# Patient Record
Sex: FEMALE | Race: WHITE | Employment: OTHER | URBAN - METROPOLITAN AREA
[De-identification: names, ages, dates, MRNs, and addresses within clinical notes are randomized per-mention and may not be internally consistent; named-entity substitution may affect disease eponyms.]

---

## 2017-09-19 ENCOUNTER — TELEPHONE (OUTPATIENT)
Dept: CARDIOLOGY CLINIC | Age: 72
End: 2017-09-19

## 2017-09-19 NOTE — TELEPHONE ENCOUNTER
Called Dr. Kashmir Lovell office to let staff know we have the request for clearance for pt for surgery. Pt needs to be seen, has an appt on 11/22, surgery is scheduled for 11/30. Let staff know we cannot clear until pt is seen. She verbalized understanding.

## 2017-09-19 NOTE — TELEPHONE ENCOUNTER
Kelly mazariegos/Dr. Adelfo Bell office wants to speak with you. She said she already left a message for you regarding this patient. Please call.  Thanks

## 2018-02-11 ENCOUNTER — APPOINTMENT (OUTPATIENT)
Dept: CT IMAGING | Age: 73
DRG: 853 | End: 2018-02-11
Attending: EMERGENCY MEDICINE
Payer: MEDICARE

## 2018-02-11 ENCOUNTER — ANESTHESIA (OUTPATIENT)
Dept: SURGERY | Age: 73
DRG: 853 | End: 2018-02-11
Payer: MEDICARE

## 2018-02-11 ENCOUNTER — HOSPITAL ENCOUNTER (INPATIENT)
Age: 73
LOS: 32 days | Discharge: NURSING, REHAB OR LONG-TERM CARE | DRG: 853 | End: 2018-03-16
Attending: EMERGENCY MEDICINE | Admitting: SURGERY
Payer: MEDICARE

## 2018-02-11 ENCOUNTER — ANESTHESIA EVENT (OUTPATIENT)
Dept: SURGERY | Age: 73
DRG: 853 | End: 2018-02-11
Payer: MEDICARE

## 2018-02-11 DIAGNOSIS — K25.1 ACUTE GASTRIC ULCER WITH PERFORATION (HCC): Primary | ICD-10-CM

## 2018-02-11 DIAGNOSIS — S00.01XA ABRASION OF SCALP, INITIAL ENCOUNTER: ICD-10-CM

## 2018-02-11 DIAGNOSIS — E87.20 LACTIC ACIDOSIS: ICD-10-CM

## 2018-02-11 DIAGNOSIS — Z98.890 S/P EXPLORATORY LAPAROTOMY: ICD-10-CM

## 2018-02-11 DIAGNOSIS — S22.081A T12 BURST FRACTURE (HCC): ICD-10-CM

## 2018-02-11 DIAGNOSIS — S32.029A CLOSED FRACTURE OF SECOND LUMBAR VERTEBRA, UNSPECIFIED FRACTURE MORPHOLOGY, INITIAL ENCOUNTER (HCC): ICD-10-CM

## 2018-02-11 DIAGNOSIS — R19.8 PERFORATED VISCUS: ICD-10-CM

## 2018-02-11 DIAGNOSIS — S00.83XA FACIAL CONTUSION, INITIAL ENCOUNTER: ICD-10-CM
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
## 2018-02-11 DIAGNOSIS — K25.5 PERFORATED CHRONIC GASTRIC ULCER (HCC): ICD-10-CM

## 2018-02-11 DIAGNOSIS — K66.8 FREE INTRAPERITONEAL AIR: ICD-10-CM

## 2018-02-11 DIAGNOSIS — R52 PAIN: ICD-10-CM

## 2018-02-11 DIAGNOSIS — K70.30 ALCOHOLIC CIRRHOSIS OF LIVER WITHOUT ASCITES (HCC): ICD-10-CM

## 2018-02-11 DIAGNOSIS — F10.10 ETOH ABUSE: ICD-10-CM

## 2018-02-11 LAB
AMMONIA PLAS-SCNC: <10 UMOL/L
AMPHET UR QL SCN: NEGATIVE
ANION GAP BLD CALC-SCNC: 22 MMOL/L (ref 5–15)
APAP SERPL-MCNC: <2 UG/ML (ref 10–30)
APPEARANCE UR: ABNORMAL
APTT PPP: 25.3 SEC (ref 22.1–32.5)
BACTERIA URNS QL MICRO: ABNORMAL /HPF
BARBITURATES UR QL SCN: NEGATIVE
BASOPHILS # BLD: 0 K/UL (ref 0–0.1)
BASOPHILS NFR BLD: 0 % (ref 0–1)
BENZODIAZ UR QL: NEGATIVE
BILIRUB UR QL: NEGATIVE
BUN BLD-MCNC: 14 MG/DL (ref 9–20)
CA-I BLD-MCNC: 1.08 MMOL/L (ref 1.12–1.32)
CANNABINOIDS UR QL SCN: NEGATIVE
CHLORIDE BLD-SCNC: 101 MMOL/L (ref 98–107)
CK SERPL-CCNC: 162 U/L (ref 26–192)
CO2 BLD-SCNC: 17 MMOL/L (ref 21–32)
COCAINE UR QL SCN: NEGATIVE
COLOR UR: YELLOW
CREAT BLD-MCNC: 1 MG/DL (ref 0.6–1.3)
DIFFERENTIAL METHOD BLD: ABNORMAL
DRUG SCRN COMMENT,DRGCM: NORMAL
EOSINOPHIL # BLD: 0 K/UL (ref 0–0.4)
EOSINOPHIL NFR BLD: 0 % (ref 0–7)
EPITH CASTS URNS QL MICRO: ABNORMAL /LPF
ERYTHROCYTE [DISTWIDTH] IN BLOOD BY AUTOMATED COUNT: 13.2 % (ref 11.5–14.5)
ETHANOL SERPL-MCNC: 14 MG/DL
GLUCOSE BLD-MCNC: 108 MG/DL (ref 65–100)
GLUCOSE UR STRIP.AUTO-MCNC: NEGATIVE MG/DL
HCT VFR BLD AUTO: 49.6 % (ref 35–47)
HCT VFR BLD CALC: 53 % (ref 35–47)
HGB BLD-MCNC: 16.3 G/DL (ref 11.5–16)
HGB BLD-MCNC: 18 GM/DL (ref 11.5–16)
HGB UR QL STRIP: ABNORMAL
HYALINE CASTS URNS QL MICRO: ABNORMAL /LPF (ref 0–5)
IMM GRANULOCYTES # BLD: 0 K/UL
IMM GRANULOCYTES NFR BLD AUTO: 0 %
INR PPP: 1.2 (ref 0.9–1.1)
KETONES UR QL STRIP.AUTO: NEGATIVE MG/DL
LACTATE SERPL-SCNC: 8.4 MMOL/L (ref 0.4–2)
LEUKOCYTE ESTERASE UR QL STRIP.AUTO: ABNORMAL
LIPASE SERPL-CCNC: 184 U/L (ref 73–393)
LYMPHOCYTES # BLD: 0.3 K/UL (ref 0.8–3.5)
LYMPHOCYTES NFR BLD: 12 % (ref 12–49)
MCH RBC QN AUTO: 34.8 PG (ref 26–34)
MCHC RBC AUTO-ENTMCNC: 32.9 G/DL (ref 30–36.5)
MCV RBC AUTO: 105.8 FL (ref 80–99)
METAMYELOCYTES NFR BLD MANUAL: 9 %
METHADONE UR QL: NEGATIVE
MONOCYTES # BLD: 0.3 K/UL (ref 0–1)
MONOCYTES NFR BLD: 11 % (ref 5–13)
MYELOCYTES NFR BLD MANUAL: 1 %
NEUTS BAND NFR BLD MANUAL: 22 % (ref 0–6)
NEUTS SEG # BLD: 1.7 K/UL (ref 1.8–8)
NEUTS SEG NFR BLD: 45 % (ref 32–75)
NITRITE UR QL STRIP.AUTO: NEGATIVE
NRBC # BLD: 0 K/UL (ref 0–0.01)
NRBC BLD-RTO: 0 PER 100 WBC
OPIATES UR QL: NEGATIVE
PCP UR QL: NEGATIVE
PH UR STRIP: 5.5 [PH] (ref 5–8)
PLATELET # BLD AUTO: 173 K/UL (ref 150–400)
PLATELET COMMENTS,PCOM: ABNORMAL
PMV BLD AUTO: 12.9 FL (ref 8.9–12.9)
POTASSIUM BLD-SCNC: 4 MMOL/L (ref 3.5–5.1)
PROT UR STRIP-MCNC: 30 MG/DL
PROTHROMBIN TIME: 11.7 SEC (ref 9–11.1)
RBC # BLD AUTO: 4.69 M/UL (ref 3.8–5.2)
RBC #/AREA URNS HPF: ABNORMAL /HPF (ref 0–5)
RBC MORPH BLD: ABNORMAL
SALICYLATES SERPL-MCNC: 1.9 MG/DL (ref 2.8–20)
SERVICE CMNT-IMP: ABNORMAL
SODIUM BLD-SCNC: 135 MMOL/L (ref 136–145)
SP GR UR REFRACTOMETRY: 1 (ref 1–1.03)
THERAPEUTIC RANGE,PTTT: NORMAL SECS (ref 58–77)
TROPONIN I SERPL-MCNC: <0.04 NG/ML
UR CULT HOLD, URHOLD: NORMAL
UROBILINOGEN UR QL STRIP.AUTO: 0.2 EU/DL (ref 0.2–1)
WBC # BLD AUTO: 2.6 K/UL (ref 3.6–11)
WBC URNS QL MICRO: ABNORMAL /HPF (ref 0–4)

## 2018-02-11 PROCEDURE — 85610 PROTHROMBIN TIME: CPT | Performed by: EMERGENCY MEDICINE

## 2018-02-11 PROCEDURE — 84484 ASSAY OF TROPONIN QUANT: CPT | Performed by: EMERGENCY MEDICINE

## 2018-02-11 PROCEDURE — 36415 COLL VENOUS BLD VENIPUNCTURE: CPT | Performed by: EMERGENCY MEDICINE

## 2018-02-11 PROCEDURE — 74011000250 HC RX REV CODE- 250: Performed by: EMERGENCY MEDICINE

## 2018-02-11 PROCEDURE — 70486 CT MAXILLOFACIAL W/O DYE: CPT

## 2018-02-11 PROCEDURE — 74011000258 HC RX REV CODE- 258: Performed by: EMERGENCY MEDICINE

## 2018-02-11 PROCEDURE — 80047 BASIC METABLC PNL IONIZED CA: CPT

## 2018-02-11 PROCEDURE — 77030034850

## 2018-02-11 PROCEDURE — 80307 DRUG TEST PRSMV CHEM ANLYZR: CPT | Performed by: EMERGENCY MEDICINE

## 2018-02-11 PROCEDURE — 87040 BLOOD CULTURE FOR BACTERIA: CPT | Performed by: EMERGENCY MEDICINE

## 2018-02-11 PROCEDURE — 81001 URINALYSIS AUTO W/SCOPE: CPT | Performed by: EMERGENCY MEDICINE

## 2018-02-11 PROCEDURE — 99284 EMERGENCY DEPT VISIT MOD MDM: CPT

## 2018-02-11 PROCEDURE — 96375 TX/PRO/DX INJ NEW DRUG ADDON: CPT

## 2018-02-11 PROCEDURE — 83605 ASSAY OF LACTIC ACID: CPT | Performed by: EMERGENCY MEDICINE

## 2018-02-11 PROCEDURE — 96361 HYDRATE IV INFUSION ADD-ON: CPT

## 2018-02-11 PROCEDURE — 82550 ASSAY OF CK (CPK): CPT | Performed by: EMERGENCY MEDICINE

## 2018-02-11 PROCEDURE — 96365 THER/PROPH/DIAG IV INF INIT: CPT

## 2018-02-11 PROCEDURE — 74011636320 HC RX REV CODE- 636/320: Performed by: RADIOLOGY

## 2018-02-11 PROCEDURE — 74177 CT ABD & PELVIS W/CONTRAST: CPT

## 2018-02-11 PROCEDURE — 82140 ASSAY OF AMMONIA: CPT | Performed by: EMERGENCY MEDICINE

## 2018-02-11 PROCEDURE — 83690 ASSAY OF LIPASE: CPT | Performed by: EMERGENCY MEDICINE

## 2018-02-11 PROCEDURE — 51702 INSERT TEMP BLADDER CATH: CPT

## 2018-02-11 PROCEDURE — 74011250636 HC RX REV CODE- 250/636

## 2018-02-11 PROCEDURE — 85730 THROMBOPLASTIN TIME PARTIAL: CPT | Performed by: EMERGENCY MEDICINE

## 2018-02-11 PROCEDURE — 74011250636 HC RX REV CODE- 250/636: Performed by: EMERGENCY MEDICINE

## 2018-02-11 PROCEDURE — 93005 ELECTROCARDIOGRAM TRACING: CPT

## 2018-02-11 PROCEDURE — 85025 COMPLETE CBC W/AUTO DIFF WBC: CPT | Performed by: EMERGENCY MEDICINE

## 2018-02-11 PROCEDURE — 70450 CT HEAD/BRAIN W/O DYE: CPT

## 2018-02-11 RX ORDER — ONDANSETRON 2 MG/ML
4 INJECTION INTRAMUSCULAR; INTRAVENOUS
Status: COMPLETED | OUTPATIENT
Start: 2018-02-11 | End: 2018-02-11

## 2018-02-11 RX ORDER — SODIUM CHLORIDE 0.9 % (FLUSH) 0.9 %
5-10 SYRINGE (ML) INJECTION AS NEEDED
Status: DISCONTINUED | OUTPATIENT
Start: 2018-02-11 | End: 2018-03-16 | Stop reason: HOSPADM

## 2018-02-11 RX ORDER — NAPROXEN SODIUM 220 MG
220 TABLET ORAL
COMMUNITY
End: 2018-03-16

## 2018-02-11 RX ORDER — FAMOTIDINE 10 MG/ML
20 INJECTION INTRAVENOUS
Status: COMPLETED | OUTPATIENT
Start: 2018-02-11 | End: 2018-02-11

## 2018-02-11 RX ORDER — SODIUM CHLORIDE 0.9 % (FLUSH) 0.9 %
5-10 SYRINGE (ML) INJECTION EVERY 8 HOURS
Status: DISCONTINUED | OUTPATIENT
Start: 2018-02-11 | End: 2018-02-13 | Stop reason: SDUPTHER

## 2018-02-11 RX ORDER — LANOLIN ALCOHOL/MO/W.PET/CERES
500 CREAM (GRAM) TOPICAL DAILY
COMMUNITY
End: 2018-04-25

## 2018-02-11 RX ORDER — BISMUTH SUBSALICYLATE 262 MG
1 TABLET,CHEWABLE ORAL DAILY
COMMUNITY

## 2018-02-11 RX ORDER — FENTANYL CITRATE 50 UG/ML
50 INJECTION, SOLUTION INTRAMUSCULAR; INTRAVENOUS
Status: COMPLETED | OUTPATIENT
Start: 2018-02-11 | End: 2018-02-11

## 2018-02-11 RX ADMIN — PIPERACILLIN SODIUM AND TAZOBACTAM SODIUM 3.38 G: 3; .375 INJECTION, POWDER, LYOPHILIZED, FOR SOLUTION INTRAVENOUS at 22:43

## 2018-02-11 RX ADMIN — IOPAMIDOL 100 ML: 755 INJECTION, SOLUTION INTRAVENOUS at 21:49

## 2018-02-11 RX ADMIN — FAMOTIDINE 20 MG: 10 INJECTION, SOLUTION INTRAVENOUS at 21:52

## 2018-02-11 RX ADMIN — SODIUM CHLORIDE 1000 ML: 9 INJECTION, SOLUTION INTRAVENOUS at 21:51

## 2018-02-11 RX ADMIN — ONDANSETRON 4 MG: 2 INJECTION INTRAMUSCULAR; INTRAVENOUS at 21:52

## 2018-02-11 RX ADMIN — Medication 10 ML: at 21:52

## 2018-02-11 RX ADMIN — FENTANYL CITRATE 50 MCG: 50 INJECTION, SOLUTION INTRAMUSCULAR; INTRAVENOUS at 21:52

## 2018-02-11 RX ADMIN — SODIUM CHLORIDE 1000 ML: 900 INJECTION, SOLUTION INTRAVENOUS at 22:35

## 2018-02-11 RX ADMIN — SODIUM CHLORIDE, SODIUM LACTATE, POTASSIUM CHLORIDE, CALCIUM CHLORIDE: 600; 310; 30; 20 INJECTION, SOLUTION INTRAVENOUS at 23:20

## 2018-02-11 NOTE — IP AVS SNAPSHOT
[facility] 
 
 
 [address] 1007 [facility] 
[phone] Patient: Raisa Fink MRN: [id number] GAZ:[id number] About your hospitalization You were admitted on:  February 12, 2018 You last received care in the:  [facility] 4M POST SURG ORT 2 You were discharged on:  March 16, 2018 Why you were hospitalized Your primary diagnosis was:  Perforated Chronic Gastric Ulcer (Hcc) Your diagnoses also included:  Etoh Abuse, Free Intraperitoneal Air, S/P Exploratory Laparotomy, Alcoholic Cirrhosis Of Liver Without Ascites (Hcc), Fatty Liver Determined By Biopsy, Hyperammonemia (Hcc), Hypokalemia, Leukocytosis, Severe Protein-Calorie Malnutrition (Hcc), Acute Metabolic Encephalopathy, Gastrocutaneous Fistula, Pleural Effusion, Bilateral, Intra-Abdominal Fluid Collection, T12 Burst Fracture (Hcc) Follow-up Information Follow up With Details Comments Contact Info Ash Magallon MD In 2 weeks  [address] [place] [address] 
[phone] [address] [place] [address] 
[phone] [facility] [address]   210 [facility] [address] 23 [facility]   210 [facility] 74 
[phone] Discharge Orders None A check halina indicates which time of day the medication should be taken. My Medications START taking these medications Instructions Each Dose to Equal  
 Morning Noon Evening Bedtime  
 fentaNYL 25 mcg/hr PATCH Commonly known as:  Dorothy Benne Start taking on:  3/18/2018 Your next dose is:  1130 am on Sunday March 18 1 Patch by TransDERmal route every seventy-two (72) hours. Max Daily Amount: 1 Patch. 1 Patch  
    
  
   
   
   
  
 pantoprazole 40 mg tablet Commonly known as:  PROTONIX Your next dose is:  Tomorrow before breakfast  
   
 Take 1 Tab by mouth Before breakfast and dinner. 40 mg  
    
  
   
   
   
  
 polyethylene glycol 17 gram packet Commonly known as:  Judye Gaster Your next dose is:  Tomorrow before breakfast  
   
 Take 1 Packet by mouth daily. 17 g  
    
  
   
   
   
  
 spironolactone 25 mg tablet Commonly known as:  ALDACTONE Your next dose is:  Tomorrow in am  
   
 Take 1 Tab by mouth daily. 25 mg CONTINUE taking these medications Instructions Each Dose to Equal  
 Morning Noon Evening Bedtime  
 cyanocobalamin 500 mcg tablet Commonly known as:  VITAMIN B12 Your next dose is:  Tomorrow in am  
   
 Take 500 mcg by mouth daily. 500 mcg  
    
  
   
   
   
  
 multivitamin tablet Commonly known as:  ONE A DAY Your next dose is:  Tomorrow am  
   
 Take 1 Tab by mouth daily. 1 Tab  
    
  
   
   
   
  
 omega-3 fatty acids-vitamin e 1,000 mg Cap Your next dose is:  Tomorrow am  
   
 Take 2 Caps by mouth daily. 2 Cap STOP taking these medications ALEVE 220 mg tablet Generic drug:  naproxen sodium Where to Get Your Medications Information on where to get these meds will be given to you by the nurse or doctor. ! Ask your nurse or doctor about these medications  
  fentaNYL 25 mcg/hr PATCH  
 pantoprazole 40 mg tablet  
 polyethylene glycol 17 gram packet  
 spironolactone 25 mg tablet Discharge Instructions Peptic Ulcer Disease: Care Instructions Your Care Instructions Peptic ulcers are sores on the inside of the stomach or the small intestine. They are usually caused by an infection with bacteria or from use of nonsteroidal anti-inflammatory drugs (NSAIDs). NSAIDs include aspirin, ibuprofen (Advil), and naproxen (Aleve). Your doctor may have prescribed medicine to reduce stomach acid.   You also may need to take antibiotics if your peptic ulcers are caused by an infection. You can help your stomach heal and keep ulcers from coming back by making some changes in your lifestyle. Quit smoking, limit caffeine and alcohol, and reduce stress. Follow-up care is a key part of your treatment and safety. Be sure to make and go to all appointments, and call your doctor if you are having problems. It's also a good idea to know your test results and keep a list of the medicines you take. How can you care for yourself at home? · Take your medicines exactly as prescribed. Call your doctor if you think you are having a problem with your medicine. · Do not take aspirin or other NSAIDs such as ibuprofen (Advil or Motrin) or naproxen (Aleve). Ask your doctor what you can take for pain. · Do not smoke. Smoking can make ulcers worse. If you need help quitting, talk to your doctor about stop-smoking programs and medicines. These can increase your chances of quitting for good. · Drink in moderation or avoid drinking alcohol. · Do not drink beverages that have caffeine if they bother your stomach. These include coffee, tea, and soda. · Eat a balanced diet of small, frequent meals. Make an appointment with a dietitian if you need help planning your meals. · Reduce stress. Avoid people and places that make you feel anxious, if you can. Learn ways to reduce stress, such as biofeedback, guided imagery, and meditation. When should you call for help? Call 911 anytime you think you may need emergency care. For example, call if: 
? · You vomit blood or what looks like coffee grounds. ? · You pass maroon or very bloody stools. ?Call your doctor now or seek immediate medical care if: 
? · You have new or worse belly pain. ? · Your stools are black and look like tar, or they have streaks of blood. ? · You are vomiting. ? Watch closely for changes in your health, and be sure to contact your doctor if: 
? · You do not feel better as expected. Where can you learn more? Go to http://tala-jacob.info/. Enter C931 in the search box to learn more about \"Peptic Ulcer Disease: Care Instructions. \" Current as of: May 12, 2017 Content Version: 11.4 © 3136-0190 [employer]. Care instructions adapted under license by [employer] (which disclaims liability or warranty for this information). If you have questions about a medical condition or this instruction, always ask your healthcare professional. [place] [address] any warranty or liability for your use of this information. Garth Siemens. Veronica Ace MD, FACS General Surgery at [address] [place] [address], [address] 
[phone] Fax [phone] The[employer] Announcement We are excited to announce that we are making your provider's discharge notes available to you in TheFriendMail. You will see these notes when they are completed and signed by the physician that discharged you from your recent hospital stay. If you have any questions or concerns about any information you see in TheFriendMail, please call the Health Information Department where you were seen or reach out to your Primary Care Provider for more information about your plan of care. Introducing [facility] [place] [facility]! Dear Kiki Mccauley: Thank you for requesting a TheFriendMail account. Our records indicate that you already have an active TheFriendMail account. You can access your account anytime at [email or web address] Did you know that you can access your hospital and ER discharge instructions at any time in TheFriendMail? You can also review all of your test results from your hospital stay or ER visit. Additional Information If you have questions, please visit the Frequently Asked Questions section of the TheFriendMail website at [email or web address] Remember, MyChart is NOT to be used for urgent needs. For medical emergencies, dial 911. Now available from your iPhone and Android! Providers Seen During Your Hospitalization Provider Specialty Primary office phone [place], [address] Emergency Medicine [phone] Katia Blake MD Surgery [phone] Your Primary Care Physician (PCP) Primary Care Physician Office Phone Office Fax [phone] [facility],[address], [address]  [phone] You are allergic to the following No active allergies Recent Documentation Height Weight Breastfeeding? BMI OB Status Smoking Status 1.613 m 81 kg No 31.14 kg/m2 Postmenopausal Former Smoker Emergency Contacts Name Discharge Info Relation Home Work Mobile [address] CAREGIVER [3] Daughter [21] [phone] Patient Belongings The following personal items are in your possession at time of discharge: 
  Dental Appliances: None  Visual Aid: Glasses, With patient      Home Medications: None   Jewelry: Ring  Clothing: At bedside    Other Valuables: At bedside Please provide this summary of care documentation to your next provider. Signatures-by signing, you are acknowledging that this After Visit Summary has been reviewed with you and you have received a copy. Patient Signature:  ____________________________________________________________ Date:  ___________________________________________________________[facility] Provider Signature:  ____________________________________________________________ Date:  ____________________________________________________________

## 2018-02-11 NOTE — IP AVS SNAPSHOT
[facility] 
 
 
 [address] 
[phone] Patient: Glen Alberto MRN: [id number] YXQ:[birth year] A check halina indicates which time of day the medication should be taken. My Medications START taking these medications Instructions Each Dose to Equal  
 Morning Noon Evening Bedtime  
 fentaNYL 25 mcg/hr PATCH Commonly known as:  Daryl Lozada Start taking on:  3/18/2018 Your next dose is:  1130 am on Sunday March 18 1 Patch by TransDERmal route every seventy-two (72) hours. Max Daily Amount: 1 Patch. 1 Patch  
    
  
   
   
   
  
 pantoprazole 40 mg tablet Commonly known as:  PROTONIX Your next dose is:  Tomorrow before breakfast  
   
 Take 1 Tab by mouth Before breakfast and dinner. 40 mg  
    
  
   
   
   
  
 polyethylene glycol 17 gram packet Commonly known as:  Gillermina Larchwood Your next dose is:  Tomorrow before breakfast  
   
 Take 1 Packet by mouth daily. 17 g  
    
  
   
   
   
  
 spironolactone 25 mg tablet Commonly known as:  ALDACTONE Your next dose is:  Tomorrow in am  
   
 Take 1 Tab by mouth daily. 25 mg CONTINUE taking these medications Instructions Each Dose to Equal  
 Morning Noon Evening Bedtime  
 cyanocobalamin 500 mcg tablet Commonly known as:  VITAMIN B12 Your next dose is:  Tomorrow in am  
   
 Take 500 mcg by mouth daily. 500 mcg  
    
  
   
   
   
  
 multivitamin tablet Commonly known as:  ONE A DAY Your next dose is:  Tomorrow am  
   
 Take 1 Tab by mouth daily. 1 Tab  
    
  
   
   
   
  
 omega-3 fatty acids-vitamin e 1,000 mg Cap Your next dose is:  Tomorrow am  
   
 Take 2 Caps by mouth daily. 2 Cap STOP taking these medications ALEVE 220 mg tablet Generic drug:  naproxen sodium Where to Get Your Medications Information on where to get these meds will be given to you by the nurse or doctor. ! Ask your nurse or doctor about these medications  
  fentaNYL 25 mcg/hr PATCH  
 pantoprazole 40 mg tablet  
 polyethylene glycol 17 gram packet  
 spironolactone 25 mg tablet

## 2018-02-12 ENCOUNTER — APPOINTMENT (OUTPATIENT)
Dept: GENERAL RADIOLOGY | Age: 73
DRG: 853 | End: 2018-02-12
Attending: ANESTHESIOLOGY
Payer: MEDICARE

## 2018-02-12 PROBLEM — K70.30 ALCOHOLIC CIRRHOSIS OF LIVER WITHOUT ASCITES (HCC): Status: ACTIVE | Noted: 2018-02-12

## 2018-02-12 PROBLEM — K66.8 FREE INTRAPERITONEAL AIR: Status: ACTIVE | Noted: 2018-02-12

## 2018-02-12 PROBLEM — Z98.890 S/P EXPLORATORY LAPAROTOMY: Status: ACTIVE | Noted: 2018-02-12

## 2018-02-12 PROBLEM — K25.5 PERFORATED CHRONIC GASTRIC ULCER (HCC): Status: ACTIVE | Noted: 2018-02-11

## 2018-02-12 LAB
ABO + RH BLD: NORMAL
ANION GAP SERPL CALC-SCNC: 10 MMOL/L (ref 5–15)
ARTERIAL PATENCY WRIST A: ABNORMAL
ATRIAL RATE: 106 BPM
BASE DEFICIT BLDA-SCNC: 5.3 MMOL/L
BASOPHILS # BLD: 0 K/UL (ref 0–0.1)
BASOPHILS NFR BLD: 0 % (ref 0–1)
BDY SITE: ABNORMAL
BLOOD GROUP ANTIBODIES SERPL: NORMAL
BUN SERPL-MCNC: 11 MG/DL (ref 6–20)
BUN/CREAT SERPL: 24 (ref 12–20)
CALCIUM SERPL-MCNC: 5.5 MG/DL (ref 8.5–10.1)
CALCULATED P AXIS, ECG09: 40 DEGREES
CALCULATED R AXIS, ECG10: 29 DEGREES
CALCULATED T AXIS, ECG11: 66 DEGREES
CHLORIDE SERPL-SCNC: 114 MMOL/L (ref 97–108)
CO2 SERPL-SCNC: 17 MMOL/L (ref 21–32)
CREAT SERPL-MCNC: 0.46 MG/DL (ref 0.55–1.02)
DIAGNOSIS, 93000: NORMAL
DIFFERENTIAL METHOD BLD: ABNORMAL
EOSINOPHIL # BLD: 0 K/UL (ref 0–0.4)
EOSINOPHIL NFR BLD: 1 % (ref 0–7)
EPAP/CPAP/PEEP, PAPEEP: 5
ERYTHROCYTE [DISTWIDTH] IN BLOOD BY AUTOMATED COUNT: 13 % (ref 11.5–14.5)
FIO2 ON VENT: 65 %
GAS FLOW.O2 O2 DELIVERY SYS: 50 L/MIN
GAS FLOW.O2 SETTING OXYMISER: 14 L/MIN
GLUCOSE SERPL-MCNC: 80 MG/DL (ref 65–100)
HCO3 BLDA-SCNC: 19 MMOL/L (ref 22–26)
HCT VFR BLD AUTO: 41.7 % (ref 35–47)
HGB BLD-MCNC: 13.8 G/DL (ref 11.5–16)
IMM GRANULOCYTES # BLD: 0 K/UL
IMM GRANULOCYTES NFR BLD AUTO: 0 %
LYMPHOCYTES # BLD: 0.2 K/UL (ref 0.8–3.5)
LYMPHOCYTES NFR BLD: 10 % (ref 12–49)
MAGNESIUM SERPL-MCNC: 0.7 MG/DL (ref 1.6–2.4)
MCH RBC QN AUTO: 34.8 PG (ref 26–34)
MCHC RBC AUTO-ENTMCNC: 33.1 G/DL (ref 30–36.5)
MCV RBC AUTO: 105 FL (ref 80–99)
METAMYELOCYTES NFR BLD MANUAL: 7 %
MONOCYTES # BLD: 0.2 K/UL (ref 0–1)
MONOCYTES NFR BLD: 10 % (ref 5–13)
NEUTS BAND NFR BLD MANUAL: 29 % (ref 0–6)
NEUTS SEG # BLD: 1.3 K/UL (ref 1.8–8)
NEUTS SEG NFR BLD: 43 % (ref 32–75)
NRBC # BLD: 0 K/UL (ref 0–0.01)
NRBC BLD-RTO: 0 PER 100 WBC
P-R INTERVAL, ECG05: 144 MS
PCO2 BLDA: 36 MMHG (ref 35–45)
PH BLDA: 7.36 [PH] (ref 7.35–7.45)
PLATELET # BLD AUTO: 115 K/UL (ref 150–400)
PMV BLD AUTO: 12.6 FL (ref 8.9–12.9)
PO2 BLDA: 81 MMHG (ref 80–100)
POTASSIUM SERPL-SCNC: 3.2 MMOL/L (ref 3.5–5.1)
Q-T INTERVAL, ECG07: 372 MS
QRS DURATION, ECG06: 88 MS
QTC CALCULATION (BEZET), ECG08: 494 MS
RBC # BLD AUTO: 3.97 M/UL (ref 3.8–5.2)
RBC MORPH BLD: ABNORMAL
SAO2 % BLD: 96 % (ref 92–97)
SAO2% DEVICE SAO2% SENSOR NAME: ABNORMAL
SODIUM SERPL-SCNC: 141 MMOL/L (ref 136–145)
SPECIMEN EXP DATE BLD: NORMAL
SPECIMEN SITE: ABNORMAL
VENTILATION MODE VENT: ABNORMAL
VENTRICULAR RATE, ECG03: 106 BPM
VT SETTING VENT: 450 ML
WBC # BLD AUTO: 1.8 K/UL (ref 3.6–11)
WBC MORPH BLD: ABNORMAL

## 2018-02-12 PROCEDURE — 77030018836 HC SOL IRR NACL ICUM -A: Performed by: SURGERY

## 2018-02-12 PROCEDURE — 74011250637 HC RX REV CODE- 250/637: Performed by: INTERNAL MEDICINE

## 2018-02-12 PROCEDURE — 82803 BLOOD GASES ANY COMBINATION: CPT | Performed by: SURGERY

## 2018-02-12 PROCEDURE — 77030011278 HC ELECTRD LIG IMPT COVD -F: Performed by: SURGERY

## 2018-02-12 PROCEDURE — 83735 ASSAY OF MAGNESIUM: CPT | Performed by: SURGERY

## 2018-02-12 PROCEDURE — 77030011640 HC PAD GRND REM COVD -A: Performed by: SURGERY

## 2018-02-12 PROCEDURE — 0DU607Z SUPPLEMENT STOMACH WITH AUTOLOGOUS TISSUE SUBSTITUTE, OPEN APPROACH: ICD-10-PCS | Performed by: SURGERY

## 2018-02-12 PROCEDURE — 65610000006 HC RM INTENSIVE CARE

## 2018-02-12 PROCEDURE — 0DB60ZX EXCISION OF STOMACH, OPEN APPROACH, DIAGNOSTIC: ICD-10-PCS | Performed by: SURGERY

## 2018-02-12 PROCEDURE — 77030026438 HC STYL ET INTUB CARD -A: Performed by: ANESTHESIOLOGY

## 2018-02-12 PROCEDURE — 87077 CULTURE AEROBIC IDENTIFY: CPT | Performed by: EMERGENCY MEDICINE

## 2018-02-12 PROCEDURE — C1765 ADHESION BARRIER: HCPCS | Performed by: SURGERY

## 2018-02-12 PROCEDURE — 76060000035 HC ANESTHESIA 2 TO 2.5 HR: Performed by: SURGERY

## 2018-02-12 PROCEDURE — 77030005401 HC CATH RAD ARRO -A: Performed by: ANESTHESIOLOGY

## 2018-02-12 PROCEDURE — 77030019908 HC STETH ESOPH SIMS -A: Performed by: ANESTHESIOLOGY

## 2018-02-12 PROCEDURE — C9113 INJ PANTOPRAZOLE SODIUM, VIA: HCPCS | Performed by: SURGERY

## 2018-02-12 PROCEDURE — 74011250636 HC RX REV CODE- 250/636: Performed by: INTERNAL MEDICINE

## 2018-02-12 PROCEDURE — 74011250636 HC RX REV CODE- 250/636

## 2018-02-12 PROCEDURE — 71045 X-RAY EXAM CHEST 1 VIEW: CPT

## 2018-02-12 PROCEDURE — 85025 COMPLETE CBC W/AUTO DIFF WBC: CPT | Performed by: SURGERY

## 2018-02-12 PROCEDURE — 74011250636 HC RX REV CODE- 250/636: Performed by: SURGERY

## 2018-02-12 PROCEDURE — 77030019563 HC DEV ATTCH FEED HOLL -A

## 2018-02-12 PROCEDURE — 88307 TISSUE EXAM BY PATHOLOGIST: CPT | Performed by: SURGERY

## 2018-02-12 PROCEDURE — 80048 BASIC METABOLIC PNL TOTAL CA: CPT | Performed by: SURGERY

## 2018-02-12 PROCEDURE — 77030003481 HC NDL BIOP GUN BARD -B: Performed by: SURGERY

## 2018-02-12 PROCEDURE — 94002 VENT MGMT INPAT INIT DAY: CPT

## 2018-02-12 PROCEDURE — 87075 CULTR BACTERIA EXCEPT BLOOD: CPT | Performed by: EMERGENCY MEDICINE

## 2018-02-12 PROCEDURE — 76210000016 HC OR PH I REC 1 TO 1.5 HR: Performed by: SURGERY

## 2018-02-12 PROCEDURE — 87102 FUNGUS ISOLATION CULTURE: CPT | Performed by: EMERGENCY MEDICINE

## 2018-02-12 PROCEDURE — 77030013079 HC BLNKT BAIR HGGR 3M -A: Performed by: ANESTHESIOLOGY

## 2018-02-12 PROCEDURE — C1751 CATH, INF, PER/CENT/MIDLINE: HCPCS | Performed by: ANESTHESIOLOGY

## 2018-02-12 PROCEDURE — 77010033678 HC OXYGEN DAILY

## 2018-02-12 PROCEDURE — 77030020061 HC IV BLD WRMR ADMIN SET 3M -B: Performed by: ANESTHESIOLOGY

## 2018-02-12 PROCEDURE — 77030002966 HC SUT PDS J&J -A: Performed by: SURGERY

## 2018-02-12 PROCEDURE — 74011250637 HC RX REV CODE- 250/637: Performed by: SURGERY

## 2018-02-12 PROCEDURE — 74011000250 HC RX REV CODE- 250

## 2018-02-12 PROCEDURE — 77030008771 HC TU NG SALEM SUMP -A: Performed by: ANESTHESIOLOGY

## 2018-02-12 PROCEDURE — 77030008684 HC TU ET CUF COVD -B: Performed by: ANESTHESIOLOGY

## 2018-02-12 PROCEDURE — 77030020186 HC BOOT HL PROTCT SAGE -B

## 2018-02-12 PROCEDURE — 74011250636 HC RX REV CODE- 250/636: Performed by: ANESTHESIOLOGY

## 2018-02-12 PROCEDURE — 88313 SPECIAL STAINS GROUP 2: CPT | Performed by: SURGERY

## 2018-02-12 PROCEDURE — 86900 BLOOD TYPING SEROLOGIC ABO: CPT | Performed by: EMERGENCY MEDICINE

## 2018-02-12 PROCEDURE — 76010000131 HC OR TIME 2 TO 2.5 HR: Performed by: SURGERY

## 2018-02-12 PROCEDURE — 94003 VENT MGMT INPAT SUBQ DAY: CPT

## 2018-02-12 PROCEDURE — 77030002933 HC SUT MCRYL J&J -A: Performed by: SURGERY

## 2018-02-12 PROCEDURE — 88342 IMHCHEM/IMCYTCHM 1ST ANTB: CPT | Performed by: SURGERY

## 2018-02-12 PROCEDURE — 0D9670Z DRAINAGE OF STOMACH WITH DRAINAGE DEVICE, VIA NATURAL OR ARTIFICIAL OPENING: ICD-10-PCS | Performed by: SURGERY

## 2018-02-12 PROCEDURE — 74011000250 HC RX REV CODE- 250: Performed by: SURGERY

## 2018-02-12 PROCEDURE — 88305 TISSUE EXAM BY PATHOLOGIST: CPT | Performed by: SURGERY

## 2018-02-12 PROCEDURE — 77030032490 HC SLV COMPR SCD KNE COVD -B: Performed by: SURGERY

## 2018-02-12 PROCEDURE — 0FB20ZX EXCISION OF LEFT LOBE LIVER, OPEN APPROACH, DIAGNOSTIC: ICD-10-PCS | Performed by: SURGERY

## 2018-02-12 PROCEDURE — 77030011264 HC ELECTRD BLD EXT COVD -A: Performed by: SURGERY

## 2018-02-12 PROCEDURE — 77030013797 HC KT TRNSDUC PRSSR EDWD -A: Performed by: ANESTHESIOLOGY

## 2018-02-12 PROCEDURE — 36415 COLL VENOUS BLD VENIPUNCTURE: CPT | Performed by: EMERGENCY MEDICINE

## 2018-02-12 PROCEDURE — 87205 SMEAR GRAM STAIN: CPT | Performed by: EMERGENCY MEDICINE

## 2018-02-12 RX ORDER — LORAZEPAM 2 MG/ML
4 INJECTION INTRAMUSCULAR
Status: DISCONTINUED | OUTPATIENT
Start: 2018-02-12 | End: 2018-02-16

## 2018-02-12 RX ORDER — LIDOCAINE HYDROCHLORIDE 20 MG/ML
INJECTION, SOLUTION EPIDURAL; INFILTRATION; INTRACAUDAL; PERINEURAL AS NEEDED
Status: DISCONTINUED | OUTPATIENT
Start: 2018-02-12 | End: 2018-02-12 | Stop reason: HOSPADM

## 2018-02-12 RX ORDER — MAGNESIUM SULFATE HEPTAHYDRATE 40 MG/ML
2 INJECTION, SOLUTION INTRAVENOUS
Status: COMPLETED | OUTPATIENT
Start: 2018-02-12 | End: 2018-02-17

## 2018-02-12 RX ORDER — SODIUM CHLORIDE, SODIUM LACTATE, POTASSIUM CHLORIDE, CALCIUM CHLORIDE 600; 310; 30; 20 MG/100ML; MG/100ML; MG/100ML; MG/100ML
100 INJECTION, SOLUTION INTRAVENOUS CONTINUOUS
Status: DISCONTINUED | OUTPATIENT
Start: 2018-02-12 | End: 2018-02-12 | Stop reason: HOSPADM

## 2018-02-12 RX ORDER — MIDAZOLAM HYDROCHLORIDE 1 MG/ML
INJECTION, SOLUTION INTRAMUSCULAR; INTRAVENOUS AS NEEDED
Status: DISCONTINUED | OUTPATIENT
Start: 2018-02-12 | End: 2018-02-12 | Stop reason: HOSPADM

## 2018-02-12 RX ORDER — SODIUM CHLORIDE, SODIUM LACTATE, POTASSIUM CHLORIDE, CALCIUM CHLORIDE 600; 310; 30; 20 MG/100ML; MG/100ML; MG/100ML; MG/100ML
INJECTION, SOLUTION INTRAVENOUS
Status: DISCONTINUED | OUTPATIENT
Start: 2018-02-11 | End: 2018-02-12 | Stop reason: HOSPADM

## 2018-02-12 RX ORDER — PROPOFOL 10 MG/ML
5-50 VIAL (ML) INTRAVENOUS
Status: DISCONTINUED | OUTPATIENT
Start: 2018-02-12 | End: 2018-02-12 | Stop reason: HOSPADM

## 2018-02-12 RX ORDER — LORAZEPAM 2 MG/ML
2 INJECTION INTRAMUSCULAR
Status: DISCONTINUED | OUTPATIENT
Start: 2018-02-12 | End: 2018-02-16

## 2018-02-12 RX ORDER — ONDANSETRON 2 MG/ML
4 INJECTION INTRAMUSCULAR; INTRAVENOUS
Status: DISCONTINUED | OUTPATIENT
Start: 2018-02-12 | End: 2018-03-16 | Stop reason: HOSPADM

## 2018-02-12 RX ORDER — NALOXONE HYDROCHLORIDE 0.4 MG/ML
0.4 INJECTION, SOLUTION INTRAMUSCULAR; INTRAVENOUS; SUBCUTANEOUS AS NEEDED
Status: DISCONTINUED | OUTPATIENT
Start: 2018-02-12 | End: 2018-03-16 | Stop reason: HOSPADM

## 2018-02-12 RX ORDER — NYSTATIN 100000 [USP'U]/G
POWDER TOPICAL 2 TIMES DAILY
Status: DISCONTINUED | OUTPATIENT
Start: 2018-02-12 | End: 2018-03-16 | Stop reason: HOSPADM

## 2018-02-12 RX ORDER — SODIUM CHLORIDE, SODIUM LACTATE, POTASSIUM CHLORIDE, CALCIUM CHLORIDE 600; 310; 30; 20 MG/100ML; MG/100ML; MG/100ML; MG/100ML
INJECTION, SOLUTION INTRAVENOUS
Status: DISCONTINUED | OUTPATIENT
Start: 2018-02-12 | End: 2018-02-12 | Stop reason: HOSPADM

## 2018-02-12 RX ORDER — PROPOFOL 10 MG/ML
INJECTION, EMULSION INTRAVENOUS AS NEEDED
Status: DISCONTINUED | OUTPATIENT
Start: 2018-02-12 | End: 2018-02-12 | Stop reason: HOSPADM

## 2018-02-12 RX ORDER — SUCCINYLCHOLINE CHLORIDE 20 MG/ML
INJECTION INTRAMUSCULAR; INTRAVENOUS AS NEEDED
Status: DISCONTINUED | OUTPATIENT
Start: 2018-02-12 | End: 2018-02-12 | Stop reason: HOSPADM

## 2018-02-12 RX ORDER — FENTANYL CITRATE 50 UG/ML
INJECTION, SOLUTION INTRAMUSCULAR; INTRAVENOUS AS NEEDED
Status: DISCONTINUED | OUTPATIENT
Start: 2018-02-12 | End: 2018-02-12 | Stop reason: HOSPADM

## 2018-02-12 RX ORDER — HYDROMORPHONE HYDROCHLORIDE 2 MG/ML
1 INJECTION, SOLUTION INTRAMUSCULAR; INTRAVENOUS; SUBCUTANEOUS
Status: DISCONTINUED | OUTPATIENT
Start: 2018-02-12 | End: 2018-02-12

## 2018-02-12 RX ORDER — SODIUM CHLORIDE 0.9 % (FLUSH) 0.9 %
5-10 SYRINGE (ML) INJECTION EVERY 8 HOURS
Status: DISCONTINUED | OUTPATIENT
Start: 2018-02-12 | End: 2018-02-17

## 2018-02-12 RX ORDER — ROCURONIUM BROMIDE 10 MG/ML
INJECTION, SOLUTION INTRAVENOUS AS NEEDED
Status: DISCONTINUED | OUTPATIENT
Start: 2018-02-12 | End: 2018-02-12 | Stop reason: HOSPADM

## 2018-02-12 RX ORDER — SODIUM CHLORIDE 0.9 % (FLUSH) 0.9 %
5-10 SYRINGE (ML) INJECTION AS NEEDED
Status: DISCONTINUED | OUTPATIENT
Start: 2018-02-12 | End: 2018-02-17

## 2018-02-12 RX ORDER — HYDROMORPHONE HYDROCHLORIDE 2 MG/ML
.25-1 INJECTION, SOLUTION INTRAMUSCULAR; INTRAVENOUS; SUBCUTANEOUS
Status: DISCONTINUED | OUTPATIENT
Start: 2018-02-12 | End: 2018-02-12 | Stop reason: HOSPADM

## 2018-02-12 RX ORDER — ENOXAPARIN SODIUM 100 MG/ML
40 INJECTION SUBCUTANEOUS EVERY 24 HOURS
Status: DISCONTINUED | OUTPATIENT
Start: 2018-02-12 | End: 2018-03-16 | Stop reason: HOSPADM

## 2018-02-12 RX ORDER — SODIUM CHLORIDE 0.9 % (FLUSH) 0.9 %
5-10 SYRINGE (ML) INJECTION AS NEEDED
Status: DISCONTINUED | OUTPATIENT
Start: 2018-02-12 | End: 2018-02-12 | Stop reason: HOSPADM

## 2018-02-12 RX ORDER — PANTOPRAZOLE SODIUM 40 MG/10ML
40 INJECTION, POWDER, LYOPHILIZED, FOR SOLUTION INTRAVENOUS EVERY 12 HOURS
Status: DISCONTINUED | OUTPATIENT
Start: 2018-02-12 | End: 2018-03-15

## 2018-02-12 RX ORDER — LORAZEPAM 2 MG/ML
1 INJECTION INTRAMUSCULAR
Status: DISCONTINUED | OUTPATIENT
Start: 2018-02-12 | End: 2018-02-12

## 2018-02-12 RX ORDER — ONDANSETRON 2 MG/ML
INJECTION INTRAMUSCULAR; INTRAVENOUS AS NEEDED
Status: DISCONTINUED | OUTPATIENT
Start: 2018-02-12 | End: 2018-02-12 | Stop reason: HOSPADM

## 2018-02-12 RX ORDER — HYDROMORPHONE HYDROCHLORIDE 2 MG/ML
2 INJECTION, SOLUTION INTRAMUSCULAR; INTRAVENOUS; SUBCUTANEOUS
Status: DISCONTINUED | OUTPATIENT
Start: 2018-02-12 | End: 2018-02-16

## 2018-02-12 RX ORDER — POTASSIUM CHLORIDE 29.8 MG/ML
20 INJECTION INTRAVENOUS
Status: COMPLETED | OUTPATIENT
Start: 2018-02-12 | End: 2018-02-17

## 2018-02-12 RX ORDER — PROPOFOL 10 MG/ML
0-50 VIAL (ML) INTRAVENOUS
Status: DISCONTINUED | OUTPATIENT
Start: 2018-02-12 | End: 2018-02-12

## 2018-02-12 RX ORDER — SODIUM CHLORIDE 0.9 % (FLUSH) 0.9 %
5-10 SYRINGE (ML) INJECTION EVERY 8 HOURS
Status: DISCONTINUED | OUTPATIENT
Start: 2018-02-12 | End: 2018-02-12 | Stop reason: HOSPADM

## 2018-02-12 RX ORDER — DEXAMETHASONE SODIUM PHOSPHATE 4 MG/ML
INJECTION, SOLUTION INTRA-ARTICULAR; INTRALESIONAL; INTRAMUSCULAR; INTRAVENOUS; SOFT TISSUE AS NEEDED
Status: DISCONTINUED | OUTPATIENT
Start: 2018-02-12 | End: 2018-02-12 | Stop reason: HOSPADM

## 2018-02-12 RX ADMIN — FENTANYL CITRATE 100 MCG: 50 INJECTION, SOLUTION INTRAMUSCULAR; INTRAVENOUS at 00:17

## 2018-02-12 RX ADMIN — SODIUM CHLORIDE, SODIUM LACTATE, POTASSIUM CHLORIDE, CALCIUM CHLORIDE: 600; 310; 30; 20 INJECTION, SOLUTION INTRAVENOUS at 01:25

## 2018-02-12 RX ADMIN — FENTANYL CITRATE 50 MCG: 50 INJECTION, SOLUTION INTRAMUSCULAR; INTRAVENOUS at 00:52

## 2018-02-12 RX ADMIN — SODIUM CHLORIDE, SODIUM LACTATE, POTASSIUM CHLORIDE, CALCIUM CHLORIDE: 600; 310; 30; 20 INJECTION, SOLUTION INTRAVENOUS at 00:30

## 2018-02-12 RX ADMIN — MAGNESIUM SULFATE HEPTAHYDRATE 2 G: 40 INJECTION, SOLUTION INTRAVENOUS at 12:50

## 2018-02-12 RX ADMIN — HYDROMORPHONE HYDROCHLORIDE 1 MG: 2 INJECTION INTRAMUSCULAR; INTRAVENOUS; SUBCUTANEOUS at 03:32

## 2018-02-12 RX ADMIN — Medication 10 ML: at 14:00

## 2018-02-12 RX ADMIN — LORAZEPAM 2 MG: 2 INJECTION INTRAMUSCULAR; INTRAVENOUS at 20:18

## 2018-02-12 RX ADMIN — Medication 10 ML: at 05:02

## 2018-02-12 RX ADMIN — FOLIC ACID: 5 INJECTION, SOLUTION INTRAMUSCULAR; INTRAVENOUS; SUBCUTANEOUS at 03:00

## 2018-02-12 RX ADMIN — ROCURONIUM BROMIDE 30 MG: 10 INJECTION, SOLUTION INTRAVENOUS at 01:50

## 2018-02-12 RX ADMIN — FENTANYL CITRATE 50 MCG: 50 INJECTION, SOLUTION INTRAMUSCULAR; INTRAVENOUS at 00:56

## 2018-02-12 RX ADMIN — Medication 1 SPRAY: at 12:15

## 2018-02-12 RX ADMIN — FENTANYL CITRATE 50 MCG: 50 INJECTION, SOLUTION INTRAMUSCULAR; INTRAVENOUS at 00:08

## 2018-02-12 RX ADMIN — PROPOFOL 10 MCG/KG/MIN: 10 INJECTION, EMULSION INTRAVENOUS at 04:54

## 2018-02-12 RX ADMIN — SODIUM CHLORIDE, SODIUM LACTATE, POTASSIUM CHLORIDE, CALCIUM CHLORIDE: 600; 310; 30; 20 INJECTION, SOLUTION INTRAVENOUS at 00:25

## 2018-02-12 RX ADMIN — FOLIC ACID: 5 INJECTION, SOLUTION INTRAMUSCULAR; INTRAVENOUS; SUBCUTANEOUS at 17:37

## 2018-02-12 RX ADMIN — MIDAZOLAM HYDROCHLORIDE 2 MG: 1 INJECTION, SOLUTION INTRAMUSCULAR; INTRAVENOUS at 00:08

## 2018-02-12 RX ADMIN — Medication 10 ML: at 20:19

## 2018-02-12 RX ADMIN — PROPOFOL 100 MG: 10 INJECTION, EMULSION INTRAVENOUS at 00:17

## 2018-02-12 RX ADMIN — SUCCINYLCHOLINE CHLORIDE 100 MG: 20 INJECTION INTRAMUSCULAR; INTRAVENOUS at 00:17

## 2018-02-12 RX ADMIN — HYDROMORPHONE HYDROCHLORIDE 2 MG: 2 INJECTION, SOLUTION INTRAMUSCULAR; INTRAVENOUS; SUBCUTANEOUS at 13:03

## 2018-02-12 RX ADMIN — PHENYLEPHRINE HYDROCHLORIDE 30 MCG/MIN: 10 INJECTION INTRAVENOUS at 04:59

## 2018-02-12 RX ADMIN — POTASSIUM CHLORIDE 20 MEQ: 400 INJECTION, SOLUTION INTRAVENOUS at 14:25

## 2018-02-12 RX ADMIN — Medication 50 MCG/HR: at 05:10

## 2018-02-12 RX ADMIN — ONDANSETRON 4 MG: 2 INJECTION INTRAMUSCULAR; INTRAVENOUS at 00:56

## 2018-02-12 RX ADMIN — NYSTATIN: 100000 POWDER TOPICAL at 17:37

## 2018-02-12 RX ADMIN — PHENYLEPHRINE HYDROCHLORIDE 40 MCG/MIN: 10 INJECTION INTRAVENOUS at 13:06

## 2018-02-12 RX ADMIN — MAGNESIUM SULFATE HEPTAHYDRATE 2 G: 40 INJECTION, SOLUTION INTRAVENOUS at 09:21

## 2018-02-12 RX ADMIN — HYDROMORPHONE HYDROCHLORIDE 1 MG: 2 INJECTION INTRAMUSCULAR; INTRAVENOUS; SUBCUTANEOUS at 03:50

## 2018-02-12 RX ADMIN — LIDOCAINE HYDROCHLORIDE 40 MG: 20 INJECTION, SOLUTION EPIDURAL; INFILTRATION; INTRACAUDAL; PERINEURAL at 00:17

## 2018-02-12 RX ADMIN — POTASSIUM CHLORIDE 20 MEQ: 400 INJECTION, SOLUTION INTRAVENOUS at 10:30

## 2018-02-12 RX ADMIN — ENOXAPARIN SODIUM 40 MG: 40 INJECTION SUBCUTANEOUS at 14:25

## 2018-02-12 RX ADMIN — HYDROMORPHONE HYDROCHLORIDE 2 MG: 2 INJECTION, SOLUTION INTRAMUSCULAR; INTRAVENOUS; SUBCUTANEOUS at 16:23

## 2018-02-12 RX ADMIN — DEXAMETHASONE SODIUM PHOSPHATE 4 MG: 4 INJECTION, SOLUTION INTRA-ARTICULAR; INTRALESIONAL; INTRAMUSCULAR; INTRAVENOUS; SOFT TISSUE at 00:56

## 2018-02-12 RX ADMIN — HYDROMORPHONE HYDROCHLORIDE 1 MG: 2 INJECTION, SOLUTION INTRAMUSCULAR; INTRAVENOUS; SUBCUTANEOUS at 11:07

## 2018-02-12 RX ADMIN — ROCURONIUM BROMIDE 50 MG: 10 INJECTION, SOLUTION INTRAVENOUS at 00:30

## 2018-02-12 RX ADMIN — MIDAZOLAM HYDROCHLORIDE 2 MG: 1 INJECTION, SOLUTION INTRAMUSCULAR; INTRAVENOUS at 02:22

## 2018-02-12 RX ADMIN — PANTOPRAZOLE SODIUM 40 MG: 40 INJECTION, POWDER, FOR SOLUTION INTRAVENOUS at 20:18

## 2018-02-12 RX ADMIN — PANTOPRAZOLE SODIUM 40 MG: 40 INJECTION, POWDER, FOR SOLUTION INTRAVENOUS at 09:21

## 2018-02-12 RX ADMIN — ROCURONIUM BROMIDE 20 MG: 10 INJECTION, SOLUTION INTRAVENOUS at 01:20

## 2018-02-12 NOTE — ED TRIAGE NOTES
PT arrived via EMS, [facility] this morning pt is currently complaining of back and abd pain. PT has hx of ETOH abuse pt states she has not had any ETOH today.

## 2018-02-12 NOTE — BRIEF OP NOTE
BRIEF OPERATIVE NOTE    Date of Procedure: 2/11/2018   Preoperative Diagnosis: Intra-abdominal free air of unknown etiology [K66.8]  Postoperative Diagnosis: Intra-abdominal free air of unknown etiology [K66.8]    Procedure(s):  EXPLORATORY LAPAROTOMY, REPAIR OF PERFORATED POSTERIOR GASTRIC ULCER, AND CORE BIOPSY LIVER  Surgeon(s) and Role:     Tanna Baldwin MD - Primary         Assistant Staff: None      Surgical Staff:  Circ-1: Paolo Saenz RN  Scrub Tech-1: Merril Brace  Surg Asst-1: Jennyfer Linda  Event Time In   Incision Start 0040   Incision Close 0217     Anesthesia: General   Estimated Blood Loss: Approximately 50 ml.  UO: 200 ml. IVF: 3500 ml crystalloid. Specimens:   ID Type Source Tests Collected by Time Destination   1 : posterior gastric ulcer Preservative Ulcer  Julia Hogue MD 2/12/2018 0128 Pathology   2 : core biopsy left lobe liver # 1 Preservative Liver specimen  Julia Hogue MD 2/12/2018 0149 Pathology   3 : core biopsy left lobe liver # 2 Preservative Liver specimen  Julia Hogue MD 2/12/2018 0149 Pathology   4 : core biopsy left lobe liver # 3 Preservative Liver specimen  Julia Hogue MD 2/12/2018 0150 Pathology   1 : periotoneal fluid Body Fluid Peritoneal Fluid CULTURE, ANAEROBIC, CULTURE, BODY FLUID, CULTURE, FUNGUS Julia Hogue MD 2/12/2018 7612 Microbiology      Drains: 23 FR Osvaldo drain adjacent to the posterior gastric repair. Findings: 1. Approximately 1750 ml turbid peritoneal fluid., 2. A perforated posterior gastric ulcer in the antrum stuck to the pancreas. , 3. A cirrhotic liver. Complications: None. Implants: * No implants in log *   Disposition: Critical to the RR intubated and on pressors.

## 2018-02-12 NOTE — PROGRESS NOTES
Physical Therapy Note:    Orders acknowledged, chart reviewed, discussed with orthopedic PA. Pt currently intubated and unable to participate in mobility evaluation at this time. Will continue to follow and proceed with PT evaluation when pt stable and appropriate. Will provide and fit for TLSO at that time.

## 2018-02-12 NOTE — CDMP QUERY
Please clarify if this patient is (was) being treated/managed for:     => Severe Sepsis with Septic Shock POA related to perforated gastric ulcer  => Other explanation of clinical findings  => Unable to determine (no explanation for clinical findings)    The medical record reflects the following clinical findings, treatment, and risk factors. Risk Factors:  PUD with NSAID use    Clinical Indicators:  Perforated gastric ulcer with free air POA: SIRS POA: WBC 2.4 bands 22, HR 90s; Post op: delbert to maintain MAP> 65; 2/12 Intensivist PN: \"shock\" ; 2/12 WBC 1.8 bands 29; SBP 81/50- 102/55    Treatment: Repair or gastric ulcer; post op on Vent;  IVABs, Delbert, IVFs as ordered, monitor labs, VS    Please clarify and document your clinical opinion in the progress notes and discharge summary including the definitive and/or presumptive diagnosis, (suspected or probable), related to the above clinical findings. Please include clinical findings supporting your diagnosis.     Thank you,  Delvin Kc MSN, [facility] [place] [address]

## 2018-02-12 NOTE — WOUND CARE
Wound care consult:  Initial visit seen in the ICU with staff nurse  PO abdominal surgery 2-11 and recent fall at home with Thoracic fracture. On ICU bed. Assessment  All skin folds and bony prominences assessed, turned with staff assistance. Sacral area, heels and elbows intact. Mid abdominal dressings and drains intact. Not disturbed. Left breast skin fold moist and red with yeast like rash   Light purple bruise on right knee from fall    Treatment  Repositioned in bed   Heels floated- prevalon boots      Recommendations/Plan  Turn, reposition every 2 hours as tolerated, float heels, place in prevalon boots. Incontinent care with comfort shields. Apply moisture barrier as needed. Nystatin to left breast skin fold. Will follow, reconsult as needed.       Kylee Davis

## 2018-02-12 NOTE — CDMP QUERY
2. Thank you for your documentation of : Respiratory failure for this patient, could this be further specified as:     => Acute Hypoxic Respiratory failure r/t gastric ulcer perf with repair  => Other explanation of clinical findings  => Unable to determine (no explanation for clinical findings)    The medical record reflects the following clinical findings, treatment, and risk factors. Risk Factors:  Gastric ulcer with perf/free air per CT    Clinical Indicators:  Vent post operative; extubated to O2 6 L ; RR 20-23    Treatment: Vent/extubation/ O2 per Pulmonary; monitor, VS, sats    Please clarify and document your clinical opinion in the progress notes and discharge summary including the definitive and/or presumptive diagnosis, (suspected or probable), related to the above clinical findings. Please include clinical findings supporting your diagnosis.     Thank you,  Darylene Leriche, MSN, [facility] [place] [address]

## 2018-02-12 NOTE — CONSULTS
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 female with past medical history of gastric ulcer and ETOH abuse that presented to the ED last evening via EMS with chief complaint of diffuse severe abdominal pain after a GLF. Per ER notes, the patient woke up on 2/11/18 and was asymptomatic. Later in the morning she had a fall, but does not remember the time or mechanism. She denied hitting her head at that time or losing consciousness. She reported having nausea after the fall with one episode of vomiting. She reports that she has been taking Naproxen at home, and she notes that she took 5 tablets of the Naproxen today. She has a history of a gatric ulcer and was not on a PPI prior to this admission. She was found to have free air on CT of her abdomen and was taken to the OR last night by general surgery for a perforated gastric ulcer and liver biopsy. She was found to have a burst fracture of T12 on her CT scan and orthopedics was consulted for evaluation of this. She denies history of back pain, numbness, parasthesias, or bowel/bladder incontinence.        Patient Active Problem List    Diagnosis Date Noted    Free intraperitoneal air 02/12/2018    Perforated viscus 02/11/2018    ETOH abuse 02/11/2018    GI bleed 04/26/2016    Hypotension 04/26/2016    Tachycardia 04/26/2016    Acute blood loss anemia 04/26/2016     Family History   Problem Relation Age of Onset    Other Other      patient did not know      Social History   Substance Use Topics    Smoking status: Former Smoker    Smokeless tobacco: Never Used    Alcohol use 6.0 oz/week     0 Standard drinks or equivalent, 10 Glasses of wine per week      Comment: Hx of heavy etoh use, but quit several months ago     Past Medical History:   Diagnosis Date    Hyperlipidemia       Past Surgical History:   Procedure Laterality Date    HX CATARACT REMOVAL        Prior to Admission medications    Medication Sig Start Date End Date Taking? Authorizing Provider   naproxen sodium (ALEVE) 220 mg tablet Take 220 mg by mouth daily as needed for Pain. Yes Historical Provider   cyanocobalamin (VITAMIN B12) 500 mcg tablet Take 500 mcg by mouth daily. Yes Historical Provider   multivitamin (ONE A DAY) tablet Take 1 Tab by mouth daily. Yes Historical Provider   omega-3 fatty acids-vitamin e 1,000 mg cap Take 2 Caps by mouth daily. Yes Historical Provider     Current Facility-Administered Medications   Medication Dose Route Frequency    sodium chloride (NS) flush 5-10 mL  5-10 mL IntraVENous Q8H    sodium chloride (NS) flush 5-10 mL  5-10 mL IntraVENous PRN    acetaminophen (TYLENOL) solution 650 mg  650 mg Oral Q6H PRN    HYDROmorphone (PF) (DILAUDID) injection 1 mg  1 mg IntraVENous Q3H PRN    naloxone (NARCAN) injection 0.4 mg  0.4 mg IntraVENous PRN    ondansetron (ZOFRAN) injection 4 mg  4 mg IntraVENous Q4H PRN    enoxaparin (LOVENOX) injection 40 mg  40 mg SubCUTAneous Q24H    0.9% sodium chloride 1,000 mL with mvi, adult no. 4 with vit K 10 mL, thiamine 924 mg, folic acid 1 mg infusion   IntraVENous Q24H    PHENYLephrine (SMITA-SYNEPHRINE) 30 mg in 0.9% sodium chloride 250 mL infusion   mcg/min IntraVENous TITRATE    pantoprazole (PROTONIX) injection 40 mg  40 mg IntraVENous Q12H    propofol (DIPRIVAN) infusion  0-50 mcg/kg/min IntraVENous TITRATE    fentaNYL (PF) 900 mcg/30 ml infusion soln  0-200 mcg/hr IntraVENous TITRATE    potassium chloride 20 mEq in 50 ml IVPB  20 mEq IntraVENous Q2H    magnesium sulfate 2 g/50 ml IVPB (premix or compounded)  2 g IntraVENous Q2H    LORazepam (ATIVAN) injection 2 mg  2 mg IntraVENous Q1H PRN    LORazepam (ATIVAN) injection 4 mg  4 mg IntraVENous Q1H PRN    sodium chloride (NS) flush 5-10 mL  5-10 mL IntraVENous Q8H    sodium chloride (NS) flush 5-10 mL  5-10 mL IntraVENous PRN     No Known Allergies     Review of Systems:  Pertinent items are noted in HPI.     Objective:     Patient Vitals for the past 8 hrs:   BP Temp Pulse Resp SpO2 Weight   02/12/18 0945 104/79 - 89 24 98 % -   02/12/18 0930 90/55 - - - - -   02/12/18 0915 109/66 - 73 22 96 % -   02/12/18 0900 110/54 - 75 24 94 % -   02/12/18 0845 94/55 - 71 19 97 % -   02/12/18 0830 105/55 - 70 17 96 % -   02/12/18 0800 112/60 - 70 19 96 % -   02/12/18 0749 - - 80 16 96 % -   02/12/18 0745 110/62 - 70 19 98 % -   02/12/18 0730 - 98.6 °F (37 °C) - - - -   02/12/18 0700 (!) 83/56 - 68 14 98 % -   02/12/18 0630 (!) 72/51 - 71 14 - -   02/12/18 0600 (!) 72/51 - 72 14 - -   02/12/18 0545 (!) 73/46 - 74 14 - -   02/12/18 0530 (!) 109/93 - 92 25 - -   02/12/18 0515 100/59 - 83 21 - -   02/12/18 0500 (!) 73/51 - 94 19 - -   02/12/18 0445 (!) 73/48 - 88 14 - -   02/12/18 0430 117/55 97.5 °F (36.4 °C) (!) 102 16 - 77.3 kg (170 lb 6.7 oz)   02/12/18 0350 - - (!) 120 19 95 % -   02/12/18 0348 - - (!) 120 16 93 % -   02/12/18 0345 145/85 - (!) 123 21 93 % -   02/12/18 0344 - - (!) 106 18 92 % -   02/12/18 0340 173/79 - (!) 116 16 91 % -   02/12/18 0337 - - (!) 111 (!) 4 92 % -   02/12/18 0335 162/66 - (!) 125 8 93 % -   02/12/18 0332 - - (!) 133 (!) 6 92 % -   02/12/18 0331 173/83 - (!) 134 (!) 5 93 % -   02/12/18 0330 (!) 164/101 - (!) 134 (!) 6 92 % -   02/12/18 0328 - - (!) 132 13 94 % -   02/12/18 0324 - - (!) 133 26 93 % -   02/12/18 0320 (!) 171/95 97.9 °F (36.6 °C) (!) 132 19 93 % -   02/12/18 0315 174/82 - (!) 130 19 93 % -   02/12/18 0313 - - (!) 125 16 92 % -   02/12/18 0312 - - (!) 121 13 - -   02/12/18 0310 - - (!) 118 18 - -   02/12/18 0303 - - (!) 118 10 92 % -   02/12/18 0256 - - (!) 114 10 - -   02/12/18 0254 - - (!) 111 (!) 2 - -   02/12/18 0253 - - (!) 122 14 94 % -   02/12/18 0245 105/66 - (!) 122 18 - -   02/12/18 0244 129/65 97.5 °F (36.4 °C) (!) 120 17 95 % -   02/12/18 0242 - - (!) 120 17 - -   02/12/18 0241 - - (!) 117 16 94 % - 18 0240 103/59 - (!) 119 16 (!) 69 % -     Temp (24hrs), Av.9 °F (36.6 °C), Min:97.5 °F (36.4 °C), Max:98.6 °F (37 °C)        EXAM: GEN: Patient in bed. MUSC: Examination of bilateral lower extremities reveals no significant deformities. There are no rash or lesions. Patient has no significant loss of sensation in bilateral lower extremities. DF and PF is 5/5. Can not test bilateral knee or ankle reflexes due to current medical state. Sensation is intact in bilateral LE. Palpable DP bilaterally. IMPRESSION  IMPRESSION:     1. Pneumoperitoneum, with focal defect in the posterior wall of the gastric  antrum which is thickened, highly suspicious for perforated ulcer or less likely  neoplasm. Moderate free air. 2. Nodular morphology of the liver may indicate underlying cirrhosis. 3. Mildly dilated small bowel loops right lower quadrant may represent focal  ileus. 4. Acute to subacute comminuted/burst type fracture of the T12 vertebral body  without retropulsion. Mild superimposed endplate fracture of L2 may be acute. Data Review   Recent Results (from the past 24 hour(s))   CBC WITH AUTOMATED DIFF    Collection Time: 18  9:47 PM   Result Value Ref Range    WBC 2.6 (L) 3.6 - 11.0 K/uL    RBC 4.69 3.80 - 5.20 M/uL    HGB 16.3 (H) 11.5 - 16.0 g/dL    HCT 49.6 (H) 35.0 - 47.0 %    .8 (H) 80.0 - 99.0 FL    MCH 34.8 (H) 26.0 - 34.0 PG    MCHC 32.9 30.0 - 36.5 g/dL    RDW 13.2 11.5 - 14.5 %    PLATELET 248 068 - 036 K/uL    MPV 12.9 8.9 - 12.9 FL    NRBC 0.0 0  WBC    ABSOLUTE NRBC 0.00 0.00 - 0.01 K/uL    NEUTROPHILS 45 32 - 75 %    BAND NEUTROPHILS 22 (H) 0 - 6 %    LYMPHOCYTES 12 12 - 49 %    MONOCYTES 11 5 - 13 %    EOSINOPHILS 0 0 - 7 %    BASOPHILS 0 0 - 1 %    METAMYELOCYTES 9 (H) 0 %    MYELOCYTES 1 (H) 0 %    IMMATURE GRANULOCYTES 0 %    ABS. NEUTROPHILS 1.7 (L) 1.8 - 8.0 K/UL    ABS. LYMPHOCYTES 0.3 (L) 0.8 - 3.5 K/UL    ABS. MONOCYTES 0.3 0.0 - 1.0 K/UL    ABS. EOSINOPHILS 0.0 0.0 - 0.4 K/UL    ABS. BASOPHILS 0.0 0.0 - 0.1 K/UL    ABS. IMM.  GRANS. 0.0 K/UL    DF MANUAL      PLATELET COMMENTS Large Platelets      RBC COMMENTS NORMOCYTIC, NORMOCHROMIC     LACTIC ACID    Collection Time: 02/11/18  9:47 PM   Result Value Ref Range    Lactic acid 8.4 (HH) 0.4 - 2.0 MMOL/L   TROPONIN I    Collection Time: 02/11/18  9:47 PM   Result Value Ref Range    Troponin-I, Qt. <0.04 <0.05 ng/mL   CK W/ REFLX CKMB    Collection Time: 02/11/18  9:47 PM   Result Value Ref Range     26 - 192 U/L   LIPASE    Collection Time: 02/11/18  9:47 PM   Result Value Ref Range    Lipase 184 73 - 393 U/L   CULTURE, BLOOD    Collection Time: 02/11/18  9:47 PM   Result Value Ref Range    Special Requests: NO SPECIAL REQUESTS      Culture result: NO GROWTH AFTER 9 HOURS     ETHYL ALCOHOL    Collection Time: 02/11/18  9:47 PM   Result Value Ref Range    ALCOHOL(ETHYL),SERUM 14 (H) <10 MG/DL   PROTHROMBIN TIME + INR    Collection Time: 02/11/18  9:47 PM   Result Value Ref Range    INR 1.2 (H) 0.9 - 1.1      Prothrombin time 11.7 (H) 9.0 - 11.1 sec   PTT    Collection Time: 02/11/18  9:47 PM   Result Value Ref Range    aPTT 25.3 22.1 - 32.5 sec    aPTT, therapeutic range     58.0 - 77.0 SECS   ACETAMINOPHEN    Collection Time: 02/11/18  9:47 PM   Result Value Ref Range    Acetaminophen level <2 (L) 10 - 30 ug/mL   SALICYLATE    Collection Time: 02/11/18  9:47 PM   Result Value Ref Range    Salicylate level 1.9 (L) 2.8 - 20.0 MG/DL   AMMONIA    Collection Time: 02/11/18  9:50 PM   Result Value Ref Range    Ammonia <10 <32 UMOL/L   POC CHEM8    Collection Time: 02/11/18  9:53 PM   Result Value Ref Range    Calcium, ionized (POC) 1.08 (L) 1.12 - 1.32 MMOL/L    Sodium (POC) 135 (L) 136 - 145 MMOL/L    Potassium (POC) 4.0 3.5 - 5.1 MMOL/L    Chloride (POC) 101 98 - 107 MMOL/L    CO2 (POC) 17 (L) 21 - 32 MMOL/L    Anion gap (POC) 22 (H) 5 - 15 mmol/L    Glucose (POC) 108 (H) 65 - 100 MG/DL    BUN (POC) 14 9 - 20 MG/DL    Creatinine (POC) 1.0 0.6 - 1.3 MG/DL    GFRAA, POC >60 >60 ml/min/1.73m2    GFRNA, POC 54 (L) >60 ml/min/1.73m2    Hemoglobin (POC) 18.0 (H) 11.5 - 16.0 GM/DL    Hematocrit (POC) 53 (H) 35.0 - 47.0 %    Comment Comment Not Indicated. EKG, 12 LEAD, INITIAL    Collection Time: 02/11/18 10:00 PM   Result Value Ref Range    Ventricular Rate 106 BPM    Atrial Rate 106 BPM    P-R Interval 144 ms    QRS Duration 88 ms    Q-T Interval 372 ms    QTC Calculation (Bezet) 494 ms    Calculated P Axis 40 degrees    Calculated R Axis 29 degrees    Calculated T Axis 66 degrees    Diagnosis       Sinus tachycardia with frequent premature ventricular complexes  Otherwise normal ECG  No previous ECGs available     URINALYSIS W/MICROSCOPIC    Collection Time: 02/11/18 10:38 PM   Result Value Ref Range    Color YELLOW      Appearance TURBID (A) CLEAR      Specific gravity 1.005 1.003 - 1.030      pH (UA) 5.5 5.0 - 8.0      Protein 30 (A) NEG mg/dL    Glucose NEGATIVE  NEG mg/dL    Ketone NEGATIVE  NEG mg/dL    Bilirubin NEGATIVE  NEG      Blood TRACE (A) NEG      Urobilinogen 0.2 0.2 - 1.0 EU/dL    Nitrites NEGATIVE  NEG      Leukocyte Esterase SMALL (A) NEG      WBC  0 - 4 /hpf    RBC 0-5 0 - 5 /hpf    Epithelial cells FEW FEW /lpf    Bacteria 3+ (A) NEG /hpf    Hyaline cast 5-10 0 - 5 /lpf   URINE CULTURE HOLD SAMPLE    Collection Time: 02/11/18 10:38 PM   Result Value Ref Range    Urine culture hold        URINE ON HOLD IN MICROBIOLOGY DEPT FOR 3 DAYS. IF UNPRESERVED URINE IS SUBMITTED, IT CANNOT BE USED FOR ADDITIONAL TESTING AFTER 24 HRS, RECOLLECTION WILL BE REQUIRED.    DRUG SCREEN, URINE    Collection Time: 02/11/18 10:38 PM   Result Value Ref Range    AMPHETAMINES NEGATIVE  NEG      BARBITURATES NEGATIVE  NEG      BENZODIAZEPINES NEGATIVE  NEG      COCAINE NEGATIVE  NEG      METHADONE NEGATIVE  NEG      OPIATES NEGATIVE  NEG      PCP(PHENCYCLIDINE) NEGATIVE  NEG      THC (TH-CANNABINOL) NEGATIVE  NEG Drug screen comment (NOTE)    CBC WITH AUTOMATED DIFF    Collection Time: 02/12/18  3:03 AM   Result Value Ref Range    WBC 1.8 (L) 3.6 - 11.0 K/uL    RBC 3.97 3.80 - 5.20 M/uL    HGB 13.8 11.5 - 16.0 g/dL    HCT 41.7 35.0 - 47.0 %    .0 (H) 80.0 - 99.0 FL    MCH 34.8 (H) 26.0 - 34.0 PG    MCHC 33.1 30.0 - 36.5 g/dL    RDW 13.0 11.5 - 14.5 %    PLATELET 912 (L) 207 - 400 K/uL    MPV 12.6 8.9 - 12.9 FL    NRBC 0.0 0  WBC    ABSOLUTE NRBC 0.00 0.00 - 0.01 K/uL    NEUTROPHILS 43 32 - 75 %    BAND NEUTROPHILS 29 (H) 0 - 6 %    LYMPHOCYTES 10 (L) 12 - 49 %    MONOCYTES 10 5 - 13 %    EOSINOPHILS 1 0 - 7 %    BASOPHILS 0 0 - 1 %    METAMYELOCYTES 7 (H) 0 %    IMMATURE GRANULOCYTES 0 %    ABS. NEUTROPHILS 1.3 (L) 1.8 - 8.0 K/UL    ABS. LYMPHOCYTES 0.2 (L) 0.8 - 3.5 K/UL    ABS. MONOCYTES 0.2 0.0 - 1.0 K/UL    ABS. EOSINOPHILS 0.0 0.0 - 0.4 K/UL    ABS. BASOPHILS 0.0 0.0 - 0.1 K/UL    ABS. IMM.  GRANS. 0.0 K/UL    DF MANUAL      RBC COMMENTS NORMOCYTIC, NORMOCHROMIC      WBC COMMENTS VACUOLATED POLYS     METABOLIC PANEL, BASIC    Collection Time: 02/12/18  3:03 AM   Result Value Ref Range    Sodium 141 136 - 145 mmol/L    Potassium 3.2 (L) 3.5 - 5.1 mmol/L    Chloride 114 (H) 97 - 108 mmol/L    CO2 17 (L) 21 - 32 mmol/L    Anion gap 10 5 - 15 mmol/L    Glucose 80 65 - 100 mg/dL    BUN 11 6 - 20 MG/DL    Creatinine 0.46 (L) 0.55 - 1.02 MG/DL    BUN/Creatinine ratio 24 (H) 12 - 20      GFR est AA >60 >60 ml/min/1.73m2    GFR est non-AA >60 >60 ml/min/1.73m2    Calcium 5.5 (LL) 8.5 - 10.1 MG/DL   MAGNESIUM    Collection Time: 02/12/18  3:03 AM   Result Value Ref Range    Magnesium 0.7 (LL) 1.6 - 2.4 mg/dL   BLOOD GAS, ARTERIAL    Collection Time: 02/12/18  4:40 AM   Result Value Ref Range    pH 7.36 7.35 - 7.45      PCO2 36 35.0 - 45.0 mmHg    PO2 81 80 - 100 mmHg    O2 SAT 96 92 - 97 %    BICARBONATE 19 (L) 22 - 26 mmol/L    BASE DEFICIT 5.3 mmol/L    O2 METHOD VENTILATOR      O2 FLOW RATE 50.00 L/min    FIO2 65 %    MODE A/C      Tidal volume 450      SET RATE 14      EPAP/CPAP/PEEP 5.0      Sample source ARTERIAL      SITE DRAWN FROM ARTERIAL LINE      GRISEL'S TEST N/A     TYPE & SCREEN    Collection Time: 02/12/18  6:20 AM   Result Value Ref Range    Crossmatch Expiration 02/15/2018     ABO/Rh(D) Madalynn Diaz POSITIVE     Antibody screen NEG          Assessment/Plan:     A: T12 burst fracture without retropulsion      L2 compression fracture     P: 1. The fracture of T12 is an anterior fracture without cord compromise. For this reason we will order a TLSO for the patient. This is to be worn at all times if getting out of bed. May be without TLSO in bed and may sit up in bed without TLSO. Once medically stable may be OOB to chair with TLSO on. Log roll patient in bed for hygiene. 2. Pain control per primary team.  3. If patient is not progressing would recommend MRI of Thoracic/Lumbar spine to evaluate for possible kyphoplasty. 4. Orthopedics will follow    Discussed case with Dr. John Jeffers who agrees with plan.         Cary Yi, [address] [facility]   Orthopaedic Surgery [address]

## 2018-02-12 NOTE — ED NOTES
Pt reports she usually drinks white wine, approx 10 glasses per week. States her last drink was 3-4 days ago.

## 2018-02-12 NOTE — PERIOP NOTES
TRANSFER - OUT REPORT:    Verbal report given to nadeem tate(name) on Tomy Corona  being transferred to icu 13(unit) for routine post - op       Report consisted of patients Situation, Background, Assessment and   Recommendations(SBAR). Information from the following report(s) SBAR, Procedure Summary, Intake/Output and MAR was reviewed with the receiving nurse.     Lines:   Triple Lumen 02/12/18 Right Internal jugular (Active)   Central Line Being Utilized No 2/12/2018  2:50 AM   Criteria for Appropriate Use Hemodynamically unstable, requiring monitoring lines, vasopressors, or volume resuscitation 2/12/2018  2:50 AM   Site Assessment Clean, dry, & intact 2/12/2018  2:50 AM   Infiltration Assessment 0 2/12/2018  2:50 AM   Date of Last Dressing Change 02/12/18 2/12/2018  2:50 AM   Dressing Status Clean, dry, & intact 2/12/2018  2:50 AM   Proximal Hub Color/Line Status Blue 2/12/2018  2:50 AM   Positive Blood Return (Medial Site) Yes 2/12/2018  2:50 AM   Medial Hub Color/Line Status White 2/12/2018  2:50 AM   Positive Blood Return (Lateral Site) Yes 2/12/2018  2:50 AM   Distal Hub Color/Line Status Brown 2/12/2018  2:50 AM   Positive Blood Return (Site #3) Yes 2/12/2018  2:50 AM       Peripheral IV 02/11/18 Left Antecubital (Active)   Site Assessment Clean, dry, & intact 2/12/2018  2:50 AM   Phlebitis Assessment 0 2/12/2018  2:50 AM   Infiltration Assessment 0 2/12/2018  2:50 AM   Dressing Status Clean, dry, & intact 2/12/2018  2:50 AM   Dressing Type Tape;Transparent 2/12/2018  2:50 AM   Hub Color/Line Status Green 2/12/2018  2:50 AM       Peripheral IV 02/11/18 Right Antecubital (Active)   Site Assessment Clean, dry, & intact 2/12/2018  2:50 AM   Phlebitis Assessment 0 2/12/2018  2:50 AM   Infiltration Assessment 0 2/12/2018  2:50 AM   Dressing Status Clean, dry, & intact 2/12/2018  2:50 AM   Dressing Type Tape;Transparent 2/12/2018  2:50 AM   Hub Color/Line Status Green 2/12/2018  2:50 AM       Arterial Line 02/12/18 Left Radial artery (Active)   Site Assessment Clean, dry, & intact 2/12/2018  2:50 AM   Dressing Status Clean, dry, & intact 2/12/2018  2:50 AM   Dressing Type Transparent 2/12/2018  2:50 AM   Line Status Intact and in place 2/12/2018  2:50 AM   Affected Extremity/Extremities Color distal to insertion site pink (or appropriate for race) 2/12/2018  2:50 AM        Opportunity for questions and clarification was provided.       Patient transported with:   Monitor  O2 @ 10 liters  Registered Nurse    Being bagged on 100% still intubated

## 2018-02-12 NOTE — PROGRESS NOTES
@0700 Bedside and Verbal shift change report given to Roger Martinez RN (oncoming nurse) by Carlos Baig RN (offgoing nurse). Report included the following information SBAR, Kardex, ED Summary, Procedure Summary, Intake/Output, MAR, Accordion, Recent Results, Med Rec Status, Cardiac Rhythm Sinus and Alarm Parameters . Primary Nurse Viola Pugh RN and [employer], RN performed a dual skin assessment on this patient No impairment noted  Gianfranco score is 16    @0800 RASS of 0 to -1 on bilateral wrist restraints. Propofol 10 mcg/kg/min and Fentanyl 75 mcg/H. Able to follow commands. Dr. Lacey Ye bedside examing the patient. Orders for log rolls only d/t T12 fracture. @6067 Dr. Julio Chase arrives and exams the patient. Hopes to extubate today if surgery is okay with it. Would like to start weaning pressors to keep MAPs >65 mmHg. @0830 /55 (67), Delbert wean to 100 mcg/min. @0900 BP  110/54 (69), Delbert wean to 85 mcg/min.    @0915 /66 (75), Delbert wean to 70 mcg/min.    @0945 /79 (84), Delbert wean to 55 mcg/min.    @1000 /80, Delbert wean to 40 mcg/min. @1100 Extubated to  Liters nasal canula. Incentive spirometer education given. 5/10 back and abdominal pain and sore throat. Dilaudid 1 mg IVP given for effect. CIWA is a 6 at this time. @9719 Dr. Susana Christine arrives and exams the patient. Plans to keep her NPO for now. @0480 Patient c/o abdominal pain 6/10 still despite earlier dose. Paged Dr. Julio Chase and received orders to give Dilaudid 2 mg IVP now for effect and increase dose to this. @1500 [place] and farmer care completed. @1600 /99 (112), Delbert wean to 25 mcg/min. @1700 ALYSSA drain dressing change d/t oozing.

## 2018-02-12 NOTE — PROGRESS NOTES
Nutrition Assessment:    RECOMMENDATIONS/INTERVENTION(S):   1.  PO initiation & diet advancement per Surgery MD    2. If pt remains intubated, monitor the need for initiating nutrition support    3. Continue to monitor & replete lytes - Check Phos    4. Continue MVI, folic acid, thiamine    ASSESSMENT:   2/12:  Chart reviewed 2/2 intubation. 67 yof admitted for intra-abd free air of unknown etiology. Pmhx includes gastric ulcer (EGD x 2 yrs ago), Etoh. Surgery following. POD #1 ex lap repair of perforated gastric ulcer, liver bx. Remains intubated 2/2 shock, respiratory failure. Labs/meds reviewed. K, Mg requiring repletion. No Phos. On Delbert. Propofol currently stopped. On PPI. Ortho following for T12 fx. Surgical incisions to abd, ALYSSA drain, no edema noted. Overwt per advanced age. No recent wt to compare per hx. Energy needs calculated using current wt, vent settings, tmax. Noted n/v after fall yesterday, no reports of inadequate PO prior to fall. Etoh hx. SUBJECTIVE/OBJECTIVE:     Diet Order: NPO  % Eaten:  No data found. Pertinent Medications: [x] Reviewed - goody bag, Delbert, propofol, mag sulf, KCl, protonix    Past Medical History:   Diagnosis Date    Hyperlipidemia        Chemistries:  Lab Results   Component Value Date/Time    Sodium 141 02/12/2018 03:03 AM    Potassium 3.2 (L) 02/12/2018 03:03 AM    Chloride 114 (H) 02/12/2018 03:03 AM    CO2 17 (L) 02/12/2018 03:03 AM    Anion gap 10 02/12/2018 03:03 AM    Glucose 80 02/12/2018 03:03 AM    BUN 11 02/12/2018 03:03 AM    Creatinine 0.46 (L) 02/12/2018 03:03 AM    BUN/Creatinine ratio 24 (H) 02/12/2018 03:03 AM    GFR est AA >60 02/12/2018 03:03 AM    GFR est non-AA >60 02/12/2018 03:03 AM    Calcium 5.5 (LL) 02/12/2018 03:03 AM    AST (SGOT) 24 04/29/2016 05:33 AM    Alk.  phosphatase 74 04/29/2016 05:33 AM    Protein, total 5.1 (L) 04/29/2016 05:33 AM    Albumin 2.5 (L) 04/29/2016 05:33 AM    Globulin 2.6 04/29/2016 05:33 AM    A-G Ratio 1.0 (L) 04/29/2016 05:33 AM    ALT (SGPT) 18 04/29/2016 05:33 AM      Anthropometrics: Height: 5' 3.5\" (161.3 cm) Weight: 77.3 kg (170 lb 6.7 oz)    IBW (%IBW): 58.7 kg (129 lb 6.6 oz) (131.69 %) UBW (%UBW):  (UTO) (  %)    BMI: Body mass index is 29.71 kg/(m^2). This BMI is indicative of:   [] Underweight    [] Normal    [x] Overweight    []  Obesity    []  Extreme Obesity (BMI>40)  Estimated Nutrition Needs (Based on): 1388 Kcals/day (PSU) , 70 g (-88 (1.2-1.5 g/kg x IBW)) Protein  Carbohydrate:  At Least 130 g/day  Fluids: 1400 mL/day    Last BM: 2/10, abd semi-soft   []Active     []Hyperactive  [x]Hypoactive       [] Absent   BS  Skin:    [] Intact   [x] Incision - abd lap sites  [] Breakdown   [] DTI   [] Tears/Excoriation/Abrasion  []Edema [x] Other:ALYSSA drain     Wt Readings from Last 30 Encounters:   02/12/18 77.3 kg (170 lb 6.7 oz)   04/29/16 76.3 kg (168 lb 3.2 oz)      NUTRITION DIAGNOSES:   Problem:  Inadequate oral intake     Etiology: related to conditions associated with a dx - perforated viscus, respiratory failure     Signs/Symptoms: as evidenced by s/p lap w/ perf viscus repair, intubation, NPO      NUTRITION INTERVENTIONS:  Meals/Snacks: General/healthful diet - once PO initiated                  GOAL:   PO diet w/ no s/s of intolerance in the next 1-2 days    Cultural, Synagogue, or Ethnic Dietary Needs: Other (comment) (UTO)     EDUCATION & DISCHARGE NEEDS:    [x] None Identified   [] Identified and Education Provided/Documented   [] Identified and Pt declined/was not appropriate      [x] Interdisciplinary Care Plan Reviewed/Documented    [x] Discharge Needs:    TBD   [] No Nutrition Related Discharge Needs    NUTRITION RISK:   Pt Is At Nutrition Risk  [x]     No Nutrition Risk Identified  []       PT SEEN FOR:    []  MD Consult: []Calorie Count      []Diabetic Diet Education        []Diet Education     []Electrolyte Management     []General Nutrition Management and Supplements     []Management of Tube Feeding     []TPN Recommendations    []  RN Referral:  []MST score >=2     []Enteral/Parenteral Nutrition PTA     []Pregnant: Gestational DM or Multigestation                 [] Pressure Ulcer    []  Low BMI      []  Length of Stay       [] Dysphagia Diet         [x] Ventilator  []  Follow-up     Previous Recommendations:   [] Implemented          [] Not Implemented          [x] Not Applicable    Previous Goal:   [] Met              [] Progressing Towards Goal              [] Not Progressing Towards Goal   [x] Not Applicable            Magdi Gamino, [address]  Pager [phone]

## 2018-02-12 NOTE — ED PROVIDER NOTES
HPI Comments: 67 y.o. female with past medical history significant for HLD, gastric ulcer/bleeding ulcer, who presents via EMS with chief complaint of diffuse severe abdominal pain, onset today after a GLF. Pt reports that she woke up this morning at 0700 and was asymptomatic. Later in the morning she had a fall, but does not remember the time. She is unsure if she hit her head or lost consciousness, and she does not remember if she was experiencing any pain during the time of the fall. Reports having nausea since the fall with one episode of vomiting. The nausea has continued with dry-heaves, and then later in the day she developed severe abdominal pain. Pt reports that her abdominal pain is \"aching\" in quality and spread diffusely throughout the abdomen. She called EMS to bring her to the ED for evaluation of all of the above. EMS reports that when they arrived on scene, pt was pale and diaphoretic with SpO2 of 86% on RA. They placed patient on O2 via NC which brought her SpO2 into the 90% range. EMS also states that patient's pupils were dilated and sluggish on their examination. Pt still c/o nausea and abdominal pain in the ED which is now a 10/10 in severity. She reports that she has been taking Naproxen at home, and she notes that she took 5 tablets of the Naproxen today. Denies taking any Naproxen yesterday. Pt also admits that she drinks \"more wine than I should\", but she states that she only drinks at night and also reports that she has \"cut back a lot\". Denies EtOH ingestion today. She specifically denies any fevers, HA, CP, or current anticoagulation, but history is otherwise limited due to patient fall and acute abdominal pain. Review of EMR indicates that patient was admitted to the hospital in 2016 for hematemesis and melena. EGD showed PUD. Social hx: Negative for Tobacco use (former smoker); Positive for EtOH use (wine nightly, h/o heavy use);  Negative for Illicit Drug Abuse   PCP: Jasmine Gray DO Vanessa     Note written by Winston Santos, as dictated by Darrick Huff DO 9:37 PM     The history is provided by the patient, the EMS personnel and medical records. The history is limited by the condition of the patient. No  was used. Past Medical History:   Diagnosis Date    Hyperlipidemia        Past Surgical History:   Procedure Laterality Date    HX CATARACT REMOVAL           Family History:   Problem Relation Age of Onset    Other       patient did not know       Social History     Social History    Marital status: SINGLE     Spouse name: N/A    Number of children: N/A    Years of education: N/A     Occupational History    Not on file. Social History Main Topics    Smoking status: Former Smoker    Smokeless tobacco: Not on file    Alcohol use 0.0 oz/week     0 Standard drinks or equivalent per week      Comment: Hx of heavy etoh use, but quit several months ago    Drug use: No    Sexual activity: Not on file     Other Topics Concern    Not on file     Social History Narrative    No narrative on file     ALLERGIES: Review of patient's allergies indicates no known allergies. Review of Systems   Constitutional: Negative for appetite change, chills, fever and unexpected weight change. HENT: Negative for ear pain, hearing loss, rhinorrhea and trouble swallowing. Eyes: Negative for pain and visual disturbance. Respiratory: Negative for cough, chest tightness and shortness of breath. Cardiovascular: Negative for chest pain and palpitations. Gastrointestinal: Positive for abdominal pain, nausea and vomiting. Genitourinary: Negative for dysuria, hematuria and urgency. Musculoskeletal: Positive for back pain. Negative for myalgias. Skin: Negative for rash. Neurological: Negative for dizziness, weakness and numbness. Psychiatric/Behavioral: Negative for confusion and suicidal ideas.    All other systems reviewed and are negative. Vitals:    02/11/18 2141 02/11/18 2205 02/11/18 2226   BP: 116/68 115/81 126/49   Pulse: 89  90   Resp: 18  21   SpO2: 98%  96%   Weight: 81.6 kg (180 lb)     Height: 5' 3.5\" (1.613 m)         Physical Exam   Constitutional: She is oriented to person, place, and time. She appears well-developed and well-nourished. She appears distressed (mild). HENT:   Head: Normocephalic. Head is with abrasion (right occipital region) and with contusion (right maxillary area). Right Ear: External ear normal.   Left Ear: External ear normal.   Nose: Nose normal.   Mouth/Throat: Oropharynx is clear and moist. Mucous membranes are dry. No oropharyngeal exudate. Eyes: Conjunctivae and EOM are normal. Pupils are equal, round, and reactive to light. Right eye exhibits no discharge. Left eye exhibits no discharge. No scleral icterus. Neck: Normal range of motion. Neck supple. No JVD present. No tracheal deviation present. Cardiovascular: Regular rhythm, normal heart sounds and intact distal pulses. Tachycardia present. Exam reveals no gallop and no friction rub. No murmur heard. Pulmonary/Chest: Effort normal and breath sounds normal. No stridor. No respiratory distress. She has no decreased breath sounds. She has no wheezes. She has no rhonchi. She has no rales. She exhibits no tenderness. Abdominal: Soft. Bowel sounds are normal. She exhibits distension. There is tenderness (diffuse, greatest in epigastrium). There is guarding. There is no rebound. Abdomen is tympanitic   Musculoskeletal: Normal range of motion. She exhibits tenderness. She exhibits no edema. Lumbar back: She exhibits tenderness. Back:    Neurological: She is alert and oriented to person, place, and time. She has normal strength and normal reflexes. No cranial nerve deficit or sensory deficit. She exhibits normal muscle tone. Coordination normal. GCS eye subscore is 4. GCS verbal subscore is 5.  GCS motor subscore is 6.   Skin: Skin is warm and dry. No rash noted. She is not diaphoretic. No erythema. No pallor. Psychiatric: She has a normal mood and affect. Her behavior is normal. Judgment and thought content normal.   Nursing note and vitals reviewed. Note written by Rosaura Boo. Delmar Puente, as dictated by Moisés Pringle, DO 9:37 PM.     MDM  Number of Diagnoses or Management Options  Abrasion of scalp, initial encounter:   Acute gastric ulcer with perforation [facility]):   Closed fracture of second lumbar vertebra, unspecified fracture morphology, initial encounter [facility]):   Facial contusion, initial encounter:   Fall, initial encounter:   Lactic acidosis:   T12 burst fracture ([facility] [address]):      Amount and/or Complexity of Data Reviewed  Clinical lab tests: ordered and reviewed  Tests in the radiology section of CPT®: ordered and reviewed  Tests in the medicine section of CPT®: ordered and reviewed  Obtain history from someone other than the patient: yes (EMS, ex-)  Discuss the patient with other providers: yes (Gen surgery; ortho)  Independent visualization of images, tracings, or specimens: yes (ekg)    Risk of Complications, Morbidity, and/or Mortality  Presenting problems: high  Diagnostic procedures: moderate  Management options: high    Critical Care  Total time providing critical care: 30-74 minutes (Total critical care time spent exclusive of procedures:  60 minutes)    Patient Progress  Patient progress: stable (Guarded condition to the OR)        ED Course       Procedures    PROGRESS NOTE:  9:51 PM   Ex- currently at bedside. He states that patient called him today and mentioned that she had been having back pain for the past three days. She was taking the Naproxen for the back pain. PROGRESS NOTE:  10:34 PM   Spoke with radiologist, who states that patient has free-air on her CT abdomen/pelvis. Will consult with surgery emergently.      PROGRESS NOTE:  10:37 PM   Spoke with radiologist, who states that patient also has two acute lumbar vertebral fractures. CONSULT NOTE:  10:45 PM Niranjan Rosen DO spoke with Dr. Claudio Kearney, Consult for Surgery. Discussed available diagnostic tests and clinical findings. He is in agreement with care plans as outlined. Dr. Tatiana Walsh will evaluate the patient and plan to admit to hospital/take to OR tonight. PROGRESS NOTE:  10:53 PM    Dr. Tatiana Walsh recommends consulting with orthopedics about pt's lumbar vertebral fractures. CONSULT NOTE:  10:57 PM Niranjan Rosen DO spoke with Amanda Gutierres PA-C, Consult for Orthopedics. Discussed available diagnostic tests and clinical findings. He is in agreement with care plans as outlined. He is aware of the patient's case and states there is nothing to do emergently for patient's vertebral fractures. Will plan to fit for back brace tomorrow/as soon as possible. PROGRESS NOTE:  11:13 PM   Dr. Tatiana Walsh currently at bedside. Chief Complaint   Patient presents with   Ky Gear Fall       The patient's presenting problems have been discussed, and they are in agreement with the care plan formulated and outlined with them. I have encouraged them to ask questions as they arise throughout their visit.     MEDICATIONS GIVEN:  Medications   sodium chloride (NS) flush 5-10 mL (10 mL IntraVENous Given 2/11/18 2152)   sodium chloride (NS) flush 5-10 mL (not administered)   sodium chloride 0.9 % bolus infusion 1,000 mL (1,000 mL IntraVENous New Bag 2/11/18 2235)   sodium chloride 0.9 % bolus infusion 1,000 mL (0 mL IntraVENous IV Completed 2/11/18 2245)   fentaNYL citrate (PF) injection 50 mcg (50 mcg IntraVENous Given 2/11/18 2152)   ondansetron (ZOFRAN) injection 4 mg (4 mg IntraVENous Given 2/11/18 2152)   famotidine (PF) (PEPCID) injection 20 mg (20 mg IntraVENous Given 2/11/18 2152)   iopamidol (ISOVUE-370) 76 % injection 100 mL (100 mL IntraVENous Given 2/11/18 2149)   piperacillin-tazobactam (ZOSYN) 3.375 g in 0.9% sodium chloride (MBP/ADV) 100 mL (3.375 g IntraVENous New Bag 2/11/18 2050)       LABS REVIEWED:  Recent Results (from the past 24 hour(s))   CBC WITH AUTOMATED DIFF    Collection Time: 02/11/18  9:47 PM   Result Value Ref Range    WBC 2.6 (L) 3.6 - 11.0 K/uL    RBC 4.69 3.80 - 5.20 M/uL    HGB 16.3 (H) 11.5 - 16.0 g/dL    HCT 49.6 (H) 35.0 - 47.0 %    .8 (H) 80.0 - 99.0 FL    MCH 34.8 (H) 26.0 - 34.0 PG    MCHC 32.9 30.0 - 36.5 g/dL    RDW 13.2 11.5 - 14.5 %    PLATELET 120 372 - 673 K/uL    MPV 12.9 8.9 - 12.9 FL    NRBC 0.0 0  WBC    ABSOLUTE NRBC 0.00 0.00 - 0.01 K/uL    NEUTROPHILS 45 32 - 75 %    BAND NEUTROPHILS 22 (H) 0 - 6 %    LYMPHOCYTES 12 12 - 49 %    MONOCYTES 11 5 - 13 %    EOSINOPHILS 0 0 - 7 %    BASOPHILS 0 0 - 1 %    METAMYELOCYTES 9 (H) 0 %    MYELOCYTES 1 (H) 0 %    IMMATURE GRANULOCYTES 0 %    ABS. NEUTROPHILS 1.7 (L) 1.8 - 8.0 K/UL    ABS. LYMPHOCYTES 0.3 (L) 0.8 - 3.5 K/UL    ABS. MONOCYTES 0.3 0.0 - 1.0 K/UL    ABS. EOSINOPHILS 0.0 0.0 - 0.4 K/UL    ABS. BASOPHILS 0.0 0.0 - 0.1 K/UL    ABS. IMM.  GRANS. 0.0 K/UL    DF MANUAL      PLATELET COMMENTS Large Platelets      RBC COMMENTS NORMOCYTIC, NORMOCHROMIC     LACTIC ACID    Collection Time: 02/11/18  9:47 PM   Result Value Ref Range    Lactic acid 8.4 (HH) 0.4 - 2.0 MMOL/L   TROPONIN I    Collection Time: 02/11/18  9:47 PM   Result Value Ref Range    Troponin-I, Qt. <0.04 <0.05 ng/mL   CK W/ REFLX CKMB    Collection Time: 02/11/18  9:47 PM   Result Value Ref Range     26 - 192 U/L   LIPASE    Collection Time: 02/11/18  9:47 PM   Result Value Ref Range    Lipase 184 73 - 393 U/L   ETHYL ALCOHOL    Collection Time: 02/11/18  9:47 PM   Result Value Ref Range    ALCOHOL(ETHYL),SERUM 14 (H) <10 MG/DL   PROTHROMBIN TIME + INR    Collection Time: 02/11/18  9:47 PM   Result Value Ref Range    INR 1.2 (H) 0.9 - 1.1      Prothrombin time 11.7 (H) 9.0 - 11.1 sec   PTT    Collection Time: 02/11/18  9:47 PM   Result Value Ref Range    aPTT 25.3 22.1 - 32.5 sec    aPTT, therapeutic range     58.0 - 77.0 SECS   ACETAMINOPHEN    Collection Time: 02/11/18  9:47 PM   Result Value Ref Range    Acetaminophen level <2 (L) 10 - 30 ug/mL   SALICYLATE    Collection Time: 02/11/18  9:47 PM   Result Value Ref Range    Salicylate level 1.9 (L) 2.8 - 20.0 MG/DL   AMMONIA    Collection Time: 02/11/18  9:50 PM   Result Value Ref Range    Ammonia <10 <32 UMOL/L   POC CHEM8    Collection Time: 02/11/18  9:53 PM   Result Value Ref Range    Calcium, ionized (POC) 1.08 (L) 1.12 - 1.32 MMOL/L    Sodium (POC) 135 (L) 136 - 145 MMOL/L    Potassium (POC) 4.0 3.5 - 5.1 MMOL/L    Chloride (POC) 101 98 - 107 MMOL/L    CO2 (POC) 17 (L) 21 - 32 MMOL/L    Anion gap (POC) 22 (H) 5 - 15 mmol/L    Glucose (POC) 108 (H) 65 - 100 MG/DL    BUN (POC) 14 9 - 20 MG/DL    Creatinine (POC) 1.0 0.6 - 1.3 MG/DL    GFRAA, POC >60 >60 ml/min/1.73m2    GFRNA, POC 54 (L) >60 ml/min/1.73m2    Hemoglobin (POC) 18.0 (H) 11.5 - 16.0 GM/DL    Hematocrit (POC) 53 (H) 35.0 - 47.0 %    Comment Comment Not Indicated.      EKG, 12 LEAD, INITIAL    Collection Time: 02/11/18 10:00 PM   Result Value Ref Range    Ventricular Rate 106 BPM    Atrial Rate 106 BPM    P-R Interval 144 ms    QRS Duration 88 ms    Q-T Interval 372 ms    QTC Calculation (Bezet) 494 ms    Calculated P Axis 40 degrees    Calculated R Axis 29 degrees    Calculated T Axis 66 degrees    Diagnosis       Sinus tachycardia with frequent premature ventricular complexes  Otherwise normal ECG  No previous ECGs available     URINALYSIS W/MICROSCOPIC    Collection Time: 02/11/18 10:38 PM   Result Value Ref Range    Color YELLOW      Appearance TURBID (A) CLEAR      Specific gravity 1.005 1.003 - 1.030      pH (UA) 5.5 5.0 - 8.0      Protein 30 (A) NEG mg/dL    Glucose NEGATIVE  NEG mg/dL    Ketone NEGATIVE  NEG mg/dL    Bilirubin NEGATIVE  NEG      Blood TRACE (A) NEG      Urobilinogen 0.2 0.2 - 1.0 EU/dL    Nitrites NEGATIVE  NEG Leukocyte Esterase SMALL (A) NEG      WBC  0 - 4 /hpf    RBC 0-5 0 - 5 /hpf    Epithelial cells FEW FEW /lpf    Bacteria 3+ (A) NEG /hpf    Hyaline cast 5-10 0 - 5 /lpf   URINE CULTURE HOLD SAMPLE    Collection Time: 02/11/18 10:38 PM   Result Value Ref Range    Urine culture hold        URINE ON HOLD IN MICROBIOLOGY DEPT FOR 3 DAYS. IF UNPRESERVED URINE IS SUBMITTED, IT CANNOT BE USED FOR ADDITIONAL TESTING AFTER 24 HRS, RECOLLECTION WILL BE REQUIRED. VITAL SIGNS:  Patient Vitals for the past 12 hrs:   Pulse Resp BP SpO2   02/11/18 2226 90 21 126/49 96 %   02/11/18 2205 - - 115/81 -   02/11/18 2141 89 18 116/68 98 %       RADIOLOGY RESULTS:  The following have been ordered and reviewed:  Ct Head Wo Cont    Result Date: 2/11/2018  INDICATION:   fall EXAMINATION:  CT HEAD WO CONTRAST COMPARISON:  None TECHNIQUE:  Routine noncontrast axial head CT was performed. Sagittal and coronal reconstructions were generated. CT dose reduction was achieved through use of a standardized protocol tailored for this examination and automatic exposure control for dose modulation. Lissa Gimenez FINDINGS: Ventricles:  Midline, no hydrocephalus. Intracranial Hemorrhage:  None. Brain Parenchyma/Brainstem:  Normal for age. Basal Cisterns:  Normal. Paranasal Sinuses:  Visualized sinuses are clear. Soft Tissues:  No significant soft tissue swelling. Osseous Structures:  No acute fracture. Additional Comments:  N/A. IMPRESSION: No acute traumatic injury. Ct Maxillofacial Wo Cont    Result Date: 2/11/2018  INDICATION:  fall EXAMINATION:  MAXILLOFACIAL CT COMPARISON:  None TECHNIQUE:  Axial CT was performed through the maxillofacial bones. Coronal and sagittal reconstructions were obtained. CT dose reduction was achieved through use of a standardized protocol tailored for this examination and automatic exposure control for dose modulation. FINDINGS: The facial bones are intact. The paranasal sinuses are clear.  There is no significant soft tissue swelling. The globes and lenses are intact. IMPRESSION: No acute process     Ct Abd Pelv W Cont    Result Date: 2/11/2018  INDICATION: abd pain, h/o gastric ulcers, ? perforation COMPARISON: None TECHNIQUE: Following the uneventful intravenous administration of 100 cc Isovue-370, thin axial images were obtained through the abdomen and pelvis. Coronal and sagittal reconstructions were generated. Oral contrast was not administered. CT dose reduction was achieved through use of a standardized protocol tailored for this examination and automatic exposure control for dose modulation. FINDINGS: LUNG BASES: Bibasilar atelectasis. LIVER: Slightly nodular morphology. No focal lesion. Portal vein is patent. GALLBLADDER: Unremarkable. SPLEEN: No enlargement or lesion. PANCREAS: No mass or ductal dilatation. ADRENALS: No mass. KIDNEYS: No mass, calculus, or hydronephrosis. GI TRACT:  No evidence of bowel obstruction. Few prominent loops of small bowel right lower quadrant may represent focal ileus. Abnormal defect in the posterior wall of the gastric antrum with associated gastric wall thickening and adjacent inflammation PERITONEUM: Pneumoperitoneum and moderate amount of ascites. APPENDIX: Unremarkable. RETROPERITONEUM: No lymphadenopathy or aortic aneurysm. ADDITIONAL COMMENTS: N/A. URINARY BLADDER: Incompletely distended. REPRODUCTIVE ORGANS: Unremarkable. LYMPH NODES:  None enlarged. FREE FLUID:  Moderate to large. BONES: Age indeterminate fracture of T12, which appears to be burst type fracture with only mild loss of height and no retropulsion. Mild superior endplate fracture of L2 is age indeterminate. ADDITIONAL COMMENTS: N/A. IMPRESSION: 1. Pneumoperitoneum, with focal defect in the posterior wall of the gastric antrum which is thickened, highly suspicious for perforated ulcer or less likely neoplasm. Moderate free air.  2. Nodular morphology of the liver may indicate underlying cirrhosis. 3. Mildly dilated small bowel loops right lower quadrant may represent focal ileus. 4. Acute to subacute comminuted/burst type fracture of the T12 vertebral body without retropulsion. Mild superimposed endplate fracture of L2 may be acute. The findings were called to  [address] [place] [address] on 2/11/2018 at 2238 hours by myself. 789. ED EKG interpretation:  Rhythm: sinus tachycardia and PVC's; and regular . Rate (approx.): 106; Axis: normal; P wave: normal; QRS interval: normal ; ST/T wave: normal; Other findings: normal. This EKG was interpreted by Vane Arthur DO, ED Provider. CONSULTATIONS:   General Surgery, Orthopedic Surgery     PROGRESS NOTES:  Discussed results and plan with patient. Patient will be admitted/observed for further evaluation and treatment. DIAGNOSIS:    1. Acute gastric ulcer with perforation ([address]    2. Fall, initial encounter    3. Facial contusion, initial encounter    4. Abrasion of scalp, initial encounter    5. Lactic acidosis    6. T12 burst fracture ([address]    7. Closed fracture of second lumbar vertebra, unspecified fracture morphology, initial encounter [facility])        PLAN:  Admit to Surgery. STAT OR. ED COURSE: The patient's hospital course has been uncomplicated.

## 2018-02-12 NOTE — PROGRESS NOTES
[facility]       INTENSIVIST DAILY PROGRESS NOTE  Name: Gonzalo Lazcano   :    MRN: [id number]   Date: 2018 8:07 AM   I have reviewed the flowsheet and previous days notes. Overnight events reviewed:    · The pt underwent the surgery, on ventilator after that  · Maintained god BP with MAP > 65 on Delbert                Vital Signs:    Visit Vitals    /55    Pulse 70    Temp 98.6 °F (37 °C)    Resp 17    Ht 5' 3.5\" (1.613 m)    Wt 170 lb 6.7 oz (77.3 kg)    SpO2 96%    BMI 29.71 kg/m2       O2 Device: Ventilator   O2 Flow Rate (L/min): 2 l/min   Temp (24hrs), Av.9 °F (36.6 °C), Min:97.5 °F (36.4 °C), Max:98.6 °F (37 °C)       Intake/Output:   Last shift:      701 - 1900  In: -   Out: 125 [Urine:125]  Last 3 shifts: 02/10 1901 -  07  In: 3687.5 [I.V.:3687.5]  Out: 755 [Urine:425; Drains:130]    Intake/Output Summary (Last 24 hours) at 18 0848  Last data filed at 18 0839   Gross per 24 hour   Intake           3687.5 ml   Output              880 ml   Net           2807.5 ml     Hemodynamics:   PAP:     Wedge:     CVP:      CO:     CI:     SVR:     PVR:        Ventilator Settings:  Ventilator Mode: Assist control  Respiratory Rate  Back-Up Rate: 14  Insp Time (sec): 1 sec  Insp Flow (l/min): 50 l/min  Ventilator Volumes  Vt Set (ml): 450 ml  Vt Exhaled (Machine Breath) (ml): 540 ml  Ve Observed (l/min): 8.9 l/min  Ventilator Pressures  PIP Observed (cm H2O): 20 cm H2O  Plateau Pressure (cm H2O): 16 cm H2O  MAP (cm H2O): 9.7  PEEP/VENT (cm H2O): 5 cm H20    Physical Exam:  General:  Alert, cooperative, no distress, appears stated age. Intubated. Head:  Normocephalic, without obvious abnormality, atraumatic. Eyes:  Conjunctivae/corneas clear. Nose: Nares normal. Septum midline. Mucosa normal. No drainage or sinus tenderness. Lungs:   Clear to auscultation bilaterally. Chest wall:  No tenderness or deformity. Heart:  Regular rate and rhythm, S1, S2 normal, no murmur, click, rub or gallop. Abdomen:   Clean chilango  Covers the abdomen. Soft, tender to palpation. Hypoactive bowel sounds. Extremities: Extremities normal, atraumatic, no cyanosis or edema. SCDs in place. Pulses: 2+ and symmetric all extremities. Skin: Skin color, texture, turgor normal. No rashes or lesions   Neurologic: Intubated, but follows the commands. Grossly no focal deficits. DATA:   Current Facility-Administered Medications   Medication Dose Route Frequency    sodium chloride (NS) flush 5-10 mL  5-10 mL IntraVENous Q8H    sodium chloride (NS) flush 5-10 mL  5-10 mL IntraVENous PRN    acetaminophen (TYLENOL) solution 650 mg  650 mg Oral Q6H PRN    HYDROmorphone (PF) (DILAUDID) injection 1 mg  1 mg IntraVENous Q3H PRN    naloxone (NARCAN) injection 0.4 mg  0.4 mg IntraVENous PRN    ondansetron (ZOFRAN) injection 4 mg  4 mg IntraVENous Q4H PRN    enoxaparin (LOVENOX) injection 40 mg  40 mg SubCUTAneous Q24H    0.9% sodium chloride 1,000 mL with mvi, adult no. 4 with vit K 10 mL, thiamine 031 mg, folic acid 1 mg infusion   IntraVENous Q24H    PHENYLephrine (SMITA-SYNEPHRINE) 30 mg in 0.9% sodium chloride 250 mL infusion   mcg/min IntraVENous TITRATE    pantoprazole (PROTONIX) injection 40 mg  40 mg IntraVENous Q12H    propofol (DIPRIVAN) infusion  0-50 mcg/kg/min IntraVENous TITRATE    fentaNYL (PF) 900 mcg/30 ml infusion soln  0-200 mcg/hr IntraVENous TITRATE    potassium chloride 20 mEq in 50 ml IVPB  20 mEq IntraVENous Q2H    magnesium sulfate 2 g/50 ml IVPB (premix or compounded)  2 g IntraVENous Q2H    LORazepam (ATIVAN) injection 2 mg  2 mg IntraVENous Q1H PRN    LORazepam (ATIVAN) injection 4 mg  4 mg IntraVENous Q1H PRN    sodium chloride (NS) flush 5-10 mL  5-10 mL IntraVENous Q8H    sodium chloride (NS) flush 5-10 mL  5-10 mL IntraVENous PRN       Telemetry:          Labs:  Recent Labs      02/12/18   3024 02/11/18   2147   WBC  1.8*  2.6*   HGB  13.8  16.3*   HCT  41.7  49.6*   PLT  115*  173     Recent Labs      02/12/18   0303  02/11/18   2147   NA  141   --    K  3.2*   --    CL  114*   --    CO2  17*   --    GLU  80   --    BUN  11   --    CREA  0.46*   --    CA  5.5*   --    MG  0.7*   --    INR   --   1.2*     Recent Labs      02/12/18   0440   PH  7.36   PCO2  36   PO2  81   HCO3  19*   FIO2  65       Imaging:  I have personally reviewed the patients radiographs and reports.        IMPRESSION:   · Perforated gastric ulcer , s/p exploratory laparotomy with repair of perforated ulcer and core liver biopsy  · Respiratory failure, on ventilator  · Shock  · Alcohol abuse  · Electrolyte abnormalities with hypokalemia, hypomagnesemia    PLAN:   · Wean off the ventilator as tolerated to maintain sat O2 >90%, SBT later today if the pt can be weaned off the pressors  · Continue Propofol and Fentanyl, wean as tolerated  · Continue pressors with Delbert to maintain MAP>65, wean as tolerated   · CIWA protocol   · Goody bag  · Replete electrolytes as needed  · Post-op management per surgery   · NPO, advance per surgery    GLOBAL ISSUES:   · Head of bed elevated  · GI Prophylaxis: Protonix Q12  · DVT Prophylaxis: Lovenox SQ, SCDs     The patient was discussed with Dr. Geovanna Chacon ( the attending physician)    Radha Croft MD  Family Medicine Resident, PGY-2

## 2018-02-12 NOTE — OP NOTES
Madhu Joshi [facility] 78  OPERATIVE REPORT    Joo Ac  MR#: [id number]  : 1945  ACCOUNT #: [de-identified]   DATE OF SERVICE: 2018    SURGEON:    Elicia Klinefelter. Katelyn Lopez MD.    Atul Raphael. ANESTHESIA:    General endotracheal.    PREOPERATIVE DIAGNOSIS:    Perforated viscus with free intraperitoneal air. POSTOPERATIVE DIAGNOSIS:    Perforated posterior gastric ulcer. PROCEDURES:  1. Exploratory laparotomy. 2.  Excisional biopsy of stomach. 3.  Repair of perforated posterior gastric ulcer. 4.  Core needle biopsy of left lobe of liver. INDICATIONS:    Mrs. Jaci Self is a 71-year-old white female with history of alcohol abuse and a gastric ulcer documented on endoscopy approximately two years ago, after presenting with an episode of bleeding. She fell this morning and then presented at least 12 hours later after developing acute back and abdominal pain. She was tachycardic and hypotensive on presentation. Her CAT scan revealed free intraperitoneal air, concerning for a posterior gastric ulcer perforation. Clinically, she has an acute abdomen. She now presents at this time for urgent exploration. PROCEDURE IN DETAIL:    The patient was seen preoperatively in the ER. The risks, benefits, and expected outcomes were discussed with the patient, and all questions were answered to extract satisfactorily. The patient concurred with this plan, giving informed consent. The patient was taken to the OR. The patient was identified as [place], and the procedure verified as Exploratory laparotomy. The patient was placed on the OR table in the supine position. Prior to induction, antibiotic prophylaxis had been administered. General anesthesia was administered. The patient was hypotensive, which required initiation of pressor support. A central line and A-line were placed.   The patient's abdomen was prepped with ChloraPrep and draped in the usual sterile fashion. A timeout was performed, and the above information was confirmed. Using a 10 blade, the skin was incised in the upper midline. Dissection was taken down through all layers of the abdominal wall with cautery. The fascia was incised along the linea alba. Immediately upon entering the peritoneal cavity, a pneumoperitoneum was released and approximately 1750 mL of turbid peritoneal fluid was suctioned out. Routine Gram stain and cultures were obtained. Attention was turned towards the stomach. The greater curvature was grasped and pulled down. No ulcer was visualized or palpated along the anterior surface. I then attempted to enter the lesser sac by taking down the gastrocolic ligament adjacent to the stomach. The planes here were fixed, with the posterior wall of the stomach was adherent to the pancreas. This dissection only resulted in bleeding. The lesser omentum was opened. I was able to palpate a perforation along the posterior wall of the stomach at the distal antrum. I was not able to free up the stomach in order to expose the posterior surface directly; therefore, an anterior gastrotomy was made immediately anterior to the ulcer. Silk stay sutures were placed in all directions to assist with exposure. I was able to direct my finger posteriorly to introduce it into the hole to confirm that I was visualizing the perforation. The stomach was then closed with interrupted 3-0 silk without any tension. Because of the location of anterior gastrotomy, a decision was made not to perform a pyloroplasty since the closure may have been too long and difficult to close. The anterior gastrotomy was closed in two layers. The first layer was closed with a running 3-0 Vicryl, grasping full thickness bites, and the second layer was performed with interrupted 3-0 Silk Lembert sutures. The nasogastric tube was confirmed in good position just proximal to the repair. The upper abdomen was filled with saline and the stomach was compressed distal to the repair. Anesthesia insufflated the stomach with air, and no bubbling was noted to indicate a leak. I was not able to feel any further full thickness defect. The peritoneal cavity was copiously irrigated with approximately 4 liters of saline which was suctioned out as best possible. The liver did appear cirrhotic; therefore, three separate core needle biopsies were obtained in the left lobe using a Yousuf-Cut biopsy needle. The biopsy site was cauterized, and hemostasis was confirmed. A tongue of omentum was isolated and placed adjacent to the repair posteriorly. A 19 round Osvaldo drain was placed above the lesser curvature and extending just behind the stomach as well. The drain was brought out through the left lower quadrant and secured with 2-0 Nylon. The omentum was pulled down over the bowel. Seprafilm was placed over the omentum. The fascia was closed with a running #1 PDS from each end. Hemostasis was obtained within the wound as needed with cautery. The wound was irrigated with saline. The skin was approximated with staples. The wound was clean and dried, and a sterile dressing was applied. The drain was placed to bulb suction. The patient was left intubated. ESTIMATED BLOOD LOSS:    Approximately 50 mL. URINE OUTPUT:    200 mL. SPECIMENS:  1. Peritoneal fluid, which was sent for routine Gram stain and cultures to Microbiology. 2.  Biopsy of the posterior gastric ulcer, which was sent off as a permanent specimen to Pathology. 3.  Three separate core biopsies of the left lobe of the liver, which were sent off as permanent specimens to Pathology. INTRAVENOUS FLUIDS:    3500 mL crystalloid. DRAINS:    A 19 French round Osvaldo drain, which was placed adjacent to the posterior gastric repair. FINDINGS:  1. Approximately 1750 mL of turbid peritoneal fluid.   2.  A perforated posterior gastric ulcer in the antrum, which was fixed to the pancreas. 3.  A nodular-appearing liver consistent with cirrhosis. COMPLICATIONS:    None. IMPLANTS:    None. DISPOSITION:    The patient was transferred to the recovery room in critical condition, intubated and on pressors.       MD TEOFILO Tamayo / [place]  D: 02/12/2018 14:10     T: 02/12/2018 17:06  JOB #: [id number]  CC: TATIANA PIEDRA DO  CC: Mylene June MD

## 2018-02-12 NOTE — H&P
Surgery History and Physical    Subjective:      Jeffrey Rodriguez is a 67 y.o. white female who presents for evaluation of free air. Mrs. Francisco Nazario was doing fine until this morning when she fell. She is not sure if she lost consciousness. She then developed back and abdominal pain which has progressively worsened. She admits to having nausea, but denies any vomiting or fever. She has a h/o a gastric ulcer diagnosed on EGD 2 years ago, but has not been on PPI's and has taken an excessive amount of Naproxen recently. She says that she was not told that she could not take it. There is no documentation of biopsy or treatment pain for H. Pylori. She also has a long-standing h/o ETOH abuse, but denies drinking for 3 to 4 days. Her CT reveals free air with a suspected posterior gastric ulcer. Past Medical History:   Diagnosis Date    Hyperlipidemia      Past Surgical History:   Procedure Laterality Date    HX CATARACT REMOVAL        Family History   Problem Relation Age of Onset    Other Other      patient did not know     Social History   Substance Use Topics    Smoking status: Former Smoker    Smokeless tobacco: Never Used    Alcohol use 6.0 oz/week     0 Standard drinks or equivalent, 10 Glasses of wine per week      Comment: Hx of heavy etoh use, but quit several months ago      Prior to Admission medications    Medication Sig Start Date End Date Taking? Authorizing Provider   naproxen sodium (ALEVE) 220 mg tablet Take 220 mg by mouth daily as needed for Pain. Yes Historical Provider   cyanocobalamin (VITAMIN B12) 500 mcg tablet Take 500 mcg by mouth daily. Yes Historical Provider   multivitamin (ONE A DAY) tablet Take 1 Tab by mouth daily. Yes Historical Provider   omega-3 fatty acids-vitamin e 1,000 mg cap Take 2 Caps by mouth daily.    Yes Historical Provider      No Known Allergies    Review of Systems:  A comprehensive review of systems was negative except for that written in the History of Present Illness. Objective:      Patient Vitals for the past 8 hrs:   BP Pulse Resp SpO2 Height Weight   02/11/18 2226 126/49 90 21 96 % - -   02/11/18 2205 115/81 - - - - -   02/11/18 2141 116/68 89 18 98 % 5' 3.5\" (1.613 m) 180 lb (81.6 kg)       No data recorded. Physical Exam:  GENERAL: alert, cooperative, mild distress, slowed mentation, appears stated age, EYE: negative findings: anicteric sclera, LYMPHATIC: Cervical, supraclavicular nodes normal. , THROAT & NECK: normal, LUNG: clear to auscultation bilaterally, HEART: Tachycardic and regular rhythm, ABDOMEN: Soft, hypoactive BS, moderately distended and tympanitic, moderately tender in the epigastric region with voluntary guarding., EXTREMITIES:  no edema, SKIN: clammy/, NEUROLOGIC: Possibly slightly intoxicated., PSYCHIATRIC: non focal    Assessment:     1. Perforated viscus, probably gastric ulcer. 2. Spinal fracture. 3. ETOH abuse. Plan:     1. Mrs. Cherry Ribeiro probably has a perforation of her gastric ulcer. It appears that she has not been treated and has been taking ulcerogenic medication and ETOH. She will need to go to the OR for exploration. I discussed the risks of the procedure including bleeding, persistent infection, wound healing problems, blood clots, injury to the bowel, liver, spleen or diaphragm, reaction to the prep or local and general anesthetic, prolonged ventilation, and death. She understands the risks; any and all questions were answered to her satisfaction. Already treated with Zosyn. 2. Ortho consult for management of the spinal injury. No issues to address tonight. 3. DT prophylaxis.   Signed By: Ramy Villeda MD     February 11, 2018

## 2018-02-12 NOTE — ANESTHESIA PROCEDURE NOTES
Central Line Placement    Start time: 2/12/2018 12:31 AM  End time: 2/12/2018 12:37 AM  Performed by: Stephanie Zazueta  Authorized by: Stephanie Zazueta     Indications: vascular access, sepsis protocol, need for vasopressors and central pressure monitoring  Preanesthetic Checklist: patient identified, risks and benefits discussed, anesthesia consent, site marked, patient being monitored and timeout performed      Pre-procedure: All elements of maximal sterile barrier technique followed?  Yes    Maximal barrier precautions followed, 2% Chlorhexidine for cutaneous antisepsis, Hand hygiene performed prior to catheter insertion and Ultrasound guidance    Sterile Ultrasound Technique followed?: Yes          Procedure:   Prep:  Chlorhexidine  Location:  Internal jugular  Orientation:  Right  Patient position:  Flat  Catheter type:  Triple lumen  Catheter size:  7 Fr  Catheter length:  20 cm  Number of attempts:  1  Successful placement: Yes      Assessment:   Post-procedure:  Catheter secured, sterile dressing applied and sterile dressing with CHG applied  Assessment:  Blood return through all ports, guidewire removal verified and free fluid flow  Insertion:  Uncomplicated  Patient tolerance:  Patient tolerated the procedure well with no immediate complications

## 2018-02-12 NOTE — ANESTHESIA POSTPROCEDURE EVALUATION
Post-Anesthesia Evaluation and Assessment    Patient: Mey Fox MRN: [id number]  SSN: [id number]    YOB: 1945  Age: 67 y.o. Sex: female       Cardiovascular Function/Vital Signs  Visit Vitals    /66    Pulse (!) 122    Temp 36.6 °C (97.9 °F)    Resp 14    Ht 5' 3.5\" (1.613 m)    Wt 81.6 kg (180 lb)    SpO2 94%    BMI 31.39 kg/m2       Patient is status post general anesthesia for Procedure(s):  LAPAROTOMY EXPLORATORY;REPAIR OF PERFORATED POSTERIOR GASTRIC ULCER AND CORE BIOPSY LIVER. Nausea/Vomiting: None    Postoperative hydration reviewed and adequate. Pain:  Pain Scale 1: Adult Nonverbal Pain Scale (02/12/18 0350)  Pain Intensity 1: 0 (02/12/18 0320)   Managed    Neurological Status:   Neuro (WDL): Exceptions to WDL (02/12/18 0320)  Neuro  Neurologic State: Anesthetized (02/12/18 0320)  Orientation Level: Oriented X4 (02/11/18 2256)  Cognition: Follows commands; Appropriate for age attention/concentration (02/11/18 2256)  Speech: Clear (02/11/18 2256)  Assessment L Pupil: Brisk;Round (02/11/18 2256)  Size L Pupil (mm): 3 (02/11/18 2256)  Assessment R Pupil: Brisk;Round (02/11/18 2256)  Size R Pupil (mm): 3 (02/11/18 2256)  LUE Motor Response: No movement to any stimulus (02/12/18 0320)  LLE Motor Response: No movement to any stimulus (02/12/18 0320)  RUE Motor Response: No movement to any stimulus (02/12/18 0320)  RLE Motor Response: No movement to any stimulus (02/12/18 0320)   At baseline    Mental Status and Level of Consciousness: Arousable    Pulmonary Status:   O2 Device: Ventilator (02/12/18 0320)   Adequate oxygenation and airway patent    Complications related to anesthesia: None    Post-anesthesia assessment completed.  No concerns    Signed By: Miko Dale MD     February 12, 2018

## 2018-02-12 NOTE — PROGRESS NOTES
Problem: Falls - Risk of  Goal: *Absence of Falls  Document Demarcus Fall Risk and appropriate interventions in the flowsheet.    Outcome: Not Progressing Towards Goal  Fall Risk Interventions:  Mobility Interventions: Bed/chair exit alarm, Strengthening exercises (ROM-active/passive)         Medication Interventions: Bed/chair exit alarm, Evaluate medications/consider consulting pharmacy    Elimination Interventions: Bed/chair exit alarm, Call light in reach, Toileting schedule/hourly rounds    History of Falls Interventions: Bed/chair exit alarm, Door open when patient unattended, Consult care management for discharge planning, Evaluate medications/consider consulting pharmacy, Investigate reason for fall, Room close to nurse's station

## 2018-02-12 NOTE — ED NOTES
Bedside and Verbal shift change report given to Chelo Rodriguez RN (oncoming nurse) by Annmarie Rosa RN (offgoing nurse). Report included the following information SBAR, ED Summary, MAR, Recent Results and Cardiac Rhythm SR/ST with frequent PVCs.

## 2018-02-12 NOTE — PROGRESS NOTES
9427 received patient from PACU, assessment to follow. Primary Nurse Rheba Krabbe, SNEHAL and Alyssa Castro, RN performed a dual skin assessment on this patient Impairment noted- see wound doc flow sheet. Abdominal incision site, dressing c/d/i, Quintin drain to left abdomen. Redness noted to sacrum, yeast to periarea and under left breast, abrasion to posterior head, bruising to right knee. Wound care order placed. Gianfranco score is 16  Patient bathed, gown and linen changed. 0430 patient assessed. See flow sheet. 8306 pulmonary paged (Dr. Marry Castellano) in regards to sedation orders. 5808 pulmonary paged (Dr. Marry Castellano) in regards to patients morning labs and blood pressure. 0700 Bedside and Verbal shift change report given to Alexus Hutchinson (oncoming nurse) by [employer] RN (offgoing nurse). Report included the following information SBAR, Kardex, Intake/Output, MAR, Recent Results and Cardiac Rhythm NSR. Oncoming nurse aware of morning labs and awaiting MD to call back to inform.

## 2018-02-12 NOTE — CONSULTS
[facility]     Name: Phu Adam MRN: [id number]   : 1945 Hospital: [facility]   Date: 2018        Impression Plan   1. Peforated gastric ulcer, s/p repair  2. Shock  3. Acute hypoxic respiratory failure   4. Hypokalemia, hypomagnesemia  5. EtOH abuse     · Wean vent as tolerated, goal sats 90%, wean FiO2  · If able to wean pressors, can attempt SBT later today  · Pressors for MAP >65, wean as tolerated  · Fentanyl and propofol as needed  · Post-op management as per surgery  · Replete magnesium and potassium  · CIWA  · Goody bag  · SCDs  · Protonix  · NPO, diet to be advanced as per surgery       Pt is critically ill and at risk of decompensation in the setting of perforated gastric ulcer, shock requiring pressors, and respiratory failure. Critical care time spent with pt exclusive of procedures was 30 minutes    Radiology  ( personally reviewed) CXR with hypoventilation, L basilar atelectasis; ETT ok    CT abd/pelvis with free air, pneumoperitoneum with focal defect in the posterior wall of the gastric antrum suspicious for perforate ulcer; nodular morphology of the liver, mildly dilated loops of small bowel, acute to subacute comminuted/burst freactue of T12 and mild superimposed endplate fracture of L2   ABG No results for input(s): PHI, PO2I, PCO2I in the last 72 hours. Subjective     This patient has been seen and evaluated at the request of Dr. Barbara Shipman for shock and respiratory failure. Patient is a 67 y.o. female with a history of gastric ulcer and EtOH abuse. Presented after a fall. Complained of nausea, back, and abdominal pain. No fevers/chills. Was not taking a PPI for her known ulcer and had been taking large amounts of Naproxen. Was found to have CT abd/pelvis with free air and suspected posterior gastric ulcer. Underwent ex-lap in the OR with repair of the perforated gastric ulcer and a liver biopsy. Was admitted to the ICU post-op. Remains intubated and currently on ACVC Vt 450 RR 14 FiO2 recently decreased to 55% and PEEP 5. On smita at 115. Sedated with propofol 10 and fentanyl 75. Review of Systems:  Review of systems not obtained due to patient factors. Past Medical History:   Diagnosis Date    Hyperlipidemia       Past Surgical History:   Procedure Laterality Date    HX CATARACT REMOVAL        Prior to Admission medications    Medication Sig Start Date End Date Taking? Authorizing Provider   naproxen sodium (ALEVE) 220 mg tablet Take 220 mg by mouth daily as needed for Pain. Yes Historical Provider   cyanocobalamin (VITAMIN B12) 500 mcg tablet Take 500 mcg by mouth daily. Yes Historical Provider   multivitamin (ONE A DAY) tablet Take 1 Tab by mouth daily. Yes Historical Provider   omega-3 fatty acids-vitamin e 1,000 mg cap Take 2 Caps by mouth daily. Yes Historical Provider     Current Facility-Administered Medications   Medication Dose Route Frequency    sodium chloride (NS) flush 5-10 mL  5-10 mL IntraVENous Q8H    enoxaparin (LOVENOX) injection 40 mg  40 mg SubCUTAneous Q24H    0.9% sodium chloride 1,000 mL with mvi, adult no. 4 with vit K 10 mL, thiamine 714 mg, folic acid 1 mg infusion   IntraVENous Q24H    PHENYLephrine (SMITA-SYNEPHRINE) 30 mg in 0.9% sodium chloride 250 mL infusion   mcg/min IntraVENous TITRATE    pantoprazole (PROTONIX) injection 40 mg  40 mg IntraVENous Q12H    propofol (DIPRIVAN) infusion  0-50 mcg/kg/min IntraVENous TITRATE    fentaNYL (PF) 900 mcg/30 ml infusion soln  0-200 mcg/hr IntraVENous TITRATE    potassium chloride 20 mEq in 50 ml IVPB  20 mEq IntraVENous Q2H    magnesium sulfate 2 g/50 ml IVPB (premix or compounded)  2 g IntraVENous Q2H    sodium chloride (NS) flush 5-10 mL  5-10 mL IntraVENous Q8H     No Known Allergies   Social History   Substance Use Topics    Smoking status: Former Smoker    Smokeless tobacco: Never Used    Alcohol use 6.0 oz/week     0 Standard drinks or equivalent, 10 Glasses of wine per week      Comment: Hx of heavy etoh use, but quit several months ago      Family History   Problem Relation Age of Onset    Other Other      patient did not know          Laboratory: I have personally reviewed the critical care flowsheet and labs.      Recent Labs      02/12/18 0303 02/11/18 2147   WBC  1.8*  2.6*   HGB  13.8  16.3*   HCT  41.7  49.6*   PLT  115*  173     Recent Labs      02/12/18 0303 02/11/18 2147   NA  141   --    K  3.2*   --    CL  114*   --    CO2  17*   --    GLU  80   --    BUN  11   --    CREA  0.46*   --    CA  5.5*   --    MG  0.7*   --    INR   --   1.2*       Objective:     Mode Rate Tidal Volume Pressure FiO2 PEEP   Assist control   450 ml    55 % 5 cm H20     Vital Signs:    Ventilator Pressures  PIP Observed (cm H2O): 20 cm H2O  Plateau Pressure (cm H2O): 16 cm H2O  MAP (cm H2O): 9.7  PEEP/VENT (cm H2O): 5 cm [id number](24)Ventilator Pressures  PIP Observed (cm H2O): 20 cm H2O  Plateau Pressure (cm H2O): 16 cm H2O  MAP (cm H2O): 9.7  PEEP/VENT (cm H2O): 5 cm H20  Safety & Alarms  Circuit Temperature: 98.6 °F (37 °C)  Backup Mode Checked/Apnea: Yes  Pressure Max: 54 cm H2O  Ve Min: 2  Ve Max: 20  Vt Min: 200 ml  Vt Max: 1000 ml  RR Min: 14  RR Max: 50  Intake/Output:   Last shift:      Ventilator Volumes  Vt Set (ml): 450 ml  Vt Exhaled (Machine Breath) (ml): 540 ml  Ve Observed (l/min): 8.9 l/min  Last 3 shifts:  [id number]  Intake/Output Summary (Last 24 hours) at 02/12/18 0810  Last data filed at 02/12/18 0654   Gross per 24 hour   Intake           3687.5 ml   Output              755 ml   Net           2932.5 ml     EXAM:   GENERAL: well developed and in no distress, intubated, HEENT:  PERRL, EOMI, no alar flaring or epistaxis, oral mucosa moist without cyanosis, NECK:  no jugular vein distention, no retractions, no thyromegaly or masses, LUNGS: CTAB, respirations non-labored , HEART:  Regular rate and rhythm with no MGR; no edema is present, ABDOMEN:  soft with tenderness to palpation, hypoactive bowel sounds, EXTREMITIES:  warm with no cyanosis, SKIN:  no jaundice or ecchymosis and NEUROLOGIC:  RASS -1, follows commands, grossly non-focal    Nova Triana MD  [facility]

## 2018-02-12 NOTE — ED NOTES
Contact Mathew Nguyen at [phone] (cell) or [phone] (home) with any questions/concerns. This is the pt's ex-.

## 2018-02-12 NOTE — ANESTHESIA PREPROCEDURE EVALUATION
Anesthetic History   No history of anesthetic complications            Review of Systems / Medical History  Patient summary reviewed and pertinent labs reviewed    Pulmonary  Within defined limits                 Neuro/Psych   Within defined limits           Cardiovascular                  Exercise tolerance: >4 METS     GI/Hepatic/Renal  Within defined limits              Endo/Other  Within defined limits           Other Findings   Comments: H/o ETOH abuse  Acute vs subacute vertebral fractures           Physical Exam    Airway  Mallampati: III  TM Distance: 4 - 6 cm  Neck ROM: normal range of motion   Mouth opening: Normal     Cardiovascular  Regular rate and rhythm,  S1 and S2 normal,  no murmur, click, rub, or gallop  Rhythm: regular  Rate: normal         Dental    Dentition: Poor dentition     Pulmonary  Breath sounds clear to auscultation               Abdominal  GI exam deferred       Other Findings            Anesthetic Plan    ASA: 3, emergent  Anesthesia type: general    Monitoring Plan: CVP and Arterial line    Post procedure ventilation   Induction: Intravenous  Anesthetic plan and risks discussed with: Patient      Discussed r/b/o with patient. She may need to remain intubated and mechanically ventilated after the surgery. She also has two vertebral fractures that may need to be addressed in the future, but her perforation is of more importance at this time. She is neurologically intact.

## 2018-02-12 NOTE — PROGRESS NOTES
CM met with pt's ex-, Antonette Vitale ([phone]: [phone]/S: [phone]), at pt's bedside to obtain initial assessment information. Per Mr. Paolo Proctor, pt resides alone in a two story house. She sleeps downstairs on a couch and there is a full bath on the first floor. Although they have been  36 years, Mr. Paolo Proctor stated, \"she's my best friend. \"  He lives about 20 minutes away and sees pt about 1x/week. Pt's daughter, Keisha Kumar, lives in [place] and is on her way to the hospital.  Pt's other daughter, [facility], lives in [place]. Reportedly pt has a hx of ETOH. She has never had HH. She drives on a limited basis, usually to [address] [facility] in [place], which is also where she gets her prescriptions filled. No DME reported. PCP is Dr. Milla Rodriguez. CM will follow and assist with d/c needs. Mathew Grove LCSW    Care Management Interventions  PCP Verified by CM:  Yes (Dr. Jarrod Doty)  Discharge Durable Medical Equipment: No  Physical Therapy Consult: Yes  Occupational Therapy Consult: No  Speech Therapy Consult: No  Current Support Network: Lives Alone  Plan discussed with Pt/Family/Caregiver: Yes  Discharge Location  Discharge Placement: Other: (TBD)

## 2018-02-12 NOTE — PROGRESS NOTES
[facility]: MED RECONCILIATION    Medications added:     · MVI  · Fish oil  · B12  · Aleve prn    Medications removed:    · Niferex  · Protonix  · potassium    Information obtained from: patient    Allergies: Review of patient's allergies indicates no known allergies. Prior to Admission Medications:     Prior to Admission Medications   Prescriptions Last Dose Informant Patient Reported? Taking? cyanocobalamin (VITAMIN B12) 500 mcg tablet 2/10/2018 at Unknown time Self Yes Yes   Sig: Take 500 mcg by mouth daily. multivitamin (ONE A DAY) tablet 2/10/2018 at Unknown time Self Yes Yes   Sig: Take 1 Tab by mouth daily. naproxen sodium (ALEVE) 220 mg tablet 2/10/2018 at Unknown time Self Yes Yes   Sig: Take 220 mg by mouth daily as needed for Pain. omega-3 fatty acids-vitamin e 1,000 mg cap 2/10/2018 at Unknown time Self Yes Yes   Sig: Take 2 Caps by mouth daily.       Facility-Administered Medications: None       YOVANY Hunt   Contact: [id number]

## 2018-02-13 ENCOUNTER — APPOINTMENT (OUTPATIENT)
Dept: GENERAL RADIOLOGY | Age: 73
DRG: 853 | End: 2018-02-13
Attending: INTERNAL MEDICINE
Payer: MEDICARE

## 2018-02-13 LAB
ANION GAP SERPL CALC-SCNC: 4 MMOL/L (ref 5–15)
BASOPHILS # BLD: 0 K/UL (ref 0–0.1)
BASOPHILS NFR BLD: 0 % (ref 0–1)
BUN SERPL-MCNC: 21 MG/DL (ref 6–20)
BUN/CREAT SERPL: 32 (ref 12–20)
CALCIUM SERPL-MCNC: 7.7 MG/DL (ref 8.5–10.1)
CHLORIDE SERPL-SCNC: 110 MMOL/L (ref 97–108)
CO2 SERPL-SCNC: 25 MMOL/L (ref 21–32)
CREAT SERPL-MCNC: 0.65 MG/DL (ref 0.55–1.02)
DIFFERENTIAL METHOD BLD: ABNORMAL
EOSINOPHIL # BLD: 0 K/UL (ref 0–0.4)
EOSINOPHIL NFR BLD: 0 % (ref 0–7)
ERYTHROCYTE [DISTWIDTH] IN BLOOD BY AUTOMATED COUNT: 13.6 % (ref 11.5–14.5)
GLUCOSE SERPL-MCNC: 84 MG/DL (ref 65–100)
HCT VFR BLD AUTO: 38.6 % (ref 35–47)
HGB BLD-MCNC: 12.2 G/DL (ref 11.5–16)
IMM GRANULOCYTES # BLD: 0 K/UL
IMM GRANULOCYTES NFR BLD AUTO: 0 %
LYMPHOCYTES # BLD: 0.5 K/UL (ref 0.8–3.5)
LYMPHOCYTES NFR BLD: 3 % (ref 12–49)
MAGNESIUM SERPL-MCNC: 2.8 MG/DL (ref 1.6–2.4)
MCH RBC QN AUTO: 35 PG (ref 26–34)
MCHC RBC AUTO-ENTMCNC: 31.6 G/DL (ref 30–36.5)
MCV RBC AUTO: 110.6 FL (ref 80–99)
METAMYELOCYTES NFR BLD MANUAL: 14 %
MONOCYTES # BLD: 0 K/UL (ref 0–1)
MONOCYTES NFR BLD: 0 % (ref 5–13)
MYELOCYTES NFR BLD MANUAL: 3 %
NEUTS BAND NFR BLD MANUAL: 37 % (ref 0–6)
NEUTS SEG # BLD: 13.1 K/UL (ref 1.8–8)
NEUTS SEG NFR BLD: 43 % (ref 32–75)
NRBC # BLD: 0 K/UL (ref 0–0.01)
NRBC BLD-RTO: 0 PER 100 WBC
PLATELET # BLD AUTO: 155 K/UL (ref 150–400)
PMV BLD AUTO: 13.1 FL (ref 8.9–12.9)
POTASSIUM SERPL-SCNC: 5.1 MMOL/L (ref 3.5–5.1)
RBC # BLD AUTO: 3.49 M/UL (ref 3.8–5.2)
SODIUM SERPL-SCNC: 139 MMOL/L (ref 136–145)
WBC # BLD AUTO: 16.4 K/UL (ref 3.6–11)
WBC MORPH BLD: ABNORMAL

## 2018-02-13 PROCEDURE — C9113 INJ PANTOPRAZOLE SODIUM, VIA: HCPCS | Performed by: SURGERY

## 2018-02-13 PROCEDURE — 77010033678 HC OXYGEN DAILY

## 2018-02-13 PROCEDURE — 65610000006 HC RM INTENSIVE CARE

## 2018-02-13 PROCEDURE — 74011000250 HC RX REV CODE- 250: Performed by: SURGERY

## 2018-02-13 PROCEDURE — 74011250636 HC RX REV CODE- 250/636: Performed by: SURGERY

## 2018-02-13 PROCEDURE — 74011000258 HC RX REV CODE- 258: Performed by: INTERNAL MEDICINE

## 2018-02-13 PROCEDURE — 74011250636 HC RX REV CODE- 250/636: Performed by: INTERNAL MEDICINE

## 2018-02-13 PROCEDURE — 85025 COMPLETE CBC W/AUTO DIFF WBC: CPT | Performed by: SURGERY

## 2018-02-13 PROCEDURE — 36415 COLL VENOUS BLD VENIPUNCTURE: CPT | Performed by: SURGERY

## 2018-02-13 PROCEDURE — 83735 ASSAY OF MAGNESIUM: CPT | Performed by: SURGERY

## 2018-02-13 PROCEDURE — 80048 BASIC METABOLIC PNL TOTAL CA: CPT | Performed by: SURGERY

## 2018-02-13 PROCEDURE — 71045 X-RAY EXAM CHEST 1 VIEW: CPT

## 2018-02-13 RX ORDER — SODIUM CHLORIDE 450 MG/100ML
1000 INJECTION, SOLUTION INTRAVENOUS ONCE
Status: COMPLETED | OUTPATIENT
Start: 2018-02-13 | End: 2018-02-13

## 2018-02-13 RX ORDER — ACETYLCYSTEINE 200 MG/ML
SOLUTION ORAL; RESPIRATORY (INHALATION)
Status: DISPENSED
Start: 2018-02-13 | End: 2018-02-14

## 2018-02-13 RX ORDER — SODIUM CHLORIDE 450 MG/100ML
125 INJECTION, SOLUTION INTRAVENOUS CONTINUOUS
Status: DISCONTINUED | OUTPATIENT
Start: 2018-02-13 | End: 2018-02-16

## 2018-02-13 RX ORDER — HYDROMORPHONE HYDROCHLORIDE 2 MG/ML
1 INJECTION, SOLUTION INTRAMUSCULAR; INTRAVENOUS; SUBCUTANEOUS
Status: DISCONTINUED | OUTPATIENT
Start: 2018-02-13 | End: 2018-02-18

## 2018-02-13 RX ADMIN — Medication 10 ML: at 15:20

## 2018-02-13 RX ADMIN — PIPERACILLIN SODIUM AND TAZOBACTAM SODIUM 10.12 G: 3; .375 INJECTION, POWDER, LYOPHILIZED, FOR SOLUTION INTRAVENOUS at 11:34

## 2018-02-13 RX ADMIN — SODIUM CHLORIDE 75 ML/HR: 450 INJECTION, SOLUTION INTRAVENOUS at 21:12

## 2018-02-13 RX ADMIN — FOLIC ACID: 5 INJECTION, SOLUTION INTRAMUSCULAR; INTRAVENOUS; SUBCUTANEOUS at 18:02

## 2018-02-13 RX ADMIN — HYDROMORPHONE HYDROCHLORIDE 1 MG: 2 INJECTION, SOLUTION INTRAMUSCULAR; INTRAVENOUS; SUBCUTANEOUS at 11:34

## 2018-02-13 RX ADMIN — PIPERACILLIN SODIUM AND TAZOBACTAM SODIUM 3.38 G: 36; 4.5 INJECTION, POWDER, FOR SOLUTION INTRAVENOUS at 10:33

## 2018-02-13 RX ADMIN — HYDROMORPHONE HYDROCHLORIDE 1 MG: 2 INJECTION, SOLUTION INTRAMUSCULAR; INTRAVENOUS; SUBCUTANEOUS at 18:25

## 2018-02-13 RX ADMIN — PANTOPRAZOLE SODIUM 40 MG: 40 INJECTION, POWDER, FOR SOLUTION INTRAVENOUS at 22:43

## 2018-02-13 RX ADMIN — ENOXAPARIN SODIUM 40 MG: 40 INJECTION SUBCUTANEOUS at 15:20

## 2018-02-13 RX ADMIN — Medication 10 ML: at 03:58

## 2018-02-13 RX ADMIN — NYSTATIN: 100000 POWDER TOPICAL at 08:34

## 2018-02-13 RX ADMIN — HYDROMORPHONE HYDROCHLORIDE 2 MG: 2 INJECTION, SOLUTION INTRAMUSCULAR; INTRAVENOUS; SUBCUTANEOUS at 03:57

## 2018-02-13 RX ADMIN — Medication 1 SPRAY: at 18:03

## 2018-02-13 RX ADMIN — PANTOPRAZOLE SODIUM 40 MG: 40 INJECTION, POWDER, FOR SOLUTION INTRAVENOUS at 08:33

## 2018-02-13 RX ADMIN — SODIUM CHLORIDE 75 ML/HR: 450 INJECTION, SOLUTION INTRAVENOUS at 10:35

## 2018-02-13 RX ADMIN — NYSTATIN: 100000 POWDER TOPICAL at 18:03

## 2018-02-13 RX ADMIN — SODIUM CHLORIDE 1000 ML: 450 INJECTION, SOLUTION INTRAVENOUS at 22:00

## 2018-02-13 NOTE — PROGRESS NOTES
Bedside and Verbal shift change report given to Allegra Wall RN (oncoming nurse) by Yael Hamilton (offgoing nurse). Report included the following information SBAR, Kardex, Recent Results, Med Rec Status, Cardiac Rhythm SR and Alarm Parameters . Overnight, Mrs. Dona Kenney had a good night. She did have lower urine output and could benefit from having continuous IV fluids. Delbert could not be weaned because of low blood pressures. Arterial line patent. Pt given ativan one time last night, however it has a sedative effect and makes the patient extremely drowsy. Pt complained of pain this am and received hydromorphone however that also made her sleepy. Reduction in dosage would be appropriate. Bedside and Verbal shift change report given to [facility] manuel (oncoming nurse) by Allegra Wall RN (offgoing nurse). Report included the following information SBAR, Kardex, Recent Results, Med Rec Status, Cardiac Rhythm SR and Alarm Parameters .      Primary Nurse Dany Cruz RN and Vinh Will RN performed a dual skin assessment on this patient Impairment noted- see wound doc flow sheet  Gianfranco score is 13

## 2018-02-13 NOTE — PROGRESS NOTES
Problem: Falls - Risk of  Goal: *Absence of Falls  Document Demarcus Fall Risk and appropriate interventions in the flowsheet.    Outcome: Progressing Towards Goal  Fall Risk Interventions:  Mobility Interventions: Bed/chair exit alarm    Mentation Interventions: Bed/chair exit alarm    Medication Interventions: Bed/chair exit alarm    Elimination Interventions: Bed/chair exit alarm, Call light in reach, Elevated toilet seat    History of Falls Interventions: Bed/chair exit alarm

## 2018-02-13 NOTE — PROGRESS NOTES
02/13/18     MSW met with the patient and then her two daughters and son-in-laws for an extended length of time. Both daughers live out of town. Ayush Márquez, [phone] lives in [facility] and is MPOA. Copy on chart. Shanice Rivera, [phone] lives in [place]. They both are visiting for a short time. They are concerned about discharged plans for the patient as she lives alone. They have concerns about her not being able to care for themselves and concerned she will go back to drinking. They also are concerned that she will refuse recommendations. Ivette Jasmine tearful and verbalizing request that hospital staff will talk honestly with the patient about ETOH and recommendations of needing care after discharge. MSW explained procedures. Provided information on short term rehab, long term care, assisted livings, adult homes, hiring help in the home. Provided lists in writing and recommended they visit places and make tentative plans. Family aware that patient can make her own decisions and may refuse all recommendations and care. CM will need to follow.     ASHWIN Booth

## 2018-02-13 NOTE — PROGRESS NOTES
Physical Therapy - orders received and at bedside after speaking with RN. PT missed opportunity to work with patient today. Her BP was low this am on pressors and deferred PT until BP improved. Unable to return until 4 pm today and patient has been up in chair with RN staff's assistance - assist x 2- and back to bed recently. Patient reports family left recently and she needs to nap. Looks very fatigued, much too tired to attempt fit with TLSO in bed. Telemetry noted and BP in 103/64 which improved from 87/47 this am on pressors. Patient should be ready to begin PT in am if hemodynamically stable. Will follow.  Will also follow through with fitting with TLSO in am. Thank you for this consult- TRUDI Gould

## 2018-02-13 NOTE — PROGRESS NOTES
[address]       INTENSIVIST DAILY PROGRESS NOTE  Name: Ashley Stewart   : 1555   MRN: [id number]   Date: 2018 8:07 AM   I have reviewed the flowsheet and previous days notes. Overnight events reviewed:    · Successful extubation yesterday  · VSS over the night, afebrile  · WBc elevated this morning to 16.4  Maintained god BP with MAP > 65 on Delbert, Delbert titrated down to 10               Vital Signs:    Visit Vitals    BP (!) 87/47    Pulse 91    Temp 97.7 °F (36.5 °C)    Resp (!) 6    Ht 5' 3.5\" (1.613 m)    Wt 174 lb 9.7 oz (79.2 kg)    SpO2 97%    BMI 30.44 kg/m2       O2 Device: Nasal cannula   O2 Flow Rate (L/min): 2 l/min   Temp (24hrs), Av.9 °F (36.6 °C), Min:97.1 °F (36.2 °C), Max:98.8 °F (37.1 °C)       Intake/Output:   Last shift:         Last 3 shifts:  1901 -  0700  In: 6051.5 [I.V.:6051.5]  Out: 7622 [Urine:1980; Drains:825]    Intake/Output Summary (Last 24 hours) at 18 0915  Last data filed at 18 0630   Gross per 24 hour   Intake          2363.97 ml   Output             2125 ml   Net           238.97 ml     Hemodynamics:   PAP:     Wedge:     CVP:      CO:     CI:     SVR:     PVR:        Ventilator Settings:  Ventilator Mode: Spontaneous, Pressure support  Respiratory Rate  Back-Up Rate: 14  Insp Time (sec): 1 sec  Insp Flow (l/min): 50 l/min  Ventilator Volumes  Vt Set (ml): 450 ml  Vt Exhaled (Machine Breath) (ml): 540 ml  Vt Spont (ml): 300 ml  Ve Observed (l/min): 7 l/min  Ventilator Pressures  Pressure Support (cm H2O): 8 cm H2O  PIP Observed (cm H2O): 20 cm H2O  Plateau Pressure (cm H2O): 16 cm H2O  MAP (cm H2O): 9.7  PEEP/VENT (cm H2O): 5 cm H20    Physical Exam:  General:  Alert, cooperative, no distress, appears stated age. Intubated. Head:  Normocephalic, without obvious abnormality, atraumatic. Eyes:  Conjunctivae/corneas clear. Nose: Nares normal. Septum midline.  Mucosa normal. No drainage or sinus tenderness. Lungs:   Clear to auscultation bilaterally. Chest wall:  No tenderness or deformity. Heart:  Regular rate and rhythm, S1, S2 normal, no murmur, click, rub or gallop. Abdomen:   Clean chilango  Covers the abdomen. Soft, tender to palpation. Hypoactive bowel sounds. Extremities: Extremities normal, atraumatic, no cyanosis or edema. SCDs in place. Pulses: 2+ and symmetric all extremities. Skin: Skin color, texture, turgor normal. No rashes or lesions   Neurologic: Intubated, but follows the commands. Grossly no focal deficits. DATA:   Current Facility-Administered Medications   Medication Dose Route Frequency    sodium chloride (NS) flush 5-10 mL  5-10 mL IntraVENous Q8H    sodium chloride (NS) flush 5-10 mL  5-10 mL IntraVENous PRN    acetaminophen (TYLENOL) solution 650 mg  650 mg Oral Q6H PRN    naloxone (NARCAN) injection 0.4 mg  0.4 mg IntraVENous PRN    ondansetron (ZOFRAN) injection 4 mg  4 mg IntraVENous Q4H PRN    enoxaparin (LOVENOX) injection 40 mg  40 mg SubCUTAneous Q24H    0.9% sodium chloride 1,000 mL with mvi, adult no. 4 with vit K 10 mL, thiamine 700 mg, folic acid 1 mg infusion   IntraVENous Q24H    PHENYLephrine (SMITA-SYNEPHRINE) 30 mg in 0.9% sodium chloride 250 mL infusion   mcg/min IntraVENous TITRATE    pantoprazole (PROTONIX) injection 40 mg  40 mg IntraVENous Q12H    LORazepam (ATIVAN) injection 2 mg  2 mg IntraVENous Q1H PRN    LORazepam (ATIVAN) injection 4 mg  4 mg IntraVENous Q1H PRN    phenol throat spray (CHLORASEPTIC) 1 Spray  1 Spray Oral PRN    HYDROmorphone (PF) (DILAUDID) injection 2 mg  2 mg IntraVENous Q3H PRN    nystatin (MYCOSTATIN) 100,000 unit/gram powder   Topical BID    sodium chloride (NS) flush 5-10 mL  5-10 mL IntraVENous PRN       Telemetry:          Labs:  Recent Labs      02/13/18   0354  02/12/18   0303  02/11/18   2147   WBC  16.4*  1.8*  2.6*   HGB  12.2  13.8  16.3*   HCT  38.6  41.7  49.6*   PLT  155  115*  173 Recent Labs      02/13/18   0354  02/12/18   0303  02/11/18   2147   NA  139  141   --    K  5.1  3.2*   --    CL  110*  114*   --    CO2  25  17*   --    GLU  84  80   --    BUN  21*  11   --    CREA  0.65  0.46*   --    CA  7.7*  5.5*   --    MG  2.8*  0.7*   --    INR   --    --   1.2*     Recent Labs      02/12/18   0440   PH  7.36   PCO2  36   PO2  81   HCO3  19*   FIO2  65       Imaging:  I have personally reviewed the patients radiographs and reports. IMPRESSION:   · Perforated gastric ulcer , s/p exploratory laparotomy with repair of perforated ulcer and core liver biopsy  · Septic shock.  Currently on pressors  · Spine fracture with T12 w/o cord compression and L2 compression fracture  · Alcohol abuse  · Electrolyte abnormalities with hypokalemia, hypomagnesemia    PLAN:     · Continue pressors with Delbert to maintain MAP>65, wean off as tolerating  · Adding Zosyn to the regimen for postop management to cover possible intraabdominal pathogens  · Pain management post op as long as for the back pain  · Per surgery, will need upper endoscopy   · Per ortho, if pain not improving would recommend MRI for possible kyphoplasty   · CIWA protocol   · Goody bag  · Replete electrolytes as needed  · Post-op management per surgery   · Encouraged incentive spirometry   · Orthopedics is following  · NPO, advance per surgery    GLOBAL ISSUES:   · Head of bed elevated  · GI Prophylaxis: Protonix Q12  · DVT Prophylaxis: Lovenox SQ, SCDs     The patient was discussed with Dr. Sobeida Cavazos ( the attending physician)    Bre Hodges MD  Family Medicine Resident, PGY-2

## 2018-02-13 NOTE — PROGRESS NOTES
Progress Note    Patient: Anu Burch MRN: [id number]  SSN: [id number]    YOB: 1945  Age: 67 y.o. Sex: female      Admit Date: 2018    1 Day Post-Op    Procedure:  Procedure(s):  EXPLORATORY LAPAROTOMY; REPAIR OF PERFORATED POSTERIOR GASTRIC ULCER AND CORE BIOPSY LIVER    Subjective:     Mrs. Marvin Bowen c/o abdominal and back pain. She is not passing flatus or BM. Objective:     Visit Vitals    BP (!) 87/47    Pulse 91    Temp 98.4 °F (36.9 °C)    Resp (!) 6    Ht 5' 3.5\" (1.613 m)    Wt 174 lb 9.7 oz (79.2 kg)    SpO2 97%    BMI 30.44 kg/m2       Temp (24hrs), Av °F (36.7 °C), Min:97.1 °F (36.2 °C), Max:98.8 °F (37.1 °C)      Physical Exam:    GENERAL: alert, cooperative, no distress, ABDOMEN: Soft, hypoactive BS, ND, mild tenderness. The dressing is intact and dry.   The drain fluid is dark brown., EXTREMITIES:  no edema    Lab Review:   BMP:   Lab Results   Component Value Date/Time     2018 03:54 AM    K 5.1 2018 03:54 AM     (H) 2018 03:54 AM    CO2 25 2018 03:54 AM    AGAP 4 (L) 2018 03:54 AM    GLU 84 2018 03:54 AM    BUN 21 (H) 2018 03:54 AM    CREA 0.65 2018 03:54 AM    GFRAA >60 2018 03:54 AM    GFRNA >60 2018 03:54 AM     CMP:   Lab Results   Component Value Date/Time     2018 03:54 AM    K 5.1 2018 03:54 AM     (H) 2018 03:54 AM    CO2 25 2018 03:54 AM    AGAP 4 (L) 2018 03:54 AM    GLU 84 2018 03:54 AM    BUN 21 (H) 2018 03:54 AM    CREA 0.65 2018 03:54 AM    GFRAA >60 2018 03:54 AM    GFRNA >60 2018 03:54 AM    CA 7.7 (L) 2018 03:54 AM    MG 2.8 (H) 2018 03:54 AM     CBC:   Lab Results   Component Value Date/Time    WBC 16.4 (H) 2018 03:54 AM    HGB 12.2 2018 03:54 AM    HCT 38.6 2018 03:54 AM     2018 03:54 AM       Assessment:     Hospital Problems  Date Reviewed: 2016 Codes Class Noted POA    Free intraperitoneal air ICD-10-CM: K66.8  ICD-9-CM: 568.89  2/12/2018 Yes        S/P exploratory laparotomy ICD-10-CM: Z98.890  ICD-9-CM: V45.89  2/12/2018 No    Overview Signed 2/12/2018 11:57 AM by Wing Erum MD     Suture repair of perforated posterior gastric ulcer with Rivka Safe patch, core needle biopsies of left lobe of the liver. Alcoholic cirrhosis of liver without ascites (HCC) ICD-10-CM: K70.30  ICD-9-CM: 571.2  2/12/2018 Unknown        * (Principal)[facility] ICD-10-CM: K25.5  ICD-9-CM: 531.50  2/11/2018 Yes        ETOH abuse ICD-10-CM: F10.10  ICD-9-CM: 305.00  2/11/2018 Yes              Plan/Recommendations/Medical Decision Making:     WBC up, but afebrile. Continue Zosyn. Cultures pending. Wean pressors as tolerated. Continue bowel rest and IVF's. UGI prior to feeding when off pressors. Continue ALYSSA. UO fine, creatinine stable. Leave farmer for now. PT/OT when off pressors. Case management for SNF upon discharge. D/W daughters.         Signed By: Wing Erum MD     February 13, 2018

## 2018-02-13 NOTE — PROGRESS NOTES
[facility] Dosing Services: Antimicrobial Stewardship Daily Doc    Consult for antibiotic dosing of Zosyn by Dr. Diaz  Indication:  Intra-abdominal  Day of Therapy 1      Non-Kinetic Antimicrobial Dosing Regimen:   Current Regimen:  zosyn 3.375gm x1 then 10.125gm over 24 hrs    Other Antimicrobial   (not dosed by pharmacist) N/A   Cultures 2/12: Wound- pending  Anaerobe- pending  Fungal- pending  2/11: Blood- NGSF- pending   Serum Creatinine Lab Results   Component Value Date/Time    Creatinine 0.65 02/13/2018 03:54 AM    Creatinine (POC) 1.0 02/11/2018 09:53 PM         Creatinine Clearance Estimated Creatinine Clearance: 77.6 mL/min (based on Cr of 0.65). Temp Temp: 97.7 °F (36.5 °C)       WBC Lab Results   Component Value Date/Time    WBC 16.4 (H) 02/13/2018 03:54 AM        H/H Lab Results   Component Value Date/Time    HGB 12.2 02/13/2018 03:54 AM        Platelets    Lab Results   Component Value Date/Time    PLATELET 457 60/85/4344 03:54 AM            Thank you,  Katie Lopez, Pharm. D.

## 2018-02-13 NOTE — PROGRESS NOTES
[facility]     Name: Raisa Fink MRN: [id number]   : 1945 Hospital: [address]   Date: 2018        Impression Plan   1. Peforated gastric ulcer, s/p repair  2. Shock, likely septic  3. Acute hypoxic respiratory failure   4. Hypokalemia, hypomagnesemia  5. EtOH abuse     · Goal sats >90%, wean O2 as tolerated  · Pressors for MAP >65, wean as tolerated  · Will add continuous IVF while NPO   · Zosyn  · Post-op management as per surgery  · Pain control  · Monitor lytes, replete as needed  · CIWA, prn ativa  · Goody bag  · SCDs  · Protonix  · NPO, diet to be advanced as per surgery       Pt is critically ill and at risk of decompensation in the setting of perforated gastric ulcer and septic shock requiring pressors. Critical care time spent with pt exclusive of procedures was 30 minutes    Radiology  ( personally reviewed) CXR with hypoventilation, bibasilar atelectasis, likely ride sided effusion   ABG No results for input(s): PHI, PO2I, PCO2I in the last 72 hours. Subjective     Overnight events:  No acute events. Remains on low dose pressors, verona is currently at 10. Has abdominal pain with movement, but no complaints at rest. Denies shortness of breath. Review of Systems:  Review of systems not obtained due to patient factors. Past Medical History:   Diagnosis Date    Hyperlipidemia       Past Surgical History:   Procedure Laterality Date    HX CATARACT REMOVAL        Prior to Admission medications    Medication Sig Start Date End Date Taking? Authorizing Provider   naproxen sodium (ALEVE) 220 mg tablet Take 220 mg by mouth daily as needed for Pain. Yes Historical Provider   cyanocobalamin (VITAMIN B12) 500 mcg tablet Take 500 mcg by mouth daily. Yes Historical Provider   multivitamin (ONE A DAY) tablet Take 1 Tab by mouth daily. Yes Historical Provider   omega-3 fatty acids-vitamin e 1,000 mg cap Take 2 Caps by mouth daily.    Yes Historical Provider     Current Facility-Administered Medications   Medication Dose Route Frequency    piperacillin-tazobactam (ZOSYN) 3.375 g in 0.9% sodium chloride 100 mL IVPB  3.375 g IntraVENous ONCE    Followed by    piperacillin-tazobactam (ZOSYN) 10.125 g in 0.9% sodium chloride 500 mL continuous 24 hr infusion  10.125 g IntraVENous Q24H    sodium chloride (NS) flush 5-10 mL  5-10 mL IntraVENous Q8H    enoxaparin (LOVENOX) injection 40 mg  40 mg SubCUTAneous Q24H    0.9% sodium chloride 1,000 mL with mvi, adult no. 4 with vit K 10 mL, thiamine 671 mg, folic acid 1 mg infusion   IntraVENous Q24H    PHENYLephrine (SMITA-SYNEPHRINE) 30 mg in 0.9% sodium chloride 250 mL infusion   mcg/min IntraVENous TITRATE    pantoprazole (PROTONIX) injection 40 mg  40 mg IntraVENous Q12H    nystatin (MYCOSTATIN) 100,000 unit/gram powder   Topical BID     No Known Allergies   Social History   Substance Use Topics    Smoking status: Former Smoker    Smokeless tobacco: Never Used    Alcohol use 6.0 oz/week     0 Standard drinks or equivalent, 10 Glasses of wine per week      Comment: Hx of heavy etoh use, but quit several months ago      Family History   Problem Relation Age of Onset    Other Other      patient did not know          Laboratory: I have personally reviewed the critical care flowsheet and labs.      Recent Labs      02/13/18   0354  02/12/18   0303  02/11/18   2147   WBC  16.4*  1.8*  2.6*   HGB  12.2  13.8  16.3*   HCT  38.6  41.7  49.6*   PLT  155  115*  173     Recent Labs      02/13/18   0354  02/12/18   0303  02/11/18   2147   NA  139  141   --    K  5.1  3.2*   --    CL  110*  114*   --    CO2  25  17*   --    GLU  84  80   --    BUN  21*  11   --    CREA  0.65  0.46*   --    CA  7.7*  5.5*   --    MG  2.8*  0.7*   --    INR   --    --   1.2*       Objective:     Mode Rate Tidal Volume Pressure FiO2 PEEP   Spontaneous, Pressure support   450 ml  8 cm H2O 50 % 5 cm H20     Vital Signs:    Ventilator Pressures  Pressure Support (cm H2O): 8 cm H2O  PIP Observed (cm H2O): 20 cm H2O  Plateau Pressure (cm H2O): 16 cm H2O  MAP (cm H2O): 9.7  PEEP/VENT (cm H2O): 5 cm [id number](24)Ventilator Pressures  Pressure Support (cm H2O): 8 cm H2O  PIP Observed (cm H2O): 20 cm H2O  Plateau Pressure (cm H2O): 16 cm H2O  MAP (cm H2O): 9.7  PEEP/VENT (cm H2O): 5 cm H20  Safety & Alarms  Circuit Temperature: 98.6 °F (37 °C)  Backup Mode Checked/Apnea: Yes  Pressure Max: 54 cm H2O  Ve Min: 2  Ve Max: 20  Vt Min: 200 ml  Vt Max: 1000 ml  RR Min: 14  RR Max: 50  Intake/Output:   Last shift:      Ventilator Volumes  Vt Set (ml): 450 ml  Vt Exhaled (Machine Breath) (ml): 540 ml  Vt Spont (ml): 300 ml  Ve Observed (l/min): 7 l/min  Last 3 shifts:  [id number]    Intake/Output Summary (Last 24 hours) at 02/13/18 1024  Last data filed at 02/13/18 0630   Gross per 24 hour   Intake          2363.97 ml   Output             1925 ml   Net           438.97 ml     EXAM:   GENERAL: well developed and in no distress, HEENT:  PERRL, EOMI, no alar flaring or epistaxis, oral mucosa moist without cyanosis, NECK:  no jugular vein distention, no retractions, no thyromegaly or masses, LUNGS: CTAB but diminished at the bases, respirations non-labored , HEART:  Regular rate and rhythm with no MGR; no edema is present, ABDOMEN:  soft with tenderness to palpation, hypoactive bowel sounds, EXTREMITIES:  warm with no cyanosis, SKIN:  no jaundice or ecchymosis and NEUROLOGIC:  Alert, oriented, grossly non-focal    Mimi Sanon MD  [facility]

## 2018-02-13 NOTE — PROGRESS NOTES
0800 Pt assessed. No complaints. Weaning vasopressors for MAP>65. Abd soft, round. Tender upon palpation. BS absent x4. No N&V, or flatus. Midline abd incision with sylvain, Lt ALYSSA drain, draining brown drainage. Dsg changed. Rt NGT connected to LIS, draining brown drainage. Bathed, back, mouth care given. OOB to chair with 1 assist. Generalized weakness. Few steps to chair. Need for PT. See assessment. 0900 Family at bedside. Updated regarding pt's condition. 1000 Dr. Alfredo Pinzon at bedside. Assessed pt. Updated pt and family regarding pt's condition. 1010 Dr. Jacki Simons at bedside. Assessed pt. Updated pt and family regarding pt's condition. 1200 Pt reassessed. Assessment unchanged. Back to bed with 1 assist.   1305 Weaned off Phenylephrine IV. MAP >65.  1600 Pt reassessed. Resting with no complaints. Lt radial a line kinked, unable to get an waveform after troubleshooting. Discontinued, held pressure for 5min. No bleeding, hematoma. Gauzed applied. 1825 Pt oob to chair for comfort. C/o lower back and abd pain. Rates 8, medicated with Dilaudid 1mg IV. Ortho at bedside, discussed pt's back fx and treatment plan  With  and pt.   1900 Bedside and Verbal shift change report given to Mignon Palacio RN (oncoming nurse) by Bernardo Diallo RN (offgoing nurse). Report included the following information SBAR, Kardex, ED Summary, OR Summary, Intake/Output, MAR and Cardiac Rhythm sr. Back, abd pain improving. Now 5.

## 2018-02-14 LAB
ANION GAP SERPL CALC-SCNC: 6 MMOL/L (ref 5–15)
BACTERIA SPEC CULT: ABNORMAL
BACTERIA SPEC CULT: ABNORMAL
BACTERIA SPEC CULT: NORMAL
BASOPHILS # BLD: 0.2 K/UL (ref 0–0.1)
BASOPHILS NFR BLD: 1 % (ref 0–1)
BUN SERPL-MCNC: 23 MG/DL (ref 6–20)
BUN/CREAT SERPL: 39 (ref 12–20)
CALCIUM SERPL-MCNC: 7.2 MG/DL (ref 8.5–10.1)
CHLORIDE SERPL-SCNC: 110 MMOL/L (ref 97–108)
CO2 SERPL-SCNC: 23 MMOL/L (ref 21–32)
CREAT SERPL-MCNC: 0.59 MG/DL (ref 0.55–1.02)
DIFFERENTIAL METHOD BLD: ABNORMAL
EOSINOPHIL # BLD: 0 K/UL (ref 0–0.4)
EOSINOPHIL NFR BLD: 0 % (ref 0–7)
ERYTHROCYTE [DISTWIDTH] IN BLOOD BY AUTOMATED COUNT: 13.5 % (ref 11.5–14.5)
GLUCOSE SERPL-MCNC: 74 MG/DL (ref 65–100)
GRAM STN SPEC: ABNORMAL
GRAM STN SPEC: ABNORMAL
HCT VFR BLD AUTO: 34.1 % (ref 35–47)
HGB BLD-MCNC: 10.7 G/DL (ref 11.5–16)
IMM GRANULOCYTES # BLD: 0 K/UL
IMM GRANULOCYTES NFR BLD AUTO: 0 %
LYMPHOCYTES # BLD: 1.1 K/UL (ref 0.8–3.5)
LYMPHOCYTES NFR BLD: 6 % (ref 12–49)
MAGNESIUM SERPL-MCNC: 2.4 MG/DL (ref 1.6–2.4)
MCH RBC QN AUTO: 35.4 PG (ref 26–34)
MCHC RBC AUTO-ENTMCNC: 31.4 G/DL (ref 30–36.5)
MCV RBC AUTO: 112.9 FL (ref 80–99)
MONOCYTES # BLD: 0.4 K/UL (ref 0–1)
MONOCYTES NFR BLD: 2 % (ref 5–13)
NEUTS BAND NFR BLD MANUAL: 8 % (ref 0–6)
NEUTS SEG # BLD: 16.2 K/UL (ref 1.8–8)
NEUTS SEG NFR BLD: 83 % (ref 32–75)
NRBC # BLD: 0 K/UL (ref 0–0.01)
NRBC BLD-RTO: 0 PER 100 WBC
PLATELET # BLD AUTO: 145 K/UL (ref 150–400)
PMV BLD AUTO: 12.5 FL (ref 8.9–12.9)
POTASSIUM SERPL-SCNC: 4.2 MMOL/L (ref 3.5–5.1)
RBC # BLD AUTO: 3.02 M/UL (ref 3.8–5.2)
RBC MORPH BLD: ABNORMAL
SERVICE CMNT-IMP: ABNORMAL
SERVICE CMNT-IMP: NORMAL
SODIUM SERPL-SCNC: 139 MMOL/L (ref 136–145)
WBC # BLD AUTO: 17.9 K/UL (ref 3.6–11)
WBC MORPH BLD: ABNORMAL

## 2018-02-14 PROCEDURE — 74011000250 HC RX REV CODE- 250: Performed by: SURGERY

## 2018-02-14 PROCEDURE — 74011250636 HC RX REV CODE- 250/636: Performed by: INTERNAL MEDICINE

## 2018-02-14 PROCEDURE — 65610000006 HC RM INTENSIVE CARE

## 2018-02-14 PROCEDURE — 74011250636 HC RX REV CODE- 250/636: Performed by: SURGERY

## 2018-02-14 PROCEDURE — C9113 INJ PANTOPRAZOLE SODIUM, VIA: HCPCS | Performed by: SURGERY

## 2018-02-14 PROCEDURE — 80048 BASIC METABOLIC PNL TOTAL CA: CPT | Performed by: SURGERY

## 2018-02-14 PROCEDURE — 77030011943

## 2018-02-14 PROCEDURE — 36415 COLL VENOUS BLD VENIPUNCTURE: CPT | Performed by: SURGERY

## 2018-02-14 PROCEDURE — 74011000258 HC RX REV CODE- 258: Performed by: INTERNAL MEDICINE

## 2018-02-14 PROCEDURE — 83735 ASSAY OF MAGNESIUM: CPT | Performed by: SURGERY

## 2018-02-14 PROCEDURE — 85025 COMPLETE CBC W/AUTO DIFF WBC: CPT | Performed by: SURGERY

## 2018-02-14 RX ADMIN — FOLIC ACID: 5 INJECTION, SOLUTION INTRAMUSCULAR; INTRAVENOUS; SUBCUTANEOUS at 17:26

## 2018-02-14 RX ADMIN — SODIUM CHLORIDE 1000 ML: 900 INJECTION, SOLUTION INTRAVENOUS at 12:58

## 2018-02-14 RX ADMIN — LORAZEPAM 4 MG: 2 INJECTION INTRAMUSCULAR; INTRAVENOUS at 05:58

## 2018-02-14 RX ADMIN — PANTOPRAZOLE SODIUM 40 MG: 40 INJECTION, POWDER, FOR SOLUTION INTRAVENOUS at 08:30

## 2018-02-14 RX ADMIN — HYDROMORPHONE HYDROCHLORIDE 1 MG: 2 INJECTION, SOLUTION INTRAMUSCULAR; INTRAVENOUS; SUBCUTANEOUS at 00:47

## 2018-02-14 RX ADMIN — Medication 10 ML: at 21:54

## 2018-02-14 RX ADMIN — LORAZEPAM 2 MG: 2 INJECTION INTRAMUSCULAR; INTRAVENOUS at 01:34

## 2018-02-14 RX ADMIN — PANTOPRAZOLE SODIUM 40 MG: 40 INJECTION, POWDER, FOR SOLUTION INTRAVENOUS at 21:53

## 2018-02-14 RX ADMIN — LORAZEPAM 4 MG: 2 INJECTION INTRAMUSCULAR; INTRAVENOUS at 06:11

## 2018-02-14 RX ADMIN — PIPERACILLIN SODIUM AND TAZOBACTAM SODIUM 10.12 G: 3; .375 INJECTION, POWDER, LYOPHILIZED, FOR SOLUTION INTRAVENOUS at 09:49

## 2018-02-14 RX ADMIN — PIPERACILLIN SODIUM AND TAZOBACTAM SODIUM 3.38 G: 36; 4.5 INJECTION, POWDER, FOR SOLUTION INTRAVENOUS at 09:09

## 2018-02-14 RX ADMIN — ENOXAPARIN SODIUM 40 MG: 40 INJECTION SUBCUTANEOUS at 15:31

## 2018-02-14 RX ADMIN — NYSTATIN: 100000 POWDER TOPICAL at 08:53

## 2018-02-14 RX ADMIN — NYSTATIN: 100000 POWDER TOPICAL at 17:27

## 2018-02-14 RX ADMIN — Medication 10 ML: at 12:59

## 2018-02-14 NOTE — PROGRESS NOTES
Spiritual Care Assessment/Progress Notes    Domenica Mccord [id number]  [id number]    1945  67 y.o.  female    Patient Telephone Number: [phone] (home)   Evangelical Affiliation: Lobito Carrera   Language: English   Extended Emergency Contact Information  Primary Emergency Contact: 034Isabela Benoit Phone: [phone]  Relation: Daughter   Patient Active Problem List    Diagnosis Date Noted    Free intraperitoneal air 02/12/2018    S/P exploratory laparotomy 18/98/2199    Alcoholic cirrhosis of liver without ascites ([address] 02/12/2018    Perforated chronic gastric ulcer ([facility] [address] 02/11/2018    ETOH abuse 02/11/2018    GI bleed 04/26/2016    Hypotension 04/26/2016    Tachycardia 04/26/2016    Acute blood loss anemia 04/26/2016        Date: 2/14/2018       Level of Evangelical/Spiritual Activity:  []         Involved in jennifer tradition/spiritual practice    []         Not involved in jennifer tradition/spiritual practice  []         Spiritually oriented    []         Claims no spiritual orientation    []         seeking spiritual identity  []         Feels alienated from Restorationism practice/tradition  []         Feels angry about Restorationism practice/tradition  []         Spirituality/Restorationism tradition is a resource for coping at this time.   [x]         Not able to assess due to medical condition    Services Provided Today:  []         crisis intervention    []         reading Scriptures  []         spiritual assessment    []         prayer  []         empathic listening/emotional support  []         rites and rituals (cite in comments)  []         life review     []         Restorationism support  []         theological development   []         advocacy  []         ethical dialog     []         blessing  []         bereavement support    []         support to family  []         anticipatory grief support   []         help with AMD  []         spiritual guidance    [] meditation      Spiritual Care Needs  []         Emotional Support  []         Spiritual/Jewish Care  []         Loss/Adjustment  []         Advocacy/Referral                /Ethics  []         No needs expressed at               this time  []         Other: (note in               comments)  [address]  []         Follow up visits with               pt/family  []         Provide materials  []         Schedule sacraments  []         Contact Community               Clergy  [x]         Follow up as needed  []         Other: (note in               comments)     Comments:   Patient not responsive at this time. Pastoral care card left for patient informing her of pastoral care support and presence. No family present. Pastoral care is available to follow as needed/desired. [id number]. Visit by: Kelsey Britt. Kamar Burch.  Guero Chinchilla MA, [facility]    Lead  Profession Development & Advancement

## 2018-02-14 NOTE — PROGRESS NOTES
Physical Therapy orders acknowledged, chart reviewed and discussed with nurse patient not yet appropriate for therapy today unable to follow command and lethargic. We will continue to follow up with the patient for therapy. Thank you.

## 2018-02-14 NOTE — PROGRESS NOTES
Nutrition Assessment:    RECOMMENDATIONS/INTERVENTION(S):   1.  PO initiation & diet advancement per Surgery MD    2. Continue to monitor & replete lytes - Check Phos    3. Continue MVI, folic acid, thiamine    ASSESSMENT:   2/14:  Extubated 2/12. Unable to start PO yesterday per Surgery - plans for UGI p/t PO once off pressors. Discussed case in ICU rounds. Pt agitated, confused overnight - pulled NGT. On sedation per CIWA protocol. NPO x 3. No BM, flatus, absent BS. Labs/meds reviewed. K, Mg WDL. No Phos. 1/2 NS IVF. 2/12:  Chart reviewed 2/2 intubation. 67 yof admitted for intra-abd free air of unknown etiology. Pmhx includes gastric ulcer (EGD x 2 yrs ago), Etoh. Surgery following. POD #1 ex lap repair of perforated gastric ulcer, liver bx. Remains intubated 2/2 shock, respiratory failure. Labs/meds reviewed. K, Mg requiring repletion. No Phos. On Delbert. Propofol currently stopped. On PPI. Ortho following for T12 fx. Surgical incisions to abd, ALYSSA drain, no edema noted. Overwt per advanced age. No recent wt to compare per hx. Energy needs calculated using current wt, vent settings, tmax. Noted n/v after fall yesterday, no reports of inadequate PO prior to fall. Etoh hx. SUBJECTIVE/OBJECTIVE:     Diet Order: NPO  % Eaten:  No data found.     Pertinent Medications: [x] Reviewed - goody bag, Delbert, propofol, mag sulf, KCl, protonix    Past Medical History:   Diagnosis Date    Hyperlipidemia        Chemistries:  Lab Results   Component Value Date/Time    Sodium 139 02/14/2018 04:47 AM    Potassium 4.2 02/14/2018 04:47 AM    Chloride 110 (H) 02/14/2018 04:47 AM    CO2 23 02/14/2018 04:47 AM    Anion gap 6 02/14/2018 04:47 AM    Glucose 74 02/14/2018 04:47 AM    BUN 23 (H) 02/14/2018 04:47 AM    Creatinine 0.59 02/14/2018 04:47 AM    BUN/Creatinine ratio 39 (H) 02/14/2018 04:47 AM    GFR est AA >60 02/14/2018 04:47 AM    GFR est non-AA >60 02/14/2018 04:47 AM    Calcium 7.2 (L) 02/14/2018 04:47 AM    AST (SGOT) 24 04/29/2016 05:33 AM    Alk. phosphatase 74 04/29/2016 05:33 AM    Protein, total 5.1 (L) 04/29/2016 05:33 AM    Albumin 2.5 (L) 04/29/2016 05:33 AM    Globulin 2.6 04/29/2016 05:33 AM    A-G Ratio 1.0 (L) 04/29/2016 05:33 AM    ALT (SGPT) 18 04/29/2016 05:33 AM      Anthropometrics: Height: 5' 3.5\" (161.3 cm) Weight: 79.6 kg (175 lb 7.8 oz)    IBW (%IBW): 58.7 kg (129 lb 6.6 oz) (131.69 %) UBW (%UBW):  (UTO) (  %)    BMI: Body mass index is 30.6 kg/(m^2). This BMI is indicative of:   [] Underweight    [] Normal    [x] Overweight - per advanced age    []  Obesity    []  Extreme Obesity (BMI>40)  Estimated Nutrition Needs (Based on): 1388 Kcals/day (PSU) , 70 g (-88 (1.2-1.5 g/kg x IBW)) Protein  Carbohydrate:  At Least 130 g/day  Fluids: 1400 mL/day    Last BM: 2/10, abd semi-soft   []Active     []Hyperactive  []Hypoactive       [x] Absent   BS  Skin:    [] Intact   [x] Incision - abd lap sites  [] Breakdown   [] DTI   [] Tears/Excoriation/Abrasion  []Edema [x] Other:ALYSSA drain     Wt Readings from Last 30 Encounters:   02/14/18 79.6 kg (175 lb 7.8 oz)   04/29/16 76.3 kg (168 lb 3.2 oz)      NUTRITION DIAGNOSES:   Problem:  Inadequate oral intake     Etiology: related to conditions associated with a dx - perforated viscus, respiratory failure     Signs/Symptoms: as evidenced by s/p lap w/ perf viscus repair, intubation, NPO& requiring sedation for CIWA protocol    NUTRITION INTERVENTIONS:  Meals/Snacks: General/healthful diet - once PO initiated                  GOAL:   PO diet w/ stable lytes in the next 1-3 days    Cultural, Rastafari, or Ethnic Dietary Needs: Other (comment) (UTO)     EDUCATION & DISCHARGE NEEDS:    [x] None Identified   [] Identified and Education Provided/Documented   [] Identified and Pt declined/was not appropriate      [x] Interdisciplinary Care Plan Reviewed/Documented    [x] Discharge Needs:    TBD   [] No Nutrition Related Discharge Needs    NUTRITION RISK:   Pt Is At Nutrition Risk  [x]     No Nutrition Risk Identified  []       PT SEEN FOR:    []  MD Consult: []Calorie Count      []Diabetic Diet Education        []Diet Education     []Electrolyte Management     []General Nutrition Management and Supplements     []Management of Tube Feeding     []TPN Recommendations    []  RN Referral:  []MST score >=2     []Enteral/Parenteral Nutrition PTA     []Pregnant: Gestational DM or Multigestation                 [] Pressure Ulcer    []  Low BMI      []  Length of Stay       [] Dysphagia Diet         [] Ventilator  [x]  Follow-up     Previous Recommendations:   [] Implemented     [x] Progressing Towards Goal          [] Not Implemented          [] Not Applicable    Previous Goal:   [] Met              [x] Progressing Towards Goal              [] Not Progressing Towards Goal   [] Not Applicable            Santosh Mckeon, [address] [place] [address]  Pager [phone]

## 2018-02-14 NOTE — PROGRESS NOTES
[address]       INTENSIVIST DAILY PROGRESS NOTE  Name: Nestor Lua   : [birth year]   MRN: [id number]   Date: 2018 8:07 AM   I have reviewed the flowsheet and previous days notes. Overnight events reviewed:    · Delbert was titrated down and stopped yesterday. Inially BP remained stable but later BP dropped with MAP <65. She received bolus of 1L of NS and BP improved. This morning BP stays on the low side with episodes of MAP <65  · UOP with 0.4 ml/kg/h for the last 24 hours  · Early morning the pt became very agitated, was pulling her IVs out.  CIWA went up to 9 over the night, she received total of 6 mg of Ativan ( Verified with the nurse as there an error in documentation in [facility])              Vital Signs:    Visit Vitals    /54    Pulse (!) 101    Temp 98 °F (36.7 °C)    Resp 15    Ht 5' 3.5\" (1.613 m)    Wt 178 lb 5.6 oz (80.9 kg)    SpO2 98%    BMI 31.1 kg/m2       O2 Device: Room air   O2 Flow Rate (L/min): 4 l/min   Temp (24hrs), Av.3 °F (36.8 °C), Min:97.8 °F (36.6 °C), Max:98.7 °F (37.1 °C)       Intake/Output:   Last shift:         Last 3 shifts:  1901 -  0700  In: 6565.3 [I.V.:6565.3]  Out: 2095 [Urine:1405; Drains:640]    Intake/Output Summary (Last 24 hours) at 18 0835  Last data filed at 18 7325   Gross per 24 hour   Intake          6565.33 ml   Output             1095 ml   Net          5470.33 ml     Hemodynamics:   PAP:     Wedge:     CVP:      CO:     CI:     SVR:     PVR:        Ventilator Settings:  Ventilator Mode: Spontaneous, Pressure support  Respiratory Rate  Back-Up Rate: 14  Insp Time (sec): 1 sec  Insp Flow (l/min): 50 l/min  Ventilator Volumes  Vt Set (ml): 450 ml  Vt Exhaled (Machine Breath) (ml): 540 ml  Vt Spont (ml): 300 ml  Ve Observed (l/min): 7 l/min  Ventilator Pressures  Pressure Support (cm H2O): 8 cm H2O  PIP Observed (cm H2O): 20 cm H2O  Plateau Pressure (cm H2O): 16 cm H2O  MAP (cm H2O): 9. 7  PEEP/VENT (cm H2O): 5 cm H20    Physical Exam:  General:  Sleeping in the bed, but arousable, responds to commands, opens the eyes but closes them right after. Head:  Normocephalic, without obvious abnormality, atraumatic. Eyes:  Conjunctivae/corneas clear. Nose: Nares normal. Septum midline. Mucosa normal. No drainage or sinus tenderness. Lungs:   Clear to auscultation bilaterally. Chest wall:  No tenderness or deformity. Heart:  Regular rate and rhythm, S1, S2 normal, no murmur, click, rub or gallop. Abdomen:   Clean chilango  Covers the abdomen. Soft, tender to palpation. Absent of bowel sounds. Extremities: Extremities normal, atraumatic, no cyanosis or edema. SCDs in place. Pulses: 2+ and symmetric all extremities. Skin: Skin color, texture, turgor normal. No rashes or lesions   Neurologic: Sleepy and lethargic. Easy arousable, opens the eyes, follow the commands         DATA:   Current Facility-Administered Medications   Medication Dose Route Frequency    piperacillin-tazobactam (ZOSYN) 10.125 g in 0.9% sodium chloride 500 mL continuous 24 hr infusion  10.125 g IntraVENous Q24H    0.45% sodium chloride infusion  75 mL/hr IntraVENous CONTINUOUS    Hold 0.45% NaCl infusion while goody bag runs over 8 hrs (from 1975-5258)  1 Each Other Q24H    HYDROmorphone (PF) (DILAUDID) injection 1 mg  1 mg IntraVENous Q3H PRN    sodium chloride (NS) flush 5-10 mL  5-10 mL IntraVENous Q8H    sodium chloride (NS) flush 5-10 mL  5-10 mL IntraVENous PRN    acetaminophen (TYLENOL) solution 650 mg  650 mg Oral Q6H PRN    naloxone (NARCAN) injection 0.4 mg  0.4 mg IntraVENous PRN    ondansetron (ZOFRAN) injection 4 mg  4 mg IntraVENous Q4H PRN    enoxaparin (LOVENOX) injection 40 mg  40 mg SubCUTAneous Q24H    0.9% sodium chloride 1,000 mL with mvi, adult no. 4 with vit K 10 mL, thiamine 623 mg, folic acid 1 mg infusion   IntraVENous Q24H    PHENYLephrine (SMITA-SYNEPHRINE) 30 mg in 0.9% sodium chloride 250 mL infusion   mcg/min IntraVENous TITRATE    pantoprazole (PROTONIX) injection 40 mg  40 mg IntraVENous Q12H    LORazepam (ATIVAN) injection 2 mg  2 mg IntraVENous Q1H PRN    LORazepam (ATIVAN) injection 4 mg  4 mg IntraVENous Q1H PRN    phenol throat spray (CHLORASEPTIC) 1 Spray  1 Spray Oral PRN    HYDROmorphone (PF) (DILAUDID) injection 2 mg  2 mg IntraVENous Q3H PRN    nystatin (MYCOSTATIN) 100,000 unit/gram powder   Topical BID    sodium chloride (NS) flush 5-10 mL  5-10 mL IntraVENous PRN               Labs:  Recent Labs      02/14/18   0447  02/13/18   0354  02/12/18   0303   WBC  17.9*  16.4*  1.8*   HGB  10.7*  12.2  13.8   HCT  34.1*  38.6  41.7   PLT  145*  155  115*     Recent Labs      02/14/18   0447  02/13/18   0354  02/12/18   0303  02/11/18   2147   NA  139  139  141   --    K  4.2  5.1  3.2*   --    CL  110*  110*  114*   --    CO2  23  25  17*   --    GLU  74  84  80   --    BUN  23*  21*  11   --    CREA  0.59  0.65  0.46*   --    CA  7.2*  7.7*  5.5*   --    MG  2.4  2.8*  0.7*   --    INR   --    --    --   1.2*     Recent Labs      02/12/18   0440   PH  7.36   PCO2  36   PO2  81   HCO3  19*   FIO2  65       Imaging:  I have personally reviewed the patients radiographs and reports. IMPRESSION:   · Perforated gastric ulcer , s/p exploratory laparotomy with repair of perforated ulcer and core liver biopsy  · Septic shock. Off the pressors currently. · Spine fracture with T12 w/o cord compression and L2 compression fracture  · Alcohol abuse  · Electrolyte abnormalities with hypokalemia, hypomagnesemia    PLAN:     · Blood pressure runs on the lower side. If MAP <65 will give a bolus of NS and considering increasing the rate of IVF. If MAP remains ,65 may need to add back the pressors.   · Continue Zosyn   · Pain management post op as long as for the back pain  · TLSO trial this morning if patient is stable for PT  · CIWA protocol   · Goody bag  · Replete electrolytes as needed  · Post-op management per surgery   · Encouraged incentive spirometry   · Orthopedics is following  · NPO, advance per surgery    GLOBAL ISSUES:   · Head of bed elevated  · GI Prophylaxis: Protonix Q12  · DVT Prophylaxis: Lovenox SQ, SCDs     The patient was discussed with Dr. Yajaira Man ( the attending physician)    Nadya Price MD  Family Medicine Resident, PGY-2

## 2018-02-14 NOTE — PROGRESS NOTES
Problem: Patient Education: Go to Patient Education Activity  Goal: Patient/Family Education  Outcome: Progressing Towards Goal  Explained symptoms of etoh withdraw.

## 2018-02-14 NOTE — PROGRESS NOTES
Progress Note    Patient: Francisca Franco MRN: [id number]  SSN: [id number]    YOB: 1945  Age: 67 y.o. Sex: female      Admit Date: 2018    2 Days Post-Op    Procedure:  Procedure(s):  LAPAROTOMY EXPLORATORY;REPAIR OF PERFORATED POSTERIOR GASTRIC ULCER AND CORE BIOPSY LIVER    Subjective:     Mrs. Donn Hardy c/o back pain, but denies any abdominal pain. Events of last night noted. Objective:     Visit Vitals    /48    Pulse 96    Temp 97.9 °F (36.6 °C)    Resp 21    Ht 5' 3.5\" (1.613 m)    Wt 175 lb 7.8 oz (79.6 kg)    SpO2 99%    BMI 30.6 kg/m2       Temp (24hrs), Av.3 °F (36.8 °C), Min:97.8 °F (36.6 °C), Max:98.7 °F (37.1 °C)      Physical Exam:    GENERAL: cooperative, no distress, agitated, ABDOMEN: Soft, hypoactive BS, mild distention, appropriately tender. The ALYSSA fluid is brown. Data Review: reviewed  X Rays of chest    Lab Review:   BMP:   Lab Results   Component Value Date/Time     2018 04:47 AM    K 4.2 2018 04:47 AM     (H) 2018 04:47 AM    CO2 23 2018 04:47 AM    AGAP 6 2018 04:47 AM    GLU 74 2018 04:47 AM    BUN 23 (H) 2018 04:47 AM    CREA 0.59 2018 04:47 AM    GFRAA >60 2018 04:47 AM    GFRNA >60 2018 04:47 AM     CBC:   Lab Results   Component Value Date/Time    WBC 17.9 (H) 2018 04:47 AM    HGB 10.7 (L) 2018 04:47 AM    HCT 34.1 (L) 2018 04:47 AM     (L) 2018 04:47 AM       Assessment:     Hospital Problems  Date Reviewed: 2016          Codes Class Noted POA    Free intraperitoneal air ICD-10-CM: K66.8  ICD-9-CM: 568.89  2018 Yes        S/P exploratory laparotomy ICD-10-CM: G09.326  ICD-9-CM: V45.89  2018 No    Overview Signed 2018 11:57 AM by Kristen Malik MD     Suture repair of perforated posterior gastric ulcer with Larnell Breaker patch, core needle biopsies of left lobe of the liver.              Alcoholic cirrhosis of liver without ascites ([facility] ICD-10-CM: K70.30  ICD-9-CM: 571.2  2/12/2018 Unknown        * (Principal)Perforated chronic gastric ulcer ([facility] ICD-10-CM: K25.5  ICD-9-CM: 531.50  2/11/2018 Yes        ETOH abuse ICD-10-CM: F10.10  ICD-9-CM: 305.00  2/11/2018 Yes              Plan/Recommendations/Medical Decision Making:     Continue CIWA protocol. Afebrile, but WBC up. Continue Zosyn, drain. Keep NPO. Check UGI prior to feeding. Continue PPI's. HD stable off pressors again. Keep farmer for now, required bolus last night. Appreciate ICU and Ortho input.     Signed By: Ines Brito MD     February 14, 2018

## 2018-02-14 NOTE — PROGRESS NOTES
[facility]     Name: Gonzalo Lazcano MRN: [id number]   : 1945 Hospital: [facility]   Date: 2018        Impression Plan   1. Peforated gastric ulcer, s/p repair  2. Shock, likely septic  3. Acute hypoxic respiratory failure   4. Hypokalemia, hypomagnesemia  5. EtOH abuse     · Goal sats >90%, wean O2 as tolerated  · Increase IVF to 125, can bolus if needed  · Zosyn, follow-up cultures  · Post-op management as per surgery  · Pain control  · Monitor lytes, replete as needed  · CIWA, prn ativa  · Goody bag  · SCDs  · Protonix  · NPO, diet to be advanced as per surgery         Radiology  ( personally reviewed) CXR with hypoventilation, bibasilar atelectasis, likely ride sided effusion   ABG No results for input(s): PHI, PO2I, PCO2I in the last 72 hours. Subjective     Overnight events:  Agitated overnight, removed central line and NG tube. Received 4mg ativan and is still somewhat sedated from this. Hypotensive yesterday evening, pressors restarted but responded to a bolus of fluid and currently off pressors with a MAP of 66. Review of Systems:  Review of systems not obtained due to patient factors. Past Medical History:   Diagnosis Date    Hyperlipidemia       Past Surgical History:   Procedure Laterality Date    HX CATARACT REMOVAL        Prior to Admission medications    Medication Sig Start Date End Date Taking? Authorizing Provider   naproxen sodium (ALEVE) 220 mg tablet Take 220 mg by mouth daily as needed for Pain. Yes Historical Provider   cyanocobalamin (VITAMIN B12) 500 mcg tablet Take 500 mcg by mouth daily. Yes Historical Provider   multivitamin (ONE A DAY) tablet Take 1 Tab by mouth daily. Yes Historical Provider   omega-3 fatty acids-vitamin e 1,000 mg cap Take 2 Caps by mouth daily.    Yes Historical Provider     Current Facility-Administered Medications   Medication Dose Route Frequency    piperacillin-tazobactam (ZOSYN) 10.125 g in 0.9% sodium chloride 500 mL continuous 24 hr infusion  10.125 g IntraVENous Q24H    0.45% sodium chloride infusion  75 mL/hr IntraVENous CONTINUOUS    Hold 0.45% NaCl infusion while goody bag runs over 8 hrs (from 5792-7962)  1 Each Other Q24H    sodium chloride (NS) flush 5-10 mL  5-10 mL IntraVENous Q8H    enoxaparin (LOVENOX) injection 40 mg  40 mg SubCUTAneous Q24H    0.9% sodium chloride 1,000 mL with mvi, adult no. 4 with vit K 10 mL, thiamine 234 mg, folic acid 1 mg infusion   IntraVENous Q24H    PHENYLephrine (SMITA-SYNEPHRINE) 30 mg in 0.9% sodium chloride 250 mL infusion   mcg/min IntraVENous TITRATE    pantoprazole (PROTONIX) injection 40 mg  40 mg IntraVENous Q12H    nystatin (MYCOSTATIN) 100,000 unit/gram powder   Topical BID     No Known Allergies   Social History   Substance Use Topics    Smoking status: Former Smoker    Smokeless tobacco: Never Used    Alcohol use 6.0 oz/week     0 Standard drinks or equivalent, 10 Glasses of wine per week      Comment: Hx of heavy etoh use, but quit several months ago      Family History   Problem Relation Age of Onset    Other Other      patient did not know          Laboratory: I have personally reviewed the critical care flowsheet and labs.      Recent Labs      02/14/18   0447  02/13/18   0354  02/12/18   0303   WBC  17.9*  16.4*  1.8*   HGB  10.7*  12.2  13.8   HCT  34.1*  38.6  41.7   PLT  145*  155  115*     Recent Labs      02/14/18   0447  02/13/18   0354  02/12/18   0303  02/11/18   2147   NA  139  139  141   --    K  4.2  5.1  3.2*   --    CL  110*  110*  114*   --    CO2  23  25  17*   --    GLU  74  84  80   --    BUN  23*  21*  11   --    CREA  0.59  0.65  0.46*   --    CA  7.2*  7.7*  5.5*   --    MG  2.4  2.8*  0.7*   --    INR   --    --    --   1.2*       Objective:     Mode Rate Tidal Volume Pressure FiO2 PEEP   Spontaneous, Pressure support   450 ml  8 cm H2O 50 % 5 cm H20     Vital Signs:    Ventilator Pressures  Pressure Support (cm H2O): 8 cm H2O  PIP Observed (cm H2O): 20 cm H2O  Plateau Pressure (cm H2O): 16 cm H2O  MAP (cm H2O): 9.7  PEEP/VENT (cm H2O): 5 cm X24TEDA(24)Ventilator Pressures  Pressure Support (cm H2O): 8 cm H2O  PIP Observed (cm H2O): 20 cm H2O  Plateau Pressure (cm H2O): 16 cm H2O  MAP (cm H2O): 9.7  PEEP/VENT (cm H2O): 5 cm H20  Safety & Alarms  Circuit Temperature: 98.6 °F (37 °C)  Backup Mode Checked/Apnea: Yes  Pressure Max: 54 cm H2O  Ve Min: 2  Ve Max: 20  Vt Min: 200 ml  Vt Max: 1000 ml  RR Min: 14  RR Max: 50  Intake/Output:   Last shift:      Ventilator Volumes  Vt Set (ml): 450 ml  Vt Exhaled (Machine Breath) (ml): 540 ml  Vt Spont (ml): 300 ml  Ve Observed (l/min): 7 l/min  Last 3 shifts:  [id number]    Intake/Output Summary (Last 24 hours) at 02/14/18 0951  Last data filed at 02/14/18 2401   Gross per 24 hour   Intake          6565.33 ml   Output             1095 ml   Net          5470.33 ml     EXAM:   GENERAL: well developed and in no distress but confused, HEENT:  PERRL, EOMI, no alar flaring or epistaxis, oral mucosa moist without cyanosis, NECK:  no jugular vein distention, no retractions, no thyromegaly or masses, LUNGS: CTAB but diminished at the bases, respirations non-labored , HEART:  Regular rate and rhythm with no MGR; no edema is present, ABDOMEN:  soft with tenderness to palpation, hypoactive bowel sounds, EXTREMITIES:  warm with no cyanosis, SKIN:  no jaundice or ecchymosis and NEUROLOGIC: asleep, arouable, confused, grossly non-focal    Nayana Rob MD  [facility]

## 2018-02-14 NOTE — PROGRESS NOTES
0800 Pt assessed. Sedated, arouses and follows commands to voice briefly then closes eyes. Pt calm. Removed bilateral wrist restraints. No complaints at this time. SR, BP low 100's. Abd soft, round, tender upon palpation. BS absent x4. No N&V. Changed abd dsg. Midline staples intact, ALYSSA leaking around site. Site without redness. Farmer minimal urine output 30ml/hr, cloudy rudy. Pt bathed, back, mouth care given. Turned, repositioned. See assessment. 0900 Started RAC #20, good blood return. Central line not needed at this time. 7541 Family at bedside. Aware of pt's events overnight which they were aware of. Updates regarding pt's condition, plan of care, possible symptoms of etoh withdrawal, ICU delirium, infection. 8602 Dr. Suzen Duverney at bedside, assessed pt. Aware of pt's events overnight, urine output 30ml/hr. SBP low 100's, MAP 50's-60's. Increased 1/2NS @125ml/hr. 56 Dr. Landry Rojas on floor, updated on pt's events overnight. BP, urine output, am assessment. Assessed pt. Updated family regarding pt's condition and plan of care. 1050 Removed farmer cath without difficulty. 250ml emptied. Placed a pure wick. Turned, repositioned. 1300 Pt turned, repositioned. Remains sedated, confused. IV NS bolus started for low BP and low urine output. 1445 Pt turned, repositioned. 1600 Pt reassessed, assessment unchanged. Turned, repositioned. 1800 Pt has not voided, bladder scanner unavailable. Straight cath pt, 300ml cloudy rudy. Tolerated well. Turned, repositioned. Applied pure wick. 1900 Bedside and Verbal shift change report given to [facility] [employer], RN (oncoming nurse) by [facility], RN (offgoing nurse). Report included the following information SBAR, Kardex, ED Summary, OR Summary, Intake/Output, MAR, Recent Results and Cardiac Rhythm sr.

## 2018-02-14 NOTE — PROGRESS NOTES
[address]  [address]  [address] [address] [address]  [phone]        To Whom it May Concern:    Ms. Miriam Bill is currently under my care in the Intensive Care Unit at [facility]. She is currently unable to make her own decisions regarding her financial management give her current level of medical illness. Please do not hesitate to contact me if there are questions or concerns.                  Angie Hardy MD  2/14/2018

## 2018-02-15 LAB
ANION GAP SERPL CALC-SCNC: 12 MMOL/L (ref 5–15)
BASOPHILS # BLD: 0 K/UL (ref 0–0.1)
BASOPHILS NFR BLD: 0 % (ref 0–1)
BUN SERPL-MCNC: 13 MG/DL (ref 6–20)
BUN/CREAT SERPL: 28 (ref 12–20)
CALCIUM SERPL-MCNC: 7.4 MG/DL (ref 8.5–10.1)
CHLORIDE SERPL-SCNC: 106 MMOL/L (ref 97–108)
CO2 SERPL-SCNC: 20 MMOL/L (ref 21–32)
CREAT SERPL-MCNC: 0.46 MG/DL (ref 0.55–1.02)
DIFFERENTIAL METHOD BLD: ABNORMAL
EOSINOPHIL # BLD: 0.1 K/UL (ref 0–0.4)
EOSINOPHIL NFR BLD: 1 % (ref 0–7)
ERYTHROCYTE [DISTWIDTH] IN BLOOD BY AUTOMATED COUNT: 13.4 % (ref 11.5–14.5)
GLUCOSE SERPL-MCNC: 56 MG/DL (ref 65–100)
HCT VFR BLD AUTO: 39.1 % (ref 35–47)
HGB BLD-MCNC: 12.2 G/DL (ref 11.5–16)
IMM GRANULOCYTES # BLD: 0.1 K/UL (ref 0–0.04)
IMM GRANULOCYTES NFR BLD AUTO: 1 % (ref 0–0.5)
LYMPHOCYTES # BLD: 1.3 K/UL (ref 0.8–3.5)
LYMPHOCYTES NFR BLD: 9 % (ref 12–49)
MAGNESIUM SERPL-MCNC: 1.9 MG/DL (ref 1.6–2.4)
MCH RBC QN AUTO: 34.9 PG (ref 26–34)
MCHC RBC AUTO-ENTMCNC: 31.2 G/DL (ref 30–36.5)
MCV RBC AUTO: 111.7 FL (ref 80–99)
MONOCYTES # BLD: 0.7 K/UL (ref 0–1)
MONOCYTES NFR BLD: 5 % (ref 5–13)
NEUTS SEG # BLD: 12.1 K/UL (ref 1.8–8)
NEUTS SEG NFR BLD: 84 % (ref 32–75)
NRBC # BLD: 0 K/UL (ref 0–0.01)
NRBC BLD-RTO: 0 PER 100 WBC
PLATELET # BLD AUTO: 174 K/UL (ref 150–400)
PMV BLD AUTO: 11.9 FL (ref 8.9–12.9)
POTASSIUM SERPL-SCNC: 3.7 MMOL/L (ref 3.5–5.1)
RBC # BLD AUTO: 3.5 M/UL (ref 3.8–5.2)
RBC MORPH BLD: ABNORMAL
SODIUM SERPL-SCNC: 138 MMOL/L (ref 136–145)
WBC # BLD AUTO: 14.3 K/UL (ref 3.6–11)

## 2018-02-15 PROCEDURE — 74011000250 HC RX REV CODE- 250: Performed by: SURGERY

## 2018-02-15 PROCEDURE — C9113 INJ PANTOPRAZOLE SODIUM, VIA: HCPCS | Performed by: SURGERY

## 2018-02-15 PROCEDURE — 77030038269 HC DRN EXT URIN PURWCK BARD -A

## 2018-02-15 PROCEDURE — 65270000029 HC RM PRIVATE

## 2018-02-15 PROCEDURE — 77010033678 HC OXYGEN DAILY

## 2018-02-15 PROCEDURE — 80048 BASIC METABOLIC PNL TOTAL CA: CPT | Performed by: SURGERY

## 2018-02-15 PROCEDURE — 36415 COLL VENOUS BLD VENIPUNCTURE: CPT | Performed by: SURGERY

## 2018-02-15 PROCEDURE — 74011250637 HC RX REV CODE- 250/637: Performed by: SURGERY

## 2018-02-15 PROCEDURE — 85025 COMPLETE CBC W/AUTO DIFF WBC: CPT | Performed by: SURGERY

## 2018-02-15 PROCEDURE — 74011250636 HC RX REV CODE- 250/636: Performed by: INTERNAL MEDICINE

## 2018-02-15 PROCEDURE — 74011250636 HC RX REV CODE- 250/636: Performed by: SURGERY

## 2018-02-15 PROCEDURE — 83735 ASSAY OF MAGNESIUM: CPT | Performed by: SURGERY

## 2018-02-15 PROCEDURE — 74011000258 HC RX REV CODE- 258: Performed by: INTERNAL MEDICINE

## 2018-02-15 RX ORDER — QUETIAPINE FUMARATE 25 MG/1
25 TABLET, FILM COATED ORAL
Status: DISCONTINUED | OUTPATIENT
Start: 2018-02-15 | End: 2018-02-16

## 2018-02-15 RX ORDER — DEXTROSE MONOHYDRATE 50 MG/ML
50 INJECTION, SOLUTION INTRAVENOUS CONTINUOUS
Status: DISCONTINUED | OUTPATIENT
Start: 2018-02-15 | End: 2018-02-16

## 2018-02-15 RX ADMIN — LORAZEPAM 2 MG: 2 INJECTION INTRAMUSCULAR; INTRAVENOUS at 03:00

## 2018-02-15 RX ADMIN — PIPERACILLIN SODIUM AND TAZOBACTAM SODIUM 10.12 G: 3; .375 INJECTION, POWDER, LYOPHILIZED, FOR SOLUTION INTRAVENOUS at 11:10

## 2018-02-15 RX ADMIN — HYDROMORPHONE HYDROCHLORIDE 1 MG: 2 INJECTION, SOLUTION INTRAMUSCULAR; INTRAVENOUS; SUBCUTANEOUS at 23:27

## 2018-02-15 RX ADMIN — NYSTATIN: 100000 POWDER TOPICAL at 10:07

## 2018-02-15 RX ADMIN — DEXTROSE MONOHYDRATE 50 ML/HR: 5 INJECTION, SOLUTION INTRAVENOUS at 12:44

## 2018-02-15 RX ADMIN — Medication 10 ML: at 22:00

## 2018-02-15 RX ADMIN — SODIUM CHLORIDE 125 ML/HR: 450 INJECTION, SOLUTION INTRAVENOUS at 12:25

## 2018-02-15 RX ADMIN — ENOXAPARIN SODIUM 40 MG: 40 INJECTION SUBCUTANEOUS at 15:20

## 2018-02-15 RX ADMIN — Medication 10 ML: at 07:13

## 2018-02-15 RX ADMIN — PANTOPRAZOLE SODIUM 40 MG: 40 INJECTION, POWDER, FOR SOLUTION INTRAVENOUS at 10:07

## 2018-02-15 RX ADMIN — FOLIC ACID: 5 INJECTION, SOLUTION INTRAMUSCULAR; INTRAVENOUS; SUBCUTANEOUS at 19:35

## 2018-02-15 RX ADMIN — NYSTATIN: 100000 POWDER TOPICAL at 18:11

## 2018-02-15 RX ADMIN — LORAZEPAM 2 MG: 2 INJECTION INTRAMUSCULAR; INTRAVENOUS at 19:16

## 2018-02-15 RX ADMIN — LORAZEPAM 2 MG: 2 INJECTION INTRAMUSCULAR; INTRAVENOUS at 21:34

## 2018-02-15 RX ADMIN — PANTOPRAZOLE SODIUM 40 MG: 40 INJECTION, POWDER, FOR SOLUTION INTRAVENOUS at 21:51

## 2018-02-15 RX ADMIN — SODIUM CHLORIDE 125 ML/HR: 450 INJECTION, SOLUTION INTRAVENOUS at 02:11

## 2018-02-15 RX ADMIN — Medication 10 ML: at 15:22

## 2018-02-15 NOTE — PROGRESS NOTES
Attempted to work with the patient for Physical Therapy, chart reviewed and discussed with nurse patient still lethargic but easily open her eyes when called her name. We will continue to follow up with the patient for therapy once more awake and can follow command. Thank you.

## 2018-02-15 NOTE — PROGRESS NOTES
[facility]     Name: Anthony Otoole MRN: [id number]   : 1945 Hospital: [facility]   Date: 2/15/2018        Impression Plan   1. Peforated gastric ulcer, s/p repair  2. Shock, likely septic  3. Acute hypoxic respiratory failure   4. Hypokalemia, hypomagnesemia  5. EtOH abuse     · Goal sats >90%, wean O2 as tolerated  · Increase IVF to 125, can bolus if needed  · Zosyn, follow-up cultures  · Post-op management as per surgery  · Pain control  · Monitor lytes, replete as needed  · CIWA, prn ativa  · Goody bag  · SCDs  · Protonix  · NPO, diet to be advanced as per surgery    Stable for transfer to surgical         Radiology  ( personally reviewed) CXR with hypoventilation, bibasilar atelectasis, likely ride sided effusion   ABG No results for input(s): PHI, PO2I, PCO2I in the last 72 hours. Subjective     Overnight events:  Somewhat agitated this morning and received ativan 2mg. Received an additional fluid bolus yesterday and blood pressure has been stable since that time. This morning she tells me her abdomen hurts only with palpation. Denies shortness of breath, chest pain, f/c.    Review of Systems:  A comprehensive review of systems was negative except for that written in the HPI. Past Medical History:   Diagnosis Date    Hyperlipidemia       Past Surgical History:   Procedure Laterality Date    HX CATARACT REMOVAL        Prior to Admission medications    Medication Sig Start Date End Date Taking? Authorizing Provider   naproxen sodium (ALEVE) 220 mg tablet Take 220 mg by mouth daily as needed for Pain. Yes Historical Provider   cyanocobalamin (VITAMIN B12) 500 mcg tablet Take 500 mcg by mouth daily. Yes Historical Provider   multivitamin (ONE A DAY) tablet Take 1 Tab by mouth daily. Yes Historical Provider   omega-3 fatty acids-vitamin e 1,000 mg cap Take 2 Caps by mouth daily.    Yes Historical Provider     Current Facility-Administered Medications Medication Dose Route Frequency    piperacillin-tazobactam (ZOSYN) 10.125 g in 0.9% sodium chloride 500 mL continuous 24 hr infusion  10.125 g IntraVENous Q24H    0.45% sodium chloride infusion  125 mL/hr IntraVENous CONTINUOUS    Hold 0.45% NaCl infusion while goody bag runs over 8 hrs (from 4670-3708)  1 Each Other Q24H    sodium chloride (NS) flush 5-10 mL  5-10 mL IntraVENous Q8H    enoxaparin (LOVENOX) injection 40 mg  40 mg SubCUTAneous Q24H    0.9% sodium chloride 1,000 mL with mvi, adult no. 4 with vit K 10 mL, thiamine 440 mg, folic acid 1 mg infusion   IntraVENous Q24H    PHENYLephrine (SMITA-SYNEPHRINE) 30 mg in 0.9% sodium chloride 250 mL infusion   mcg/min IntraVENous TITRATE    pantoprazole (PROTONIX) injection 40 mg  40 mg IntraVENous Q12H    nystatin (MYCOSTATIN) 100,000 unit/gram powder   Topical BID     No Known Allergies   Social History   Substance Use Topics    Smoking status: Former Smoker    Smokeless tobacco: Never Used    Alcohol use 6.0 oz/week     0 Standard drinks or equivalent, 10 Glasses of wine per week      Comment: Hx of heavy etoh use, but quit several months ago      Family History   Problem Relation Age of Onset    Other Other      patient did not know          Laboratory: I have personally reviewed the critical care flowsheet and labs.      Recent Labs      02/15/18   0319  02/14/18   0447  02/13/18   0354   WBC  14.3*  17.9*  16.4*   HGB  12.2  10.7*  12.2   HCT  39.1  34.1*  38.6   PLT  174  145*  155     Recent Labs      02/15/18   0319  02/14/18   0447  02/13/18   0354   NA  138  139  139   K  3.7  4.2  5.1   CL  106  110*  110*   CO2  20*  23  25   GLU  56*  74  84   BUN  13  23*  21*   CREA  0.46*  0.59  0.65   CA  7.4*  7.2*  7.7*   MG  1.9  2.4  2.8*       Objective:     Mode Rate Tidal Volume Pressure FiO2 PEEP   Spontaneous, Pressure support   450 ml  8 cm H2O 50 % 5 cm H20     Vital Signs:    Ventilator Pressures  Pressure Support (cm H2O): 8 cm H2O  PIP Observed (cm H2O): 20 cm H2O  Plateau Pressure (cm H2O): 16 cm H2O  MAP (cm H2O): 9.7  PEEP/VENT (cm H2O): 5 cm [id number](24)Ventilator Pressures  Pressure Support (cm H2O): 8 cm H2O  PIP Observed (cm H2O): 20 cm H2O  Plateau Pressure (cm H2O): 16 cm H2O  MAP (cm H2O): 9.7  PEEP/VENT (cm H2O): 5 cm H20  Safety & Alarms  Circuit Temperature: 98.6 °F (37 °C)  Backup Mode Checked/Apnea: Yes  Pressure Max: 54 cm H2O  Ve Min: 2  Ve Max: 20  Vt Min: 200 ml  Vt Max: 1000 ml  RR Min: 14  RR Max: 50  Intake/Output:   Last shift:      Ventilator Volumes  Vt Set (ml): 450 ml  Vt Exhaled (Machine Breath) (ml): 540 ml  Vt Spont (ml): 300 ml  Ve Observed (l/min): 7 l/min  Last 3 shifts: 02/15 0701 - 02/15 1900  In: 320.8 [I.V.:320.8]  Out: - RRIOLAST3    Intake/Output Summary (Last 24 hours) at 02/15/18 0946  Last data filed at 02/15/18 0804   Gross per 24 hour   Intake           4892.5 ml   Output             1205 ml   Net           3687.5 ml     EXAM:   GENERAL: well developed and in no distress but confused, HEENT:  PERRL, EOMI, no alar flaring or epistaxis, oral mucosa moist without cyanosis, NECK:  no jugular vein distention, no retractions, no thyromegaly or masses, LUNGS: CTAB but diminished at the bases, respirations non-labored , HEART:  Regular rate and rhythm with no MGR; no edema is present, ABDOMEN:  soft with tenderness to deep palpation, hypoactive bowel sounds, EXTREMITIES:  warm with no cyanosis, SKIN:  no jaundice or ecchymosis and NEUROLOGIC: asleep, arouable, confused, grossly non-focal    Nayana Rob MD  [facility]

## 2018-02-15 NOTE — PROGRESS NOTES
Problem: Falls - Risk of  Goal: *Absence of Falls  Document Demarcus Fall Risk and appropriate interventions in the flowsheet.    Outcome: Progressing Towards Goal  Fall Risk Interventions:  Mobility Interventions: Bed/chair exit alarm    Mentation Interventions: Adequate sleep, hydration, pain control, Bed/chair exit alarm, Room close to nurse's station, Reorient patient    Medication Interventions: Bed/chair exit alarm, Patient to call before getting OOB, Evaluate medications/consider consulting pharmacy    Elimination Interventions: Call light in reach, Bed/chair exit alarm    History of Falls Interventions: Bed/chair exit alarm

## 2018-02-15 NOTE — PROGRESS NOTES
0700: Bedside and Verbal shift change report given to [employer] (oncoming nurse) by Opal Sandoval RN  (offgoing nurse). Report included the following information SBAR, Kardex, ED Summary, Procedure Summary, Intake/Output, MAR and Cardiac Rhythm Sinus rhythm. 0730: Pt resting in bed, responds to voice, alert to person and place but confused about time and situation. Pt on 2L NC. No complaints of pain at this time. Spoke with family on phone, will be in to visit later today. 1200: Pt resting quietly, orders received to transfer pt to surgical today. 1500: Per Dr. Rian Concepcion, will wait until pt is more alert to do upper GI series. 1550: TRANSFER - OUT REPORT:    Verbal report given to RN (name) on Tretha Scarce  being transferred to [facility] (unit) for routine progression of care       Report consisted of patients Situation, Background, Assessment and   Recommendations(SBAR). Information from the following report(s) SBAR, Kardex, ED Summary, OR Summary, Procedure Summary, Intake/Output, MAR, Recent Results and Cardiac Rhythm sinus rhythm was reviewed with the receiving nurse.     Lines:   Peripheral IV 02/14/18 Right Antecubital (Active)   Site Assessment Clean, dry, & intact 2/15/2018 12:00 PM   Phlebitis Assessment 0 2/15/2018 12:00 PM   Infiltration Assessment 0 2/15/2018 12:00 PM   Dressing Status Clean, dry, & intact 2/15/2018 12:00 PM   Dressing Type Transparent 2/15/2018 12:00 PM   Hub Color/Line Status Pink 2/15/2018 12:00 PM   Action Taken Open ports on tubing capped 2/15/2018  5:30 AM   Alcohol Cap Used Yes 2/15/2018 12:00 PM       Peripheral IV 02/15/18 Left Antecubital (Active)   Site Assessment Clean, dry, & intact 2/15/2018 12:00 PM   Phlebitis Assessment 0 2/15/2018 12:00 PM   Infiltration Assessment 0 2/15/2018 12:00 PM   Dressing Status Clean, dry, & intact 2/15/2018 12:00 PM   Dressing Type Transparent 2/15/2018 12:00 PM   Hub Color/Line Status Pink 2/15/2018 12:00 PM   Alcohol Cap Used Yes 2/15/2018 12:00 PM        Opportunity for questions and clarification was provided.       Patient transported with:   Monitor  O2 @ 3 liters   RN

## 2018-02-15 NOTE — DIABETES MGMT
Progress Note     Chart reviewed for low blood glucose ( < 80 mg/dL x 1 in the past 24 hours) . Recommendations/ Comments: If appropriate, please consider adding dextrose to IVF and POCT Glucose every 6 hours. Fasting glucose today: 56 mg/dL (per am POCT Glucose). Required 0 units of correction in the last 24 hours. POC Glucose last 24hrs:   Lab Results   Component Value Date/Time    GLU 56 (L) 02/15/2018 03:19 AM        Estimated Creatinine Clearance: 110 mL/min (based on Cr of 0.46). Diet order:   Active Orders   Diet    DIET NPO Except Meds      PO intake: No data found. History of Diabetes:   Nestor Lua is a 67 y.o. female with no past medical history significant for DM per Dr. Ines Brito MD's Surgery H&P dated 2/12/2018. A1C:   No results found for: HBA1C, HGBE8, IFJ9JOGH    Reference range*:  Increased risk for diabetes: 5.7 - 6.4%  Diabetes: >6.4%  Glycemic control for adults with diabetes: <7.0 %    *KAREEM MARTIN (2014). Diagnosis and classification of diabetes mellitus. Diabetes care, 37, S81. Thank you. Vinay Coreas. Anita MPH, RN, BSN, [address]   [phone]    -For most hospitalized persons with hyperglycemia in the intensive care unit (ICU), a glucose range of 140 to 180 mg/dL is recommended, provided this target can be safely achieved. *  - For general medicine and surgery patients in non-ICU settings, a premeal glucose target <140 mg/dL and a random blood glucose <180 mg/dL are recommended. *    JOE Yu, Tiffany Kaminski., Rebeka Hernandez., ... & Lucy Liu ([id number]). AMERICAN ASSOCIATION OF CLINICAL ENDOCRINOLOGISTS AND AMERICAN COLLEGE OF ENDOCRINOLOGY-CLINICAL PRACTICE GUIDELINES FOR DEVELOPING A DIABETES MELLITUS COMPREHENSIVE CARE PLAN-2015-EXECUTIVE SUMMARY: Complete guidelines are available at https://www. aace. com/publications/guidelines.  Endocrine Practice, 21(4), [id number].

## 2018-02-15 NOTE — PROGRESS NOTES
ORTHO:    Once pt. Is able to participate with PT, we will eval her pain. If improving and tolerable, treatment for the burst fx will be conservative with TLSO brace modified with abd incision. If severely debilitated due to back pain, plan for MRI and Kyphoplasty as treatment option with Interventional Radiology. Thank you for allowing us to take part in this patients care.       Gallo Gongora PA-C  Orthopaedic Surgery PA

## 2018-02-15 NOTE — PROGRESS NOTES
Primary Nurse Dahlia Tracy, RN and Lora Anderson RN performed a dual skin assessment on this patient Impairment noted- see wound doc flow sheet  Gianfranco score is 18    Bedside and Verbal shift change report given to Sushila Gandara RN (oncoming nurse) by Yuliana Perales (offgoing nurse). Report included the following information SBAR, Kardex, Recent Results, Med Rec Status, Cardiac Rhythm afib and Alarm Parameters . 2000- Pt sleepy, but able to wake up and follow commands. Family visited. 0000- Pt pulled at abdominal dressing and wound oozed. ALYSSA remains intact. 0200- Pt pulled out IV and had to have it replaced. PT's mentation a little off. Pt stating that she just wants to go home. Oxygen decreased. Ativan given. 0600- Bath given.

## 2018-02-15 NOTE — PROGRESS NOTES
Progress Note    Patient: Rashard Sy MRN: [id number]  SSN: [id number]    YOB: 1945  Age: 67 y.o. Sex: female      Admit Date: 2018    3 Days Post-Op    Procedure:  Procedure(s):  LAPAROTOMY EXPLORATORY; REPAIR OF PERFORATED POSTERIOR GASTRIC ULCER AND CORE BIOPSY LIVER    Subjective:     Mrs. Olga Lidia Pineda c/o back pain. She has abdominal pain if pressed. Objective:     Visit Vitals    /63    Pulse 75    Temp 97.7 °F (36.5 °C)    Resp 14    Ht 5' 3.5\" (1.613 m)    Wt 175 lb 7.8 oz (79.6 kg)    SpO2 92%    Breastfeeding No    BMI 30.6 kg/m2       Temp (24hrs), Av.9 °F (36.6 °C), Min:97.1 °F (36.2 °C), Max:98.8 °F (37.1 °C)      Physical Exam:    GENERAL: cooperative, no distress, confused, sedated, ABDOMEN: Soft, hypoactive BS, NT, ND. The ALYSSA fluid is dark. Lab Review:   BMP:   Lab Results   Component Value Date/Time     02/15/2018 03:19 AM    K 3.7 02/15/2018 03:19 AM     02/15/2018 03:19 AM    CO2 20 (L) 02/15/2018 03:19 AM    AGAP 12 02/15/2018 03:19 AM    GLU 56 (L) 02/15/2018 03:19 AM    BUN 13 02/15/2018 03:19 AM    CREA 0.46 (L) 02/15/2018 03:19 AM    GFRAA >60 02/15/2018 03:19 AM    GFRNA >60 02/15/2018 03:19 AM     CBC:   Lab Results   Component Value Date/Time    WBC 14.3 (H) 02/15/2018 03:19 AM    HGB 12.2 02/15/2018 03:19 AM    HCT 39.1 02/15/2018 03:19 AM     02/15/2018 03:19 AM       Assessment:     Hospital Problems  Date Reviewed: 2016          Codes Class Noted POA    Free intraperitoneal air ICD-10-CM: K66.8  ICD-9-CM: 568.89  2018 Yes        S/P exploratory laparotomy ICD-10-CM: R61.841  ICD-9-CM: V45.89  2018 No    Overview Signed 2018 11:57 AM by Jefry Valentin MD     Suture repair of perforated posterior gastric ulcer with [place] patch, core needle biopsies of left lobe of the liver.              Alcoholic [facility] liver without ascites [facility] ICD-10-CM: K70.30  ICD-9-CM: 571.2  2018 Unknown * (Principal)Perforated chronic gastric ulcer ([facility] ICD-10-CM: K25.5  ICD-9-CM: 531.50  2/11/2018 Yes        ETOH abuse ICD-10-CM: F10.10  ICD-9-CM: 305.00  2/11/2018 Yes              Plan/Recommendations/Medical Decision Making:     HD stable. WBC coming down. Continue Zosyn, drain. UGI prior to feeding if not too sedated. Continue bowel rest.  Continue CIWA. OK to transfer to floor.         Signed By: Gaurav Molina MD     February 15, 2018

## 2018-02-15 NOTE — PROGRESS NOTES
2/15/2018  12:58 PM  EMR reviewed, Pt S/P Ex lap 2/12, is continuing to require medical management. CM will follow for PT/OT evals and dispo recommendations, there is family concern for safety at d/c and Pt resuming ETOH use. Per CM note 2/13 family was provided resources for Rehab, LTC, AL and in home help. CM will follow for d/c plan and needs.   Javier Aponte

## 2018-02-15 NOTE — PROGRESS NOTES
[address]       INTENSIVIST DAILY PROGRESS NOTE  Name: Niecy Trivedi   :    MRN: [id number]   Date: 2/15/2018 8:07 AM   I have reviewed the flowsheet and previous days notes. Overnight events reviewed:    · Mildly agitated early this morning and required the dose of Ativan 2 mg ( CIWA up to 9)  · Blood pressure on the lower side yesterday at noon which required bolus of 1L NS. After that BP has been stable with MAp >65 with MIVF   · Afebtile              Vital Signs:    Visit Vitals    /56    Pulse 74    Temp 97.1 °F (36.2 °C)    Resp 12    Ht 5' 3.5\" (1.613 m)    Wt 175 lb 7.8 oz (79.6 kg)    SpO2 91%    Breastfeeding No    BMI 30.6 kg/m2       O2 Device: Nasal cannula   O2 Flow Rate (L/min): 3 l/min   Temp (24hrs), Av.9 °F (36.6 °C), Min:97.1 °F (36.2 °C), Max:98.8 °F (37.1 °C)       Intake/Output:   Last shift:         Last 3 shifts:  1901 - 02/15 0700  In: 78508.3 [I.V.:30262.3]  Out: 1274 [Urine:1280; Drains:505]    Intake/Output Summary (Last 24 hours) at 02/15/18 0757  Last data filed at 02/15/18 0530   Gross per 24 hour   Intake          4671.67 ml   Output             1205 ml   Net          3466.67 ml     Hemodynamics:   PAP:     Wedge:     CVP:      CO:     CI:     SVR:     PVR:        Ventilator Settings:  Ventilator Mode: Spontaneous, Pressure support  Respiratory Rate  Back-Up Rate: 14  Insp Time (sec): 1 sec  Insp Flow (l/min): 50 l/min  Ventilator Volumes  Vt Set (ml): 450 ml  Vt Exhaled (Machine Breath) (ml): 540 ml  Vt Spont (ml): 300 ml  Ve Observed (l/min): 7 l/min  Ventilator Pressures  Pressure Support (cm H2O): 8 cm H2O  PIP Observed (cm H2O): 20 cm H2O  Plateau Pressure (cm H2O): 16 cm H2O  MAP (cm H2O): 9.7  PEEP/VENT (cm H2O): 5 cm H20    Physical Exam:  General:  Alert, cooperative, not in distress   Head:  Normocephalic, without obvious abnormality, atraumatic. Eyes:  Conjunctivae/corneas clear.     Nose: Nares normal. Septum midline. Mucosa normal. No drainage or sinus tenderness. Lungs: No wheezing, no rhonchi. Symmetrically diminished breath sounds on the bases bilaterally. Chest wall:  No tenderness or deformity. Heart:  Regular rate and rhythm, S1, S2 normal, no murmur, click, rub or gallop. Abdomen:   Clean chilango  Covers the abdomen. Soft, tender to palpation. Absent of bowel sounds. Extremities: Extremities normal, atraumatic, no cyanosis or edema. SCDs in place. Pulses: 2+ and symmetric all extremities. Skin: Skin color, texture, turgor normal. No rashes or lesions   Neurologic: Sleeping in the bed but easily arousable and appropriately answering the questions and follow the commands. DATA:   Current Facility-Administered Medications   Medication Dose Route Frequency    piperacillin-tazobactam (ZOSYN) 10.125 g in 0.9% sodium chloride 500 mL continuous 24 hr infusion  10.125 g IntraVENous Q24H    0.45% sodium chloride infusion  125 mL/hr IntraVENous CONTINUOUS    Hold 0.45% NaCl infusion while goody bag runs over 8 hrs (from 8975-4430)  1 Each Other Q24H    HYDROmorphone (PF) (DILAUDID) injection 1 mg  1 mg IntraVENous Q3H PRN    sodium chloride (NS) flush 5-10 mL  5-10 mL IntraVENous Q8H    sodium chloride (NS) flush 5-10 mL  5-10 mL IntraVENous PRN    acetaminophen (TYLENOL) solution 650 mg  650 mg Oral Q6H PRN    naloxone (NARCAN) injection 0.4 mg  0.4 mg IntraVENous PRN    ondansetron (ZOFRAN) injection 4 mg  4 mg IntraVENous Q4H PRN    enoxaparin (LOVENOX) injection 40 mg  40 mg SubCUTAneous Q24H    0.9% sodium chloride 1,000 mL with mvi, adult no. 4 with vit K 10 mL, thiamine 280 mg, folic acid 1 mg infusion   IntraVENous Q24H    PHENYLephrine (SMITA-SYNEPHRINE) 30 mg in 0.9% sodium chloride 250 mL infusion   mcg/min IntraVENous TITRATE    pantoprazole (PROTONIX) injection 40 mg  40 mg IntraVENous Q12H    LORazepam (ATIVAN) injection 2 mg  2 mg IntraVENous Q1H PRN    LORazepam (ATIVAN) injection 4 mg  4 mg IntraVENous Q1H PRN    phenol throat spray (CHLORASEPTIC) 1 Spray  1 Spray Oral PRN    HYDROmorphone (PF) (DILAUDID) injection 2 mg  2 mg IntraVENous Q3H PRN    nystatin (MYCOSTATIN) 100,000 unit/gram powder   Topical BID    sodium chloride (NS) flush 5-10 mL  5-10 mL IntraVENous PRN               Labs:  Recent Labs      02/15/18   0319  02/14/18   0447  02/13/18   0354   WBC  14.3*  17.9*  16.4*   HGB  12.2  10.7*  12.2   HCT  39.1  34.1*  38.6   PLT  174  145*  155     Recent Labs      02/15/18   0319  02/14/18   0447  02/13/18   0354   NA  138  139  139   K  3.7  4.2  5.1   CL  106  110*  110*   CO2  20*  23  25   GLU  56*  74  84   BUN  13  23*  21*   CREA  0.46*  0.59  0.65   CA  7.4*  7.2*  7.7*   MG  1.9  2.4  2.8*     No results for input(s): PH, PCO2, PO2, HCO3, FIO2 in the last 72 hours. Imaging:  I have personally reviewed the patients radiographs and reports. IMPRESSION:   · Perforated gastric ulcer , s/p exploratory laparotomy with repair of perforated ulcer and core liver biopsy  · Alcohol abuse  · Septic shock, resolved. Off the pressors currently. · Spine fracture with T12 w/o cord compression and L2 compression fracture  · Electrolyte abnormalities with hypokalemia, hypomagnesemia    PLAN:   · Continue IVF for BP support. · Continue Zosyn   · CIWA protocol with Ativan prn  · Goody bag  · Replete electrolytes as needed  · Post-op management per surgery   · Encouraged incentive spirometry   · Orthopedics is following  · NPO, advance per surgery. The pt needs upper endoscopy prior to initiation of diet.   · Medically stable to be transferred from ICU   GLOBAL ISSUES:   · Head of bed elevated  · GI Prophylaxis: Protonix Q12  · DVT Prophylaxis: Lovenox SQ, SCDs     The patient was discussed with Dr. Iliana Reddy ( the attending physician)    Irma De Paz MD  Family Medicine Resident, PGY-2

## 2018-02-16 ENCOUNTER — APPOINTMENT (OUTPATIENT)
Dept: GENERAL RADIOLOGY | Age: 73
DRG: 853 | End: 2018-02-16
Attending: SURGERY
Payer: MEDICARE

## 2018-02-16 ENCOUNTER — APPOINTMENT (OUTPATIENT)
Dept: CT IMAGING | Age: 73
DRG: 853 | End: 2018-02-16
Attending: NURSE PRACTITIONER
Payer: MEDICARE

## 2018-02-16 PROBLEM — K70.30 ALCOHOLIC CIRRHOSIS OF LIVER WITHOUT ASCITES (HCC): Chronic | Status: ACTIVE | Noted: 2018-02-12

## 2018-02-16 PROBLEM — F10.10 ETOH ABUSE: Chronic | Status: ACTIVE | Noted: 2018-02-11

## 2018-02-16 PROBLEM — K25.5 PERFORATED CHRONIC GASTRIC ULCER (HCC): Chronic | Status: ACTIVE | Noted: 2018-02-11

## 2018-02-16 LAB
AMMONIA PLAS-SCNC: 69 UMOL/L
ANION GAP SERPL CALC-SCNC: 10 MMOL/L (ref 5–15)
BASOPHILS # BLD: 0 K/UL (ref 0–0.1)
BASOPHILS NFR BLD: 0 % (ref 0–1)
BUN SERPL-MCNC: 8 MG/DL (ref 6–20)
BUN/CREAT SERPL: 19 (ref 12–20)
CALCIUM SERPL-MCNC: 7.3 MG/DL (ref 8.5–10.1)
CHLORIDE SERPL-SCNC: 108 MMOL/L (ref 97–108)
CO2 SERPL-SCNC: 21 MMOL/L (ref 21–32)
CREAT SERPL-MCNC: 0.42 MG/DL (ref 0.55–1.02)
DIFFERENTIAL METHOD BLD: ABNORMAL
EOSINOPHIL # BLD: 0 K/UL (ref 0–0.4)
EOSINOPHIL NFR BLD: 1 % (ref 0–7)
ERYTHROCYTE [DISTWIDTH] IN BLOOD BY AUTOMATED COUNT: 13.1 % (ref 11.5–14.5)
GLUCOSE SERPL-MCNC: 94 MG/DL (ref 65–100)
HCT VFR BLD AUTO: 35.5 % (ref 35–47)
HGB BLD-MCNC: 11.4 G/DL (ref 11.5–16)
IMM GRANULOCYTES # BLD: 0.1 K/UL (ref 0–0.04)
IMM GRANULOCYTES NFR BLD AUTO: 1 % (ref 0–0.5)
LYMPHOCYTES # BLD: 0.9 K/UL (ref 0.8–3.5)
LYMPHOCYTES NFR BLD: 12 % (ref 12–49)
MAGNESIUM SERPL-MCNC: 1.7 MG/DL (ref 1.6–2.4)
MCH RBC QN AUTO: 34.4 PG (ref 26–34)
MCHC RBC AUTO-ENTMCNC: 32.1 G/DL (ref 30–36.5)
MCV RBC AUTO: 107.3 FL (ref 80–99)
MONOCYTES # BLD: 0.9 K/UL (ref 0–1)
MONOCYTES NFR BLD: 12 % (ref 5–13)
NEUTS SEG # BLD: 5.5 K/UL (ref 1.8–8)
NEUTS SEG NFR BLD: 74 % (ref 32–75)
NRBC # BLD: 0 K/UL (ref 0–0.01)
NRBC BLD-RTO: 0 PER 100 WBC
PLATELET # BLD AUTO: 159 K/UL (ref 150–400)
PMV BLD AUTO: 11.7 FL (ref 8.9–12.9)
POTASSIUM SERPL-SCNC: 3.1 MMOL/L (ref 3.5–5.1)
RBC # BLD AUTO: 3.31 M/UL (ref 3.8–5.2)
SODIUM SERPL-SCNC: 139 MMOL/L (ref 136–145)
TSH SERPL DL<=0.05 MIU/L-ACNC: 3.75 UIU/ML (ref 0.36–3.74)
VIT B12 SERPL-MCNC: 1765 PG/ML (ref 211–911)
WBC # BLD AUTO: 7.4 K/UL (ref 3.6–11)

## 2018-02-16 PROCEDURE — 84439 ASSAY OF FREE THYROXINE: CPT | Performed by: PSYCHIATRY & NEUROLOGY

## 2018-02-16 PROCEDURE — 74011250636 HC RX REV CODE- 250/636: Performed by: SURGERY

## 2018-02-16 PROCEDURE — 82140 ASSAY OF AMMONIA: CPT | Performed by: NURSE PRACTITIONER

## 2018-02-16 PROCEDURE — 77010033678 HC OXYGEN DAILY

## 2018-02-16 PROCEDURE — 65270000029 HC RM PRIVATE

## 2018-02-16 PROCEDURE — 74011250636 HC RX REV CODE- 250/636: Performed by: INTERNAL MEDICINE

## 2018-02-16 PROCEDURE — 74011000258 HC RX REV CODE- 258: Performed by: SURGERY

## 2018-02-16 PROCEDURE — 85025 COMPLETE CBC W/AUTO DIFF WBC: CPT | Performed by: SURGERY

## 2018-02-16 PROCEDURE — 3E0336Z INTRODUCTION OF NUTRITIONAL SUBSTANCE INTO PERIPHERAL VEIN, PERCUTANEOUS APPROACH: ICD-10-PCS | Performed by: SURGERY

## 2018-02-16 PROCEDURE — 77030038269 HC DRN EXT URIN PURWCK BARD -A

## 2018-02-16 PROCEDURE — 77030018786 HC NDL GD F/USND BARD -B

## 2018-02-16 PROCEDURE — 77030018719 HC DRSG PTCH ANTIMIC J&J -A

## 2018-02-16 PROCEDURE — 84481 FREE ASSAY (FT-3): CPT | Performed by: PSYCHIATRY & NEUROLOGY

## 2018-02-16 PROCEDURE — 82607 VITAMIN B-12: CPT | Performed by: NURSE PRACTITIONER

## 2018-02-16 PROCEDURE — 70450 CT HEAD/BRAIN W/O DYE: CPT

## 2018-02-16 PROCEDURE — C1751 CATH, INF, PER/CENT/MIDLINE: HCPCS

## 2018-02-16 PROCEDURE — 83735 ASSAY OF MAGNESIUM: CPT | Performed by: SURGERY

## 2018-02-16 PROCEDURE — 74011250637 HC RX REV CODE- 250/637: Performed by: SURGERY

## 2018-02-16 PROCEDURE — 77030008771 HC TU NG SALEM SUMP -A

## 2018-02-16 PROCEDURE — C9113 INJ PANTOPRAZOLE SODIUM, VIA: HCPCS | Performed by: SURGERY

## 2018-02-16 PROCEDURE — 02HV33Z INSERTION OF INFUSION DEVICE INTO SUPERIOR VENA CAVA, PERCUTANEOUS APPROACH: ICD-10-PCS | Performed by: SURGERY

## 2018-02-16 PROCEDURE — 74011258636 HC RX REV CODE- 258/636: Performed by: SURGERY

## 2018-02-16 PROCEDURE — 80048 BASIC METABOLIC PNL TOTAL CA: CPT | Performed by: SURGERY

## 2018-02-16 PROCEDURE — 84443 ASSAY THYROID STIM HORMONE: CPT | Performed by: NURSE PRACTITIONER

## 2018-02-16 PROCEDURE — 76937 US GUIDE VASCULAR ACCESS: CPT

## 2018-02-16 PROCEDURE — 74011000250 HC RX REV CODE- 250: Performed by: SURGERY

## 2018-02-16 PROCEDURE — 77030019563 HC DEV ATTCH FEED HOLL -A

## 2018-02-16 PROCEDURE — 36569 INSJ PICC 5 YR+ W/O IMAGING: CPT | Performed by: SURGERY

## 2018-02-16 PROCEDURE — 36415 COLL VENOUS BLD VENIPUNCTURE: CPT | Performed by: SURGERY

## 2018-02-16 RX ORDER — LORAZEPAM 2 MG/ML
1 INJECTION INTRAMUSCULAR
Status: DISCONTINUED | OUTPATIENT
Start: 2018-02-16 | End: 2018-02-20

## 2018-02-16 RX ORDER — POTASSIUM CHLORIDE 14.9 MG/ML
20 INJECTION INTRAVENOUS ONCE
Status: DISCONTINUED | OUTPATIENT
Start: 2018-02-16 | End: 2018-02-16

## 2018-02-16 RX ORDER — SODIUM CHLORIDE 0.9 % (FLUSH) 0.9 %
10 SYRINGE (ML) INJECTION EVERY 24 HOURS
Status: DISCONTINUED | OUTPATIENT
Start: 2018-02-16 | End: 2018-02-17

## 2018-02-16 RX ORDER — SODIUM CHLORIDE 0.9 % (FLUSH) 0.9 %
10 SYRINGE (ML) INJECTION AS NEEDED
Status: DISCONTINUED | OUTPATIENT
Start: 2018-02-16 | End: 2018-02-17

## 2018-02-16 RX ORDER — SODIUM CHLORIDE 0.9 % (FLUSH) 0.9 %
10-30 SYRINGE (ML) INJECTION AS NEEDED
Status: DISCONTINUED | OUTPATIENT
Start: 2018-02-16 | End: 2018-03-16 | Stop reason: HOSPADM

## 2018-02-16 RX ORDER — SODIUM CHLORIDE 0.9 % (FLUSH) 0.9 %
20 SYRINGE (ML) INJECTION EVERY 24 HOURS
Status: DISCONTINUED | OUTPATIENT
Start: 2018-02-16 | End: 2018-02-17

## 2018-02-16 RX ORDER — SODIUM CHLORIDE, SODIUM LACTATE, POTASSIUM CHLORIDE, CALCIUM CHLORIDE 600; 310; 30; 20 MG/100ML; MG/100ML; MG/100ML; MG/100ML
100 INJECTION, SOLUTION INTRAVENOUS CONTINUOUS
Status: DISCONTINUED | OUTPATIENT
Start: 2018-02-16 | End: 2018-02-16

## 2018-02-16 RX ORDER — LORAZEPAM 2 MG/ML
2 INJECTION INTRAMUSCULAR
Status: DISCONTINUED | OUTPATIENT
Start: 2018-02-16 | End: 2018-02-20

## 2018-02-16 RX ORDER — DEXTROSE, SODIUM CHLORIDE, SODIUM LACTATE, POTASSIUM CHLORIDE, AND CALCIUM CHLORIDE 5; .6; .31; .03; .02 G/100ML; G/100ML; G/100ML; G/100ML; G/100ML
100 INJECTION, SOLUTION INTRAVENOUS CONTINUOUS
Status: DISCONTINUED | OUTPATIENT
Start: 2018-02-16 | End: 2018-02-17

## 2018-02-16 RX ORDER — SODIUM CHLORIDE 0.9 % (FLUSH) 0.9 %
10-40 SYRINGE (ML) INJECTION EVERY 8 HOURS
Status: DISCONTINUED | OUTPATIENT
Start: 2018-02-16 | End: 2018-03-16 | Stop reason: HOSPADM

## 2018-02-16 RX ADMIN — DEXTROSE MONOHYDRATE 50 ML/HR: 5 INJECTION, SOLUTION INTRAVENOUS at 02:39

## 2018-02-16 RX ADMIN — HYDROMORPHONE HYDROCHLORIDE 1 MG: 2 INJECTION, SOLUTION INTRAMUSCULAR; INTRAVENOUS; SUBCUTANEOUS at 21:32

## 2018-02-16 RX ADMIN — LORAZEPAM 2 MG: 2 INJECTION INTRAMUSCULAR; INTRAVENOUS at 05:08

## 2018-02-16 RX ADMIN — PANTOPRAZOLE SODIUM 40 MG: 40 INJECTION, POWDER, FOR SOLUTION INTRAVENOUS at 10:06

## 2018-02-16 RX ADMIN — HYDROMORPHONE HYDROCHLORIDE 1 MG: 2 INJECTION, SOLUTION INTRAMUSCULAR; INTRAVENOUS; SUBCUTANEOUS at 11:18

## 2018-02-16 RX ADMIN — Medication 10 ML: at 00:00

## 2018-02-16 RX ADMIN — Medication 10 ML: at 21:32

## 2018-02-16 RX ADMIN — LORAZEPAM 2 MG: 2 INJECTION INTRAMUSCULAR; INTRAVENOUS at 00:01

## 2018-02-16 RX ADMIN — FOLIC ACID: 5 INJECTION, SOLUTION INTRAMUSCULAR; INTRAVENOUS; SUBCUTANEOUS at 18:23

## 2018-02-16 RX ADMIN — ENOXAPARIN SODIUM 40 MG: 40 INJECTION SUBCUTANEOUS at 16:28

## 2018-02-16 RX ADMIN — NYSTATIN: 100000 POWDER TOPICAL at 18:23

## 2018-02-16 RX ADMIN — HYDROMORPHONE HYDROCHLORIDE 2 MG: 2 INJECTION, SOLUTION INTRAMUSCULAR; INTRAVENOUS; SUBCUTANEOUS at 06:06

## 2018-02-16 RX ADMIN — PIPERACILLIN SODIUM AND TAZOBACTAM SODIUM 10.12 G: 3; .375 INJECTION, POWDER, LYOPHILIZED, FOR SOLUTION INTRAVENOUS at 10:49

## 2018-02-16 RX ADMIN — PANTOPRAZOLE SODIUM 40 MG: 40 INJECTION, POWDER, FOR SOLUTION INTRAVENOUS at 20:52

## 2018-02-16 RX ADMIN — NYSTATIN: 100000 POWDER TOPICAL at 10:06

## 2018-02-16 RX ADMIN — SODIUM CHLORIDE, SODIUM LACTATE, POTASSIUM CHLORIDE, CALCIUM CHLORIDE, AND DEXTROSE MONOHYDRATE 100 ML/HR: 600; 310; 30; 20; 5 INJECTION, SOLUTION INTRAVENOUS at 10:15

## 2018-02-16 NOTE — PROGRESS NOTES
Physical Therapy - Attempted PT twice, patient initially have PICC line placement and now off floor per RN.  Will make 3rd attempt as time permits, otherwise follow up tomorrow am.  Harry Bernstein

## 2018-02-16 NOTE — PROGRESS NOTES
2/16/2018 10:34 AM EMR reviewed, likely pt will need rehab placement at discharge. PT consult noted, pt will need OT consult. Spoke with Dr. Tatiana Walsh and requested OT eval. OT eval will not be placed until pt's participation in therapy is more appropriate. No plan for discharge today or over the weekend. CM will follow up on Monday, 2/19.  ZACH Alfred

## 2018-02-16 NOTE — PROGRESS NOTES
Bedside and Verbal shift change report given to Jo-Ann Stubbs RN (oncoming nurse) by Rodrigo Grover RN (offgoing nurse). Report included the following information SBAR and Kardex.

## 2018-02-16 NOTE — PROGRESS NOTES
VAT    Order acknowledged and chart reviewed for PICC Line evaluation. -- Areas reviewed, BUT NOT LIMITED TO, Patient's current and past H&P, Labs, all Notes, all Media records, radiology records, and medications. Spoke to RN who will get consent for PICC line with daughter, she is present in room. Plan for PICC line after lunch time.

## 2018-02-16 NOTE — PROGRESS NOTES
Primary Nurse Yg Jaimes RN and ISIS Kunz RN performed a dual skin assessment on this patient Impairment noted- see wound doc flow sheet  Gianfranco score is 18

## 2018-02-16 NOTE — PROGRESS NOTES
Problem: Falls - Risk of  Goal: *Absence of Falls  Document Demarcus Fall Risk and appropriate interventions in the flowsheet. Outcome: Progressing Towards Goal  Fall Risk Interventions:  Mobility Interventions: Bed/chair exit alarm, Patient to call before getting OOB    Mentation Interventions: Adequate sleep, hydration, pain control, Door open when patient unattended, Bed/chair exit alarm, Increase mobility, More frequent rounding, Update white board, Toileting rounds, Room close to nurse's station, Reorient patient    Medication Interventions: Utilize gait belt for transfers/ambulation, Teach patient to arise slowly, Patient to call before getting OOB, Evaluate medications/consider consulting pharmacy, Bed/chair exit alarm, Assess postural VS orthostatic hypotension    Elimination Interventions:  Toileting schedule/hourly rounds, Toilet paper/wipes in reach, Patient to call for help with toileting needs, Elevated toilet seat, Call light in reach, Bed/chair exit alarm    History of Falls Interventions: Consult care management for discharge planning, Bed/chair exit alarm, Door open when patient unattended, Evaluate medications/consider consulting pharmacy, Investigate reason for fall, Room close to nurse's station, Utilize gait belt for transfer/ambulation

## 2018-02-16 NOTE — PROGRESS NOTES
Bedside and Verbal shift change report given to Beatrice Patricia RN (oncoming nurse) by Arnaldo Soriano RN (offgoing nurse). Report included the following information SBAR and Kardex.

## 2018-02-16 NOTE — PROGRESS NOTES
PICC Placement Note    PRE-PROCEDURE VERIFICATION  Correct Procedure: yes  Correct Site:  yes  Temperature: Temp: 98.6 °F (37 °C), Temperature Source: Temp Source: Oral  Recent Labs      02/16/18   0401   BUN  8   CREA  0.42*   PLT  159   WBC  7.4     Allergies: Review of patient's allergies indicates no known allergies. Education materials for PICC Care given: yes. See Patient Education activity for further details. PICC Booklet placed at bedside: yes    Closed Ended PICC Catheters:  Flush Lumens as Follows:  Intermittent Medication:   Flush before and after each medication with 10 ml NS. Unused Ports:  Flush every 8 hours with 10 ml NS.  TPN Ports:  Flush every 24 hours with 20 ml NS prior to hanging new bag. Blood Draws: Stop infusion, draw off and waste 10 ml of blood. Draw sample with 10cc syringe or greater. DO NOT USE VACUTAINER . Transfer with appropriate device to lab  tubes. Flush with 20 ml NS. Dressing Change:  Every 7 days, and PRN using sterile technique if integrity of dressing is compromised. Initial dressing change for central line 24-48 hours post insertion if gauze is used. Apply new dressing per policy. PROCEDURE DETAIL  Consent was obtained and all questions were answered related to risks and benefits. A triple lumen PICC line was inserted, as a sterile procedure using ultrasound and modified Seldinger technique for TPN. The following documentation is in addition to the PICC properties in the lines/airways flowsheet :  Lot #: [id number]  Lidocaine 1% administered intradermally :yes  Internal Catheter Total Length: 41 cm with 2 cm out  Vein Selection for PICC:right basilic  Central Line Bundle followed yes  Complication Related to Insertion:no    The placement was verified by EKG, MAX P WAVE @ 41 (cm) with 2 cm out PER EKG PICC TIP @ C/A junction.      Line is okay to use: yes    Jagdish Quintanilla RN

## 2018-02-16 NOTE — PROGRESS NOTES
Nutrition Assessment:    RECOMMENDATIONS/INTERVENTION(S):   1. To avoid further nutritional decline while unable to initiate PO, recommend starting TPN    TPN recommendations:  Day 1:  D20/5%AA @ 42 ml/hr  Day 2:  D20/5%AA @ the goal rate of 63 ml/hr  Add Lipids 20% (500 ml) @ 42 ml/hr x 12 hr 3x/wk  (TPN @ goal + Lipids provides 1762 kcals, 76 g protein)    2. Monitor BG, lytes, TG (weekly while on TPN)    3. Adjust IVF accordingly  ASSESSMENT:   2/16:  Pt remains confused, agitated & sedated (CIWA protocol). NPO Day 6. Labs/meds reviewed. K repleted. No Phos. D5 LR IVF. TPN started per Surgery MD today, plans for UGI when able. Likely pt meets criteria for Severe Acute Malnutrition, no new wt, mild edema. TPN recs above. 2/14:  Extubated 2/12. Unable to start PO yesterday per Surgery - plans for UGI p/t PO once off pressors. Discussed case in ICU rounds. Pt agitated, confused overnight - pulled NGT. On sedation per CIWA protocol. NPO. No BM, flatus, absent BS. Labs/meds reviewed. K, Mg WDL. No Phos. 1/2 NS IVF. 2/12:  Chart reviewed 2/2 intubation. 67 yof admitted for intra-abd free air of unknown etiology. Pmhx includes gastric ulcer (EGD x 2 yrs ago), Etoh. Surgery following. POD #1 ex lap repair of perforated gastric ulcer, liver bx. Remains intubated 2/2 shock, respiratory failure. Labs/meds reviewed. K, Mg requiring repletion. No Phos. On Delbert. Propofol currently stopped. On PPI. Ortho following for T12 fx. Surgical incisions to abd, ALYSSA drain, no edema noted. Overwt per advanced age. No recent wt to compare per hx. Energy needs calculated using current wt, vent settings, tmax. Noted n/v after fall yesterday, no reports of inadequate PO prior to fall. Etoh hx. SUBJECTIVE/OBJECTIVE:     Diet Order: NPO; TPN - D20/5% AA @ 42 ml/hr  % Eaten:  No data found.     Pertinent Medications: [x] Reviewed - goody bag, Delbert, propofol, mag sulf, KCl, protonix    Past Medical History:   Diagnosis Date    Hyperlipidemia        Chemistries:  Lab Results   Component Value Date/Time    Sodium 139 02/16/2018 04:01 AM    Potassium 3.1 (L) 02/16/2018 04:01 AM    Chloride 108 02/16/2018 04:01 AM    CO2 21 02/16/2018 04:01 AM    Anion gap 10 02/16/2018 04:01 AM    Glucose 94 02/16/2018 04:01 AM    BUN 8 02/16/2018 04:01 AM    Creatinine 0.42 (L) 02/16/2018 04:01 AM    BUN/Creatinine ratio 19 02/16/2018 04:01 AM    GFR est AA >60 02/16/2018 04:01 AM    GFR est non-AA >60 02/16/2018 04:01 AM    Calcium 7.3 (L) 02/16/2018 04:01 AM    AST (SGOT) 24 04/29/2016 05:33 AM    Alk. phosphatase 74 04/29/2016 05:33 AM    Protein, total 5.1 (L) 04/29/2016 05:33 AM    Albumin 2.5 (L) 04/29/2016 05:33 AM    Globulin 2.6 04/29/2016 05:33 AM    A-G Ratio 1.0 (L) 04/29/2016 05:33 AM    ALT (SGPT) 18 04/29/2016 05:33 AM      Anthropometrics: Height: 5' 3.5\" (161.3 cm) Weight: 79.6 kg (175 lb 7.8 oz)    IBW (%IBW): 58.7 kg (129 lb 6.6 oz) (131.69 %) UBW (%UBW):  (UTO) (  %)    BMI: Body mass index is 30.6 kg/(m^2). This BMI is indicative of:   [] Underweight    [] Normal    [x] Overweight - per advanced age    []  Obesity    []  Extreme Obesity (BMI>40)  Estimated Nutrition Needs (Based on): 5946 Kcals/day (BMR (1398) x 1.3 AF) , 70 g (-88 (1.2-1.5 g/kg x IBW)) Protein  Carbohydrate:  At Least 130 g/day  Fluids: 1700 mL/day    Last BM: 2/10, abd semi-soft   []Active     []Hyperactive  [x]Hypoactive       [] Absent   BS  Skin:    [] Intact   [x] Incision - abd lap sites  [] Breakdown   [] DTI   [] Tears/Excoriation/Abrasion  [x]Edema - 1+ BLE [x] Other:ALYSSA drain     Wt Readings from Last 30 Encounters:   02/16/18 79.6 kg (175 lb 7.8 oz)   04/29/16 76.3 kg (168 lb 3.2 oz)      NUTRITION DIAGNOSES:   Problem:  Inadequate oral intake     Etiology: related to conditions associated with a dx - perforated viscus, respiratory failure     Signs/Symptoms: as evidenced by s/p lap w/ perf viscus repair, intubation, NPO x 6, requiring sedation for CIWA protocol, need to initiate TPN    NUTRITION INTERVENTIONS:  Meals/Snacks: General/healthful diet - once PO initiated Enteral/Parenteral Nutrition: Initiate parenteral nutrition                GOAL:   Initiate TPN w/ stable BG & lytes in the next 1-3 days    Cultural, Orthodox, or Ethnic Dietary Needs: Other (comment) (UTO)     EDUCATION & DISCHARGE NEEDS:    [x] None Identified   [] Identified and Education Provided/Documented   [] Identified and Pt declined/was not appropriate      [x] Interdisciplinary Care Plan Reviewed/Documented    [x] Discharge Needs:    TBD   [] No Nutrition Related Discharge Needs    NUTRITION RISK:   Pt Is At Nutrition Risk  [x]     No Nutrition Risk Identified  []       PT SEEN FOR:    []  MD Consult: []Calorie Count      []Diabetic Diet Education        []Diet Education     []Electrolyte Management     []General Nutrition Management and Supplements     []Management of Tube Feeding     [x]TPN Recommendations (TPN started per MD today)   []  RN Referral:  []MST score >=2     []Enteral/Parenteral Nutrition PTA     []Pregnant: Gestational DM or Multigestation                 [] Pressure Ulcer    []  Low BMI      []  Length of Stay       [] Dysphagia Diet         [] Ventilator  [x]  Follow-up     Previous Recommendations:   [] Implemented     [x] Progressing Towards Goal          [] Not Implemented          [] Not Applicable    Previous Goal:   [] Met              [x] Progressing Towards Goal              [] Not Progressing Towards Goal   [] Not Applicable            Mandy Olivia, [address] [place] [address]  Pager [phone]

## 2018-02-16 NOTE — PROGRESS NOTES
Progress Note    Patient: Mirian Kawasaki MRN: [id number]  SSN: [id number]    YOB: 1945  Age: 67 y.o. Sex: female      Admit Date: 2018    4 Days Post-Op    Procedure:  Procedure(s):  LAPAROTOMY EXPLORATORY;REPAIR OF PERFORATED POSTERIOR GASTRIC ULCER AND CORE BIOPSY LIVER    Subjective:     Mrs. Paolo Proctor is confused and sedated. She denies any pain. Objective:     Visit Vitals    /69 (BP 1 Location: Right arm, BP Patient Position: At rest)    Pulse 83    Temp 97.1 °F (36.2 °C)    Resp 16    Ht 5' 3.5\" (1.613 m)    Wt 175 lb 7.8 oz (79.6 kg)    SpO2 96%    Breastfeeding No    BMI 30.6 kg/m2       Temp (24hrs), Av.7 °F (36.5 °C), Min:97.1 °F (36.2 °C), Max:97.9 °F (36.6 °C)      Physical Exam:    GENERAL: no distress, confused, disoriented, ABDOMEN: Soft, NT, ND. The ALYSSA fluid is bilous, but clearer. Lab Review:   BMP:   Lab Results   Component Value Date/Time     2018 04:01 AM    K 3.1 (L) 2018 04:01 AM     2018 04:01 AM    CO2 21 2018 04:01 AM    AGAP 10 2018 04:01 AM    GLU 94 2018 04:01 AM    BUN 8 2018 04:01 AM    CREA 0.42 (L) 2018 04:01 AM    GFRAA >60 2018 04:01 AM    GFRNA >60 2018 04:01 AM     CBC:   Lab Results   Component Value Date/Time    WBC 7.4 2018 04:01 AM    HGB 11.4 (L) 2018 04:01 AM    HCT 35.5 2018 04:01 AM     2018 04:01 AM       Assessment:     Hospital Problems  Date Reviewed: 2016          Codes Class Noted POA    Free intraperitoneal air ICD-10-CM: K66.8  ICD-9-CM: 568.89  2018 Yes        S/P exploratory laparotomy ICD-10-CM: L82.182  ICD-9-CM: V45.89  2018 No    Overview Signed 2018 11:57 AM by Corinne Elaine MD     Suture repair of perforated posterior gastric ulcer with Charlett Sheller patch, core needle biopsies of left lobe of the liver.              Alcoholic cirrhosis of liver without ascites ([facility] ICD-10-CM: K70.30  ICD-9-CM: 571.2  2/12/2018 Unknown        * (Principal)Perforated chronic gastric ulcer ([facility] ICD-10-CM: K25.5  ICD-9-CM: 531.50  2/11/2018 Yes        ETOH abuse ICD-10-CM: F10.10  ICD-9-CM: 305.00  2/11/2018 Yes              Plan/Recommendations/Medical Decision Making:     Still confused and agitated, requiring sedation. Minimize sedation if possible. Start TPN. Place PICC. Continue bowel rest.  UGI when more alert. Continue PPI's. WBC normal.  Continue Zosyn, drain. PT/OT when more alert. DVT prophylaxis.     Signed By: Nedra Wiley MD     February 16, 2018

## 2018-02-16 NOTE — CONSULTS
[facility]  [place] [address]  [phone]     Inpatient Neurology Consult  [facility]    Name:   Jerzy Snyder record #: [id number]  Admission Date: 2/11/2018  Consult Date:  02/16/18    Referring Provider: Dr. Barbara Shipman  Chief Complaint:  AMS  Source of Hx:  Chart review, pt and RN Otis Halsted  _____________________________________________________________________    NEUROLOGY NOTE ADDENDUM:    2/16/2018      I have reviewed the documentation provided by the nurse practitioner, discussed her findings, clinical impression, and the proposed management plans with regards to this patient's encounter. I have personally evaluated the patient, verify the history and confirm physical findings. Below are my additional findings:    Admitted for perforated gastric ulcer s/p fall    Durng admission noted to be confused    (+) alcohol abuse    Ammonia 69  TSH 3.75    O> Drowsy but opens eyes to name calling  Knows she is in the hospital, recognize ex   Motor moves all ext    A> Encephalopathy possible multi factorial (elevated ammonia, abnormal TSH, alcohol withdrawal)    P> 1) Address abnormal ammonia  2) Will check T3 and T4  3) Withdrawal precaution    Thank you for the consultation. Ilya Rosales MD  Diplomate, American Board of Psychiatry and Neurology  Diplomate, Neuromuscular Medicine  Diplomate, American Board of Electrodiagnostic Medicine        HISTORY OF PRESENT ILLNESS:   Kellie Adam is a 67 y.o. female with PMH of former tobacco use, ETOH, HLD, perforated gastric ulcer and T12/L2 fracture. The Neurology Service is asked to evaluate for AMS, after admission for a perforated gastric ulcer post fall. Patient had Ex Lap of perforated Gastric ulcer and liver biopsy on 2/12/18 by Dr. Barbara Shipman. Patient states she knows she is at [facility] and fell and messed up her back and stomach. She knows she had surgery.   I told her neurology was asked to see her because of confusion noted after her surgery 4 days ago. I asked her what type of alcohol she drank and she stated wine, I stated no liquor or beer she said no. I asked about quantity, she said 2-3 small glasses, normal quantity. Patient was in and out of a restless sleep during discussion---she appeared sedated; but only received Ativan 2mg today at 0001 and 0500 and Hydromorphone at 0600 and 11am today. CT head done on admission 2/11 post fall without acute findings. Patient denies knowing she is confused and can follow some commands but not all commands. She knows the year, location and month. Past Medical History:   Diagnosis Date    Alcoholic cirrhosis of liver without ascites ([facility] [address] 2/12/2018    ETOH abuse 2/11/2018    History of vascular access device 02/16/2018    [facility] VAT - 5 FR triple, R basilic, TPN    Hyperlipidemia     Perforated chronic gastric ulcer ([facility] [address] 2/11/2018     Past Surgical History:   Procedure Laterality Date    HX CATARACT REMOVAL       Family History   Problem Relation Age of Onset    Other Other      patient did not know     Social History     Social History    Marital status: SINGLE     Spouse name: N/A    Number of children: N/A    Years of education: N/A     Occupational History    Not on file. Social History Main Topics    Smoking status: Former Smoker    Smokeless tobacco: Never Used    Alcohol use 6.0 oz/week     0 Standard drinks or equivalent, 10 Glasses of wine per week      Comment: Hx of heavy etoh use, but quit several months ago    Drug use: No    Sexual activity: Not on file     Other Topics Concern    Not on file     Social History Narrative       Objective  Allergies:   No Known Allergies  Outpatient Meds  No current facility-administered medications on file prior to encounter.       Current Outpatient Prescriptions on File Prior to Encounter   Medication Sig Dispense Refill    omega-3 fatty acids-vitamin e 1,000 mg cap Take 2 Caps by mouth daily.          Inpatient Meds    Current Facility-Administered Medications:     dextrose 5% lactated ringers infusion, 100 mL/hr, IntraVENous, CONTINUOUS, Corinne Elaine MD, Last Rate: 100 mL/hr at 02/16/18 1015, 100 mL/hr at 02/16/18 1015    TPN ADULT - CENTRAL AA 5% D20% W/ ELECTROLYTES AND CA, , IntraVENous, CONTINUOUS, Corinne Cabral., MD    LORazepam (ATIVAN) injection 1 mg, 1 mg, IntraVENous, Q2H PRN, Corinne Grate., MD    LORazepam (ATIVAN) injection 2 mg, 2 mg, IntraVENous, Q2H PRN, Corinne Grate., MD    alteplase (CATHFLO) 1 mg in sterile water (preservative free) 1 mL injection, 1 mg, InterCATHeter, PRN, Corinne Grate., MD    sodium chloride (NS) flush 10-30 mL, 10-30 mL, InterCATHeter, PRN, Corinne Grate., MD    sodium chloride (NS) flush 10 mL, 10 mL, InterCATHeter, Q24H, Corinne Grate., MD    sodium chloride (NS) flush 10 mL, 10 mL, InterCATHeter, PRN, Corinne Grate., MD    sodium chloride (NS) flush 10-40 mL, 10-40 mL, InterCATHeter, Q8H, Corinne Grate., MD    sodium chloride (NS) flush 20 mL, 20 mL, InterCATHeter, Q24H, Corinne Grate., MD    [COMPLETED] piperacillin-tazobactam (ZOSYN) 3.375 g in 0.9% sodium chloride 100 mL IVPB, 3.375 g, IntraVENous, ONCE, Last Rate: 200 mL/hr at 02/13/18 1033, 3.375 g at 02/13/18 1033 **FOLLOWED BY** piperacillin-tazobactam (ZOSYN) 10.125 g in 0.9% sodium chloride 500 mL continuous 24 hr infusion, 10.125 g, IntraVENous, Q24H, Mimi Sanon MD, 10.125 g at 02/16/18 1049    HYDROmorphone (PF) (DILAUDID) injection 1 mg, 1 mg, IntraVENous, Q3H PRN, Mimi Sanon MD, 1 mg at 02/16/18 1118    sodium chloride (NS) flush 5-10 mL, 5-10 mL, IntraVENous, Q8H, Corinne Elaine MD, 10 mL at 02/15/18 2200    sodium chloride (NS) flush 5-10 mL, 5-10 mL, IntraVENous, PRN, Corinne Elaine MD, 10 mL at 02/16/18 0000    acetaminophen (TYLENOL) solution 650 mg, 650 mg, Oral, Q6H PRN, Teresa Marshall MD    [facility]) injection 0.4 mg, 0.4 mg, IntraVENous, PRN, Teresa Marshall MD    ondansetron [facility]) injection 4 mg, 4 mg, IntraVENous, Q4H PRN, Teresa Marshall MD    enoxaparin (LOVENOX) injection 40 mg, 40 mg, SubCUTAneous, Q24H, Teresa Marshall MD, 40 mg at 02/15/18 1520    pantoprazole (PROTONIX) injection 40 mg, 40 mg, IntraVENous, Q12H, Teresa Marshall MD, 40 mg at 02/16/18 1006    phenol throat spray (CHLORASEPTIC) 1 Spray, 1 Spray, Oral, PRN, Livan Snyder MD, 1 [place] at 02/13/18 1803    nystatin (MYCOSTATIN) 100,000 unit/gram powder, , Topical, BID, Teresa Marshall MD    sodium chloride (NS) flush 5-10 mL, 5-10 mL, IntraVENous, PRN, Jairo Armas, DO    PHYSICAL EXAM  Patient Vitals for the past 12 hrs:   Temp Pulse Resp BP SpO2   02/16/18 1034 - 87 - 127/62 94 %   02/16/18 0826 98.6 °F (37 °C) 88 18 109/55 93 %   02/16/18 0311 97.1 °F (36.2 °C) 83 16 116/69 96 %        General:  WF in NAD, providing reduced history    Psych: sedated but will arouse if stimulated enough   Neck: supple, nontender,  No bruit   Heart: regular rhythm and rate     Lungs: clear BBS   Extremities: mild LE edema Skin: no rashes      Neurological Examination:    Mental Status:  confused, oriented x1, poor insight         Commands:   Following with direction 1 step commands    Language:  Comprehension: reduced    Speech: slower speech and responses, but with continued repetition of a question pt would answer     Cranial Nerves:            I: smell   Not tested    II: visual acuity    deferred    II: visual fields    *RON    II: pupils   Equal, pinpoint, reactive to light    II: optic disc   Not examined    III,VII:   no ptosis of either eyelid    III,IV,VI: extraocular muscles     no nystagmus, no intranuclear opthalmoplegia    V: mastication   *RON    V: facial sensation:    Equal V1, V2 and V3 bilaterally with LT    VII: facial muscle function     Symmetric, no facial droop    VIII: hearing   Equal bilaterally    IX: soft palate elevation    *RON    XI: trapezius strength    *RON    XI: sternocleidomastoid strength   *RON    XI: neck flexion strength    *RON    XII: tongue    *RON      Strength/Motor   Left:   Proximal:   4/5   Distal: 4 /5       Right:  Proximal:  4/5   Distal:   4/5   (JHA passively)   Drift:       None             Bulk:  appears symmetric         Tone:  normal      Reflexes:    BR Brachial Patellar Achilles Babinski Startle Glabellar   L 1+/4 1+/4 1+/4 NT  downgoing NT NT   R 1+/4 1+/4 1+/4 NT downgoing NT NT      Sensory: intact on proximal & distal extremity w/ LT, pressure, temp bilaterally   Coordination: pt cannot follow this command currently    Tremors:  no resting tremors, no intention tremors   Gait: deferred   *RON :  Unable to assess due to reduced comprehension and reduced LOC.     Labs Reviewed  Recent Results (from the past 12 hour(s))   METABOLIC PANEL, BASIC    Collection Time: 02/16/18  4:01 AM   Result Value Ref Range    Sodium 139 136 - 145 mmol/L    Potassium 3.1 (L) 3.5 - 5.1 mmol/L    Chloride 108 97 - 108 mmol/L    CO2 21 21 - 32 mmol/L    Anion gap 10 5 - 15 mmol/L    Glucose 94 65 - 100 mg/dL    BUN 8 6 - 20 MG/DL    Creatinine 0.42 (L) 0.55 - 1.02 MG/DL    BUN/Creatinine ratio 19 12 - 20      GFR est AA >60 >60 ml/min/1.73m2    GFR est non-AA >60 >60 ml/min/1.73m2    Calcium 7.3 (L) 8.5 - 10.1 MG/DL   CBC WITH AUTOMATED DIFF    Collection Time: 02/16/18  4:01 AM   Result Value Ref Range    WBC 7.4 3.6 - 11.0 K/uL    RBC 3.31 (L) 3.80 - 5.20 M/uL    HGB 11.4 (L) 11.5 - 16.0 g/dL    HCT 35.5 35.0 - 47.0 %    .3 (H) 80.0 - 99.0 FL    MCH 34.4 (H) 26.0 - 34.0 PG    MCHC 32.1 30.0 - 36.5 g/dL    RDW 13.1 11.5 - 14.5 %    PLATELET 363 042 - 973 K/uL    MPV 11.7 8.9 - 12.9 FL    NRBC 0.0 0  WBC    ABSOLUTE NRBC 0.00 0.00 - 0.01 K/uL    NEUTROPHILS 74 32 - 75 %    LYMPHOCYTES 12 12 - 49 %    MONOCYTES 12 5 - 13 % EOSINOPHILS 1 0 - 7 %    BASOPHILS 0 0 - 1 %    IMMATURE GRANULOCYTES 1 (H) 0.0 - 0.5 %    ABS. NEUTROPHILS 5.5 1.8 - 8.0 K/UL    ABS. LYMPHOCYTES 0.9 0.8 - 3.5 K/UL    ABS. MONOCYTES 0.9 0.0 - 1.0 K/UL    ABS. EOSINOPHILS 0.0 0.0 - 0.4 K/UL    ABS. BASOPHILS 0.0 0.0 - 0.1 K/UL    ABS. IMM. GRANS. 0.1 (H) 0.00 - 0.04 K/UL    DF AUTOMATED     MAGNESIUM    Collection Time: 02/16/18  4:01 AM   Result Value Ref Range    Magnesium 1.7 1.6 - 2.4 mg/dL     Imaging  Reviewed:   CT Results (recent):    Results from [place] encounter on 02/11/18   CT ABD PELV W CONT   Narrative INDICATION: abd pain, h/o gastric ulcers, ? perforation    COMPARISON: None    TECHNIQUE:   Following the uneventful intravenous administration of 100 cc Isovue-370, thin  axial images were obtained through the abdomen and pelvis. Coronal and sagittal  reconstructions were generated. Oral contrast was not administered. CT dose  reduction was achieved through use of a standardized protocol tailored for this  examination and automatic exposure control for dose modulation. FINDINGS:   LUNG BASES: Bibasilar atelectasis. LIVER: Slightly nodular morphology. No focal lesion. Portal vein is patent. GALLBLADDER: Unremarkable. SPLEEN: No enlargement or lesion. PANCREAS: No mass or ductal dilatation. ADRENALS: No mass. KIDNEYS: No mass, calculus, or hydronephrosis. GI TRACT:  No evidence of bowel obstruction. Few prominent loops of small bowel  right lower quadrant may represent focal ileus. Abnormal defect in the posterior  wall of the gastric antrum with associated gastric wall thickening and adjacent  inflammation  PERITONEUM: Pneumoperitoneum and moderate amount of ascites. APPENDIX: Unremarkable. RETROPERITONEUM: No lymphadenopathy or aortic aneurysm. ADDITIONAL COMMENTS: N/A.    URINARY BLADDER: Incompletely distended. REPRODUCTIVE ORGANS: Unremarkable. LYMPH NODES:  None enlarged. FREE FLUID:  Moderate to large.   BONES: Age indeterminate fracture of T12, which appears to be burst type  fracture with only mild loss of height and no retropulsion. Mild superior  endplate fracture of L2 is age indeterminate. ADDITIONAL COMMENTS: N/A. Impression IMPRESSION:    1. Pneumoperitoneum, with focal defect in the posterior wall of the gastric  antrum which is thickened, highly suspicious for perforated ulcer or less likely  neoplasm. Moderate free air. 2. Nodular morphology of the liver may indicate underlying cirrhosis. 3. Mildly dilated small bowel loops right lower quadrant may represent focal  ileus. 4. Acute to subacute comminuted/burst type fracture of the T12 vertebral body  without retropulsion. Mild superimposed endplate fracture of L2 may be acute. The findings were called to Dr. Gabriella Ferreira on 2/11/2018 at 2238 hours by myself.    789. MRI Results (recent):  No results found for this or any previous visit.    _____________________________________________________________________    Review of Systems: pt cannot provide ROS with AMS  _____________________________________________________________________  Impression  67 y.o. female with onset of AMS today. Exam findings of  . Imaging reviewed:  CT head without acute findings. CT abdomen noted acute/subacute T12 fracture and L2 endplate acute fracture. Notable Labs: Wound cx from 2/14 noting Alpha and Gamma Strept. On admission Ethyl level of 14. Liver biopsy only noted portal HTN and cirrhosis. With patient having hx of ETOH and reporting she drinks 2-3 small glasses of wine daily but used to drink more, she still is at risk of ETOH withdrawal.  She needs CIWA but oddly was last given Ativan 2mg  today at 0500 and 0001. She also received Hydromorphone at 0600 and 11am today. I question if she may have hepatic encephalopathy as well. Do not feel her AMS is neurologic in nature, but will check CT head to ensure this. Refer to plan below. Assessment:  1. Encephalopathy   2. ETOH     Plan  · Medications:  None to add  · Testing: CT head to ensure no acute events have occurred since fall 2/11, check Ammonia. TSh, B12  · Recommendations:  Would be wise to start CIWA either by Attending MD or hospitalist group. Fall precautions  ----Will evaluate later---  ·   Continue great care by collaborating care team and nursing staff. ·  Testing discussed with patient and RN --- any questions answered. My collaborating care team physician may have further recommendations. On DVT Prophylaxis yes no   Continue lovenox while inpatient x      Care Plan discussed with:  Patient x   Family    RN x   Care Manager    Consultant/Specialist:     Patient will be discussed with Dr. Pritesh Lindsey  ______________________________________________________________  Hospital Problems  Date Reviewed: 4/26/2016          Codes Class Noted POA    Free intraperitoneal air ICD-10-CM: K66.8  ICD-9-CM: 568.89  2/12/2018 Yes        S/P exploratory laparotomy ICD-10-CM: D94.206  ICD-9-CM: V45.89  2/12/2018 No    Overview Signed 2/12/2018 11:57 AM by Vandana Esquivel MD     Suture repair of perforated posterior gastric ulcer with Tylene Silver patch, core needle biopsies of left lobe of the liver. Alcoholic cirrhosis of liver without ascites (HCC) (Chronic) ICD-10-CM: K70.30  ICD-9-CM: 571.2  2/12/2018 Unknown        * (Principal)Perforated chronic gastric ulcer ([facility] [address] (Chronic) ICD-10-CM: K25.5  ICD-9-CM: 531.50  2/11/2018 Yes        ETOH abuse (Chronic) ICD-10-CM: F10.10  ICD-9-CM: 305.00  2/11/2018 Yes              *Thank you for allowing [facility], to participate in the care of your patient.     ---CLIFF August  ================================================    ADDENDUM--> Collaborating Care Team Physician:

## 2018-02-17 PROBLEM — E72.20 HYPERAMMONEMIA (HCC): Status: ACTIVE | Noted: 2018-02-16

## 2018-02-17 PROBLEM — K76.0 FATTY LIVER DETERMINED BY BIOPSY: Status: ACTIVE | Noted: 2018-02-12

## 2018-02-17 LAB
ALBUMIN SERPL-MCNC: 1.7 G/DL (ref 3.5–5)
ALBUMIN/GLOB SERPL: 0.5 {RATIO} (ref 1.1–2.2)
ALP SERPL-CCNC: 88 U/L (ref 45–117)
ALT SERPL-CCNC: 15 U/L (ref 12–78)
AMMONIA PLAS-SCNC: 60 UMOL/L
ANION GAP SERPL CALC-SCNC: 8 MMOL/L (ref 5–15)
AST SERPL-CCNC: 24 U/L (ref 15–37)
BACTERIA SPEC CULT: NORMAL
BASOPHILS # BLD: 0.1 K/UL (ref 0–0.1)
BASOPHILS NFR BLD: 1 % (ref 0–1)
BILIRUB SERPL-MCNC: 0.7 MG/DL (ref 0.2–1)
BUN SERPL-MCNC: 10 MG/DL (ref 6–20)
BUN/CREAT SERPL: 20 (ref 12–20)
CALCIUM SERPL-MCNC: 7.7 MG/DL (ref 8.5–10.1)
CHLORIDE SERPL-SCNC: 108 MMOL/L (ref 97–108)
CO2 SERPL-SCNC: 25 MMOL/L (ref 21–32)
CREAT SERPL-MCNC: 0.5 MG/DL (ref 0.55–1.02)
DIFFERENTIAL METHOD BLD: ABNORMAL
EOSINOPHIL # BLD: 0.1 K/UL (ref 0–0.4)
EOSINOPHIL NFR BLD: 1 % (ref 0–7)
ERYTHROCYTE [DISTWIDTH] IN BLOOD BY AUTOMATED COUNT: 13.3 % (ref 11.5–14.5)
GLOBULIN SER CALC-MCNC: 3.5 G/DL (ref 2–4)
GLUCOSE BLD STRIP.AUTO-MCNC: 130 MG/DL (ref 65–100)
GLUCOSE BLD STRIP.AUTO-MCNC: 140 MG/DL (ref 65–100)
GLUCOSE BLD STRIP.AUTO-MCNC: 153 MG/DL (ref 65–100)
GLUCOSE SERPL-MCNC: 150 MG/DL (ref 65–100)
HCT VFR BLD AUTO: 39.2 % (ref 35–47)
HGB BLD-MCNC: 12.6 G/DL (ref 11.5–16)
IMM GRANULOCYTES # BLD: 0.2 K/UL (ref 0–0.04)
IMM GRANULOCYTES NFR BLD AUTO: 2 % (ref 0–0.5)
LYMPHOCYTES # BLD: 1.4 K/UL (ref 0.8–3.5)
LYMPHOCYTES NFR BLD: 15 % (ref 12–49)
MAGNESIUM SERPL-MCNC: 1.8 MG/DL (ref 1.6–2.4)
MCH RBC QN AUTO: 34.1 PG (ref 26–34)
MCHC RBC AUTO-ENTMCNC: 32.1 G/DL (ref 30–36.5)
MCV RBC AUTO: 105.9 FL (ref 80–99)
MONOCYTES # BLD: 1.7 K/UL (ref 0–1)
MONOCYTES NFR BLD: 18 % (ref 5–13)
NEUTS SEG # BLD: 6 K/UL (ref 1.8–8)
NEUTS SEG NFR BLD: 64 % (ref 32–75)
NRBC # BLD: 0 K/UL (ref 0–0.01)
NRBC BLD-RTO: 0 PER 100 WBC
PLATELET # BLD AUTO: 214 K/UL (ref 150–400)
PMV BLD AUTO: 11.6 FL (ref 8.9–12.9)
POTASSIUM SERPL-SCNC: 3.5 MMOL/L (ref 3.5–5.1)
PREALB SERPL-MCNC: 5.5 MG/DL (ref 20–40)
PROT SERPL-MCNC: 5.2 G/DL (ref 6.4–8.2)
RBC # BLD AUTO: 3.7 M/UL (ref 3.8–5.2)
SERVICE CMNT-IMP: ABNORMAL
SERVICE CMNT-IMP: NORMAL
SODIUM SERPL-SCNC: 141 MMOL/L (ref 136–145)
T3FREE SERPL-MCNC: 1.8 PG/ML (ref 2.2–4)
T4 FREE SERPL-MCNC: 1.3 NG/DL (ref 0.8–1.5)
WBC # BLD AUTO: 9.4 K/UL (ref 3.6–11)

## 2018-02-17 PROCEDURE — 65270000029 HC RM PRIVATE

## 2018-02-17 PROCEDURE — 74011250636 HC RX REV CODE- 250/636: Performed by: SURGERY

## 2018-02-17 PROCEDURE — 80053 COMPREHEN METABOLIC PANEL: CPT | Performed by: SURGERY

## 2018-02-17 PROCEDURE — 77030008771 HC TU NG SALEM SUMP -A

## 2018-02-17 PROCEDURE — C9113 INJ PANTOPRAZOLE SODIUM, VIA: HCPCS | Performed by: SURGERY

## 2018-02-17 PROCEDURE — 77030019563 HC DEV ATTCH FEED HOLL -A

## 2018-02-17 PROCEDURE — 77030015696

## 2018-02-17 PROCEDURE — 77010033678 HC OXYGEN DAILY

## 2018-02-17 PROCEDURE — 83735 ASSAY OF MAGNESIUM: CPT | Performed by: SURGERY

## 2018-02-17 PROCEDURE — 74011250636 HC RX REV CODE- 250/636: Performed by: INTERNAL MEDICINE

## 2018-02-17 PROCEDURE — 77030038269 HC DRN EXT URIN PURWCK BARD -A

## 2018-02-17 PROCEDURE — 82140 ASSAY OF AMMONIA: CPT | Performed by: SURGERY

## 2018-02-17 PROCEDURE — 77030027138 HC INCENT SPIROMETER -A

## 2018-02-17 PROCEDURE — 36415 COLL VENOUS BLD VENIPUNCTURE: CPT | Performed by: SURGERY

## 2018-02-17 PROCEDURE — 74011258636 HC RX REV CODE- 258/636: Performed by: SURGERY

## 2018-02-17 PROCEDURE — 74011000250 HC RX REV CODE- 250: Performed by: SURGERY

## 2018-02-17 PROCEDURE — 85025 COMPLETE CBC W/AUTO DIFF WBC: CPT | Performed by: SURGERY

## 2018-02-17 PROCEDURE — 82962 GLUCOSE BLOOD TEST: CPT

## 2018-02-17 PROCEDURE — 74011000258 HC RX REV CODE- 258: Performed by: SURGERY

## 2018-02-17 PROCEDURE — 84134 ASSAY OF PREALBUMIN: CPT | Performed by: SURGERY

## 2018-02-17 RX ORDER — LIDOCAINE HYDROCHLORIDE 20 MG/ML
JELLY TOPICAL ONCE
Status: COMPLETED | OUTPATIENT
Start: 2018-02-17 | End: 2018-02-17

## 2018-02-17 RX ORDER — DEXTROSE 50 % IN WATER (D50W) INTRAVENOUS SYRINGE
12.5-25 AS NEEDED
Status: DISCONTINUED | OUTPATIENT
Start: 2018-02-17 | End: 2018-02-17

## 2018-02-17 RX ORDER — SODIUM CHLORIDE 9 MG/ML
75 INJECTION, SOLUTION INTRAVENOUS CONTINUOUS
Status: DISCONTINUED | OUTPATIENT
Start: 2018-02-17 | End: 2018-02-20

## 2018-02-17 RX ORDER — MAGNESIUM SULFATE 100 %
4 CRYSTALS MISCELLANEOUS AS NEEDED
Status: DISCONTINUED | OUTPATIENT
Start: 2018-02-17 | End: 2018-02-17 | Stop reason: SDUPTHER

## 2018-02-17 RX ORDER — MAGNESIUM SULFATE 100 %
4 CRYSTALS MISCELLANEOUS AS NEEDED
Status: DISCONTINUED | OUTPATIENT
Start: 2018-02-17 | End: 2018-03-16 | Stop reason: HOSPADM

## 2018-02-17 RX ORDER — DEXTROSE 50 % IN WATER (D50W) INTRAVENOUS SYRINGE
12.5-25 AS NEEDED
Status: DISCONTINUED | OUTPATIENT
Start: 2018-02-17 | End: 2018-03-16 | Stop reason: HOSPADM

## 2018-02-17 RX ADMIN — PANTOPRAZOLE SODIUM 40 MG: 40 INJECTION, POWDER, FOR SOLUTION INTRAVENOUS at 20:31

## 2018-02-17 RX ADMIN — PIPERACILLIN SODIUM AND TAZOBACTAM SODIUM 10.12 G: 3; .375 INJECTION, POWDER, LYOPHILIZED, FOR SOLUTION INTRAVENOUS at 11:06

## 2018-02-17 RX ADMIN — HYDROMORPHONE HYDROCHLORIDE 1 MG: 2 INJECTION, SOLUTION INTRAMUSCULAR; INTRAVENOUS; SUBCUTANEOUS at 22:49

## 2018-02-17 RX ADMIN — THIAMINE HYDROCHLORIDE: 100 INJECTION, SOLUTION INTRAMUSCULAR; INTRAVENOUS at 17:46

## 2018-02-17 RX ADMIN — SODIUM CHLORIDE, SODIUM LACTATE, POTASSIUM CHLORIDE, CALCIUM CHLORIDE, AND DEXTROSE MONOHYDRATE 100 ML/HR: 600; 310; 30; 20; 5 INJECTION, SOLUTION INTRAVENOUS at 05:47

## 2018-02-17 RX ADMIN — I.V. FAT EMULSION 500 ML: 20 EMULSION INTRAVENOUS at 17:43

## 2018-02-17 RX ADMIN — NYSTATIN: 100000 POWDER TOPICAL at 17:52

## 2018-02-17 RX ADMIN — Medication 10 ML: at 16:58

## 2018-02-17 RX ADMIN — ENOXAPARIN SODIUM 40 MG: 40 INJECTION SUBCUTANEOUS at 16:57

## 2018-02-17 RX ADMIN — HYDROMORPHONE HYDROCHLORIDE 1 MG: 2 INJECTION, SOLUTION INTRAMUSCULAR; INTRAVENOUS; SUBCUTANEOUS at 05:50

## 2018-02-17 RX ADMIN — HYDROMORPHONE HYDROCHLORIDE 1 MG: 2 INJECTION, SOLUTION INTRAMUSCULAR; INTRAVENOUS; SUBCUTANEOUS at 00:27

## 2018-02-17 RX ADMIN — SODIUM CHLORIDE 75 ML/HR: 900 INJECTION, SOLUTION INTRAVENOUS at 11:08

## 2018-02-17 RX ADMIN — HYDROMORPHONE HYDROCHLORIDE 1 MG: 2 INJECTION, SOLUTION INTRAMUSCULAR; INTRAVENOUS; SUBCUTANEOUS at 12:15

## 2018-02-17 RX ADMIN — NYSTATIN: 100000 POWDER TOPICAL at 08:47

## 2018-02-17 RX ADMIN — PANTOPRAZOLE SODIUM 40 MG: 40 INJECTION, POWDER, FOR SOLUTION INTRAVENOUS at 08:45

## 2018-02-17 RX ADMIN — LIDOCAINE HYDROCHLORIDE: 20 JELLY TOPICAL at 10:00

## 2018-02-17 RX ADMIN — HYDROMORPHONE HYDROCHLORIDE 1 MG: 2 INJECTION, SOLUTION INTRAMUSCULAR; INTRAVENOUS; SUBCUTANEOUS at 17:56

## 2018-02-17 NOTE — PROGRESS NOTES
General Surgery at [address] [place] [address]  Multiple attempts at NG placement unsuccessful last night. Patient Vitals for the past 12 hrs:   Temp Pulse Resp BP SpO2   18 0837 97.5 °F (36.4 °C) 92 16 146/74 98 %   18 0443 - 94 16 - 96 %   18 0406 97.9 °F (36.6 °C) (!) 106 16 158/72 95 %   18 2336 97.6 °F (36.4 °C) (!) 104 16 137/77 95 %     Temp (24hrs), Av °F (36.7 °C), Min:97.5 °F (36.4 °C), Max:98.9 °F (37.2 °C)         Date 18 07 - 18 0659 18 07 - 18 0659   Shift 9730-2433 4397-2484 24 Hour Total 6648-9272 9225-2822 24 Hour Total   I  N  T  A  K  E   P. O.  0 0         P. O.  0 0       I.V.  (mL/kg/hr)  1704  (1.8) 1704  (0.9)         Volume (dextrose 5% lactated ringers infusion)  1200 1200         Volume (TPN ADULT - CENTRAL AA 5% D20% W/ ELECTROLYTES AND CA)  504 504       Shift Total  (mL/kg)  1704  (21.4) 1704  (21.4)      O  U  T  P  U  T   Urine  (mL/kg/hr)  600  (0.6) 600  (0.3)         Urine Output (mL) (External Female Catheter 18)  600 600       Drains 240 120 360         Output (ml) (Samuel-Gutierrez Drain 18 Right; Anterior Abdomen) 240 120 360       Shift Total  (mL/kg) 240  (3) 720  (9) 960  (12.1)      NET -240 984 744      Weight (kg) 79.6 79.6 79.6 79.6 79.6 79.6           Gen NAD, Alert and oriented times 3   Lungs Exp wheeze bilaterally  Abd Distended, soft, tender at left drain site with bilious leakage around drain, dressings soaked, skin intact without erythema or excoriation, tube with same bilious fluid, incision clean without erythema or drainage, staples intact, Bowel sounds hypoactive   Black dot on Osvaldo drain visible with suture cinched below dot.     LAB   BMP:   Lab Results   Component Value Date/Time     2018 04:01 AM    K 3.5 2018 04:01 AM     2018 04:01 AM    CO2 25 2018 04:01 AM    AGAP 8 2018 04:01 AM     (H) 2018 04:01 AM    BUN 10 2018 04:01 AM    CREA 0.50 (L) 02/17/2018 04:01 AM    GFRAA >60 02/17/2018 04:01 AM    GFRNA >60 02/17/2018 04:01 AM     CBC:   Lab Results   Component Value Date/Time    WBC 9.4 02/17/2018 04:01 AM    HGB 12.6 02/17/2018 04:01 AM    HCT 39.2 02/17/2018 04:01 AM     02/17/2018 04:01 AM     Ammonia 60 UMOL/L    Assessment:   Active Hospital Problems    Diagnosis Date Noted    Hyperammonemia ([address] 02/16/2018        Ref. Range 2/16/2018 17:20   Ammonia Latest Ref Range: <32 UMOL/L 69 (H)         Free intraperitoneal air 02/12/2018    S/P exploratory laparotomy 02/12/2018     Suture repair of perforated posterior gastric ulcer with Julious March patch, core needle biopsies of left lobe of the liver.  Alcoholic cirrhosis of liver without ascites ([address] 02/12/2018     Liver bx 2/12/18:   Cirrhosis with mild chronic portal inflammation and macrovesicular steatosis.  Fatty liver determined by biopsy 02/12/2018        Cirrhosis with mild chronic portal inflammation and macrovesicular steatosis.  Perforated chronic gastric ulcer ([address] 02/11/2018    ETOH abuse 02/11/2018      Mental status improved and non-agitated today. CIWA 1, I don't believe she needs treatment for hyperammonia at this time. Currently has ileus as expected. No nausea or vomiting but sig risk for aspiration if vomits. Leak at drain site because dot is outside of skin. Not much can be done. Rec/Plan:  TPN as rec by RD: D20 AA 5% @ 63 mL/h, also ordered lipids. Replace NG for decompression. POC gluc and insulin SS. Signed By: Deejay Staples MD     February 17, 2018       10:25 AM Addendum  NG placement attempted. 1% lidocaine jelly applied to left nare, also used to lubricate 14 Fr Taney sump tube. Pt unable to swallow to assist with placement. Advancing tube caused coughing. Concerned for placement in trachea. Procedure aborted. Will ask if anesthesiologist can possibly attempt with direct or indirect, or will need to consider IR placement.  I will also try to contact Dr. Landry Rojas.

## 2018-02-17 NOTE — PROGRESS NOTES
Attempted to insert ordered NG tube with 2 nurse assist. Patient cooperated but was unable to assist with swallowing and tube would not advance. Patients R nare began bleeding and this RN stopped insertion attempt. NG tube placement was attempted 4x during day shift unsuccessfully. On-call MD paged to notify of above. 18- Dr. Donna Kat returned page and notified of above. Also notified of elevated Ammonia result. Patient will need to go to IR in the morning to have NG tube placed. No further orders at this time, will continue to monitor.

## 2018-02-17 NOTE — PROGRESS NOTES
Physical Therapy  2/17/2018    1500: Chart reviewed and spoke with RN who reports pt is not appropriate for evaluation at this time. Will follow up tomorrow to assess readiness to participate.      Thank Char Avalos, PT, DPT

## 2018-02-17 NOTE — PROGRESS NOTES
Problem: Falls - Risk of  Goal: *Absence of Falls  Document Demarcus Fall Risk and appropriate interventions in the flowsheet. Outcome: Progressing Towards Goal  Fall Risk Interventions:  Mobility Interventions: Bed/chair exit alarm, Patient to call before getting OOB    Mentation Interventions: Door open when patient unattended, Adequate sleep, hydration, pain control, Bed/chair exit alarm, Room close to nurse's station, Reorient patient, More frequent rounding    Medication Interventions: Utilize gait belt for transfers/ambulation, Teach patient to arise slowly, Patient to call before getting OOB, Evaluate medications/consider consulting pharmacy, Bed/chair exit alarm, Assess postural VS orthostatic hypotension    Elimination Interventions:  Toileting schedule/hourly rounds, Toilet paper/wipes in reach, Patient to call for help with toileting needs, Elevated toilet seat, Call light in reach, Bed/chair exit alarm    History of Falls Interventions: Utilize gait belt for transfer/ambulation, Room close to nurse's station, Investigate reason for fall, Evaluate medications/consider consulting pharmacy, Door open when patient unattended, Consult care management for discharge planning, Bed/chair exit alarm

## 2018-02-17 NOTE — PROGRESS NOTES
Bedside and Verbal shift change report given to Addy Lou RN (oncoming nurse) by Jeffery Gar RN (offgoing nurse). Report included the following information SBAR and Kardex.

## 2018-02-18 ENCOUNTER — APPOINTMENT (OUTPATIENT)
Dept: GENERAL RADIOLOGY | Age: 73
DRG: 853 | End: 2018-02-18
Attending: SURGERY
Payer: MEDICARE

## 2018-02-18 ENCOUNTER — ANESTHESIA (OUTPATIENT)
Dept: SURGERY | Age: 73
DRG: 853 | End: 2018-02-18
Payer: MEDICARE

## 2018-02-18 ENCOUNTER — ANESTHESIA EVENT (OUTPATIENT)
Dept: SURGERY | Age: 73
DRG: 853 | End: 2018-02-18
Payer: MEDICARE

## 2018-02-18 PROBLEM — E43 SEVERE PROTEIN-CALORIE MALNUTRITION (HCC): Status: ACTIVE | Noted: 2018-02-18

## 2018-02-18 PROBLEM — D72.829 LEUKOCYTOSIS: Status: ACTIVE | Noted: 2018-02-18

## 2018-02-18 PROBLEM — G93.41 ACUTE METABOLIC ENCEPHALOPATHY: Status: ACTIVE | Noted: 2018-02-18

## 2018-02-18 PROBLEM — E87.6 HYPOKALEMIA: Status: ACTIVE | Noted: 2018-02-18

## 2018-02-18 LAB
ALBUMIN SERPL-MCNC: 1.5 G/DL (ref 3.5–5)
ALBUMIN/GLOB SERPL: 0.5 {RATIO} (ref 1.1–2.2)
ALP SERPL-CCNC: 83 U/L (ref 45–117)
ALT SERPL-CCNC: 12 U/L (ref 12–78)
AMMONIA PLAS-SCNC: 52 UMOL/L
ANION GAP SERPL CALC-SCNC: 7 MMOL/L (ref 5–15)
ARTERIAL PATENCY WRIST A: YES
AST SERPL-CCNC: 19 U/L (ref 15–37)
BASE DEFICIT BLDA-SCNC: 0.3 MMOL/L
BASOPHILS # BLD: 0.1 K/UL (ref 0–0.1)
BASOPHILS NFR BLD: 1 % (ref 0–1)
BDY SITE: ABNORMAL
BILIRUB SERPL-MCNC: 0.6 MG/DL (ref 0.2–1)
BUN SERPL-MCNC: 9 MG/DL (ref 6–20)
BUN/CREAT SERPL: 21 (ref 12–20)
CALCIUM SERPL-MCNC: 7.4 MG/DL (ref 8.5–10.1)
CHLORIDE SERPL-SCNC: 108 MMOL/L (ref 97–108)
CO2 SERPL-SCNC: 27 MMOL/L (ref 21–32)
CREAT SERPL-MCNC: 0.43 MG/DL (ref 0.55–1.02)
DIFFERENTIAL METHOD BLD: ABNORMAL
EOSINOPHIL # BLD: 0.1 K/UL (ref 0–0.4)
EOSINOPHIL NFR BLD: 1 % (ref 0–7)
ERYTHROCYTE [DISTWIDTH] IN BLOOD BY AUTOMATED COUNT: 13.5 % (ref 11.5–14.5)
GAS FLOW.O2 O2 DELIVERY SYS: 4 L/MIN
GLOBULIN SER CALC-MCNC: 3.3 G/DL (ref 2–4)
GLUCOSE BLD STRIP.AUTO-MCNC: 139 MG/DL (ref 65–100)
GLUCOSE BLD STRIP.AUTO-MCNC: 142 MG/DL (ref 65–100)
GLUCOSE BLD STRIP.AUTO-MCNC: 146 MG/DL (ref 65–100)
GLUCOSE BLD STRIP.AUTO-MCNC: 151 MG/DL (ref 65–100)
GLUCOSE SERPL-MCNC: 139 MG/DL (ref 65–100)
HCO3 BLDA-SCNC: 23 MMOL/L (ref 22–26)
HCT VFR BLD AUTO: 38.6 % (ref 35–47)
HGB BLD-MCNC: 12.6 G/DL (ref 11.5–16)
IMM GRANULOCYTES # BLD: 0.2 K/UL (ref 0–0.04)
IMM GRANULOCYTES NFR BLD AUTO: 2 % (ref 0–0.5)
LYMPHOCYTES # BLD: 1.1 K/UL (ref 0.8–3.5)
LYMPHOCYTES NFR BLD: 9 % (ref 12–49)
MAGNESIUM SERPL-MCNC: 1.6 MG/DL (ref 1.6–2.4)
MCH RBC QN AUTO: 34.1 PG (ref 26–34)
MCHC RBC AUTO-ENTMCNC: 32.6 G/DL (ref 30–36.5)
MCV RBC AUTO: 104.6 FL (ref 80–99)
MONOCYTES # BLD: 1.2 K/UL (ref 0–1)
MONOCYTES NFR BLD: 10 % (ref 5–13)
NEUTS SEG # BLD: 9.7 K/UL (ref 1.8–8)
NEUTS SEG NFR BLD: 78 % (ref 32–75)
NRBC # BLD: 0 K/UL (ref 0–0.01)
NRBC BLD-RTO: 0 PER 100 WBC
PCO2 BLDA: 34 MMHG (ref 35–45)
PH BLDA: 7.45 [PH] (ref 7.35–7.45)
PLATELET # BLD AUTO: 209 K/UL (ref 150–400)
PMV BLD AUTO: 11.6 FL (ref 8.9–12.9)
PO2 BLDA: 72 MMHG (ref 80–100)
POTASSIUM SERPL-SCNC: 2.8 MMOL/L (ref 3.5–5.1)
PROT SERPL-MCNC: 4.8 G/DL (ref 6.4–8.2)
RBC # BLD AUTO: 3.69 M/UL (ref 3.8–5.2)
SAO2 % BLD: 95 % (ref 92–97)
SAO2% DEVICE SAO2% SENSOR NAME: ABNORMAL
SERVICE CMNT-IMP: ABNORMAL
SODIUM SERPL-SCNC: 142 MMOL/L (ref 136–145)
SPECIMEN SITE: ABNORMAL
WBC # BLD AUTO: 12.4 K/UL (ref 3.6–11)

## 2018-02-18 PROCEDURE — 76060000032 HC ANESTHESIA 0.5 TO 1 HR: Performed by: SURGERY

## 2018-02-18 PROCEDURE — 74011000250 HC RX REV CODE- 250: Performed by: SURGERY

## 2018-02-18 PROCEDURE — 83735 ASSAY OF MAGNESIUM: CPT | Performed by: SURGERY

## 2018-02-18 PROCEDURE — C9113 INJ PANTOPRAZOLE SODIUM, VIA: HCPCS | Performed by: SURGERY

## 2018-02-18 PROCEDURE — 94640 AIRWAY INHALATION TREATMENT: CPT

## 2018-02-18 PROCEDURE — 77010033678 HC OXYGEN DAILY

## 2018-02-18 PROCEDURE — 77030013140 HC MSK NEB VYRM -A

## 2018-02-18 PROCEDURE — 77030037186 HC VLV ENDOSC STRL DEFENDO DISP MVAT -A: Performed by: SURGERY

## 2018-02-18 PROCEDURE — 82140 ASSAY OF AMMONIA: CPT | Performed by: SURGERY

## 2018-02-18 PROCEDURE — 36415 COLL VENOUS BLD VENIPUNCTURE: CPT | Performed by: SURGERY

## 2018-02-18 PROCEDURE — 82803 BLOOD GASES ANY COMBINATION: CPT | Performed by: SURGERY

## 2018-02-18 PROCEDURE — 71045 X-RAY EXAM CHEST 1 VIEW: CPT

## 2018-02-18 PROCEDURE — 74011000250 HC RX REV CODE- 250

## 2018-02-18 PROCEDURE — 0D9670Z DRAINAGE OF STOMACH WITH DRAINAGE DEVICE, VIA NATURAL OR ARTIFICIAL OPENING: ICD-10-PCS | Performed by: SURGERY

## 2018-02-18 PROCEDURE — 85025 COMPLETE CBC W/AUTO DIFF WBC: CPT | Performed by: SURGERY

## 2018-02-18 PROCEDURE — 74011250636 HC RX REV CODE- 250/636

## 2018-02-18 PROCEDURE — 74011000258 HC RX REV CODE- 258: Performed by: SURGERY

## 2018-02-18 PROCEDURE — 77030008771 HC TU NG SALEM SUMP -A: Performed by: SURGERY

## 2018-02-18 PROCEDURE — 77030008771 HC TU NG SALEM SUMP -A: Performed by: ANESTHESIOLOGY

## 2018-02-18 PROCEDURE — 77030038269 HC DRN EXT URIN PURWCK BARD -A

## 2018-02-18 PROCEDURE — 74011250636 HC RX REV CODE- 250/636: Performed by: SURGERY

## 2018-02-18 PROCEDURE — 74011250636 HC RX REV CODE- 250/636: Performed by: INTERNAL MEDICINE

## 2018-02-18 PROCEDURE — 36600 WITHDRAWAL OF ARTERIAL BLOOD: CPT | Performed by: SURGERY

## 2018-02-18 PROCEDURE — 93005 ELECTROCARDIOGRAM TRACING: CPT

## 2018-02-18 PROCEDURE — 77030008684 HC TU ET CUF COVD -B: Performed by: ANESTHESIOLOGY

## 2018-02-18 PROCEDURE — 77030026438 HC STYL ET INTUB CARD -A: Performed by: ANESTHESIOLOGY

## 2018-02-18 PROCEDURE — 74018 RADEX ABDOMEN 1 VIEW: CPT

## 2018-02-18 PROCEDURE — 80053 COMPREHEN METABOLIC PANEL: CPT | Performed by: SURGERY

## 2018-02-18 PROCEDURE — 65660000000 HC RM CCU STEPDOWN

## 2018-02-18 PROCEDURE — 76010000138 HC OR TIME 0.5 TO 1 HR: Performed by: SURGERY

## 2018-02-18 PROCEDURE — 82962 GLUCOSE BLOOD TEST: CPT

## 2018-02-18 PROCEDURE — 76210000006 HC OR PH I REC 0.5 TO 1 HR: Performed by: SURGERY

## 2018-02-18 RX ORDER — LIDOCAINE HYDROCHLORIDE 20 MG/ML
INJECTION, SOLUTION EPIDURAL; INFILTRATION; INTRACAUDAL; PERINEURAL AS NEEDED
Status: DISCONTINUED | OUTPATIENT
Start: 2018-02-18 | End: 2018-02-18 | Stop reason: HOSPADM

## 2018-02-18 RX ORDER — ROCURONIUM BROMIDE 10 MG/ML
INJECTION, SOLUTION INTRAVENOUS AS NEEDED
Status: DISCONTINUED | OUTPATIENT
Start: 2018-02-18 | End: 2018-02-18 | Stop reason: HOSPADM

## 2018-02-18 RX ORDER — IPRATROPIUM BROMIDE AND ALBUTEROL SULFATE 2.5; .5 MG/3ML; MG/3ML
3 SOLUTION RESPIRATORY (INHALATION)
Status: DISCONTINUED | OUTPATIENT
Start: 2018-02-18 | End: 2018-02-24

## 2018-02-18 RX ORDER — SODIUM CHLORIDE, SODIUM LACTATE, POTASSIUM CHLORIDE, CALCIUM CHLORIDE 600; 310; 30; 20 MG/100ML; MG/100ML; MG/100ML; MG/100ML
INJECTION, SOLUTION INTRAVENOUS
Status: DISCONTINUED | OUTPATIENT
Start: 2018-02-18 | End: 2018-02-18 | Stop reason: HOSPADM

## 2018-02-18 RX ORDER — PROPOFOL 10 MG/ML
INJECTION, EMULSION INTRAVENOUS AS NEEDED
Status: DISCONTINUED | OUTPATIENT
Start: 2018-02-18 | End: 2018-02-18 | Stop reason: HOSPADM

## 2018-02-18 RX ORDER — HYDROMORPHONE HYDROCHLORIDE 1 MG/ML
0.5 INJECTION, SOLUTION INTRAMUSCULAR; INTRAVENOUS; SUBCUTANEOUS
Status: DISCONTINUED | OUTPATIENT
Start: 2018-02-18 | End: 2018-02-20

## 2018-02-18 RX ORDER — SUCCINYLCHOLINE CHLORIDE 20 MG/ML
INJECTION INTRAMUSCULAR; INTRAVENOUS AS NEEDED
Status: DISCONTINUED | OUTPATIENT
Start: 2018-02-18 | End: 2018-02-18 | Stop reason: HOSPADM

## 2018-02-18 RX ADMIN — Medication 10 ML: at 05:35

## 2018-02-18 RX ADMIN — LORAZEPAM 1 MG: 2 INJECTION, SOLUTION INTRAMUSCULAR; INTRAVENOUS at 00:15

## 2018-02-18 RX ADMIN — PANTOPRAZOLE SODIUM 40 MG: 40 INJECTION, POWDER, FOR SOLUTION INTRAVENOUS at 08:39

## 2018-02-18 RX ADMIN — NYSTATIN: 100000 POWDER TOPICAL at 17:58

## 2018-02-18 RX ADMIN — PROPOFOL 120 MG: 10 INJECTION, EMULSION INTRAVENOUS at 11:08

## 2018-02-18 RX ADMIN — ENOXAPARIN SODIUM 40 MG: 40 INJECTION SUBCUTANEOUS at 15:05

## 2018-02-18 RX ADMIN — SODIUM CHLORIDE 75 ML/HR: 900 INJECTION, SOLUTION INTRAVENOUS at 00:15

## 2018-02-18 RX ADMIN — NYSTATIN: 100000 POWDER TOPICAL at 08:39

## 2018-02-18 RX ADMIN — Medication 10 ML: at 22:20

## 2018-02-18 RX ADMIN — SODIUM CHLORIDE, SODIUM LACTATE, POTASSIUM CHLORIDE, CALCIUM CHLORIDE: 600; 310; 30; 20 INJECTION, SOLUTION INTRAVENOUS at 10:56

## 2018-02-18 RX ADMIN — CALCIUM GLUCONATE: 94 INJECTION, SOLUTION INTRAVENOUS at 17:52

## 2018-02-18 RX ADMIN — Medication 10 ML: at 15:07

## 2018-02-18 RX ADMIN — THIAMINE HYDROCHLORIDE: 100 INJECTION, SOLUTION INTRAMUSCULAR; INTRAVENOUS at 05:36

## 2018-02-18 RX ADMIN — SODIUM CHLORIDE 75 ML/HR: 900 INJECTION, SOLUTION INTRAVENOUS at 20:51

## 2018-02-18 RX ADMIN — HYDROMORPHONE HYDROCHLORIDE 0.5 MG: 1 INJECTION, SOLUTION INTRAMUSCULAR; INTRAVENOUS; SUBCUTANEOUS at 15:10

## 2018-02-18 RX ADMIN — HYDROMORPHONE HYDROCHLORIDE 0.5 MG: 1 INJECTION, SOLUTION INTRAMUSCULAR; INTRAVENOUS; SUBCUTANEOUS at 08:51

## 2018-02-18 RX ADMIN — ROCURONIUM BROMIDE 5 MG: 10 INJECTION, SOLUTION INTRAVENOUS at 11:07

## 2018-02-18 RX ADMIN — HYDROMORPHONE HYDROCHLORIDE 0.5 MG: 1 INJECTION, SOLUTION INTRAMUSCULAR; INTRAVENOUS; SUBCUTANEOUS at 05:35

## 2018-02-18 RX ADMIN — SUCCINYLCHOLINE CHLORIDE 100 MG: 20 INJECTION INTRAMUSCULAR; INTRAVENOUS at 11:11

## 2018-02-18 RX ADMIN — HYDROMORPHONE HYDROCHLORIDE 0.5 MG: 1 INJECTION, SOLUTION INTRAMUSCULAR; INTRAVENOUS; SUBCUTANEOUS at 19:00

## 2018-02-18 RX ADMIN — PANTOPRAZOLE SODIUM 40 MG: 40 INJECTION, POWDER, FOR SOLUTION INTRAVENOUS at 20:50

## 2018-02-18 RX ADMIN — PIPERACILLIN SODIUM AND TAZOBACTAM SODIUM 10.12 G: 3; .375 INJECTION, POWDER, LYOPHILIZED, FOR SOLUTION INTRAVENOUS at 13:01

## 2018-02-18 RX ADMIN — IPRATROPIUM BROMIDE AND ALBUTEROL SULFATE 3 ML: .5; 3 SOLUTION RESPIRATORY (INHALATION) at 02:02

## 2018-02-18 RX ADMIN — IPRATROPIUM BROMIDE AND ALBUTEROL SULFATE 3 ML: .5; 3 SOLUTION RESPIRATORY (INHALATION) at 08:58

## 2018-02-18 RX ADMIN — LIDOCAINE HYDROCHLORIDE 60 MG: 20 INJECTION, SOLUTION EPIDURAL; INFILTRATION; INTRACAUDAL; PERINEURAL at 11:08

## 2018-02-18 NOTE — PERIOP NOTES
Dr. Cabrera Leung informed of 2.8 K on 2/18/18 lab work. Dr. Umm Narayanan stated it was addressed in TPN orders and no further orders were given. PACU anaesthesia MD notified, no new orders given.

## 2018-02-18 NOTE — PROGRESS NOTES
5: Noticed pt having difficulty breathing with audible wheezing after receiving ativan 1mg IV 30 minutes prior for agitation after receiving a CIWA scale of 5. Pt was also having a heart rate sustaining in the 120s and oxygen saturations sustaining in the high 80s. Bp:188/86   B   RR: 22   HR:122   O2: 88% on 2L  0057: after conferring with senior nursing staff, a rapid response was call and pt was put on cardiac monitor. An ABG and ECG were done on recommendation of senior nursing staff. ABG results within normal limits and ECG showed Sinus Tach with occasional PVCs. Blood glucose was also given at this time and pt was at 139.   0110: vital signs at end of rapid response    O2: 90% on 4L   HR: 118bpm   Temp: 99.3 axillary   BP:168/76 blood pressure   RR: 20  0145: Dr. Brandi Phelan called and informed of rapid response, orders for albuterol nebs prn given, remote tele orders given, and orders to decrease dilaudid from 1mg IV to 0.5mg IV.

## 2018-02-18 NOTE — CONSULTS
[facility]  [address]  [phone]    Hospitalist Consult Note      NAME:  Cinthya Hitchcock   :      MRN:  [id number]     Requesting Physician Geena Renee MD   Reason for Consult:  Medical management, AMS     PCP:  Fernie Oscar DO     Date/Time:  2018 1:51 PM       Assessment and Plan:      Principal Problem:  Perforated chronic gastric ulcer /  Free intraperitoneal air / S/P exploratory laparotomy. S/p repair in OR with Dr. Rian Concepcion on 18. Now s/p NGT placement to LIWS. Defer to primary re: issues of further evaluation of repair integrity, diet/TPN, pain control, and dispo. Active Problems:    Acute metabolic encephalopathy. Agree with Neurology consult. This is multifactorial but suspect that leading causes would be infectious, medication related, underlying liver disease, and generalized acute delirium in a septuagenarian. Continue antibiotic coverage for possible aspiration PNA. Agree with decreasing dose of hydromorphone and use this judiciously. CIWA can continue for now but this is not c/w EtOH withdrawal. AASLD recognizes that there is no diagnostic or prognostic value in measuring ammonia levels, it's only utility is in prognosis of acute liver failure. Improved gut transit will certainly help and would continue NGT to LIWS. Discussed at length with family re: supportive care, maintenance of sleep/wake cycle, and re-orientation. Hypoxia / Respiratory distress. CXR with atelectasis and pleural effusion. Continue Abx, nebs, supplemental oxygen. Repeat CXR in AM.    Alcoholic cirrhosis of liver without ascites /  Fatty liver determined by biopsy /  Hyperammonemia. Will need to be counseled on weight loss and alcohol cessation when more awake.  Again, ammonia levels of little utility and do not correlate with patient's condition (i.e. Ammonia level 52 down from 60 yesterday and patient in much worse neurological condition). ETOH abuse. Can continue CIWA for now but doubt this is withdrawal. Needs to stop EtOH entirely. Hypokalemia /  Hypoalbuminemia. Being addressed with TPN    Leukocytosis. Could be related to evolving ? PNA v. Stress response. Monitor      Case discussed with Dr. Colby Joseph who will follow patient starting tomorrow         Subjective:     CHIEF COMPLAINT: AMS     HISTORY OF PRESENT ILLNESS:     Ms. Ailyn Goff is a 67 y.o.  female with EtOH abuse who is admitted to the General Surgery Service with perforated gastric ulcer. We are asked to evaluate for altered mental status. The patient is poorly interactive at this time and this limits primary hx. Discussed case with family available at bedside providing secondary hx and chart review. Per family, patient has had declining neurological condition over past 48 hours. Notably, an RRT was called for respiratory distress. Past Medical History:   Diagnosis Date    Alcoholic cirrhosis of liver without ascites ([facility] [address] 2/12/2018    ETOH abuse 2/11/2018    History of vascular access device 02/16/2018    [facility] VAT - 5 FR triple, R basilic, TPN    Hyperammonemia ([facility] [address] 2/16/2018     Results for Yanely Loyola (MRN [id number]) as of 2/17/2018 08:43  Ref. Range 2/16/2018 17:20 Ammonia Latest Ref Range: <32 UMOL/L 69 (H)     Hyperlipidemia     Perforated chronic gastric ulcer ([facility] [address] 2/11/2018        Past Surgical History:   Procedure Laterality Date    HX CATARACT REMOVAL         Social History   Substance Use Topics    Smoking status: Former Smoker    Smokeless tobacco: Never Used    Alcohol use 11.4 oz/week     0 Standard drinks or equivalent, 19 Glasses of wine per week      Comment: Hx of heavy etoh use, but quit several months ago        Family History   Problem Relation Age of Onset    Other Other      patient did not know        No Known Allergies     Prior to Admission medications    Medication Sig Start Date End Date Taking? Authorizing Provider   naproxen sodium (ALEVE) 220 mg tablet Take 220 mg by mouth daily as needed for Pain. Yes Historical Provider   cyanocobalamin (VITAMIN B12) 500 mcg tablet Take 500 mcg by mouth daily. Yes Historical Provider   multivitamin (ONE A DAY) tablet Take 1 Tab by mouth daily. Yes Historical Provider   omega-3 fatty acids-vitamin e 1,000 mg cap Take 2 Caps by mouth daily.    Yes Historical Provider         Current Facility-Administered Medications:     albuterol-ipratropium (DUO-NEB) 2.5 MG-0.5 MG/3 ML, 3 mL, Nebulization, Q4H PRN, Herminia Castillo MD, 3 mL at 02/18/18 0858    HYDROmorphone (PF) (DILAUDID) injection 0.5 mg, 0.5 mg, IntraVENous, Q3H PRN, Herminia Castillo MD, 0.5 mg at 02/18/18 0851    TPN ADULT - CENTRAL, , IntraVENous, CONTINUOUS, Herminia Castillo MD    TPN ADULT - CENTRAL AA 5% D20% W/ ELECTROLYTES AND CA, , IntraVENous, CONTINUOUS, Herminia Castillo MD    fat emulsion 20% (LIPOSYN, INTRAlipid) infusion 500 mL, 500 mL, IntraVENous, Q MON, WED & SAT, Herminia Castillo MD, 500 mL at 02/17/18 1743    glucose chewable tablet 16 g, 4 Tab, Oral, PRN, Herminia Castillo MD    glucagon (GLUCAGEN) injection 1 mg, 1 mg, IntraMUSCular, PRN, Herminia Castillo MD    0.9% sodium chloride infusion, 75 mL/hr, IntraVENous, CONTINUOUS, Hemrinia Castillo MD, Last Rate: 75 mL/hr at 02/18/18 0015, 75 mL/hr at 02/18/18 0015    insulin regular (NOVOLIN R, HUMULIN R) injection, , SubCUTAneous, AC&HS, Herminia Castillo MD, Stopped at 02/17/18 1630    dextrose (D50W) injection syrg 12.5-25 g, 12.5-25 g, IntraVENous, PRN, Herminia Castillo MD    LORazepam (ATIVAN) injection 1 mg, 1 mg, IntraVENous, Q2H PRN, Wing Erum MD, 1 mg at 02/18/18 0015    LORazepam (ATIVAN) injection 2 mg, 2 mg, IntraVENous, Q2H PRN, Wing Erum MD    alteplase (CATHFLO) 1 mg in sterile water (preservative free) 1 mL injection, 1 mg, InterCATHeter, PRN, Wing Erum MD    sodium chloride (NS) flush 10-30 mL, 10-30 mL, InterCATHeter, PRN, Sintia Canales MD    sodium chloride (NS) flush 10-40 mL, 10-40 mL, InterCATHeter, Q8H, Sintia Canales MD, 10 mL at 02/18/18 0535    [COMPLETED] piperacillin-tazobactam (ZOSYN) 3.375 g in 0.9% sodium chloride 100 mL IVPB, 3.375 g, IntraVENous, ONCE, Stopped at 02/17/18 0846 **FOLLOWED BY** piperacillin-tazobactam (ZOSYN) 10.125 g in 0.9% sodium chloride 500 mL continuous 24 hr infusion, 10.125 g, IntraVENous, Q24H, Nayana Rob MD, 10.125 g at 02/18/18 1301    acetaminophen (TYLENOL) solution 650 mg, 650 mg, Oral, Q6H PRN, Sintia Canales MD    [facility]) injection 0.4 mg, 0.4 mg, IntraVENous, PRN, Sintia Canales MD    ondansetron [facility]) injection 4 mg, 4 mg, IntraVENous, Q4H PRN, Sintia Canales MD    enoxaparin (LOVENOX) injection 40 mg, 40 mg, SubCUTAneous, Q24H, Sintia Canales MD, 40 mg at 02/17/18 1657    pantoprazole (PROTONIX) injection 40 mg, 40 mg, IntraVENous, Q12H, Sintia Canales MD, 40 mg at 02/18/18 7300    phenol throat spray (CHLORASEPTIC) 1 Spray, 1 Spray, Oral, PRN, Nayana Rob MD, 1 Spray at 02/13/18 1803    nystatin (MYCOSTATIN) 100,000 unit/gram powder, , Topical, BID, Sintia Canales MD    sodium chloride (NS) flush 5-10 mL, 5-10 mL, IntraVENous, PRN, Cathy Man,     Review of Systems:  Unable to obtain due to current clinical condition         Objective:      VITALS:    Vital signs reviewed; most recent are:    Visit Vitals    /60    Pulse (!) 107    Temp 98.6 °F (37 °C)    Resp 17    Ht 5' 3.5\" (1.613 m)    Wt 79.6 kg (175 lb 7.8 oz)    SpO2 95%    Breastfeeding No    BMI 30.6 kg/m2     SpO2 Readings from Last 6 Encounters:   02/18/18 95%   04/30/16 94%    O2 Flow Rate (L/min): 4 l/min     Intake/Output Summary (Last 24 hours) at 02/18/18 1351  Last data filed at 02/18/18 1027   Gross per 24 hour   Intake                0 ml Output             1740 ml   Net            -1740 ml      All Micro Results     Procedure Component Value Units Date/Time    CULTURE, BLOOD [[id number]] Collected:  02/11/18 2147    Order Status:  Completed Specimen:  Blood from Blood Updated:  02/17/18 0659     Special Requests: NO SPECIAL REQUESTS        Culture result: NO GROWTH 6 DAYS       CULTURE, Kaveh Fees STAIN [[id number]]  (Abnormal) Collected:  02/12/18 0045    Order Status:  Completed Specimen:  Peritoneal Fluid Updated:  02/14/18 1154     Special Requests:         Culture performed on Fluid swab specimen     GRAM STAIN 1+ WBCS SEEN         NO ORGANISMS SEEN        Culture result:         RARE ALPHA STREPTOCOCCUS (A)              GAMMA STREPTOCOCCUS ISOLATED FROM THIO BROTH ONLY (A)    CULTURE, ANAEROBIC [[id number]] Collected:  02/12/18 0045    Order Status:  Completed Specimen:  Peritoneal Fluid Updated:  02/14/18 1113     Special Requests: NONE        Culture result: NO ANAEROBES ISOLATED       CULTURE, FUNGUS [[id number]] Collected:  02/12/18 0045    Order Status:  Completed Updated:  02/12/18 1059    URINE CULTURE HOLD SAMPLE [[id number]] Collected:  02/11/18 2238    Order Status:  Completed Specimen:  Urine from Serum Updated:  02/11/18 2244     Urine culture hold         URINE ON HOLD IN MICROBIOLOGY DEPT FOR 3 DAYS. IF UNPRESERVED URINE IS SUBMITTED, IT CANNOT BE USED FOR ADDITIONAL TESTING AFTER 24 HRS, RECOLLECTION WILL BE REQUIRED. Exam:     Physical Exam:    Gen:  Ill-appearing, in no acute distress  HEENT:  Pink conjunctivae, PERRL, hearing intact to voice, NGT in place, dry MM  Neck:  Supple, without masses, thyroid non-tender  Resp:  No accessory muscle use, bibasilar crackles  Card:  No murmurs, normal S1, S2 without thrills, bruits. ++ peripheral edema  Abd:  Soft, mild ttp,  Mild distended, post-op dressing in place, c/d/i.   Lymph:  No cervical or inguinal adenopathy  Musc:  No cyanosis or clubbing  Skin:  No rashes or ulcers, skin turgor is good  Neuro:  Lethargic, responds to verbal stimuli, answering some questions and following some commands  Psych:  Little to no insight, lethargic       Labs:    Recent Labs      02/18/18   0129   WBC  12.4*   HGB  12.6   HCT  38.6   PLT  209     Recent Labs      02/18/18   0129   NA  142   K  2.8*   CL  108   CO2  27   GLU  139*   BUN  9   CREA  0.43*   CA  7.4*   MG  1.6   ALB  1.5*   TBILI  0.6   SGOT  19   ALT  12     Lab Results   Component Value Date/Time    Glucose (POC) 146 (H) 02/18/2018 07:50 AM    Glucose (POC) 139 (H) 02/18/2018 01:01 AM     Recent Labs      02/18/18   0100   PH  7.45   PCO2  34*   PO2  72*   HCO3  23     No results for input(s): INR in the last 72 hours.     No lab exists for component: INREXT    I have reviewed previous records,    Telemetry reviewed:   normal sinus rhythm    Risk of deterioration: high      Total time spent with patient: 70 min                  Care Plan discussed with: Patient, Family, Nursing Staff and >50% of time spent in counseling and coordination of care    Discussed:  Care Plan       ___________________________________________________    Attending Physician: Laya Chaudhari DO

## 2018-02-18 NOTE — PROGRESS NOTES
Occupational Therapy:  Chart reviewed. Attempted to see patient, currently off the floor for procedure. We will continue to follow and re-attempt tomorrow.   Thank you  Oz Reddy MS, OTR/L

## 2018-02-18 NOTE — ROUTINE PROCESS
Bedside and Verbal shift change report given to Masood Bae RN (oncoming nurse) by Hayes Nuñez RN (offgoing nurse). Report included the following information SBAR, Kardex, ED Summary, OR Summary, Procedure Summary, Intake/Output, MAR and Recent Results.

## 2018-02-18 NOTE — ANESTHESIA POSTPROCEDURE EVALUATION
Post-Anesthesia Evaluation and Assessment    Patient: Myra Soliman MRN: [id number]  SSN: [id number]    YOB: 1945  Age: 67 y.o. Sex: female       Cardiovascular Function/Vital Signs  Visit Vitals    /57    Pulse (!) 110    Temp 37 °C (98.6 °F)    Resp 21    Ht 5' 3.5\" (1.613 m)    Wt 79.6 kg (175 lb 7.8 oz)    SpO2 94%    Breastfeeding No    BMI 30.6 kg/m2       Patient is status post general anesthesia for Procedure(s):   [address]. Nausea/Vomiting: None    Postoperative hydration reviewed and adequate. Pain:  Pain Scale 1: Adult Nonverbal Pain Scale (02/18/18 1150)  Pain Intensity 1: 0 (02/18/18 1150)   Managed    Neurological Status:   Neuro (WDL): Exceptions to WDL (02/18/18 1150)  Neuro  Neurologic State: Anesthetized (post op) (02/18/18 1150)  Orientation Level: Unable to verbalize (02/18/18 1150)  Cognition: Decreased attention/concentration; No command following;Poor safety awareness (02/18/18 0156)  Speech: Incomprehensible words (02/18/18 8438)  Assessment L Pupil: Brisk;Round (02/16/18 1954)  Size L Pupil (mm): 2 (02/16/18 1954)  Assessment R Pupil: Brisk;Round (02/16/18 1954)  Size R Pupil (mm): 2 (02/16/18 1954)  LUE Motor Response: Weak (02/18/18 0832)  LLE Motor Response: Weak (02/18/18 0832)  RUE Motor Response: Weak (02/18/18 9036)  RLE Motor Response: Weak (02/18/18 0832)   At baseline    Mental Status and Level of Consciousness: Arousable    Pulmonary Status:   O2 Device: Oxygen mask (02/18/18 1150)   Adequate oxygenation and airway patent    Complications related to anesthesia: None    Post-anesthesia assessment completed. No concerns.  Back to baseline    Signed By: Elvie Isaac DO     February 18, 2018

## 2018-02-18 NOTE — ROUTINE PROCESS
TRANSFER - OUT REPORT:    Verbal report given to Evelyn Yasmine on Jason Res  being transferred to [phone] for routine post - op       Report consisted of patients Situation, Background, Assessment and   Recommendations(SBAR). Information from the following report(s) SBAR and OR Summary was reviewed with the receiving nurse. Lines:   PICC Triple Lumen 54/78/93 Right;Basilic (Active)   Central Line Being Utilized Yes 2/18/2018 11:50 AM   Criteria for Appropriate Use Total parenteral nutrition 2/18/2018 11:50 AM   Site Assessment Clean, dry, & intact 2/18/2018 11:50 AM   Phlebitis Assessment 0 2/18/2018 11:50 AM   Infiltration Assessment 0 2/18/2018 11:50 AM   Arm Circumference (cm) 27 cm 2/16/2018  1:30 PM   Date of Last Dressing Change 02/16/18 2/18/2018 11:50 AM   Dressing Status Clean, dry, & intact 2/18/2018 11:50 AM   External Catheter Length (cm) 2 centimeters 2/16/2018  1:30 PM   Dressing Type Disk with Chlorhexadine gluconate (CHG); Transparent 2/18/2018 11:50 AM   Action Taken Open ports on tubing capped 2/18/2018  2:53 AM   Hub Color/Line Status Kristen Field; Infusing 2/18/2018 11:50 AM   Positive Blood Return (Site #1) Yes 2/18/2018  2:53 AM   Hub Color/Line Status Red 2/18/2018 11:50 AM   Positive Blood Return (Site #2) Yes 2/18/2018  2:53 AM   Hub Color/Line Status White 2/18/2018 11:50 AM   Positive Blood Return (Site #3) Yes 2/18/2018  2:53 AM   Alcohol Cap Used Yes 2/18/2018  2:53 AM        Opportunity for questions and clarification was provided.       Patient transported with:   O2 @ 4 liters  Registered Nurse

## 2018-02-18 NOTE — OP NOTES
Operative Report    Date of Surgery: 2/18/2018     Preoperative Diagnosis: ulcer perforation    Postoperative Diagnosis: ulcer perforation    Surgeon(s) and Role:     * Nimisha Lala MD - Primary      Assistant:  none    Anesthesia: General    Procedure: Procedure(s):   NASOGASTRIC TUBE PLACEMENT under anesthesia    Findings: 18 Fr tube placed into stomach     Estimated Blood Loss:  none           Drains: none and Nasogastric Tube             Specimens: * No specimens in log *             Implants: * No implants in log *           Complications:  None; patient tolerated the procedure well. Indications:  Pt had recent repair of posterior gastric ulcer via anterior gastrotomy. Needs NG tube decompression. Multiple attempts (by RNs and MD) at bedside have been unsuccessful. Procedure in Detail: The patient was seen in the Holding Area. After obtaining informed consent the patient was taken to the operating room, identified as Rashardrosy Sy, and the procedure verified. The patient remained supine in her bed. After establishing general endotracheal anesthesia using the C-Mac the nasogastric tube was inserted through the left nare and under direct visualization it was passed into the esophagus. KUB was obtained in the OR which showed the tip of tube in the proximal stomach. The tube was advanced above 10 cm then secured with umbilical tape around the back of her neck. Portable CXR had been ordered on the floor so the x-ray tech took that as well. Radiologist read pending but on my own read, the tube was seen in the distal stomach    The patient was extubated and taken to recovery room in good condition having tolerated the procedure well.       Signed By: Nimisha Lala MD     February 18, 2018

## 2018-02-18 NOTE — PROGRESS NOTES
Physical Therapy:  Chart reviewed. Attempted to see patient, currently off the floor for procedure. We will continue to follow and re-attempt tomorrow.   Thank you  Samantha Tillman PT,DPT,NCS

## 2018-02-18 NOTE — PROGRESS NOTES
4918: Responded to Rapid Response called by primary RN. Patient was working hard to breath, and not responding. Patient had received Dilaudid 1 mg IV at 2249 and Ativan 1 mg at [facility] 3701 for a CIWA score of 5. 12 Lead EKG showed Sinus Tachycardia with occasional PVCs. Patient on 4 L Nasal Cannula with Oxygen Saturation of 95%. Patient is lethargic, but is oriented to self and place. ABGs done at this time which looked within normal limits. Patient back to previous baseline prior to rapid response. Recommend upgrading patient to Remote Telemetry. Nursing Supervisor and Primary RN in agreement.

## 2018-02-18 NOTE — PROGRESS NOTES
Bedside shift change report given to Shanna Castillo (oncoming nurse) by Farris Crigler (offgoing nurse). Report included the following information SBAR, Kardex, Procedure Summary, MAR and Recent Results.

## 2018-02-18 NOTE — PROGRESS NOTES
General Surgery at [facility]  Events last night noted. RN notes abd more distended. No vomiting yet. Patient Vitals for the past 12 hrs:   Temp Pulse Resp BP SpO2   18 0749 98.1 °F (36.7 °C) 94 18 152/70 98 %   18 0444 98.6 °F (37 °C) (!) 105 18 169/79 92 %   18 0254 - 100 - - -   18 0110 99.3 °F (37.4 °C) (!) 118 20 168/76 90 %   18 2357 98.6 °F (37 °C) (!) 109 16 181/84 93 %     Temp (24hrs), Av.7 °F (37.1 °C), Min:98.1 °F (36.7 °C), Max:99.3 °F (37.4 °C)       Date 18 0700 - 18 0659   Shift 6018-6075 1589-4898 6946-5227 24 Hour Total   I  N  T  A  K  E   Shift Total  (mL/kg)       O  U  T  P  U  T   Urine  (mL/kg/hr) 500   500    Drains 20   20    Shift Total  (mL/kg) 520  (6.5)   520  (6.5)   Weight (kg) 79.6 79.6 79.6 79.6       Gen Pt is obtunded, just received meds, responds to pain  Abd Soft, distended, tympanitic, tender at incision, no rebound, drainage bilious    LAB Ammonia - 52  CMP:   Lab Results   Component Value Date/Time     2018 01:29 AM    K 2.8 (L) 2018 01:29 AM     2018 01:29 AM    CO2 27 2018 01:29 AM    AGAP 7 2018 01:29 AM     (H) 2018 01:29 AM    BUN 9 2018 01:29 AM    CREA 0.43 (L) 2018 01:29 AM    GFRAA >60 2018 01:29 AM    GFRNA >60 2018 01:29 AM    CA 7.4 (L) 2018 01:29 AM    MG 1.6 2018 01:29 AM    ALB 1.5 (L) 2018 01:29 AM    TP 4.8 (L) 2018 01:29 AM    GLOB 3.3 2018 01:29 AM    AGRAT 0.5 (L) 2018 01:29 AM    SGOT 19 2018 01:29 AM    ALT 12 2018 01:29 AM       Assessment:   Active Hospital Problems    Diagnosis Date Noted    Hypokalemia 2018    Hyperammonemia ([address] 2018        Ref.  Range 2018 17:20   Ammonia Latest Ref Range: <32 UMOL/L 69 (H)         Free intraperitoneal air 2018    S/P exploratory laparotomy 2018     Suture repair of perforated posterior gastric ulcer with Michael patch, core needle biopsies of left lobe of the liver.  Alcoholic cirrhosis of liver without ascites ([address] 02/12/2018     Liver bx 2/12/18:   Cirrhosis with mild chronic portal inflammation and macrovesicular steatosis.  Fatty liver determined by biopsy 02/12/2018        Cirrhosis with mild chronic portal inflammation and macrovesicular steatosis.  Perforated chronic gastric ulcer ([address] 02/11/2018    ETOH abuse 02/11/2018      With increasing distension due to ileus, concerned for potential vomiting and aspiration. Needs NG tube. I think direct visualization will be needed, not just fluoro. Rec/Plan:  NG tube placement under anesthesia with endoscopic guidance. Discussed indication with daughter. She understands and wishes to proceed. Renew TPN, replace K  CXR to assess increased O2 need.     Signed By: Herminia Castillo MD covering for Dr. Landry Rojas    February 18, 2018

## 2018-02-18 NOTE — ANESTHESIA PREPROCEDURE EVALUATION
Anesthetic History   No history of anesthetic complications            Review of Systems / Medical History  Patient summary reviewed and pertinent labs reviewed    Pulmonary  Within defined limits                 Neuro/Psych         Dementia    Comments: Alcoholic encephalopathy Cardiovascular              Hyperlipidemia    Exercise tolerance: >4 METS     GI/Hepatic/Renal           PUD and liver disease    Comments: EtOH abuse  NSAID usage  S/P perforated ulcer Endo/Other        Obesity     Other Findings   Comments: Acute vs subacute vertebral fractures         Physical Exam    Airway  Mallampati: III  TM Distance: 4 - 6 cm  Neck ROM: normal range of motion   Mouth opening: Normal     Cardiovascular  Regular rate and rhythm,  S1 and S2 normal,  no murmur, click, rub, or gallop  Rhythm: regular  Rate: normal         Dental    Dentition: Poor dentition     Pulmonary  Breath sounds clear to auscultation               Abdominal  GI exam deferred       Other Findings            Anesthetic Plan    ASA: 3, emergent  Anesthesia type: general    Monitoring Plan: CVP and Arterial line      Induction: Intravenous  Anesthetic plan and risks discussed with: Patient and Family      Informed consent obtained.

## 2018-02-19 ENCOUNTER — APPOINTMENT (OUTPATIENT)
Dept: GENERAL RADIOLOGY | Age: 73
DRG: 853 | End: 2018-02-19
Attending: INTERNAL MEDICINE
Payer: MEDICARE

## 2018-02-19 ENCOUNTER — APPOINTMENT (OUTPATIENT)
Dept: CT IMAGING | Age: 73
DRG: 853 | End: 2018-02-19
Attending: SURGERY
Payer: MEDICARE

## 2018-02-19 LAB
ALBUMIN SERPL-MCNC: 1.5 G/DL (ref 3.5–5)
ALBUMIN/GLOB SERPL: 0.4 {RATIO} (ref 1.1–2.2)
ALP SERPL-CCNC: 94 U/L (ref 45–117)
ALT SERPL-CCNC: 13 U/L (ref 12–78)
ANION GAP SERPL CALC-SCNC: 8 MMOL/L (ref 5–15)
APPEARANCE FLD: ABNORMAL
AST SERPL-CCNC: 22 U/L (ref 15–37)
ATRIAL RATE: 111 BPM
BASOPHILS # BLD: 0.1 K/UL (ref 0–0.1)
BASOPHILS NFR BLD: 0 % (ref 0–1)
BILIRUB SERPL-MCNC: 1 MG/DL (ref 0.2–1)
BUN SERPL-MCNC: 12 MG/DL (ref 6–20)
BUN/CREAT SERPL: 26 (ref 12–20)
CALCIUM SERPL-MCNC: 7.5 MG/DL (ref 8.5–10.1)
CALCULATED P AXIS, ECG09: 32 DEGREES
CALCULATED R AXIS, ECG10: 18 DEGREES
CALCULATED T AXIS, ECG11: 68 DEGREES
CHLORIDE SERPL-SCNC: 108 MMOL/L (ref 97–108)
CO2 SERPL-SCNC: 26 MMOL/L (ref 21–32)
COLOR FLD: ABNORMAL
CREAT SERPL-MCNC: 0.46 MG/DL (ref 0.55–1.02)
DIAGNOSIS, 93000: NORMAL
DIFFERENTIAL METHOD BLD: ABNORMAL
EOSINOPHIL # BLD: 0.1 K/UL (ref 0–0.4)
EOSINOPHIL NFR BLD: 0 % (ref 0–7)
ERYTHROCYTE [DISTWIDTH] IN BLOOD BY AUTOMATED COUNT: 13.3 % (ref 11.5–14.5)
GLOBULIN SER CALC-MCNC: 3.5 G/DL (ref 2–4)
GLUCOSE BLD STRIP.AUTO-MCNC: 113 MG/DL (ref 65–100)
GLUCOSE BLD STRIP.AUTO-MCNC: 131 MG/DL (ref 65–100)
GLUCOSE BLD STRIP.AUTO-MCNC: 146 MG/DL (ref 65–100)
GLUCOSE SERPL-MCNC: 135 MG/DL (ref 65–100)
HCT VFR BLD AUTO: 37.2 % (ref 35–47)
HGB BLD-MCNC: 12.2 G/DL (ref 11.5–16)
IMM GRANULOCYTES # BLD: 0.2 K/UL (ref 0–0.04)
IMM GRANULOCYTES NFR BLD AUTO: 1 % (ref 0–0.5)
LYMPHOCYTES # BLD: 1 K/UL (ref 0.8–3.5)
LYMPHOCYTES NFR BLD: 6 % (ref 12–49)
LYMPHOCYTES NFR FLD: 18 %
MAGNESIUM SERPL-MCNC: 1.6 MG/DL (ref 1.6–2.4)
MCH RBC QN AUTO: 34.1 PG (ref 26–34)
MCHC RBC AUTO-ENTMCNC: 32.8 G/DL (ref 30–36.5)
MCV RBC AUTO: 103.9 FL (ref 80–99)
MONOCYTES # BLD: 1.1 K/UL (ref 0–1)
MONOCYTES NFR BLD: 7 % (ref 5–13)
MONOS+MACROS NFR FLD: 10 %
NEUTROPHILS NFR FLD: 72 %
NEUTS SEG # BLD: 13.3 K/UL (ref 1.8–8)
NEUTS SEG NFR BLD: 85 % (ref 32–75)
NRBC # BLD: 0 K/UL (ref 0–0.01)
NRBC BLD-RTO: 0 PER 100 WBC
NUC CELL # FLD: 4972 /CU MM
P-R INTERVAL, ECG05: 152 MS
PLATELET # BLD AUTO: 214 K/UL (ref 150–400)
PMV BLD AUTO: 11.8 FL (ref 8.9–12.9)
POTASSIUM SERPL-SCNC: 3.1 MMOL/L (ref 3.5–5.1)
PROT SERPL-MCNC: 5 G/DL (ref 6.4–8.2)
Q-T INTERVAL, ECG07: 340 MS
QRS DURATION, ECG06: 96 MS
QTC CALCULATION (BEZET), ECG08: 462 MS
RBC # BLD AUTO: 3.58 M/UL (ref 3.8–5.2)
RBC # FLD: >100 /CU MM
SERVICE CMNT-IMP: ABNORMAL
SODIUM SERPL-SCNC: 142 MMOL/L (ref 136–145)
SPECIMEN SOURCE FLD: ABNORMAL
VENTRICULAR RATE, ECG03: 111 BPM
WBC # BLD AUTO: 15.7 K/UL (ref 3.6–11)

## 2018-02-19 PROCEDURE — 74011250636 HC RX REV CODE- 250/636: Performed by: SURGERY

## 2018-02-19 PROCEDURE — 74011000250 HC RX REV CODE- 250: Performed by: SURGERY

## 2018-02-19 PROCEDURE — C9113 INJ PANTOPRAZOLE SODIUM, VIA: HCPCS | Performed by: SURGERY

## 2018-02-19 PROCEDURE — 77030003666 HC NDL SPINAL BD -A

## 2018-02-19 PROCEDURE — 77030003462 HC NDL BIOP BRST MDT -B

## 2018-02-19 PROCEDURE — 74177 CT ABD & PELVIS W/CONTRAST: CPT

## 2018-02-19 PROCEDURE — 89050 BODY FLUID CELL COUNT: CPT | Performed by: SURGERY

## 2018-02-19 PROCEDURE — 99153 MOD SED SAME PHYS/QHP EA: CPT

## 2018-02-19 PROCEDURE — 74011636320 HC RX REV CODE- 636/320: Performed by: RADIOLOGY

## 2018-02-19 PROCEDURE — 85025 COMPLETE CBC W/AUTO DIFF WBC: CPT | Performed by: SURGERY

## 2018-02-19 PROCEDURE — 36415 COLL VENOUS BLD VENIPUNCTURE: CPT | Performed by: SURGERY

## 2018-02-19 PROCEDURE — 87205 SMEAR GRAM STAIN: CPT | Performed by: SURGERY

## 2018-02-19 PROCEDURE — 49406 IMAGE CATH FLUID PERI/RETRO: CPT

## 2018-02-19 PROCEDURE — 80053 COMPREHEN METABOLIC PANEL: CPT | Performed by: SURGERY

## 2018-02-19 PROCEDURE — 77030002996 HC SUT SLK J&J -A

## 2018-02-19 PROCEDURE — 74011000250 HC RX REV CODE- 250: Performed by: RADIOLOGY

## 2018-02-19 PROCEDURE — 77010033678 HC OXYGEN DAILY

## 2018-02-19 PROCEDURE — 77030010546 HC BG URIN DRNG URES -B

## 2018-02-19 PROCEDURE — 0W9G30Z DRAINAGE OF PERITONEAL CAVITY WITH DRAINAGE DEVICE, PERCUTANEOUS APPROACH: ICD-10-PCS | Performed by: RADIOLOGY

## 2018-02-19 PROCEDURE — 74011000250 HC RX REV CODE- 250

## 2018-02-19 PROCEDURE — 71045 X-RAY EXAM CHEST 1 VIEW: CPT

## 2018-02-19 PROCEDURE — 77030038269 HC DRN EXT URIN PURWCK BARD -A

## 2018-02-19 PROCEDURE — C1769 GUIDE WIRE: HCPCS

## 2018-02-19 PROCEDURE — 74011250637 HC RX REV CODE- 250/637: Performed by: SURGERY

## 2018-02-19 PROCEDURE — 0W9G3ZZ DRAINAGE OF PERITONEAL CAVITY, PERCUTANEOUS APPROACH: ICD-10-PCS | Performed by: RADIOLOGY

## 2018-02-19 PROCEDURE — 65660000000 HC RM CCU STEPDOWN

## 2018-02-19 PROCEDURE — 99152 MOD SED SAME PHYS/QHP 5/>YRS: CPT

## 2018-02-19 PROCEDURE — C1729 CATH, DRAINAGE: HCPCS

## 2018-02-19 PROCEDURE — 77030011229 HC DIL VESL COON COOK -A

## 2018-02-19 PROCEDURE — 83735 ASSAY OF MAGNESIUM: CPT | Performed by: SURGERY

## 2018-02-19 PROCEDURE — 82962 GLUCOSE BLOOD TEST: CPT

## 2018-02-19 PROCEDURE — 74011250636 HC RX REV CODE- 250/636: Performed by: RADIOLOGY

## 2018-02-19 PROCEDURE — 87075 CULTR BACTERIA EXCEPT BLOOD: CPT | Performed by: SURGERY

## 2018-02-19 PROCEDURE — 94640 AIRWAY INHALATION TREATMENT: CPT

## 2018-02-19 PROCEDURE — 74011000258 HC RX REV CODE- 258: Performed by: SURGERY

## 2018-02-19 PROCEDURE — 74011250636 HC RX REV CODE- 250/636: Performed by: INTERNAL MEDICINE

## 2018-02-19 RX ORDER — LIDOCAINE HYDROCHLORIDE 10 MG/ML
INJECTION, SOLUTION EPIDURAL; INFILTRATION; INTRACAUDAL; PERINEURAL
Status: COMPLETED
Start: 2018-02-19 | End: 2018-02-19

## 2018-02-19 RX ORDER — LIDOCAINE HYDROCHLORIDE 10 MG/ML
1-5 INJECTION INFILTRATION; PERINEURAL
Status: DISCONTINUED | OUTPATIENT
Start: 2018-02-19 | End: 2018-02-19 | Stop reason: HOSPADM

## 2018-02-19 RX ORDER — FENTANYL CITRATE 50 UG/ML
100 INJECTION, SOLUTION INTRAMUSCULAR; INTRAVENOUS
Status: DISCONTINUED | OUTPATIENT
Start: 2018-02-19 | End: 2018-02-19 | Stop reason: ALTCHOICE

## 2018-02-19 RX ORDER — LIDOCAINE HYDROCHLORIDE 10 MG/ML
10 INJECTION, SOLUTION EPIDURAL; INFILTRATION; INTRACAUDAL; PERINEURAL
Status: COMPLETED | OUTPATIENT
Start: 2018-02-19 | End: 2018-02-19

## 2018-02-19 RX ORDER — POTASSIUM CHLORIDE 14.9 MG/ML
20 INJECTION INTRAVENOUS ONCE
Status: COMPLETED | OUTPATIENT
Start: 2018-02-19 | End: 2018-02-20

## 2018-02-19 RX ORDER — MIDAZOLAM HYDROCHLORIDE 1 MG/ML
5 INJECTION, SOLUTION INTRAMUSCULAR; INTRAVENOUS
Status: DISCONTINUED | OUTPATIENT
Start: 2018-02-19 | End: 2018-02-19 | Stop reason: ALTCHOICE

## 2018-02-19 RX ADMIN — Medication 10 ML: at 06:56

## 2018-02-19 RX ADMIN — MIDAZOLAM 1 MG: 1 INJECTION INTRAMUSCULAR; INTRAVENOUS at 17:02

## 2018-02-19 RX ADMIN — FENTANYL CITRATE 25 MCG: 50 INJECTION, SOLUTION INTRAMUSCULAR; INTRAVENOUS at 16:59

## 2018-02-19 RX ADMIN — NYSTATIN: 100000 POWDER TOPICAL at 19:00

## 2018-02-19 RX ADMIN — LIDOCAINE HYDROCHLORIDE 5 ML: 10 INJECTION, SOLUTION EPIDURAL; INFILTRATION; INTRACAUDAL; PERINEURAL at 17:10

## 2018-02-19 RX ADMIN — I.V. FAT EMULSION 500 ML: 20 EMULSION INTRAVENOUS at 19:14

## 2018-02-19 RX ADMIN — HYDROMORPHONE HYDROCHLORIDE 0.5 MG: 1 INJECTION, SOLUTION INTRAMUSCULAR; INTRAVENOUS; SUBCUTANEOUS at 08:16

## 2018-02-19 RX ADMIN — PANTOPRAZOLE SODIUM 40 MG: 40 INJECTION, POWDER, FOR SOLUTION INTRAVENOUS at 22:07

## 2018-02-19 RX ADMIN — ONDANSETRON 4 MG: 2 INJECTION INTRAMUSCULAR; INTRAVENOUS at 22:13

## 2018-02-19 RX ADMIN — HYDROMORPHONE HYDROCHLORIDE 0.5 MG: 1 INJECTION, SOLUTION INTRAMUSCULAR; INTRAVENOUS; SUBCUTANEOUS at 11:18

## 2018-02-19 RX ADMIN — Medication 10 ML: at 14:06

## 2018-02-19 RX ADMIN — PIPERACILLIN SODIUM AND TAZOBACTAM SODIUM 10.12 G: 3; .375 INJECTION, POWDER, LYOPHILIZED, FOR SOLUTION INTRAVENOUS at 11:08

## 2018-02-19 RX ADMIN — LIDOCAINE HYDROCHLORIDE 5 ML: 10 INJECTION, SOLUTION EPIDURAL; INFILTRATION; INTRACAUDAL; PERINEURAL at 17:15

## 2018-02-19 RX ADMIN — PANTOPRAZOLE SODIUM 40 MG: 40 INJECTION, POWDER, FOR SOLUTION INTRAVENOUS at 08:15

## 2018-02-19 RX ADMIN — LIDOCAINE HYDROCHLORIDE 5 ML: 10 INJECTION, SOLUTION INFILTRATION; PERINEURAL at 17:24

## 2018-02-19 RX ADMIN — LIDOCAINE HYDROCHLORIDE 10 ML: 10 INJECTION, SOLUTION EPIDURAL; INFILTRATION; INTRACAUDAL; PERINEURAL at 17:47

## 2018-02-19 RX ADMIN — IPRATROPIUM BROMIDE AND ALBUTEROL SULFATE 3 ML: .5; 3 SOLUTION RESPIRATORY (INHALATION) at 04:15

## 2018-02-19 RX ADMIN — CALCIUM GLUCONATE: 94 INJECTION, SOLUTION INTRAVENOUS at 18:52

## 2018-02-19 RX ADMIN — HYDROMORPHONE HYDROCHLORIDE 0.5 MG: 1 INJECTION, SOLUTION INTRAMUSCULAR; INTRAVENOUS; SUBCUTANEOUS at 18:45

## 2018-02-19 RX ADMIN — ENOXAPARIN SODIUM 40 MG: 40 INJECTION SUBCUTANEOUS at 14:06

## 2018-02-19 RX ADMIN — HYDROMORPHONE HYDROCHLORIDE 0.5 MG: 1 INJECTION, SOLUTION INTRAMUSCULAR; INTRAVENOUS; SUBCUTANEOUS at 14:06

## 2018-02-19 RX ADMIN — MIDAZOLAM 0.5 MG: 1 INJECTION INTRAMUSCULAR; INTRAVENOUS at 17:14

## 2018-02-19 RX ADMIN — NYSTATIN: 100000 POWDER TOPICAL at 08:21

## 2018-02-19 RX ADMIN — HYDROMORPHONE HYDROCHLORIDE 0.5 MG: 1 INJECTION, SOLUTION INTRAMUSCULAR; INTRAVENOUS; SUBCUTANEOUS at 04:41

## 2018-02-19 RX ADMIN — ONDANSETRON 4 MG: 2 INJECTION INTRAMUSCULAR; INTRAVENOUS at 11:16

## 2018-02-19 RX ADMIN — LORAZEPAM 1 MG: 2 INJECTION, SOLUTION INTRAMUSCULAR; INTRAVENOUS at 22:14

## 2018-02-19 RX ADMIN — IOPAMIDOL 95 ML: 755 INJECTION, SOLUTION INTRAVENOUS at 13:28

## 2018-02-19 RX ADMIN — DIATRIZOATE MEGLUMINE AND DIATRIZOATE SODIUM 30 ML: 660; 100 LIQUID ORAL; RECTAL at 10:29

## 2018-02-19 RX ADMIN — HYDROMORPHONE HYDROCHLORIDE 0.5 MG: 1 INJECTION, SOLUTION INTRAMUSCULAR; INTRAVENOUS; SUBCUTANEOUS at 00:37

## 2018-02-19 RX ADMIN — IPRATROPIUM BROMIDE AND ALBUTEROL SULFATE 3 ML: .5; 3 SOLUTION RESPIRATORY (INHALATION) at 22:40

## 2018-02-19 RX ADMIN — POTASSIUM CHLORIDE 20 MEQ: 200 INJECTION, SOLUTION INTRAVENOUS at 11:09

## 2018-02-19 RX ADMIN — FENTANYL CITRATE 12.5 MCG: 50 INJECTION, SOLUTION INTRAMUSCULAR; INTRAVENOUS at 17:11

## 2018-02-19 RX ADMIN — SODIUM CHLORIDE 75 ML/HR: 900 INJECTION, SOLUTION INTRAVENOUS at 11:07

## 2018-02-19 NOTE — PROGRESS NOTES
Orthopaedic Progress Note      2018 11:20 AM     Patient: Betsy Amaya MRN: [id number]  SSN: [id number]    YOB: 1945  Age: 67 y.o. Sex: female      Admit date:  2018  Date of Surgery:  2018   Admitting Physician:  Brooklynn Gómez MD   Surgeon:  Donta Perez) and Role:     * Charles Hager MD - Primary    Consulting Physician(s): Treatment Team: Attending Provider: Brooklynn Gómez MD; Consulting Provider: Brooklynn Gómez MD; Consulting Provider: Luis Vizcarra; Consulting Provider: Casandra Jordan DO; Care Manager: Jacqueline Godoy; Consulting Provider: Kristie Delgado MD; Physician: Ramona Schmidt MD; Physician: Kashmir Andrade MD    SUBJECTIVE:     Betsy Amaya is a 67 y.o. female is laying in bed with NG tube place. Patient is arousable, but is not able to conversate this morning. She complains of abdominal pain. OBJECTIVE:       Physical Exam:  General: Alert, cooperative, no distress. Respiratory: Respirations unlabored  Neurological:  Neurovascular exam within normal limits. Motor: + DF/PF. Musculoskeletal: Calves soft, supple, non-tender upon palpation.         Vital Signs:      Patient Vitals for the past 8 hrs:   BP Temp Pulse Resp SpO2   18 0719 156/76 100 °F (37.8 °C) 97 20 94 %   18 0333 173/78 98.8 °F (37.1 °C) (!) 113 19 92 %                                          Temp (24hrs), Av.6 °F (37 °C), Min:98 °F (36.7 °C), Max:100 °F (37.8 °C)      Labs:        Recent Labs      18   0052   HCT  37.2   HGB  12.2     Lab Results   Component Value Date/Time    Sodium 142 2018 12:52 AM    Potassium 3.1 (L) 2018 12:52 AM    Chloride 108 2018 12:52 AM    CO2 26 2018 12:52 AM    Glucose 135 (H) 2018 12:52 AM    BUN 12 2018 12:52 AM    Creatinine 0.46 (L) 2018 12:52 AM    Calcium 7.5 (L) 2018 12:52 AM       PT/OT:                Patient mobility                         ASSESSMENT / PLAN:   Principal Problem:    Perforated chronic gastric ulcer ([address] (2/11/2018)    Active Problems:    ETOH abuse (2/11/2018)      Free intraperitoneal air (2/12/2018)      S/P exploratory laparotomy (2/12/2018)      Overview: Suture repair of perforated posterior gastric ulcer with Larnell Breaker patch,       core needle biopsies of left lobe of the liver. Alcoholic cirrhosis of liver without ascites ([address] (2/12/2018)      Overview: Liver bx 2/12/18:       Cirrhosis with mild chronic portal inflammation and macrovesicular       steatosis. Fatty liver determined by biopsy (2/12/2018)      Overview:        Cirrhosis with mild chronic portal inflammation and macrovesicular       steatosis. Hyperammonemia ([address] (2/16/2018)      Overview:        Ref. Range 2/16/2018 17:20       Ammonia Latest Ref Range: <32 UMOL/L 69 (H)             Hypokalemia (2/18/2018)      Leukocytosis (2/18/2018)      Severe protein-calorie malnutrition ([address] (2/18/2018)      Acute metabolic encephalopathy (1/50/4034)       A: T12 burst fracture      S/p ex lap with repair of posterior gastric ulcer      Abdominal pain    P: 1. I had a long discussion with the family today regarding her T12 burst fracture. I explained to them that currently her medical state has really slowed down progress with regards to her back. She will progress eventually to a TLSO when ambulating or OOB. Currently, therapy has not been able to work with her due to her medical state. We will continue to follow along with therapy to see her progression. I explained to the family if she struggles with pain in regards to the TLSO due to the fracture and incisional/abdominal pain we may be forced to do a MRI and evaluate appropriateness of a kyphoplasty.       Signed By:  Jermain Munguia, [facility]

## 2018-02-19 NOTE — PROGRESS NOTES
2/19/2018 2:20 PM Met with pt's daughter, SUSANA([phone]) and discussed options for discharge. SNF list provided to pt's daughter, CM explained [facility] and [facility] are not in-network with [employer]. Pt's daughter was understanding and reported she will review list and follow up with CM for preferences of SNFs. CM's contact information provided to pt's daughter. CM will follow. 2/19/2018 :52 AM EMR reviewed, pt on TPN. PT and OT evals pending. CM will follow for final discharge recommendations.  ZACH Stokes

## 2018-02-19 NOTE — PROGRESS NOTES
Progress Note    Patient: Francisca Franco MRN: [id number]  SSN: [id number]    YOB: 1945  Age: 67 y.o. Sex: female      Admit Date: 2018    1 Day Post-Op    Procedure:  Procedure(s):   NASOGASTRIC TUBE PLACEMENT    Subjective:     Mrs. Donn Hardy c/o abdominal pain. Objective:     Visit Vitals    /76 (BP 1 Location: Left arm, BP Patient Position: At rest)    Pulse 97    Temp 100 °F (37.8 °C)    Resp 20    Ht 5' 3.5\" (1.613 m)    Wt 175 lb 7.8 oz (79.6 kg)    SpO2 94%    Breastfeeding No    BMI 30.6 kg/m2       Temp (24hrs), Av.6 °F (37 °C), Min:98 °F (36.7 °C), Max:100 °F (37.8 °C)      Physical Exam:    GENERAL: alert, cooperative, no distress, ABDOMEN: Soft, hypoactive BS, moderately distended and tender. ALYSSA fluid is bilous and leaking around tube.     Lab Review:   BMP:   Lab Results   Component Value Date/Time     2018 12:52 AM    K 3.1 (L) 2018 12:52 AM     2018 12:52 AM    CO2 26 2018 12:52 AM    AGAP 8 2018 12:52 AM     (H) 2018 12:52 AM    BUN 12 2018 12:52 AM    CREA 0.46 (L) 2018 12:52 AM    GFRAA >60 2018 12:52 AM    GFRNA >60 2018 12:52 AM     CMP:   Lab Results   Component Value Date/Time     2018 12:52 AM    K 3.1 (L) 2018 12:52 AM     2018 12:52 AM    CO2 26 2018 12:52 AM    AGAP 8 2018 12:52 AM     (H) 2018 12:52 AM    BUN 12 2018 12:52 AM    CREA 0.46 (L) 2018 12:52 AM    GFRAA >60 2018 12:52 AM    GFRNA >60 2018 12:52 AM    CA 7.5 (L) 2018 12:52 AM    MG 1.6 2018 12:52 AM    ALB 1.5 (L) 2018 12:52 AM    TP 5.0 (L) 2018 12:52 AM    GLOB 3.5 2018 12:52 AM    AGRAT 0.4 (L) 2018 12:52 AM    SGOT 22 2018 12:52 AM    ALT 13 2018 12:52 AM     CBC:   Lab Results   Component Value Date/Time    WBC 15.7 (H) 2018 12:52 AM    HGB 12.2 2018 12:52 AM    HCT 37.2 02/19/2018 12:52 AM     02/19/2018 12:52 AM       Assessment:     Hospital Problems  Date Reviewed: 4/26/2016          Codes Class Noted POA    Hypokalemia ICD-10-CM: E87.6  ICD-9-CM: 276.8  2/18/2018 No        Leukocytosis ICD-10-CM: D72.829  ICD-9-CM: 288.60  2/18/2018 Yes        Severe protein-calorie malnutrition ([address] ICD-10-CM: E43  ICD-9-CM: 998  2/18/2018 Yes        Acute metabolic encephalopathy TZD-97-WK: G93.41  ICD-9-CM: 348.31  2/18/2018 Yes        Hyperammonemia ([address] ICD-10-CM: E72.20  ICD-9-CM: 270.6  2/16/2018 No    Overview Addendum 2/17/2018  9:08 AM by Zaki Heredia MD        Ref. Range 2/16/2018 17:20   Ammonia Latest Ref Range: <32 UMOL/L 69 (H)                Free intraperitoneal air ICD-10-CM: K66.8  ICD-9-CM: 568.89  2/12/2018 Yes        S/P exploratory laparotomy ICD-10-CM: Z98.890  ICD-9-CM: V45.89  2/12/2018 No    Overview Signed 2/12/2018 11:57 AM by May Villa MD     Suture repair of perforated posterior gastric ulcer with Dawson Handy patch, core needle biopsies of left lobe of the liver. Alcoholic cirrhosis of liver without ascites (HCC) (Chronic) ICD-10-CM: K70.30  ICD-9-CM: 571.2  2/12/2018 Yes    Overview Signed 2/17/2018  8:45 AM by Zaki Heredia MD     Liver bx 2/12/18:   Cirrhosis with mild chronic portal inflammation and macrovesicular steatosis. Fatty liver determined by biopsy ICD-10-CM: K76.0  ICD-9-CM: 571.8  2/12/2018 Yes    Overview Signed 2/17/2018  8:53 AM by Zaki Heredia MD        Cirrhosis with mild chronic portal inflammation and macrovesicular steatosis. * (Principal)Perforated chronic gastric ulcer ([address] (Chronic) ICD-10-CM: K25.5  ICD-9-CM: 531.50  2/11/2018 Yes        ETOH abuse (Chronic) ICD-10-CM: F10.10  ICD-9-CM: 305.00  2/11/2018 Yes              Plan/Recommendations/Medical Decision Making:     Continue bowel rest, TPN. Replete K+. WBC trending up and tachycardia.   CT abdomen this AM. Continue Zosyn. Continue PPI\"sJayesh SCOTT. Appreciate Neurology assistance.     Signed By: Kiara Doshi MD     February 19, 2018

## 2018-02-19 NOTE — PROGRESS NOTES
Occupational Therapy Note:  Chart reviewed and spoke with RN. Pt recently received contrast and is now leaving room for medical test.  In addition pt now with NG tube and TPN. She has an acute T12 burst fx in which ortho states she can mobilize slowly with TLSO brace for OOB and if she cannot tolerate mobility with then perform MRI of spine. Per RN pt is unable to participate in therapy today due to medical issues and pain. Will follow up tomorrow and attempt OT eval as pt is able to participate.   Ko Herbert, OTR/L, CBIS

## 2018-02-19 NOTE — PROGRESS NOTES
Family Practice Consult and Daily Progress Note: 2/19/2018  Anu Law Guardian, DO    Assessment/Plan:   Perforated chronic gastric ulcer /  Free intraperitoneal air / S/P exploratory laparotomy. S/p repair in OR with Dr. Otoniel Abdi on 2/12/18 - per surg     Acute metabolic encephalopathy. Agree with Neurology consult - multifactorial - likely recent surg/anesthesia/medication/underlying liver disease, and generalized acute delirium in a septuagenarian. Continue antibiotic coverage for possible aspiration PNA. Agree with decreasing dose of hydromorphone and use this judiciously. CIWA can continue for now but this is not currently c/w EtOH withdrawal.  supportive care, maintenance of sleep/wake cycle, and re-orientation will help - for now monitor and follow Neuro recs.      Hypoxia / Respiratory distress. CXR with atelectasis and pleural effusion. Continue Abx, nebs, supplemental oxygen and incentive spirom as able. Repeat CXR as needed.     Alcoholic cirrhosis of liver without ascites /  Fatty liver determined by biopsy /  Hyperammonemia. Will need to be counseled on weight loss and alcohol cessation when more awake.    ETOH abuse. Can continue CIWA for now but doubt this is withdrawal. Needs to stop EtOH entirely.     Hypokalemia /  Hypoalbuminemia. Being addressed with TPN     Leukocytosis. Could be related to evolving ? PNA v. Stress response.  Monitor        Problem List:  Problem List as of 2/19/2018  Date Reviewed: 4/26/2016          Codes Class Noted - Resolved    Hypokalemia ICD-10-CM: E87.6  ICD-9-CM: 276.8  2/18/2018 - Present        Leukocytosis ICD-10-CM: D72.829  ICD-9-CM: 288.60  2/18/2018 - Present        Severe protein-calorie malnutrition ([facility] ICD-10-CM: E43  ICD-9-CM: 262  2/18/2018 - Present        Acute metabolic encephalopathy YFL-30-GC: G93.41  ICD-9-CM: 348.31  2/18/2018 - Present        Hyperammonemia ([facility] ICD-10-CM: E72.20  ICD-9-CM: 270.6  2/16/2018 - Present Overview Addendum 2/17/2018  9:08 AM by Deejay Staples MD        Ref. Range 2/16/2018 17:20   Ammonia Latest Ref Range: <32 UMOL/L 69 (H)                Free intraperitoneal air ICD-10-CM: K66.8  ICD-9-CM: 568.89  2/12/2018 - Present        S/P exploratory laparotomy ICD-10-CM: Z98.890  ICD-9-CM: V45.89  2/12/2018 - Present    Overview Signed 2/12/2018 11:57 AM by Brandin Barnett MD     Suture repair of perforated posterior gastric ulcer with Julious March patch, core needle biopsies of left lobe of the liver. Alcoholic cirrhosis of liver without ascites (HCC) (Chronic) ICD-10-CM: K70.30  ICD-9-CM: 571.2  2/12/2018 - Present    Overview Signed 2/17/2018  8:45 AM by Deejay Staples MD     Liver bx 2/12/18:   Cirrhosis with mild chronic portal inflammation and macrovesicular steatosis. Fatty liver determined by biopsy ICD-10-CM: K76.0  ICD-9-CM: 571.8  2/12/2018 - Present    Overview Signed 2/17/2018  8:53 AM by Deejay Staples MD        Cirrhosis with mild chronic portal inflammation and macrovesicular steatosis. * (Principal)Perforated chronic gastric ulcer ([address] (Chronic) ICD-10-CM: K25.5  ICD-9-CM: 531.50  2/11/2018 - Present        ETOH abuse (Chronic) ICD-10-CM: F10.10  ICD-9-CM: 305.00  2/11/2018 - Present        GI bleed ICD-10-CM: K92.2  ICD-9-CM: 578.9  4/26/2016 - Present        Hypotension ICD-10-CM: I95.9  ICD-9-CM: 458.9  4/26/2016 - Present        Tachycardia ICD-10-CM: R00.0  ICD-9-CM: 785.0  4/26/2016 - Present        Acute blood loss anemia ICD-10-CM: D62  ICD-9-CM: 285.1  4/26/2016 - Present              Subjective:    67 y.o.  female with EtOH abuse who is admitted to the General Surgery Service with perforated gastric ulcer. We are asked to evaluate for altered mental status. The patient is poorly interactive at this time and this limits primary hx. Discussed case with family available at bedside providing secondary hx and chart review.  Per family, patient has had declining neurological condition over past 48 hours. Notably, an RRT was called for respiratory distress. 2/19: This morning she is a bit more alert at this moment and taking in more complete sentences. She says she does not feel well. She has no specific site of pain other than the NG tube which is bothering her a great deal.  She should improve as time from surg increases. K+ a little low - replacing. Past Medical History:   Diagnosis Date    Alcoholic cirrhosis of liver without ascites ([facility] 2/12/2018    ETOH abuse 2/11/2018    History of vascular access device 02/16/2018    [facility] VAT - 5 FR triple, R basilic, TPN    Hyperammonemia ([facility] 2/16/2018     Results for Dahlia Check (MRN [id number]) as of 2/17/2018 08:43  Ref. Range 2/16/2018 17:20 Ammonia Latest Ref Range: <32 UMOL/L 69 (H)     Hyperlipidemia     Perforated chronic gastric ulcer ([facility] 2/11/2018     Past Surgical History:   Procedure Laterality Date    HX CATARACT REMOVAL       Social History     Social History    Marital status: SINGLE     Spouse name: N/A    Number of children: N/A    Years of education: N/A     Occupational History    Not on file. Social History Main Topics    Smoking status: Former Smoker    Smokeless tobacco: Never Used    Alcohol use 11.4 oz/week     0 Standard drinks or equivalent, 19 Glasses of wine per week      Comment: Hx of heavy etoh use, but quit several months ago    Drug use: No    Sexual activity: Not on file     Other Topics Concern    Not on file     Social History Narrative     Family History   Problem Relation Age of Onset    Other Other      patient did not know       Review of Systems:   Review of systems not obtained due to patient factors.     Objective:   Physical Exam:     Visit Vitals    /78 (BP 1 Location: Left arm, BP Patient Position: At rest)    Pulse (!) 113    Temp 98.8 °F (37.1 °C)    Resp 19    Ht 5' 3.5\" (1.613 m)    Wt 79.6 kg (175 lb 7.8 oz)    SpO2 92%    Breastfeeding No    BMI 30.6 kg/m2    O2 Flow Rate (L/min): 4 l/min O2 Device: Nasal cannula    Temp (24hrs), Av.3 °F (36.8 °C), Min:98 °F (36.7 °C), Max:98.8 °F (37.1 °C)         1901 -  0700  In: -   Out: 6026 [Urine:3050; Drains:200]    General:  Alert, appears stated age. Head:  Normocephalic, without obvious abnormality, atraumatic. Eyes:  Conjunctivae/corneas clear. PERRL, EOMs intact. Nose: NG in place. Throat: Lips, mucosa, and tongue moist..   Neck: Supple, symmetrical, trachea midline, no adenopathy, thyroid: no enlargement/tenderness/nodules, no carotid bruit and no JVD. Lungs:   Scattered rhonchi bilaterally. Chest wall:  No tenderness or deformity. Heart:  Regular rate and rhythm, S1, S2 normal, no murmur, click, rub or gallop. Abdomen:   Soft, distended, with mild generalized tenderness. Bowel sounds present. Dressings dry. Extremities: no cyanosis or edema. No calf tenderness or cords. Skin: Skin color, texture, turgor normal. No rashes or lesions   Neurologic:   Alert and oriented X 1. She thinks she is in [facility].  She is able to give short answers to questions. She will follow 1 step commands. No cogwheeling or rigidity. Gait not tested at this time. Sensation grossly normal to touch.   Gross motor of extremities normal.       Data Review:       Recent Days:  Recent Labs      18   0052  18   0129  18   0401   WBC  15.7*  12.4*  9.4   HGB  12.2  12.6  12.6   HCT  37.2  38.6  39.2   PLT  214  209  214     Recent Labs      18   0052  18   0129  18   0401   NA  142  142  141   K  3.1*  2.8*  3.5   CL  108  108  108   CO2  26  27  25   GLU  135*  139*  150*   BUN  12  9  10   CREA  0.46*  0.43*  0.50*   CA  7.5*  7.4*  7.7*   MG  1.6  1.6  1.8   ALB  1.5*  1.5*  1.7*   TBILI  1.0  0.6  0.7   SGOT  22  19  24   ALT  13  12  15     Recent Labs      18   0100   PH  7.45   PCO2 34*   PO2  72*   HCO3  23       24 Hour Results:  Recent Results (from the past 24 hour(s))   GLUCOSE, POC    Collection Time: 02/18/18  7:50 AM   Result Value Ref Range    Glucose (POC) 146 (H) 65 - 100 mg/dL    Performed by Jagjit Breen ([facility])    GLUCOSE, POC    Collection Time: 02/18/18  4:24 PM   Result Value Ref Range    Glucose (POC) 142 (H) 65 - 100 mg/dL    Performed by Jagjit Breen ([facility])    GLUCOSE, POC    Collection Time: 02/18/18  9:56 PM   Result Value Ref Range    Glucose (POC) 151 (H) 65 - 100 mg/dL    Performed by Margarette Sacks Alaa ([facility])    CBC WITH AUTOMATED DIFF    Collection Time: 02/19/18 12:52 AM   Result Value Ref Range    WBC 15.7 (H) 3.6 - 11.0 K/uL    RBC 3.58 (L) 3.80 - 5.20 M/uL    HGB 12.2 11.5 - 16.0 g/dL    HCT 37.2 35.0 - 47.0 %    .9 (H) 80.0 - 99.0 FL    MCH 34.1 (H) 26.0 - 34.0 PG    MCHC 32.8 30.0 - 36.5 g/dL    RDW 13.3 11.5 - 14.5 %    PLATELET 607 980 - 961 K/uL    MPV 11.8 8.9 - 12.9 FL    NRBC 0.0 0  WBC    ABSOLUTE NRBC 0.00 0.00 - 0.01 K/uL    NEUTROPHILS 85 (H) 32 - 75 %    LYMPHOCYTES 6 (L) 12 - 49 %    MONOCYTES 7 5 - 13 %    EOSINOPHILS 0 0 - 7 %    BASOPHILS 0 0 - 1 %    IMMATURE GRANULOCYTES 1 (H) 0.0 - 0.5 %    ABS. NEUTROPHILS 13.3 (H) 1.8 - 8.0 K/UL    ABS. LYMPHOCYTES 1.0 0.8 - 3.5 K/UL    ABS. MONOCYTES 1.1 (H) 0.0 - 1.0 K/UL    ABS. EOSINOPHILS 0.1 0.0 - 0.4 K/UL    ABS. BASOPHILS 0.1 0.0 - 0.1 K/UL    ABS. IMM.  GRANS. 0.2 (H) 0.00 - 0.04 K/UL    DF AUTOMATED     MAGNESIUM    Collection Time: 02/19/18 12:52 AM   Result Value Ref Range    Magnesium 1.6 1.6 - 2.4 mg/dL   METABOLIC PANEL, COMPREHENSIVE    Collection Time: 02/19/18 12:52 AM   Result Value Ref Range    Sodium 142 136 - 145 mmol/L    Potassium 3.1 (L) 3.5 - 5.1 mmol/L    Chloride 108 97 - 108 mmol/L    CO2 26 21 - 32 mmol/L    Anion gap 8 5 - 15 mmol/L    Glucose 135 (H) 65 - 100 mg/dL    BUN 12 6 - 20 MG/DL    Creatinine 0.46 (L) 0.55 - 1.02 MG/DL    BUN/Creatinine ratio 26 (H) 12 - 20 GFR est AA >60 >60 ml/min/1.73m2    GFR est non-AA >60 >60 ml/min/1.73m2    Calcium 7.5 (L) 8.5 - 10.1 MG/DL    Bilirubin, total 1.0 0.2 - 1.0 MG/DL    ALT (SGPT) 13 12 - 78 U/L    AST (SGOT) 22 15 - 37 U/L    Alk.  phosphatase 94 45 - 117 U/L    Protein, total 5.0 (L) 6.4 - 8.2 g/dL    Albumin 1.5 (L) 3.5 - 5.0 g/dL    Globulin 3.5 2.0 - 4.0 g/dL    A-G Ratio 0.4 (L) 1.1 - 2.2         Medications reviewed  Current Facility-Administered Medications   Medication Dose Route Frequency    albuterol-ipratropium (DUO-NEB) 2.5 MG-0.5 MG/3 ML  3 mL Nebulization Q4H PRN    HYDROmorphone (PF) (DILAUDID) injection 0.5 mg  0.5 mg IntraVENous Q3H PRN    TPN ADULT - CENTRAL   IntraVENous CONTINUOUS    fat emulsion 20% (LIPOSYN, INTRAlipid) infusion 500 mL  500 mL IntraVENous Q MON, WED & SAT    glucose chewable tablet 16 g  4 Tab Oral PRN    glucagon (GLUCAGEN) injection 1 mg  1 mg IntraMUSCular PRN    0.9% sodium chloride infusion  75 mL/hr IntraVENous CONTINUOUS    insulin regular (NOVOLIN R, HUMULIN R) injection   SubCUTAneous AC&HS    dextrose (D50W) injection syrg 12.5-25 g  12.5-25 g IntraVENous PRN    LORazepam (ATIVAN) injection 1 mg  1 mg IntraVENous Q2H PRN    LORazepam (ATIVAN) injection 2 mg  2 mg IntraVENous Q2H PRN    alteplase (CATHFLO) 1 mg in sterile water (preservative free) 1 mL injection  1 mg InterCATHeter PRN    sodium chloride (NS) flush 10-30 mL  10-30 mL InterCATHeter PRN    sodium chloride (NS) flush 10-40 mL  10-40 mL InterCATHeter Q8H    piperacillin-tazobactam (ZOSYN) 10.125 g in 0.9% sodium chloride 500 mL continuous 24 hr infusion  10.125 g IntraVENous Q24H    acetaminophen (TYLENOL) solution 650 mg  650 mg Oral Q6H PRN    naloxone (NARCAN) injection 0.4 mg  0.4 mg IntraVENous PRN    ondansetron (ZOFRAN) injection 4 mg  4 mg IntraVENous Q4H PRN    enoxaparin (LOVENOX) injection 40 mg  40 mg SubCUTAneous Q24H    pantoprazole (PROTONIX) injection 40 mg  40 mg IntraVENous Q12H  phenol throat spray (CHLORASEPTIC) 1 Spray  1 Spray Oral PRN    nystatin (MYCOSTATIN) 100,000 unit/gram powder   Topical BID    sodium chloride (NS) flush 5-10 mL  5-10 mL IntraVENous PRN       Care Plan discussed with: Patient/Family and Nurse    Total time spent with patient: 30 minutes.     Saundra Rivera MD

## 2018-02-19 NOTE — ROUTINE PROCESS
Bedside and Verbal shift change report given to Mary Ellen Bynum RN (oncoming nurse) by Nolberto Dougherty RN (offgoing nurse). Report included the following information SBAR, Kardex, ED Summary, OR Summary, Procedure Summary, Intake/Output, MAR and Recent Results.

## 2018-02-19 NOTE — PROGRESS NOTES
Bedside shift change report given to Justin Parra (oncoming nurse) by Kwame Metzger (offgoing nurse). Report included the following information SBAR, Kardex, Procedure Summary, MAR and Recent Results.

## 2018-02-19 NOTE — PROGRESS NOTES
Physical Therapy Note:  RN consulted requesting to defer PT tx at this time. Chart review revealing extended medical issues and now with NG tube/TPN/continued AMS. Pt being prepared with contrast for test and experiencing nausea. Will continue to follow for PT evaluation.

## 2018-02-19 NOTE — PROGRESS NOTES
[facility] RN [facility] of [facility] for a CT guided drain. Per RN she has obtained consent from Pt's daughter since Pt is not alert and oriented x4. Pt received Lovenox at 1406 hrs (see MAR). Pt put in for transport. 1630 - Pt taken to CT for CT guided drain of abscess. Pt drowsy but responds to commands. Procedure started at 1702 hrs and ended at 1729 hrs. Pt received a total of 1.5 mg of Versed and 37.5 mcg of Fentanyl. Pt tolerated the procedure well. VS monitored throughout procedure. 200 - Report called to Ady Alexis RN in [facility]. Pt returning with a 8.5 FR Accordian drain to the left abdomen draining clear yellow fluid. Procedure site c/d/i. Labs sent by LEONOR Bautista.  1816 - Pt resting/sleeping. 1827 - Pt transported back to the floor. Alert and VS stable (see doc flow).

## 2018-02-19 NOTE — PROGRESS NOTES
Problem: Falls - Risk of  Goal: *Absence of Falls  Document Demarcus Fall Risk and appropriate interventions in the flowsheet.    Outcome: Progressing Towards Goal  Fall Risk Interventions:  Mobility Interventions: Bed/chair exit alarm, Communicate number of staff needed for ambulation/transfer, Patient to call before getting OOB    Mentation Interventions: Bed/chair exit alarm, Adequate sleep, hydration, pain control, Door open when patient unattended, Reorient patient, Room close to nurse's station, More frequent rounding    Medication Interventions: Bed/chair exit alarm, Patient to call before getting OOB, Teach patient to arise slowly    Elimination Interventions: Bed/chair exit alarm    History of Falls Interventions: Bed/chair exit alarm

## 2018-02-19 NOTE — PROGRESS NOTES
Nutrition Assessment:    RECOMMENDATIONS/INTERVENTION(S):   1. While unable to initiate PO, recommend continuing TPN    TPN recommendations:  D20/5%AA @ the goal rate of 63 ml/hr  Add Lipids 20% (500 ml) @ 42 ml/hr x 12 hr 3x/wk  (TPN @ goal + Lipids provides 1762 kcals, 76 g protein)    2. Monitor BG, lytes, TG (weekly while on TPN)    3. Adjust IVF accordingly  ASSESSMENT:   2/19:  Labs/meds reviewed. TPN running @ recommended goal rate. BG controlled, [phone]. On insulin. K continuing to require repletion. PAB 5.5. No TG, Alk phos WDL. NS IVF. Last BM 2/10, hypoactive BS. NGT placed today 2/2 abd pain. Continue TPN per Surgery MD.  WBC's elevated, for CT abd today. Possible kyphoplasty per Ortho. Noted new wt.      2/16:  Pt remains confused, agitated & sedated (CIWA protocol). NPO Day 6. Labs/meds reviewed. K repleted. No Phos. D5 LR IVF. TPN started per Surgery MD today, plans for UGI when able. Likely pt meets criteria for Severe Acute Malnutrition, no new wt, mild edema. TPN recs above. 2/14:  Extubated 2/12. Unable to start PO yesterday per Surgery - plans for UGI p/t PO once off pressors. Discussed case in ICU rounds. Pt agitated, confused overnight - pulled NGT. On sedation per CIWA protocol. NPO. No BM, flatus, absent BS. Labs/meds reviewed. K, Mg WDL. No Phos. 1/2 NS IVF. 2/12:  Chart reviewed 2/2 intubation. 67 yof admitted for intra-abd free air of unknown etiology. Pmhx includes gastric ulcer (EGD x 2 yrs ago), Etoh. Surgery following. POD #1 ex lap repair of perforated gastric ulcer, liver bx. Remains intubated 2/2 shock, respiratory failure. Labs/meds reviewed. K, Mg requiring repletion. No Phos. On Delbert. Propofol currently stopped. On PPI. Ortho following for T12 fx. Surgical incisions to abd, ALYSSA drain, no edema noted. Overwt per advanced age. No recent wt to compare per hx.   Energy needs calculated using current wt, vent settings, tmax.  Noted n/v after fall yesterday, no reports of inadequate PO prior to fall. Etoh hx. SUBJECTIVE/OBJECTIVE:     Diet Order: NPO; TPN - D20/5% AA @ 63 ml/hr + Lipids 20% (500 ml) @ 42 ml/hr x 12 hr 3x/wk  % Eaten:  No data found. Pertinent Medications: [x] Reviewed - protonix, insulin, KCl, protonix    Past Medical History:   Diagnosis Date    Alcoholic cirrhosis of liver without ascites ([facility] 2/12/2018    ETOH abuse 2/11/2018    History of vascular access device 02/16/2018    [facility] VAT - 5 FR triple, R basilic, TPN    Hyperammonemia ([facility] 2/16/2018     Results for Claudia Alexander (MRN [id number]) as of 2/17/2018 08:43  Ref. Range 2/16/2018 17:20 Ammonia Latest Ref Range: <32 UMOL/L 69 (H)     Hyperlipidemia     Perforated chronic gastric ulcer ([facility] 2/11/2018       Chemistries:  Lab Results   Component Value Date/Time    Sodium 142 02/19/2018 12:52 AM    Potassium 3.1 (L) 02/19/2018 12:52 AM    Chloride 108 02/19/2018 12:52 AM    CO2 26 02/19/2018 12:52 AM    Anion gap 8 02/19/2018 12:52 AM    Glucose 135 (H) 02/19/2018 12:52 AM    BUN 12 02/19/2018 12:52 AM    Creatinine 0.46 (L) 02/19/2018 12:52 AM    BUN/Creatinine ratio 26 (H) 02/19/2018 12:52 AM    GFR est AA >60 02/19/2018 12:52 AM    GFR est non-AA >60 02/19/2018 12:52 AM    Calcium 7.5 (L) 02/19/2018 12:52 AM    AST (SGOT) 22 02/19/2018 12:52 AM    Alk. phosphatase 94 02/19/2018 12:52 AM    Protein, total 5.0 (L) 02/19/2018 12:52 AM    Albumin 1.5 (L) 02/19/2018 12:52 AM    Globulin 3.5 02/19/2018 12:52 AM    A-G Ratio 0.4 (L) 02/19/2018 12:52 AM    ALT (SGPT) 13 02/19/2018 12:52 AM      Anthropometrics: Height: 5' 3.5\" (161.3 cm) Weight: 79.6 kg (175 lb 7.8 oz)    IBW (%IBW): 58.7 kg (129 lb 6.6 oz) (131.69 %) UBW (%UBW):  (UTO) (  %)    BMI: Body mass index is 30.6 kg/(m^2).     This BMI is indicative of:   [] Underweight    [] Normal    [x] Overweight - per advanced age    []  Obesity    []  Extreme Obesity (BMI>40)  Estimated Nutrition Needs (Based on): 1661 Kcals/day (BMR (1398) x 1.3 AF) , 70 g (-88 (1.2-1.5 g/kg x IBW)) Protein  Carbohydrate:  At Least 130 g/day  Fluids: 1700 mL/day    Last BM: 2/10, abd soft, tender   []Active     []Hyperactive  [x]Hypoactive       [] Absent   BS  Skin:    [] Intact   [x] Incision - abd lap sites  [] Breakdown   [] DTI   [] Tears/Excoriation/Abrasion  [x]Edema - 1+ BUE [x] Other:ALYSSA drain     Wt Readings from Last 30 Encounters:   02/16/18 79.6 kg (175 lb 7.8 oz)   04/29/16 76.3 kg (168 lb 3.2 oz)      NUTRITION DIAGNOSES:   Problem:  Inadequate oral intake     Etiology: related to conditions associated with a dx - perforated viscus, respiratory failure     Signs/Symptoms: as evidenced by s/p lap w/ perf viscus repair, intubation, NPO x 8, requiring sedation for CIWA protocol, need to initiate TPN    NUTRITION INTERVENTIONS:  Meals/Snacks: General/healthful diet - once PO initiated Enteral/Parenteral Nutrition: Initiate parenteral nutrition                GOAL:   Continue TPN @ goal until able to initiate PO in the next 1-3 days    Cultural, Jehovah's witness, or Ethnic Dietary Needs: Other (comment) (UTO)     EDUCATION & DISCHARGE NEEDS:    [x] None Identified   [] Identified and Education Provided/Documented   [] Identified and Pt declined/was not appropriate      [x] Interdisciplinary Care Plan Reviewed/Documented    [x] Discharge Needs:    TBD   [] No Nutrition Related Discharge Needs    NUTRITION RISK:   Pt Is At Nutrition Risk  [x]     No Nutrition Risk Identified  []       PT SEEN FOR:    []  MD Consult: []Calorie Count      []Diabetic Diet Education        []Diet Education     []Electrolyte Management     []General Nutrition Management and Supplements     []Management of Tube Feeding     [x]TPN Recommendations (TPN started per MD today)   []  RN Referral:  []MST score >=2     []Enteral/Parenteral Nutrition PTA     []Pregnant: Gestational DM or Multigestation                 [] Pressure Ulcer    []  Low BMI []  Length of Stay       [] Dysphagia Diet         [] Ventilator  [x]  Follow-up - TPN     Previous Recommendations:   [x] Implemented     [] Progressing Towards Goal          [] Not Implemented          [] Not Applicable    Previous Goal:   [x] Met              [] Progressing Towards Goal              [] Not Progressing Towards Goal   [] Not Applicable            Denia Barbosa, [address] [place] [address]  Pager [phone]

## 2018-02-19 NOTE — PROGRESS NOTES
Primary Nurse Jason Mims RN and Luh Guerra RN performed a dual skin assessment on this patient Impairment noted- see wound doc flow sheet  Gianfranco score is 11

## 2018-02-20 ENCOUNTER — APPOINTMENT (OUTPATIENT)
Dept: GENERAL RADIOLOGY | Age: 73
DRG: 853 | End: 2018-02-20
Attending: FAMILY MEDICINE
Payer: MEDICARE

## 2018-02-20 LAB
ALBUMIN SERPL-MCNC: 1.4 G/DL (ref 3.5–5)
ALBUMIN/GLOB SERPL: 0.4 {RATIO} (ref 1.1–2.2)
ALP SERPL-CCNC: 104 U/L (ref 45–117)
ALT SERPL-CCNC: 16 U/L (ref 12–78)
ANION GAP SERPL CALC-SCNC: 5 MMOL/L (ref 5–15)
AST SERPL-CCNC: 26 U/L (ref 15–37)
BASOPHILS # BLD: 0.1 K/UL (ref 0–0.1)
BASOPHILS NFR BLD: 0 % (ref 0–1)
BILIRUB SERPL-MCNC: 1 MG/DL (ref 0.2–1)
BUN SERPL-MCNC: 13 MG/DL (ref 6–20)
BUN/CREAT SERPL: 28 (ref 12–20)
CALCIUM SERPL-MCNC: 7.5 MG/DL (ref 8.5–10.1)
CHLORIDE SERPL-SCNC: 110 MMOL/L (ref 97–108)
CO2 SERPL-SCNC: 27 MMOL/L (ref 21–32)
CREAT SERPL-MCNC: 0.46 MG/DL (ref 0.55–1.02)
DIFFERENTIAL METHOD BLD: ABNORMAL
EOSINOPHIL # BLD: 0.2 K/UL (ref 0–0.4)
EOSINOPHIL NFR BLD: 1 % (ref 0–7)
ERYTHROCYTE [DISTWIDTH] IN BLOOD BY AUTOMATED COUNT: 13.6 % (ref 11.5–14.5)
GLOBULIN SER CALC-MCNC: 3.4 G/DL (ref 2–4)
GLUCOSE BLD STRIP.AUTO-MCNC: 103 MG/DL (ref 65–100)
GLUCOSE BLD STRIP.AUTO-MCNC: 111 MG/DL (ref 65–100)
GLUCOSE BLD STRIP.AUTO-MCNC: 126 MG/DL (ref 65–100)
GLUCOSE BLD STRIP.AUTO-MCNC: 97 MG/DL (ref 65–100)
GLUCOSE SERPL-MCNC: 109 MG/DL (ref 65–100)
HCT VFR BLD AUTO: 36.4 % (ref 35–47)
HGB BLD-MCNC: 11.7 G/DL (ref 11.5–16)
IMM GRANULOCYTES # BLD: 0.2 K/UL (ref 0–0.04)
IMM GRANULOCYTES NFR BLD AUTO: 1 % (ref 0–0.5)
LYMPHOCYTES # BLD: 1.1 K/UL (ref 0.8–3.5)
LYMPHOCYTES NFR BLD: 6 % (ref 12–49)
MAGNESIUM SERPL-MCNC: 1.7 MG/DL (ref 1.6–2.4)
MCH RBC QN AUTO: 34 PG (ref 26–34)
MCHC RBC AUTO-ENTMCNC: 32.1 G/DL (ref 30–36.5)
MCV RBC AUTO: 105.8 FL (ref 80–99)
MONOCYTES # BLD: 1.2 K/UL (ref 0–1)
MONOCYTES NFR BLD: 6 % (ref 5–13)
NEUTS SEG # BLD: 17 K/UL (ref 1.8–8)
NEUTS SEG NFR BLD: 86 % (ref 32–75)
NRBC # BLD: 0 K/UL (ref 0–0.01)
NRBC BLD-RTO: 0 PER 100 WBC
PHOSPHATE SERPL-MCNC: 2.6 MG/DL (ref 2.6–4.7)
PHOSPHATE SERPL-MCNC: 2.8 MG/DL (ref 2.6–4.7)
PLATELET # BLD AUTO: 275 K/UL (ref 150–400)
PMV BLD AUTO: 12.2 FL (ref 8.9–12.9)
POTASSIUM SERPL-SCNC: 3.7 MMOL/L (ref 3.5–5.1)
PROT SERPL-MCNC: 4.8 G/DL (ref 6.4–8.2)
RBC # BLD AUTO: 3.44 M/UL (ref 3.8–5.2)
SERVICE CMNT-IMP: ABNORMAL
SERVICE CMNT-IMP: NORMAL
SODIUM SERPL-SCNC: 142 MMOL/L (ref 136–145)
WBC # BLD AUTO: 19.8 K/UL (ref 3.6–11)

## 2018-02-20 PROCEDURE — 82962 GLUCOSE BLOOD TEST: CPT

## 2018-02-20 PROCEDURE — 83735 ASSAY OF MAGNESIUM: CPT | Performed by: SURGERY

## 2018-02-20 PROCEDURE — 74011000250 HC RX REV CODE- 250: Performed by: SURGERY

## 2018-02-20 PROCEDURE — 77030011256 HC DRSG MEPILEX <16IN NO BORD MOLN -A

## 2018-02-20 PROCEDURE — 74011250637 HC RX REV CODE- 250/637: Performed by: SURGERY

## 2018-02-20 PROCEDURE — 87040 BLOOD CULTURE FOR BACTERIA: CPT | Performed by: FAMILY MEDICINE

## 2018-02-20 PROCEDURE — 94640 AIRWAY INHALATION TREATMENT: CPT

## 2018-02-20 PROCEDURE — 74011000258 HC RX REV CODE- 258: Performed by: SURGERY

## 2018-02-20 PROCEDURE — 74011250636 HC RX REV CODE- 250/636: Performed by: SURGERY

## 2018-02-20 PROCEDURE — 77010033678 HC OXYGEN DAILY

## 2018-02-20 PROCEDURE — 97163 PT EVAL HIGH COMPLEX 45 MIN: CPT

## 2018-02-20 PROCEDURE — 65660000000 HC RM CCU STEPDOWN

## 2018-02-20 PROCEDURE — 97165 OT EVAL LOW COMPLEX 30 MIN: CPT

## 2018-02-20 PROCEDURE — 97530 THERAPEUTIC ACTIVITIES: CPT

## 2018-02-20 PROCEDURE — 84100 ASSAY OF PHOSPHORUS: CPT | Performed by: SURGERY

## 2018-02-20 PROCEDURE — 97535 SELF CARE MNGMENT TRAINING: CPT

## 2018-02-20 PROCEDURE — 36415 COLL VENOUS BLD VENIPUNCTURE: CPT | Performed by: FAMILY MEDICINE

## 2018-02-20 PROCEDURE — 85025 COMPLETE CBC W/AUTO DIFF WBC: CPT | Performed by: SURGERY

## 2018-02-20 PROCEDURE — 74011250636 HC RX REV CODE- 250/636: Performed by: INTERNAL MEDICINE

## 2018-02-20 PROCEDURE — 80053 COMPREHEN METABOLIC PANEL: CPT | Performed by: SURGERY

## 2018-02-20 PROCEDURE — C9113 INJ PANTOPRAZOLE SODIUM, VIA: HCPCS | Performed by: SURGERY

## 2018-02-20 PROCEDURE — 84100 ASSAY OF PHOSPHORUS: CPT | Performed by: FAMILY MEDICINE

## 2018-02-20 PROCEDURE — 77030038269 HC DRN EXT URIN PURWCK BARD -A

## 2018-02-20 PROCEDURE — 71045 X-RAY EXAM CHEST 1 VIEW: CPT

## 2018-02-20 PROCEDURE — L0464 TLSO 4MOD SACRO-SCAP PRE: HCPCS

## 2018-02-20 RX ORDER — LORAZEPAM 2 MG/ML
1 INJECTION INTRAMUSCULAR
Status: DISCONTINUED | OUTPATIENT
Start: 2018-02-20 | End: 2018-02-27

## 2018-02-20 RX ORDER — HYDROMORPHONE HYDROCHLORIDE 1 MG/ML
0.5 INJECTION, SOLUTION INTRAMUSCULAR; INTRAVENOUS; SUBCUTANEOUS
Status: DISCONTINUED | OUTPATIENT
Start: 2018-02-20 | End: 2018-02-28

## 2018-02-20 RX ADMIN — HYDROMORPHONE HYDROCHLORIDE: 1 INJECTION, SOLUTION INTRAMUSCULAR; INTRAVENOUS; SUBCUTANEOUS at 12:43

## 2018-02-20 RX ADMIN — PANTOPRAZOLE SODIUM 40 MG: 40 INJECTION, POWDER, FOR SOLUTION INTRAVENOUS at 10:28

## 2018-02-20 RX ADMIN — PIPERACILLIN SODIUM AND TAZOBACTAM SODIUM 10.12 G: 3; .375 INJECTION, POWDER, LYOPHILIZED, FOR SOLUTION INTRAVENOUS at 11:34

## 2018-02-20 RX ADMIN — LORAZEPAM 1 MG: 2 INJECTION, SOLUTION INTRAMUSCULAR; INTRAVENOUS at 03:42

## 2018-02-20 RX ADMIN — IPRATROPIUM BROMIDE AND ALBUTEROL SULFATE 3 ML: .5; 3 SOLUTION RESPIRATORY (INHALATION) at 20:49

## 2018-02-20 RX ADMIN — ENOXAPARIN SODIUM 40 MG: 40 INJECTION SUBCUTANEOUS at 15:37

## 2018-02-20 RX ADMIN — NYSTATIN: 100000 POWDER TOPICAL at 10:35

## 2018-02-20 RX ADMIN — Medication 10 ML: at 20:48

## 2018-02-20 RX ADMIN — HYDROMORPHONE HYDROCHLORIDE 0.5 MG: 1 INJECTION, SOLUTION INTRAMUSCULAR; INTRAVENOUS; SUBCUTANEOUS at 01:00

## 2018-02-20 RX ADMIN — ACETAMINOPHEN 650 MG: 650 SOLUTION ORAL at 02:46

## 2018-02-20 RX ADMIN — CALCIUM GLUCONATE: 94 INJECTION, SOLUTION INTRAVENOUS at 18:56

## 2018-02-20 RX ADMIN — HYDROMORPHONE HYDROCHLORIDE 0.5 MG: 1 INJECTION, SOLUTION INTRAMUSCULAR; INTRAVENOUS; SUBCUTANEOUS at 16:50

## 2018-02-20 RX ADMIN — Medication 30 ML: at 14:00

## 2018-02-20 RX ADMIN — PANTOPRAZOLE SODIUM 40 MG: 40 INJECTION, POWDER, FOR SOLUTION INTRAVENOUS at 20:48

## 2018-02-20 RX ADMIN — SODIUM CHLORIDE 75 ML/HR: 900 INJECTION, SOLUTION INTRAVENOUS at 02:41

## 2018-02-20 RX ADMIN — NYSTATIN: 100000 POWDER TOPICAL at 18:58

## 2018-02-20 RX ADMIN — HYDROMORPHONE HYDROCHLORIDE 0.5 MG: 1 INJECTION, SOLUTION INTRAMUSCULAR; INTRAVENOUS; SUBCUTANEOUS at 06:54

## 2018-02-20 NOTE — PROGRESS NOTES
Progress Note    Patient: Nestor Lua MRN: [id number]  SSN: [id number]    YOB: 1945  Age: 67 y.o. Sex: female      Admit Date: 2018    8 Days Post-Op    Procedure:  Procedure(s):   EX LAP    Subjective:     Mrs. Patsy Johnson is sedated, but arouseable. Objective:     Visit Vitals    /71 (BP 1 Location: Left arm, BP Patient Position: At rest)    Pulse (!) 113    Temp 100.3 °F (37.9 °C)    Resp 16    Ht 5' 3.5\" (1.613 m)    Wt 195 lb (88.5 kg)    SpO2 97%    Breastfeeding No    BMI 34 kg/m2       Temp (24hrs), Av.9 °F (37.2 °C), Min:97.8 °F (36.6 °C), Max:100.3 °F (37.9 °C)      Physical Exam:    GENERAL: fatigued, cooperative, no distress, oversedated, ABDOMEN: Soft, hypoactive BS, distended, mild tenderness around incision. The incision is clean, dry, and intact with no erythema. The ALYSSA fluid is clear, green. The accordion fluid is serous. Data Review: reviewed  CT abdomen.     Lab Review:   BMP:   Lab Results   Component Value Date/Time     2018 03:46 AM    K 3.7 2018 03:46 AM     (H) 2018 03:46 AM    CO2 27 2018 03:46 AM    AGAP 5 2018 03:46 AM     (H) 2018 03:46 AM    BUN 13 2018 03:46 AM    CREA 0.46 (L) 2018 03:46 AM    GFRAA >60 2018 03:46 AM    GFRNA >60 2018 03:46 AM     CBC:   Lab Results   Component Value Date/Time    WBC 19.8 (H) 2018 03:46 AM    HGB 11.7 2018 03:46 AM    HCT 36.4 2018 03:46 AM     2018 03:46 AM       Assessment:     Hospital Problems  Date Reviewed: 2016          Codes Class Noted POA    Hypokalemia ICD-10-CM: E87.6  ICD-9-CM: 276.8  2018 No        Leukocytosis ICD-10-CM: D72.829  ICD-9-CM: 288.60  2018 Yes        Severe protein-calorie malnutrition ([facility] ICD-10-CM: E43  ICD-9-CM: 262  2018 Yes        Acute metabolic encephalopathy SKV-34-NY: G93.41  ICD-9-CM: 348.31  2018 Yes        Hyperammonemia ([facility] ICD-10-CM: E72.20  ICD-9-CM: 270.6  2/16/2018 No    Overview Addendum 2/17/2018  9:08 AM by Brenda Lyon MD        Ref. Range 2/16/2018 17:20   Ammonia Latest Ref Range: <32 UMOL/L 69 (H)                Free intraperitoneal air ICD-10-CM: K66.8  ICD-9-CM: 568.89  2/12/2018 Yes        S/P exploratory laparotomy ICD-10-CM: Z98.890  ICD-9-CM: V45.89  2/12/2018 No    Overview Signed 2/12/2018 11:57 AM by Molly Head MD     Suture repair of perforated posterior gastric ulcer with Henry Darell patch, core needle biopsies of left lobe of the liver. Alcoholic cirrhosis of liver without ascites (HCC) (Chronic) ICD-10-CM: K70.30  ICD-9-CM: 571.2  2/12/2018 Yes    Overview Signed 2/17/2018  8:45 AM by Brenda Lyon MD     Liver bx 2/12/18:   Cirrhosis with mild chronic portal inflammation and macrovesicular steatosis. Fatty liver determined by biopsy ICD-10-CM: K76.0  ICD-9-CM: 571.8  2/12/2018 Yes    Overview Signed 2/17/2018  8:53 AM by Brenda Lyon MD        Cirrhosis with mild chronic portal inflammation and macrovesicular steatosis. * (Principal)Perforated chronic gastric ulcer ([address] (Chronic) ICD-10-CM: K25.5  ICD-9-CM: 531.50  2/11/2018 Yes        ETOH abuse (Chronic) ICD-10-CM: F10.10  ICD-9-CM: 305.00  2/11/2018 Yes              Plan/Recommendations/Medical Decision Making:     Appears oversedated. Will try to hold Ativan and Dilaudid unless absolutely needed. Continue TPN. No gastric leak on CT.  D/C NGT. Start clears when awake and alert. Continue PPI's. Will need to be treated for H. Pylori at some point. Continue Zosyn. WBC up. Continue drains. Still tachycardic, but otherwise HD stable. IS.  OOB to chair, PT/OT. DVT prophylaxis.     Signed By: Molly Head MD     February 20, 2018

## 2018-02-20 NOTE — PROGRESS NOTES
Family Practice Consult and Daily Progress Note: 2/20/2018  Jhon Casey  Becky Gabrielraine Memos, DO    Assessment/Plan:   Perforated chronic gastric ulcer /  Free intraperitoneal air / S/P exploratory laparotomy. S/p repair in OR with Dr. Suman Duggan on 2/12/18 - per surg     Acute metabolic encephalopathy. Agree with Neurology consult - multifactorial - likely recent surg/anesthesia/medication/underlying liver disease, and generalized acute delirium in a septuagenarian. Continue antibiotic coverage for possible aspiration PNA. Agree with decreasing dose of hydromorphone/pain meds/anxiety meds and use judiciously. CIWA can continue for now but this is not currently c/w EtOH withdrawal.  supportive care, maintenance of sleep/wake cycle, and re-orientation will help - for now monitor and follow Neuro recs.      Hypoxia / Respiratory distress. CXR with atelectasis and pleural effusion. Continue Abx, nebs, supplemental oxygen and incentive spirom as able. Repeat CXR as needed.     Alcoholic cirrhosis of liver without ascites /  Fatty liver determined by biopsy /  Hyperammonemia. Will need to be counseled on weight loss and alcohol cessation when more awake.    ETOH abuse. Can continue CIWA for now but doubt this is withdrawal. Needs to stop EtOH entirely.     Hypokalemia /  Hypoalbuminemia. Being addressed with TPN     Leukocytosis. Could be related to evolving ? PNA v. Stress response.  Monitor        Problem List:  Problem List as of 2/20/2018  Date Reviewed: 4/26/2016          Codes Class Noted - Resolved    Hypokalemia ICD-10-CM: E87.6  ICD-9-CM: 276.8  2/18/2018 - Present        Leukocytosis ICD-10-CM: D72.829  ICD-9-CM: 288.60  2/18/2018 - Present        Severe protein-calorie malnutrition ([facility] ICD-10-CM: E43  ICD-9-CM: 262  2/18/2018 - Present        Acute metabolic encephalopathy HGQ-78-QH: G93.41  ICD-9-CM: 348.31  2/18/2018 - Present        Hyperammonemia ([facility] ICD-10-CM: E72.20  ICD-9-CM: 270.6  2/16/2018 - Present    Overview Addendum 2/17/2018  9:08 AM by Jesika Wall MD        Ref. Range 2/16/2018 17:20   Ammonia Latest Ref Range: <32 UMOL/L 69 (H)                Free intraperitoneal air ICD-10-CM: K66.8  ICD-9-CM: 568.89  2/12/2018 - Present        S/P exploratory laparotomy ICD-10-CM: Z98.890  ICD-9-CM: V45.89  2/12/2018 - Present    Overview Signed 2/12/2018 11:57 AM by Zena Hodgkins., MD     Suture repair of perforated posterior gastric ulcer with Juliaette Nims patch, core needle biopsies of left lobe of the liver. Alcoholic cirrhosis of liver without ascites (HCC) (Chronic) ICD-10-CM: K70.30  ICD-9-CM: 571.2  2/12/2018 - Present    Overview Signed 2/17/2018  8:45 AM by Jesika Wall MD     Liver bx 2/12/18:   Cirrhosis with mild chronic portal inflammation and macrovesicular steatosis. Fatty liver determined by biopsy ICD-10-CM: K76.0  ICD-9-CM: 571.8  2/12/2018 - Present    Overview Signed 2/17/2018  8:53 AM by Jesika Wall MD        Cirrhosis with mild chronic portal inflammation and macrovesicular steatosis. * (Principal)Perforated chronic gastric ulcer ([address] (Chronic) ICD-10-CM: K25.5  ICD-9-CM: 531.50  2/11/2018 - Present        ETOH abuse (Chronic) ICD-10-CM: F10.10  ICD-9-CM: 305.00  2/11/2018 - Present        GI bleed ICD-10-CM: K92.2  ICD-9-CM: 578.9  4/26/2016 - Present        Hypotension ICD-10-CM: I95.9  ICD-9-CM: 458.9  4/26/2016 - Present        Tachycardia ICD-10-CM: R00.0  ICD-9-CM: 785.0  4/26/2016 - Present        Acute blood loss anemia ICD-10-CM: D62  ICD-9-CM: 285.1  4/26/2016 - Present              Subjective:    67 y.o.  female with EtOH abuse who is admitted to the General Surgery Service with perforated gastric ulcer. We are asked to evaluate for altered mental status. The patient is poorly interactive at this time and this limits primary hx.  Discussed case with family available at bedside providing secondary hx and chart review. Per family, patient has had declining neurological condition over past 48 hours. Notably, an RRT was called for respiratory distress. 2/19: This morning she is a bit more alert at this moment and taking in more complete sentences. She says she does not feel well. She has no specific site of pain other than the NG tube which is bothering her a great deal.  She should improve as time from surg increases. K+ a little low - replacing.     2/20:  Still confused and somnolent at times. Now able to localize pain to ab and converse a bit more. Tmax 100.3  WBC is up again but hopefully reactive - recheck in AM - more incentive spirom and check CXR. Past Medical History:   Diagnosis Date    Alcoholic cirrhosis of liver without ascites ([facility] 2/12/2018    ETOH abuse 2/11/2018    History of vascular access device 02/16/2018    [facility] VAT - 5 FR triple, R basilic, TPN    Hyperammonemia ([facility] 2/16/2018     Results for Aminta Guallpa (MRN [id number]) as of 2/17/2018 08:43  Ref. Range 2/16/2018 17:20 Ammonia Latest Ref Range: <32 UMOL/L 69 (H)     Hyperlipidemia     Perforated chronic gastric ulcer ([facility] 2/11/2018     Past Surgical History:   Procedure Laterality Date    HX CATARACT REMOVAL       Social History     Social History    Marital status: SINGLE     Spouse name: N/A    Number of children: N/A    Years of education: N/A     Occupational History    Not on file.      Social History Main Topics    Smoking status: Former Smoker    Smokeless tobacco: Never Used    Alcohol use 11.4 oz/week     0 Standard drinks or equivalent, 19 Glasses of wine per week      Comment: Hx of heavy etoh use, but quit several months ago    Drug use: No    Sexual activity: Not on file     Other Topics Concern    Not on file     Social History Narrative     Family History   Problem Relation Age of Onset    Other Other      patient did not know       Review of Systems:   Review of systems not obtained due to patient factors. Objective:   Physical Exam:     Visit Vitals    /71 (BP 1 Location: Left arm, BP Patient Position: At rest)    Pulse (!) 113    Temp 100.3 °F (37.9 °C)    Resp 16    Ht 5' 3.5\" (1.613 m)    Wt 88.5 kg (195 lb)    SpO2 97%    Breastfeeding No    BMI 34 kg/m2    O2 Flow Rate (L/min): 3 l/min O2 Device: Nasal cannula    Temp (24hrs), Av.1 °F (37.3 °C), Min:97.8 °F (36.6 °C), Max:100.3 °F (37.9 °C)    1901 -  0700  In: -   Out: 45 [Drains:45]   701 - 1900  In: -   Out: 2680 [Urine:2400; Drains:280]    General:  Alert, appears stated age. Head:  Normocephalic, without obvious abnormality, atraumatic. Eyes:  Conjunctivae/corneas clear. EOMs intact. Nose: NG in place. Throat: Lips, mucosa, and tongue moist..   Neck: Supple, symmetrical, trachea midline, no adenopathy, thyroid: no enlargement/tenderness/nodules, no carotid bruit and no JVD. Lungs:   Scattered rhonchi. Chest wall:  No tenderness or deformity. Heart:  Regular rate and rhythm, S1, S2 normal, no murmur, click, rub or gallop. Abdomen:   Soft, distended, with mild generalized tenderness. Bowel sounds present. Dressings dry. Extremities: no cyanosis or edema. No calf tenderness or cords. Skin: Skin color, texture, turgor normal. No rashes or lesions   Neurologic:   Alert and oriented X 2. She knows she is in THE [facility]. She is able to give short answers to questions. She will follow 1 step commands. No cogwheeling or rigidity. Gait not tested at this time. Sensation grossly normal to touch.   Gross motor of extremities normal.       Data Review:       Recent Days:  Recent Labs      18   0346  18   0052  18   0129   WBC  19.8*  15.7*  12.4*   HGB  11.7  12.2  12.6   HCT  36.4  37.2  38.6   PLT  275  214  209     Recent Labs      18   0346  18   0052  18   0129   NA  142  142  142   K  3.7  3.1*  2.8*   CL  110*  108  108   CO2  27 26  27   GLU  109*  135*  139*   BUN  13  12  9   CREA  0.46*  0.46*  0.43*   CA  7.5*  7.5*  7.4*   MG  1.7  1.6  1.6   PHOS  2.6  2.8   --    --    ALB  1.4*  1.5*  1.5*   TBILI  1.0  1.0  0.6   SGOT  26  22  19   ALT  16  13  12     Recent Labs      02/18/18   0100   PH  7.45   PCO2  34*   PO2  72*   HCO3  23       24 Hour Results:  Recent Results (from the past 24 hour(s))   GLUCOSE, POC    Collection Time: 02/19/18  7:22 AM   Result Value Ref Range    Glucose (POC) 146 (H) 65 - 100 mg/dL    Performed by Ulysses Scott (PCT)    GLUCOSE, POC    Collection Time: 02/19/18 11:22 AM   Result Value Ref Range    Glucose (POC) 131 (H) 65 - 100 mg/dL    Performed by Ulysses Scott (PCT)    CULTURE, BODY FLUID Tang November STAIN    Collection Time: 02/19/18  5:15 PM   Result Value Ref Range    Special Requests: NO SPECIAL REQUESTS      GRAM STAIN RARE WBCS SEEN      GRAM STAIN NO ORGANISMS SEEN      Culture result: PENDING    CELL COUNT, BODY FLUID    Collection Time: 02/19/18  5:15 PM   Result Value Ref Range    BODY FLUID TYPE ABDOMEN      FLUID COLOR PINK      FLUID APPEARANCE CLOUDY      FLUID RBC CT. >100 (H) 0 /cu mm    FLUID NUCLEATED CELLS 4972 /cu mm    FLD NEUTROPHILS 72 (A) NRRE %    FLD LYMPHS 18 (A) NRRE %    FLD MONO/MACROPHAGES 10 (A) NRRE %   GLUCOSE, POC    Collection Time: 02/19/18  8:48 PM   Result Value Ref Range    Glucose (POC) 113 (H) 65 - 100 mg/dL    Performed by Cherelle Alonso (PCT)    PHOSPHORUS    Collection Time: 02/20/18  3:46 AM   Result Value Ref Range    Phosphorus 2.8 2.6 - 4.7 MG/DL   CBC WITH AUTOMATED DIFF    Collection Time: 02/20/18  3:46 AM   Result Value Ref Range    WBC 19.8 (H) 3.6 - 11.0 K/uL    RBC 3.44 (L) 3.80 - 5.20 M/uL    HGB 11.7 11.5 - 16.0 g/dL    HCT 36.4 35.0 - 47.0 %    .8 (H) 80.0 - 99.0 FL    MCH 34.0 26.0 - 34.0 PG    MCHC 32.1 30.0 - 36.5 g/dL    RDW 13.6 11.5 - 14.5 %    PLATELET 504 995 - 311 K/uL    MPV 12.2 8.9 - 12.9 FL    NRBC 0.0 0  WBC ABSOLUTE NRBC 0.00 0.00 - 0.01 K/uL    NEUTROPHILS 86 (H) 32 - 75 %    LYMPHOCYTES 6 (L) 12 - 49 %    MONOCYTES 6 5 - 13 %    EOSINOPHILS 1 0 - 7 %    BASOPHILS 0 0 - 1 %    IMMATURE GRANULOCYTES 1 (H) 0.0 - 0.5 %    ABS. NEUTROPHILS 17.0 (H) 1.8 - 8.0 K/UL    ABS. LYMPHOCYTES 1.1 0.8 - 3.5 K/UL    ABS. MONOCYTES 1.2 (H) 0.0 - 1.0 K/UL    ABS. EOSINOPHILS 0.2 0.0 - 0.4 K/UL    ABS. BASOPHILS 0.1 0.0 - 0.1 K/UL    ABS. IMM. GRANS. 0.2 (H) 0.00 - 0.04 K/UL    DF AUTOMATED     MAGNESIUM    Collection Time: 02/20/18  3:46 AM   Result Value Ref Range    Magnesium 1.7 1.6 - 2.4 mg/dL   METABOLIC PANEL, COMPREHENSIVE    Collection Time: 02/20/18  3:46 AM   Result Value Ref Range    Sodium 142 136 - 145 mmol/L    Potassium 3.7 3.5 - 5.1 mmol/L    Chloride 110 (H) 97 - 108 mmol/L    CO2 27 21 - 32 mmol/L    Anion gap 5 5 - 15 mmol/L    Glucose 109 (H) 65 - 100 mg/dL    BUN 13 6 - 20 MG/DL    Creatinine 0.46 (L) 0.55 - 1.02 MG/DL    BUN/Creatinine ratio 28 (H) 12 - 20      GFR est AA >60 >60 ml/min/1.73m2    GFR est non-AA >60 >60 ml/min/1.73m2    Calcium 7.5 (L) 8.5 - 10.1 MG/DL    Bilirubin, total 1.0 0.2 - 1.0 MG/DL    ALT (SGPT) 16 12 - 78 U/L    AST (SGOT) 26 15 - 37 U/L    Alk.  phosphatase 104 45 - 117 U/L    Protein, total 4.8 (L) 6.4 - 8.2 g/dL    Albumin 1.4 (L) 3.5 - 5.0 g/dL    Globulin 3.4 2.0 - 4.0 g/dL    A-G Ratio 0.4 (L) 1.1 - 2.2     PHOSPHORUS    Collection Time: 02/20/18  3:46 AM   Result Value Ref Range    Phosphorus 2.6 2.6 - 4.7 MG/DL   GLUCOSE, POC    Collection Time: 02/20/18  5:40 AM   Result Value Ref Range    Glucose (POC) 126 (H) 65 - 100 mg/dL    Performed by Zeny West (PCT)        Medications reviewed  Current Facility-Administered Medications   Medication Dose Route Frequency    insulin regular (NOVOLIN R, HUMULIN R) injection   SubCUTAneous Q6H    TPN ADULT - CENTRAL   IntraVENous CONTINUOUS    albuterol-ipratropium (DUO-NEB) 2.5 MG-0.5 MG/3 ML  3 mL Nebulization Q4H PRN    HYDROmorphone (PF) (DILAUDID) injection 0.5 mg  0.5 mg IntraVENous Q3H PRN    fat emulsion 20% (LIPOSYN, INTRAlipid) infusion 500 mL  500 mL IntraVENous Q MON, WED & SAT    glucose chewable tablet 16 g  4 Tab Oral PRN    glucagon (GLUCAGEN) injection 1 mg  1 mg IntraMUSCular PRN    0.9% sodium chloride infusion  75 mL/hr IntraVENous CONTINUOUS    dextrose (D50W) injection syrg 12.5-25 g  12.5-25 g IntraVENous PRN    LORazepam (ATIVAN) injection 1 mg  1 mg IntraVENous Q2H PRN    LORazepam (ATIVAN) injection 2 mg  2 mg IntraVENous Q2H PRN    alteplase (CATHFLO) 1 mg in sterile water (preservative free) 1 mL injection  1 mg InterCATHeter PRN    sodium chloride (NS) flush 10-30 mL  10-30 mL InterCATHeter PRN    sodium chloride (NS) flush 10-40 mL  10-40 mL InterCATHeter Q8H    piperacillin-tazobactam (ZOSYN) 10.125 g in 0.9% sodium chloride 500 mL continuous 24 hr infusion  10.125 g IntraVENous Q24H    acetaminophen (TYLENOL) solution 650 mg  650 mg Oral Q6H PRN    naloxone (NARCAN) injection 0.4 mg  0.4 mg IntraVENous PRN    ondansetron (ZOFRAN) injection 4 mg  4 mg IntraVENous Q4H PRN    enoxaparin (LOVENOX) injection 40 mg  40 mg SubCUTAneous Q24H    pantoprazole (PROTONIX) injection 40 mg  40 mg IntraVENous Q12H    phenol throat spray (CHLORASEPTIC) 1 Spray  1 Spray Oral PRN    nystatin (MYCOSTATIN) 100,000 unit/gram powder   Topical BID    sodium chloride (NS) flush 5-10 mL  5-10 mL IntraVENous PRN       Care Plan discussed with: Patient/Family and Nurse  Total time spent with patient: 30 minutes.   Rylan Ca MD

## 2018-02-20 NOTE — PROGRESS NOTES
Problem: Self Care Deficits Care Plan (Adult)  Goal: *Acute Goals and Plan of Care (Insert Text)  Occupational Therapy Goals  Initiated 2/20/2018  1. Patient will perform upper body dressing with moderate assistance in unsupported sitting within 7 day(s). 2.  Patient will perform upper body bathing with moderate assistance  within 7 day(s). 3.  Patient will perform grooming with minimal assistance/contact guard assist within 7 day(s). 4.  Patient will perform toilet transfers with maximal assistance  within 7 day(s). 5.  Patient will perform all aspects of toileting with maximal assistance within 7 day(s). 6.  Patient will participate in upper extremity therapeutic exercise/activities with minimal assistance/contact guard assist for 5 minutes within 7 day(s). Occupational Therapy EVALUATION  Patient: Mey Fox (80 y.o. female)  Date: 2/20/2018  Primary Diagnosis: Intra-abdominal free air of unknown etiology [K66.8]  Procedure(s) (LRB):   NASOGASTRIC TUBE PLACEMENT (N/A) 2 Days Post-Op   Precautions:   Aspiration, Back, Bed Alarm, Fall, Skin    ASSESSMENT :  Based on the objective data described below, the patient presents with hospital admission secondary to abdominal pain, low O2 sats on room air and ETOH abuse with GLF at home, and found to have intra abdominal free air, T12 burst fracture. Patient with complicated hospital course since admission to include perforated gastric ulcer, s/p repair, acute hypoxic respiratory failure, hypokalemia, cirrhosis of liver, intubation, and NG tube placement. Patient received in bed sleeping and agreeable to activity. Patient following therapist commands and does well with support/encouragement of ex  present in room. Patient requires max assist x 2 for rolling and sitting EOB. Patient demonstrates fair sitting balance at EOB and requirs constant support for safety.   Patient does ask for toileting but unable to hold for set up and therefore incontinent of urine sitting EOB. Patient able to stand with mod assist x2 at EOB to allow removal /replacment of bedsheets. Patient fatigues very quickly, noted with sweating and returned to EOB with moderate assistance. Noted HR at 107 with standing and O2 reading in 80s but unclear if accurate as pulse ox on patient ear and misaligned. Patient following treatment session well, though still with some confusion regarding time/place. She reports increased pain with activity and and nursing staff to medicate. Patient returned to bed with max assist x 2. Patient will require rehab placement to maximize function, though course of rehab will be long. Patient will best benefit from SNF placement as she is well below baseline status of independence as reported by family. Patient will benefit from skilled intervention to address the above impairments.   Patients rehabilitation potential is considered to be Fair  Factors which may influence rehabilitation potential include:   []             None noted  [x]             Mental ability/status  [x]             Medical condition  []             Home/family situation and support systems  [x]             Safety awareness  []             Pain tolerance/management  []             Other:      PLAN :  Recommendations and Planned Interventions:  [x]               Self Care Training                  [x]        Therapeutic Activities  [x]               Functional Mobility Training    []        Cognitive Retraining  [x]               Therapeutic Exercises           [x]        Endurance Activities  [x]               Balance Training                   []        Neuromuscular Re-Education  []               Visual/Perceptual Training     [x]   Home Safety Training  [x]               Patient Education                 [x]        Family Training/Education  []               Other (comment):    Frequency/Duration: Patient will be followed by occupational therapy 5 times a week to address goals. Discharge Recommendations: Skilled Nursing Facility  Further Equipment Recommendations for Discharge: TBD     SUBJECTIVE:   Patient stated I want to lay down.     OBJECTIVE DATA SUMMARY:   HISTORY:   Past Medical History:   Diagnosis Date    Alcoholic cirrhosis of liver without ascites ([facility] [address] 2/12/2018    ETOH abuse 2/11/2018    History of vascular access device 02/16/2018    [facility] VAT - 5 FR triple, R basilic, TPN    Hyperammonemia ([facility] [address] 2/16/2018     Results for Kala Diaz (MRN [id number]) as of 2/17/2018 08:43  Ref. Range 2/16/2018 17:20 Ammonia Latest Ref Range: <32 UMOL/L 69 (H)     Hyperlipidemia     Perforated chronic gastric ulcer ([facility] [address] 2/11/2018     Past Surgical History:   Procedure Laterality Date    HX CATARACT REMOVAL         Prior Level of Function/Environment/Context: independent with ADLs, per family  Occupations in which the patient is/was successful, what are the barriers preventing that success:   Performance Patterns (routines, roles, habits, and rituals):   Personal Interests and/or values:   Expanded or extensive additional review of patient history:     Home Situation  Home Environment: Private residence  # Steps to Enter: 7  Rails to Enter: Yes  Hand Rails : Bilateral  One/Two Story Residence: Two story, live on 1st floor  Living Alone: Yes  Support Systems: Child(palma)  Patient Expects to be Discharged to[de-identified] Private residence  Current DME Used/Available at Home: None  Tub or Shower Type:  (sponge bathes )  [x]  Right hand dominant   []  Left hand dominant    EXAMINATION OF PERFORMANCE DEFICITS:  Cognitive/Behavioral Status:  Neurologic State: Confused  Orientation Level: Oriented to person;Oriented to place  Cognition: Follows commands             Skin: intact as seen, multiple drains intact      Hearing:   Auditory  Auditory Impairment: None    Vision/Perceptual:      Range of Motion:  AROM: Generally decreased, functional  PROM: Generally decreased, functional Strength:    Strength: Grossly decreased, non-functional                Coordination:  Coordination: Grossly decreased, non-functional            Tone & Sensation:  Tone: Normal  Sensation: Intact                      Balance:  Sitting: High guard; With support  Standing: Impaired  Standing - Static: Constant support; Fair  Standing - Dynamic : Poor    Functional Mobility and Transfers for ADLs:  Bed Mobility:  Rolling: Maximum assistance; Additional time;Assist x2  Supine to Sit: Maximum assistance; Additional time;Assist x2  Sit to Supine: Maximum assistance; Additional time;Assist x2  Scooting: Moderate assistance; Additional time;Assist x2    Transfers:  Sit to Stand: Moderate assistance; Additional time;Assist x2  Stand to Sit: Moderate assistance; Additional time;Assist x2    ADL Assessment:  Feeding:  (NPO)    Oral Facial Hygiene/Grooming: Maximum assistance    Bathing: Total assistance    Upper Body Dressing: Total assistance    Lower Body Dressing: Total assistance    Toileting: Total assistance (incontinent of urine at EOB)                ADL Intervention and task modifications:                                          Therapeutic Exercise:     Functional Measure:  Barthel Index:    Bathin  Bladder: 0  Bowels: 5  Groomin  Dressin  Feedin  Mobility: 0  Stairs: 0  Toilet Use: 0  Transfer (Bed to Chair and Back): 5  Total: 10       Barthel and G-code impairment scale:  Percentage of impairment CH  0% CI  1-19% CJ  20-39% CK  40-59% CL  60-79% CM  80-99% CN  100%   Barthel Score 0-100 100 99-80 79-60 59-40 20-39 1-19   0   Barthel Score 0-20 20 17-19 13-16 9-12 5-8 1-4 0      The Barthel ADL Index: Guidelines  1. The index should be used as a record of what a patient does, not as a record of what a patient could do. 2. The main aim is to establish degree of independence from any help, physical or verbal, however minor and for whatever reason.   3. The need for supervision renders the patient not independent. 4. A patient's performance should be established using the best available evidence. Asking the patient, friends/relatives and nurses are the usual sources, but direct observation and common sense are also important. However direct testing is not needed. 5. Usually the patient's performance over the preceding 24-48 hours is important, but occasionally longer periods will be relevant. 6. Middle categories imply that the patient supplies over 50 per cent of the effort. 7. Use of aids to be independent is allowed. Jose North., Barthel, DJayeshW. (3150). Functional evaluation: the Barthel Index. [address] [facility] (14)2. Kong Skinner kenan KIM SteveF, Isha Hughes., Alfredo Ibarra., [place], [address] (1999). Measuring the change indisability after inpatient rehabilitation; comparison of the responsiveness of the Barthel Index and Functional [place] Measure. Journal of Neurology, Neurosurgery, and Psychiatry, 66(4), 703-405. Roberta Montgomery, N.J.A, BALBINA Felton, & Radhika Pelletier MJayeshA. (2004.) Assessment of post-stroke quality of life in cost-effectiveness studies: The usefulness of the Barthel Index and the EuroQoL-5D. Quality of Life Research, 13, 195-72         G codes: In compliance with CMSs Claims Based Outcome Reporting, the following G-code set was chosen for this patient based on their primary functional limitation being treated: The outcome measure chosen to determine the severity of the functional limitation was the Barthel Index  with a score of 10/100 which was correlated with the impairment scale. ?  Self Care:     - CURRENT STATUS: CM - 80%-99% impaired, limited or restricted    - GOAL STATUS: CL - 60%-79% impaired, limited or restricted    - D/C STATUS:  ---------------To be determined---------------     Occupational Therapy Evaluation Charge Determination   History Examination Decision-Making   MEDIUM Complexity : Expanded review of history including physical, cognitive and psychosocial  history  MEDIUM Complexity : 3-5 performance deficits relating to physical, cognitive , or psychosocial skils that result in activity limitations and / or participation restrictions HIGH Complexity : Patient presents with comorbidities that affect occupational performance. Signifigant modification of tasks or assistance (eg, physical or verbal) with assessment (s) is necessary to enable patient to complete evaluation       Based on the above components, the patient evaluation is determined to be of the following complexity level: HIGH   Pain:  Pain Scale 1: Numeric (0 - 10)  Pain Intensity 1: 0  Pain Location 1: Back  Pain Orientation 1: Posterior  Pain Description 1: Aching  Pain Intervention(s) 1: Repositioned  Activity Tolerance:   VSS  Please refer to the flowsheet for vital signs taken during this treatment. After treatment:   [] Patient left in no apparent distress sitting up in chair  [x] Patient left in no apparent distress in bed  [x] Call bell left within reach  [x] Nursing notified  [x] Caregiver present  [x] Bed alarm activated    COMMUNICATION/EDUCATION:   The patients plan of care was discussed with: Physical Therapist and Registered Nurse. [x] Home safety education was provided and the patient/caregiver indicated understanding. [x] Patient/family have participated as able in goal setting and plan of care. [x] Patient/family agree to work toward stated goals and plan of care. [] Patient understands intent and goals of therapy, but is neutral about his/her participation. [] Patient is unable to participate in goal setting and plan of care. This patients plan of care is appropriate for delegation to [facility].     Thank you for this referral.  JAN Infante/L  Time Calculation: 31 mins

## 2018-02-20 NOTE — PROGRESS NOTES
Problem: Mobility Impaired (Adult and Pediatric)  Goal: *Acute Goals and Plan of Care (Insert Text)  Physical Therapy Goals  Initiated 2/20/2018    1. Patient will move from supine to sit and sit to supine , scoot up and down and roll side to side in bed with moderate assistance x 2  within 7 days. 2. Patient will perform sit <> stand with minimal assist x 2  within 7 days. 3. Patient will ambulate with minimal assistance x 2 for 10 feet with RW and assist of 3rd pushing chair from behind within 7 days. 4. Patient will verbalize and demonstrate understanding of spinal precautions (No bending, lifting greater than 5 lbs, or twisting; log-roll technique; frequent repositioning as instructed) within 7 days. physical Therapy EVALUATION  Patient: Glen Alberto (67 y.o. female)  Date: 2/20/2018  Primary Diagnosis: Intra-abdominal free air of unknown etiology [K66.8]  Procedure(s) (LRB):   NASOGASTRIC TUBE PLACEMENT (N/A) 2 Days Post-Op   Precautions: Aspiration, Back, Bed Alarm, Fall, Skin    ASSESSMENT :  Based on the objective data described below, the patient presents with severe abdominal pain, poor tolerance for bed mobility and sitting edge of bed, decreased strength BLEs, poor initiation of movement, and poor sitting and standing balance. Ms. Jaci Self has been hospitalized for last 8 days and has had complex hospitalization. She has history of ETOH and found after GLF. She sustained burst fracture of T12 and was admitted to ICU. She had perforated gastric ulcer with free air and blood loss. She required urgent surgery and since has been sedated and off floor for tests and procedures over the last 7 days. She was extubated on 2/14/18. PT has been unable to work with patient due to her medical complexity and today has been first viable opportunity for PT/OT intervention.  Other pertinent events/ history this admission: includes cirrhosis of liver, acute encephalopathy, elevated ammonia levels, NG tube placement. Patient with extreme pain with rolling and bed mobility. Due to patient's drowsy and confused, RN reports they did not want to premedicate for therapies as patient would then be sedated. Patient needed max assist x 2 to supine<>sit. She has bulky abdominal bandages and stapled incision along abdomen as well as 2 drains. Patient has difficulty tolerating all movement. Patient sat edge of bed and with increased work of breathing and drowsiness. BP substantially elevated 180/84 SPO2 95% . At one point ear sensor for continuous pulse oximeter was stretched and recorded 79%. However when sensor replaced with firm contact to ear lobe, immediate rise and registered at 93-95%. Did not intend for patient to stand today and did not believe patient could tolerate donning of TLSO but then she began complaining of urge to void. Patient incontinent urine and stood briefly in order to change bed linens. Patient able to sustain WBAT BLEs for brief stance and sidestep 4 steps with poor foot clearance. Overall max x 2 to total x 2 for back to bed. Vitals returned toward baseline and patient visibly very fatigued with activity. Patient began coughing and twice productive sputum. Patient placed in upright sitting in bed with multi podus boots and SCDs in place. Patient appears still quite critically ill and fragile tolerance of activity with sweating, tachycardia, tachypnea, decreased alertness and c/o severe pain and fatigue. After last 8 days in bed patient's rehab course will be extensive and prolonged. She lives alone but her ex- ( he states best friend) at bedside and 2 daughters present earlier today. Patient has no DME at home and was completely independent and driving prior to onset recent injury. Discussed with patient, RN and ex- oral hygiene swabs and gave patient wash cloth. Encouraged continued AAROM with family members and head of bed elevation. Recommend SNF for rehab.    Patient will benefit from skilled intervention to address the above impairments. Patients rehabilitation potential is considered to be Guarded  Factors which may influence rehabilitation potential include:   []         None noted  [x]         Mental ability/status  [x]         Medical condition  [x]         Home/family situation and support systems  [x]         Safety awareness  [x]         Pain tolerance/management  []         Other:      PLAN :  Recommendations and Planned Interventions:  [x]           Bed Mobility Training             []    Neuromuscular Re-Education  [x]           Transfer Training                   []    Orthotic/Prosthetic Training  [x]           Gait Training                         []    Modalities  [x]           Therapeutic Exercises           []    Edema Management/Control  [x]           Therapeutic Activities            [x]    Patient and Family Training/Education  []           Other (comment):    Frequency/Duration: Patient will be followed by physical therapy  twice daily to address goals. Discharge Recommendations: [facility]  Further Equipment Recommendations for Discharge: TBD     SUBJECTIVE:   Patient stated I am so tired- I can't !.    OBJECTIVE DATA SUMMARY:   HISTORY:    Past Medical History:   Diagnosis Date    Alcoholic cirrhosis of liver without ascites ([facility] [address] 2/12/2018    ETOH abuse 2/11/2018    History of vascular access device 02/16/2018    [facility] VAT - 5 FR triple, R basilic, TPN    Hyperammonemia ([facility] [address] 2/16/2018     Results for Martin Hutchinson (MRN [id number]) as of 2/17/2018 08:43  Ref.  Range 2/16/2018 17:20 Ammonia Latest Ref Range: <32 UMOL/L 69 (H)     Hyperlipidemia     Perforated chronic gastric ulcer ([facility] [address] 2/11/2018     Past Surgical History:   Procedure Laterality Date    HX CATARACT REMOVAL       Prior Level of Function/Home Situation:prior to adm - independent prior to fall and fracture  Personal factors and/or comorbidities impacting plan of care: Lives alone, disorientation, pain control, debility from prolonged bedrest    Home Situation  Home Environment: Private residence  # Steps to Enter: 7  Rails to Enter: Yes  Hand Rails : Bilateral  One/Two Story Residence: Two story, live on 1st floor  Living Alone: Yes  Support Systems: Child(palma)  Patient Expects to be Discharged to[de-identified] Private residence  Current DME Used/Available at Home: None  Tub or Shower Type:  (sponge bathes )    EXAMINATION/PRESENTATION/DECISION MAKING:   Critical Behavior:  Neurologic State: Confused  Orientation Level: Oriented to person, Oriented to place  Cognition: Follows commands     Hearing: Auditory  Auditory Impairment: None  Skin: Multipodus boots, on turn team every 2 hrs  Range Of Motion:  AROM: Generally decreased, functional           PROM: Generally decreased, functional           Strength:    Strength: Grossly decreased, non-functional                    Tone & Sensation:   Tone: Normal              Sensation: Intact               Coordination:  Coordination: Grossly decreased, non-functional  Vision:      Functional Mobility:  Bed Mobility:  Rolling: Maximum assistance; Additional time;Assist x2  Supine to Sit: Maximum assistance; Additional time;Assist x2  Sit to Supine: Maximum assistance; Additional time;Assist x2  Scooting: Moderate assistance; Additional time;Assist x2  Transfers:  Sit to Stand: Moderate assistance; Additional time;Assist x2  Stand to Sit: Moderate assistance; Additional time;Assist x2  Stand Pivot Transfers: (NT)                    Balance:   Sitting: High guard; With support  Standing: Impaired  Standing - Static: Constant support; Fair  Standing - Dynamic : Poor  Ambulation/Gait Training:  Distance (ft): 1 Feet (ft) (4 sidesteps )     Ambulation - Level of Assistance: Moderate assistance; Additional time;Assist x2        Gait Abnormalities: Antalgic;Decreased step clearance         Functional Measure:  Barthel Index:    Bathin  Bladder: 0  Bowels: 5  Groomin  Dressin  Feedin  Mobility: 0  Stairs: 0  Toilet Use: 0  Transfer (Bed to Chair and Back): 5  Total: 10       Barthel and G-code impairment scale:  Percentage of impairment CH  0% CI  1-19% CJ  20-39% CK  40-59% CL  60-79% CM  80-99% CN  100%   Barthel Score 0-100 100 99-80 79-60 59-40 20-39 1-19   0   Barthel Score 0-20 20 17-19 13-16 9-12 5-8 1-4 0      The Barthel ADL Index: Guidelines  1. The index should be used as a record of what a patient does, not as a record of what a patient could do. 2. The main aim is to establish degree of independence from any help, physical or verbal, however minor and for whatever reason. 3. The need for supervision renders the patient not independent. 4. A patient's performance should be established using the best available evidence. Asking the patient, friends/relatives and nurses are the usual sources, but direct observation and common sense are also important. However direct testing is not needed. 5. Usually the patient's performance over the preceding 24-48 hours is important, but occasionally longer periods will be relevant. 6. Middle categories imply that the patient supplies over 50 per cent of the effort. 7. Use of aids to be independent is allowed. John Cannon., Barthel, D.W. (8006). Functional evaluation: the Barthel Index. [address] [facility] (14)2. PAULO Keller, Eloina Buckley, Tova Wilcoxs., [place], [address] [place] [address] (). Measuring the change indisability after inpatient rehabilitation; comparison of the responsiveness of the Barthel Index and Functional [place] Measure. Journal of Neurology, Neurosurgery, and Psychiatry, 66(4), 107-159. Roberta Cheung, N.J.A, BALBINA Felton, & Meir Dobbs M.A. (2004.) Assessment of post-stroke quality of life in cost-effectiveness studies: The usefulness of the Barthel Index and the EuroQoL-5D. Quality of Life Research, 13, 967-36       G codes:   In compliance with CMSs Claims Based Outcome Reporting, the following G-code set was chosen for this patient based on their primary functional limitation being treated: The outcome measure chosen to determine the severity of the functional limitation was the Barthel Index with a score of 10/100 which was correlated with the impairment scale. ? Mobility - Walking and Moving Around:     - CURRENT STATUS: CM - 80%-99% impaired, limited or restricted    - GOAL STATUS: CL - 60%-79% impaired, limited or restricted    - D/C STATUS:  ---------------To be determined---------------      Physical Therapy Evaluation Charge Determination   History Examination Presentation Decision-Making   HIGH Complexity :3+ comorbidities / personal factors will impact the outcome/ POC  HIGH Complexity : 4+ Standardized tests and measures addressing body structure, function, activity limitation and / or participation in recreation  HIGH Complexity : Unstable and unpredictable characteristics  Other outcome measures Barthel Index  HIGH       Based on the above components, the patient evaluation is determined to be of the following complexity level: HIGH     Pain:  Pain Scale 1: Numeric (0 - 10)  Pain Intensity 1: 0  Pain Location 1: Back  Pain Orientation 1: Posterior  Pain Description 1: Aching  Pain Intervention(s) 1: Repositioned  Activity Tolerance:   Poor - see flowsheet  Please refer to the flowsheet for vital signs taken during this treatment. After treatment:   []         Patient left in no apparent distress sitting up in chair  [x]         Patient left in no apparent distress in bed sitting upright   [x]         Call bell left within reach  [x]         Nursing notified  [x]         Caregiver present  [x]         Bed alarm activated    COMMUNICATION/EDUCATION:   The patients plan of care was discussed with: Occupational Therapist and Registered Nurse.  []         Fall prevention education was provided and the patient/caregiver indicated understanding.   [] Patient/family have participated as able in goal setting and plan of care. []         Patient/family agree to work toward stated goals and plan of care. []         Patient understands intent and goals of therapy, but is neutral about his/her participation. [x]         Patient is unable to participate in goal setting and plan of care.     Thank you for this referral.  Jayden Mcgill, PT   Time Calculation: 26 mins

## 2018-02-20 NOTE — PROGRESS NOTES
PT - patient with great pain today with minimal bed mobility and attempted stance. Unable to tolerate don of brace due to severe abdominal pain. Patient extremely debilitated and will need to build up to be able to tolerate PT BID. Concerned to advance too quickly to BID PT due to complex medical status. PT will follow up tomorrow and discuss with RN, PT timed with pre medication so that we may try to don TLSO. Lethargy and sedating pain medication has been significant barrier to patient being alert enough to follow some commands and participate in therapies. Without pre medicated for tx however patient cannot tolerate therapies and is unsafe to be up to chair at this time.  Thank you  Janece Habermann, MSPT

## 2018-02-21 ENCOUNTER — APPOINTMENT (OUTPATIENT)
Dept: GENERAL RADIOLOGY | Age: 73
DRG: 853 | End: 2018-02-21
Attending: SURGERY
Payer: MEDICARE

## 2018-02-21 LAB
ALBUMIN SERPL-MCNC: 1.4 G/DL (ref 3.5–5)
ALBUMIN/GLOB SERPL: 0.4 {RATIO} (ref 1.1–2.2)
ALP SERPL-CCNC: 109 U/L (ref 45–117)
ALT SERPL-CCNC: 13 U/L (ref 12–78)
ANION GAP SERPL CALC-SCNC: 7 MMOL/L (ref 5–15)
AST SERPL-CCNC: 26 U/L (ref 15–37)
BASOPHILS # BLD: 0.1 K/UL (ref 0–0.1)
BASOPHILS NFR BLD: 1 % (ref 0–1)
BILIRUB SERPL-MCNC: 1.4 MG/DL (ref 0.2–1)
BUN SERPL-MCNC: 16 MG/DL (ref 6–20)
BUN/CREAT SERPL: 36 (ref 12–20)
CALCIUM SERPL-MCNC: 7.7 MG/DL (ref 8.5–10.1)
CHLORIDE SERPL-SCNC: 111 MMOL/L (ref 97–108)
CO2 SERPL-SCNC: 25 MMOL/L (ref 21–32)
CREAT SERPL-MCNC: 0.45 MG/DL (ref 0.55–1.02)
DIFFERENTIAL METHOD BLD: ABNORMAL
EOSINOPHIL # BLD: 0.1 K/UL (ref 0–0.4)
EOSINOPHIL NFR BLD: 1 % (ref 0–7)
ERYTHROCYTE [DISTWIDTH] IN BLOOD BY AUTOMATED COUNT: 13.9 % (ref 11.5–14.5)
GLOBULIN SER CALC-MCNC: 3.5 G/DL (ref 2–4)
GLUCOSE BLD STRIP.AUTO-MCNC: 106 MG/DL (ref 65–100)
GLUCOSE BLD STRIP.AUTO-MCNC: 106 MG/DL (ref 65–100)
GLUCOSE BLD STRIP.AUTO-MCNC: 112 MG/DL (ref 65–100)
GLUCOSE BLD STRIP.AUTO-MCNC: 124 MG/DL (ref 65–100)
GLUCOSE SERPL-MCNC: 104 MG/DL (ref 65–100)
HCT VFR BLD AUTO: 33.1 % (ref 35–47)
HGB BLD-MCNC: 10.7 G/DL (ref 11.5–16)
IMM GRANULOCYTES # BLD: 0.1 K/UL (ref 0–0.04)
IMM GRANULOCYTES NFR BLD AUTO: 1 % (ref 0–0.5)
LYMPHOCYTES # BLD: 1.2 K/UL (ref 0.8–3.5)
LYMPHOCYTES NFR BLD: 6 % (ref 12–49)
MAGNESIUM SERPL-MCNC: 1.8 MG/DL (ref 1.6–2.4)
MCH RBC QN AUTO: 34 PG (ref 26–34)
MCHC RBC AUTO-ENTMCNC: 32.3 G/DL (ref 30–36.5)
MCV RBC AUTO: 105.1 FL (ref 80–99)
MONOCYTES # BLD: 1.2 K/UL (ref 0–1)
MONOCYTES NFR BLD: 6 % (ref 5–13)
NEUTS SEG # BLD: 16.4 K/UL (ref 1.8–8)
NEUTS SEG NFR BLD: 86 % (ref 32–75)
NRBC # BLD: 0 K/UL (ref 0–0.01)
NRBC BLD-RTO: 0 PER 100 WBC
PHOSPHATE SERPL-MCNC: 3.1 MG/DL (ref 2.6–4.7)
PLATELET # BLD AUTO: 311 K/UL (ref 150–400)
PMV BLD AUTO: 12.1 FL (ref 8.9–12.9)
POTASSIUM SERPL-SCNC: 4.2 MMOL/L (ref 3.5–5.1)
PROT SERPL-MCNC: 4.9 G/DL (ref 6.4–8.2)
RBC # BLD AUTO: 3.15 M/UL (ref 3.8–5.2)
SERVICE CMNT-IMP: ABNORMAL
SODIUM SERPL-SCNC: 143 MMOL/L (ref 136–145)
TRIGL SERPL-MCNC: 76 MG/DL (ref ?–150)
WBC # BLD AUTO: 19.1 K/UL (ref 3.6–11)

## 2018-02-21 PROCEDURE — 77010033678 HC OXYGEN DAILY

## 2018-02-21 PROCEDURE — 84478 ASSAY OF TRIGLYCERIDES: CPT | Performed by: SURGERY

## 2018-02-21 PROCEDURE — 74241 XR UPPER GI SERIES W KUB: CPT

## 2018-02-21 PROCEDURE — C9113 INJ PANTOPRAZOLE SODIUM, VIA: HCPCS | Performed by: SURGERY

## 2018-02-21 PROCEDURE — 74011250637 HC RX REV CODE- 250/637: Performed by: SURGERY

## 2018-02-21 PROCEDURE — 97116 GAIT TRAINING THERAPY: CPT

## 2018-02-21 PROCEDURE — 65660000000 HC RM CCU STEPDOWN

## 2018-02-21 PROCEDURE — 80053 COMPREHEN METABOLIC PANEL: CPT | Performed by: SURGERY

## 2018-02-21 PROCEDURE — 74011000258 HC RX REV CODE- 258: Performed by: SURGERY

## 2018-02-21 PROCEDURE — 97110 THERAPEUTIC EXERCISES: CPT

## 2018-02-21 PROCEDURE — 36415 COLL VENOUS BLD VENIPUNCTURE: CPT | Performed by: SURGERY

## 2018-02-21 PROCEDURE — 84100 ASSAY OF PHOSPHORUS: CPT | Performed by: SURGERY

## 2018-02-21 PROCEDURE — 74011000250 HC RX REV CODE- 250: Performed by: SURGERY

## 2018-02-21 PROCEDURE — 85025 COMPLETE CBC W/AUTO DIFF WBC: CPT | Performed by: SURGERY

## 2018-02-21 PROCEDURE — 74011636320 HC RX REV CODE- 636/320: Performed by: SURGERY

## 2018-02-21 PROCEDURE — 74011250636 HC RX REV CODE- 250/636: Performed by: SURGERY

## 2018-02-21 PROCEDURE — 83735 ASSAY OF MAGNESIUM: CPT | Performed by: SURGERY

## 2018-02-21 PROCEDURE — 82962 GLUCOSE BLOOD TEST: CPT

## 2018-02-21 PROCEDURE — 97530 THERAPEUTIC ACTIVITIES: CPT

## 2018-02-21 PROCEDURE — 77030038269 HC DRN EXT URIN PURWCK BARD -A

## 2018-02-21 PROCEDURE — 74011250636 HC RX REV CODE- 250/636: Performed by: INTERNAL MEDICINE

## 2018-02-21 RX ORDER — FACIAL-BODY WIPES
10 EACH TOPICAL DAILY PRN
Status: DISCONTINUED | OUTPATIENT
Start: 2018-02-21 | End: 2018-02-28

## 2018-02-21 RX ADMIN — PIPERACILLIN SODIUM AND TAZOBACTAM SODIUM 10.12 G: 3; .375 INJECTION, POWDER, LYOPHILIZED, FOR SOLUTION INTRAVENOUS at 12:11

## 2018-02-21 RX ADMIN — HYDROMORPHONE HYDROCHLORIDE 0.5 MG: 1 INJECTION, SOLUTION INTRAMUSCULAR; INTRAVENOUS; SUBCUTANEOUS at 12:00

## 2018-02-21 RX ADMIN — HYDROMORPHONE HYDROCHLORIDE 0.5 MG: 1 INJECTION, SOLUTION INTRAMUSCULAR; INTRAVENOUS; SUBCUTANEOUS at 05:02

## 2018-02-21 RX ADMIN — Medication 10 ML: at 13:26

## 2018-02-21 RX ADMIN — HYDROMORPHONE HYDROCHLORIDE 0.5 MG: 1 INJECTION, SOLUTION INTRAMUSCULAR; INTRAVENOUS; SUBCUTANEOUS at 16:31

## 2018-02-21 RX ADMIN — BISACODYL 10 MG: 10 SUPPOSITORY RECTAL at 16:33

## 2018-02-21 RX ADMIN — LORAZEPAM 1 MG: 2 INJECTION INTRAMUSCULAR; INTRAVENOUS at 02:35

## 2018-02-21 RX ADMIN — CALCIUM GLUCONATE: 94 INJECTION, SOLUTION INTRAVENOUS at 18:08

## 2018-02-21 RX ADMIN — I.V. FAT EMULSION 500 ML: 20 EMULSION INTRAVENOUS at 18:06

## 2018-02-21 RX ADMIN — IOHEXOL 70 ML: 350 INJECTION, SOLUTION INTRAVENOUS at 11:34

## 2018-02-21 RX ADMIN — NYSTATIN: 100000 POWDER TOPICAL at 18:07

## 2018-02-21 RX ADMIN — ENOXAPARIN SODIUM 40 MG: 40 INJECTION SUBCUTANEOUS at 14:51

## 2018-02-21 RX ADMIN — PANTOPRAZOLE SODIUM 40 MG: 40 INJECTION, POWDER, FOR SOLUTION INTRAVENOUS at 10:23

## 2018-02-21 RX ADMIN — NYSTATIN: 100000 POWDER TOPICAL at 10:13

## 2018-02-21 RX ADMIN — Medication 30 ML: at 06:00

## 2018-02-21 NOTE — PROGRESS NOTES
Bedside and Verbal shift change report given to Eden Davalos RN (oncoming nurse) by Mikhail Enriquez Rn (offgoing nurse). Report included the following information SBAR, Kardex, Procedure Summary, Intake/Output, MAR, Recent Results and Cardiac Rhythm Sinus Tach.

## 2018-02-21 NOTE — PROGRESS NOTES
Progress Note    Patient: Karla Spivey MRN: [id number]  SSN: [id number]    YOB: 1945  Age: 67 y.o. Sex: female      Admit Date: 2018    9 Days Post-Op    Procedure:  Procedure(s):   EX LAP    Subjective:     Mrs. Ailyn Goff is more alert and oriented today. She has more back than abdominal pain. She is not passing flatus or BM. Objective:     Visit Vitals    /63 (BP 1 Location: Left arm, BP Patient Position: At rest)    Pulse (!) 103    Temp 97.5 °F (36.4 °C)    Resp 16    Ht 5' 3.5\" (1.613 m)    Wt 193 lb 12.6 oz (87.9 kg)    SpO2 95%    Breastfeeding No    BMI 33.79 kg/m2       Temp (24hrs), Av.9 °F (36.6 °C), Min:97.4 °F (36.3 °C), Max:98.4 °F (36.9 °C)      Physical Exam:    GENERAL: alert, fatigued, cooperative, no distress, ABDOMEN: Soft, hypoactive BS, mild tenderness and distention. The ALYSSA fluid is bilious. The accordion fluid is serous. Data Review: reviewed  cultures.     Lab Review:   BMP:   Lab Results   Component Value Date/Time     2018 03:45 AM    K 4.2 2018 03:45 AM     (H) 2018 03:45 AM    CO2 25 2018 03:45 AM    AGAP 7 2018 03:45 AM     (H) 2018 03:45 AM    BUN 16 2018 03:45 AM    CREA 0.45 (L) 2018 03:45 AM    GFRAA >60 2018 03:45 AM    GFRNA >60 2018 03:45 AM     CMP:   Lab Results   Component Value Date/Time     2018 03:45 AM    K 4.2 2018 03:45 AM     (H) 2018 03:45 AM    CO2 25 2018 03:45 AM    AGAP 7 2018 03:45 AM     (H) 2018 03:45 AM    BUN 16 2018 03:45 AM    CREA 0.45 (L) 2018 03:45 AM    GFRAA >60 2018 03:45 AM    GFRNA >60 2018 03:45 AM    CA 7.7 (L) 2018 03:45 AM    MG 1.8 2018 03:45 AM    PHOS 3.1 2018 03:45 AM    ALB 1.4 (L) 2018 03:45 AM    TP 4.9 (L) 2018 03:45 AM    GLOB 3.5 2018 03:45 AM    AGRAT 0.4 (L) 2018 03:45 AM    SGOT 26 02/21/2018 03:45 AM    ALT 13 02/21/2018 03:45 AM     CBC:   Lab Results   Component Value Date/Time    WBC 19.1 (H) 02/21/2018 03:45 AM    HGB 10.7 (L) 02/21/2018 03:45 AM    HCT 33.1 (L) 02/21/2018 03:45 AM     02/21/2018 03:45 AM       Assessment:     Hospital Problems  Date Reviewed: 4/26/2016          Codes Class Noted POA    Hypokalemia ICD-10-CM: E87.6  ICD-9-CM: 276.8  2/18/2018 No        Leukocytosis ICD-10-CM: D72.829  ICD-9-CM: 288.60  2/18/2018 Yes        Severe protein-calorie malnutrition ([address] ICD-10-CM: E43  ICD-9-CM: 262  2/18/2018 Yes        Acute metabolic encephalopathy ZZI-53-LL: G93.41  ICD-9-CM: 348.31  2/18/2018 Yes        Hyperammonemia (HCC) ICD-10-CM: E72.20  ICD-9-CM: 270.6  2/16/2018 No    Overview Addendum 2/17/2018  9:08 AM by Darcie Foster MD        Ref. Range 2/16/2018 17:20   Ammonia Latest Ref Range: <32 UMOL/L 69 (H)                Free intraperitoneal air ICD-10-CM: K66.8  ICD-9-CM: 568.89  2/12/2018 Yes        S/P exploratory laparotomy ICD-10-CM: Z98.890  ICD-9-CM: V45.89  2/12/2018 No    Overview Signed 2/12/2018 11:57 AM by Julia Hogue MD     Suture repair of perforated posterior gastric ulcer with Cabrera Arellano patch, core needle biopsies of left lobe of the liver. Alcoholic cirrhosis of liver without ascites (HCC) (Chronic) ICD-10-CM: K70.30  ICD-9-CM: 571.2  2/12/2018 Yes    Overview Signed 2/17/2018  8:45 AM by Darcie Foster MD     Liver bx 2/12/18:   Cirrhosis with mild chronic portal inflammation and macrovesicular steatosis. Fatty liver determined by biopsy ICD-10-CM: K76.0  ICD-9-CM: 571.8  2/12/2018 Yes    Overview Signed 2/17/2018  8:53 AM by Darcie Foster MD        Cirrhosis with mild chronic portal inflammation and macrovesicular steatosis.               * (Principal)Perforated chronic gastric ulcer ([address] (Chronic) ICD-10-CM: K25.5  ICD-9-CM: 531.50  2/11/2018 Yes        ETOH abuse (Chronic) ICD-10-CM: F10.10  ICD-9-CM: 305.00  2/11/2018 Yes              Plan/Recommendations/Medical Decision Making:     Continue TPN and bowel rest.  Still with bilious drainage from the ALYSSA. No leak noted on CT. Will try to get UGI. WBC hanging at 19. Continue Zosyn and drains. OOB with PT. Suppository.       Signed By: Corinne Elaine MD     February 21, 2018

## 2018-02-21 NOTE — PROGRESS NOTES
Problem: Mobility Impaired (Adult and Pediatric)  Goal: *Acute Goals and Plan of Care (Insert Text)  Physical Therapy Goals  Initiated 2/20/2018    1. Patient will move from supine to sit and sit to supine , scoot up and down and roll side to side in bed with moderate assistance x 2  within 7 days. 2. Patient will perform sit <> stand with minimal assist x 2  within 7 days. 3. Patient will ambulate with minimal assistance x 2 for 10 feet with RW and assist of 3rd pushing chair from behind within 7 days. 4. Patient will verbalize and demonstrate understanding of spinal precautions (No bending, lifting greater than 5 lbs, or twisting; log-roll technique; frequent repositioning as instructed) within 7 days. physical Therapy TREATMENT  Patient: Jeanne Portillo (2 y.o. female)  Date: 2/21/2018  Diagnosis: Intra-abdominal free air of unknown etiology [K66.8] Perforated chronic gastric ulcer ([address]  Procedure(s) (LRB):   NASOGASTRIC TUBE PLACEMENT (N/A) 3 Days Post-Op  Precautions: Aspiration, Back, Bed Alarm, Fall, Skin  Chart, physical therapy assessment, plan of care and goals were reviewed. ASSESSMENT:  Patient in bed awake. [place]. Her friend (stan) present and encouraging patient during treatment. Performed semireclined bed exercises x 10 reps each for strength and range of motion of bilateral lower extremities. Performed transfer training for supine <> sidelying <> sit with max assist of two and cues for technique. Patient needs review of log rolling technique. Patient asking for the therapist to \"pull me up. \"  Performed sit <> stand with cues for hand placement and to use the armrest of the bedside commode. Gait training with moderate assist of two and assist needed with steering. Patient improving. Tolerating more therapy today. Limited by back pain with mobility. Attempted to jennifer TLSO brace, adjusted twice to attempting fitting - but difficult due to two abdominal drains present.   Brace was sized but not yet tightened/used. Will continue to assess use and fit. Patient asleep before therapist left the room. May benefit from sitting up in chair next treatment if no sleepy. Pulse ox also quickly desats and returns to 90s with mobility. (questionable reading due to speed of change)  Progression toward goals:  [x]    Improving appropriately and progressing toward goals  []    Improving slowly and progressing toward goals  []    Not making progress toward goals and plan of care will be adjusted     PLAN:  Patient continues to benefit from skilled intervention to address the above impairments. Continue treatment per established plan of care. Discharge Recommendations:  Rehab  Further Equipment Recommendations for Discharge:  tbd     SUBJECTIVE:   Patient stated it hurts.     OBJECTIVE DATA SUMMARY:   Critical Behavior:  Neurologic State: Alert  Orientation Level: Oriented to person, Oriented to place  Cognition: Follows commands     Functional Mobility Training:  Bed Mobility:     Supine to Sit: Maximum assistance;Assist x2; Additional time  Sit to Supine: Maximum assistance;Assist x2; Additional time           Transfers:  Sit to Stand: Moderate assistance;Assist x2; Additional time  Stand to Sit: Moderate assistance;Assist x2; Additional time                             Balance:     Ambulation/Gait Training:  Distance (ft): 3 Feet (ft) (twice)  Assistive Device: Walker, rolling;Gait belt  Ambulation - Level of Assistance: Moderate assistance;Assist x2; Additional time        Gait Abnormalities: Step to gait; Antalgic        Base of Support: Widened     Speed/Alejandra: Delayed  Step Length: Right shortened;Left shortened                    Stairs: Therapeutic Exercises:   Performed semireclined bed exercises x 10 reps each for strength and range of motion of bilateral lower extremities.    Performed resisted PF, resisted HS, SAQs, Ab/ad    Pain Scale 1: Numeric (0 - 10)  Pain Intensity 1: 8  Pain Location 1: Abdomen  Pain Orientation 1: Anterior  Pain Description 1: Aching;Constant  Pain Intervention(s) 1: Emotional support;Distraction  Activity Tolerance:   Limited by pain and weakness  Please refer to the flowsheet for vital signs taken during this treatment.   After treatment:   []    Patient left in no apparent distress sitting up in chair  [x]    Patient left in no apparent distress in bed  [x]    Call bell left within reach  [x]    Nursing notified  [x]    Caregiver present  []    Bed alarm activated    COMMUNICATION/COLLABORATION:   The patients plan of care was discussed with: Registered Nurse    Azul Pearson, PT   Time Calculation: 43 mins

## 2018-02-21 NOTE — PROGRESS NOTES
Bedside shift change report given to Inocencio Aguila (oncoming nurse) by Eben Guillermo (offgoing nurse). Report included the following information SBAR, Kardex, OR Summary, Procedure Summary, Intake/Output and MAR.

## 2018-02-21 NOTE — PROGRESS NOTES
Reviewed chart and spoke with nurse. Patient off floor at Upper GI procedure. Will continue to attempt Physical Therapy as schedule allows and patient is available.

## 2018-02-21 NOTE — PROGRESS NOTES
Family Practice Consult and Daily Progress Note: 2/21/2018  Foy Bamberger Tiffany Epifania Sear, DO    Assessment/Plan:   Perforated chronic gastric ulcer /  Free intraperitoneal air / S/P exploratory laparotomy. S/p repair in OR with Dr. Autumn Gonzalez on 2/12/18 - per surg     Acute metabolic encephalopathy. Agree with Neurology consult - multifactorial - likely recent surg/anesthesia/medication/underlying liver disease, and generalized acute delirium in a septuagenarian. Continue antibiotic coverage for possible aspiration PNA. Agree with decreasing dose of hydromorphone/pain meds/anxiety meds and use judiciously. CIWA can continue for now but this is not currently c/w EtOH withdrawal.  supportive care, maintenance of sleep/wake cycle, and re-orientation will help - for now monitor and follow Neuro recs.      Hypoxia / Respiratory distress. CXR with atelectasis and pleural effusion. Continue Abx, nebs, supplemental oxygen and incentive spirom as able. Repeat CXR as needed.     Alcoholic cirrhosis of liver without ascites /  Fatty liver determined by biopsy /  Hyperammonemia. Will need to be counseled on weight loss and alcohol cessation when more awake.    ETOH abuse. Can continue CIWA for now but doubt this is withdrawal. Needs to stop EtOH entirely.     Hypokalemia /  Hypoalbuminemia. Being addressed with TPN     Leukocytosis. Could be related to evolving ? PNA v. Stress response.  Monitor        Problem List:  Problem List as of 2/21/2018  Date Reviewed: 4/26/2016          Codes Class Noted - Resolved    Hypokalemia ICD-10-CM: E87.6  ICD-9-CM: 276.8  2/18/2018 - Present        Leukocytosis ICD-10-CM: D72.829  ICD-9-CM: 288.60  2/18/2018 - Present        Severe protein-calorie malnutrition ([facility] ICD-10-CM: E43  ICD-9-CM: 262  2/18/2018 - Present        Acute metabolic encephalopathy JEL-55-: G93.41  ICD-9-CM: 348.31  2/18/2018 - Present        Hyperammonemia ([facility] [address] [facility] ICD-10-CM: E72.20  ICD-9-CM: 270.6  2/16/2018 - Present    Overview Addendum 2/17/2018  9:08 AM by Joy Membreno MD        Ref. Range 2/16/2018 17:20   Ammonia Latest Ref Range: <32 UMOL/L 69 (H)                Free intraperitoneal air ICD-10-CM: K66.8  ICD-9-CM: 568.89  2/12/2018 - Present        S/P exploratory laparotomy ICD-10-CM: Z98.890  ICD-9-CM: V45.89  2/12/2018 - Present    Overview Signed 2/12/2018 11:57 AM by Katia Blake MD     Suture repair of perforated posterior gastric ulcer with Sammi Abi patch, core needle biopsies of left lobe of the liver. Alcoholic cirrhosis of liver without ascites (HCC) (Chronic) ICD-10-CM: K70.30  ICD-9-CM: 571.2  2/12/2018 - Present    Overview Signed 2/17/2018  8:45 AM by Joy Membreno MD     Liver bx 2/12/18:   Cirrhosis with mild chronic portal inflammation and macrovesicular steatosis. Fatty liver determined by biopsy ICD-10-CM: K76.0  ICD-9-CM: 571.8  2/12/2018 - Present    Overview Signed 2/17/2018  8:53 AM by Joy Membreno MD        Cirrhosis with mild chronic portal inflammation and macrovesicular steatosis. * (Principal)Perforated chronic gastric ulcer ([address] (Chronic) ICD-10-CM: K25.5  ICD-9-CM: 531.50  2/11/2018 - Present        ETOH abuse (Chronic) ICD-10-CM: F10.10  ICD-9-CM: 305.00  2/11/2018 - Present        GI bleed ICD-10-CM: K92.2  ICD-9-CM: 578.9  4/26/2016 - Present        Hypotension ICD-10-CM: I95.9  ICD-9-CM: 458.9  4/26/2016 - Present        Tachycardia ICD-10-CM: R00.0  ICD-9-CM: 785.0  4/26/2016 - Present        Acute blood loss anemia ICD-10-CM: D62  ICD-9-CM: 285.1  4/26/2016 - Present              Subjective:    67 y.o.  female with EtOH abuse who is admitted to the General Surgery Service with perforated gastric ulcer. We are asked to evaluate for altered mental status. The patient is poorly interactive at this time and this limits primary hx.  Discussed case with family available at bedside providing secondary hx and chart review. Per family, patient has had declining neurological condition over past 48 hours. Notably, an RRT was called for respiratory distress. 2/19: This morning she is a bit more alert at this moment and taking in more complete sentences. She says she does not feel well. She has no specific site of pain other than the NG tube which is bothering her a great deal.  She should improve as time from surg increases. K+ a little low - replacing.     2/20:  Still confused and somnolent at times. Now able to localize pain to ab and converse a bit more. Tmax 100.3  WBC is up again but hopefully reactive - recheck in AM - more incentive spirom and check CXR.     2/21: A little more alert. Knows she is in the hospital. Not able to participate with PT yesterday as she was in too much pain. WBC still at 19. Past Medical History:   Diagnosis Date    Alcoholic cirrhosis of liver without ascites ([facility] 2/12/2018    ETOH abuse 2/11/2018    History of vascular access device 02/16/2018    [facility] VAT - 5 FR triple, R basilic, TPN    Hyperammonemia ([facility] 2/16/2018     Results for [place] Harman (MRN [id number]) as of 2/17/2018 08:43  Ref. Range 2/16/2018 17:20 Ammonia Latest Ref Range: <32 UMOL/L 69 (H)     Hyperlipidemia     Perforated chronic gastric ulcer ([facility] 2/11/2018     Past Surgical History:   Procedure Laterality Date    HX CATARACT REMOVAL       Social History     Social History    Marital status: SINGLE     Spouse name: N/A    Number of children: N/A    Years of education: N/A     Occupational History    Not on file.      Social History Main Topics    Smoking status: Former Smoker    Smokeless tobacco: Never Used    Alcohol use 11.4 oz/week     0 Standard drinks or equivalent, 19 Glasses of wine per week      Comment: Hx of heavy etoh use, but quit several months ago    Drug use: No    Sexual activity: Not on file     Other Topics Concern    Not on file     Social History Narrative     Family History   Problem Relation Age of Onset    Other Other      patient did not know       Review of Systems:   Review of systems not obtained due to patient factors. Objective:   Physical Exam:     Visit Vitals    /63 (BP 1 Location: Left arm, BP Patient Position: At rest)    Pulse (!) 103    Temp 97.5 °F (36.4 °C)    Resp 16    Ht 5' 3.5\" (1.613 m)    Wt 193 lb 12.6 oz (87.9 kg)    SpO2 95%    Breastfeeding No    BMI 33.79 kg/m2    O2 Flow Rate (L/min): 2 l/min O2 Device: Nasal cannula    Temp (24hrs), Av.8 °F (36.6 °C), Min:97.4 °F (36.3 °C), Max:98.4 °F (36.9 °C)         1901 -  0700  In: 3.9 [I.V.:3.9]  Out: 0246 [Urine:850; Drains:275]    General:  Alert but sleepy, appears stated age. Head:  Normocephalic, without obvious abnormality, atraumatic. Eyes:  Conjunctivae/corneas clear. EOMs intact. Nose: NG in place. Throat: Lips, mucosa, and tongue moist..   Neck: Supple, symmetrical, trachea midline, no adenopathy, thyroid: no enlargement/tenderness/nodules, no carotid bruit and no JVD. Lungs:   Scattered rhonchi. Chest wall:  No tenderness or deformity. Heart:  Regular rate and rhythm, S1, S2 normal, no murmur, click, rub or gallop. Abdomen:   Soft, distended, with mild generalized tenderness. Bowel sounds present. Dressings dry. Incision dry without redness   Extremities: no cyanosis or edema. No calf tenderness or cords. Skin: Skin color, texture, turgor normal. No rashes or lesions   Neurologic:   Alert and oriented X 2. She knows she is in [facility]. She is able to give short answers to questions. She will follow 1 step commands. No cogwheeling or rigidity. Gait not tested at this time. Sensation grossly normal to touch.   Gross motor of extremities normal.       Data Review:       Recent Days:  Recent Labs      18   0345  18   0346  18   0052   WBC  19.1*  19.8*  15.7*   HGB  10.7*  11.7  12.2   HCT  33.1*  36.4  37.2 PLT  311  275  214     Recent Labs      02/21/18   0345  02/20/18   0346  02/19/18   0052   NA  143  142  142   K  4.2  3.7  3.1*   CL  111*  110*  108   CO2  25  27  26   GLU  104*  109*  135*   BUN  16  13  12   CREA  0.45*  0.46*  0.46*   CA  7.7*  7.5*  7.5*   MG  1.8  1.7  1.6   PHOS  3.1  2.6  2.8   --    ALB  1.4*  1.4*  1.5*   TBILI  1.4*  1.0  1.0   SGOT  26  26  22   ALT  13  16  13     No results for input(s): PH, PCO2, PO2, HCO3, FIO2 in the last 72 hours. 24 Hour Results:  Recent Results (from the past 24 hour(s))   GLUCOSE, POC    Collection Time: 02/20/18 12:56 PM   Result Value Ref Range    Glucose (POC) 97 65 - 100 mg/dL    Performed by [address] [place] [address] Rd, BLOOD    Collection Time: 02/20/18  5:13 PM   Result Value Ref Range    Special Requests: NO SPECIAL REQUESTS      Culture result: NO GROWTH AFTER 12 HOURS     GLUCOSE, POC    Collection Time: 02/20/18  6:05 PM   Result Value Ref Range    Glucose (POC) 111 (H) 65 - 100 mg/dL    Performed by [facility]    GLUCOSE, POC    Collection Time: 02/20/18 11:30 PM   Result Value Ref Range    Glucose (POC) 103 (H) 65 - 100 mg/dL    Performed by Sherrel Denver (PCT)    CBC WITH AUTOMATED DIFF    Collection Time: 02/21/18  3:45 AM   Result Value Ref Range    WBC 19.1 (H) 3.6 - 11.0 K/uL    RBC 3.15 (L) 3.80 - 5.20 M/uL    HGB 10.7 (L) 11.5 - 16.0 g/dL    HCT 33.1 (L) 35.0 - 47.0 %    .1 (H) 80.0 - 99.0 FL    MCH 34.0 26.0 - 34.0 PG    MCHC 32.3 30.0 - 36.5 g/dL    RDW 13.9 11.5 - 14.5 %    PLATELET 289 813 - 077 K/uL    MPV 12.1 8.9 - 12.9 FL    NRBC 0.0 0  WBC    ABSOLUTE NRBC 0.00 0.00 - 0.01 K/uL    NEUTROPHILS 86 (H) 32 - 75 %    LYMPHOCYTES 6 (L) 12 - 49 %    MONOCYTES 6 5 - 13 %    EOSINOPHILS 1 0 - 7 %    BASOPHILS 1 0 - 1 %    IMMATURE GRANULOCYTES 1 (H) 0.0 - 0.5 %    ABS. NEUTROPHILS 16.4 (H) 1.8 - 8.0 K/UL    ABS. LYMPHOCYTES 1.2 0.8 - 3.5 K/UL    ABS. MONOCYTES 1.2 (H) 0.0 - 1.0 K/UL    ABS.  EOSINOPHILS 0.1 0.0 - 0.4 K/UL    ABS. BASOPHILS 0.1 0.0 - 0.1 K/UL    ABS. IMM. GRANS. 0.1 (H) 0.00 - 0.04 K/UL    DF AUTOMATED     MAGNESIUM    Collection Time: 02/21/18  3:45 AM   Result Value Ref Range    Magnesium 1.8 1.6 - 2.4 mg/dL   METABOLIC PANEL, COMPREHENSIVE    Collection Time: 02/21/18  3:45 AM   Result Value Ref Range    Sodium 143 136 - 145 mmol/L    Potassium 4.2 3.5 - 5.1 mmol/L    Chloride 111 (H) 97 - 108 mmol/L    CO2 25 21 - 32 mmol/L    Anion gap 7 5 - 15 mmol/L    Glucose 104 (H) 65 - 100 mg/dL    BUN 16 6 - 20 MG/DL    Creatinine 0.45 (L) 0.55 - 1.02 MG/DL    BUN/Creatinine ratio 36 (H) 12 - 20      GFR est AA >60 >60 ml/min/1.73m2    GFR est non-AA >60 >60 ml/min/1.73m2    Calcium 7.7 (L) 8.5 - 10.1 MG/DL    Bilirubin, total 1.4 (H) 0.2 - 1.0 MG/DL    ALT (SGPT) 13 12 - 78 U/L    AST (SGOT) 26 15 - 37 U/L    Alk.  phosphatase 109 45 - 117 U/L    Protein, total 4.9 (L) 6.4 - 8.2 g/dL    Albumin 1.4 (L) 3.5 - 5.0 g/dL    Globulin 3.5 2.0 - 4.0 g/dL    A-G Ratio 0.4 (L) 1.1 - 2.2     PHOSPHORUS    Collection Time: 02/21/18  3:45 AM   Result Value Ref Range    Phosphorus 3.1 2.6 - 4.7 MG/DL   GLUCOSE, POC    Collection Time: 02/21/18  5:46 AM   Result Value Ref Range    Glucose (POC) 112 (H) 65 - 100 mg/dL    Performed by Malcolm Weiner (PCT)        Medications reviewed  Current Facility-Administered Medications   Medication Dose Route Frequency    HYDROmorphone (PF) (DILAUDID) injection 0.5 mg  0.5 mg IntraVENous Q4H PRN    LORazepam (ATIVAN) injection 1 mg  1 mg IntraVENous Q4H PRN    TPN ADULT - CENTRAL   IntraVENous CONTINUOUS    insulin regular (NOVOLIN R, HUMULIN R) injection   SubCUTAneous Q6H    albuterol-ipratropium (DUO-NEB) 2.5 MG-0.5 MG/3 ML  3 mL Nebulization Q4H PRN    fat emulsion 20% (LIPOSYN, INTRAlipid) infusion 500 mL  500 mL IntraVENous Q MON, WED & SAT    glucose chewable tablet 16 g  4 Tab Oral PRN    glucagon (GLUCAGEN) injection 1 mg  1 mg IntraMUSCular PRN    dextrose (D50W) injection syrg 12.5-25 g  12.5-25 g IntraVENous PRN    alteplase (CATHFLO) 1 mg in sterile water (preservative free) 1 mL injection  1 mg InterCATHeter PRN    sodium chloride (NS) flush 10-30 mL  10-30 mL InterCATHeter PRN    sodium chloride (NS) flush 10-40 mL  10-40 mL InterCATHeter Q8H    piperacillin-tazobactam (ZOSYN) 10.125 g in 0.9% sodium chloride 500 mL continuous 24 hr infusion  10.125 g IntraVENous Q24H    acetaminophen (TYLENOL) solution 650 mg  650 mg Oral Q6H PRN    naloxone (NARCAN) injection 0.4 mg  0.4 mg IntraVENous PRN    ondansetron (ZOFRAN) injection 4 mg  4 mg IntraVENous Q4H PRN    enoxaparin (LOVENOX) injection 40 mg  40 mg SubCUTAneous Q24H    pantoprazole (PROTONIX) injection 40 mg  40 mg IntraVENous Q12H    phenol throat spray (CHLORASEPTIC) 1 Spray  1 Spray Oral PRN    nystatin (MYCOSTATIN) 100,000 unit/gram powder   Topical BID    sodium chloride (NS) flush 5-10 mL  5-10 mL IntraVENous PRN       Care Plan discussed with: Patient/Family and Nurse  Total time spent with patient: 30 minutes.     Sherman Rod MD

## 2018-02-22 ENCOUNTER — APPOINTMENT (OUTPATIENT)
Dept: CT IMAGING | Age: 73
DRG: 853 | End: 2018-02-22
Attending: SURGERY
Payer: MEDICARE

## 2018-02-22 LAB
ALBUMIN SERPL-MCNC: 1.5 G/DL (ref 3.5–5)
ALBUMIN/GLOB SERPL: 0.4 {RATIO} (ref 1.1–2.2)
ALP SERPL-CCNC: 167 U/L (ref 45–117)
ALT SERPL-CCNC: 25 U/L (ref 12–78)
ANION GAP SERPL CALC-SCNC: 7 MMOL/L (ref 5–15)
AST SERPL-CCNC: 46 U/L (ref 15–37)
BASOPHILS # BLD: 0.1 K/UL (ref 0–0.1)
BASOPHILS NFR BLD: 1 % (ref 0–1)
BILIRUB SERPL-MCNC: 1 MG/DL (ref 0.2–1)
BNP SERPL-MCNC: 854 PG/ML (ref 0–125)
BUN SERPL-MCNC: 12 MG/DL (ref 6–20)
BUN/CREAT SERPL: 28 (ref 12–20)
CALCIUM SERPL-MCNC: 7.8 MG/DL (ref 8.5–10.1)
CHLORIDE SERPL-SCNC: 107 MMOL/L (ref 97–108)
CO2 SERPL-SCNC: 27 MMOL/L (ref 21–32)
CREAT SERPL-MCNC: 0.43 MG/DL (ref 0.55–1.02)
DIFFERENTIAL METHOD BLD: ABNORMAL
EOSINOPHIL # BLD: 0.1 K/UL (ref 0–0.4)
EOSINOPHIL NFR BLD: 1 % (ref 0–7)
ERYTHROCYTE [DISTWIDTH] IN BLOOD BY AUTOMATED COUNT: 13.9 % (ref 11.5–14.5)
GLOBULIN SER CALC-MCNC: 3.8 G/DL (ref 2–4)
GLUCOSE BLD STRIP.AUTO-MCNC: 103 MG/DL (ref 65–100)
GLUCOSE BLD STRIP.AUTO-MCNC: 103 MG/DL (ref 65–100)
GLUCOSE BLD STRIP.AUTO-MCNC: 120 MG/DL (ref 65–100)
GLUCOSE SERPL-MCNC: 96 MG/DL (ref 65–100)
HCT VFR BLD AUTO: 32.6 % (ref 35–47)
HGB BLD-MCNC: 10.6 G/DL (ref 11.5–16)
IMM GRANULOCYTES # BLD: 0.1 K/UL (ref 0–0.04)
IMM GRANULOCYTES NFR BLD AUTO: 1 % (ref 0–0.5)
LYMPHOCYTES # BLD: 1.6 K/UL (ref 0.8–3.5)
LYMPHOCYTES NFR BLD: 8 % (ref 12–49)
MAGNESIUM SERPL-MCNC: 1.9 MG/DL (ref 1.6–2.4)
MCH RBC QN AUTO: 34.2 PG (ref 26–34)
MCHC RBC AUTO-ENTMCNC: 32.5 G/DL (ref 30–36.5)
MCV RBC AUTO: 105.2 FL (ref 80–99)
MONOCYTES # BLD: 1.3 K/UL (ref 0–1)
MONOCYTES NFR BLD: 6 % (ref 5–13)
NEUTS SEG # BLD: 16.7 K/UL (ref 1.8–8)
NEUTS SEG NFR BLD: 84 % (ref 32–75)
NRBC # BLD: 0 K/UL (ref 0–0.01)
NRBC BLD-RTO: 0 PER 100 WBC
PHOSPHATE SERPL-MCNC: 3.6 MG/DL (ref 2.6–4.7)
PLATELET # BLD AUTO: 408 K/UL (ref 150–400)
PMV BLD AUTO: 11.9 FL (ref 8.9–12.9)
POTASSIUM SERPL-SCNC: 4 MMOL/L (ref 3.5–5.1)
PROT SERPL-MCNC: 5.3 G/DL (ref 6.4–8.2)
RBC # BLD AUTO: 3.1 M/UL (ref 3.8–5.2)
SERVICE CMNT-IMP: ABNORMAL
SODIUM SERPL-SCNC: 141 MMOL/L (ref 136–145)
WBC # BLD AUTO: 19.9 K/UL (ref 3.6–11)

## 2018-02-22 PROCEDURE — 74011000250 HC RX REV CODE- 250: Performed by: SURGERY

## 2018-02-22 PROCEDURE — 77010033678 HC OXYGEN DAILY

## 2018-02-22 PROCEDURE — 49406 IMAGE CATH FLUID PERI/RETRO: CPT

## 2018-02-22 PROCEDURE — 77030002996 HC SUT SLK J&J -A

## 2018-02-22 PROCEDURE — 74011636320 HC RX REV CODE- 636/320: Performed by: RADIOLOGY

## 2018-02-22 PROCEDURE — C1769 GUIDE WIRE: HCPCS

## 2018-02-22 PROCEDURE — 71275 CT ANGIOGRAPHY CHEST: CPT

## 2018-02-22 PROCEDURE — 74011000250 HC RX REV CODE- 250: Performed by: RADIOLOGY

## 2018-02-22 PROCEDURE — C9113 INJ PANTOPRAZOLE SODIUM, VIA: HCPCS | Performed by: SURGERY

## 2018-02-22 PROCEDURE — 99153 MOD SED SAME PHYS/QHP EA: CPT

## 2018-02-22 PROCEDURE — 77030010546 HC BG URIN DRNG URES -B

## 2018-02-22 PROCEDURE — 83880 ASSAY OF NATRIURETIC PEPTIDE: CPT | Performed by: NURSE PRACTITIONER

## 2018-02-22 PROCEDURE — 74011250636 HC RX REV CODE- 250/636: Performed by: SURGERY

## 2018-02-22 PROCEDURE — 80053 COMPREHEN METABOLIC PANEL: CPT | Performed by: SURGERY

## 2018-02-22 PROCEDURE — 74011250637 HC RX REV CODE- 250/637: Performed by: SURGERY

## 2018-02-22 PROCEDURE — 74011250636 HC RX REV CODE- 250/636: Performed by: NURSE PRACTITIONER

## 2018-02-22 PROCEDURE — 74177 CT ABD & PELVIS W/CONTRAST: CPT

## 2018-02-22 PROCEDURE — 84100 ASSAY OF PHOSPHORUS: CPT | Performed by: SURGERY

## 2018-02-22 PROCEDURE — 77030038269 HC DRN EXT URIN PURWCK BARD -A

## 2018-02-22 PROCEDURE — 85025 COMPLETE CBC W/AUTO DIFF WBC: CPT | Performed by: FAMILY MEDICINE

## 2018-02-22 PROCEDURE — 77030003462 HC NDL BIOP BRST MDT -B

## 2018-02-22 PROCEDURE — 74011250637 HC RX REV CODE- 250/637: Performed by: FAMILY MEDICINE

## 2018-02-22 PROCEDURE — 94640 AIRWAY INHALATION TREATMENT: CPT

## 2018-02-22 PROCEDURE — 36415 COLL VENOUS BLD VENIPUNCTURE: CPT | Performed by: SURGERY

## 2018-02-22 PROCEDURE — 83735 ASSAY OF MAGNESIUM: CPT | Performed by: SURGERY

## 2018-02-22 PROCEDURE — 74011250636 HC RX REV CODE- 250/636: Performed by: RADIOLOGY

## 2018-02-22 PROCEDURE — C1729 CATH, DRAINAGE: HCPCS

## 2018-02-22 PROCEDURE — 65660000000 HC RM CCU STEPDOWN

## 2018-02-22 PROCEDURE — 82962 GLUCOSE BLOOD TEST: CPT

## 2018-02-22 PROCEDURE — 74011250636 HC RX REV CODE- 250/636: Performed by: INTERNAL MEDICINE

## 2018-02-22 PROCEDURE — 77030005208 HC CATH HLDR GLWC -A

## 2018-02-22 PROCEDURE — 74011000258 HC RX REV CODE- 258: Performed by: SURGERY

## 2018-02-22 PROCEDURE — 99152 MOD SED SAME PHYS/QHP 5/>YRS: CPT

## 2018-02-22 RX ORDER — MIDAZOLAM HYDROCHLORIDE 1 MG/ML
5 INJECTION, SOLUTION INTRAMUSCULAR; INTRAVENOUS
Status: DISCONTINUED | OUTPATIENT
Start: 2018-02-22 | End: 2018-02-22

## 2018-02-22 RX ORDER — LEVOFLOXACIN 5 MG/ML
750 INJECTION, SOLUTION INTRAVENOUS EVERY 24 HOURS
Status: DISCONTINUED | OUTPATIENT
Start: 2018-02-22 | End: 2018-02-27

## 2018-02-22 RX ORDER — FENTANYL CITRATE 50 UG/ML
100 INJECTION, SOLUTION INTRAMUSCULAR; INTRAVENOUS
Status: DISCONTINUED | OUTPATIENT
Start: 2018-02-22 | End: 2018-02-22

## 2018-02-22 RX ORDER — METOPROLOL TARTRATE 25 MG/1
25 TABLET, FILM COATED ORAL ONCE
Status: COMPLETED | OUTPATIENT
Start: 2018-02-22 | End: 2018-02-22

## 2018-02-22 RX ORDER — LIDOCAINE HYDROCHLORIDE 10 MG/ML
10 INJECTION, SOLUTION EPIDURAL; INFILTRATION; INTRACAUDAL; PERINEURAL
Status: COMPLETED | OUTPATIENT
Start: 2018-02-22 | End: 2018-02-22

## 2018-02-22 RX ORDER — VANCOMYCIN/0.9 % SOD CHLORIDE 1 G/100 ML
1000 PLASTIC BAG, INJECTION (ML) INTRAVENOUS EVERY 12 HOURS
Status: DISCONTINUED | OUTPATIENT
Start: 2018-02-22 | End: 2018-02-22 | Stop reason: DRUGHIGH

## 2018-02-22 RX ORDER — FUROSEMIDE 10 MG/ML
20 INJECTION INTRAMUSCULAR; INTRAVENOUS ONCE
Status: COMPLETED | OUTPATIENT
Start: 2018-02-22 | End: 2018-02-22

## 2018-02-22 RX ADMIN — HYDROMORPHONE HYDROCHLORIDE 0.5 MG: 1 INJECTION, SOLUTION INTRAMUSCULAR; INTRAVENOUS; SUBCUTANEOUS at 01:42

## 2018-02-22 RX ADMIN — FUROSEMIDE 20 MG: 10 INJECTION, SOLUTION INTRAMUSCULAR; INTRAVENOUS at 17:30

## 2018-02-22 RX ADMIN — PANTOPRAZOLE SODIUM 40 MG: 40 INJECTION, POWDER, FOR SOLUTION INTRAVENOUS at 09:47

## 2018-02-22 RX ADMIN — HYDROMORPHONE HYDROCHLORIDE 0.5 MG: 1 INJECTION, SOLUTION INTRAMUSCULAR; INTRAVENOUS; SUBCUTANEOUS at 17:31

## 2018-02-22 RX ADMIN — MIDAZOLAM 0.5 MG: 1 INJECTION INTRAMUSCULAR; INTRAVENOUS at 15:23

## 2018-02-22 RX ADMIN — ENOXAPARIN SODIUM 40 MG: 40 INJECTION SUBCUTANEOUS at 17:30

## 2018-02-22 RX ADMIN — PANTOPRAZOLE SODIUM 40 MG: 40 INJECTION, POWDER, FOR SOLUTION INTRAVENOUS at 00:41

## 2018-02-22 RX ADMIN — Medication 10 ML: at 17:31

## 2018-02-22 RX ADMIN — NYSTATIN: 100000 POWDER TOPICAL at 17:30

## 2018-02-22 RX ADMIN — MIDAZOLAM 1 MG: 1 INJECTION INTRAMUSCULAR; INTRAVENOUS at 15:12

## 2018-02-22 RX ADMIN — ONDANSETRON 4 MG: 2 INJECTION INTRAMUSCULAR; INTRAVENOUS at 00:41

## 2018-02-22 RX ADMIN — HYDROMORPHONE HYDROCHLORIDE 0.5 MG: 1 INJECTION, SOLUTION INTRAMUSCULAR; INTRAVENOUS; SUBCUTANEOUS at 21:36

## 2018-02-22 RX ADMIN — IOPAMIDOL 95 ML: 755 INJECTION, SOLUTION INTRAVENOUS at 12:04

## 2018-02-22 RX ADMIN — PIPERACILLIN SODIUM AND TAZOBACTAM SODIUM 10.12 G: 3; .375 INJECTION, POWDER, LYOPHILIZED, FOR SOLUTION INTRAVENOUS at 12:51

## 2018-02-22 RX ADMIN — FENTANYL CITRATE 25 MCG: 50 INJECTION, SOLUTION INTRAMUSCULAR; INTRAVENOUS at 15:12

## 2018-02-22 RX ADMIN — LORAZEPAM 1 MG: 2 INJECTION INTRAMUSCULAR; INTRAVENOUS at 21:36

## 2018-02-22 RX ADMIN — LIDOCAINE HYDROCHLORIDE 10 ML: 10 INJECTION, SOLUTION EPIDURAL; INFILTRATION; INTRACAUDAL; PERINEURAL at 16:06

## 2018-02-22 RX ADMIN — NYSTATIN: 100000 POWDER TOPICAL at 09:46

## 2018-02-22 RX ADMIN — PANTOPRAZOLE SODIUM 40 MG: 40 INJECTION, POWDER, FOR SOLUTION INTRAVENOUS at 21:36

## 2018-02-22 RX ADMIN — CALCIUM GLUCONATE: 94 INJECTION, SOLUTION INTRAVENOUS at 18:24

## 2018-02-22 RX ADMIN — FENTANYL CITRATE 12.5 MCG: 50 INJECTION, SOLUTION INTRAMUSCULAR; INTRAVENOUS at 15:33

## 2018-02-22 RX ADMIN — MIDAZOLAM 0.5 MG: 1 INJECTION INTRAMUSCULAR; INTRAVENOUS at 15:27

## 2018-02-22 RX ADMIN — LORAZEPAM 1 MG: 2 INJECTION INTRAMUSCULAR; INTRAVENOUS at 00:41

## 2018-02-22 RX ADMIN — METOPROLOL TARTRATE 25 MG: 25 TABLET ORAL at 04:35

## 2018-02-22 RX ADMIN — HYDROMORPHONE HYDROCHLORIDE 0.5 MG: 1 INJECTION, SOLUTION INTRAMUSCULAR; INTRAVENOUS; SUBCUTANEOUS at 10:01

## 2018-02-22 RX ADMIN — LEVOFLOXACIN 750 MG: 5 INJECTION, SOLUTION INTRAVENOUS at 17:30

## 2018-02-22 RX ADMIN — VANCOMYCIN HYDROCHLORIDE 2250 MG: 10 INJECTION, POWDER, LYOPHILIZED, FOR SOLUTION INTRAVENOUS at 18:24

## 2018-02-22 RX ADMIN — IPRATROPIUM BROMIDE AND ALBUTEROL SULFATE 3 ML: .5; 3 SOLUTION RESPIRATORY (INHALATION) at 07:01

## 2018-02-22 RX ADMIN — Medication 10 ML: at 00:42

## 2018-02-22 RX ADMIN — MIDAZOLAM 0.5 MG: 1 INJECTION INTRAMUSCULAR; INTRAVENOUS at 15:33

## 2018-02-22 NOTE — PROGRESS NOTES
At [address] was notified that patients heart rate was in the high 120s, her respirations were 32. md notified. Orders for metoprolol tartrate 2mg po and to add magnesium to morning labs. Bedside and Verbal shift change report given to Charlie Mcgill (oncoming nurse) by Allie Jones (offgoing nurse). Report included the following information SBAR, Kardex, MAR and Recent Results.

## 2018-02-22 NOTE — PROGRESS NOTES
Family Practice Consult and Daily Progress Note: 2/22/2018  Anthony Zuniga DO    Assessment/Plan:   Perforated chronic gastric ulcer /  Free intraperitoneal air / S/P exploratory laparotomy. S/p repair in OR with Dr. Low Zepeda on 2/12/18 - per surg     Acute metabolic encephalopathy. Agree with Neurology consult - multifactorial - likely recent surg/anesthesia/medication/underlying liver disease, and generalized acute delirium in a septuagenarian. Continue antibiotic coverage for possible aspiration PNA. Agree with decreasing dose of hydromorphone/pain meds/anxiety meds and use judiciously. Supportive care, maintenance of sleep/wake cycle, and re-orientation will help - for now monitor and follow Neuro recs.      Hypoxia / Respiratory distress. CXR with atelectasis and pleural effusion. Continue Abx, nebs, supplemental oxygen and incentive spirom as able. Repeat CXR as needed.     Alcoholic cirrhosis of liver without ascites /  Fatty liver determined by biopsy /  Hyperammonemia. Will need to be counseled on weight loss and alcohol cessation.     ETOH abuse. Needs to stop EtOH entirely.     Hypokalemia /  Hypoalbuminemia. Being addressed with TPN     Leukocytosis. Could be related to evolving ? PNA v. Stress response. Monitor        Problem List:  Problem List as of 2/22/2018  Date Reviewed: 4/26/2016          Codes Class Noted - Resolved    Hypokalemia ICD-10-CM: E87.6  ICD-9-CM: 276.8  2/18/2018 - Present        Leukocytosis ICD-10-CM: D72.829  ICD-9-CM: 288.60  2/18/2018 - Present        Severe protein-calorie malnutrition ([facility] [address] ICD-10-CM: E43  ICD-9-CM: 262  2/18/2018 - Present        Acute metabolic encephalopathy JVI-55-AN: G93.41  ICD-9-CM: 348.31  2/18/2018 - Present        Hyperammonemia ([facility] [address] ICD-10-CM: E72.20  ICD-9-CM: 270.6  2/16/2018 - Present    Overview Addendum 2/17/2018  9:08 AM by Keanu Whitt MD        Ref.  Range 2/16/2018 17:20   Ammonia Latest Ref Range: <32 UMOL/L 69 (H)                Free intraperitoneal air ICD-10-CM: K66.8  ICD-9-CM: 568.89  2/12/2018 - Present        S/P exploratory laparotomy ICD-10-CM: Z98.890  ICD-9-CM: V45.89  2/12/2018 - Present    Overview Signed 2/12/2018 11:57 AM by Edward Baker MD     Suture repair of perforated posterior gastric ulcer with Mevelyn Prophet patch, core needle biopsies of left lobe of the liver. Alcoholic cirrhosis of liver without ascites (HCC) (Chronic) ICD-10-CM: K70.30  ICD-9-CM: 571.2  2/12/2018 - Present    Overview Signed 2/17/2018  8:45 AM by Nuno Kuo MD     Liver bx 2/12/18:   Cirrhosis with mild chronic portal inflammation and macrovesicular steatosis. Fatty liver determined by biopsy ICD-10-CM: K76.0  ICD-9-CM: 571.8  2/12/2018 - Present    Overview Signed 2/17/2018  8:53 AM by Nuno Kuo MD        Cirrhosis with mild chronic portal inflammation and macrovesicular steatosis. * (Principal)Perforated chronic gastric ulcer ([address] (Chronic) ICD-10-CM: K25.5  ICD-9-CM: 531.50  2/11/2018 - Present        ETOH abuse (Chronic) ICD-10-CM: F10.10  ICD-9-CM: 305.00  2/11/2018 - Present        GI bleed ICD-10-CM: K92.2  ICD-9-CM: 578.9  4/26/2016 - Present        Hypotension ICD-10-CM: I95.9  ICD-9-CM: 458.9  4/26/2016 - Present        Tachycardia ICD-10-CM: R00.0  ICD-9-CM: 785.0  4/26/2016 - Present        Acute blood loss anemia ICD-10-CM: D62  ICD-9-CM: 285.1  4/26/2016 - Present              Subjective:    67 y.o.  female with EtOH abuse who is admitted to the General Surgery Service with perforated gastric ulcer. We are asked to evaluate for altered mental status. The patient is poorly interactive at this time and this limits primary hx. Discussed case with family available at bedside providing secondary hx and chart review. Per family, patient has had declining neurological condition over past 48 hours.  Notably, an RRT was called for respiratory distress. 2/19: This morning she is a bit more alert at this moment and taking in more complete sentences. She says she does not feel well. She has no specific site of pain other than the NG tube which is bothering her a great deal.  She should improve as time from surg increases. K+ a little low - replacing.     2/20:  Still confused and somnolent at times. Now able to localize pain to ab and converse a bit more. Tmax 100.3  WBC is up again but hopefully reactive - recheck in AM - more incentive spirom and check CXR.     2/21: A little more alert. Knows she is in the hospital. Not able to participate with PT yesterday as she was in too much pain. WBC still at 19.     2/22:  WBC 19K. She is more alert. Daughter at bedside. Both of her daughters live out of town. Looking at SNF options for rehab. Coughing more. Starting to use IS. Past Medical History:   Diagnosis Date    Alcoholic cirrhosis of liver without ascites ([facility] [address] 2/12/2018    ETOH abuse 2/11/2018    History of vascular access device 02/16/2018    [facility] VAT - 5 FR triple, R basilic, TPN    Hyperammonemia ([facility] [address] 2/16/2018     Results for Aminta Guallpa (MRN [id number]) as of 2/17/2018 08:43  Ref. Range 2/16/2018 17:20 Ammonia Latest Ref Range: <32 UMOL/L 69 (H)     Hyperlipidemia     Perforated chronic gastric ulcer ([facility] [address] 2/11/2018     Past Surgical History:   Procedure Laterality Date    HX CATARACT REMOVAL       Social History     Social History    Marital status: SINGLE     Spouse name: N/A    Number of children: N/A    Years of education: N/A     Occupational History    Not on file.      Social History Main Topics    Smoking status: Former Smoker    Smokeless tobacco: Never Used    Alcohol use 11.4 oz/week     0 Standard drinks or equivalent, 19 Glasses of wine per week      Comment: Hx of heavy etoh use, but quit several months ago    Drug use: No    Sexual activity: Not on file     Other Topics Concern    Not on file Social History Narrative     Family History   Problem Relation Age of Onset    Other Other      patient did not know       Review of Systems:   Review of systems not obtained due to patient factors. Objective:   Physical Exam:     Visit Vitals    /66 (BP 1 Location: Left arm, BP Patient Position: At rest)    Pulse 95    Temp 98 °F (36.7 °C)    Resp 15    Ht 5' 3.5\" (1.613 m)    Wt 87.9 kg (193 lb 12.6 oz)    SpO2 96%    Breastfeeding No    BMI 33.79 kg/m2    O2 Flow Rate (L/min): 2 l/min O2 Device: Nasal cannula    Temp (24hrs), Av.2 °F (36.8 °C), Min:97.5 °F (36.4 °C), Max:99 °F (37.2 °C)        1901 -  0700  In: .6 [I.V.:.6]  Out:  [Urine:1775; Drains:275]    General:  Alert and oriented, appears stated age. Head:  Normocephalic, without obvious abnormality, atraumatic. Eyes:  Conjunctivae/corneas clear. EOMs intact. Nose: Patent, on NC   Throat: Lips, mucosa, and tongue moist..   Neck: Supple, symmetrical, trachea midline, no adenopathy, thyroid: no enlargement/tenderness/nodules, no carotid bruit and no JVD. Lungs:   Scattered rhonchi. Chest wall:  No tenderness or deformity. Heart:  Regular rate and rhythm, S1, S2 normal, no murmur, click, rub or gallop. Abdomen:   Soft, distended, with mild generalized tenderness. Bowel sounds present. Dressings dry. Incision dry without redness   Extremities: no cyanosis or edema. No calf tenderness or cords. Skin: Skin color, texture, turgor normal. No rashes or lesions   Neurologic:   Alert and oriented X 2. She is able to answer questions. She will follow  commands. No cogwheeling or rigidity. Gait not tested at this time. Sensation grossly normal to touch.   Gross motor of extremities normal.       Data Review:       Recent Days:  Recent Labs      18   0457  18   0345  18   0346   WBC  19.9*  19.1*  19.8*   HGB  10.6*  10.7*  11.7   HCT  32.6*  33.1*  36.4   PLT  408*  311  139 Recent Labs      02/22/18   0457  02/21/18   0345  02/20/18   0346   NA  141  143  142   K  4.0  4.2  3.7   CL  107  111*  110*   CO2  27  25  27   GLU  96  104*  109*   BUN  12  16  13   CREA  0.43*  0.45*  0.46*   CA  7.8*  7.7*  7.5*   MG  1.9  1.8  1.7   PHOS  3.6  3.1  2.6  2.8   ALB  1.5*  1.4*  1.4*   TBILI  1.0  1.4*  1.0   SGOT  46*  26  26   ALT  25  13  16     No results for input(s): PH, PCO2, PO2, HCO3, FIO2 in the last 72 hours. 24 Hour Results:  Recent Results (from the past 24 hour(s))   GLUCOSE, POC    Collection Time: 02/21/18 12:23 PM   Result Value Ref Range    Glucose (POC) 106 (H) 65 - 100 mg/dL    Performed by David Inman (CON)    GLUCOSE, POC    Collection Time: 02/21/18  5:55 PM   Result Value Ref Range    Glucose (POC) 124 (H) 65 - 100 mg/dL    Performed by [address] [place] [address], POC    Collection Time: 02/21/18 11:41 PM   Result Value Ref Range    Glucose (POC) 106 (H) 65 - 100 mg/dL    Performed by Kristine Crook (PCT)    MAGNESIUM    Collection Time: 02/22/18  4:57 AM   Result Value Ref Range    Magnesium 1.9 1.6 - 2.4 mg/dL   METABOLIC PANEL, COMPREHENSIVE    Collection Time: 02/22/18  4:57 AM   Result Value Ref Range    Sodium 141 136 - 145 mmol/L    Potassium 4.0 3.5 - 5.1 mmol/L    Chloride 107 97 - 108 mmol/L    CO2 27 21 - 32 mmol/L    Anion gap 7 5 - 15 mmol/L    Glucose 96 65 - 100 mg/dL    BUN 12 6 - 20 MG/DL    Creatinine 0.43 (L) 0.55 - 1.02 MG/DL    BUN/Creatinine ratio 28 (H) 12 - 20      GFR est AA >60 >60 ml/min/1.73m2    GFR est non-AA >60 >60 ml/min/1.73m2    Calcium 7.8 (L) 8.5 - 10.1 MG/DL    Bilirubin, total 1.0 0.2 - 1.0 MG/DL    ALT (SGPT) 25 12 - 78 U/L    AST (SGOT) 46 (H) 15 - 37 U/L    Alk.  phosphatase 167 (H) 45 - 117 U/L    Protein, total 5.3 (L) 6.4 - 8.2 g/dL    Albumin 1.5 (L) 3.5 - 5.0 g/dL    Globulin 3.8 2.0 - 4.0 g/dL    A-G Ratio 0.4 (L) 1.1 - 2.2     CBC WITH AUTOMATED DIFF    Collection Time: 02/22/18  4:57 AM   Result Value Ref Range WBC 19.9 (H) 3.6 - 11.0 K/uL    RBC 3.10 (L) 3.80 - 5.20 M/uL    HGB 10.6 (L) 11.5 - 16.0 g/dL    HCT 32.6 (L) 35.0 - 47.0 %    .2 (H) 80.0 - 99.0 FL    MCH 34.2 (H) 26.0 - 34.0 PG    MCHC 32.5 30.0 - 36.5 g/dL    RDW 13.9 11.5 - 14.5 %    PLATELET 501 (H) 634 - 400 K/uL    MPV 11.9 8.9 - 12.9 FL    NRBC 0.0 0  WBC    ABSOLUTE NRBC 0.00 0.00 - 0.01 K/uL    NEUTROPHILS 84 (H) 32 - 75 %    LYMPHOCYTES 8 (L) 12 - 49 %    MONOCYTES 6 5 - 13 %    EOSINOPHILS 1 0 - 7 %    BASOPHILS 1 0 - 1 %    IMMATURE GRANULOCYTES 1 (H) 0.0 - 0.5 %    ABS. NEUTROPHILS 16.7 (H) 1.8 - 8.0 K/UL    ABS. LYMPHOCYTES 1.6 0.8 - 3.5 K/UL    ABS. MONOCYTES 1.3 (H) 0.0 - 1.0 K/UL    ABS. EOSINOPHILS 0.1 0.0 - 0.4 K/UL    ABS. BASOPHILS 0.1 0.0 - 0.1 K/UL    ABS. IMM.  GRANS. 0.1 (H) 0.00 - 0.04 K/UL    DF AUTOMATED     PHOSPHORUS    Collection Time: 02/22/18  4:57 AM   Result Value Ref Range    Phosphorus 3.6 2.6 - 4.7 MG/DL   GLUCOSE, POC    Collection Time: 02/22/18  6:00 AM   Result Value Ref Range    Glucose (POC) 103 (H) 65 - 100 mg/dL    Performed by Boogie Davis ([facility])        Medications reviewed  Current Facility-Administered Medications   Medication Dose Route Frequency    bisacodyl (DULCOLAX) suppository 10 mg  10 mg Rectal DAILY PRN    TPN ADULT - CENTRAL   IntraVENous CONTINUOUS    HYDROmorphone (PF) (DILAUDID) injection 0.5 mg  0.5 mg IntraVENous Q4H PRN    LORazepam (ATIVAN) injection 1 mg  1 mg IntraVENous Q4H PRN    insulin regular (NOVOLIN R, HUMULIN R) injection   SubCUTAneous Q6H    albuterol-ipratropium (DUO-NEB) 2.5 MG-0.5 MG/3 ML  3 mL Nebulization Q4H PRN    fat emulsion 20% (LIPOSYN, INTRAlipid) infusion 500 mL  500 mL IntraVENous Q MON, WED & SAT    glucose chewable tablet 16 g  4 Tab Oral PRN    glucagon (GLUCAGEN) injection 1 mg  1 mg IntraMUSCular PRN    dextrose (D50W) injection syrg 12.5-25 g  12.5-25 g IntraVENous PRN    alteplase (CATHFLO) 1 mg in sterile water (preservative free) 1 mL injection  1 mg InterCATHeter PRN    sodium chloride (NS) flush 10-30 mL  10-30 mL InterCATHeter PRN    sodium chloride (NS) flush 10-40 mL  10-40 mL InterCATHeter Q8H    piperacillin-tazobactam (ZOSYN) 10.125 g in 0.9% sodium chloride 500 mL continuous 24 hr infusion  10.125 g IntraVENous Q24H    acetaminophen (TYLENOL) solution 650 mg  650 mg Oral Q6H PRN    naloxone (NARCAN) injection 0.4 mg  0.4 mg IntraVENous PRN    ondansetron (ZOFRAN) injection 4 mg  4 mg IntraVENous Q4H PRN    enoxaparin (LOVENOX) injection 40 mg  40 mg SubCUTAneous Q24H    pantoprazole (PROTONIX) injection 40 mg  40 mg IntraVENous Q12H    phenol throat spray (CHLORASEPTIC) 1 Spray  1 Spray Oral PRN    nystatin (MYCOSTATIN) 100,000 unit/gram powder   Topical BID    sodium chloride (NS) flush 5-10 mL  5-10 mL IntraVENous PRN       Care Plan discussed with: Patient/Family and Nurse     Total time spent with patient: 30 minutes.     Matteo Nunez MD

## 2018-02-22 NOTE — PROGRESS NOTES
[facility] Dosing Services: Antimicrobial Stewardship Progress Note    Consult for antibiotic dosing of vanocmycin by Franklin Bishop NP  Pharmacist reviewed antibiotic appropriateness for 67year old , female  for indication of HAP  Day of Therapy 1    Plan:  Vancomycin therapy:   Start Vancomycin therapy, with loading dose of 2250 mg IV x 1 @ 1600   Follow with maintenance dose of 1250 mg IV Q12H to begin at 0400 on 2/23   Dose calculated to approximate a therapeutic trough of 15-20 mcg/mL.  Last trough level / Plan for level: prior to 4th dose (not yet ordered)  [address] to follow daily and will make changes to dose and/or frequency based on clinical status. Other Antimicrobial  (not dosed by pharmacist)   Zosyn 10.125 gm IV continuous infusion Q24H   mg IV Q24H   Cultures     2/11 Blood - ng (FINAL)  2/11 Urine - on hold  2/12 Peritoneal fluid - rare alpha strep, gamma strep in thio broth (FINAL)  2/19 Abd fluid - ngtd (pending)  2/20 Blood - ngtd (pending)   Serum Creatinine     Lab Results   Component Value Date/Time    Creatinine 0.43 (L) 02/22/2018 04:57 AM    Creatinine (POC) 1.0 02/11/2018 09:53 PM       Creatinine Clearance Estimated Creatinine Clearance: 123.8 mL/min (based on Cr of 0.43). Temp   100 °F (37.8 °C)    WBC   Lab Results   Component Value Date/Time    WBC 19.9 (H) 02/22/2018 04:57 AM       H/H   Lab Results   Component Value Date/Time    HGB 10.6 (L) 02/22/2018 04:57 AM        Platelets   Lab Results   Component Value Date/Time    PLATELET 974 (H) 55/27/0646 04:57 AM        Thank you for the consult,  Domenico Page

## 2018-02-22 NOTE — PROGRESS NOTES
Progress Note    Patient: Mando Artist MRN: [id number]  SSN: [id number]    YOB: 1945  Age: 67 y.o. Sex: female      Admit Date: 2018    10 Days Post-Op    Procedure:  Procedure(s):  EX LAP    Subjective:     Mrs. eR Cerda c/o back and abdominal pain. She passed flatus and had multiple BM's. Objective:     Visit Vitals    /66 (BP 1 Location: Left arm, BP Patient Position: At rest)    Pulse 95    Temp 98 °F (36.7 °C)    Resp 15    Ht 5' 3.5\" (1.613 m)    Wt 193 lb 12.6 oz (87.9 kg)    SpO2 96%    Breastfeeding No    BMI 33.79 kg/m2       Temp (24hrs), Av.3 °F (36.8 °C), Min:97.9 °F (36.6 °C), Max:99 °F (37.2 °C)      Physical Exam:    GENERAL: alert, cooperative, no distress, ABDOMEN: Soft, mild distention, inconsistent tenderness. The ALYSSA fluid is light green. The accordion fluid is serous.     Lab Review:   BMP:   Lab Results   Component Value Date/Time     2018 04:57 AM    K 4.0 2018 04:57 AM     2018 04:57 AM    CO2 27 2018 04:57 AM    AGAP 7 2018 04:57 AM    GLU 96 2018 04:57 AM    BUN 12 2018 04:57 AM    CREA 0.43 (L) 2018 04:57 AM    GFRAA >60 2018 04:57 AM    GFRNA >60 2018 04:57 AM     CBC:   Lab Results   Component Value Date/Time    WBC 19.9 (H) 2018 04:57 AM    HGB 10.6 (L) 2018 04:57 AM    HCT 32.6 (L) 2018 04:57 AM     (H) 2018 04:57 AM       Assessment:     Hospital Problems  Date Reviewed: 2016          Codes Class Noted POA    Hypokalemia ICD-10-CM: E87.6  ICD-9-CM: 276.8  2018 No        Leukocytosis ICD-10-CM: D72.829  ICD-9-CM: 288.60  2018 Yes        Severe protein-calorie malnutrition ([address] ICD-10-CM: E43  ICD-9-CM: 880  2018 Yes        Acute metabolic encephalopathy XQG-17-ES: G93.41  ICD-9-CM: 348.31  2018 Yes        Hyperammonemia (HCC) ICD-10-CM: E72.20  ICD-9-CM: 270.6  2018 No    Overview Addendum 2018  9:08 AM by Madeleine Colorado MD        Ref. Range 2/16/2018 17:20   Ammonia Latest Ref Range: <32 UMOL/L 69 (H)                Free intraperitoneal air ICD-10-CM: K66.8  ICD-9-CM: 568.89  2/12/2018 Yes        S/P exploratory laparotomy ICD-10-CM: Z98.890  ICD-9-CM: V45.89  2/12/2018 No    Overview Signed 2/12/2018 11:57 AM by Kristen Malik MD     Suture repair of perforated posterior gastric ulcer with Larnell Breaker patch, core needle biopsies of left lobe of the liver. Alcoholic cirrhosis of liver without ascites (HCC) (Chronic) ICD-10-CM: K70.30  ICD-9-CM: 571.2  2/12/2018 Yes    Overview Signed 2/17/2018  8:45 AM by Madeleine Colorado MD     Liver bx 2/12/18:   Cirrhosis with mild chronic portal inflammation and macrovesicular steatosis. Fatty liver determined by biopsy ICD-10-CM: K76.0  ICD-9-CM: 571.8  2/12/2018 Yes    Overview Signed 2/17/2018  8:53 AM by Madeleine Colorado MD        Cirrhosis with mild chronic portal inflammation and macrovesicular steatosis. * (Principal)Perforated chronic gastric ulcer ([address] (Chronic) ICD-10-CM: K25.5  ICD-9-CM: 531.50  2/11/2018 Yes        ETOH abuse (Chronic) ICD-10-CM: F10.10  ICD-9-CM: 305.00  2/11/2018 Yes              Plan/Recommendations/Medical Decision Making:     No fever, but WBC still at 19. C/o abdominal pain, but exam is inconsistent. Drain output is going down. Repeat CT abd/pelvis. Continue Zosyn, drains. Still with tachycardia and requiring O2. Check CT chest.  Continue TPN. No leak on previous CT or limited UGI. Clears if CT negative. OOB with PT.  IS, wean O2.     Signed By: Kristen Malik MD     February 22, 2018

## 2018-02-22 NOTE — PROGRESS NOTES
PHYSICAL THERAPY:PT attempted this afternoon but was aborted as pt was taken for testing. PT unable to return. PT will follow in AM.

## 2018-02-22 NOTE — PROGRESS NOTES
Bedside and Verbal shift change report given to Natasha (oncoming nurse) by [facility] (offgoing nurse). Report included the following information SBAR, Kardex, Procedure Summary, Intake/Output, MAR and Recent Results.

## 2018-02-22 NOTE — PROGRESS NOTES
[facility]     Name: Jeanne Portillo MRN: [id number]   : 1945 Hospital: [address]   Date: 2018        Asked to see again due to worsening SOB. Not present on the unit, having drain placed in IR. Unable to personally interview patient. Impression Plan   1. Dyspnea  2. Abnormal Chest CT: with bilateral pleural effusions, atelectasis,and patchy airspace disease in MIRIAM  3. Leukocytosis  4. Peforated gastric ulcer, s/p repair  5. Shock, likely septic, resolved  6. Acute hypoxic respiratory failure   7. Hypokalemia, hypomagnesemia  8. EtOH abuse     · Goal sats >90%, wean O2 as tolerated  · Continue to diurese, will add Bumex 1mg BID  · Zosyn, add Vanc and Levaquin. Follow-up cultures  · Post-op management as per surgery  · Pain control  · Monitor lytes, replete as needed  · Encourage IS use  · OOB and mobilize as much as possible  · TPN  · SCDs  · Protonix               Radiology  ( personally reviewed) Chest CTA:  No evidence of PE. Small bilateral pleural effusions. Considerable bibasilar atelectasis. Patchy airspace disease at left apex in MIRIAM. No mass or nodule. Small bilateral pleural effusions. Abdominal CT:  Ascites. Cirrhosis. [id number] undrained left subphrenic fluid collection. ABG No results for input(s): PHI, PO2I, PCO2I in the last 72 hours. Subjective     Overnight events:  TMax 99.0  BP stable  HR in low 100s  O2 sats 95% on 2L NC  WBC 19.9  Hgb 10.6  Phos 3.6  Blood cultures negative thus far  Received Lasix this mornin out thus far today (975 mL urine)  Pro- (post Lasix)      Past Medical History:   Diagnosis Date    Alcoholic cirrhosis of liver without ascites ([facility] [facility] 2018    ETOH abuse 2018    History of vascular access device 2018    [facility] VAT - 5 FR triple, R basilic, TPN    Hyperammonemia ([facility] [address] [facility] 2018     Results for Claudia Alexander (MRN [id number]) as of 2018 08:43  Ref. Range 2/16/2018 17:20 Ammonia Latest Ref Range: <32 UMOL/L 69 (H)     Hyperlipidemia     Perforated chronic gastric ulcer ([address] 2/11/2018      Past Surgical History:   Procedure Laterality Date    HX CATARACT REMOVAL        Prior to Admission medications    Medication Sig Start Date End Date Taking? Authorizing Provider   naproxen sodium (ALEVE) 220 mg tablet Take 220 mg by mouth daily as needed for Pain. Yes Historical Provider   cyanocobalamin (VITAMIN B12) 500 mcg tablet Take 500 mcg by mouth daily. Yes Historical Provider   multivitamin (ONE A DAY) tablet Take 1 Tab by mouth daily. Yes Historical Provider   omega-3 fatty acids-vitamin e 1,000 mg cap Take 2 Caps by mouth daily.    Yes Historical Provider     Current Facility-Administered Medications   Medication Dose Route Frequency    TPN ADULT - CENTRAL   IntraVENous CONTINUOUS    diatrizoate meglumine-d.sodium (MD-GASTROVIEW,GASTROGRAFIN) 66-10 % contrast solution 30 mL  30 mL Oral ONCE    lidocaine (PF) (XYLOCAINE) 10 mg/mL (1 %) injection 10 mL  10 mL SubCUTAneous RAD ONCE    furosemide (LASIX) injection 20 mg  20 mg IntraVENous ONCE    vancomycin (VANCOCIN) 1000 mg in  ml infusion  1,000 mg IntraVENous Q12H    levoFLOXacin (LEVAQUIN) 750 mg in D5W IVPB  750 mg IntraVENous Q24H    TPN ADULT - CENTRAL   IntraVENous CONTINUOUS    insulin regular (NOVOLIN R, HUMULIN R) injection   SubCUTAneous Q6H    fat emulsion 20% (LIPOSYN, INTRAlipid) infusion 500 mL  500 mL IntraVENous Q MON, WED & SAT    sodium chloride (NS) flush 10-40 mL  10-40 mL InterCATHeter Q8H    piperacillin-tazobactam (ZOSYN) 10.125 g in 0.9% sodium chloride 500 mL continuous 24 hr infusion  10.125 g IntraVENous Q24H    enoxaparin (LOVENOX) injection 40 mg  40 mg SubCUTAneous Q24H    pantoprazole (PROTONIX) injection 40 mg  40 mg IntraVENous Q12H    nystatin (MYCOSTATIN) 100,000 unit/gram powder   Topical BID     No Known Allergies   Social History   Substance Use Topics    Smoking status: Former Smoker    Smokeless tobacco: Never Used    Alcohol use 11.4 oz/week     0 Standard drinks or equivalent, 19 Glasses of wine per week      Comment: Hx of heavy etoh use, but quit several months ago      Family History   Problem Relation Age of Onset    Other Other      patient did not know          Laboratory: I have personally reviewed the critical care flowsheet and labs. Recent Labs      02/22/18 0457 02/21/18 0345  02/20/18   0346   WBC  19.9*  19.1*  19.8*   HGB  10.6*  10.7*  11.7   HCT  32.6*  33.1*  36.4   PLT  408*  311  275     Recent Labs      02/22/18 0457 02/21/18 0345  02/20/18   0346   NA  141  143  142   K  4.0  4.2  3.7   CL  107  111*  110*   CO2  27  25  27   GLU  96  104*  109*   BUN  12  16  13   CREA  0.43*  0.45*  0.46*   CA  7.8*  7.7*  7.5*   MG  1.9  1.8  1.7   PHOS  3.6  3.1  2.6  2.8   ALB  1.5*  1.4*  1.4*   SGOT  46*  26  26   ALT  25  13  16       Objective:          Intake/Output Summary (Last 24 hours) at 02/22/18 1428  Last data filed at 02/22/18 0351   Gross per 24 hour   Intake           1961.6 ml   Output             1215 ml   Net            746.6 ml       Kimmy Castellano NP  [facility]

## 2018-02-22 NOTE — PROGRESS NOTES
2/22/2018 11:38 AM Called and spoke with pt's daughter, Lorne De Paz regarding preferred rehab placement. Lorne De Paz reported [facility] would be preference and family will be checking [facility] as well. Pt's daughter requested referrals be sent to [facility] and [facility]. Referral sent to [facility] via [employer]. Referral sent to [facility] [address] via [employer] [place]. CM will follow.  ZACH Nathan

## 2018-02-22 NOTE — PROGRESS NOTES
Nutrition Assessment:    RECOMMENDATIONS/INTERVENTION(S):   1. Continue TPN until PO initiated & tolerated    TPN recommendations:  D20/5%AA @ the goal rate of 63 ml/hr  Add Lipids 20% (500 ml) @ 42 ml/hr x 12 hr 3x/wk  (TPN @ goal + Lipids provides 1762 kcals, 76 g protein)    2. Continue to monitor BG, lytes, TG (weekly while on TPN)    3.  Obtain new wt - standing scale if able  ASSESSMENT:   2/22:  TPN + Lipids running @ goal rate. TG 76. Alk phos, AST elevated. BG controlled. Lytes WDL. No IVF. Pt reporting abd pain, for repeat CT abd today. Last BM 2/21, loose, +BS.         2/19:  Labs/meds reviewed. TPN running @ recommended goal rate. BG controlled, [phone]. On insulin. K continuing to require repletion. PAB 5.5. No TG, Alk phos WDL. NS IVF. Last BM 2/10, hypoactive BS. NGT placed today 2/2 abd pain. Continue TPN per Surgery MD.  WBC's elevated, for CT abd today. Possible kyphoplasty per Ortho. Noted new wt.      2/16:  Pt remains confused, agitated & sedated (CIWA protocol). NPO Day 6. Labs/meds reviewed. K repleted. No Phos. D5 LR IVF. TPN started per Surgery MD today, plans for UGI when able. Likely pt meets criteria for Severe Acute Malnutrition, no new wt, mild edema. TPN recs above. 2/14:  Extubated 2/12. Unable to start PO yesterday per Surgery - plans for UGI p/t PO once off pressors. Discussed case in ICU rounds. Pt agitated, confused overnight - pulled NGT. On sedation per CIWA protocol. NPO. No BM, flatus, absent BS. Labs/meds reviewed. K, Mg WDL. No Phos. 1/2 NS IVF. 2/12:  Chart reviewed 2/2 intubation. 67 yof admitted for intra-abd free air of unknown etiology. Pmhx includes gastric ulcer (EGD x 2 yrs ago), Etoh. Surgery following. POD #1 ex lap repair of perforated gastric ulcer, liver bx. Remains intubated 2/2 shock, respiratory failure. Labs/meds reviewed. K, Mg requiring repletion. No Phos. On Delbert. Propofol currently stopped. On PPI. Ortho following for T12 fx. Surgical incisions to abd, ALYSSA drain, no edema noted. Overwt per advanced age. No recent wt to compare per hx. Energy needs calculated using current wt, vent settings, tmax. Noted n/v after fall yesterday, no reports of inadequate PO prior to fall. Etoh hx. SUBJECTIVE/OBJECTIVE:     Diet Order: NPO; TPN - D20/5% AA @ 63 ml/hr + Lipids 20% (500 ml) @ 42 ml/hr x 12 hr 3x/wk  % Eaten:  No data found. Pertinent Medications: [x] Reviewed     Past Medical History:   Diagnosis Date    Alcoholic cirrhosis of liver without ascites ([facility] [address] 2/12/2018    ETOH abuse 2/11/2018    History of vascular access device 02/16/2018    [facility] VAT - 5 FR triple, R basilic, TPN    Hyperammonemia ([facility] [address] 2/16/2018     Results for Fernandez Blue (MRN [id number]) as of 2/17/2018 08:43  Ref. Range 2/16/2018 17:20 Ammonia Latest Ref Range: <32 UMOL/L 69 (H)     Hyperlipidemia     Perforated chronic gastric ulcer ([facility] [address] 2/11/2018       Chemistries:  Lab Results   Component Value Date/Time    Sodium 141 02/22/2018 04:57 AM    Potassium 4.0 02/22/2018 04:57 AM    Chloride 107 02/22/2018 04:57 AM    CO2 27 02/22/2018 04:57 AM    Anion gap 7 02/22/2018 04:57 AM    Glucose 96 02/22/2018 04:57 AM    BUN 12 02/22/2018 04:57 AM    Creatinine 0.43 (L) 02/22/2018 04:57 AM    BUN/Creatinine ratio 28 (H) 02/22/2018 04:57 AM    GFR est AA >60 02/22/2018 04:57 AM    GFR est non-AA >60 02/22/2018 04:57 AM    Calcium 7.8 (L) 02/22/2018 04:57 AM    AST (SGOT) 46 (H) 02/22/2018 04:57 AM    Alk.  phosphatase 167 (H) 02/22/2018 04:57 AM    Protein, total 5.3 (L) 02/22/2018 04:57 AM    Albumin 1.5 (L) 02/22/2018 04:57 AM    Globulin 3.8 02/22/2018 04:57 AM    A-G Ratio 0.4 (L) 02/22/2018 04:57 AM    ALT (SGPT) 25 02/22/2018 04:57 AM      Anthropometrics: Height: 5' 3.5\" (161.3 cm) Weight: 87.9 kg (193 lb 12.6 oz)    IBW (%IBW): 58.7 kg (129 lb 6.6 oz) (131.69 %) UBW (%UBW):  (UTO) (  %)    BMI: Body mass index is 33.79 kg/(m^2). This BMI is indicative of:   [] Underweight    [] Normal    [] Overweight     [x]  Obesity    []  Extreme Obesity (BMI>40)  Estimated Nutrition Needs (Based on): 1661 Kcals/day (BMR (1398) x 1.3 AF) , 70 g (-88 (1.2-1.5 g/kg x IBW)) Protein  Carbohydrate:  At Least 130 g/day  Fluids: 1700 mL/day    Last BM: 2/21 loose   [x]Active     []Hyperactive  []Hypoactive       [] Absent   BS  Skin:    [] Intact   [x] Incision - abd lap sites  [] Breakdown   [] DTI   [] Tears/Excoriation/Abrasion  [x]Edema - 1+ BUE [x] Other:ALYSSA drain     Wt Readings from Last 30 Encounters:   02/20/18 87.9 kg (193 lb 12.6 oz)   04/29/16 76.3 kg (168 lb 3.2 oz)      NUTRITION DIAGNOSES:   Problem:  Inadequate oral intake     Etiology: related to altered GI function      Signs/Symptoms: as evidenced by s/p lap w/ perf viscus repair, intubation, NPO x 11, abd pain/repeat CT abd, need to continue TPN    NUTRITION INTERVENTIONS:  Meals/Snacks: General/healthful diet - once PO initiated Enteral/Parenteral Nutrition: Initiate parenteral nutrition                GOAL:   Continue TPN @ goal until able to initiate PO in the next 3-5 days    Cultural, Gnosticism, or Ethnic Dietary Needs: None    EDUCATION & DISCHARGE NEEDS:    [x] None Identified   [] Identified and Education Provided/Documented   [] Identified and Pt declined/was not appropriate      [x] Interdisciplinary Care Plan Reviewed/Documented    [x] Discharge Needs:    TBD   [] No Nutrition Related Discharge Needs    NUTRITION RISK:   Pt Is At Nutrition Risk  [x]     No Nutrition Risk Identified  []       PT SEEN FOR:    []  MD Consult: []Calorie Count      []Diabetic Diet Education        []Diet Education     []Electrolyte Management     []General Nutrition Management and Supplements     []Management of Tube Feeding     []TPN Recommendations   []  RN Referral:  []MST score >=2     []Enteral/Parenteral Nutrition PTA     []Pregnant: Gestational DM or Multigestation [] Pressure Ulcer    []  Low BMI      []  Length of Stay       [] Dysphagia Diet         [] Ventilator  [x]  Follow-up - TPN     Previous Recommendations:   [x] Implemented     [] Progressing Towards Goal          [] Not Implemented          [] Not Applicable    Previous Goal:   [x] Met              [] Progressing Towards Goal              [] Not Progressing Towards Goal   [] Not Applicable            Jolanda Landau, [address] [place] [address]  Pager [phone]

## 2018-02-22 NOTE — PROGRESS NOTES
Pt received to [facility] on stretcher, drowsy but arousable. Two daughters with pt. Confirmed NPO and NKA. Pt on 2L 02, vitals stable. PICC line with positive flush and blood return on all 3 lumens, green caps placed on luer locks. Mallampati 2 with 2.5 fingers. LS diminished but clear, HS S1, S2. Consent signed by Dr Anders Mckeon. Pt with very small BM and urine x1 while in RH. Pt noted to have accordion drain from LUE abdomen as well as ALYSSA from mid abdomen. To CT, prepped on table, timeout at 1527. Start time at 35 67 15, pt tolerated fair, total of 2mg versed and 37.5 mcg fentanyl given. Stop time [id number] 12. Pt has 10 Fr Resolve drain in LUE with accordion attached, 125 mL clear yellow fluid drained to bag. Old pigtail removed during procedure. Pt returned to [facility], bedpan void. Pt is restless in bed, daughter at bedside. Report called to Christina Jaime RN.

## 2018-02-22 NOTE — PROGRESS NOTES
Occupational Therapy Note: Pt being transported off the floor for a test. Will attempt OT later today as able.

## 2018-02-22 NOTE — H&P
Radiology History and Physical    Patient: Richmond Dragan 67 y.o. female       Chief Complaint: Fall      History of Present Illness: for left subphrenic drain    History:    Past Medical History:   Diagnosis Date    Alcoholic cirrhosis of liver without ascites ([facility] [address] 2/12/2018    ETOH abuse 2/11/2018    History of vascular access device 02/16/2018    [facility] VAT - 5 FR triple, R basilic, TPN    Hyperammonemia ([facility] [address] 2/16/2018     Results for Narcisa Abdi (MRN [id number]) as of 2/17/2018 08:43  Ref. Range 2/16/2018 17:20 Ammonia Latest Ref Range: <32 UMOL/L 69 (H)     Hyperlipidemia     Perforated chronic gastric ulcer ([facility] [address] 2/11/2018     Family History   Problem Relation Age of Onset    Other Other      patient did not know     Social History     Social History    Marital status: SINGLE     Spouse name: N/A    Number of children: N/A    Years of education: N/A     Occupational History    Not on file.      Social History Main Topics    Smoking status: Former Smoker    Smokeless tobacco: Never Used    Alcohol use 11.4 oz/week     0 Standard drinks or equivalent, 19 Glasses of wine per week      Comment: Hx of heavy etoh use, but quit several months ago    Drug use: No    Sexual activity: Not on file     Other Topics Concern    Not on file     Social History Narrative       Allergies: No Known Allergies    Current Medications:  Current Facility-Administered Medications   Medication Dose Route Frequency    TPN ADULT - CENTRAL   IntraVENous CONTINUOUS    diatrizoate meglumine-d.sodium (MD-GASTROVIEW,GASTROGRAFIN) 66-10 % contrast solution 30 mL  30 mL Oral ONCE    fentaNYL citrate (PF) injection 100 mcg  100 mcg IntraVENous RAD PRN    midazolam (VERSED) injection 5 mg  5 mg IntraVENous RAD PRN    lidocaine (PF) (XYLOCAINE) 10 mg/mL (1 %) injection 10 mL  10 mL SubCUTAneous RAD ONCE    furosemide (LASIX) injection 20 mg  20 mg IntraVENous ONCE    vancomycin (VANCOCIN) 1000 mg in  ml infusion  1,000 mg IntraVENous Q12H    levoFLOXacin (LEVAQUIN) 750 mg in D5W IVPB  750 mg IntraVENous Q24H    bisacodyl (DULCOLAX) suppository 10 mg  10 mg Rectal DAILY PRN    TPN ADULT - CENTRAL   IntraVENous CONTINUOUS    HYDROmorphone (PF) (DILAUDID) injection 0.5 mg  0.5 mg IntraVENous Q4H PRN    LORazepam (ATIVAN) injection 1 mg  1 mg IntraVENous Q4H PRN    insulin regular (NOVOLIN R, HUMULIN R) injection   SubCUTAneous Q6H    albuterol-ipratropium (DUO-NEB) 2.5 MG-0.5 MG/3 ML  3 mL Nebulization Q4H PRN    fat emulsion 20% (LIPOSYN, INTRAlipid) infusion 500 mL  500 mL IntraVENous Q MON, WED & SAT    glucose chewable tablet 16 g  4 Tab Oral PRN    glucagon (GLUCAGEN) injection 1 mg  1 mg IntraMUSCular PRN    dextrose (D50W) injection syrg 12.5-25 g  12.5-25 g IntraVENous PRN    alteplase (CATHFLO) 1 mg in sterile water (preservative free) 1 mL injection  1 mg InterCATHeter PRN    sodium chloride (NS) flush 10-30 mL  10-30 mL InterCATHeter PRN    sodium chloride (NS) flush 10-40 mL  10-40 mL InterCATHeter Q8H    piperacillin-tazobactam (ZOSYN) 10.125 g in 0.9% sodium chloride 500 mL continuous 24 hr infusion  10.125 g IntraVENous Q24H    acetaminophen (TYLENOL) solution 650 mg  650 mg Oral Q6H PRN    naloxone (NARCAN) injection 0.4 mg  0.4 mg IntraVENous PRN    ondansetron (ZOFRAN) injection 4 mg  4 mg IntraVENous Q4H PRN    enoxaparin (LOVENOX) injection 40 mg  40 mg SubCUTAneous Q24H    pantoprazole (PROTONIX) injection 40 mg  40 mg IntraVENous Q12H    phenol throat spray (CHLORASEPTIC) 1 Spray  1 Spray Oral PRN    nystatin (MYCOSTATIN) 100,000 unit/gram powder   Topical BID    sodium chloride (NS) flush 5-10 mL  5-10 mL IntraVENous PRN        Physical Exam:  Blood pressure 131/70, pulse 98, temperature 100 °F (37.8 °C), resp. rate 18, height 5' 3.5\" (1.613 m), weight 87.9 kg (193 lb 12.6 oz), SpO2 93 %, not currently breastfeeding.   LUNG: clear to auscultation bilaterally, HEART: regular rate and rhythm      Alerts:    Hospital Problems  Date Reviewed: 4/26/2016          Codes Class Noted POA    Hypokalemia ICD-10-CM: E87.6  ICD-9-CM: 276.8  2/18/2018 No        Leukocytosis ICD-10-CM: D72.829  ICD-9-CM: 288.60  2/18/2018 Yes        Severe protein-calorie malnutrition ([address] ICD-10-CM: E43  ICD-9-CM: 727  2/18/2018 Yes        Acute metabolic encephalopathy SEF-74-SY: G93.41  ICD-9-CM: 348.31  2/18/2018 Yes        Hyperammonemia ([address] ICD-10-CM: E72.20  ICD-9-CM: 270.6  2/16/2018 No    Overview Addendum 2/17/2018  9:08 AM by Brenda Lyon MD        Ref. Range 2/16/2018 17:20   Ammonia Latest Ref Range: <32 UMOL/L 69 (H)                Free intraperitoneal air ICD-10-CM: K66.8  ICD-9-CM: 568.89  2/12/2018 Yes        S/P exploratory laparotomy ICD-10-CM: Z98.890  ICD-9-CM: V45.89  2/12/2018 No    Overview Signed 2/12/2018 11:57 AM by Molly Head MD     Suture repair of perforated posterior gastric ulcer with Henry Darell patch, core needle biopsies of left lobe of the liver. Alcoholic cirrhosis of liver without ascites (HCC) (Chronic) ICD-10-CM: K70.30  ICD-9-CM: 571.2  2/12/2018 Yes    Overview Signed 2/17/2018  8:45 AM by Brenda Lyon MD     Liver bx 2/12/18:   Cirrhosis with mild chronic portal inflammation and macrovesicular steatosis. Fatty liver determined by biopsy ICD-10-CM: K76.0  ICD-9-CM: 571.8  2/12/2018 Yes    Overview Signed 2/17/2018  8:53 AM by Brenda Lyon MD        Cirrhosis with mild chronic portal inflammation and macrovesicular steatosis.               * (Principal)Perforated chronic gastric ulcer ([address] (Chronic) ICD-10-CM: K25.5  ICD-9-CM: 531.50  2/11/2018 Yes        ETOH abuse (Chronic) ICD-10-CM: F10.10  ICD-9-CM: 305.00  2/11/2018 Yes              Laboratory:      Recent Labs      02/22/18   0457   HGB  10.6*   HCT  32.6*   WBC  19.9*   PLT  408*   BUN  12   CREA  0.43*   K  4.0         Plan of Care/Planned Procedure:  Risks, benefits, and alternatives reviewed with patient and she agrees to proceed with the procedure.        Severiano Penning, MD

## 2018-02-23 LAB
ALBUMIN SERPL-MCNC: 1.4 G/DL (ref 3.5–5)
ALBUMIN/GLOB SERPL: 0.4 {RATIO} (ref 1.1–2.2)
ALP SERPL-CCNC: 161 U/L (ref 45–117)
ALT SERPL-CCNC: 23 U/L (ref 12–78)
AMMONIA PLAS-SCNC: 42 UMOL/L
ANION GAP SERPL CALC-SCNC: 6 MMOL/L (ref 5–15)
AST SERPL-CCNC: 37 U/L (ref 15–37)
BACTERIA SPEC CULT: NORMAL
BACTERIA SPEC CULT: NORMAL
BASOPHILS # BLD: 0.1 K/UL (ref 0–0.1)
BASOPHILS NFR BLD: 0 % (ref 0–1)
BILIRUB SERPL-MCNC: 1.5 MG/DL (ref 0.2–1)
BUN SERPL-MCNC: 11 MG/DL (ref 6–20)
BUN/CREAT SERPL: 26 (ref 12–20)
CALCIUM SERPL-MCNC: 7.4 MG/DL (ref 8.5–10.1)
CHLORIDE SERPL-SCNC: 107 MMOL/L (ref 97–108)
CO2 SERPL-SCNC: 26 MMOL/L (ref 21–32)
CREAT SERPL-MCNC: 0.42 MG/DL (ref 0.55–1.02)
DIFFERENTIAL METHOD BLD: ABNORMAL
EOSINOPHIL # BLD: 0.1 K/UL (ref 0–0.4)
EOSINOPHIL NFR BLD: 1 % (ref 0–7)
ERYTHROCYTE [DISTWIDTH] IN BLOOD BY AUTOMATED COUNT: 14 % (ref 11.5–14.5)
GLOBULIN SER CALC-MCNC: 3.7 G/DL (ref 2–4)
GLUCOSE BLD STRIP.AUTO-MCNC: 101 MG/DL (ref 65–100)
GLUCOSE BLD STRIP.AUTO-MCNC: 107 MG/DL (ref 65–100)
GLUCOSE BLD STRIP.AUTO-MCNC: 108 MG/DL (ref 65–100)
GLUCOSE BLD STRIP.AUTO-MCNC: 119 MG/DL (ref 65–100)
GLUCOSE SERPL-MCNC: 101 MG/DL (ref 65–100)
GRAM STN SPEC: NORMAL
GRAM STN SPEC: NORMAL
HCT VFR BLD AUTO: 32 % (ref 35–47)
HGB BLD-MCNC: 10.2 G/DL (ref 11.5–16)
IMM GRANULOCYTES # BLD: 0.1 K/UL (ref 0–0.04)
IMM GRANULOCYTES NFR BLD AUTO: 1 % (ref 0–0.5)
LYMPHOCYTES # BLD: 1 K/UL (ref 0.8–3.5)
LYMPHOCYTES NFR BLD: 6 % (ref 12–49)
MCH RBC QN AUTO: 33.6 PG (ref 26–34)
MCHC RBC AUTO-ENTMCNC: 31.9 G/DL (ref 30–36.5)
MCV RBC AUTO: 105.3 FL (ref 80–99)
MONOCYTES # BLD: 0.6 K/UL (ref 0–1)
MONOCYTES NFR BLD: 4 % (ref 5–13)
NEUTS SEG # BLD: 15.2 K/UL (ref 1.8–8)
NEUTS SEG NFR BLD: 89 % (ref 32–75)
NRBC # BLD: 0 K/UL (ref 0–0.01)
NRBC BLD-RTO: 0 PER 100 WBC
PLATELET # BLD AUTO: 444 K/UL (ref 150–400)
PMV BLD AUTO: 12.5 FL (ref 8.9–12.9)
POTASSIUM SERPL-SCNC: 3.7 MMOL/L (ref 3.5–5.1)
PREALB SERPL-MCNC: 4.4 MG/DL (ref 20–40)
PROT SERPL-MCNC: 5.1 G/DL (ref 6.4–8.2)
RBC # BLD AUTO: 3.04 M/UL (ref 3.8–5.2)
SERVICE CMNT-IMP: ABNORMAL
SERVICE CMNT-IMP: NORMAL
SERVICE CMNT-IMP: NORMAL
SODIUM SERPL-SCNC: 139 MMOL/L (ref 136–145)
WBC # BLD AUTO: 17.1 K/UL (ref 3.6–11)

## 2018-02-23 PROCEDURE — 77030038269 HC DRN EXT URIN PURWCK BARD -A

## 2018-02-23 PROCEDURE — 65660000000 HC RM CCU STEPDOWN

## 2018-02-23 PROCEDURE — 74011000250 HC RX REV CODE- 250: Performed by: SURGERY

## 2018-02-23 PROCEDURE — 82962 GLUCOSE BLOOD TEST: CPT

## 2018-02-23 PROCEDURE — C9113 INJ PANTOPRAZOLE SODIUM, VIA: HCPCS | Performed by: SURGERY

## 2018-02-23 PROCEDURE — 74011250637 HC RX REV CODE- 250/637: Performed by: FAMILY MEDICINE

## 2018-02-23 PROCEDURE — 82140 ASSAY OF AMMONIA: CPT | Performed by: SURGERY

## 2018-02-23 PROCEDURE — 74011250636 HC RX REV CODE- 250/636: Performed by: SURGERY

## 2018-02-23 PROCEDURE — 84134 ASSAY OF PREALBUMIN: CPT | Performed by: SURGERY

## 2018-02-23 PROCEDURE — 74011250636 HC RX REV CODE- 250/636: Performed by: NURSE PRACTITIONER

## 2018-02-23 PROCEDURE — 36415 COLL VENOUS BLD VENIPUNCTURE: CPT | Performed by: SURGERY

## 2018-02-23 PROCEDURE — 80053 COMPREHEN METABOLIC PANEL: CPT | Performed by: SURGERY

## 2018-02-23 PROCEDURE — 74011250636 HC RX REV CODE- 250/636: Performed by: INTERNAL MEDICINE

## 2018-02-23 PROCEDURE — 97530 THERAPEUTIC ACTIVITIES: CPT

## 2018-02-23 PROCEDURE — 77010033678 HC OXYGEN DAILY

## 2018-02-23 PROCEDURE — 74011000258 HC RX REV CODE- 258: Performed by: SURGERY

## 2018-02-23 PROCEDURE — 94640 AIRWAY INHALATION TREATMENT: CPT

## 2018-02-23 PROCEDURE — 85025 COMPLETE CBC W/AUTO DIFF WBC: CPT | Performed by: FAMILY MEDICINE

## 2018-02-23 PROCEDURE — 97535 SELF CARE MNGMENT TRAINING: CPT

## 2018-02-23 PROCEDURE — 74011000250 HC RX REV CODE- 250: Performed by: NURSE PRACTITIONER

## 2018-02-23 PROCEDURE — 74011250637 HC RX REV CODE- 250/637: Performed by: SURGERY

## 2018-02-23 RX ORDER — BUMETANIDE 0.25 MG/ML
1 INJECTION INTRAMUSCULAR; INTRAVENOUS ONCE
Status: COMPLETED | OUTPATIENT
Start: 2018-02-23 | End: 2018-02-23

## 2018-02-23 RX ORDER — HALOPERIDOL 1 MG/1
1 TABLET ORAL
Status: DISCONTINUED | OUTPATIENT
Start: 2018-02-23 | End: 2018-02-27

## 2018-02-23 RX ORDER — METOPROLOL TARTRATE 25 MG/1
25 TABLET, FILM COATED ORAL ONCE
Status: ACTIVE | OUTPATIENT
Start: 2018-02-23 | End: 2018-02-23

## 2018-02-23 RX ORDER — BUMETANIDE 0.25 MG/ML
1 INJECTION INTRAMUSCULAR; INTRAVENOUS ONCE
Status: DISCONTINUED | OUTPATIENT
Start: 2018-02-23 | End: 2018-02-23

## 2018-02-23 RX ORDER — QUETIAPINE FUMARATE 25 MG/1
12.5 TABLET, FILM COATED ORAL
Status: COMPLETED | OUTPATIENT
Start: 2018-02-23 | End: 2018-02-25

## 2018-02-23 RX ADMIN — LEVOFLOXACIN 750 MG: 5 INJECTION, SOLUTION INTRAVENOUS at 15:32

## 2018-02-23 RX ADMIN — QUETIAPINE 12.5 MG: 25 TABLET ORAL at 02:33

## 2018-02-23 RX ADMIN — BUMETANIDE 1 MG: 0.25 INJECTION INTRAMUSCULAR; INTRAVENOUS at 17:11

## 2018-02-23 RX ADMIN — NYSTATIN: 100000 POWDER TOPICAL at 22:39

## 2018-02-23 RX ADMIN — HYDROMORPHONE HYDROCHLORIDE 0.5 MG: 1 INJECTION, SOLUTION INTRAMUSCULAR; INTRAVENOUS; SUBCUTANEOUS at 05:36

## 2018-02-23 RX ADMIN — IPRATROPIUM BROMIDE AND ALBUTEROL SULFATE 3 ML: .5; 3 SOLUTION RESPIRATORY (INHALATION) at 02:31

## 2018-02-23 RX ADMIN — LORAZEPAM 1 MG: 2 INJECTION INTRAMUSCULAR; INTRAVENOUS at 05:36

## 2018-02-23 RX ADMIN — CALCIUM GLUCONATE: 94 INJECTION, SOLUTION INTRAVENOUS at 19:31

## 2018-02-23 RX ADMIN — PANTOPRAZOLE SODIUM 40 MG: 40 INJECTION, POWDER, FOR SOLUTION INTRAVENOUS at 22:38

## 2018-02-23 RX ADMIN — NYSTATIN: 100000 POWDER TOPICAL at 10:05

## 2018-02-23 RX ADMIN — ENOXAPARIN SODIUM 40 MG: 40 INJECTION SUBCUTANEOUS at 15:32

## 2018-02-23 RX ADMIN — PANTOPRAZOLE SODIUM 40 MG: 40 INJECTION, POWDER, FOR SOLUTION INTRAVENOUS at 10:10

## 2018-02-23 RX ADMIN — Medication 10 ML: at 15:32

## 2018-02-23 RX ADMIN — HYDROMORPHONE HYDROCHLORIDE 0.5 MG: 1 INJECTION, SOLUTION INTRAMUSCULAR; INTRAVENOUS; SUBCUTANEOUS at 15:31

## 2018-02-23 RX ADMIN — VANCOMYCIN HYDROCHLORIDE 1250 MG: 10 INJECTION, POWDER, LYOPHILIZED, FOR SOLUTION INTRAVENOUS at 05:28

## 2018-02-23 RX ADMIN — LORAZEPAM 1 MG: 2 INJECTION INTRAMUSCULAR; INTRAVENOUS at 19:31

## 2018-02-23 RX ADMIN — PIPERACILLIN SODIUM AND TAZOBACTAM SODIUM 10.12 G: 3; .375 INJECTION, POWDER, LYOPHILIZED, FOR SOLUTION INTRAVENOUS at 13:02

## 2018-02-23 RX ADMIN — Medication 10 ML: at 22:38

## 2018-02-23 RX ADMIN — VANCOMYCIN HYDROCHLORIDE 1250 MG: 10 INJECTION, POWDER, LYOPHILIZED, FOR SOLUTION INTRAVENOUS at 17:11

## 2018-02-23 RX ADMIN — HYDROMORPHONE HYDROCHLORIDE 0.5 MG: 1 INJECTION, SOLUTION INTRAMUSCULAR; INTRAVENOUS; SUBCUTANEOUS at 22:37

## 2018-02-23 NOTE — CONSULTS
Consult completed 2/22 by Cait Heath NP and Dr. Kelvin Ribeiro. Please see notes section for details.

## 2018-02-23 NOTE — PROGRESS NOTES
0215 Patient had become extremely restless and confused. HR in the 120s. MD notified. Order was given to administer Seroquel 12.5 mg PO once and if patient isn't asleep in an hour, may may repeat dose. And if HR didn't come down with that as well, administer Metoprolol 25mg PO once. Will continue to monitor. Bedside and Verbal shift change report given to Juanis Bonilla (oncoming nurse) by Kaleb Mcmanus (offgoing nurse). Report included the following information SBAR, Kardex, MAR and Recent Results.

## 2018-02-23 NOTE — PROGRESS NOTES
Spiritual Care Partner Volunteer visited patient in med surg on 2/22/2018. This is a late chart note. Documented by:   Rev. Chao Ayala.  Guero Chinchilla MA, [facility]    Lead  Profession Development & Advancement

## 2018-02-23 NOTE — PROGRESS NOTES
Occupational Therapy Note; Spoke to RN who stated pt is very groggy right now and recommended trying to work with pt this afternoon instead. Will attempt to see pt later in the day.  Nik VALENTINE/ALAYNA

## 2018-02-23 NOTE — PROGRESS NOTES
2/23/2018  4:32 PM  EMR reviewed, Pt is continuing to require medical management; current plan is SNf for rehab at d/c. Referrals made to [facility], CM called admissions, left VM. CM called [facility], spoke w/ Ayaz Armando, they can accept Pt for rehab at d/c. CM will follow.   Myah Peace

## 2018-02-23 NOTE — PROGRESS NOTES
Problem: Mobility Impaired (Adult and Pediatric)  Goal: *Acute Goals and Plan of Care (Insert Text)  Physical Therapy Goals  Initiated 2/20/2018    1. Patient will move from supine to sit and sit to supine , scoot up and down and roll side to side in bed with moderate assistance x 2  within 7 days. 2. Patient will perform sit <> stand with minimal assist x 2  within 7 days. 3. Patient will ambulate with minimal assistance x 2 for 10 feet with RW and assist of 3rd pushing chair from behind within 7 days. 4. Patient will verbalize and demonstrate understanding of spinal precautions (No bending, lifting greater than 5 lbs, or twisting; log-roll technique; frequent repositioning as instructed) within 7 days. physical Therapy TREATMENT  Patient: Karla Spivey (65 y.o. female)  Date: 2/23/2018  Diagnosis: Intra-abdominal free air of unknown etiology [K66.8] Perforated chronic gastric ulcer ([address]  Procedure(s) (LRB):   NASOGASTRIC TUBE PLACEMENT (N/A) 5 Days Post-Op  Precautions: Aspiration, Back, Bed Alarm, Fall, Skin  Chart, physical therapy assessment, plan of care and goals were reviewed. ASSESSMENT:  Pt presents with some  pain to back and abdomen. Pt and family eager to mobilize as able. Pt performed rolling to put on diaper as pt is incontanent of bowl. Pt max to total assist to sit on EOB. Pt min to mod  assist sitting on EOB. Pt to stand with max assist of 2. Pt stood on elevated plinth with RW max assist of 2. Pt stood for 1 minute max assist of 2. Pt was able to keep knees extended but leaned heavily to posterior. Pt returned to supine max to total assist of 2. Pt struggling with pain and weakness. PT will continue to follow.   Progression toward goals:  []      Improving appropriately and progressing toward goals  [x]      Improving slowly and progressing toward goals  []      Not making progress toward goals and plan of care will be adjusted     PLAN:  Patient continues to benefit from skilled intervention to address the above impairments. Continue treatment per established plan of care. Discharge Recommendations: To Be Determined  Further Equipment Recommendations for Discharge:       SUBJECTIVE:        OBJECTIVE DATA SUMMARY:   Critical Behavior:  Neurologic State: Confused, Eyes open spontaneously, Restless  Orientation Level: Oriented to person, Oriented to place  Cognition: Follows commands, Poor safety awareness     Functional Mobility Training:  Bed Mobility:     Supine to Sit: Maximum assistance; Total assistance;Assist x2     Scooting: Maximum assistance;Assist x2;Total assistance         Transfers:  Sit to Stand: Maximum assistance;Assist x2                                Balance:  Sitting: Impaired; With support  Standing: Impaired; With support    Pain:  Pain Scale 1: Numeric (0 - 10)  Pain Intensity 1: 8  Pain Location 1: Abdomen;Back  Pain Orientation 1: Anterior;Posterior  Pain Description 1: Aching;Constant  Pain Intervention(s) 1: Medication (see MAR)  Activity Tolerance:   Pt tolerated treatment well. Please refer to the flowsheet for vital signs taken during this treatment.   After treatment:   [] Patient left in no apparent distress sitting up in chair  [x] Patient left in no apparent distress in bed  [] Call bell left within reach  [] Nursing notified  [] Caregiver present  [] Bed alarm activated    COMMUNICATION/COLLABORATION:   The patients plan of care was discussed with: Physical Therapist    Parker Modi PTA   Time Calculation: 23 mins

## 2018-02-23 NOTE — PROGRESS NOTES
[facility]     Name: Betsy Amaya MRN: [id number]   : 1945 Hospital: [address] [facility]   Date: 2018        Asked to see again due to worsening SOB. Impression Plan   1. Dyspnea  2. Abnormal Chest CT: with bilateral pleural effusions, atelectasis,and patchy airspace disease in MIRIAM  3. Leukocytosis  4. Peforated gastric ulcer, s/p repair  5. Shock, likely septic, resolved  6. Acute hypoxic respiratory failure   7. Hypokalemia, hypomagnesemia  8. EtOH abuse     · Goal sats >90%, wean O2 as tolerated  · Will diurese with 1mg Bumex now  · Zosyn, Vanc and Levaquin. Follow-up cultures, should be able to wean tomorrow  · Post-op management as per surgery  · Pain control: timing of narcotics and benzos spaced out this morning  · Monitor lytes, replete as needed  · Encourage IS use  · OOB and mobilize as much as possible: discussed with nursing today that she needs to get OOB  · TPN  · SCDs  · Protonix               Radiology  ( personally reviewed) Chest CTA:  No evidence of PE. Small bilateral pleural effusions. Considerable bibasilar atelectasis. Patchy airspace disease at left apex in MIRIAM. No mass or nodule. Small bilateral pleural effusions. Abdominal CT:  Ascites. Cirrhosis. [id number] undrained left subphrenic fluid collection. ABG No results for input(s): PHI, PO2I, PCO2I in the last 72 hours. Subjective     Overnight events:  TMax 100.7  BP stable  HR in low 100s  O2 sats 98% on 3L NC  WBC 17.1  K 3.7  Hgb 10.2  Phos 3.6  Blood cultures negative thus far  Fluid balance: -865  New subphrenic drain placed in IR yesterday    ROS:   Spoke mostly with daughter and ex . Patient lethargic but has periods of wakefulness and is quite uncomfortable. Reports extensive back pain. Denies fever or chills. She reports that her breathing is slightly improved. Denies history of sleep apnea but certainly has episodes of gasping while I'm in room.   Denies history of lung disease. Former smoker for 10+ years, quit 20 years ago. She reports that she did have a cough that has improved. Has LE swelling that her family reports is not at all her baseline-typically has very small legs. Family inquiring about getting her OOB. Past Medical History:   Diagnosis Date    Alcoholic cirrhosis of liver without ascites ([facility] 2/12/2018    ETOH abuse 2/11/2018    History of vascular access device 02/16/2018    [facility] VAT - 5 FR triple, R basilic, TPN    Hyperammonemia ([facility] 2/16/2018     Results for Obdulio Patricia (MRN [id number]) as of 2/17/2018 08:43  Ref. Range 2/16/2018 17:20 Ammonia Latest Ref Range: <32 UMOL/L 69 (H)     Hyperlipidemia     Perforated chronic gastric ulcer ([facility] 2/11/2018      Past Surgical History:   Procedure Laterality Date    HX CATARACT REMOVAL        Prior to Admission medications    Medication Sig Start Date End Date Taking? Authorizing Provider   naproxen sodium (ALEVE) 220 mg tablet Take 220 mg by mouth daily as needed for Pain. Yes Historical Provider   cyanocobalamin (VITAMIN B12) 500 mcg tablet Take 500 mcg by mouth daily. Yes Historical Provider   multivitamin (ONE A DAY) tablet Take 1 Tab by mouth daily. Yes Historical Provider   omega-3 fatty acids-vitamin e 1,000 mg cap Take 2 Caps by mouth daily.    Yes Historical Provider     Current Facility-Administered Medications   Medication Dose Route Frequency    metoprolol tartrate (LOPRESSOR) tablet 25 mg  25 mg Oral ONCE    TPN ADULT - CENTRAL   IntraVENous CONTINUOUS    [START ON 2/24/2018] Vancomycin Trough 03:30 2/24  1 Each Other ONCE    TPN ADULT - CENTRAL   IntraVENous CONTINUOUS    levoFLOXacin (LEVAQUIN) 750 mg in D5W IVPB  750 mg IntraVENous Q24H    vancomycin (VANCOCIN) 1,250 mg in 0.9% sodium chloride 250 mL IVPB  1,250 mg IntraVENous Q12H    insulin regular (NOVOLIN R, HUMULIN R) injection   SubCUTAneous Q6H    fat emulsion 20% (LIPOSYN, INTRAlipid) infusion 500 mL  500 mL IntraVENous Q MON, WED & SAT    sodium chloride (NS) flush 10-40 mL  10-40 mL InterCATHeter Q8H    piperacillin-tazobactam (ZOSYN) 10.125 g in 0.9% sodium chloride 500 mL continuous 24 hr infusion  10.125 g IntraVENous Q24H    enoxaparin (LOVENOX) injection 40 mg  40 mg SubCUTAneous Q24H    pantoprazole (PROTONIX) injection 40 mg  40 mg IntraVENous Q12H    nystatin (MYCOSTATIN) 100,000 unit/gram powder   Topical BID     No Known Allergies   Social History   Substance Use Topics    Smoking status: Former Smoker    Smokeless tobacco: Never Used    Alcohol use 11.4 oz/week     0 Standard drinks or equivalent, 19 Glasses of wine per week      Comment: Hx of heavy etoh use, but quit several months ago      Family History   Problem Relation Age of Onset    Other Other      patient did not know          Laboratory: I have personally reviewed the critical care flowsheet and labs. Recent Labs      02/23/18 0339 02/22/18 0457  02/21/18   0345   WBC  17.1*  19.9*  19.1*   HGB  10.2*  10.6*  10.7*   HCT  32.0*  32.6*  33.1*   PLT  444*  408*  311     Recent Labs      02/23/18   0339  02/22/18 0457  02/21/18   0345   NA  139  141  143   K  3.7  4.0  4.2   CL  107  107  111*   CO2  26  27  25   GLU  101*  96  104*   BUN  11  12  16   CREA  0.42*  0.43*  0.45*   CA  7.4*  7.8*  7.7*   MG   --   1.9  1.8   PHOS   --   3.6  3.1   ALB  1.4*  1.5*  1.4*   SGOT  37  46*  26   ALT  23  25  13       Objective:          Intake/Output Summary (Last 24 hours) at 02/23/18 1350  Last data filed at 02/23/18 0237   Gross per 24 hour   Intake               10 ml   Output              875 ml   Net             -865 ml     GENERAL: lethargic with periods of wakefulness due to pain, confused but able to answer questions appropriately HEENT:  PERRL, EOMI, no alar flaring or epistaxis, oral mucosa moist without cyanosis, NECK:  no jugular vein distention, no retractions, no thyromegaly or masses, LUNGS: CTAB but diminished at the bases, respirations non-labored, on 2L NC , HEART:  Regular rate and rhythm with no MGR; no edema is present, ABDOMEN:  soft, stable CDI, drain dressing CDI EXTREMITIES:  warm with no cyanosis, NP LE swelling SKIN:  no jaundice or ecchymosis and NEUROLOGIC: asleep, arouable, confused, grossly non-focal    [place]rent[place] Life, NP  [facility]

## 2018-02-23 NOTE — CONSULTS
Gastroenterology Consultation Note      Admit Date: 2/11/2018  Consult Date: 2/23/2018   I greatly appreciate your asking me to see Mey Fox, thank you very much for the opportunity to participate in her care. Narrative Assessment and Plan   · Perforated gastric ulcer  · Alcoholic cirrhosis  · Ascites (new postop)    Plan  - postop surgical management per primary  - will need for H Pylori    Regarding cirrhosis: With history and liver biopsy results this is likely alcohol induced. Had long discussion with patient daughter. They were very honest with me about patient alcohol history and then also that their mother will not likely comply with routine follow up appointments, screening etc and if she returns home will likely return to drinking. The main goal is to focus on patient alcohol cessation. Discussed that is she continues to drink this can lead to further complications and early demise. Avoiding alcohol can improve outcome and prevent further complications. Will need to have conversation with patient also when she is more alert. Check for hep A and B and if not immune patient will need vaccinations. Ascites management: When eating again recommend strict low Na diet. Can consider adding step 1 diuretic therapy with lasix 40mg and aldactone 100mg daily if kidney function and electrolytes allow. [address] screening: recent CT scan without evidence of hepatic mass, will need ultrasound in 6 months    Esophageal variceal screening: EGD in 6-8 weeks (if patient agreeable) to assess for healing of ulcer and screen for varices    Attending (Dr. Heydi Paul) evaluation to follow    ------  Yaritza Lam. Heydi Paul MD  [phone] office  [phone] voicemail   I have personally reviewed the history and independently examined the patient. I have reviewed the chart and agree with the documentation recorded by the Mid Level Provider, including the assessment, treatment plan, and disposition.     ASSESSMENT AND PLAN:  Cirrhosis  Ethanol abuse  Peptic ulcer disease  Ascites  Confusion / delerium    She's been inpatient since 2/12 so her confusion too far out to ascribe to DT. She has ascites and per Dr. Kade Castellon a small controlled leak    Continue supportive care/TPN  GI will see this weekend if needed, call if so [phone]    Kusum Gamino MD          Subjective:     Chief Complaint: confused    History of Present Illness: Valery Giang is a 67 y.o. female that was admitted with perforated gastric ulcer 2/11/18. Required emergent exploration and repair. Initial CT scan shows abnormal liver morphology. Liver biopsy performed and results with cirrhosis with mild chronic portal inflammation and macrovesicular steatosis (comments: likely alcohol related or steatohepatitis). Patient is confused and not able to provider history. Patient's daughters state patient has issues with alcohol abuse for the past 13-14 years. Pt lives at home alone. Daughters live [place] of [place]. Despite attempts to change her living situation and recommendations from previous providers to stop drinking she has continued. Patient has history of perforated gastric ulcer in 2016. Recommended to have repeat EGD/Colonoscopy 6-8 weeks after procedure but per office records/notes patient declined. Daughter's state that patient does not go to the doctor or cancel her appointments. PCP:  Lori Hicks DO    Past Medical History:   Diagnosis Date    Alcoholic cirrhosis of liver without ascites ([address] 2/12/2018    ETOH abuse 2/11/2018    History of vascular access device 02/16/2018    [facility] VAT - 5 FR triple, R basilic, TPN    Hyperammonemia ([address] 2/16/2018     Results for Kala Diaz (MRN [id number]) as of 2/17/2018 08:43  Ref.  Range 2/16/2018 17:20 Ammonia Latest Ref Range: <32 UMOL/L 69 (H)     Hyperlipidemia     Perforated chronic gastric ulcer ([address] 2/11/2018        Past Surgical History:   Procedure Laterality Date    HX CATARACT REMOVAL         Social History   Substance Use Topics    Smoking status: Former Smoker    Smokeless tobacco: Never Used    Alcohol use 11.4 oz/week     0 Standard drinks or equivalent, 19 Glasses of wine per week      Comment: Hx of heavy etoh use, but quit several months ago        Family History   Problem Relation Age of Onset    Other Other      patient did not know        No Known Allergies         Home Medications:  Prior to Admission Medications   Prescriptions Last Dose Informant Patient Reported? Taking? cyanocobalamin (VITAMIN B12) 500 mcg tablet 2/10/2018 at Unknown time Self Yes Yes   Sig: Take 500 mcg by mouth daily. multivitamin (ONE A DAY) tablet 2/10/2018 at Unknown time Self Yes Yes   Sig: Take 1 Tab by mouth daily. naproxen sodium (ALEVE) 220 mg tablet 2/10/2018 at Unknown time Self Yes Yes   Sig: Take 220 mg by mouth daily as needed for Pain. omega-3 fatty acids-vitamin e 1,000 mg cap 2/10/2018 at Unknown time Self Yes Yes   Sig: Take 2 Caps by mouth daily.       Facility-Administered Medications: None       Hospital Medications:  Current Facility-Administered Medications   Medication Dose Route Frequency    QUEtiapine (SEROquel) tablet 12.5 mg  12.5 mg Oral Q1H PRN    metoprolol tartrate (LOPRESSOR) tablet 25 mg  25 mg Oral ONCE    TPN ADULT - CENTRAL   IntraVENous CONTINUOUS    TPN ADULT - CENTRAL   IntraVENous CONTINUOUS    levoFLOXacin (LEVAQUIN) 750 mg in D5W IVPB  750 mg IntraVENous Q24H    vancomycin (VANCOCIN) 1,250 mg in 0.9% sodium chloride 250 mL IVPB  1,250 mg IntraVENous Q12H    bisacodyl (DULCOLAX) suppository 10 mg  10 mg Rectal DAILY PRN    HYDROmorphone (PF) (DILAUDID) injection 0.5 mg  0.5 mg IntraVENous Q4H PRN    LORazepam (ATIVAN) injection 1 mg  1 mg IntraVENous Q4H PRN    insulin regular (NOVOLIN R, HUMULIN R) injection   SubCUTAneous Q6H    albuterol-ipratropium (DUO-NEB) 2.5 MG-0.5 MG/3 ML  3 mL Nebulization Q4H PRN    fat emulsion 20% (LIPOSYN, INTRAlipid) infusion 500 mL  500 mL IntraVENous Q MON, WED & SAT    glucose chewable tablet 16 g  4 Tab Oral PRN    glucagon (GLUCAGEN) injection 1 mg  1 mg IntraMUSCular PRN    dextrose (D50W) injection syrg 12.5-25 g  12.5-25 g IntraVENous PRN    alteplase (CATHFLO) 1 mg in sterile water (preservative free) 1 mL injection  1 mg InterCATHeter PRN    sodium chloride (NS) flush 10-30 mL  10-30 mL InterCATHeter PRN    sodium chloride (NS) flush 10-40 mL  10-40 mL InterCATHeter Q8H    piperacillin-tazobactam (ZOSYN) 10.125 g in 0.9% sodium chloride 500 mL continuous 24 hr infusion  10.125 g IntraVENous Q24H    acetaminophen (TYLENOL) solution 650 mg  650 mg Oral Q6H PRN    naloxone (NARCAN) injection 0.4 mg  0.4 mg IntraVENous PRN    ondansetron (ZOFRAN) injection 4 mg  4 mg IntraVENous Q4H PRN    enoxaparin (LOVENOX) injection 40 mg  40 mg SubCUTAneous Q24H    pantoprazole (PROTONIX) injection 40 mg  40 mg IntraVENous Q12H    phenol throat spray (CHLORASEPTIC) 1 Spray  1 Spray Oral PRN    nystatin (MYCOSTATIN) 100,000 unit/gram powder   Topical BID    sodium chloride (NS) flush 5-10 mL  5-10 mL IntraVENous PRN       Review of Systems: Admission ROS by Julia Hogue MD from 2/11/2018 were reviewed with the patient and changes (other than per HPI) include: none      Objective:     Physical Exam:  Visit Vitals    /55 (BP 1 Location: Left arm, BP Patient Position: At rest)    Pulse 98    Temp 98.5 °F (36.9 °C)    Resp 15    Ht 5' 3.5\" (1.613 m)    Wt 87.9 kg (193 lb 12.6 oz)    SpO2 95%    Breastfeeding No    BMI 33.79 kg/m2     SpO2 Readings from Last 6 Encounters:   02/23/18 95%   04/30/16 94%    O2 Flow Rate (L/min): 3 l/min     Intake/Output Summary (Last 24 hours) at 02/23/18 1020  Last data filed at 02/23/18 0237   Gross per 24 hour   Intake               10 ml   Output              875 ml   Net             -865 ml      General: no distress, confused  Skin:  No jaundice  HEENT: Pupils equal, sclera anicteric, oropharynx with no gross lesions  Cardiovascular: regular rate and rhythm, well perfused, trace edema  Respiratory:  Diminished breath sounds, no respiratory distress  GI: bowel sounds hypoactive, soft, obese, mild distention, appears appropriately tender  Musculoskeletal:  No skeletal deformity nor acute arthritis noted. Neurological:  Motor and sensory function intact in upper extremeties  Psychiatric: drowsy and confused    Laboratory:    Recent Results (from the past 24 hour(s))   GLUCOSE, POC    Collection Time: 02/22/18  1:01 PM   Result Value Ref Range    Glucose (POC) 103 (H) 65 - 100 mg/dL    Performed by Miki Payne (PCT)    GLUCOSE, POC    Collection Time: 02/22/18  6:44 PM   Result Value Ref Range    Glucose (POC) 120 (H) 65 - 100 mg/dL    Performed by Nicko Hanks    GLUCOSE, POC    Collection Time: 02/23/18 12:00 AM   Result Value Ref Range    Glucose (POC) 101 (H) 65 - 100 mg/dL    Performed by Kaylah Raya    METABOLIC PANEL, COMPREHENSIVE    Collection Time: 02/23/18  3:39 AM   Result Value Ref Range    Sodium 139 136 - 145 mmol/L    Potassium 3.7 3.5 - 5.1 mmol/L    Chloride 107 97 - 108 mmol/L    CO2 26 21 - 32 mmol/L    Anion gap 6 5 - 15 mmol/L    Glucose 101 (H) 65 - 100 mg/dL    BUN 11 6 - 20 MG/DL    Creatinine 0.42 (L) 0.55 - 1.02 MG/DL    BUN/Creatinine ratio 26 (H) 12 - 20      GFR est AA >60 >60 ml/min/1.73m2    GFR est non-AA >60 >60 ml/min/1.73m2    Calcium 7.4 (L) 8.5 - 10.1 MG/DL    Bilirubin, total 1.5 (H) 0.2 - 1.0 MG/DL    ALT (SGPT) 23 12 - 78 U/L    AST (SGOT) 37 15 - 37 U/L    Alk.  phosphatase 161 (H) 45 - 117 U/L    Protein, total 5.1 (L) 6.4 - 8.2 g/dL    Albumin 1.4 (L) 3.5 - 5.0 g/dL    Globulin 3.7 2.0 - 4.0 g/dL    A-G Ratio 0.4 (L) 1.1 - 2.2     CBC WITH AUTOMATED DIFF    Collection Time: 02/23/18  3:39 AM   Result Value Ref Range    WBC 17.1 (H) 3.6 - 11.0 K/uL    RBC 3.04 (L) 3.80 - 5.20 M/uL    HGB 10.2 (L) 11.5 - 16.0 g/dL    HCT 32.0 (L) 35.0 - 47.0 %    .3 (H) 80.0 - 99.0 FL    MCH 33.6 26.0 - 34.0 PG    MCHC 31.9 30.0 - 36.5 g/dL    RDW 14.0 11.5 - 14.5 %    PLATELET 125 (H) 582 - 400 K/uL    MPV 12.5 8.9 - 12.9 FL    NRBC 0.0 0  WBC    ABSOLUTE NRBC 0.00 0.00 - 0.01 K/uL    NEUTROPHILS 89 (H) 32 - 75 %    LYMPHOCYTES 6 (L) 12 - 49 %    MONOCYTES 4 (L) 5 - 13 %    EOSINOPHILS 1 0 - 7 %    BASOPHILS 0 0 - 1 %    IMMATURE GRANULOCYTES 1 (H) 0.0 - 0.5 %    ABS. NEUTROPHILS 15.2 (H) 1.8 - 8.0 K/UL    ABS. LYMPHOCYTES 1.0 0.8 - 3.5 K/UL    ABS. MONOCYTES 0.6 0.0 - 1.0 K/UL    ABS. EOSINOPHILS 0.1 0.0 - 0.4 K/UL    ABS. BASOPHILS 0.1 0.0 - 0.1 K/UL    ABS. IMM. GRANS. 0.1 (H) 0.00 - 0.04 K/UL    DF AUTOMATED     PREALBUMIN    Collection Time: 02/23/18  3:39 AM   Result Value Ref Range    Prealbumin 4.4 (L) 20.0 - 40.0 mg/dL   GLUCOSE, POC    Collection Time: 02/23/18  6:20 AM   Result Value Ref Range    Glucose (POC) 119 (H) 65 - 100 mg/dL    Performed by Kaylah Raya          Assessment/Plan:     Principal Problem:    Perforated chronic gastric ulcer ([facility] (2/11/2018)    Active Problems:    ETOH abuse (2/11/2018)      Free intraperitoneal air (2/12/2018)      S/P exploratory laparotomy (2/12/2018)      Overview: Suture repair of perforated posterior gastric ulcer with Shikha Butts patch,       core needle biopsies of left lobe of the liver. Alcoholic cirrhosis of liver without ascites ([address] (2/12/2018)      Overview: Liver bx 2/12/18:       Cirrhosis with mild chronic portal inflammation and macrovesicular       steatosis. Fatty liver determined by biopsy (2/12/2018)      Overview:        Cirrhosis with mild chronic portal inflammation and macrovesicular       steatosis. Hyperammonemia ([address] (2/16/2018)      Overview:        Ref.  Range 2/16/2018 17:20       Ammonia Latest Ref Range: <32 UMOL/L 69 (H)             Hypokalemia (2/18/2018)      Leukocytosis (2/18/2018)      Severe protein-calorie malnutrition ([facility] (2/18/2018)      Acute metabolic encephalopathy (7/49/3144)         See above narrative for full detail.     Jann SAN PA-C  02/23/18  10:20 AM

## 2018-02-23 NOTE — PROGRESS NOTES
Problem: Self Care Deficits Care Plan (Adult)  Goal: *Acute Goals and Plan of Care (Insert Text)  Occupational Therapy Goals  Initiated 2/20/2018  1. Patient will perform upper body dressing with moderate assistance in unsupported sitting within 7 day(s). 2.  Patient will perform upper body bathing with moderate assistance  within 7 day(s). 3.  Patient will perform grooming with minimal assistance/contact guard assist within 7 day(s). 4.  Patient will perform toilet transfers with maximal assistance  within 7 day(s). 5.  Patient will perform all aspects of toileting with maximal assistance within 7 day(s). 6.  Patient will participate in upper extremity therapeutic exercise/activities with minimal assistance/contact guard assist for 5 minutes within 7 day(s). Occupational Therapy TREATMENT  Patient: Raisa Fink (81 y.o. female)  Date: 2/23/2018  Diagnosis: Intra-abdominal free air of unknown etiology [K66.8] Perforated chronic gastric ulcer ([address]  Procedure(s) (LRB):   NASOGASTRIC TUBE PLACEMENT (N/A) 5 Days Post-Op  Precautions: Aspiration, Back, Bed Alarm, Fall, Skin  Chart, occupational therapy assessment, plan of care, and goals were reviewed. ASSESSMENT:  Pt needed max assist x 2 to roll for brief placement. Family stating pt has been \"peeing constantly. \" Pt sat edge of bed with fair balance. She stood one time on plinth due to short stature and in prep for ADL's with moderate assist x 2 plus another to assist with linens. Pt verbalizing back and abdominal pain, RN aware. Pt returned to supine and max assist to doff/don gown. Pt will need rehab. Progression toward goals:  []          Improving appropriately and progressing toward goals  [x]          Improving slowly and progressing toward goals  []          Not making progress toward goals and plan of care will be adjusted     PLAN:  Patient continues to benefit from skilled intervention to address the above impairments.   Continue treatment per established plan of care. Discharge Recommendations:  Rehab  Further Equipment Recommendations for Discharge:  None     SUBJECTIVE:   Patient stated oooo.     OBJECTIVE DATA SUMMARY:   Cognitive/Behavioral Status:  Neurologic State: Drowsy; Eyes open spontaneously  Orientation Level: Oriented to person  Cognition: Decreased attention/concentration           Functional Mobility and Transfers for ADLs:   Bed Mobility:     Supine to Sit: Maximum assistance; Total assistance;Assist x2     Scooting: Maximum assistance;Assist x2;Total assistance     Transfers:  Sit to Stand: Maximum assistance;Assist x2       Balance:  Sitting: Impaired; With support  Standing: Impaired; With support  ADL Intervention:                      Upper Body Dressing Assistance  Dressing Assistance: Maximum assistance  Hospital Gown: Maximum assistance    Lower Body Dressing Assistance  Dressing Assistance: Total assistance(dependent)  Protective Undergarmet: Total assistance (dependent)  Leg Crossed Method Used: No  Position Performed: Supine  Cues: Physical assistance    Toileting  Toileting Assistance: Total assistance(dependent)         Pain:  Pain Scale 1: Numeric (0 - 10)  Pain Intensity 1: 8  Pain Location 1: Abdomen;Back  Pain Orientation 1: Anterior;Posterior  Pain Description 1: Aching;Constant  Pain Intervention(s) 1: Medication (see MAR)    Activity Tolerance:    Poor  Please refer to the flowsheet for vital signs taken during this treatment.   After treatment:   []  Patient left in no apparent distress sitting up in chair  [x]  Patient left in no apparent distress in bed  [x]  Call bell left within reach  [x]  Nursing notified  [x]  Caregiver present  []  Bed alarm activated    COMMUNICATION/COLLABORATION:   The patients plan of care was discussed with: Physical Therapy Assistant, Occupational Therapist and Registered Nurse    ISRAEL Powell  Time Calculation: 30 mins

## 2018-02-23 NOTE — PROGRESS NOTES
Family Practice Consult and Daily Progress Note: 2/23/2018  [facility]  Becky Solis DO    Assessment/Plan:   Perforated chronic gastric ulcer /  Free intraperitoneal air / S/P exploratory laparotomy. S/p repair in OR with Dr. Tonny Montgomery on 2/12/18 - per surg     Acute metabolic encephalopathy. Agree with decreasing dose of hydromorphone/pain meds/anxiety meds and use judiciously. Supportive care, maintenance of sleep/wake cycle, and re-orientation      Hypoxia / Respiratory distress. CXR with atelectasis and pleural effusion. Continue Abx, nebs, supplemental oxygen and incentive spirom as able. Repeat CT chest with moderate to large persistent left subphrenic collection. Pulmonary consulted and planning to drain in IR. Bumex.      Alcoholic cirrhosis of liver without ascites /  Fatty liver determined by biopsy /  Hyperammonemia. Will need to be counseled on weight loss and alcohol cessation.     ETOH abuse. Needs to stop EtOH entirely.     Hypokalemia /  Hypoalbuminemia. Being addressed with TPN     Leukocytosis. Down to 17 this am. Zosyn, Vanc, and levaquin        Problem List:  Problem List as of 2/23/2018  Date Reviewed: 4/26/2016          Codes Class Noted - Resolved    Hypokalemia ICD-10-CM: E87.6  ICD-9-CM: 276.8  2/18/2018 - Present        Leukocytosis ICD-10-CM: D72.829  ICD-9-CM: 288.60  2/18/2018 - Present        Severe protein-calorie malnutrition ([facility] ICD-10-CM: E43  ICD-9-CM: 262  2/18/2018 - Present        Acute metabolic encephalopathy FSI-69-EZ: G93.41  ICD-9-CM: 348.31  2/18/2018 - Present        Hyperammonemia ([address] ICD-10-CM: E72.20  ICD-9-CM: 270.6  2/16/2018 - Present    Overview Addendum 2/17/2018  9:08 AM by Chacorta Munguia MD        Ref.  Range 2/16/2018 17:20   Ammonia Latest Ref Range: <32 UMOL/L 69 (H)                Free intraperitoneal air ICD-10-CM: K66.8  ICD-9-CM: 568.89  2/12/2018 - Present        S/P exploratory laparotomy ICD-10-CM: Z98.890  ICD-9-CM: V45.89  2/12/2018 - Present    Overview Signed 2/12/2018 11:57 AM by Zena Hodgkins., MD     Suture repair of perforated posterior gastric ulcer with Juliaette Nims patch, core needle biopsies of left lobe of the liver. Alcoholic cirrhosis of liver without ascites (HCC) (Chronic) ICD-10-CM: K70.30  ICD-9-CM: 571.2  2/12/2018 - Present    Overview Signed 2/17/2018  8:45 AM by Jesika Wall MD     Liver bx 2/12/18:   Cirrhosis with mild chronic portal inflammation and macrovesicular steatosis. Fatty liver determined by biopsy ICD-10-CM: K76.0  ICD-9-CM: 571.8  2/12/2018 - Present    Overview Signed 2/17/2018  8:53 AM by Jesika Wall MD        Cirrhosis with mild chronic portal inflammation and macrovesicular steatosis. * (Principal)Perforated chronic gastric ulcer ([address] (Chronic) ICD-10-CM: K25.5  ICD-9-CM: 531.50  2/11/2018 - Present        ETOH abuse (Chronic) ICD-10-CM: F10.10  ICD-9-CM: 305.00  2/11/2018 - Present        GI bleed ICD-10-CM: K92.2  ICD-9-CM: 578.9  4/26/2016 - Present        Hypotension ICD-10-CM: I95.9  ICD-9-CM: 458.9  4/26/2016 - Present        Tachycardia ICD-10-CM: R00.0  ICD-9-CM: 785.0  4/26/2016 - Present        Acute blood loss anemia ICD-10-CM: D62  ICD-9-CM: 285.1  4/26/2016 - Present              Subjective:    67 y.o.  female with EtOH abuse who is admitted to the General Surgery Service with perforated gastric ulcer. We are asked to evaluate for altered mental status. The patient is poorly interactive at this time and this limits primary hx. Discussed case with family available at bedside providing secondary hx and chart review. Per family, patient has had declining neurological condition over past 48 hours. Notably, an RRT was called for respiratory distress. 2/19: This morning she is a bit more alert at this moment and taking in more complete sentences. She says she does not feel well.   She has no specific site of pain other than the NG tube which is bothering her a great deal.  She should improve as time from surg increases. K+ a little low - replacing.     2/20:  Still confused and somnolent at times. Now able to localize pain to ab and converse a bit more. Tmax 100.3  WBC is up again but hopefully reactive - recheck in AM - more incentive spirom and check CXR.     2/21: A little more alert. Knows she is in the hospital. Not able to participate with PT yesterday as she was in too much pain. WBC still at 19.     2/22:  WBC 19K. She is more alert. Daughter at bedside. Both of her daughters live out of town. Looking at SNF options for rehab. Coughing more. Starting to use IS.     2/23: WBC 17. Repeat CT chest with moderate to large persistent left subphrenic collection. IR has been consulted. Vanc and Levaquin added by pulm. Past Medical History:   Diagnosis Date    Alcoholic cirrhosis of liver without ascites ([facility] 2/12/2018    ETOH abuse 2/11/2018    History of vascular access device 02/16/2018    [facility] VAT - 5 FR triple, R basilic, TPN    Hyperammonemia ([facility] 2/16/2018     Results for Maddi Seen (MRN [id number]) as of 2/17/2018 08:43  Ref. Range 2/16/2018 17:20 Ammonia Latest Ref Range: <32 UMOL/L 69 (H)     Hyperlipidemia     Perforated chronic gastric ulcer ([facility] 2/11/2018     Past Surgical History:   Procedure Laterality Date    HX CATARACT REMOVAL       Social History     Social History    Marital status: SINGLE     Spouse name: N/A    Number of children: N/A    Years of education: N/A     Occupational History    Not on file.      Social History Main Topics    Smoking status: Former Smoker    Smokeless tobacco: Never Used    Alcohol use 11.4 oz/week     0 Standard drinks or equivalent, 19 Glasses of wine per week      Comment: Hx of heavy etoh use, but quit several months ago    Drug use: No    Sexual activity: Not on file     Other Topics Concern    Not on file     Social History Narrative Family History   Problem Relation Age of Onset    Other Other      patient did not know       Review of Systems:   Review of systems not obtained due to patient factors. Objective:   Physical Exam:     Visit Vitals    /83    Pulse (!) 109    Temp (!) 100.7 °F (38.2 °C)    Resp 26    Ht 5' 3.5\" (1.613 m)    Wt 193 lb 12.6 oz (87.9 kg)    SpO2 94%    Breastfeeding No    BMI 33.79 kg/m2    O2 Flow Rate (L/min): 3 l/min O2 Device: Nasal cannula    Temp (24hrs), Av.8 °F (37.1 °C), Min:97.4 °F (36.3 °C), Max:100.7 °F (38.2 °C)    1901 -  0700  In: 0   Out: 805 [Urine:700; Drains:105]   701 -  190  In: 1971.6 [I.V.:1961.6]  Out: 1310 [Urine:1125; Drains:185]    General:  Sleeping, appears stated age. Head:  Normocephalic, without obvious abnormality, atraumatic. Eyes:  Conjunctivae/corneas clear. EOMs intact. Nose: Patent, on NC   Throat: Lips, mucosa, and tongue moist..   Neck: Supple, symmetrical, trachea midline, no adenopathy, thyroid: no enlargement/tenderness/nodules, no carotid bruit and no JVD. Lungs:   Diminished breath sounds   Chest wall:  No tenderness or deformity. Heart:  Regular rate and rhythm, S1, S2 normal, no murmur, click, rub or gallop. Abdomen:   Soft, distended, with mild generalized tenderness. Bowel sounds present. Dressings dry. Incision dry without redness   Extremities: no cyanosis or edema. No calf tenderness or cords. Skin: Skin color, texture, turgor normal. No rashes or lesions   Neurologic:   Arousable. She will follow  commands. No cogwheeling or rigidity. Gait not tested at this time. Sensation grossly normal to touch.   Gross motor of extremities normal.       Data Review:       Recent Days:  Recent Labs      18   0339  18   0457  18   0345   WBC  17.1*  19.9*  19.1*   HGB  10.2*  10.6*  10.7*   HCT  32.0*  32.6*  33.1*   PLT  444*  408*  311     Recent Labs      18   0339  18   0453 02/21/18   0345   NA  139  141  143   K  3.7  4.0  4.2   CL  107  107  111*   CO2  26  27  25   GLU  101*  96  104*   BUN  11  12  16   CREA  0.42*  0.43*  0.45*   CA  7.4*  7.8*  7.7*   MG   --   1.9  1.8   PHOS   --   3.6  3.1   ALB  1.4*  1.5*  1.4*   TBILI  1.5*  1.0  1.4*   SGOT  37  46*  26   ALT  23  25  13     No results for input(s): PH, PCO2, PO2, HCO3, FIO2 in the last 72 hours. 24 Hour Results:  Recent Results (from the past 24 hour(s))   GLUCOSE, POC    Collection Time: 02/22/18  1:01 PM   Result Value Ref Range    Glucose (POC) 103 (H) 65 - 100 mg/dL    Performed by Charlie Garvey (KENDALL)    GLUCOSE, POC    Collection Time: 02/22/18  6:44 PM   Result Value Ref Range    Glucose (POC) 120 (H) 65 - 100 mg/dL    Performed by Jo-Ann Lozoya    GLUCOSE, POC    Collection Time: 02/23/18 12:00 AM   Result Value Ref Range    Glucose (POC) 101 (H) 65 - 100 mg/dL    Performed by Kaylah Raya    METABOLIC PANEL, COMPREHENSIVE    Collection Time: 02/23/18  3:39 AM   Result Value Ref Range    Sodium 139 136 - 145 mmol/L    Potassium 3.7 3.5 - 5.1 mmol/L    Chloride 107 97 - 108 mmol/L    CO2 26 21 - 32 mmol/L    Anion gap 6 5 - 15 mmol/L    Glucose 101 (H) 65 - 100 mg/dL    BUN 11 6 - 20 MG/DL    Creatinine 0.42 (L) 0.55 - 1.02 MG/DL    BUN/Creatinine ratio 26 (H) 12 - 20      GFR est AA >60 >60 ml/min/1.73m2    GFR est non-AA >60 >60 ml/min/1.73m2    Calcium 7.4 (L) 8.5 - 10.1 MG/DL    Bilirubin, total 1.5 (H) 0.2 - 1.0 MG/DL    ALT (SGPT) 23 12 - 78 U/L    AST (SGOT) 37 15 - 37 U/L    Alk.  phosphatase 161 (H) 45 - 117 U/L    Protein, total 5.1 (L) 6.4 - 8.2 g/dL    Albumin 1.4 (L) 3.5 - 5.0 g/dL    Globulin 3.7 2.0 - 4.0 g/dL    A-G Ratio 0.4 (L) 1.1 - 2.2     CBC WITH AUTOMATED DIFF    Collection Time: 02/23/18  3:39 AM   Result Value Ref Range    WBC 17.1 (H) 3.6 - 11.0 K/uL    RBC 3.04 (L) 3.80 - 5.20 M/uL    HGB 10.2 (L) 11.5 - 16.0 g/dL    HCT 32.0 (L) 35.0 - 47.0 %    .3 (H) 80.0 - 99.0 FL    MCH 33.6 26.0 - 34.0 PG    MCHC 31.9 30.0 - 36.5 g/dL    RDW 14.0 11.5 - 14.5 %    PLATELET 021 (H) 269 - 400 K/uL    MPV 12.5 8.9 - 12.9 FL    NRBC 0.0 0  WBC    ABSOLUTE NRBC 0.00 0.00 - 0.01 K/uL    NEUTROPHILS 89 (H) 32 - 75 %    LYMPHOCYTES 6 (L) 12 - 49 %    MONOCYTES 4 (L) 5 - 13 %    EOSINOPHILS 1 0 - 7 %    BASOPHILS 0 0 - 1 %    IMMATURE GRANULOCYTES 1 (H) 0.0 - 0.5 %    ABS. NEUTROPHILS 15.2 (H) 1.8 - 8.0 K/UL    ABS. LYMPHOCYTES 1.0 0.8 - 3.5 K/UL    ABS. MONOCYTES 0.6 0.0 - 1.0 K/UL    ABS. EOSINOPHILS 0.1 0.0 - 0.4 K/UL    ABS. BASOPHILS 0.1 0.0 - 0.1 K/UL    ABS. IMM.  GRANS. 0.1 (H) 0.00 - 0.04 K/UL    DF AUTOMATED     GLUCOSE, POC    Collection Time: 02/23/18  6:20 AM   Result Value Ref Range    Glucose (POC) 119 (H) 65 - 100 mg/dL    Performed by Kaylah Raya        Medications reviewed  Current Facility-Administered Medications   Medication Dose Route Frequency    QUEtiapine (SEROquel) tablet 12.5 mg  12.5 mg Oral Q1H PRN    metoprolol tartrate (LOPRESSOR) tablet 25 mg  25 mg Oral ONCE    TPN ADULT - CENTRAL   IntraVENous CONTINUOUS    levoFLOXacin (LEVAQUIN) 750 mg in D5W IVPB  750 mg IntraVENous Q24H    vancomycin (VANCOCIN) 1,250 mg in 0.9% sodium chloride 250 mL IVPB  1,250 mg IntraVENous Q12H    bisacodyl (DULCOLAX) suppository 10 mg  10 mg Rectal DAILY PRN    HYDROmorphone (PF) (DILAUDID) injection 0.5 mg  0.5 mg IntraVENous Q4H PRN    LORazepam (ATIVAN) injection 1 mg  1 mg IntraVENous Q4H PRN    insulin regular (NOVOLIN R, HUMULIN R) injection   SubCUTAneous Q6H    albuterol-ipratropium (DUO-NEB) 2.5 MG-0.5 MG/3 ML  3 mL Nebulization Q4H PRN    fat emulsion 20% (LIPOSYN, INTRAlipid) infusion 500 mL  500 mL IntraVENous Q MON, WED & SAT    glucose chewable tablet 16 g  4 Tab Oral PRN    glucagon (GLUCAGEN) injection 1 mg  1 mg IntraMUSCular PRN    dextrose (D50W) injection syrg 12.5-25 g  12.5-25 g IntraVENous PRN    alteplase (CATHFLO) 1 mg in sterile water (preservative free) 1 mL injection  1 mg InterCATHeter PRN    sodium chloride (NS) flush 10-30 mL  10-30 mL InterCATHeter PRN    sodium chloride (NS) flush 10-40 mL  10-40 mL InterCATHeter Q8H    piperacillin-tazobactam (ZOSYN) 10.125 g in 0.9% sodium chloride 500 mL continuous 24 hr infusion  10.125 g IntraVENous Q24H    acetaminophen (TYLENOL) solution 650 mg  650 mg Oral Q6H PRN    naloxone (NARCAN) injection 0.4 mg  0.4 mg IntraVENous PRN    ondansetron (ZOFRAN) injection 4 mg  4 mg IntraVENous Q4H PRN    enoxaparin (LOVENOX) injection 40 mg  40 mg SubCUTAneous Q24H    pantoprazole (PROTONIX) injection 40 mg  40 mg IntraVENous Q12H    phenol throat spray (CHLORASEPTIC) 1 Spray  1 Spray Oral PRN    nystatin (MYCOSTATIN) 100,000 unit/gram powder   Topical BID    sodium chloride (NS) flush 5-10 mL  5-10 mL IntraVENous PRN       Care Plan discussed with: Patient/Family and Nurse     Total time spent with patient: 20 minutes.     Phoenix Oro MD

## 2018-02-23 NOTE — PROGRESS NOTES
SURGERY PROGRESS NOTE      Admit Date: 2018        Subjective:     Patient complains of abdominal and back pain. Family complains of altered mental status and state she has declined over the last 24 hours       Objective:     Visit Vitals    /56 (BP 1 Location: Left arm, BP Patient Position: At rest)    Pulse 99    Temp 98.4 °F (36.9 °C)    Resp 16    Ht 5' 3.5\" (1.613 m)    Wt 193 lb 12.6 oz (87.9 kg)    SpO2 98%    Breastfeeding No    BMI 33.79 kg/m2        Temp (24hrs), Av.6 °F (37 °C), Min:97.4 °F (36.3 °C), Max:100.7 °F (38.2 °C)          190 -  0700  In: 10   Out: 0121 [Urine:1250; Drains:225]    Physical Exam:    General:  Arousal, oriented most of the time but confused periodically and falls asleep quickly   Abdomen: soft, bowel sounds active, appropriately tender    ALYSSA -  Small amount bilious drainage. Does not hold suction and needs tp be charged frequently.       Pigtail - serous    Incision:   healing well, no drainage, no erythema, no hernia, no seroma, no swelling, no dehiscence, incision well approximated           Lab Results  Component Value Date/Time   WBC 17.1 (H) 2018 03:39 AM   HGB 10.2 (L) 2018 03:39 AM   Hemoglobin (POC) 18.0 (H) 2018 09:53 PM   HCT 32.0 (L) 2018 03:39 AM   Hematocrit (POC) 53 (H) 2018 09:53 PM   PLATELET 879 (H)  03:39 AM   .3 (H) 2018 03:39 AM     Lab Results  Component Value Date/Time   GFR est non-AA >60 2018 03:39 AM   GFRNA, POC 54 (L) 2018 09:53 PM   GFR est AA >60 2018 03:39 AM   GFRAA, POC >60 2018 09:53 PM   Creatinine 0.42 (L) 2018 03:39 AM   Creatinine (POC) 1.0 2018 09:53 PM   BUN 11 2018 03:39 AM   BUN (POC) 14 2018 09:53 PM   Sodium 139 2018 03:39 AM   Sodium (POC) 135 (L) 2018 09:53 PM   Potassium 3.7 2018 03:39 AM   Potassium (POC) 4.0 2018 09:53 PM   Chloride 107 2018 03:39 AM   Chloride (POC) 101 02/11/2018 09:53 PM   CO2 26 02/23/2018 03:39 AM   Magnesium 1.9 02/22/2018 04:57 AM   Phosphorus 3.6 02/22/2018 04:57 AM       Assessment/Plan:     Principal Problem:    Perforated chronic gastric ulcer ([address] (2/11/2018)    Active Problems:    ETOH abuse (2/11/2018)      Free intraperitoneal air (2/12/2018)      S/P exploratory laparotomy (2/12/2018)      Overview: Suture repair of perforated posterior gastric ulcer with Annamaria Core patch,       core needle biopsies of left lobe of the liver. Alcoholic cirrhosis of liver without ascites ([address] (2/12/2018)      Overview: Liver bx 2/12/18:       Cirrhosis with mild chronic portal inflammation and macrovesicular       steatosis. Fatty liver determined by biopsy (2/12/2018)      Overview:        Cirrhosis with mild chronic portal inflammation and macrovesicular       steatosis. Hyperammonemia ([address] (2/16/2018)      Overview:        Ref. Range 2/16/2018 17:20       Ammonia Latest Ref Range: <32 UMOL/L 69 (H)             Hypokalemia (2/18/2018)      Leukocytosis (2/18/2018)      Severe protein-calorie malnutrition ([address] (2/18/2018)      Acute metabolic encephalopathy (1/94/1725)    Stable from surgical perspective. Has a small controlled leak based on the drain output and CT findings. This should heal with bowel rest and nutrition. encephalopathy is multifactorial and as such difficult to sort out. I will try low dose haldol to reduce ativan use to see if this helps.   I seen previous discussions on ammonia and the decision not to treat but with the family's observation of a decline after improvement I will check to see if it significantly elevated

## 2018-02-24 LAB
ALBUMIN SERPL-MCNC: 1.2 G/DL (ref 3.5–5)
ALBUMIN/GLOB SERPL: 0.4 {RATIO} (ref 1.1–2.2)
ALP SERPL-CCNC: 138 U/L (ref 45–117)
ALT SERPL-CCNC: 22 U/L (ref 12–78)
ANION GAP SERPL CALC-SCNC: 6 MMOL/L (ref 5–15)
AST SERPL-CCNC: 36 U/L (ref 15–37)
BASOPHILS # BLD: 0.1 K/UL (ref 0–0.1)
BASOPHILS NFR BLD: 1 % (ref 0–1)
BILIRUB SERPL-MCNC: 1 MG/DL (ref 0.2–1)
BUN SERPL-MCNC: 12 MG/DL (ref 6–20)
BUN/CREAT SERPL: 24 (ref 12–20)
CALCIUM SERPL-MCNC: 6.7 MG/DL (ref 8.5–10.1)
CHLORIDE SERPL-SCNC: 111 MMOL/L (ref 97–108)
CO2 SERPL-SCNC: 24 MMOL/L (ref 21–32)
CREAT SERPL-MCNC: 0.49 MG/DL (ref 0.55–1.02)
DATE LAST DOSE: NORMAL
DIFFERENTIAL METHOD BLD: ABNORMAL
EOSINOPHIL # BLD: 0.1 K/UL (ref 0–0.4)
EOSINOPHIL NFR BLD: 1 % (ref 0–7)
ERYTHROCYTE [DISTWIDTH] IN BLOOD BY AUTOMATED COUNT: 14.2 % (ref 11.5–14.5)
GLOBULIN SER CALC-MCNC: 3.4 G/DL (ref 2–4)
GLUCOSE BLD STRIP.AUTO-MCNC: 104 MG/DL (ref 65–100)
GLUCOSE BLD STRIP.AUTO-MCNC: 120 MG/DL (ref 65–100)
GLUCOSE BLD STRIP.AUTO-MCNC: 121 MG/DL (ref 65–100)
GLUCOSE BLD STRIP.AUTO-MCNC: 87 MG/DL (ref 65–100)
GLUCOSE SERPL-MCNC: 92 MG/DL (ref 65–100)
HCT VFR BLD AUTO: 26.1 % (ref 35–47)
HGB BLD-MCNC: 8.1 G/DL (ref 11.5–16)
IMM GRANULOCYTES # BLD: 0.1 K/UL (ref 0–0.04)
IMM GRANULOCYTES NFR BLD AUTO: 1 % (ref 0–0.5)
LYMPHOCYTES # BLD: 1 K/UL (ref 0.8–3.5)
LYMPHOCYTES NFR BLD: 8 % (ref 12–49)
MAGNESIUM SERPL-MCNC: 1.8 MG/DL (ref 1.6–2.4)
MCH RBC QN AUTO: 33.6 PG (ref 26–34)
MCHC RBC AUTO-ENTMCNC: 31 G/DL (ref 30–36.5)
MCV RBC AUTO: 108.3 FL (ref 80–99)
MONOCYTES # BLD: 1 K/UL (ref 0–1)
MONOCYTES NFR BLD: 8 % (ref 5–13)
NEUTS SEG # BLD: 10.5 K/UL (ref 1.8–8)
NEUTS SEG NFR BLD: 82 % (ref 32–75)
NRBC # BLD: 0 K/UL (ref 0–0.01)
NRBC BLD-RTO: 0 PER 100 WBC
PHOSPHATE SERPL-MCNC: 3.1 MG/DL (ref 2.6–4.7)
PLATELET # BLD AUTO: 383 K/UL (ref 150–400)
PMV BLD AUTO: 12.7 FL (ref 8.9–12.9)
POTASSIUM SERPL-SCNC: 3.6 MMOL/L (ref 3.5–5.1)
PROT SERPL-MCNC: 4.6 G/DL (ref 6.4–8.2)
RBC # BLD AUTO: 2.41 M/UL (ref 3.8–5.2)
REPORTED DOSE,DOSE: NORMAL UNITS
REPORTED DOSE/TIME,TMG: 1600
SERVICE CMNT-IMP: ABNORMAL
SERVICE CMNT-IMP: NORMAL
SODIUM SERPL-SCNC: 141 MMOL/L (ref 136–145)
VANCOMYCIN TROUGH SERPL-MCNC: 8.9 UG/ML (ref 5–10)
WBC # BLD AUTO: 12.8 K/UL (ref 3.6–11)

## 2018-02-24 PROCEDURE — 74011250636 HC RX REV CODE- 250/636: Performed by: SURGERY

## 2018-02-24 PROCEDURE — 74011000250 HC RX REV CODE- 250: Performed by: INTERNAL MEDICINE

## 2018-02-24 PROCEDURE — 84100 ASSAY OF PHOSPHORUS: CPT | Performed by: SURGERY

## 2018-02-24 PROCEDURE — 85025 COMPLETE CBC W/AUTO DIFF WBC: CPT | Performed by: FAMILY MEDICINE

## 2018-02-24 PROCEDURE — 97530 THERAPEUTIC ACTIVITIES: CPT

## 2018-02-24 PROCEDURE — 36415 COLL VENOUS BLD VENIPUNCTURE: CPT | Performed by: SURGERY

## 2018-02-24 PROCEDURE — 86708 HEPATITIS A ANTIBODY: CPT | Performed by: PHYSICIAN ASSISTANT

## 2018-02-24 PROCEDURE — C9113 INJ PANTOPRAZOLE SODIUM, VIA: HCPCS | Performed by: SURGERY

## 2018-02-24 PROCEDURE — 82962 GLUCOSE BLOOD TEST: CPT

## 2018-02-24 PROCEDURE — 80202 ASSAY OF VANCOMYCIN: CPT | Performed by: NURSE PRACTITIONER

## 2018-02-24 PROCEDURE — 74011000250 HC RX REV CODE- 250: Performed by: SURGERY

## 2018-02-24 PROCEDURE — 74011250636 HC RX REV CODE- 250/636: Performed by: INTERNAL MEDICINE

## 2018-02-24 PROCEDURE — 77030011256 HC DRSG MEPILEX <16IN NO BORD MOLN -A

## 2018-02-24 PROCEDURE — 65660000000 HC RM CCU STEPDOWN

## 2018-02-24 PROCEDURE — 94640 AIRWAY INHALATION TREATMENT: CPT

## 2018-02-24 PROCEDURE — 74011000258 HC RX REV CODE- 258: Performed by: SURGERY

## 2018-02-24 PROCEDURE — 74011250637 HC RX REV CODE- 250/637: Performed by: SURGERY

## 2018-02-24 PROCEDURE — 83735 ASSAY OF MAGNESIUM: CPT | Performed by: SURGERY

## 2018-02-24 PROCEDURE — 77030038269 HC DRN EXT URIN PURWCK BARD -A

## 2018-02-24 PROCEDURE — 86706 HEP B SURFACE ANTIBODY: CPT | Performed by: PHYSICIAN ASSISTANT

## 2018-02-24 PROCEDURE — 80053 COMPREHEN METABOLIC PANEL: CPT | Performed by: SURGERY

## 2018-02-24 PROCEDURE — 74011250636 HC RX REV CODE- 250/636: Performed by: NURSE PRACTITIONER

## 2018-02-24 PROCEDURE — 77010033678 HC OXYGEN DAILY

## 2018-02-24 RX ORDER — BUMETANIDE 0.25 MG/ML
1 INJECTION INTRAMUSCULAR; INTRAVENOUS ONCE
Status: COMPLETED | OUTPATIENT
Start: 2018-02-24 | End: 2018-02-24

## 2018-02-24 RX ORDER — ACETAMINOPHEN 325 MG/1
650 TABLET ORAL
Status: DISCONTINUED | OUTPATIENT
Start: 2018-02-24 | End: 2018-03-16 | Stop reason: HOSPADM

## 2018-02-24 RX ORDER — IPRATROPIUM BROMIDE AND ALBUTEROL SULFATE 2.5; .5 MG/3ML; MG/3ML
3 SOLUTION RESPIRATORY (INHALATION)
Status: DISCONTINUED | OUTPATIENT
Start: 2018-02-24 | End: 2018-03-01

## 2018-02-24 RX ORDER — FENTANYL 12.5 UG/1
1 PATCH TRANSDERMAL
Status: DISCONTINUED | OUTPATIENT
Start: 2018-02-24 | End: 2018-02-28

## 2018-02-24 RX ADMIN — PIPERACILLIN SODIUM AND TAZOBACTAM SODIUM 10.12 G: 3; .375 INJECTION, POWDER, LYOPHILIZED, FOR SOLUTION INTRAVENOUS at 11:59

## 2018-02-24 RX ADMIN — IPRATROPIUM BROMIDE AND ALBUTEROL SULFATE 3 ML: .5; 3 SOLUTION RESPIRATORY (INHALATION) at 01:39

## 2018-02-24 RX ADMIN — LORAZEPAM 1 MG: 2 INJECTION INTRAMUSCULAR; INTRAVENOUS at 19:16

## 2018-02-24 RX ADMIN — Medication 10 ML: at 20:57

## 2018-02-24 RX ADMIN — HYDROMORPHONE HYDROCHLORIDE 0.5 MG: 1 INJECTION, SOLUTION INTRAMUSCULAR; INTRAVENOUS; SUBCUTANEOUS at 12:40

## 2018-02-24 RX ADMIN — Medication 10 ML: at 21:05

## 2018-02-24 RX ADMIN — HYDROMORPHONE HYDROCHLORIDE 0.5 MG: 1 INJECTION, SOLUTION INTRAMUSCULAR; INTRAVENOUS; SUBCUTANEOUS at 19:16

## 2018-02-24 RX ADMIN — IPRATROPIUM BROMIDE AND ALBUTEROL SULFATE 3 ML: .5; 3 SOLUTION RESPIRATORY (INHALATION) at 13:18

## 2018-02-24 RX ADMIN — NYSTATIN: 100000 POWDER TOPICAL at 09:25

## 2018-02-24 RX ADMIN — VANCOMYCIN HYDROCHLORIDE 1250 MG: 10 INJECTION, POWDER, LYOPHILIZED, FOR SOLUTION INTRAVENOUS at 05:30

## 2018-02-24 RX ADMIN — IPRATROPIUM BROMIDE AND ALBUTEROL SULFATE 3 ML: .5; 3 SOLUTION RESPIRATORY (INHALATION) at 19:58

## 2018-02-24 RX ADMIN — BISACODYL 10 MG: 10 SUPPOSITORY RECTAL at 09:23

## 2018-02-24 RX ADMIN — HYDROMORPHONE HYDROCHLORIDE 0.5 MG: 1 INJECTION, SOLUTION INTRAMUSCULAR; INTRAVENOUS; SUBCUTANEOUS at 03:43

## 2018-02-24 RX ADMIN — LEVOFLOXACIN 750 MG: 5 INJECTION, SOLUTION INTRAVENOUS at 15:20

## 2018-02-24 RX ADMIN — CALCIUM GLUCONATE: 94 INJECTION, SOLUTION INTRAVENOUS at 18:52

## 2018-02-24 RX ADMIN — I.V. FAT EMULSION 500 ML: 20 EMULSION INTRAVENOUS at 18:53

## 2018-02-24 RX ADMIN — NYSTATIN: 100000 POWDER TOPICAL at 19:09

## 2018-02-24 RX ADMIN — PANTOPRAZOLE SODIUM 40 MG: 40 INJECTION, POWDER, FOR SOLUTION INTRAVENOUS at 09:00

## 2018-02-24 RX ADMIN — ONDANSETRON 4 MG: 2 INJECTION INTRAMUSCULAR; INTRAVENOUS at 11:59

## 2018-02-24 RX ADMIN — ENOXAPARIN SODIUM 40 MG: 40 INJECTION SUBCUTANEOUS at 15:19

## 2018-02-24 RX ADMIN — PANTOPRAZOLE SODIUM 40 MG: 40 INJECTION, POWDER, FOR SOLUTION INTRAVENOUS at 20:57

## 2018-02-24 RX ADMIN — Medication 10 ML: at 15:23

## 2018-02-24 RX ADMIN — BUMETANIDE 1 MG: 0.25 INJECTION INTRAMUSCULAR; INTRAVENOUS at 12:41

## 2018-02-24 NOTE — PROGRESS NOTES
Problem: Falls - Risk of  Goal: *Absence of Falls  Document Demarcus Fall Risk and appropriate interventions in the flowsheet.    Outcome: Progressing Towards Goal  Fall Risk Interventions:  Mobility Interventions: Bed/chair exit alarm, OT consult for ADLs, Patient to call before getting OOB, PT Consult for mobility concerns, PT Consult for assist device competence    Mentation Interventions: Adequate sleep, hydration, pain control, Bed/chair exit alarm, Door open when patient unattended, Family/sitter at bedside    Medication Interventions: Bed/chair exit alarm, Patient to call before getting OOB, Utilize gait belt for transfers/ambulation    Elimination Interventions: Bed/chair exit alarm, Call light in reach, Patient to call for help with toileting needs, Toileting schedule/hourly rounds    History of Falls Interventions: Bed/chair exit alarm, Door open when patient unattended, Room close to nurse's station

## 2018-02-24 NOTE — PROGRESS NOTES
[facility]     Name: Anu Burch MRN: [id number]   : 1945 Hospital: [facility]   Date: 2018        Asked to see again due to worsening SOB. Impression Plan   1. Dyspnea  2. Abnormal Chest CT: with bilateral pleural effusions, atelectasis,and patchy airspace disease in MIRIAM  3. Leukocytosis  4. Peforated gastric ulcer, s/p repair  5. Shock, likely septic, resolved  6. Acute hypoxic respiratory failure   7. Hypokalemia, hypomagnesemia  8. EtOH abuse     · Goal sats >90%, wean O2 as tolerated  · Will diurese with 1mg Bumex again today  · Zosyn, Levaquin. Follow-up cultures, should be able to wean tomorrow; d/c vancomycin  · Will schedule nebs as she is not remembering to ask for her PRNs  · Post-op management as per surgery  · Pain control  · Monitor lytes, replete as needed  · Encourage IS use  · OOB and mobilize as much as possible: discussed with nursing today that she needs to get OOB  · TPN  · SCDs  · Protonix               Radiology  ( personally reviewed) Chest CTA:  No evidence of PE. Small bilateral pleural effusions. Considerable bibasilar atelectasis. Patchy airspace disease at left apex in MIRIAM. No mass or nodule. Small bilateral pleural effusions. Abdominal CT:  Ascites. Cirrhosis. [id number] undrained left subphrenic fluid collection. ABG No results for input(s): PHI, PO2I, PCO2I in the last 72 hours. Subjective     Overnight events:  TMax 99.7  BP stable  HR in low 100s  O2 sats 95% on 2L NC  WBC 12.8  K 3.6  Hgb 10.2  Phos 3.6  Blood cultures negative thus far  Fluid balance: -995 (but ins not recorded)  New subphrenic drain placed in IR     ROS:   Less lethargic. Still short of breath, feels she cannot take in a good deep breath due to chest pain and discomfort in her abdomen. Denies cough, sputum production, chest pain. No f/c. Has a headache today.       Past Medical History:   Diagnosis Date    [facility] 2/12/2018    ETOH abuse 2/11/2018    History of vascular access device 02/16/2018    [facility] VAT - 5 FR triple, R basilic, TPN    Hyperammonemia ([facility] 2/16/2018     Results for Jyl Dials (MRN [id number]) as of 2/17/2018 08:43  Ref. Range 2/16/2018 17:20 Ammonia Latest Ref Range: <32 UMOL/L 69 (H)     Hyperlipidemia     Perforated chronic gastric ulcer ([facility] 2/11/2018      Past Surgical History:   Procedure Laterality Date    HX CATARACT REMOVAL        Prior to Admission medications    Medication Sig Start Date End Date Taking? Authorizing Provider   naproxen sodium (ALEVE) 220 mg tablet Take 220 mg by mouth daily as needed for Pain. Yes Historical Provider   cyanocobalamin (VITAMIN B12) 500 mcg tablet Take 500 mcg by mouth daily. Yes Historical Provider   multivitamin (ONE A DAY) tablet Take 1 Tab by mouth daily. Yes Historical Provider   omega-3 fatty acids-vitamin e 1,000 mg cap Take 2 Caps by mouth daily.    Yes Historical Provider     Current Facility-Administered Medications   Medication Dose Route Frequency    fentaNYL (DURAGESIC) 12 mcg/hr patch 1 Patch  1 Patch TransDERmal Q72H    TPN ADULT - CENTRAL   IntraVENous CONTINUOUS    vancomycin (VANCOCIN) 1,250 mg in 0.9% sodium chloride 250 mL IVPB  1,250 mg IntraVENous Q8H    TPN ADULT - CENTRAL   IntraVENous CONTINUOUS    levoFLOXacin (LEVAQUIN) 750 mg in D5W IVPB  750 mg IntraVENous Q24H    insulin regular (NOVOLIN R, HUMULIN R) injection   SubCUTAneous Q6H    fat emulsion 20% (LIPOSYN, INTRAlipid) infusion 500 mL  500 mL IntraVENous Q MON, WED & SAT    sodium chloride (NS) flush 10-40 mL  10-40 mL InterCATHeter Q8H    piperacillin-tazobactam (ZOSYN) 10.125 g in 0.9% sodium chloride 500 mL continuous 24 hr infusion  10.125 g IntraVENous Q24H    enoxaparin (LOVENOX) injection 40 mg  40 mg SubCUTAneous Q24H    pantoprazole (PROTONIX) injection 40 mg  40 mg IntraVENous Q12H    nystatin (MYCOSTATIN) 100,000 unit/gram powder   Topical BID     No Known Allergies   Social History   Substance Use Topics    Smoking status: Former Smoker    Smokeless tobacco: Never Used    Alcohol use 11.4 oz/week     0 Standard drinks or equivalent, 19 Glasses of wine per week      Comment: Hx of heavy etoh use, but quit several months ago      Family History   Problem Relation Age of Onset    Other Other      patient did not know          Laboratory: I have personally reviewed the critical care flowsheet and labs. Recent Labs      02/24/18 0412 02/23/18 0339 02/22/18 0457   WBC  12.8*  17.1*  19.9*   HGB  8.1*  10.2*  10.6*   HCT  26.1*  32.0*  32.6*   PLT  383  444*  408*     Recent Labs      02/24/18 0412 02/23/18 0339 02/22/18 0457   NA  141  139  141   K  3.6  3.7  4.0   CL  111*  107  107   CO2  24  26  27   GLU  92  101*  96   BUN  12  11  12   CREA  0.49*  0.42*  0.43*   CA  6.7*  7.4*  7.8*   MG  1.8   --   1.9   PHOS  3.1   --   3.6   ALB  1.2*  1.4*  1.5*   SGOT  36  37  46*   ALT  22  23  25       Objective:          Intake/Output Summary (Last 24 hours) at 02/24/18 1106  Last data filed at 02/24/18 0010   Gross per 24 hour   Intake                0 ml   Output              995 ml   Net             -995 ml     GENERAL: alert, able to answer questions appropriately, uncomfortable but NAD HEENT:  PERRL, EOMI, no alar flaring or epistaxis, oral mucosa moist without cyanosis, NECK:  no jugular vein distention, no retractions, no thyromegaly or masses, LUNGS: CTAB but diminished at the bases, respirations non-labored, on 2L NC , HEART:  Regular rate and rhythm with no MGR; no edema is present, ABDOMEN:  soft, stable CDI, drain dressing CDI EXTREMITIES:  warm with no cyanosis, NP LE swelling SKIN:  no jaundice or ecchymosis and NEUROLOGIC: alert, oriented, grossly non-focal    Lieutenant Jayesh MD  [facility]

## 2018-02-24 NOTE — PROGRESS NOTES
Problem: Falls - Risk of  Goal: *Absence of Falls  Document Demarcus Fall Risk and appropriate interventions in the flowsheet.    Outcome: Progressing Towards Goal  Fall Risk Interventions:  Mobility Interventions: Bed/chair exit alarm, OT consult for ADLs, PT Consult for mobility concerns, PT Consult for assist device competence    Mentation Interventions: Bed/chair exit alarm, Door open when patient unattended, Evaluate medications/consider consulting pharmacy, Family/sitter at bedside, Room close to nurse's station    Medication Interventions: Bed/chair exit alarm    Elimination Interventions: Bed/chair exit alarm, Call light in reach, Patient to call for help with toileting needs    History of Falls Interventions: Bed/chair exit alarm, Consult care management for discharge planning, Evaluate medications/consider consulting pharmacy, Door open when patient unattended, Room close to nurse's station

## 2018-02-24 NOTE — PROGRESS NOTES
Bedside and Verbal shift change report given to Raisa Flores (oncoming nurse) by Gavin Freeman (offgoing nurse). Report included the following information SBAR, Kardex, MAR and Recent Results.

## 2018-02-24 NOTE — PROGRESS NOTES
Problem: Mobility Impaired (Adult and Pediatric)  Goal: *Acute Goals and Plan of Care (Insert Text)  Physical Therapy Goals  Initiated 2/20/2018    1. Patient will move from supine to sit and sit to supine , scoot up and down and roll side to side in bed with moderate assistance x 2  within 7 days. 2. Patient will perform sit <> stand with minimal assist x 2  within 7 days. 3. Patient will ambulate with minimal assistance x 2 for 10 feet with RW and assist of 3rd pushing chair from behind within 7 days. 4. Patient will verbalize and demonstrate understanding of spinal precautions (No bending, lifting greater than 5 lbs, or twisting; log-roll technique; frequent repositioning as instructed) within 7 days. physical Therapy TREATMENT  Patient: Jeffrey Rodriguez (68 y.o. female)  Date: 2/24/2018  Diagnosis: Intra-abdominal free air of unknown etiology [K66.8] Perforated chronic gastric ulcer ([address]  Procedure(s) (LRB):   NASOGASTRIC TUBE PLACEMENT (N/A) 6 Days Post-Op  Precautions: Aspiration, Back, Bed Alarm, Fall, Skin  Chart, physical therapy assessment, plan of care and goals were reviewed. ASSESSMENT:  Pt pleasant but confused. Pt comes to sit with max assist of 2. Pt progressed to CGA sitting on EOB. Pt sit to stand with mod assist of 2. Pt took 3 steps to bedside chair with RW min to mod assist of 2. Pt sat in chair 10 minutes as bed was changed. Pt not safe to sit in chair alone and family was not present. Pt returned to bed with  bed with RW min to mod assist of 2. Pt max of 2 sit to supine. Raissa was assisted rolling pt to ad extra pads. Pt very confused but was more mobile today. PT will continue to follow.   Progression toward goals:  []      Improving appropriately and progressing toward goals  [x]      Improving slowly and progressing toward goals  []      Not making progress toward goals and plan of care will be adjusted     PLAN:  Patient continues to benefit from skilled intervention to address the above impairments. Continue treatment per established plan of care. Discharge Recommendations:  Rehab  Further Equipment Recommendations for Discharge:  rolling walker     SUBJECTIVE:       OBJECTIVE DATA SUMMARY:   Critical Behavior:  Neurologic State: Drowsy, Eyes open spontaneously  Orientation Level: Oriented to person  Cognition: Decreased attention/concentration     Functional Mobility Training:  Bed Mobility:       Pt max of 2 supine to sit to supine. Transfers:      Pt performed SPT bed to chair with RW min to mod assist of 2. Balance:     Ambulation/Gait Training:   Pt took 3 steps to chair with RW min to mod assist of 2. Activity Tolerance:   Pt tolerated treatment fair. Please refer to the flowsheet for vital signs taken during this treatment.   After treatment:   [] Patient left in no apparent distress sitting up in chair  [x] Patient left in no apparent distress in bed  [] Call bell left within reach  [] Nursing notified  [] Caregiver present  [] Bed alarm activated    COMMUNICATION/COLLABORATION:   The patients plan of care was discussed with: Physical Therapist    Staci Reid PTA   Time Calculation: 53 mins

## 2018-02-24 NOTE — PROGRESS NOTES
[address] Dosing Services:     Consult for antibiotic dosing of Vancomycin by Franklin Bishop NP  Indication:HAP  Day of Therapy 3    Vancomycin therapy:  Current maintenance dose: 1250 (mg) every 12 hours (frequency). Trough goal 15-20 mcg/ml    Last trough level 8.9 mcg/ml drawn 1.5 hrs early. Corrected trough 7.4 mcg/ml. Adjustment in Therapy:1250 mg every 8 hours  Dose calculated to approximate a therapeutic trough of 15 mcg/mL. Pharmacy to follow daily and will make changes to dose and/or frequency based on clinical status.   Pharmacist Anuradha Cruz                                [id number]

## 2018-02-24 NOTE — PROGRESS NOTES
Family Practice Consult and Daily Progress Note: 2/24/2018  Thor Bernheim Tiffany Terence Rajas,     Assessment/Plan:   Perforated chronic gastric ulcer /  Free intraperitoneal air / S/P exploratory laparotomy. S/p repair in OR with Dr. John Miarnda on 2/12/18 - per surg  -  Severe protein calorie malnutrition-POA  --calcium corrects  --TPN     Acute metabolic encephalopathy. Multifactorial-- medications, alcohol use, sundowning  ---avoid triggers  ---CIWA  ---surgery has ordered haldol PRN  ---maintenance of sleep/wake cycle, and re-orientation      Hypoxia / Respiratory distress. CXR with atelectasis and pleural effusion. Continue Abx, nebs, supplemental oxygen and incentive spirom as able. Repeat CT chest with moderate to large persistent left subphrenic collection. Pulmonary consulted and planning to drain in IR. Bumex.      Alcoholic cirrhosis of liver without ascites /  Fatty liver determined by biopsy /  Hyperammonemia. --GI has seen- rec alcohol cessation and outpt follow up     ETOH abuse. Needs to stop ETOH entirely.     Hypokalemia /  Hypoalbuminemia. Being addressed with TPN     Leukocytosis. Down to 12 this am. Zosyn, Vanc, and levaquin    Anemia: Hgb down to 8  --follow, transfuse <7    Needs to get up with PT today to bedside commode.          Problem List:  Problem List as of 2/24/2018  Date Reviewed: 4/26/2016          Codes Class Noted - Resolved    Hypokalemia ICD-10-CM: E87.6  ICD-9-CM: 276.8  2/18/2018 - Present        Leukocytosis ICD-10-CM: D72.829  ICD-9-CM: 288.60  2/18/2018 - Present        Severe protein-calorie malnutrition ([facility] [address] ICD-10-CM: E43  ICD-9-CM: 262  2/18/2018 - Present        Acute metabolic encephalopathy PVZ-49-PK: G93.41  ICD-9-CM: 348.31  2/18/2018 - Present        Hyperammonemia ([facility] [address] ICD-10-CM: E72.20  ICD-9-CM: 270.6  2/16/2018 - Present    Overview Addendum 2/17/2018  9:08 AM by Elliot Sun MD        Ref.  Range 2/16/2018 17:20   Ammonia Latest Ref Range: <32 UMOL/L 69 (H)                Free intraperitoneal air ICD-10-CM: K66.8  ICD-9-CM: 568.89  2/12/2018 - Present        S/P exploratory laparotomy ICD-10-CM: Z98.890  ICD-9-CM: V45.89  2/12/2018 - Present    Overview Signed 2/12/2018 11:57 AM by Sintia Canales MD     Suture repair of perforated posterior gastric ulcer with Julio Salvage patch, core needle biopsies of left lobe of the liver. Alcoholic cirrhosis of liver without ascites (HCC) (Chronic) ICD-10-CM: K70.30  ICD-9-CM: 571.2  2/12/2018 - Present    Overview Signed 2/17/2018  8:45 AM by Noel Martin MD     Liver bx 2/12/18:   Cirrhosis with mild chronic portal inflammation and macrovesicular steatosis. Fatty liver determined by biopsy ICD-10-CM: K76.0  ICD-9-CM: 571.8  2/12/2018 - Present    Overview Signed 2/17/2018  8:53 AM by Noel Martin MD        Cirrhosis with mild chronic portal inflammation and macrovesicular steatosis. * (Principal)Perforated chronic gastric ulcer ([address] (Chronic) ICD-10-CM: K25.5  ICD-9-CM: 531.50  2/11/2018 - Present        ETOH abuse (Chronic) ICD-10-CM: F10.10  ICD-9-CM: 305.00  2/11/2018 - Present        GI bleed ICD-10-CM: K92.2  ICD-9-CM: 578.9  4/26/2016 - Present        Hypotension ICD-10-CM: I95.9  ICD-9-CM: 458.9  4/26/2016 - Present        Tachycardia ICD-10-CM: R00.0  ICD-9-CM: 785.0  4/26/2016 - Present        Acute blood loss anemia ICD-10-CM: D62  ICD-9-CM: 285.1  4/26/2016 - Present              Subjective:    67 y.o.  female with EtOH abuse who is admitted to the General Surgery Service with perforated gastric ulcer. We are asked to evaluate for altered mental status. The patient is poorly interactive at this time and this limits primary hx. Discussed case with family available at bedside providing secondary hx and chart review. Per family, patient has had declining neurological condition over past 48 hours.  Notably, an RRT was called for respiratory distress. 2/19: This morning she is a bit more alert at this moment and taking in more complete sentences. She says she does not feel well. She has no specific site of pain other than the NG tube which is bothering her a great deal.  She should improve as time from surg increases. K+ a little low - replacing.     2/20:  Still confused and somnolent at times. Now able to localize pain to ab and converse a bit more. Tmax 100.3  WBC is up again but hopefully reactive - recheck in AM - more incentive spirom and check CXR.     2/21: A little more alert. Knows she is in the hospital. Not able to participate with PT yesterday as she was in too much pain. WBC still at 19.     2/22:  WBC 19K. She is more alert. Daughter at bedside. Both of her daughters live out of town. Looking at SNF options for rehab. Coughing more. Starting to use IS.     2/23: WBC 17. Repeat CT chest with moderate to large persistent left subphrenic collection. IR has been consulted. Rafael and Levaquin added by pulm. 2/24: Pt had a better night last night. Only brief episode of confusion. She is much clearer this AM.  Now in quite a bit of pain from gas and stomach cramping. Thinks she will feel better if she can have a BM. Has been on bedpan for a while without success. On TPN. Daughters very concerned that she won't be successful while lying down. Wanting to get up to bedside commode. Will need PT's help. Past Medical History:   Diagnosis Date    Alcoholic cirrhosis of liver without ascites ([facility] [address] [address] 2/12/2018    ETOH abuse 2/11/2018    History of vascular access device 02/16/2018    [facility] VAT - 5 FR triple, R basilic, TPN    Hyperammonemia ([facility] [address] 2/16/2018     Results for Aminta Guallpa (MRN [id number]) as of 2/17/2018 08:43  Ref.  Range 2/16/2018 17:20 Ammonia Latest Ref Range: <32 UMOL/L 69 (H)     Hyperlipidemia     Perforated chronic gastric ulcer ([facility] [address] 2/11/2018     Past Surgical History:   Procedure Laterality Date  HX CATARACT REMOVAL       Social History     Social History    Marital status: SINGLE     Spouse name: N/A    Number of children: N/A    Years of education: N/A     Occupational History    Not on file. Social History Main Topics    Smoking status: Former Smoker    Smokeless tobacco: Never Used    Alcohol use 11.4 oz/week     0 Standard drinks or equivalent, 19 Glasses of wine per week      Comment: Hx of heavy etoh use, but quit several months ago    Drug use: No    Sexual activity: Not on file     Other Topics Concern    Not on file     Social History Narrative     Family History   Problem Relation Age of Onset    Other Other      patient did not know       Review of Systems:   Review of systems not obtained due to patient factors. Objective:   Physical Exam:     Visit Vitals    /67 (BP 1 Location: Left arm, BP Patient Position: At rest)    Pulse (!) 101    Temp 99.7 °F (37.6 °C)    Resp 18    Ht 5' 3.5\" (1.613 m)    Wt 87.9 kg (193 lb 12.6 oz)    SpO2 95%    Breastfeeding No    BMI 33.79 kg/m2    O2 Flow Rate (L/min): 3 l/min O2 Device: Nasal cannula    Temp (24hrs), Av.4 °F (36.9 °C), Min:97.7 °F (36.5 °C), Max:99.7 °F (37.6 °C)         1901 -  0700  In: 0   Out: 1800 [Urine:1600; Drains:200]    General:  Awake, uncomfortable, appears stated age. Head:  Normocephalic, without obvious abnormality, atraumatic. Eyes:  Conjunctivae/corneas clear. EOMs intact. Nose: Patent, on NC   Throat: Lips, mucosa, and tongue dry. Neck: Supple, symmetrical, trachea midline, no adenopathy, thyroid: no enlargement/tenderness/nodules, no carotid bruit and no JVD. Lungs:   Diminished breath sounds   Chest wall:  No tenderness or deformity. Heart:  Regular rate and rhythm, S1, S2 normal, no murmur, click, rub or gallop. Abdomen:   Soft, distended, with mild generalized tenderness. Bowel sounds present. Dressings dry.  Incision dry without redness, staple line intact Extremities: no cyanosis or edema. No calf tenderness or cords. Skin: Skin color, texture, turgor normal. No rashes or lesions   Neurologic:   AOx2, CN intact, Gait not tested at this time. Sensation grossly normal to touch. Gross motor of extremities normal.       Data Review:       Recent Days:  Recent Labs      02/24/18 0412 02/23/18 0339 02/22/18 0457   WBC  12.8*  17.1*  19.9*   HGB  8.1*  10.2*  10.6*   HCT  26.1*  32.0*  32.6*   PLT  383  444*  408*     Recent Labs      02/24/18 0412 02/23/18 0339 02/22/18 0457   NA  141  139  141   K  3.6  3.7  4.0   CL  111*  107  107   CO2  24  26  27   GLU  92  101*  96   BUN  12  11  12   CREA  0.49*  0.42*  0.43*   CA  6.7*  7.4*  7.8*   MG  1.8   --   1.9   PHOS  3.1   --   3.6   ALB  1.2*  1.4*  1.5*   TBILI  1.0  1.5*  1.0   SGOT  36  37  46*   ALT  22  23  25     No results for input(s): PH, PCO2, PO2, HCO3, FIO2 in the last 72 hours.     24 Hour Results:  Recent Results (from the past 24 hour(s))   GLUCOSE, POC    Collection Time: 02/23/18 11:59 AM   Result Value Ref Range    Glucose (POC) 108 (H) 65 - 100 mg/dL    Performed by Energeno Alaa (PCT)    AMMONIA    Collection Time: 02/23/18  4:37 PM   Result Value Ref Range    Ammonia 42 (H) <32 UMOL/L   GLUCOSE, POC    Collection Time: 02/23/18  6:48 PM   Result Value Ref Range    Glucose (POC) 107 (H) 65 - 100 mg/dL    Performed by Energeno Alaa (PCT)    GLUCOSE, POC    Collection Time: 02/24/18 12:24 AM   Result Value Ref Range    Glucose (POC) 104 (H) 65 - 100 mg/dL    Performed by Pauline RAYMUNDO([id number])    METABOLIC PANEL, COMPREHENSIVE    Collection Time: 02/24/18  4:12 AM   Result Value Ref Range    Sodium 141 136 - 145 mmol/L    Potassium 3.6 3.5 - 5.1 mmol/L    Chloride 111 (H) 97 - 108 mmol/L    CO2 24 21 - 32 mmol/L    Anion gap 6 5 - 15 mmol/L    Glucose 92 65 - 100 mg/dL    BUN 12 6 - 20 MG/DL    Creatinine 0.49 (L) 0.55 - 1.02 MG/DL    BUN/Creatinine ratio 24 (H) 12 - 20      GFR est AA >60 >60 ml/min/1.73m2    GFR est non-AA >60 >60 ml/min/1.73m2    Calcium 6.7 (L) 8.5 - 10.1 MG/DL    Bilirubin, total 1.0 0.2 - 1.0 MG/DL    ALT (SGPT) 22 12 - 78 U/L    AST (SGOT) 36 15 - 37 U/L    Alk. phosphatase 138 (H) 45 - 117 U/L    Protein, total 4.6 (L) 6.4 - 8.2 g/dL    Albumin 1.2 (L) 3.5 - 5.0 g/dL    Globulin 3.4 2.0 - 4.0 g/dL    A-G Ratio 0.4 (L) 1.1 - 2.2     CBC WITH AUTOMATED DIFF    Collection Time: 02/24/18  4:12 AM   Result Value Ref Range    WBC 12.8 (H) 3.6 - 11.0 K/uL    RBC 2.41 (L) 3.80 - 5.20 M/uL    HGB 8.1 (L) 11.5 - 16.0 g/dL    HCT 26.1 (L) 35.0 - 47.0 %    .3 (H) 80.0 - 99.0 FL    MCH 33.6 26.0 - 34.0 PG    MCHC 31.0 30.0 - 36.5 g/dL    RDW 14.2 11.5 - 14.5 %    PLATELET 479 221 - 315 K/uL    MPV 12.7 8.9 - 12.9 FL    NRBC 0.0 0  WBC    ABSOLUTE NRBC 0.00 0.00 - 0.01 K/uL    NEUTROPHILS 82 (H) 32 - 75 %    LYMPHOCYTES 8 (L) 12 - 49 %    MONOCYTES 8 5 - 13 %    EOSINOPHILS 1 0 - 7 %    BASOPHILS 1 0 - 1 %    IMMATURE GRANULOCYTES 1 (H) 0.0 - 0.5 %    ABS. NEUTROPHILS 10.5 (H) 1.8 - 8.0 K/UL    ABS. LYMPHOCYTES 1.0 0.8 - 3.5 K/UL    ABS. MONOCYTES 1.0 0.0 - 1.0 K/UL    ABS. EOSINOPHILS 0.1 0.0 - 0.4 K/UL    ABS. BASOPHILS 0.1 0.0 - 0.1 K/UL    ABS. IMM.  GRANS. 0.1 (H) 0.00 - 0.04 K/UL    DF AUTOMATED     MAGNESIUM    Collection Time: 02/24/18  4:12 AM   Result Value Ref Range    Magnesium 1.8 1.6 - 2.4 mg/dL   PHOSPHORUS    Collection Time: 02/24/18  4:12 AM   Result Value Ref Range    Phosphorus 3.1 2.6 - 4.7 MG/DL   GLUCOSE, POC    Collection Time: 02/24/18  7:19 AM   Result Value Ref Range    Glucose (POC) 121 (H) 65 - 100 mg/dL    Performed by Chino Jacobs (KENDALL)        Medications reviewed  Current Facility-Administered Medications   Medication Dose Route Frequency    fentaNYL (DURAGESIC) 12 mcg/hr patch 1 Patch  1 Patch TransDERmal Q72H    TPN ADULT - CENTRAL   IntraVENous CONTINUOUS    QUEtiapine (SEROquel) tablet 12.5 mg  12.5 mg Oral Q1H PRN    TPN ADULT - CENTRAL   IntraVENous CONTINUOUS    haloperidol (HALDOL) tablet 1 mg  1 mg Oral Q8H PRN    levoFLOXacin (LEVAQUIN) 750 mg in D5W IVPB  750 mg IntraVENous Q24H    vancomycin (VANCOCIN) 1,250 mg in 0.9% sodium chloride 250 mL IVPB  1,250 mg IntraVENous Q12H    bisacodyl (DULCOLAX) suppository 10 mg  10 mg Rectal DAILY PRN    HYDROmorphone (PF) (DILAUDID) injection 0.5 mg  0.5 mg IntraVENous Q4H PRN    LORazepam (ATIVAN) injection 1 mg  1 mg IntraVENous Q4H PRN    insulin regular (NOVOLIN R, HUMULIN R) injection   SubCUTAneous Q6H    albuterol-ipratropium (DUO-NEB) 2.5 MG-0.5 MG/3 ML  3 mL Nebulization Q4H PRN    fat emulsion 20% (LIPOSYN, INTRAlipid) infusion 500 mL  500 mL IntraVENous Q MON, WED & SAT    glucose chewable tablet 16 g  4 Tab Oral PRN    glucagon (GLUCAGEN) injection 1 mg  1 mg IntraMUSCular PRN    dextrose (D50W) injection syrg 12.5-25 g  12.5-25 g IntraVENous PRN    alteplase (CATHFLO) 1 mg in sterile water (preservative free) 1 mL injection  1 mg InterCATHeter PRN    sodium chloride (NS) flush 10-30 mL  10-30 mL InterCATHeter PRN    sodium chloride (NS) flush 10-40 mL  10-40 mL InterCATHeter Q8H    piperacillin-tazobactam (ZOSYN) 10.125 g in 0.9% sodium chloride 500 mL continuous 24 hr infusion  10.125 g IntraVENous Q24H    acetaminophen (TYLENOL) solution 650 mg  650 mg Oral Q6H PRN    naloxone (NARCAN) injection 0.4 mg  0.4 mg IntraVENous PRN    ondansetron (ZOFRAN) injection 4 mg  4 mg IntraVENous Q4H PRN    enoxaparin (LOVENOX) injection 40 mg  40 mg SubCUTAneous Q24H    pantoprazole (PROTONIX) injection 40 mg  40 mg IntraVENous Q12H    phenol throat spray (CHLORASEPTIC) 1 Spray  1 Spray Oral PRN    nystatin (MYCOSTATIN) 100,000 unit/gram powder   Topical BID    sodium chloride (NS) flush 5-10 mL  5-10 mL IntraVENous PRN       Care Plan discussed with: Patient/Family and Nurse/Administration/PT    Total time spent with patient: 40 minutes.     Cherry Roman MD

## 2018-02-24 NOTE — PROGRESS NOTES
SURGERY PROGRESS NOTE      Admit Date: 2018      Subjective:     Patient complains of severe back pain. Denies nausea. She is confused and thinks I am her son-in-law. She calls me Nigel Moralez despite being reoriented multiple times. Objective:     Visit Vitals    /67 (BP 1 Location: Left arm, BP Patient Position: At rest;Head of bed elevated (Comment degrees))  Comment (BP Patient Position): 20    Pulse (!) 109    Temp 98.1 °F (36.7 °C)    Resp 20    Ht 5' 3.5\" (1.613 m)    Wt 193 lb 12.6 oz (87.9 kg)    SpO2 94%    Breastfeeding No    BMI 33.79 kg/m2        Temp (24hrs), Av.2 °F (36.8 °C), Min:97.7 °F (36.5 °C), Max:98.5 °F (36.9 °C)          1901 -  0700  In: 0   Out: 1800 [Urine:1600; Drains:200]    Physical Exam:    General:  alert, cooperative, no distress, appears stated age, confused   Abdomen: soft, bowel sounds active, non-tender    ALYSSA - less than 50cc/24 hr of bile.   LUQ drain - serous   Incision:   healing well, no drainage, no erythema, no hernia, no seroma, no swelling, no dehiscence, incision well approximated           Lab Results  Component Value Date/Time   WBC 12.8 (H) 2018 04:12 AM   HGB 8.1 (L) 2018 04:12 AM   Hemoglobin (POC) 18.0 (H) 2018 09:53 PM   HCT 26.1 (L) 2018 04:12 AM   Hematocrit (POC) 53 (H) 2018 09:53 PM   PLATELET 083  04:12 AM   .3 (H) 2018 04:12 AM     Lab Results  Component Value Date/Time   GFR est non-AA >60 2018 04:12 AM   GFRNA, POC 54 (L) 2018 09:53 PM   GFR est AA >60 2018 04:12 AM   GFRAA, POC >60 2018 09:53 PM   Creatinine 0.49 (L) 2018 04:12 AM   Creatinine (POC) 1.0 2018 09:53 PM   BUN 12 2018 04:12 AM   BUN (POC) 14 2018 09:53 PM   Sodium 141 2018 04:12 AM   Sodium (POC) 135 (L) 2018 09:53 PM   Potassium 3.6 2018 04:12 AM   Potassium (POC) 4.0 2018 09:53 PM   Chloride 111 (H) 2018 04:12 AM   Chloride (POC) 101 02/11/2018 09:53 PM   CO2 24 02/24/2018 04:12 AM   Magnesium 1.8 02/24/2018 04:12 AM   Phosphorus 3.1 02/24/2018 04:12 AM       Assessment:     Principal Problem:    Perforated chronic gastric ulcer ([address] (2/11/2018)    Active Problems:    ETOH abuse (2/11/2018)      Free intraperitoneal air (2/12/2018)      S/P exploratory laparotomy (2/12/2018)      Overview: Suture repair of perforated posterior gastric ulcer with Harle Damian patch,       core needle biopsies of left lobe of the liver. Alcoholic cirrhosis of liver without ascites ([address] (2/12/2018)      Overview: Liver bx 2/12/18:       Cirrhosis with mild chronic portal inflammation and macrovesicular       steatosis. Fatty liver determined by biopsy (2/12/2018)      Overview:        Cirrhosis with mild chronic portal inflammation and macrovesicular       steatosis. Hyperammonemia ([address] (2/16/2018)      Overview:        Ref. Range 2/16/2018 17:20       Ammonia Latest Ref Range: <32 UMOL/L 69 (H)             Hypokalemia (2/18/2018)      Leukocytosis (2/18/2018)      Severe protein-calorie malnutrition ([address] (2/18/2018)      Acute metabolic encephalopathy (1/97/8566)    perforated ulcer -  Healing. Continue TPN, bowel rest, PPI and drainage. Encephalopathy -  Minimize benzo. I think it is best to treat her pain. Will try low does fentanyl patch to try and minimize bolus IV doses of narcotics.

## 2018-02-25 ENCOUNTER — APPOINTMENT (OUTPATIENT)
Dept: GENERAL RADIOLOGY | Age: 73
DRG: 853 | End: 2018-02-25
Attending: FAMILY MEDICINE
Payer: MEDICARE

## 2018-02-25 LAB
ALBUMIN SERPL-MCNC: 1.5 G/DL (ref 3.5–5)
ALBUMIN/GLOB SERPL: 0.4 {RATIO} (ref 1.1–2.2)
ALP SERPL-CCNC: 179 U/L (ref 45–117)
ALT SERPL-CCNC: 26 U/L (ref 12–78)
ANION GAP SERPL CALC-SCNC: 6 MMOL/L (ref 5–15)
AST SERPL-CCNC: 42 U/L (ref 15–37)
BASOPHILS # BLD: 0.1 K/UL (ref 0–0.1)
BASOPHILS NFR BLD: 1 % (ref 0–1)
BILIRUB SERPL-MCNC: 0.9 MG/DL (ref 0.2–1)
BUN SERPL-MCNC: 12 MG/DL (ref 6–20)
BUN/CREAT SERPL: 24 (ref 12–20)
CALCIUM SERPL-MCNC: 7.7 MG/DL (ref 8.5–10.1)
CHLORIDE SERPL-SCNC: 104 MMOL/L (ref 97–108)
CO2 SERPL-SCNC: 26 MMOL/L (ref 21–32)
CREAT SERPL-MCNC: 0.5 MG/DL (ref 0.55–1.02)
DIFFERENTIAL METHOD BLD: ABNORMAL
EOSINOPHIL # BLD: 0.2 K/UL (ref 0–0.4)
EOSINOPHIL NFR BLD: 1 % (ref 0–7)
ERYTHROCYTE [DISTWIDTH] IN BLOOD BY AUTOMATED COUNT: 14.3 % (ref 11.5–14.5)
GLOBULIN SER CALC-MCNC: 4 G/DL (ref 2–4)
GLUCOSE BLD STRIP.AUTO-MCNC: 111 MG/DL (ref 65–100)
GLUCOSE BLD STRIP.AUTO-MCNC: 91 MG/DL (ref 65–100)
GLUCOSE BLD STRIP.AUTO-MCNC: 95 MG/DL (ref 65–100)
GLUCOSE BLD STRIP.AUTO-MCNC: 96 MG/DL (ref 65–100)
GLUCOSE SERPL-MCNC: 101 MG/DL (ref 65–100)
HAV AB SER QL IA: POSITIVE
HCT VFR BLD AUTO: 30 % (ref 35–47)
HGB BLD-MCNC: 9.5 G/DL (ref 11.5–16)
IMM GRANULOCYTES # BLD: 0.2 K/UL (ref 0–0.04)
IMM GRANULOCYTES NFR BLD AUTO: 1 % (ref 0–0.5)
LYMPHOCYTES # BLD: 1.2 K/UL (ref 0.8–3.5)
LYMPHOCYTES NFR BLD: 8 % (ref 12–49)
MAGNESIUM SERPL-MCNC: 1.8 MG/DL (ref 1.6–2.4)
MCH RBC QN AUTO: 33.5 PG (ref 26–34)
MCHC RBC AUTO-ENTMCNC: 31.7 G/DL (ref 30–36.5)
MCV RBC AUTO: 105.6 FL (ref 80–99)
MONOCYTES # BLD: 1.4 K/UL (ref 0–1)
MONOCYTES NFR BLD: 9 % (ref 5–13)
NEUTS SEG # BLD: 12.5 K/UL (ref 1.8–8)
NEUTS SEG NFR BLD: 81 % (ref 32–75)
NRBC # BLD: 0 K/UL (ref 0–0.01)
NRBC BLD-RTO: 0 PER 100 WBC
PHOSPHATE SERPL-MCNC: 3.3 MG/DL (ref 2.6–4.7)
PLATELET # BLD AUTO: 528 K/UL (ref 150–400)
PMV BLD AUTO: 12.1 FL (ref 8.9–12.9)
POTASSIUM SERPL-SCNC: 3.8 MMOL/L (ref 3.5–5.1)
PROT SERPL-MCNC: 5.5 G/DL (ref 6.4–8.2)
RBC # BLD AUTO: 2.84 M/UL (ref 3.8–5.2)
SERVICE CMNT-IMP: ABNORMAL
SERVICE CMNT-IMP: NORMAL
SODIUM SERPL-SCNC: 136 MMOL/L (ref 136–145)
WBC # BLD AUTO: 15.5 K/UL (ref 3.6–11)

## 2018-02-25 PROCEDURE — 36415 COLL VENOUS BLD VENIPUNCTURE: CPT | Performed by: FAMILY MEDICINE

## 2018-02-25 PROCEDURE — 65660000000 HC RM CCU STEPDOWN

## 2018-02-25 PROCEDURE — 74011000250 HC RX REV CODE- 250: Performed by: SURGERY

## 2018-02-25 PROCEDURE — 74011250637 HC RX REV CODE- 250/637: Performed by: FAMILY MEDICINE

## 2018-02-25 PROCEDURE — 84100 ASSAY OF PHOSPHORUS: CPT | Performed by: SURGERY

## 2018-02-25 PROCEDURE — 74011250636 HC RX REV CODE- 250/636: Performed by: SURGERY

## 2018-02-25 PROCEDURE — 82962 GLUCOSE BLOOD TEST: CPT

## 2018-02-25 PROCEDURE — 73100 X-RAY EXAM OF WRIST: CPT

## 2018-02-25 PROCEDURE — 74011250636 HC RX REV CODE- 250/636: Performed by: INTERNAL MEDICINE

## 2018-02-25 PROCEDURE — 83735 ASSAY OF MAGNESIUM: CPT | Performed by: SURGERY

## 2018-02-25 PROCEDURE — 85025 COMPLETE CBC W/AUTO DIFF WBC: CPT | Performed by: FAMILY MEDICINE

## 2018-02-25 PROCEDURE — 73110 X-RAY EXAM OF WRIST: CPT

## 2018-02-25 PROCEDURE — 74011250637 HC RX REV CODE- 250/637: Performed by: INTERNAL MEDICINE

## 2018-02-25 PROCEDURE — 80053 COMPREHEN METABOLIC PANEL: CPT | Performed by: SURGERY

## 2018-02-25 PROCEDURE — 74011250637 HC RX REV CODE- 250/637: Performed by: SURGERY

## 2018-02-25 PROCEDURE — 77030008027

## 2018-02-25 PROCEDURE — 74011000258 HC RX REV CODE- 258: Performed by: SURGERY

## 2018-02-25 PROCEDURE — 71045 X-RAY EXAM CHEST 1 VIEW: CPT

## 2018-02-25 PROCEDURE — 74011000250 HC RX REV CODE- 250: Performed by: INTERNAL MEDICINE

## 2018-02-25 PROCEDURE — C9113 INJ PANTOPRAZOLE SODIUM, VIA: HCPCS | Performed by: SURGERY

## 2018-02-25 PROCEDURE — 77030038269 HC DRN EXT URIN PURWCK BARD -A

## 2018-02-25 PROCEDURE — 94640 AIRWAY INHALATION TREATMENT: CPT

## 2018-02-25 PROCEDURE — 77010033678 HC OXYGEN DAILY

## 2018-02-25 PROCEDURE — 74011250636 HC RX REV CODE- 250/636: Performed by: NURSE PRACTITIONER

## 2018-02-25 RX ORDER — METHOCARBAMOL 500 MG/1
500 TABLET, FILM COATED ORAL 4 TIMES DAILY
Status: DISCONTINUED | OUTPATIENT
Start: 2018-02-25 | End: 2018-03-01

## 2018-02-25 RX ORDER — BUMETANIDE 0.25 MG/ML
1 INJECTION INTRAMUSCULAR; INTRAVENOUS ONCE
Status: COMPLETED | OUTPATIENT
Start: 2018-02-25 | End: 2018-02-25

## 2018-02-25 RX ORDER — GUAIFENESIN 600 MG/1
600 TABLET, EXTENDED RELEASE ORAL EVERY 12 HOURS
Status: DISCONTINUED | OUTPATIENT
Start: 2018-02-25 | End: 2018-02-28

## 2018-02-25 RX ADMIN — METHOCARBAMOL 500 MG: 500 TABLET ORAL at 18:08

## 2018-02-25 RX ADMIN — GUAIFENESIN 600 MG: 600 TABLET, EXTENDED RELEASE ORAL at 14:22

## 2018-02-25 RX ADMIN — IPRATROPIUM BROMIDE AND ALBUTEROL SULFATE 3 ML: .5; 3 SOLUTION RESPIRATORY (INHALATION) at 01:38

## 2018-02-25 RX ADMIN — IPRATROPIUM BROMIDE AND ALBUTEROL SULFATE 3 ML: .5; 3 SOLUTION RESPIRATORY (INHALATION) at 14:23

## 2018-02-25 RX ADMIN — METHOCARBAMOL 500 MG: 500 TABLET ORAL at 12:24

## 2018-02-25 RX ADMIN — LEVOFLOXACIN 750 MG: 5 INJECTION, SOLUTION INTRAVENOUS at 14:23

## 2018-02-25 RX ADMIN — QUETIAPINE 12.5 MG: 25 TABLET ORAL at 22:17

## 2018-02-25 RX ADMIN — HYDROMORPHONE HYDROCHLORIDE 0.5 MG: 1 INJECTION, SOLUTION INTRAMUSCULAR; INTRAVENOUS; SUBCUTANEOUS at 14:22

## 2018-02-25 RX ADMIN — BUMETANIDE 1 MG: 0.25 INJECTION INTRAMUSCULAR; INTRAVENOUS at 14:21

## 2018-02-25 RX ADMIN — HYDROMORPHONE HYDROCHLORIDE 0.5 MG: 1 INJECTION, SOLUTION INTRAMUSCULAR; INTRAVENOUS; SUBCUTANEOUS at 10:15

## 2018-02-25 RX ADMIN — ENOXAPARIN SODIUM 40 MG: 40 INJECTION SUBCUTANEOUS at 14:21

## 2018-02-25 RX ADMIN — IPRATROPIUM BROMIDE AND ALBUTEROL SULFATE 3 ML: .5; 3 SOLUTION RESPIRATORY (INHALATION) at 07:45

## 2018-02-25 RX ADMIN — NYSTATIN: 100000 POWDER TOPICAL at 08:22

## 2018-02-25 RX ADMIN — HYDROMORPHONE HYDROCHLORIDE 0.5 MG: 1 INJECTION, SOLUTION INTRAMUSCULAR; INTRAVENOUS; SUBCUTANEOUS at 18:09

## 2018-02-25 RX ADMIN — Medication 10 ML: at 14:22

## 2018-02-25 RX ADMIN — Medication 10 ML: at 05:40

## 2018-02-25 RX ADMIN — PIPERACILLIN SODIUM AND TAZOBACTAM SODIUM 10.12 G: 3; .375 INJECTION, POWDER, LYOPHILIZED, FOR SOLUTION INTRAVENOUS at 10:15

## 2018-02-25 RX ADMIN — LORAZEPAM 1 MG: 2 INJECTION INTRAMUSCULAR; INTRAVENOUS at 20:46

## 2018-02-25 RX ADMIN — IPRATROPIUM BROMIDE AND ALBUTEROL SULFATE 3 ML: .5; 3 SOLUTION RESPIRATORY (INHALATION) at 19:57

## 2018-02-25 RX ADMIN — GUAIFENESIN 600 MG: 600 TABLET, EXTENDED RELEASE ORAL at 20:37

## 2018-02-25 RX ADMIN — NYSTATIN: 100000 POWDER TOPICAL at 18:17

## 2018-02-25 RX ADMIN — PANTOPRAZOLE SODIUM 40 MG: 40 INJECTION, POWDER, FOR SOLUTION INTRAVENOUS at 08:18

## 2018-02-25 RX ADMIN — HYDROMORPHONE HYDROCHLORIDE 0.5 MG: 1 INJECTION, SOLUTION INTRAMUSCULAR; INTRAVENOUS; SUBCUTANEOUS at 05:40

## 2018-02-25 RX ADMIN — METHOCARBAMOL 500 MG: 500 TABLET ORAL at 22:14

## 2018-02-25 RX ADMIN — CALCIUM GLUCONATE: 94 INJECTION, SOLUTION INTRAVENOUS at 18:07

## 2018-02-25 RX ADMIN — PANTOPRAZOLE SODIUM 40 MG: 40 INJECTION, POWDER, FOR SOLUTION INTRAVENOUS at 20:36

## 2018-02-25 NOTE — PROGRESS NOTES
SURGERY PROGRESS NOTE      Admit Date: 2018    Subjective:     Patient complains of back pain and severe spasms that radiate around her left side. Denies nausea. Passing flatus and had a BM yesterday. She complains of  The staples causing pain as well. Daughters have may concerns that were discussed today.       Objective:     Visit Vitals    /60 (BP 1 Location: Left arm, BP Patient Position: At rest)    Pulse (!) 115  Comment: nurse is aware    Temp 99.2 °F (37.3 °C)    Resp 16    Ht 5' 3.5\" (1.613 m)    Wt 193 lb 12.6 oz (87.9 kg)    SpO2 96%    Breastfeeding No    BMI 33.79 kg/m2        Temp (24hrs), Av.9 °F (37.2 °C), Min:97.6 °F (36.4 °C), Max:100.5 °F (38.1 °C)          190 -  0700  In: -   Out: 1858 [Urine:1550; Drains:220]    Physical Exam:    General:  alert, cooperative, no distress, appears stated age   Abdomen: soft, bowel sounds active, non-tender    ALYSSA - only a faint hint of bile    LUQ drain - serous    Incision:   healing well, no drainage, no erythema, no hernia, no seroma, no swelling, no dehiscence, incision well approximated           Lab Results  Component Value Date/Time   WBC 15.5 (H) 2018 05:36 AM   HGB 9.5 (L) 2018 05:36 AM   Hemoglobin (POC) 18.0 (H) 2018 09:53 PM   HCT 30.0 (L) 2018 05:36 AM   Hematocrit (POC) 53 (H) 2018 09:53 PM   PLATELET 179 (H)  05:36 AM   .6 (H) 2018 05:36 AM     Lab Results  Component Value Date/Time   GFR est non-AA >60 2018 05:36 AM   GFRNA, POC 54 (L) 2018 09:53 PM   GFR est AA >60 2018 05:36 AM   GFRAA, POC >60 2018 09:53 PM   Creatinine 0.50 (L) 2018 05:36 AM   Creatinine (POC) 1.0 2018 09:53 PM   BUN 12 2018 05:36 AM   BUN (POC) 14 2018 09:53 PM   Sodium 136 2018 05:36 AM   Sodium (POC) 135 (L) 2018 09:53 PM   Potassium 3.8 2018 05:36 AM   Potassium (POC) 4.0 2018 09:53 PM   Chloride 104 2018 05:36 AM   Chloride (POC) 101 02/11/2018 09:53 PM   CO2 26 02/25/2018 05:36 AM   Magnesium 1.8 02/25/2018 05:36 AM   Phosphorus 3.3 02/25/2018 05:36 AM       Assessment:     Principal Problem:    Perforated chronic gastric ulcer ([address] (2/11/2018)    Active Problems:    ETOH abuse (2/11/2018)      Free intraperitoneal air (2/12/2018)      S/P exploratory laparotomy (2/12/2018)      Overview: Suture repair of perforated posterior gastric ulcer with Shikha Butts patch,       core needle biopsies of left lobe of the liver. Alcoholic cirrhosis of liver without ascites ([address] (2/12/2018)      Overview: Liver bx 2/12/18:       Cirrhosis with mild chronic portal inflammation and macrovesicular       steatosis. Fatty liver determined by biopsy (2/12/2018)      Overview:        Cirrhosis with mild chronic portal inflammation and macrovesicular       steatosis. Hyperammonemia ([address] (2/16/2018)      Overview:        Ref. Range 2/16/2018 17:20       Ammonia Latest Ref Range: <32 UMOL/L 69 (H)             Hypokalemia (2/18/2018)      Leukocytosis (2/18/2018)      Severe protein-calorie malnutrition ([address] (2/18/2018)      Acute metabolic encephalopathy (6/18/0433)    GI function improving. Will renew TPN and get nutrition labs for the morning. Pain - working on getting of bolus pain medication and transitioning to a patch. Will add robaxin for the muscle spasms.   Hopefull can get drains out and get her in her back brace to decrease her pain level    Confusion - better today

## 2018-02-25 NOTE — PROGRESS NOTES
[facility]     Name: Amanda Franco MRN: [id number]   : 1945 Hospital: [address]   Date: 2018        Asked to see again due to worsening SOB. Impression Plan   1. Dyspnea  2. Abnormal Chest CT: with bilateral pleural effusions, atelectasis,and patchy airspace disease in MIRIAM  3. Leukocytosis  4. Peforated gastric ulcer, s/p repair  5. Shock, likely septic, resolved  6. Acute hypoxic respiratory failure   7. Hypokalemia, hypomagnesemia  8. EtOH abuse     · Goal sats >90%, wean O2 as tolerated  · Will diurese with 1mg Bumex again today  · Zosyn, Levaquin. Follow-up cultures  · Reculture if she spikes another temp or white count continues to increase  · Scheduled nebs  · Will add guaifenesin  · Post-op management as per surgery  · Pain control  · Monitor lytes, replete as needed  · Encourage IS use  · OOB and mobilize as much as possible: discussed with nursing today that she needs to get OOB  · TPN  · SCDs  · Protonix               Radiology  ( personally reviewed) Chest CTA:  No evidence of PE. Small bilateral pleural effusions. Considerable bibasilar atelectasis. Patchy airspace disease at left apex in MIRIAM. No mass or nodule. Small bilateral pleural effusions. Abdominal CT:  Ascites. Cirrhosis. [id number] undrained left subphrenic fluid collection. ABG No results for input(s): PHI, PO2I, PCO2I in the last 72 hours. Subjective     Overnight events:  TMax 99.7  BP stable  HR in low 100s  O2 sats 95% on 2L NC  WBC 15.5  K 3.8  Hgb 10.2  Phos 3.6  Blood cultures negative thus far  Fluid balance: -775  New subphrenic drain placed in IR     ROS:   Still short of breat at times. Persistent dry cough. Denies chest pain. Still has abdominal pain. Febrile overnight.       Past Medical History:   Diagnosis Date    Alcoholic cirrhosis of liver without ascites ([facility] 2018    ETOH abuse 2018    History of vascular access device 2018 [facility] VAT - 5 FR triple, R basilic, TPN    Hyperammonemia ([facility] 2/16/2018     Results for Vane Irving (MRN [id number]) as of 2/17/2018 08:43  Ref. Range 2/16/2018 17:20 Ammonia Latest Ref Range: <32 UMOL/L 69 (H)     Hyperlipidemia     Perforated chronic gastric ulcer ([facility] 2/11/2018      Past Surgical History:   Procedure Laterality Date    HX CATARACT REMOVAL        Prior to Admission medications    Medication Sig Start Date End Date Taking? Authorizing Provider   naproxen sodium (ALEVE) 220 mg tablet Take 220 mg by mouth daily as needed for Pain. Yes Historical Provider   cyanocobalamin (VITAMIN B12) 500 mcg tablet Take 500 mcg by mouth daily. Yes Historical Provider   multivitamin (ONE A DAY) tablet Take 1 Tab by mouth daily. Yes Historical Provider   omega-3 fatty acids-vitamin e 1,000 mg cap Take 2 Caps by mouth daily.    Yes Historical Provider     Current Facility-Administered Medications   Medication Dose Route Frequency    TPN ADULT - CENTRAL   IntraVENous CONTINUOUS    methocarbamol (ROBAXIN) tablet 500 mg  500 mg Oral QID    bumetanide (BUMEX) injection 1 mg  1 mg IntraVENous ONCE    guaiFENesin ER (MUCINEX) tablet 600 mg  600 mg Oral Q12H    fentaNYL (DURAGESIC) 12 mcg/hr patch 1 Patch  1 Patch TransDERmal Q72H    TPN ADULT - CENTRAL   IntraVENous CONTINUOUS    albuterol-ipratropium (DUO-NEB) 2.5 MG-0.5 MG/3 ML  3 mL Nebulization Q6H RT    levoFLOXacin (LEVAQUIN) 750 mg in D5W IVPB  750 mg IntraVENous Q24H    insulin regular (NOVOLIN R, HUMULIN R) injection   SubCUTAneous Q6H    fat emulsion 20% (LIPOSYN, INTRAlipid) infusion 500 mL  500 mL IntraVENous Q MON, WED & SAT    sodium chloride (NS) flush 10-40 mL  10-40 mL InterCATHeter Q8H    piperacillin-tazobactam (ZOSYN) 10.125 g in 0.9% sodium chloride 500 mL continuous 24 hr infusion  10.125 g IntraVENous Q24H    enoxaparin (LOVENOX) injection 40 mg  40 mg SubCUTAneous Q24H    pantoprazole (PROTONIX) injection 40 mg  40 mg IntraVENous Q12H    nystatin (MYCOSTATIN) 100,000 unit/gram powder   Topical BID     No Known Allergies   Social History   Substance Use Topics    Smoking status: Former Smoker    Smokeless tobacco: Never Used    Alcohol use 11.4 oz/week     0 Standard drinks or equivalent, 19 Glasses of wine per week      Comment: Hx of heavy etoh use, but quit several months ago      Family History   Problem Relation Age of Onset    Other Other      patient did not know          Laboratory: I have personally reviewed the critical care flowsheet and labs. Recent Labs      02/25/18 0536  02/24/18 0412 02/23/18   0339   WBC  15.5*  12.8*  17.1*   HGB  9.5*  8.1*  10.2*   HCT  30.0*  26.1*  32.0*   PLT  528*  383  444*     Recent Labs      02/25/18 0536  02/24/18 0412  02/23/18   0339   NA  136  141  139   K  3.8  3.6  3.7   CL  104  111*  107   CO2  26  24  26   GLU  101*  92  101*   BUN  12  12  11   CREA  0.50*  0.49*  0.42*   CA  7.7*  6.7*  7.4*   MG  1.8  1.8   --    PHOS  3.3  3.1   --    ALB  1.5*  1.2*  1.4*   SGOT  42*  36  37   ALT  26  22  23       Objective:          Intake/Output Summary (Last 24 hours) at 02/25/18 1300  Last data filed at 02/25/18 0630   Gross per 24 hour   Intake                0 ml   Output              775 ml   Net             -775 ml     GENERAL: alert, able to answer questions appropriately, uncomfortable but NAD HEENT:  PERRL, EOMI, no alar flaring or epistaxis, oral mucosa moist without cyanosis, NECK:  no jugular vein distention, no retractions, no thyromegaly or masses, LUNGS: CTAB but diminished at the bases, respirations non-labored, on 2L NC , HEART:  Regular rate and rhythm with no MGR; no edema is present, ABDOMEN:  soft, stable CDI, drain dressing CDI EXTREMITIES:  warm with no cyanosis, NP LE swelling SKIN:  no jaundice or ecchymosis and NEUROLOGIC: alert, oriented, grossly non-focal    Skip MD Jovanny  [facility]

## 2018-02-25 NOTE — PROGRESS NOTES
Family Practice Daily Progress Note: 2/25/2018  Mirian Kawasaki Tiffany Jeani Grills,     Assessment/Plan:   Perforated chronic gastric ulcer /  Free intraperitoneal air / S/P exploratory laparotomy. S/p repair in OR with Dr. Anabelle Briceno on 2/12/18 - per surg    Severe protein calorie malnutrition-POA  --calcium corrects  --TPN     Acute metabolic encephalopathy. Multifactorial-- medications, alcohol use, sundowning  ---avoid triggers  ---CIWA  ---surgery has ordered haldol PRN  ---maintenance of sleep/wake cycle, and re-orientation      Hypoxia / Respiratory distress. CXR with atelectasis and pleural effusion. Continue Abx, nebs, supplemental oxygen and incentive spirom as able. Repeat CT chest with moderate to large persistent left subphrenic collection. ---Pulmonary consulted and planning to drain in IR. Bumex. ---repeat CXR     Alcoholic cirrhosis of liver without ascites /  Fatty liver determined by biopsy /  Hyperammonemia. --GI has seen- rec alcohol cessation and outpt follow up     ETOH abuse. Needs to stop ETOH entirely.     Hypokalemia /  Hypoalbuminemia. Being addressed with TPN     Leukocytosis. Up and febrile again  --if spikes again, will need to repeat blood cultures, and urine  --abx per pulm. - Zosyn, and levaquin    Anemia: Hgb up to 9.5  --follow, transfuse <7    Calcium corrects.     R wrist pain:  --XR today    Debility: due to prolonged hospital stay  --continue PT/OT  --will likely need rehab        Problem List:  Problem List as of 2/25/2018  Date Reviewed: 4/26/2016          Codes Class Noted - Resolved    Hypokalemia ICD-10-CM: E87.6  ICD-9-CM: 276.8  2/18/2018 - Present        Leukocytosis ICD-10-CM: D72.829  ICD-9-CM: 288.60  2/18/2018 - Present        Severe protein-calorie malnutrition ([facility] ICD-10-CM: E43  ICD-9-CM: 262  2/18/2018 - Present        Acute metabolic encephalopathy XHA-13-[facility]: G93.41  ICD-9-CM: 348.31  2/18/2018 - Present        Hyperammonemia ([facility] ICD-10-CM: E72.20  ICD-9-CM: 270.6  2/16/2018 - Present    Overview Addendum 2/17/2018  9:08 AM by Kehinde Elkins MD        Ref. Range 2/16/2018 17:20   Ammonia Latest Ref Range: <32 UMOL/L 69 (H)                Free intraperitoneal air ICD-10-CM: K66.8  ICD-9-CM: 568.89  2/12/2018 - Present        S/P exploratory laparotomy ICD-10-CM: Z98.890  ICD-9-CM: V45.89  2/12/2018 - Present    Overview Signed 2/12/2018 11:57 AM by Jennie Alarcon MD     Suture repair of perforated posterior gastric ulcer with Ramirez Bitter patch, core needle biopsies of left lobe of the liver. Alcoholic cirrhosis of liver without ascites (HCC) (Chronic) ICD-10-CM: K70.30  ICD-9-CM: 571.2  2/12/2018 - Present    Overview Signed 2/17/2018  8:45 AM by Kehinde Elkins MD     Liver bx 2/12/18:   Cirrhosis with mild chronic portal inflammation and macrovesicular steatosis. Fatty liver determined by biopsy ICD-10-CM: K76.0  ICD-9-CM: 571.8  2/12/2018 - Present    Overview Signed 2/17/2018  8:53 AM by Kehinde Elkins MD        Cirrhosis with mild chronic portal inflammation and macrovesicular steatosis. * (Principal)Perforated chronic gastric ulcer ([address] (Chronic) ICD-10-CM: K25.5  ICD-9-CM: 531.50  2/11/2018 - Present        ETOH abuse (Chronic) ICD-10-CM: F10.10  ICD-9-CM: 305.00  2/11/2018 - Present        GI bleed ICD-10-CM: K92.2  ICD-9-CM: 578.9  4/26/2016 - Present        Hypotension ICD-10-CM: I95.9  ICD-9-CM: 458.9  4/26/2016 - Present        Tachycardia ICD-10-CM: R00.0  ICD-9-CM: 785.0  4/26/2016 - Present        Acute blood loss anemia ICD-10-CM: D62  ICD-9-CM: 285.1  4/26/2016 - Present              Subjective:    67 y.o.  female with EtOH abuse who is admitted to the General Surgery Service with perforated gastric ulcer. We are asked to evaluate for altered mental status. The patient is poorly interactive at this time and this limits primary hx.  Discussed case with family available at bedside providing secondary hx and chart review. Per family, patient has had declining neurological condition over past 48 hours. Notably, an RRT was called for respiratory distress. 2/19: This morning she is a bit more alert at this moment and taking in more complete sentences. She says she does not feel well. She has no specific site of pain other than the NG tube which is bothering her a great deal.  She should improve as time from surg increases. K+ a little low - replacing.     2/20:  Still confused and somnolent at times. Now able to localize pain to ab and converse a bit more. Tmax 100.3  WBC is up again but hopefully reactive - recheck in AM - more incentive spirom and check CXR.     2/21: A little more alert. Knows she is in the hospital. Not able to participate with PT yesterday as she was in too much pain. WBC still at 19.     2/22:  WBC 19K. She is more alert. Daughter at bedside. Both of her daughters live out of town. Looking at SNF options for rehab. Coughing more. Starting to use IS.     2/23: WBC 17. Repeat CT chest with moderate to large persistent left subphrenic collection. IR has been consulted. Vanc and Levaquin added by pulm. 2/24: Pt had a better night last night. Only brief episode of confusion. She is much clearer this AM.  Now in quite a bit of pain from gas and stomach cramping. Thinks she will feel better if she can have a BM. Has been on bedpan for a while without success. On TPN. Daughters very concerned that she won't be successful while lying down. Wanting to get up to bedside commode. Will need PT's help. 2/25: febrile overnight and WBC up after vanc was stopped. Pt c/o more dyspnea today. Known fluid collection that will be drained in IR this week. Up to chair with PT yesterday briefly. +BM yesterday. Da notes pt has been c/o R wrist pain off and on for 2 wks since she fell. No swelling.     Past Medical History:   Diagnosis Date    Al[facility] liver without ascites ([facility] 2018    ETOH abuse 2018    History of vascular access device 2018    [facility] VAT - 5 FR triple, R basilic, TPN    Hyperammonemia ([facility] 2018     Results for Celso Lunsford (MRN [id number]) as of 2018 08:43  Ref. Range 2018 17:20 Ammonia Latest Ref Range: <32 UMOL/L 69 (H)     Hyperlipidemia     Perforated chronic gastric ulcer ([facility] 2018     Past Surgical History:   Procedure Laterality Date    HX CATARACT REMOVAL       Social History     Social History    Marital status: SINGLE     Spouse name: N/A    Number of children: N/A    Years of education: N/A     Occupational History    Not on file. Social History Main Topics    Smoking status: Former Smoker    Smokeless tobacco: Never Used    Alcohol use 11.4 oz/week     0 Standard drinks or equivalent, 19 Glasses of wine per week      Comment: Hx of heavy etoh use, but quit several months ago    Drug use: No    Sexual activity: Not on file     Other Topics Concern    Not on file     Social History Narrative     Family History   Problem Relation Age of Onset    Other Other      patient did not know       Review of Systems:   Review of systems not obtained due to patient factors. Objective:   Physical Exam:     Visit Vitals    /67 (BP 1 Location: Left arm, BP Patient Position: At rest)    Pulse 99    Temp 98.5 °F (36.9 °C)    Resp 18    Ht 5' 3.5\" (1.613 m)    Wt 87.9 kg (193 lb 12.6 oz)    SpO2 90%    Breastfeeding No    BMI 33.79 kg/m2    O2 Flow Rate (L/min): 2 l/min O2 Device: Nasal cannula    Temp (24hrs), Av °F (37.2 °C), Min:97.6 °F (36.4 °C), Max:100.5 °F (38.1 °C)         1901 -  0700  In: -   Out: 4873 [Urine:1550; Drains:220]    General:  Awake, uncomfortable, appears stated age. Head:  Normocephalic, without obvious abnormality, atraumatic. Eyes:  Conjunctivae/corneas clear. EOMs intact.    Nose: Patent, on NC   Throat: Lips, mucosa, and tongue dry. Neck: Supple, symmetrical, trachea midline, no adenopathy, thyroid: no enlargement/tenderness/nodules, no carotid bruit and no JVD. Lungs:   Labored breathing, no intercostal use, expiratory wheezing, diminished at bases   Chest wall:  No tenderness or deformity. Heart:  Regular rate and rhythm, S1, S2 normal, no murmur, click, rub or gallop. Abdomen:   Soft, distended, with mild generalized tenderness. Bowel sounds present. Dressings dry. Incision dry without redness, staple line intact   Extremities: no cyanosis or edema. No calf tenderness or cords, R wrist mildly tender, no erythema or swelling   Skin: Skin color, texture, turgor normal. No rashes or lesions   Neurologic:   AOx2, CN intact, Gait not tested at this time. Sensation grossly normal to touch. Gross motor of extremities normal.       Data Review:       Recent Days:  Recent Labs      02/25/18   0536  02/24/18   0412  02/23/18   0339   WBC  15.5*  12.8*  17.1*   HGB  9.5*  8.1*  10.2*   HCT  30.0*  26.1*  32.0*   PLT  528*  383  444*     Recent Labs      02/25/18   0536  02/24/18   0412  02/23/18   0339   NA  136  141  139   K  3.8  3.6  3.7   CL  104  111*  107   CO2  26  24  26   GLU  101*  92  101*   BUN  12  12  11   CREA  0.50*  0.49*  0.42*   CA  7.7*  6.7*  7.4*   MG  1.8  1.8   --    PHOS  3.3  3.1   --    ALB  1.5*  1.2*  1.4*   TBILI  0.9  1.0  1.5*   SGOT  42*  36  37   ALT  26  22  23     No results for input(s): PH, PCO2, PO2, HCO3, FIO2 in the last 72 hours.     24 Hour Results:  Recent Results (from the past 24 hour(s))   GLUCOSE, POC    Collection Time: 02/24/18 12:42 PM   Result Value Ref Range    Glucose (POC) 120 (H) 65 - 100 mg/dL    Performed by [facility] (PCT)    GLUCOSE, POC    Collection Time: 02/24/18  7:47 PM   Result Value Ref Range    Glucose (POC) 87 65 - 100 mg/dL    Performed by Yamileth Pan    GLUCOSE, POC    Collection Time: 02/25/18 12:08 AM   Result Value Ref Range    Glucose (POC) 91 65 - 100 mg/dL    Performed by Michelle Wyatt    CBC WITH AUTOMATED DIFF    Collection Time: 02/25/18  5:36 AM   Result Value Ref Range    WBC 15.5 (H) 3.6 - 11.0 K/uL    RBC 2.84 (L) 3.80 - 5.20 M/uL    HGB 9.5 (L) 11.5 - 16.0 g/dL    HCT 30.0 (L) 35.0 - 47.0 %    .6 (H) 80.0 - 99.0 FL    MCH 33.5 26.0 - 34.0 PG    MCHC 31.7 30.0 - 36.5 g/dL    RDW 14.3 11.5 - 14.5 %    PLATELET 324 (H) 121 - 400 K/uL    MPV 12.1 8.9 - 12.9 FL    NRBC 0.0 0  WBC    ABSOLUTE NRBC 0.00 0.00 - 0.01 K/uL    NEUTROPHILS 81 (H) 32 - 75 %    LYMPHOCYTES 8 (L) 12 - 49 %    MONOCYTES 9 5 - 13 %    EOSINOPHILS 1 0 - 7 %    BASOPHILS 1 0 - 1 %    IMMATURE GRANULOCYTES 1 (H) 0.0 - 0.5 %    ABS. NEUTROPHILS 12.5 (H) 1.8 - 8.0 K/UL    ABS. LYMPHOCYTES 1.2 0.8 - 3.5 K/UL    ABS. MONOCYTES 1.4 (H) 0.0 - 1.0 K/UL    ABS. EOSINOPHILS 0.2 0.0 - 0.4 K/UL    ABS. BASOPHILS 0.1 0.0 - 0.1 K/UL    ABS. IMM. GRANS. 0.2 (H) 0.00 - 0.04 K/UL    DF AUTOMATED     PHOSPHORUS    Collection Time: 02/25/18  5:36 AM   Result Value Ref Range    Phosphorus 3.3 2.6 - 4.7 MG/DL   MAGNESIUM    Collection Time: 02/25/18  5:36 AM   Result Value Ref Range    Magnesium 1.8 1.6 - 2.4 mg/dL   METABOLIC PANEL, COMPREHENSIVE    Collection Time: 02/25/18  5:36 AM   Result Value Ref Range    Sodium 136 136 - 145 mmol/L    Potassium 3.8 3.5 - 5.1 mmol/L    Chloride 104 97 - 108 mmol/L    CO2 26 21 - 32 mmol/L    Anion gap 6 5 - 15 mmol/L    Glucose 101 (H) 65 - 100 mg/dL    BUN 12 6 - 20 MG/DL    Creatinine 0.50 (L) 0.55 - 1.02 MG/DL    BUN/Creatinine ratio 24 (H) 12 - 20      GFR est AA >60 >60 ml/min/1.73m2    GFR est non-AA >60 >60 ml/min/1.73m2    Calcium 7.7 (L) 8.5 - 10.1 MG/DL    Bilirubin, total 0.9 0.2 - 1.0 MG/DL    ALT (SGPT) 26 12 - 78 U/L    AST (SGOT) 42 (H) 15 - 37 U/L    Alk.  phosphatase 179 (H) 45 - 117 U/L    Protein, total 5.5 (L) 6.4 - 8.2 g/dL    Albumin 1.5 (L) 3.5 - 5.0 g/dL    Globulin 4.0 2.0 - 4.0 g/dL    A-G Ratio 0.4 (L) 1.1 - 2. 2     GLUCOSE, POC    Collection Time: 02/25/18  6:01 AM   Result Value Ref Range    Glucose (POC) 96 65 - 100 mg/dL    Performed by Craige Leyden        Medications reviewed  Current Facility-Administered Medications   Medication Dose Route Frequency    TPN ADULT - CENTRAL   IntraVENous CONTINUOUS    fentaNYL (DURAGESIC) 12 mcg/hr patch 1 Patch  1 Patch TransDERmal Q72H    TPN ADULT - CENTRAL   IntraVENous CONTINUOUS    albuterol-ipratropium (DUO-NEB) 2.5 MG-0.5 MG/3 ML  3 mL Nebulization Q6H RT    acetaminophen (TYLENOL) tablet 650 mg  650 mg Oral Q4H PRN    QUEtiapine (SEROquel) tablet 12.5 mg  12.5 mg Oral Q1H PRN    haloperidol (HALDOL) tablet 1 mg  1 mg Oral Q8H PRN    levoFLOXacin (LEVAQUIN) 750 mg in D5W IVPB  750 mg IntraVENous Q24H    bisacodyl (DULCOLAX) suppository 10 mg  10 mg Rectal DAILY PRN    HYDROmorphone (PF) (DILAUDID) injection 0.5 mg  0.5 mg IntraVENous Q4H PRN    LORazepam (ATIVAN) injection 1 mg  1 mg IntraVENous Q4H PRN    insulin regular (NOVOLIN R, HUMULIN R) injection   SubCUTAneous Q6H    fat emulsion 20% (LIPOSYN, INTRAlipid) infusion 500 mL  500 mL IntraVENous Q MON, WED & SAT    glucose chewable tablet 16 g  4 Tab Oral PRN    glucagon (GLUCAGEN) injection 1 mg  1 mg IntraMUSCular PRN    dextrose (D50W) injection syrg 12.5-25 g  12.5-25 g IntraVENous PRN    alteplase (CATHFLO) 1 mg in sterile water (preservative free) 1 mL injection  1 mg InterCATHeter PRN    sodium chloride (NS) flush 10-30 mL  10-30 mL InterCATHeter PRN    sodium chloride (NS) flush 10-40 mL  10-40 mL InterCATHeter Q8H    piperacillin-tazobactam (ZOSYN) 10.125 g in 0.9% sodium chloride 500 mL continuous 24 hr infusion  10.125 g IntraVENous Q24H    naloxone (NARCAN) injection 0.4 mg  0.4 mg IntraVENous PRN    ondansetron (ZOFRAN) injection 4 mg  4 mg IntraVENous Q4H PRN    enoxaparin (LOVENOX) injection 40 mg  40 mg SubCUTAneous Q24H    pantoprazole (PROTONIX) injection 40 mg  40 mg IntraVENous Q12H    phenol throat spray (CHLORASEPTIC) 1 Spray  1 Spray Oral PRN    nystatin (MYCOSTATIN) 100,000 unit/gram powder   Topical BID    sodium chloride (NS) flush 5-10 mL  5-10 mL IntraVENous PRN       Care Plan discussed with: Patient/Family and Nurse/Administration/PT    Total time spent with patient: 30 minutes.     Fritz Tucker MD

## 2018-02-25 NOTE — PROGRESS NOTES
Problem: Falls - Risk of  Goal: *Absence of Falls  Document Demarcus Fall Risk and appropriate interventions in the flowsheet.    Outcome: Progressing Towards Goal  Fall Risk Interventions:  Mobility Interventions: Bed/chair exit alarm, Communicate number of staff needed for ambulation/transfer, Patient to call before getting OOB, PT Consult for mobility concerns, PT Consult for assist device competence    Mentation Interventions: Bed/chair exit alarm, Door open when patient unattended, Family/sitter at bedside, Room close to nurse's station, More frequent rounding    Medication Interventions: Bed/chair exit alarm, Evaluate medications/consider consulting pharmacy    Elimination Interventions: Call light in reach, Bed/chair exit alarm, Patient to call for help with toileting needs    History of Falls Interventions: Bed/chair exit alarm, Consult care management for discharge planning, Room close to nurse's station, Evaluate medications/consider consulting pharmacy

## 2018-02-26 LAB
ALBUMIN SERPL-MCNC: 1.5 G/DL (ref 3.5–5)
ALBUMIN/GLOB SERPL: 0.4 {RATIO} (ref 1.1–2.2)
ALP SERPL-CCNC: 174 U/L (ref 45–117)
ALT SERPL-CCNC: 26 U/L (ref 12–78)
ANION GAP SERPL CALC-SCNC: 6 MMOL/L (ref 5–15)
AST SERPL-CCNC: 35 U/L (ref 15–37)
BACTERIA SPEC CULT: NORMAL
BASOPHILS # BLD: 0.1 K/UL (ref 0–0.1)
BASOPHILS NFR BLD: 1 % (ref 0–1)
BILIRUB DIRECT SERPL-MCNC: 0.7 MG/DL (ref 0–0.2)
BILIRUB SERPL-MCNC: 1.1 MG/DL (ref 0.2–1)
BUN SERPL-MCNC: 14 MG/DL (ref 6–20)
BUN/CREAT SERPL: 24 (ref 12–20)
CALCIUM SERPL-MCNC: 7.6 MG/DL (ref 8.5–10.1)
CHLORIDE SERPL-SCNC: 105 MMOL/L (ref 97–108)
CO2 SERPL-SCNC: 26 MMOL/L (ref 21–32)
CREAT SERPL-MCNC: 0.58 MG/DL (ref 0.55–1.02)
DIFFERENTIAL METHOD BLD: ABNORMAL
EOSINOPHIL # BLD: 0.1 K/UL (ref 0–0.4)
EOSINOPHIL NFR BLD: 1 % (ref 0–7)
ERYTHROCYTE [DISTWIDTH] IN BLOOD BY AUTOMATED COUNT: 14.5 % (ref 11.5–14.5)
GLOBULIN SER CALC-MCNC: 3.5 G/DL (ref 2–4)
GLUCOSE BLD STRIP.AUTO-MCNC: 100 MG/DL (ref 65–100)
GLUCOSE BLD STRIP.AUTO-MCNC: 111 MG/DL (ref 65–100)
GLUCOSE BLD STRIP.AUTO-MCNC: 114 MG/DL (ref 65–100)
GLUCOSE BLD STRIP.AUTO-MCNC: 118 MG/DL (ref 65–100)
GLUCOSE BLD STRIP.AUTO-MCNC: 366 MG/DL (ref 65–100)
GLUCOSE BLD STRIP.AUTO-MCNC: 87 MG/DL (ref 65–100)
GLUCOSE SERPL-MCNC: 95 MG/DL (ref 65–100)
HBV SURFACE AB SER QL: NONREACTIVE
HBV SURFACE AB SER-ACNC: <3.1 MIU/ML
HCT VFR BLD AUTO: 28.8 % (ref 35–47)
HGB BLD-MCNC: 9.3 G/DL (ref 11.5–16)
IMM GRANULOCYTES # BLD: 0.1 K/UL (ref 0–0.04)
IMM GRANULOCYTES NFR BLD AUTO: 1 % (ref 0–0.5)
LYMPHOCYTES # BLD: 1.3 K/UL (ref 0.8–3.5)
LYMPHOCYTES NFR BLD: 8 % (ref 12–49)
MAGNESIUM SERPL-MCNC: 2 MG/DL (ref 1.6–2.4)
MCH RBC QN AUTO: 34.2 PG (ref 26–34)
MCHC RBC AUTO-ENTMCNC: 32.3 G/DL (ref 30–36.5)
MCV RBC AUTO: 105.9 FL (ref 80–99)
MONOCYTES # BLD: 1.3 K/UL (ref 0–1)
MONOCYTES NFR BLD: 8 % (ref 5–13)
NEUTS SEG # BLD: 12.8 K/UL (ref 1.8–8)
NEUTS SEG NFR BLD: 81 % (ref 32–75)
NRBC # BLD: 0 K/UL (ref 0–0.01)
NRBC BLD-RTO: 0 PER 100 WBC
PHOSPHATE SERPL-MCNC: 4.1 MG/DL (ref 2.6–4.7)
PLATELET # BLD AUTO: 518 K/UL (ref 150–400)
PMV BLD AUTO: 12.1 FL (ref 8.9–12.9)
POTASSIUM SERPL-SCNC: 4.6 MMOL/L (ref 3.5–5.1)
PREALB SERPL-MCNC: 4.5 MG/DL (ref 20–40)
PROT SERPL-MCNC: 5 G/DL (ref 6.4–8.2)
RBC # BLD AUTO: 2.72 M/UL (ref 3.8–5.2)
SERVICE CMNT-IMP: ABNORMAL
SERVICE CMNT-IMP: NORMAL
SODIUM SERPL-SCNC: 137 MMOL/L (ref 136–145)
TRIGL SERPL-MCNC: 74 MG/DL (ref ?–150)
WBC # BLD AUTO: 15.7 K/UL (ref 3.6–11)

## 2018-02-26 PROCEDURE — 97530 THERAPEUTIC ACTIVITIES: CPT

## 2018-02-26 PROCEDURE — 77030011256 HC DRSG MEPILEX <16IN NO BORD MOLN -A

## 2018-02-26 PROCEDURE — 74011000258 HC RX REV CODE- 258: Performed by: SURGERY

## 2018-02-26 PROCEDURE — 94640 AIRWAY INHALATION TREATMENT: CPT

## 2018-02-26 PROCEDURE — 74011000250 HC RX REV CODE- 250: Performed by: NURSE PRACTITIONER

## 2018-02-26 PROCEDURE — 74011250636 HC RX REV CODE- 250/636: Performed by: INTERNAL MEDICINE

## 2018-02-26 PROCEDURE — 82962 GLUCOSE BLOOD TEST: CPT

## 2018-02-26 PROCEDURE — 97535 SELF CARE MNGMENT TRAINING: CPT

## 2018-02-26 PROCEDURE — 74011250636 HC RX REV CODE- 250/636: Performed by: SURGERY

## 2018-02-26 PROCEDURE — 74011250636 HC RX REV CODE- 250/636: Performed by: NURSE PRACTITIONER

## 2018-02-26 PROCEDURE — 82248 BILIRUBIN DIRECT: CPT | Performed by: FAMILY MEDICINE

## 2018-02-26 PROCEDURE — 65660000000 HC RM CCU STEPDOWN

## 2018-02-26 PROCEDURE — 74011000250 HC RX REV CODE- 250: Performed by: SURGERY

## 2018-02-26 PROCEDURE — 84134 ASSAY OF PREALBUMIN: CPT | Performed by: SURGERY

## 2018-02-26 PROCEDURE — 97116 GAIT TRAINING THERAPY: CPT

## 2018-02-26 PROCEDURE — 74011000250 HC RX REV CODE- 250: Performed by: INTERNAL MEDICINE

## 2018-02-26 PROCEDURE — 74011250637 HC RX REV CODE- 250/637: Performed by: SURGERY

## 2018-02-26 PROCEDURE — 74011250637 HC RX REV CODE- 250/637: Performed by: INTERNAL MEDICINE

## 2018-02-26 PROCEDURE — C9113 INJ PANTOPRAZOLE SODIUM, VIA: HCPCS | Performed by: SURGERY

## 2018-02-26 PROCEDURE — 84478 ASSAY OF TRIGLYCERIDES: CPT | Performed by: SURGERY

## 2018-02-26 PROCEDURE — 77030038269 HC DRN EXT URIN PURWCK BARD -A

## 2018-02-26 PROCEDURE — 84100 ASSAY OF PHOSPHORUS: CPT | Performed by: FAMILY MEDICINE

## 2018-02-26 PROCEDURE — 80053 COMPREHEN METABOLIC PANEL: CPT | Performed by: FAMILY MEDICINE

## 2018-02-26 PROCEDURE — 85025 COMPLETE CBC W/AUTO DIFF WBC: CPT | Performed by: FAMILY MEDICINE

## 2018-02-26 PROCEDURE — 77010033678 HC OXYGEN DAILY

## 2018-02-26 PROCEDURE — 36415 COLL VENOUS BLD VENIPUNCTURE: CPT | Performed by: FAMILY MEDICINE

## 2018-02-26 PROCEDURE — 83735 ASSAY OF MAGNESIUM: CPT | Performed by: FAMILY MEDICINE

## 2018-02-26 RX ORDER — BUMETANIDE 0.25 MG/ML
1 INJECTION INTRAMUSCULAR; INTRAVENOUS ONCE
Status: COMPLETED | OUTPATIENT
Start: 2018-02-26 | End: 2018-02-26

## 2018-02-26 RX ADMIN — ACETAMINOPHEN 650 MG: 325 TABLET ORAL at 23:47

## 2018-02-26 RX ADMIN — NYSTATIN: 100000 POWDER TOPICAL at 18:16

## 2018-02-26 RX ADMIN — HYDROMORPHONE HYDROCHLORIDE 0.5 MG: 1 INJECTION, SOLUTION INTRAMUSCULAR; INTRAVENOUS; SUBCUTANEOUS at 16:49

## 2018-02-26 RX ADMIN — LORAZEPAM 1 MG: 2 INJECTION INTRAMUSCULAR; INTRAVENOUS at 18:15

## 2018-02-26 RX ADMIN — PIPERACILLIN SODIUM AND TAZOBACTAM SODIUM 10.12 G: 3; .375 INJECTION, POWDER, LYOPHILIZED, FOR SOLUTION INTRAVENOUS at 11:54

## 2018-02-26 RX ADMIN — Medication 10 ML: at 21:21

## 2018-02-26 RX ADMIN — HYDROMORPHONE HYDROCHLORIDE 0.5 MG: 1 INJECTION, SOLUTION INTRAMUSCULAR; INTRAVENOUS; SUBCUTANEOUS at 22:28

## 2018-02-26 RX ADMIN — METHOCARBAMOL 500 MG: 500 TABLET ORAL at 15:03

## 2018-02-26 RX ADMIN — GUAIFENESIN 600 MG: 600 TABLET, EXTENDED RELEASE ORAL at 21:21

## 2018-02-26 RX ADMIN — ENOXAPARIN SODIUM 40 MG: 40 INJECTION SUBCUTANEOUS at 15:02

## 2018-02-26 RX ADMIN — PANTOPRAZOLE SODIUM 40 MG: 40 INJECTION, POWDER, FOR SOLUTION INTRAVENOUS at 21:21

## 2018-02-26 RX ADMIN — I.V. FAT EMULSION 500 ML: 20 EMULSION INTRAVENOUS at 18:44

## 2018-02-26 RX ADMIN — LEVOFLOXACIN 750 MG: 5 INJECTION, SOLUTION INTRAVENOUS at 15:02

## 2018-02-26 RX ADMIN — HYDROMORPHONE HYDROCHLORIDE 0.5 MG: 1 INJECTION, SOLUTION INTRAMUSCULAR; INTRAVENOUS; SUBCUTANEOUS at 11:55

## 2018-02-26 RX ADMIN — METHOCARBAMOL 500 MG: 500 TABLET ORAL at 21:21

## 2018-02-26 RX ADMIN — Medication 10 ML: at 00:14

## 2018-02-26 RX ADMIN — METHOCARBAMOL 500 MG: 500 TABLET ORAL at 08:50

## 2018-02-26 RX ADMIN — IPRATROPIUM BROMIDE AND ALBUTEROL SULFATE 3 ML: .5; 3 SOLUTION RESPIRATORY (INHALATION) at 13:05

## 2018-02-26 RX ADMIN — HYDROMORPHONE HYDROCHLORIDE 0.5 MG: 1 INJECTION, SOLUTION INTRAMUSCULAR; INTRAVENOUS; SUBCUTANEOUS at 05:27

## 2018-02-26 RX ADMIN — IPRATROPIUM BROMIDE AND ALBUTEROL SULFATE 3 ML: .5; 3 SOLUTION RESPIRATORY (INHALATION) at 03:31

## 2018-02-26 RX ADMIN — METHOCARBAMOL 500 MG: 500 TABLET ORAL at 18:15

## 2018-02-26 RX ADMIN — PANTOPRAZOLE SODIUM 40 MG: 40 INJECTION, POWDER, FOR SOLUTION INTRAVENOUS at 08:52

## 2018-02-26 RX ADMIN — Medication 10 ML: at 15:03

## 2018-02-26 RX ADMIN — Medication 10 ML: at 05:29

## 2018-02-26 RX ADMIN — CALCIUM GLUCONATE: 94 INJECTION, SOLUTION INTRAVENOUS at 18:42

## 2018-02-26 RX ADMIN — IPRATROPIUM BROMIDE AND ALBUTEROL SULFATE 3 ML: .5; 3 SOLUTION RESPIRATORY (INHALATION) at 20:12

## 2018-02-26 RX ADMIN — IPRATROPIUM BROMIDE AND ALBUTEROL SULFATE 3 ML: .5; 3 SOLUTION RESPIRATORY (INHALATION) at 07:26

## 2018-02-26 RX ADMIN — GUAIFENESIN 600 MG: 600 TABLET, EXTENDED RELEASE ORAL at 08:50

## 2018-02-26 RX ADMIN — NYSTATIN: 100000 POWDER TOPICAL at 08:50

## 2018-02-26 RX ADMIN — BUMETANIDE 1 MG: 0.25 INJECTION, SOLUTION INTRAMUSCULAR; INTRAVENOUS at 15:02

## 2018-02-26 NOTE — PROGRESS NOTES
Family Practice Daily Progress Note: 2/26/2018  Jhonny Oneal,     Assessment/Plan:   Perforated chronic gastric ulcer /  Free intraperitoneal air / S/P exploratory laparotomy. S/p repair in OR with Dr. Rosibel Ta on 2/12/18 - per surg    Severe protein calorie malnutrition-POA  --calcium corrects  --TPN     Acute metabolic encephalopathy. Multifactorial-- medications, alcohol use, sundowning  ---avoid triggers  ---CIWA  ---surgery has ordered haldol PRN  ---maintenance of sleep/wake cycle, and re-orientation      Hypoxia / Respiratory distress. CXR with atelectasis and pleural effusion. Continue Abx, nebs, supplemental oxygen and incentive spirom as able. Repeat CT chest with moderate to large persistent left subphrenic collection. ---Pulmonary consulted and planning to drain in IR. Bumex. ---repeat CXR     Alcoholic cirrhosis of liver without ascites /  Fatty liver determined by biopsy /  Hyperammonemia. --GI has seen- rec alcohol cessation and outpt follow up     ETOH abuse. Needs to stop ETOH entirely.     Hypokalemia /  Hypoalbuminemia. Being addressed with TPN     Leukocytosis. Up and febrile again  --if spikes again, will need to repeat blood cultures, and urine  --abx per pulm. - Zosyn, and levaquin    Anemia: Hgb up to 9.5  --follow, transfuse <7    Calcium corrects.     R wrist pain:  --XR today    Debility: due to prolonged hospital stay  --continue PT/OT  --will likely need rehab        Problem List:  Problem List as of 2/26/2018  Date Reviewed: 4/26/2016          Codes Class Noted - Resolved    Hypokalemia ICD-10-CM: E87.6  ICD-9-CM: 276.8  2/18/2018 - Present        Leukocytosis ICD-10-CM: D72.829  ICD-9-CM: 288.60  2/18/2018 - Present        Severe protein-calorie malnutrition ([facility] ICD-10-CM: E43  ICD-9-CM: 262  2/18/2018 - Present        Acute metabolic encephalopathy AGF-79-CHEW: G93.41  ICD-9-CM: 348.31  2/18/2018 - Present        Hyperammonemia ([facility] ICD-10-CM: E72.20  ICD-9-CM: 270.6  2/16/2018 - Present    Overview Addendum 2/17/2018  9:08 AM by Owen Kimbrough MD        Ref. Range 2/16/2018 17:20   Ammonia Latest Ref Range: <32 UMOL/L 69 (H)                Free intraperitoneal air ICD-10-CM: K66.8  ICD-9-CM: 568.89  2/12/2018 - Present        S/P exploratory laparotomy ICD-10-CM: Z98.890  ICD-9-CM: V45.89  2/12/2018 - Present    Overview Signed 2/12/2018 11:57 AM by Daquan Perrin MD     Suture repair of perforated posterior gastric ulcer with Daiva Guiles patch, core needle biopsies of left lobe of the liver. Alcoholic cirrhosis of liver without ascites (HCC) (Chronic) ICD-10-CM: K70.30  ICD-9-CM: 571.2  2/12/2018 - Present    Overview Signed 2/17/2018  8:45 AM by Owen Kimbrough MD     Liver bx 2/12/18:   Cirrhosis with mild chronic portal inflammation and macrovesicular steatosis. Fatty liver determined by biopsy ICD-10-CM: K76.0  ICD-9-CM: 571.8  2/12/2018 - Present    Overview Signed 2/17/2018  8:53 AM by Owen Kimbrough MD        Cirrhosis with mild chronic portal inflammation and macrovesicular steatosis. * (Principal)Perforated chronic gastric ulcer ([address] (Chronic) ICD-10-CM: K25.5  ICD-9-CM: 531.50  2/11/2018 - Present        ETOH abuse (Chronic) ICD-10-CM: F10.10  ICD-9-CM: 305.00  2/11/2018 - Present        GI bleed ICD-10-CM: K92.2  ICD-9-CM: 578.9  4/26/2016 - Present        Hypotension ICD-10-CM: I95.9  ICD-9-CM: 458.9  4/26/2016 - Present        Tachycardia ICD-10-CM: R00.0  ICD-9-CM: 785.0  4/26/2016 - Present        Acute blood loss anemia ICD-10-CM: D62  ICD-9-CM: 285.1  4/26/2016 - Present              Subjective:    67 y.o.  female with EtOH abuse who is admitted to the General Surgery Service with perforated gastric ulcer. We are asked to evaluate for altered mental status. The patient is poorly interactive at this time and this limits primary hx.  Discussed case with family available at bedside providing secondary hx and chart review. Per family, patient has had declining neurological condition over past 48 hours. Notably, an RRT was called for respiratory distress. 2/19: This morning she is a bit more alert at this moment and taking in more complete sentences. She says she does not feel well. She has no specific site of pain other than the NG tube which is bothering her a great deal.  She should improve as time from surg increases. K+ a little low - replacing.     2/20:  Still confused and somnolent at times. Now able to localize pain to ab and converse a bit more. Tmax 100.3  WBC is up again but hopefully reactive - recheck in AM - more incentive spirom and check CXR.     2/21: A little more alert. Knows she is in the hospital. Not able to participate with PT yesterday as she was in too much pain. WBC still at 19.     2/22:  WBC 19K. She is more alert. Daughter at bedside. Both of her daughters live out of town. Looking at SNF options for rehab. Coughing more. Starting to use IS.     2/23: WBC 17. Repeat CT chest with moderate to large persistent left subphrenic collection. IR has been consulted. Vanc and Levaquin added by pulm. 2/24: Pt had a better night last night. Only brief episode of confusion. She is much clearer this AM.  Now in quite a bit of pain from gas and stomach cramping. Thinks she will feel better if she can have a BM. Has been on bedpan for a while without success. On TPN. Daughters very concerned that she won't be successful while lying down. Wanting to get up to bedside commode. Will need PT's help. 2/25: febrile overnight and WBC up after vanc was stopped. Pt c/o more dyspnea today. Known fluid collection that will be drained in IR this week. Up to chair with PT yesterday briefly. +BM yesterday. Da notes pt has been c/o R wrist pain off and on for 2 wks since she fell. No swelling.    2/26:  No new complaints. Still c/o back and ab pain. Still passing gas and had BM. Diuresed with 1 mg Bumex yesterday by Pulmonary. Remains on Zosyn and Levaquin. Blood culture remains neg. Tmax 99.2. X-ray of wrists ok. CXR ok with tube inplace. WBC 15.7K. Past Medical History:   Diagnosis Date    Alcoholic cirrhosis of liver without ascites ([facility] 2018    ETOH abuse 2018    History of vascular access device 2018    [facility] VAT - 5 FR triple, R basilic, TPN    Hyperammonemia ([facility] 2018     Results for Carlitos Brochure (MRN [id number]) as of 2018 08:43  Ref. Range 2018 17:20 Ammonia Latest Ref Range: <32 UMOL/L 69 (H)     Hyperlipidemia     [facility] 2018     Past Surgical History:   Procedure Laterality Date    HX CATARACT REMOVAL       Social History     Social History    Marital status: SINGLE     Spouse name: N/A    Number of children: N/A    Years of education: N/A     Occupational History    Not on file. Social History Main Topics    Smoking status: Former Smoker    Smokeless tobacco: Never Used    Alcohol use 11.4 oz/week     0 Standard drinks or equivalent, 19 Glasses of wine per week      Comment: Hx of heavy etoh use, but quit several months ago    Drug use: No    Sexual activity: Not on file     Other Topics Concern    Not on file     Social History Narrative     Family History   Problem Relation Age of Onset    Other Other      patient did not know       Review of Systems:   Review of systems not obtained due to patient factors.     Objective:   Physical Exam:     Visit Vitals    /60 (BP 1 Location: Left arm, BP Patient Position: At rest)    Pulse 100    Temp 99.2 °F (37.3 °C)    Resp 20    Ht 5' 3.5\" (1.613 m)    Wt 87.9 kg (193 lb 12.6 oz)    SpO2 91%    Breastfeeding No    BMI 33.79 kg/m2    O2 Flow Rate (L/min): 2 l/min O2 Device: Nasal cannula    Temp (24hrs), Av.8 °F (37.1 °C), Min:97.8 °F (36.6 °C), Max:99.2 °F (37.3 °C)         1901 - 02/26 0700  In: -   Out: 7172 [Urine:1250; Drains:165]    General:  Awake, uncomfortable, appears stated age. Head:  Normocephalic, without obvious abnormality, atraumatic. Eyes:  Conjunctivae/corneas clear. EOMs intact. Nose: Patent, on NC   Throat: Lips, mucosa, and tongue dry. Neck: Supple, symmetrical, trachea midline, no adenopathy, thyroid: no enlargement/tenderness/nodules, no carotid bruit and no JVD. Lungs:    no intercostal use, lungs sound clearer today, diminished at bases   Chest wall:  No tenderness or deformity. Heart:  Regular rate and rhythm, S1, S2 normal, no murmur, click, rub or gallop. Abdomen:   Soft, distended, with mild generalized tenderness. Bowel sounds present. Dressings dry. Incision dry without redness, staple line intact   Extremities: no cyanosis. 1+ LE edema. No calf tenderness or cords, R wrist mildly tender, no erythema or swelling   Skin: Skin color, texture, turgor normal. No rashes or lesions   Neurologic:   AOx2, CN intact, Gait not tested at this time. Sensation grossly normal to touch. Gross motor of extremities normal.       Data Review:       Recent Days:  Recent Labs      02/26/18 0055 02/25/18   0536  02/24/18   0412   WBC  15.7*  15.5*  12.8*   HGB  9.3*  9.5*  8.1*   HCT  28.8*  30.0*  26.1*   PLT  518*  528*  383     Recent Labs      02/26/18 0055 02/25/18   0536  02/24/18   0412   NA  137  136  141   K  4.6  3.8  3.6   CL  105  104  111*   CO2  26  26  24   GLU  95  101*  92   BUN  14  12  12   CREA  0.58  0.50*  0.49*   CA  7.6*  7.7*  6.7*   MG  2.0  1.8  1.8   PHOS  4.1  3.3  3.1   ALB  1.5*  1.5*  1.2*   TBILI  1.1*  0.9  1.0   SGOT  35  42*  36   ALT  26  26  22     No results for input(s): PH, PCO2, PO2, HCO3, FIO2 in the last 72 hours.     24 Hour Results:  Recent Results (from the past 24 hour(s))   GLUCOSE, POC    Collection Time: 02/25/18 12:02 PM   Result Value Ref Range    Glucose (POC) 111 (H) 65 - 100 mg/dL    Performed by Chen Marie ([facility])    GLUCOSE, POC    Collection Time: 02/25/18  6:13 PM   Result Value Ref Range    Glucose (POC) 95 65 - 100 mg/dL    Performed by Casandra Ortega ([facility])    GLUCOSE, POC    Collection Time: 02/26/18 12:54 AM   Result Value Ref Range    Glucose (POC) 366 (H) 65 - 100 mg/dL    Performed by Yaritza Aguilar    CBC WITH AUTOMATED DIFF    Collection Time: 02/26/18 12:55 AM   Result Value Ref Range    WBC 15.7 (H) 3.6 - 11.0 K/uL    RBC 2.72 (L) 3.80 - 5.20 M/uL    HGB 9.3 (L) 11.5 - 16.0 g/dL    HCT 28.8 (L) 35.0 - 47.0 %    .9 (H) 80.0 - 99.0 FL    MCH 34.2 (H) 26.0 - 34.0 PG    MCHC 32.3 30.0 - 36.5 g/dL    RDW 14.5 11.5 - 14.5 %    PLATELET 167 (H) 365 - 400 K/uL    MPV 12.1 8.9 - 12.9 FL    NRBC 0.0 0  WBC    ABSOLUTE NRBC 0.00 0.00 - 0.01 K/uL    NEUTROPHILS 81 (H) 32 - 75 %    LYMPHOCYTES 8 (L) 12 - 49 %    MONOCYTES 8 5 - 13 %    EOSINOPHILS 1 0 - 7 %    BASOPHILS 1 0 - 1 %    IMMATURE GRANULOCYTES 1 (H) 0.0 - 0.5 %    ABS. NEUTROPHILS 12.8 (H) 1.8 - 8.0 K/UL    ABS. LYMPHOCYTES 1.3 0.8 - 3.5 K/UL    ABS. MONOCYTES 1.3 (H) 0.0 - 1.0 K/UL    ABS. EOSINOPHILS 0.1 0.0 - 0.4 K/UL    ABS. BASOPHILS 0.1 0.0 - 0.1 K/UL    ABS. IMM. GRANS. 0.1 (H) 0.00 - 0.04 K/UL    DF AUTOMATED     METABOLIC PANEL, COMPREHENSIVE    Collection Time: 02/26/18 12:55 AM   Result Value Ref Range    Sodium 137 136 - 145 mmol/L    Potassium 4.6 3.5 - 5.1 mmol/L    Chloride 105 97 - 108 mmol/L    CO2 26 21 - 32 mmol/L    Anion gap 6 5 - 15 mmol/L    Glucose 95 65 - 100 mg/dL    BUN 14 6 - 20 MG/DL    Creatinine 0.58 0.55 - 1.02 MG/DL    BUN/Creatinine ratio 24 (H) 12 - 20      GFR est AA >60 >60 ml/min/1.73m2    GFR est non-AA >60 >60 ml/min/1.73m2    Calcium 7.6 (L) 8.5 - 10.1 MG/DL    Bilirubin, total 1.1 (H) 0.2 - 1.0 MG/DL    ALT (SGPT) 26 12 - 78 U/L    AST (SGOT) 35 15 - 37 U/L    Alk.  phosphatase 174 (H) 45 - 117 U/L    Protein, total 5.0 (L) 6.4 - 8.2 g/dL    Albumin 1.5 (L) 3.5 - 5.0 g/dL    Globulin 3.5 2.0 - 4.0 g/dL    A-G Ratio 0.4 (L) 1.1 - 2.2     BILIRUBIN, DIRECT    Collection Time: 02/26/18 12:55 AM   Result Value Ref Range    Bilirubin, direct 0.7 (H) 0.0 - 0.2 MG/DL   MAGNESIUM    Collection Time: 02/26/18 12:55 AM   Result Value Ref Range    Magnesium 2.0 1.6 - 2.4 mg/dL   PHOSPHORUS    Collection Time: 02/26/18 12:55 AM   Result Value Ref Range    Phosphorus 4.1 2.6 - 4.7 MG/DL   GLUCOSE, POC    Collection Time: 02/26/18 12:58 AM   Result Value Ref Range    Glucose (POC) 87 65 - 100 mg/dL    Performed by Leonor Aguilar    GLUCOSE, POC    Collection Time: 02/26/18  5:32 AM   Result Value Ref Range    Glucose (POC) 111 (H) 65 - 100 mg/dL    Performed by Leonor Aguilar        Medications reviewed  Current Facility-Administered Medications   Medication Dose Route Frequency    TPN ADULT - CENTRAL   IntraVENous CONTINUOUS    methocarbamol (ROBAXIN) tablet 500 mg  500 mg Oral QID    guaiFENesin ER (MUCINEX) tablet 600 mg  600 mg Oral Q12H    fentaNYL (DURAGESIC) 12 mcg/hr patch 1 Patch  1 Patch TransDERmal Q72H    albuterol-ipratropium (DUO-NEB) 2.5 MG-0.5 MG/3 ML  3 mL Nebulization Q6H RT    acetaminophen (TYLENOL) tablet 650 mg  650 mg Oral Q4H PRN    haloperidol (HALDOL) tablet 1 mg  1 mg Oral Q8H PRN    levoFLOXacin (LEVAQUIN) 750 mg in D5W IVPB  750 mg IntraVENous Q24H    bisacodyl (DULCOLAX) suppository 10 mg  10 mg Rectal DAILY PRN    HYDROmorphone (PF) (DILAUDID) injection 0.5 mg  0.5 mg IntraVENous Q4H PRN    LORazepam (ATIVAN) injection 1 mg  1 mg IntraVENous Q4H PRN    insulin regular (NOVOLIN R, HUMULIN R) injection   SubCUTAneous Q6H    fat emulsion 20% (LIPOSYN, INTRAlipid) infusion 500 mL  500 mL IntraVENous Q MON, WED & SAT    glucose chewable tablet 16 g  4 Tab Oral PRN    glucagon (GLUCAGEN) injection 1 mg  1 mg IntraMUSCular PRN    dextrose (D50W) injection syrg 12.5-25 g  12.5-25 g IntraVENous PRN    alteplase (CATHFLO) 1 mg in sterile water (preservative free) 1 mL injection  1 mg InterCATHeter PRN    sodium chloride (NS) flush 10-30 mL  10-30 mL InterCATHeter PRN    sodium chloride (NS) flush 10-40 mL  10-40 mL InterCATHeter Q8H    piperacillin-tazobactam (ZOSYN) 10.125 g in 0.9% sodium chloride 500 mL continuous 24 hr infusion  10.125 g IntraVENous Q24H    naloxone (NARCAN) injection 0.4 mg  0.4 mg IntraVENous PRN    ondansetron (ZOFRAN) injection 4 mg  4 mg IntraVENous Q4H PRN    enoxaparin (LOVENOX) injection 40 mg  40 mg SubCUTAneous Q24H    pantoprazole (PROTONIX) injection 40 mg  40 mg IntraVENous Q12H    phenol throat spray (CHLORASEPTIC) 1 Spray  1 Spray Oral PRN    nystatin (MYCOSTATIN) 100,000 unit/gram powder   Topical BID    sodium chloride (NS) flush 5-10 mL  5-10 mL IntraVENous PRN       Care Plan discussed with: Patient and daughter and Nurse and Surg    Total time spent with patient: 30 minutes.   Andrei Huff MD

## 2018-02-26 NOTE — PROGRESS NOTES
Problem: Self Care Deficits Care Plan (Adult)  Goal: *Acute Goals and Plan of Care (Insert Text)  Occupational Therapy Goals  Initiated 2/20/2018  1. Patient will perform upper body dressing with moderate assistance in unsupported sitting within 7 day(s). 2.  Patient will perform upper body bathing with moderate assistance  within 7 day(s). 3.  Patient will perform grooming with minimal assistance/contact guard assist within 7 day(s). 4.  Patient will perform toilet transfers with maximal assistance  within 7 day(s). 5.  Patient will perform all aspects of toileting with maximal assistance within 7 day(s). 6.  Patient will participate in upper extremity therapeutic exercise/activities with minimal assistance/contact guard assist for 5 minutes within 7 day(s). Occupational Therapy TREATMENT  Patient: Jason Archibald (36 y.o. female)  Date: 2/26/2018  Diagnosis: Intra-abdominal free air of unknown etiology [K66.8] Perforated chronic gastric ulcer ([address]  Procedure(s) (LRB):   NASOGASTRIC TUBE PLACEMENT (N/A) 8 Days Post-Op  Precautions: Aspiration, Back, Bed Alarm, Fall, Skin  Chart, occupational therapy assessment, plan of care, and goals were reviewed. ASSESSMENT:  Pt received in bed, agreeable to participate with OT. Multiple family members present. . Reports 8/10 R sided pain . She is  very weak in BLE and BUE. Verbal cues and physical assist to reach for bed rails in prep for rolling to don brief. Pt presents with frequent urination and usually has a pure wick in place. Tolerated sitting EOB x 4min with occasional support and verbal cues to maintain fwd lean to reach for washcloth. O2 sast 88-89 using 2L, required verbal cues for PLB, recovered to 93%. -120 during activity. Pull to stand  with Mod A x2. Side step toward [facility] with max verbal cues for sequencing. Pt returned to bed with Max A x2 for sit>supine.  Bed placed in semi chair position in prep for breakfast. Recommend SNF for rehab.  Progression toward goals:  []          Improving appropriately and progressing toward goals  [x]          Improving slowly and progressing toward goals  []          Not making progress toward goals and plan of care will be adjusted     PLAN:  Patient continues to benefit from skilled intervention to address the above impairments. Continue treatment per established plan of care. Discharge Recommendations:  SNf for rehab  Further Equipment Recommendations for Discharge:  None     SUBJECTIVE:   Patient stated I dont like that thing.  and referring to spirometer. OBJECTIVE DATA SUMMARY:   Cognitive/Behavioral Status:  Neurologic State: Alert  Orientation Level: Oriented to person  Cognition: Decreased command following           Functional Mobility and Transfers for ADLs:   Bed Mobility:     Supine to Sit: Maximum assistance; Additional time  Sit to Supine: Maximum assistance;Assist x2; Additional time        Transfers:  Sit to Stand: Moderate assistance;Assist x2       Balance:  Sitting: High guard; With support; Intact  Standing: Impaired; With support  Standing - Static: Fair;Constant support  Standing - Dynamic : Fair  ADL Intervention:       Grooming  Washing Face: Moderate assistance  Cues: Physical assistance;Verbal cues provided     Pt sat edge of bed for 4 plus minutes in prep for ADL's. Upper Body Dressing Assistance  Dressing Assistance: Maximum assistance  Hospital Gown: Maximum assistance         Toileting  Toileting Assistance: Total assistance(dependent)       Pain:      8/10 R side. Activity Tolerance:    Pt sat edge of bed 4 plus minutes in prep for ADL's. Please refer to the flowsheet for vital signs taken during this treatment.   After treatment:   []  Patient left in no apparent distress sitting up in chair  [x]  Patient left in no apparent distress in bed  [x]  Call bell left within reach  [x]  Nursing notified  [x]  Caregiver present  []  Bed alarm activated    COMMUNICATION/COLLABORATION:   The patients plan of care was discussed with: Physical Therapy Assistant, Occupational Therapist and Registered Nurse    ISRAEL Luong  Time Calculation: 32 mins

## 2018-02-26 NOTE — PROGRESS NOTES
Called MD on call Dr. Terra Austin regarding patient's family concern about patient having hallucination with dilaudid pain meds and she stated patient is an alchoholic and has been hallucinating and sundowning and she would not do any changes at this time and to give Seroquel if needed. Or to contact the surgeon if he want to change.

## 2018-02-26 NOTE — PROGRESS NOTES
[facility]     Name: Betsy Amaya MRN: [id number]   : 1945 Hospital: [facility]   Date: 2018            Impression Plan   1. Dyspnea  2. Abnormal Chest CT: with bilateral pleural effusions, atelectasis,and patchy airspace disease in MIRIAM  3. Leukocytosis  4. Peforated gastric ulcer, s/p repair  5. Shock, likely septic, resolved  6. Acute hypoxic respiratory failure   7. Hypokalemia, hypomagnesemia  8. EtOH abuse     · Goal sats >90%, wean O2 as tolerated  · Will diurese with 1mg Bumex again today. Strict I&O. · Zosyn, Levaquin. Follow-up cultures  · Will ask speech to evaluate swallow  · Scheduled nebs  · Guafenesin   · Post-op management as per surgery  · Pain control; limit benzos as much as possible  · Seroquel for delirium  · Monitor lytes, replete as needed  · Encourage IS use  · PT/OT  · OOB and mobilize as much as possible: discussed with nursing today that she needs to get OOB  · TPN  · SCDs  · Protonix               Radiology  ( personally reviewed) CXR:  Persistent small bilateral pleural effusions. Right wrist:  No acute findings   ABG No results for input(s): PHI, PO2I, PCO2I in the last 72 hours. Subjective     Overnight events:  TMax 99.2  BP stable  HR in low 100s  O2 sats 92% on 4L NC  WBC 15.7  Hgb 9.3  Phos 3.6  Blood cultures negative   Fluid balance: -670, but no documentation of intake       ROS:   Daughter reports that she is hallucinating with use of Dilaudid and a little tremulous at times. Concerned about her anxiety. Also worried as she seems to be more short of breath after eating/drinking anything. Denies fever or chills. Patient reports continued abdominal and back pain. 12 point ROS reviewed and negative other than noted above.       Past Medical History:   Diagnosis Date    Alcoholic cirrhosis of liver without ascites ([facility] [address] 2018    ETOH abuse 2018    History of vascular access device 2018    [facility] VAT - 5 FR triple, R basilic, TPN    Hyperammonemia ([facility] [address] 2/16/2018     Results for Hetal Castellon (MRN [id number]) as of 2/17/2018 08:43  Ref. Range 2/16/2018 17:20 Ammonia Latest Ref Range: <32 UMOL/L 69 (H)     Hyperlipidemia     Perforated chronic gastric ulcer ([facility] [address] 2/11/2018      Past Surgical History:   Procedure Laterality Date    HX CATARACT REMOVAL        Prior to Admission medications    Medication Sig Start Date End Date Taking? Authorizing Provider   naproxen sodium (ALEVE) 220 mg tablet Take 220 mg by mouth daily as needed for Pain. Yes Historical Provider   cyanocobalamin (VITAMIN B12) 500 mcg tablet Take 500 mcg by mouth daily. Yes Historical Provider   multivitamin (ONE A DAY) tablet Take 1 Tab by mouth daily. Yes Historical Provider   omega-3 fatty acids-vitamin e 1,000 mg cap Take 2 Caps by mouth daily.    Yes Historical Provider     Current Facility-Administered Medications   Medication Dose Route Frequency    TPN ADULT - CENTRAL   IntraVENous CONTINUOUS    TPN ADULT - CENTRAL   IntraVENous CONTINUOUS    methocarbamol (ROBAXIN) tablet 500 mg  500 mg Oral QID    guaiFENesin ER (MUCINEX) tablet 600 mg  600 mg Oral Q12H    fentaNYL (DURAGESIC) 12 mcg/hr patch 1 Patch  1 Patch TransDERmal Q72H    albuterol-ipratropium (DUO-NEB) 2.5 MG-0.5 MG/3 ML  3 mL Nebulization Q6H RT    levoFLOXacin (LEVAQUIN) 750 mg in D5W IVPB  750 mg IntraVENous Q24H    insulin regular (NOVOLIN R, HUMULIN R) injection   SubCUTAneous Q6H    fat emulsion 20% (LIPOSYN, INTRAlipid) infusion 500 mL  500 mL IntraVENous Q MON, WED & SAT    sodium chloride (NS) flush 10-40 mL  10-40 mL InterCATHeter Q8H    piperacillin-tazobactam (ZOSYN) 10.125 g in 0.9% sodium chloride 500 mL continuous 24 hr infusion  10.125 g IntraVENous Q24H    enoxaparin (LOVENOX) injection 40 mg  40 mg SubCUTAneous Q24H    pantoprazole (PROTONIX) injection 40 mg  40 mg IntraVENous Q12H    nystatin (MYCOSTATIN) 100,000 unit/gram powder Topical BID     No Known Allergies   Social History   Substance Use Topics    Smoking status: Former Smoker    Smokeless tobacco: Never Used    Alcohol use 11.4 oz/week     0 Standard drinks or equivalent, 19 Glasses of wine per week      Comment: Hx of heavy etoh use, but quit several months ago      Family History   Problem Relation Age of Onset    Other Other      patient did not know          Laboratory: I have personally reviewed the critical care flowsheet and labs. Recent Labs      02/26/18 0055  02/25/18   0536  02/24/18   0412   WBC  15.7*  15.5*  12.8*   HGB  9.3*  9.5*  8.1*   HCT  28.8*  30.0*  26.1*   PLT  518*  528*  383     Recent Labs      02/26/18 0055  02/25/18   0536  02/24/18   0412   NA  137  136  141   K  4.6  3.8  3.6   CL  105  104  111*   CO2  26  26  24   GLU  95  101*  92   BUN  14  12  12   CREA  0.58  0.50*  0.49*   CA  7.6*  7.7*  6.7*   MG  2.0  1.8  1.8   PHOS  4.1  3.3  3.1   ALB  1.5*  1.5*  1.2*   SGOT  35  42*  36   ALT  26  26  22       Objective:          Intake/Output Summary (Last 24 hours) at 02/26/18 1321  Last data filed at 02/26/18 0946   Gross per 24 hour   Intake                0 ml   Output              670 ml   Net             -670 ml     GENERAL: alert, able to answer questions appropriately, uncomfortable but NAD HEENT:  PERRL, EOMI, no alar flaring or epistaxis, oral mucosa moist without cyanosis, NECK:  no jugular vein distention, no retractions, no thyromegaly or masses, LUNGS: CTAB but diminished at the bases, respirations non-labored, on 4L NC , HEART:  Regular rate and rhythm with no MGR; no edema is present, ABDOMEN:  soft, stable CDI, drain dressing CDI EXTREMITIES:  warm with no cyanosis, NP LE swelling SKIN:  no jaundice or ecchymosis and NEUROLOGIC: alert, oriented, grossly non-focal    Binnie Medici, NP  [facility]

## 2018-02-26 NOTE — PROGRESS NOTES
Spiritual Care Assessment/Progress Note  [address]      NAME: Glen Alberto      MRN: [id number]  AGE: 67 y.o. SEX: female  Nondenominational Affiliation: Juanita   Language: English     2/26/2018     Total Time (in minutes): 22     Spiritual Assessment begun in [facility] 4M POST SURG ORT 1 through conversation with:         [x]Patient        [x] Family    [] Friend(s)        Reason for Consult: Initial/Spiritual assessment, patient floor     Spiritual beliefs: (Please include comment if needed)     [x] Involved in a jennifer tradition/spiritual practice:     [] Supported by a jennifer community:      [] Claims no spiritual orientation:      [] Seeking spiritual identity:           [] Adheres to an individual form of spirituality:      [] Not able to assess:                     Identified resources for coping:      [x] Prayer                  [] Devotional reading               [x] Music                  [] Guided Imagery     [] Family/friends                 [] Pet visits     [] Other:        Interventions offered during this visit: (See comments for more details)    Patient Interventions: Affirmation of emotions/emotional suffering, Affirmation of jennifer, Coping skills reviewed/reinforced, Iconic (affirming the presence of God/Higher Power), Initial/Spiritual assessment, patient floor, Prayer (assurance of)     Family/Friend(s):  Affirmation of emotions/emotional suffering, Affirmation of jennifer, Iconic (affirming the presence of God/Higher Power), Catharsis/review of pertinent events in supportive environment, Prayer (assurance of)     Plan of Care:     [] Discuss Spiritual/Cultural needs    [] Support AMD and/or advance care planning process      [] Support grieving process   [] Coordinate Rites/Rituals    [] Coordination with community clergy   [] No spiritual needs identified at this time   [] Detailed Plan of Care below (See Comments)  [] Make referral to Music Therapy  [] Make referral to Pet Therapy     [] Make referral to Addiction services  [] Make referral to [facility]  [] Make referral to Spiritual Care Partner  [] No future visits requested             Comments: Initial spiritual assessment in 4 post Surg Ort. Miss Neena Trevizo appeared to be a bit uncomfortable, one of her daughters at her bedside provided support. Miss Neena Trevizo shared she has strong jennifer in God. Provided gentle breathing meditation. .. In with the [facility]. ... Out with the rest... Provided gentle song. .. Come [employer]. .. After staff moved Miss Neena Trevizo she appeared to doze as I sang. Advised daughter kisha  Availability.   Visited by: Abel Collet [address] ([id number])

## 2018-02-26 NOTE — PROGRESS NOTES
Progress Note    Patient: Valery Giang MRN: [id number]  SSN: [id number]    YOB: 1945  Age: 67 y.o. Sex: female      Admit Date: 2018    14 Days Post-Op    Procedure:  Procedure(s):  EX LAP    Subjective:     Mrs. Mel Krueger was seen this AM.  She c/o abdominal pain. Objective:     Visit Vitals    /67 (BP 1 Location: Left arm, BP Patient Position: Head of bed elevated (Comment degrees))    Pulse (!) 110    Temp 98.7 °F (37.1 °C)    Resp 22    Ht 5' 3.5\" (1.613 m)    Wt 193 lb 12.6 oz (87.9 kg)    SpO2 91%    Breastfeeding No    BMI 33.79 kg/m2       Temp (24hrs), Av.8 °F (37.1 °C), Min:97.8 °F (36.6 °C), Max:99.2 °F (37.3 °C)      Physical Exam:    GENERAL: alert, fatigued, cooperative, no distress, ABDOMEN: Soft, mild tenderness, ND. The ALYSSA and accordion fluid are serous.     Lab Review:   BMP:   Lab Results   Component Value Date/Time     2018 12:55 AM    K 4.6 2018 12:55 AM     2018 12:55 AM    CO2 26 2018 12:55 AM    AGAP 6 2018 12:55 AM    GLU 95 2018 12:55 AM    BUN 14 2018 12:55 AM    CREA 0.58 2018 12:55 AM    GFRAA >60 2018 12:55 AM    GFRNA >60 2018 12:55 AM     CBC:   Lab Results   Component Value Date/Time    WBC 15.7 (H) 2018 12:55 AM    HGB 9.3 (L) 2018 12:55 AM    HCT 28.8 (L) 2018 12:55 AM     (H) 2018 12:55 AM       Assessment:     Hospital Problems  Date Reviewed: 2016          Codes Class Noted POA    Hypokalemia ICD-10-CM: E87.6  ICD-9-CM: 276.8  2018 No        Leukocytosis ICD-10-CM: D72.829  ICD-9-CM: 288.60  2018 Yes        Severe protein-calorie malnutrition ([address] ICD-10-CM: E43  ICD-9-CM: 262  2018 Yes        Acute metabolic encephalopathy PSM-12-QT: G93.41  ICD-9-CM: 348.31  2018 Yes        Hyperammonemia (HCC) ICD-10-CM: E72.20  ICD-9-CM: 270.6  2018 No    Overview Addendum 2018  9:08 AM by Sylvia Kelley MD Ref. Range 2/16/2018 17:20   Ammonia Latest Ref Range: <32 UMOL/L 69 (H)                Free intraperitoneal air ICD-10-CM: K66.8  ICD-9-CM: 568.89  2/12/2018 Yes        S/P exploratory laparotomy ICD-10-CM: Z98.890  ICD-9-CM: V45.89  2/12/2018 No    Overview Signed 2/12/2018 11:57 AM by Kristen Malik MD     Suture repair of perforated posterior gastric ulcer with Larnell Breaker patch, core needle biopsies of left lobe of the liver. Alcoholic cirrhosis of liver without ascites (HCC) (Chronic) ICD-10-CM: K70.30  ICD-9-CM: 571.2  2/12/2018 Yes    Overview Signed 2/17/2018  8:45 AM by Madeleine Colorado MD     Liver bx 2/12/18:   Cirrhosis with mild chronic portal inflammation and macrovesicular steatosis. Fatty liver determined by biopsy ICD-10-CM: K76.0  ICD-9-CM: 571.8  2/12/2018 Yes    Overview Signed 2/17/2018  8:53 AM by Madeleine Colorado MD        Cirrhosis with mild chronic portal inflammation and macrovesicular steatosis. * (Principal)Perforated chronic gastric ulcer ([address] (Chronic) ICD-10-CM: K25.5  ICD-9-CM: 531.50  2/11/2018 Yes        ETOH abuse (Chronic) ICD-10-CM: F10.10  ICD-9-CM: 305.00  2/11/2018 Yes              Plan/Recommendations/Medical Decision Making:     Afebrile, WBC at 15. D/C antibiotics tomorrow. Leave drains. Continue TPN. Start clears. PT/OT. D/C planning soon.     Signed By: Kristen Malik MD     February 26, 2018

## 2018-02-26 NOTE — PROGRESS NOTES
Met with pt's daughter/emergency contact, Alton Barfield, to discuss d/c plan. Informed her that pt was not accepted by [facility] but has been accepted by the [address] of [facility]. Ms. Sivakumar Adhikari said she and her sister/MPOA are not impressed with the [facility] of [facility]. They will research some more SNFs that accept [facility]. Ms. Sivakumar Adhikari also mentioned that she would like to resubmit a referral to [facility] closer to when pt is going to be discharged to see if they will reconsider pt for rehab. She will also explore possible SNFs in [place] or [place] where she and her sister reside.   Ángel Rod LCSW

## 2018-02-26 NOTE — PROGRESS NOTES
Called Dr. Raul Lopez and notified her of patient's 6 beats of SVT at 180 and what current vital signs were 119/59 and .  No new orders at this time

## 2018-02-27 LAB
ALBUMIN SERPL-MCNC: 1.3 G/DL (ref 3.5–5)
ALBUMIN/GLOB SERPL: 0.3 {RATIO} (ref 1.1–2.2)
ALP SERPL-CCNC: 167 U/L (ref 45–117)
ALT SERPL-CCNC: 32 U/L (ref 12–78)
ANION GAP SERPL CALC-SCNC: 7 MMOL/L (ref 5–15)
AST SERPL-CCNC: 54 U/L (ref 15–37)
BASOPHILS # BLD: 0.1 K/UL (ref 0–0.1)
BASOPHILS NFR BLD: 1 % (ref 0–1)
BILIRUB SERPL-MCNC: 1 MG/DL (ref 0.2–1)
BUN SERPL-MCNC: 14 MG/DL (ref 6–20)
BUN/CREAT SERPL: 22 (ref 12–20)
CALCIUM SERPL-MCNC: 7.5 MG/DL (ref 8.5–10.1)
CHLORIDE SERPL-SCNC: 104 MMOL/L (ref 97–108)
CO2 SERPL-SCNC: 24 MMOL/L (ref 21–32)
CREAT SERPL-MCNC: 0.64 MG/DL (ref 0.55–1.02)
DIFFERENTIAL METHOD BLD: ABNORMAL
EOSINOPHIL # BLD: 0.3 K/UL (ref 0–0.4)
EOSINOPHIL NFR BLD: 2 % (ref 0–7)
ERYTHROCYTE [DISTWIDTH] IN BLOOD BY AUTOMATED COUNT: 14.5 % (ref 11.5–14.5)
GLOBULIN SER CALC-MCNC: 4.1 G/DL (ref 2–4)
GLUCOSE BLD STRIP.AUTO-MCNC: 102 MG/DL (ref 65–100)
GLUCOSE BLD STRIP.AUTO-MCNC: 106 MG/DL (ref 65–100)
GLUCOSE BLD STRIP.AUTO-MCNC: 123 MG/DL (ref 65–100)
GLUCOSE SERPL-MCNC: 95 MG/DL (ref 65–100)
HCT VFR BLD AUTO: 28.6 % (ref 35–47)
HGB BLD-MCNC: 9 G/DL (ref 11.5–16)
IMM GRANULOCYTES # BLD: 0.2 K/UL (ref 0–0.04)
IMM GRANULOCYTES NFR BLD AUTO: 1 % (ref 0–0.5)
LYMPHOCYTES # BLD: 1.3 K/UL (ref 0.8–3.5)
LYMPHOCYTES NFR BLD: 8 % (ref 12–49)
MAGNESIUM SERPL-MCNC: 1.8 MG/DL (ref 1.6–2.4)
MCH RBC QN AUTO: 33.6 PG (ref 26–34)
MCHC RBC AUTO-ENTMCNC: 31.5 G/DL (ref 30–36.5)
MCV RBC AUTO: 106.7 FL (ref 80–99)
MONOCYTES # BLD: 1.3 K/UL (ref 0–1)
MONOCYTES NFR BLD: 8 % (ref 5–13)
NEUTS SEG # BLD: 13.6 K/UL (ref 1.8–8)
NEUTS SEG NFR BLD: 81 % (ref 32–75)
NRBC # BLD: 0 K/UL (ref 0–0.01)
NRBC BLD-RTO: 0 PER 100 WBC
PHOSPHATE SERPL-MCNC: 3.3 MG/DL (ref 2.6–4.7)
PLATELET # BLD AUTO: 514 K/UL (ref 150–400)
PMV BLD AUTO: 12.3 FL (ref 8.9–12.9)
POTASSIUM SERPL-SCNC: 4.2 MMOL/L (ref 3.5–5.1)
PROT SERPL-MCNC: 5.4 G/DL (ref 6.4–8.2)
RBC # BLD AUTO: 2.68 M/UL (ref 3.8–5.2)
SERVICE CMNT-IMP: ABNORMAL
SODIUM SERPL-SCNC: 135 MMOL/L (ref 136–145)
WBC # BLD AUTO: 16.8 K/UL (ref 3.6–11)

## 2018-02-27 PROCEDURE — 82962 GLUCOSE BLOOD TEST: CPT

## 2018-02-27 PROCEDURE — 74011250637 HC RX REV CODE- 250/637: Performed by: INTERNAL MEDICINE

## 2018-02-27 PROCEDURE — 74011000250 HC RX REV CODE- 250: Performed by: INTERNAL MEDICINE

## 2018-02-27 PROCEDURE — 74011000258 HC RX REV CODE- 258: Performed by: SURGERY

## 2018-02-27 PROCEDURE — 83735 ASSAY OF MAGNESIUM: CPT | Performed by: SURGERY

## 2018-02-27 PROCEDURE — 77010033678 HC OXYGEN DAILY

## 2018-02-27 PROCEDURE — 94640 AIRWAY INHALATION TREATMENT: CPT

## 2018-02-27 PROCEDURE — 65660000000 HC RM CCU STEPDOWN

## 2018-02-27 PROCEDURE — 74011250636 HC RX REV CODE- 250/636: Performed by: NURSE PRACTITIONER

## 2018-02-27 PROCEDURE — 80053 COMPREHEN METABOLIC PANEL: CPT | Performed by: SURGERY

## 2018-02-27 PROCEDURE — C9113 INJ PANTOPRAZOLE SODIUM, VIA: HCPCS | Performed by: SURGERY

## 2018-02-27 PROCEDURE — 85025 COMPLETE CBC W/AUTO DIFF WBC: CPT | Performed by: FAMILY MEDICINE

## 2018-02-27 PROCEDURE — 36415 COLL VENOUS BLD VENIPUNCTURE: CPT | Performed by: FAMILY MEDICINE

## 2018-02-27 PROCEDURE — 74011250636 HC RX REV CODE- 250/636: Performed by: SURGERY

## 2018-02-27 PROCEDURE — 74011250637 HC RX REV CODE- 250/637: Performed by: SURGERY

## 2018-02-27 PROCEDURE — 77030038269 HC DRN EXT URIN PURWCK BARD -A

## 2018-02-27 PROCEDURE — 84100 ASSAY OF PHOSPHORUS: CPT | Performed by: SURGERY

## 2018-02-27 PROCEDURE — 93005 ELECTROCARDIOGRAM TRACING: CPT

## 2018-02-27 PROCEDURE — 92610 EVALUATE SWALLOWING FUNCTION: CPT

## 2018-02-27 PROCEDURE — 74011000250 HC RX REV CODE- 250: Performed by: SURGERY

## 2018-02-27 RX ORDER — HALOPERIDOL 5 MG/ML
2 INJECTION INTRAMUSCULAR
Status: DISCONTINUED | OUTPATIENT
Start: 2018-02-27 | End: 2018-02-28

## 2018-02-27 RX ADMIN — HYDROMORPHONE HYDROCHLORIDE 0.5 MG: 1 INJECTION, SOLUTION INTRAMUSCULAR; INTRAVENOUS; SUBCUTANEOUS at 06:55

## 2018-02-27 RX ADMIN — PANTOPRAZOLE SODIUM 40 MG: 40 INJECTION, POWDER, FOR SOLUTION INTRAVENOUS at 10:24

## 2018-02-27 RX ADMIN — IPRATROPIUM BROMIDE AND ALBUTEROL SULFATE 3 ML: .5; 3 SOLUTION RESPIRATORY (INHALATION) at 01:43

## 2018-02-27 RX ADMIN — LORAZEPAM 1 MG: 2 INJECTION INTRAMUSCULAR; INTRAVENOUS at 00:10

## 2018-02-27 RX ADMIN — GUAIFENESIN 600 MG: 600 TABLET, EXTENDED RELEASE ORAL at 21:13

## 2018-02-27 RX ADMIN — HYDROMORPHONE HYDROCHLORIDE 0.5 MG: 1 INJECTION, SOLUTION INTRAMUSCULAR; INTRAVENOUS; SUBCUTANEOUS at 19:44

## 2018-02-27 RX ADMIN — Medication 10 ML: at 21:13

## 2018-02-27 RX ADMIN — Medication 10 ML: at 14:33

## 2018-02-27 RX ADMIN — NYSTATIN: 100000 POWDER TOPICAL at 10:24

## 2018-02-27 RX ADMIN — METHOCARBAMOL 500 MG: 500 TABLET ORAL at 20:03

## 2018-02-27 RX ADMIN — LORAZEPAM 1 MG: 2 INJECTION INTRAMUSCULAR; INTRAVENOUS at 11:23

## 2018-02-27 RX ADMIN — METHOCARBAMOL 500 MG: 500 TABLET ORAL at 10:24

## 2018-02-27 RX ADMIN — IPRATROPIUM BROMIDE AND ALBUTEROL SULFATE 3 ML: .5; 3 SOLUTION RESPIRATORY (INHALATION) at 08:39

## 2018-02-27 RX ADMIN — PANTOPRAZOLE SODIUM 40 MG: 40 INJECTION, POWDER, FOR SOLUTION INTRAVENOUS at 21:13

## 2018-02-27 RX ADMIN — ENOXAPARIN SODIUM 40 MG: 40 INJECTION SUBCUTANEOUS at 14:32

## 2018-02-27 RX ADMIN — ACETAMINOPHEN 650 MG: 325 TABLET ORAL at 22:35

## 2018-02-27 RX ADMIN — METHOCARBAMOL 500 MG: 500 TABLET ORAL at 14:32

## 2018-02-27 RX ADMIN — GUAIFENESIN 600 MG: 600 TABLET, EXTENDED RELEASE ORAL at 10:24

## 2018-02-27 RX ADMIN — NYSTATIN: 100000 POWDER TOPICAL at 19:55

## 2018-02-27 RX ADMIN — CALCIUM GLUCONATE: 94 INJECTION, SOLUTION INTRAVENOUS at 19:47

## 2018-02-27 RX ADMIN — IPRATROPIUM BROMIDE AND ALBUTEROL SULFATE 3 ML: .5; 3 SOLUTION RESPIRATORY (INHALATION) at 20:30

## 2018-02-27 RX ADMIN — HYDROMORPHONE HYDROCHLORIDE 0.5 MG: 1 INJECTION, SOLUTION INTRAMUSCULAR; INTRAVENOUS; SUBCUTANEOUS at 10:19

## 2018-02-27 RX ADMIN — IPRATROPIUM BROMIDE AND ALBUTEROL SULFATE 3 ML: .5; 3 SOLUTION RESPIRATORY (INHALATION) at 15:08

## 2018-02-27 RX ADMIN — METHOCARBAMOL 500 MG: 500 TABLET ORAL at 23:20

## 2018-02-27 RX ADMIN — Medication 10 ML: at 05:38

## 2018-02-27 RX ADMIN — HYDROMORPHONE HYDROCHLORIDE 0.5 MG: 1 INJECTION, SOLUTION INTRAMUSCULAR; INTRAVENOUS; SUBCUTANEOUS at 02:26

## 2018-02-27 RX ADMIN — HALOPERIDOL LACTATE 2 MG: 5 INJECTION, SOLUTION INTRAMUSCULAR at 21:13

## 2018-02-27 RX ADMIN — HYDROMORPHONE HYDROCHLORIDE 0.5 MG: 1 INJECTION, SOLUTION INTRAMUSCULAR; INTRAVENOUS; SUBCUTANEOUS at 23:19

## 2018-02-27 NOTE — PROGRESS NOTES
Progress Note    Patient: Niecy Trivedi MRN: [id number]  SSN: [id number]    YOB: 1945  Age: 67 y.o. Sex: female      Admit Date: 2018    15 Days Post-Op    Procedure:  Procedure(s):   EX LAP    Subjective:     Mrs. Spike Woo has no specific complaints. Her breathing is shallow. She only has abdominal pain is her belly is pushed. She tolerated only a small amount of clears. She had a BM. Objective:     Visit Vitals    /56    Pulse 99    Temp 98.4 °F (36.9 °C)    Resp 20    Ht 5' 3.5\" (1.613 m)    Wt 193 lb 12.6 oz (87.9 kg)    SpO2 94%    Breastfeeding No    BMI 33.79 kg/m2       Temp (24hrs), Av.2 °F (37.3 °C), Min:98.3 °F (36.8 °C), Max:100.3 °F (37.9 °C)      Physical Exam:    GENERAL: cooperative, no distress, ABDOMEN: Soft, NT, ND, EXTREMITIES:  edema bilaterally.     Lab Review:   BMP:   Lab Results   Component Value Date/Time     (L) 2018 03:20 AM    K 4.2 2018 03:20 AM     2018 03:20 AM    CO2 24 2018 03:20 AM    AGAP 7 2018 03:20 AM    GLU 95 2018 03:20 AM    BUN 14 2018 03:20 AM    CREA 0.64 2018 03:20 AM    GFRAA >60 2018 03:20 AM    GFRNA >60 2018 03:20 AM     CMP:   Lab Results   Component Value Date/Time     (L) 2018 03:20 AM    K 4.2 2018 03:20 AM     2018 03:20 AM    CO2 24 2018 03:20 AM    AGAP 7 2018 03:20 AM    GLU 95 2018 03:20 AM    BUN 14 2018 03:20 AM    CREA 0.64 2018 03:20 AM    GFRAA >60 2018 03:20 AM    GFRNA >60 2018 03:20 AM    CA 7.5 (L) 2018 03:20 AM    MG 1.8 2018 03:20 AM    PHOS 3.3 2018 03:20 AM    ALB 1.3 (L) 2018 03:20 AM    TP 5.4 (L) 2018 03:20 AM    GLOB 4.1 (H) 2018 03:20 AM    AGRAT 0.3 (L) 2018 03:20 AM    SGOT 54 (H) 2018 03:20 AM    ALT 32 2018 03:20 AM     CBC:   Lab Results   Component Value Date/Time    WBC 16.8 (H) 2018 03:20 AM    HGB 9.0 (L) 02/27/2018 03:20 AM    HCT 28.6 (L) 02/27/2018 03:20 AM     (H) 02/27/2018 03:20 AM       Assessment:     Hospital Problems  Date Reviewed: 4/26/2016          Codes Class Noted POA    Hypokalemia ICD-10-CM: E87.6  ICD-9-CM: 276.8  2/18/2018 No        Leukocytosis ICD-10-CM: D72.829  ICD-9-CM: 288.60  2/18/2018 Yes        Severe protein-calorie malnutrition ([facility] [address] ICD-10-CM: E43  ICD-9-CM: 301  2/18/2018 Yes        Acute metabolic encephalopathy TQY-96-PO: G93.41  ICD-9-CM: 348.31  2/18/2018 Yes        Hyperammonemia ([facility] [address] ICD-10-CM: E72.20  ICD-9-CM: 270.6  2/16/2018 No    Overview Addendum 2/17/2018  9:08 AM by Jessica Gaines MD        Ref. Range 2/16/2018 17:20   Ammonia Latest Ref Range: <32 UMOL/L 69 (H)                Free intraperitoneal air ICD-10-CM: K66.8  ICD-9-CM: 568.89  2/12/2018 Yes        S/P exploratory laparotomy ICD-10-CM: Z98.890  ICD-9-CM: V45.89  2/12/2018 No    Overview Signed 2/12/2018 11:57 AM by Meet Prakash MD     Suture repair of perforated posterior gastric ulcer with Cheron Hanover patch, core needle biopsies of left lobe of the liver. Alcoholic cirrhosis of liver without ascites (HCC) (Chronic) ICD-10-CM: K70.30  ICD-9-CM: 571.2  2/12/2018 Yes    Overview Signed 2/17/2018  8:45 AM by Jessica Gaines MD     Liver bx 2/12/18:   Cirrhosis with mild chronic portal inflammation and macrovesicular steatosis. Fatty liver determined by biopsy ICD-10-CM: K76.0  ICD-9-CM: 571.8  2/12/2018 Yes    Overview Signed 2/17/2018  8:53 AM by Jessica Gaines MD        Cirrhosis with mild chronic portal inflammation and macrovesicular steatosis.               * (Principal)Perforated chronic gastric ulcer ([facility] [address] (Chronic) ICD-10-CM: K25.5  ICD-9-CM: 531.50  2/11/2018 Yes        ETOH abuse (Chronic) ICD-10-CM: F10.10  ICD-9-CM: 305.00  2/11/2018 Yes              Plan/Recommendations/Medical Decision Making:     Continue TPN and sips of clears only.  ALYSSA output less, but bilous. D/C Zosyn and Levaquin. Diurese prn, wean O2. PT/OT.     Signed By: Zena Hodgkins., MD     February 27, 2018

## 2018-02-27 NOTE — PROGRESS NOTES
Physical Therapy - RN Aleksandra Flores reports patient more confused agitated and hallucinating today. He requests PT to defer as patient now resting and sleeping at this time. Moments later, patient restless, setting off bed alarm and attempting to get OOB on her own. PT will follow up later this afternoon. Thank you- LATOSHA Del RealT

## 2018-02-27 NOTE — PROGRESS NOTES
Family Practice Daily Progress Note: 2/27/2018  Nestor Cole DO    Assessment/Plan:   Perforated chronic gastric ulcer /  Free intraperitoneal air / S/P exploratory laparotomy 2/11. S/p repair in OR with Dr. Magalys Pérez on 2/12/18 - per surg    Severe protein calorie malnutrition-POA  --calcium corrects  --TPN     Acute metabolic encephalopathy. Multifactorial-- medications, alcohol use, sundowning  ---avoid triggers  ---CIWA  ---surgery has ordered haldol PRN  ---maintenance of sleep/wake cycle, and re-orientation      Hypoxia / Respiratory distress. CXR with atelectasis and pleural effusion. Continue Abx, nebs, supplemental oxygen and incentive spirom as able. Repeat CT chest with moderate to large persistent left subphrenic collection. ---Pulmonary consulted and planning to drain in IR. Bumex. ---repeat CXR     Alcoholic cirrhosis of liver without ascites /  Fatty liver determined by biopsy /  Hyperammonemia. --GI has seen- rec alcohol cessation and outpt follow up     ETOH abuse. Needs to stop ETOH entirely.     Hypokalemia /  Hypoalbuminemia. Being addressed with TPN     Leukocytosis. Up and febrile again  --if spikes again, will need to repeat blood cultures, and urine  --abx per pulm. - Zosyn, and levaquin    Anemia: Hgb up to 9.5  --follow, transfuse <7    Calcium corrects.     R wrist pain:  --XR today    Debility: due to prolonged hospital stay  --continue PT/OT  --will likely need rehab        Problem List:  Problem List as of 2/27/2018  Date Reviewed: 4/26/2016          Codes Class Noted - Resolved    Hypokalemia ICD-10-CM: E87.6  ICD-9-CM: 276.8  2/18/2018 - Present        Leukocytosis ICD-10-CM: D72.829  ICD-9-CM: 288.60  2/18/2018 - Present        Severe protein-calorie malnutrition ([facility] [address] ICD-10-CM: E43  ICD-9-CM: 262  2/18/2018 - Present        Acute metabolic encephalopathy TZZ-80-ZT: G93.41  ICD-9-CM: 348.31  2/18/2018 - Present        Hyperammonemia ([facility] [address] ICD-10-CM: E72.20  ICD-9-CM: 270.6  2/16/2018 - Present    Overview Addendum 2/17/2018  9:08 AM by Brenda Lyon MD        Ref. Range 2/16/2018 17:20   Ammonia Latest Ref Range: <32 UMOL/L 69 (H)                Free intraperitoneal air ICD-10-CM: K66.8  ICD-9-CM: 568.89  2/12/2018 - Present        S/P exploratory laparotomy ICD-10-CM: Z98.890  ICD-9-CM: V45.89  2/12/2018 - Present    Overview Signed 2/12/2018 11:57 AM by Molly Head MD     Suture repair of perforated posterior gastric ulcer with Henry Darell patch, core needle biopsies of left lobe of the liver. Alcoholic cirrhosis of liver without ascites (HCC) (Chronic) ICD-10-CM: K70.30  ICD-9-CM: 571.2  2/12/2018 - Present    Overview Signed 2/17/2018  8:45 AM by Brenda Lyon MD     Liver bx 2/12/18:   Cirrhosis with mild chronic portal inflammation and macrovesicular steatosis. Fatty liver determined by biopsy ICD-10-CM: K76.0  ICD-9-CM: 571.8  2/12/2018 - Present    Overview Signed 2/17/2018  8:53 AM by Brenda Lyon MD        Cirrhosis with mild chronic portal inflammation and macrovesicular steatosis. * (Principal)Perforated chronic gastric ulcer ([address] (Chronic) ICD-10-CM: K25.5  ICD-9-CM: 531.50  2/11/2018 - Present        ETOH abuse (Chronic) ICD-10-CM: F10.10  ICD-9-CM: 305.00  2/11/2018 - Present        GI bleed ICD-10-CM: K92.2  ICD-9-CM: 578.9  4/26/2016 - Present        Hypotension ICD-10-CM: I95.9  ICD-9-CM: 458.9  4/26/2016 - Present        Tachycardia ICD-10-CM: R00.0  ICD-9-CM: 785.0  4/26/2016 - Present        Acute blood loss anemia ICD-10-CM: D62  ICD-9-CM: 285.1  4/26/2016 - Present              Subjective:    67 y.o.  female with EtOH abuse who is admitted to the General Surgery Service with perforated gastric ulcer. We are asked to evaluate for altered mental status. The patient is poorly interactive at this time and this limits primary hx.  Discussed case with family available at bedside providing secondary hx and chart review. Per family, patient has had declining neurological condition over past 48 hours. Notably, an RRT was called for respiratory distress. 2/19: This morning she is a bit more alert at this moment and taking in more complete sentences. She says she does not feel well. She has no specific site of pain other than the NG tube which is bothering her a great deal.  She should improve as time from surg increases. K+ a little low - replacing.     2/20:  Still confused and somnolent at times. Now able to localize pain to ab and converse a bit more. Tmax 100.3  WBC is up again but hopefully reactive - recheck in AM - more incentive spirom and check CXR.     2/21: A little more alert. Knows she is in the hospital. Not able to participate with PT yesterday as she was in too much pain. WBC still at 19.     2/22:  WBC 19K. She is more alert. Daughter at bedside. Both of her daughters live out of town. Looking at SNF options for rehab. Coughing more. Starting to use IS.     2/23: WBC 17. Repeat CT chest with moderate to large persistent left subphrenic collection. IR has been consulted. Vanc and Levaquin added by pulm. 2/24: Pt had a better night last night. Only brief episode of confusion. She is much clearer this AM.  Now in quite a bit of pain from gas and stomach cramping. Thinks she will feel better if she can have a BM. Has been on bedpan for a while without success. On TPN. Daughters very concerned that she won't be successful while lying down. Wanting to get up to bedside commode. Will need PT's help. 2/25: febrile overnight and WBC up after vanc was stopped. Pt c/o more dyspnea today. Known fluid collection that will be drained in IR this week. Up to chair with PT yesterday briefly. +BM yesterday. Da notes pt has been c/o R wrist pain off and on for 2 wks since she fell. No swelling.    2/26:  No new complaints.   Still c/o back and ab pain. Still passing gas and had BM. Diuresed with 1 mg Bumex yesterday by Pulmonary. Remains on Zosyn and Levaquin. Blood culture remains neg. Tmax 99.2. X-ray of wrists ok. CXR ok with tube inplace. WBC 15.7K.      2/27:  Looks much better today. Much less work of breathing. Diuresed about 1 liter over last 24h. WBC 16K today. Blood cult remains neg. Off antibiotics. Still on TPN. She will need rehab for PT/OT. Past Medical History:   Diagnosis Date    Alcoholic cirrhosis of liver without ascites ([facility] [address] 2/12/2018    ETOH abuse 2/11/2018    History of vascular access device 02/16/2018    [facility] VAT - 5 FR triple, R basilic, TPN    [facility] 2/16/2018     Results for Zuleyma Ji (MRN [id number]) as of 2/17/2018 08:43  Ref. Range 2/16/2018 17:20 Ammonia Latest Ref Range: <32 UMOL/L 69 (H)     Hyperlipidemia     Perforated chronic gastric ulcer ([facility] [address] 2/11/2018     Past Surgical History:   Procedure Laterality Date    HX CATARACT REMOVAL       Social History     Social History    Marital status: SINGLE     Spouse name: N/A    Number of children: N/A    Years of education: N/A     Occupational History    Not on file. Social History Main Topics    Smoking status: Former Smoker    Smokeless tobacco: Never Used    Alcohol use 11.4 oz/week     0 Standard drinks or equivalent, 19 Glasses of wine per week      Comment: Hx of heavy etoh use, but quit several months ago    Drug use: No    Sexual activity: Not on file     Other Topics Concern    Not on file     Social History Narrative     Family History   Problem Relation Age of Onset    Other Other      patient did not know       Review of Systems:   Review of systems not obtained due to patient factors.     Objective:   Physical Exam:     Visit Vitals    /53 (BP 1 Location: Left arm, BP Patient Position: At rest)    Pulse (!) 105    Temp 98.3 °F (36.8 °C)    Resp 20    Ht 5' 3.5\" (1.613 m)    Wt 87.9 kg (193 lb 12.6 oz)    SpO2 96%    Breastfeeding No    BMI 33.79 kg/m2    O2 Flow Rate (L/min): 4 l/min O2 Device: Nasal cannula    Temp (24hrs), Av.3 °F (37.4 °C), Min:98.3 °F (36.8 °C), Max:100.3 °F (37.9 °C)         190 -  0700  In: 120 [P.O.:120]  Out: 6517 [Urine:1600; Drains:150]    General:  Awake, uncomfortable, appears stated age. Head:  Normocephalic, without obvious abnormality, atraumatic. Eyes:  Conjunctivae/corneas clear. EOMs intact. Nose: Patent, on NC   Throat: Lips, mucosa, and tongue dry. Neck: Supple, symmetrical, trachea midline, no adenopathy, thyroid: no enlargement/tenderness/nodules, no carotid bruit and no JVD. Lungs:    no intercostal use, lungs sound clearer today, diminished at bases   Chest wall:  No tenderness or deformity. Heart:  Regular rate and rhythm, S1, S2 normal, no murmur, click, rub or gallop. Abdomen:   Soft, distended, with mild generalized tenderness. Bowel sounds present. Dressings dry. Incision dry without redness, staple line intact   Extremities: no cyanosis. 1+ LE edema. No calf tenderness or cords, R wrist mildly tender, no erythema or swelling   Skin: Skin color, texture, turgor normal. No rashes or lesions   Neurologic:   AOx2, CN intact, Gait not tested at this time. Sensation grossly normal to touch.   Gross motor of extremities normal.       Data Review:       Recent Days:  Recent Labs      18   03218   0055  18   0536   WBC  16.8*  15.7*  15.5*   HGB  9.0*  9.3*  9.5*   HCT  28.6*  28.8*  30.0*   PLT  514*  518*  528*     Recent Labs      18   0320  18   0055  18   0536   NA  135*  137  136   K  4.2  4.6  3.8   CL  104  105  104   CO2  24  26  26   GLU  95  95  101*   BUN  14  14  12   CREA  0.64  0.58  0.50*   CA  7.5*  7.6*  7.7*   MG  1.8  2.0  1.8   PHOS  3.3  4.1  3.3   ALB  1.3*  1.5*  1.5*   TBILI  1.0  1.1*  0.9   SGOT  54*  35  42*   ALT  32  26  26     No results for input(s): PH, PCO2, PO2, HCO3, FIO2 in the last 72 hours. 24 Hour Results:  Recent Results (from the past 24 hour(s))   GLUCOSE, POC    Collection Time: 02/26/18 11:44 AM   Result Value Ref Range    Glucose (POC) 118 (H) 65 - 100 mg/dL    Performed by Trinidad Chaudhari (PCT)    GLUCOSE, POC    Collection Time: 02/26/18  6:13 PM   Result Value Ref Range    Glucose (POC) 100 65 - 100 mg/dL    Performed by Derick Smith (PCT)    GLUCOSE, POC    Collection Time: 02/26/18 11:53 PM   Result Value Ref Range    Glucose (POC) 114 (H) 65 - 100 mg/dL    Performed by Ash Dhillon    CBC WITH AUTOMATED DIFF    Collection Time: 02/27/18  3:20 AM   Result Value Ref Range    WBC 16.8 (H) 3.6 - 11.0 K/uL    RBC 2.68 (L) 3.80 - 5.20 M/uL    HGB 9.0 (L) 11.5 - 16.0 g/dL    HCT 28.6 (L) 35.0 - 47.0 %    .7 (H) 80.0 - 99.0 FL    MCH 33.6 26.0 - 34.0 PG    MCHC 31.5 30.0 - 36.5 g/dL    RDW 14.5 11.5 - 14.5 %    PLATELET 326 (H) 715 - 400 K/uL    MPV 12.3 8.9 - 12.9 FL    NRBC 0.0 0  WBC    ABSOLUTE NRBC 0.00 0.00 - 0.01 K/uL    NEUTROPHILS 81 (H) 32 - 75 %    LYMPHOCYTES 8 (L) 12 - 49 %    MONOCYTES 8 5 - 13 %    EOSINOPHILS 2 0 - 7 %    BASOPHILS 1 0 - 1 %    IMMATURE GRANULOCYTES 1 (H) 0.0 - 0.5 %    ABS. NEUTROPHILS 13.6 (H) 1.8 - 8.0 K/UL    ABS. LYMPHOCYTES 1.3 0.8 - 3.5 K/UL    ABS. MONOCYTES 1.3 (H) 0.0 - 1.0 K/UL    ABS. EOSINOPHILS 0.3 0.0 - 0.4 K/UL    ABS. BASOPHILS 0.1 0.0 - 0.1 K/UL    ABS. IMM.  GRANS. 0.2 (H) 0.00 - 0.04 K/UL    DF AUTOMATED     PHOSPHORUS    Collection Time: 02/27/18  3:20 AM   Result Value Ref Range    Phosphorus 3.3 2.6 - 4.7 MG/DL   MAGNESIUM    Collection Time: 02/27/18  3:20 AM   Result Value Ref Range    Magnesium 1.8 1.6 - 2.4 mg/dL   METABOLIC PANEL, COMPREHENSIVE    Collection Time: 02/27/18  3:20 AM   Result Value Ref Range    Sodium 135 (L) 136 - 145 mmol/L    Potassium 4.2 3.5 - 5.1 mmol/L    Chloride 104 97 - 108 mmol/L    CO2 24 21 - 32 mmol/L    Anion gap 7 5 - 15 mmol/L    Glucose 95 65 - 100 mg/dL    BUN 14 6 - 20 MG/DL    Creatinine 0.64 0.55 - 1.02 MG/DL    BUN/Creatinine ratio 22 (H) 12 - 20      GFR est AA >60 >60 ml/min/1.73m2    GFR est non-AA >60 >60 ml/min/1.73m2    Calcium 7.5 (L) 8.5 - 10.1 MG/DL    Bilirubin, total 1.0 0.2 - 1.0 MG/DL    ALT (SGPT) 32 12 - 78 U/L    AST (SGOT) 54 (H) 15 - 37 U/L    Alk.  phosphatase 167 (H) 45 - 117 U/L    Protein, total 5.4 (L) 6.4 - 8.2 g/dL    Albumin 1.3 (L) 3.5 - 5.0 g/dL    Globulin 4.1 (H) 2.0 - 4.0 g/dL    A-G Ratio 0.3 (L) 1.1 - 2.2     GLUCOSE, POC    Collection Time: 02/27/18  5:38 AM   Result Value Ref Range    Glucose (POC) 102 (H) 65 - 100 mg/dL    Performed by Jhon Han (PCT)        Medications reviewed  Current Facility-Administered Medications   Medication Dose Route Frequency    TPN ADULT - CENTRAL   IntraVENous CONTINUOUS    methocarbamol (ROBAXIN) tablet 500 mg  500 mg Oral QID    guaiFENesin ER (MUCINEX) tablet 600 mg  600 mg Oral Q12H    fentaNYL (DURAGESIC) 12 mcg/hr patch 1 Patch  1 Patch TransDERmal Q72H    albuterol-ipratropium (DUO-NEB) 2.5 MG-0.5 MG/3 ML  3 mL Nebulization Q6H RT    acetaminophen (TYLENOL) tablet 650 mg  650 mg Oral Q4H PRN    haloperidol (HALDOL) tablet 1 mg  1 mg Oral Q8H PRN    levoFLOXacin (LEVAQUIN) 750 mg in D5W IVPB  750 mg IntraVENous Q24H    bisacodyl (DULCOLAX) suppository 10 mg  10 mg Rectal DAILY PRN    HYDROmorphone (PF) (DILAUDID) injection 0.5 mg  0.5 mg IntraVENous Q4H PRN    LORazepam (ATIVAN) injection 1 mg  1 mg IntraVENous Q4H PRN    insulin regular (NOVOLIN R, HUMULIN R) injection   SubCUTAneous Q6H    fat emulsion 20% (LIPOSYN, INTRAlipid) infusion 500 mL  500 mL IntraVENous Q MON, WED & SAT    glucose chewable tablet 16 g  4 Tab Oral PRN    glucagon (GLUCAGEN) injection 1 mg  1 mg IntraMUSCular PRN    dextrose (D50W) injection syrg 12.5-25 g  12.5-25 g IntraVENous PRN    alteplase (CATHFLO) 1 mg in sterile water (preservative free) 1 mL injection  1 mg InterCATHeter PRN    sodium chloride (NS) flush 10-30 mL  10-30 mL InterCATHeter PRN    sodium chloride (NS) flush 10-40 mL  10-40 mL InterCATHeter Q8H    piperacillin-tazobactam (ZOSYN) 10.125 g in 0.9% sodium chloride 500 mL continuous 24 hr infusion  10.125 g IntraVENous Q24H    naloxone (NARCAN) injection 0.4 mg  0.4 mg IntraVENous PRN    ondansetron (ZOFRAN) injection 4 mg  4 mg IntraVENous Q4H PRN    enoxaparin (LOVENOX) injection 40 mg  40 mg SubCUTAneous Q24H    pantoprazole (PROTONIX) injection 40 mg  40 mg IntraVENous Q12H    phenol throat spray (CHLORASEPTIC) 1 Spray  1 Spray Oral PRN    nystatin (MYCOSTATIN) 100,000 unit/gram powder   Topical BID    sodium chloride (NS) flush 5-10 mL  5-10 mL IntraVENous PRN       Care Plan discussed with: Patient and daughter and Nurse and Surg    Total time spent with patient: 30 minutes.   Sofia Francis MD

## 2018-02-27 NOTE — PROGRESS NOTES
Occupational [facility] S reports patient more confused agitated and hallucinating today. He requests OT to defer as patient now resting and sleeping at this time. Moments later, patient restless, setting off bed alarm and attempting to get OOB on her own. OT will follow up later this afternoon.  Thank you-

## 2018-02-27 NOTE — PROGRESS NOTES
Bedside shift change report given to [facility] (oncoming nurse) by eKo Arellano (offgoing nurse). Report included the following information SBAR, Kardex, Procedure Summary, MAR and Recent Results.

## 2018-02-27 NOTE — PROGRESS NOTES
Physical Therapy - Called and spoke with RN who reports patient should be deferred for therapy today - restless and easily agitated. Will follow up tomorrow.   Una Winn

## 2018-02-27 NOTE — PROGRESS NOTES
Problem: Dysphagia (Adult)  Goal: *Acute Goals and Plan of Care (Insert Text)  Swallowing goals initiated 2-27-18:  1)  Tolerate sips without s/s aspiration by 3-2-18  Speech LAnguage Pathology bedside swallow evaluation  Patient: Jhonny Mckeon (67 y.o. female)  Date: 2/27/2018  Primary Diagnosis: Intra-abdominal free air of unknown etiology [K66.8]  Procedure(s) (LRB):   NASOGASTRIC TUBE PLACEMENT (N/A) 9 Days Post-Op   Precautions:   Aspiration, Back, Bed Alarm, Fall, Skin    ASSESSMENT :  Based on the objective data described below, the patient presents with moderately weak  Swallow. She has frequent rapid respirations with exertion, including moving, self-feeding, swallowing, talking. Her main aspiration risk is difficulty coordinating swallow with breathing. She has TPN. She was admitted with .perforated gastric ulcer  S/p repair with septic shock, respiratory distress. PMH includes ETOH abuse. Patient will benefit from skilled intervention to address the above impairments. Patients rehabilitation potential is considered to be Fair  Factors which may influence rehabilitation potential include:   []            None noted  [x]            Mental ability/status  [x]            Medical condition  []            Home/family situation and support systems  []            Safety awareness  []            Pain tolerance/management  []            Other:      PLAN :  Recommendations and Planned Interventions:  Agree with sips for now. Patient needs to try to take several sips per hour to prevent disuse atrophy of swallow muscles. TAke sips/bites of clears only when awake, alert and respiratory status stable. Frequency/Duration: Patient will be followed by speech-language pathology 2 times a week to address goals. Discharge Recommendations: To Be Determined     SUBJECTIVE:   Patient stated I\"m so cold.     OBJECTIVE:     Past Medical History:   Diagnosis Date    Alcoholic cirrhosis of liver without ascites ([facility] [address] 2/12/2018    ETOH abuse 2/11/2018    History of vascular access device 02/16/2018    [facility] VAT - 5 FR triple, R basilic, TPN    Hyperammonemia ([facility] [address] 2/16/2018     Results for Yanely Loyola (MRN [id number]) as of 2/17/2018 08:43  Ref. Range 2/16/2018 17:20 Ammonia Latest Ref Range: <32 UMOL/L 69 (H)     Hyperlipidemia     Perforated chronic gastric ulcer ([facility] [address] 2/11/2018     Past Surgical History:   Procedure Laterality Date    HX CATARACT REMOVAL       Prior Level of Function/Home Situation: lives alone with dog  Home Situation  Home Environment: Private residence  # Steps to Enter: 7  Rails to Enter: Yes  Hand Rails : Bilateral  One/Two Story Residence: Two story, live on 1st floor  Living Alone: Yes  Support Systems: Child(palma)  Patient Expects to be Discharged to[de-identified] Private residence  Current DME Used/Available at Home: None  Tub or Shower Type:  (sponge bathes )  Diet prior to admission: regular, thins  Current Diet:     Cognitive and Communication Status:  Neurologic State: Alert, Confused  Orientation Level: Oriented to person, Oriented to place, Disoriented to situation, Disoriented to time  Cognition: Memory loss        Safety/Judgement: Decreased insight into deficits  Oral Assessment:  Oral Assessment  Labial:  (deferred at this time. )  P. O. Trials:  Patient Position:  (upright in bed)  Vocal quality prior to P.O.: No impairment  Consistency Presented: Thin liquid;Puree  How Presented: Self-fed/presented (daughter fed. feeding was exertional for patient)     Bolus Acceptance:  (she tried to refuse PO, but we convinced her to take small amounts for swallow eval)  Bolus Formation/Control: No impairment (with thins nad purees)     Propulsion: No impairment  Oral Residue: None  Initiation of Swallow:  (difficulty coordinating respiration with deglutition. She had  shallow respiration. Satted between 90-93 in session.  only radndomly desatted to 89)  Laryngeal Elevation: Weak (moderate)  Aspiration Signs/Symptoms: None (she has risk due to breathing issues.)      chest CT:  Bilateral pl effusion, atelectasis, patchy airspace dz in MIRIAM                    NOMS:   The NOMS functional outcome measure was used to quantify this patient's level of swallowing impairment. Based on the NOMS, the patient was determined to be at level 2 for swallow function     G Codes: In compliance with CMSs Claims Based Outcome Reporting, the following G-code set was chosen for this patient based the use of the NOMS functional outcome to quantify this patient's level of swallowing impairment. Using the NOMS, the patient was determined to be at level 2 for swallow function which correlates with the CM= 80-99% level of severity. Based on the objective assessment provided within this note, the current, goal, and discharge g-codes are as follows:    Swallow  Swallowing:   Swallow Current Status CM= 80-99%   Swallow Goal Status CK= 40-59%      NOMS Swallowing Levels:  Level 1 (CN): NPO  Level 2 (CM): NPO but takes consistency in therapy  Level 3 (CL): Takes less than 50% of nutrition p.o. and continues with nonoral feedings; and/or safe with mod cues; and/or max diet restriction  Level 4 (CK): Safe swallow but needs mod cues; and/or mod diet restriction; and/or still requires some nonoral feeding/supplements  Level 5 (CJ): Safe swallow with min diet restriction; and/or needs min cues  Level 6 (CI): Independent with p.o.; rare cues; usually self cues; may need to avoid some foods or needs extra time  Level 7 ([address] [place] [address]): Independent for all p.o.  EVELINA. (2003). National Outcomes Measurement System (NOMS): Adult Speech-Language Pathology User's Guide.        Pain:  Pain Scale 1: Numeric (0 - 10)  Pain Intensity 1: 8  Pain Location 1: Abdomen  After treatment:   []            Patient left in no apparent distress sitting up in chair  []            Patient left in no apparent distress in bed  []            Call noe left within reach  []            Nursing notified  []            Caregiver present  []            Bed alarm activated    COMMUNICATION/EDUCATION:   The patients plan of care including recommendations, planned interventions, and recommended diet changes were discussed with: Physical Therapist and Registered Nurse. Patient was educated regarding Her deficit(s) of potential dysphagia as this relates to Her diagnosis of weakness s/p surgery. She demonstrated Guarded understanding as evidenced by memory loss. Daughter present and undertood. .  []            Posted safety precautions in patient's room. [x]            Patient/family have participated as able in goal setting and plan of care. [x]            Patient/family agree to work toward stated goals and plan of care. []            Patient understands intent and goals of therapy, but is neutral about his/her participation. []            Patient is unable to participate in goal setting and plan of care.     Thank you for this referral.  Matthew Villalta, SLP  Time Calculation: 20 mins

## 2018-02-27 NOTE — PROGRESS NOTES
Gastroenterology Progress Note    February 27, 2018  Admit Date: 2/11/2018         Narrative Assessment and Plan   · Perforated gastric ulcer  · Cirrhosis  · Alcohol abuse  · Ascites    No new recommendations at this time  Continue postop management per surgery, remains on TPN, antibiotics were stopped today  Pt more alert today - discussed findings on liver and importance of abstaining from alcohol  Family is working on rehab placement     ----  Anxious and uncomfortable complains of back pain and bed. Nursing staff working to de-escalate her dissatisfaction at this time. WBC up a bit, Na down to 135. TPN continues until taking oral nutrition more reliably. Lakshmi Henry MD     Subjective:   · States she needs to get to a bigger bed and go upstairs. States getting stiff from bed. Denies abdominal pain. Some complaints of SOB - getting diuresed as needed. No nausea or vomiting. On TPN. No family at bedside. ROS:  The previous review of systems on initial consultation / H&P is noted and reviewed. Specific changes noted above in HPI.     Current Medications:     Current Facility-Administered Medications   Medication Dose Route Frequency    TPN ADULT - CENTRAL   IntraVENous CONTINUOUS    TPN ADULT - CENTRAL   IntraVENous CONTINUOUS    methocarbamol (ROBAXIN) tablet 500 mg  500 mg Oral QID    guaiFENesin ER (MUCINEX) tablet 600 mg  600 mg Oral Q12H    fentaNYL (DURAGESIC) 12 mcg/hr patch 1 Patch  1 Patch TransDERmal Q72H    albuterol-ipratropium (DUO-NEB) 2.5 MG-0.5 MG/3 ML  3 mL Nebulization Q6H RT    acetaminophen (TYLENOL) tablet 650 mg  650 mg Oral Q4H PRN    haloperidol (HALDOL) tablet 1 mg  1 mg Oral Q8H PRN    bisacodyl (DULCOLAX) suppository 10 mg  10 mg Rectal DAILY PRN    HYDROmorphone (PF) (DILAUDID) injection 0.5 mg  0.5 mg IntraVENous Q4H PRN    LORazepam (ATIVAN) injection 1 mg  1 mg IntraVENous Q4H PRN    insulin regular (NOVOLIN R, HUMULIN R) injection   SubCUTAneous Q6H    fat emulsion 20% (LIPOSYN, INTRAlipid) infusion 500 mL  500 mL IntraVENous Q MON, WED & SAT    glucose chewable tablet 16 g  4 Tab Oral PRN    glucagon (GLUCAGEN) injection 1 mg  1 mg IntraMUSCular PRN    dextrose (D50W) injection syrg 12.5-25 g  12.5-25 g IntraVENous PRN    alteplase (CATHFLO) 1 mg in sterile water (preservative free) 1 mL injection  1 mg InterCATHeter PRN    sodium chloride (NS) flush 10-30 mL  10-30 mL InterCATHeter PRN    sodium chloride (NS) flush 10-40 mL  10-40 mL InterCATHeter Q8H    naloxone (NARCAN) injection 0.4 mg  0.4 mg IntraVENous PRN    ondansetron (ZOFRAN) injection 4 mg  4 mg IntraVENous Q4H PRN    enoxaparin (LOVENOX) injection 40 mg  40 mg SubCUTAneous Q24H    pantoprazole (PROTONIX) injection 40 mg  40 mg IntraVENous Q12H    phenol throat spray (CHLORASEPTIC) 1 Spray  1 Spray Oral PRN    nystatin (MYCOSTATIN) 100,000 unit/gram powder   Topical BID    sodium chloride (NS) flush 5-10 mL  5-10 mL IntraVENous PRN       Objective:     VITALS:   Last 24hrs VS reviewed since prior progress note. Most recent are:  Visit Vitals    /56    Pulse 99    Temp 98.4 °F (36.9 °C)    Resp 24    Ht 5' 3.5\" (1.613 m)    Wt 87.9 kg (193 lb 12.6 oz)    SpO2 91%    Breastfeeding No    BMI 33.79 kg/m2     Temp (24hrs), Av.2 °F (37.3 °C), Min:98.3 °F (36.8 °C), Max:100.3 °F (37.9 °C)      Intake/Output Summary (Last 24 hours) at 18 1124  Last data filed at 18 0658   Gross per 24 hour   Intake              240 ml   Output             1080 ml   Net             -840 ml       EXAM:  General: Anxious   Lungs:  No respiratory distress.   Speaking in complete sentences  Abdomen: Soft, tenderness to palpation   Neurologic:  Cranial nerves grossly intact, moves all 4 extremities  Derm:  No jaundice    Lab Data Reviewed:   Recent Labs      18   0320  18   0055  18   0536   WBC  16.8*  15.7*  15.5*   HGB  9.0*  9.3*  9.5*   HCT  28.6*  28.8* 30.0*   PLT  514*  518*  528*     Recent Labs      02/27/18   0320  02/26/18   0055  02/25/18   0536   NA  135*  137  136   K  4.2  4.6  3.8   CL  104  105  104   CO2  24  26  26   GLU  95  95  101*   BUN  14  14  12   CREA  0.64  0.58  0.50*   CA  7.5*  7.6*  7.7*   MG  1.8  2.0  1.8   PHOS  3.3  4.1  3.3   ALB  1.3*  1.5*  1.5*   TBILI  1.0  1.1*  0.9   SGOT  54*  35  42*   ALT  32  26  26     Lab Results   Component Value Date/Time    Glucose (POC) 123 (H) 02/27/2018 11:21 AM    Glucose (POC) 102 (H) 02/27/2018 05:38 AM    Glucose (POC) 114 (H) 02/26/2018 11:53 PM    Glucose (POC) 100 02/26/2018 06:13 PM    Glucose (POC) 118 (H) 02/26/2018 11:44 AM     No results for input(s): PH, PCO2, PO2, HCO3, FIO2 in the last 72 hours. No results for input(s): INR in the last 72 hours. No lab exists for component: INREXT        Assessment:   (See above)  Principal Problem:    Perforated chronic gastric ulcer ([address] (2/11/2018)    Active Problems:    ETOH abuse (2/11/2018)      Free intraperitoneal air (2/12/2018)      S/P exploratory laparotomy (2/12/2018)      Overview: Suture repair of perforated posterior gastric ulcer with Dawson Handy patch,       core needle biopsies of left lobe of the liver. Alcoholic cirrhosis of liver without ascites ([address] (2/12/2018)      Overview: Liver bx 2/12/18:       Cirrhosis with mild chronic portal inflammation and macrovesicular       steatosis. Fatty liver determined by biopsy (2/12/2018)      Overview:        Cirrhosis with mild chronic portal inflammation and macrovesicular       steatosis. Hyperammonemia ([address] (2/16/2018)      Overview:        Ref.  Range 2/16/2018 17:20       Ammonia Latest Ref Range: <32 UMOL/L 69 (H)             Hypokalemia (2/18/2018)      Leukocytosis (2/18/2018)      Severe protein-calorie malnutrition ([address] (2/18/2018)      Acute metabolic encephalopathy (6/60/0676)        Plan:   (See above)    Signed By:  Cj Barber PA-C  2/27/2018  11:24 AM

## 2018-02-27 NOTE — PROGRESS NOTES
Music Therapy Assessment    [place] [id number]  [id number]    1945  67 y.o.  female    Patient Telephone Number: [phone] (home)   Mormonism Affiliation: Sunil Jayy   Language: English   Extended Emergency Contact Information  Primary Emergency Contact: [address] Phone: [phone]  Relation: Daughter   Patient Active Problem List    Diagnosis Date Noted    Hypokalemia 02/18/2018    Leukocytosis 02/18/2018    Severe protein-calorie malnutrition ([address] 02/18/2018    Acute metabolic encephalopathy 11/85/9547    Hyperammonemia ([address] 02/16/2018    Free intraperitoneal air 02/12/2018    S/P exploratory laparotomy 58/62/3418    Alcoholic cirrhosis of liver without ascites ([address] 02/12/2018    Fatty liver determined by biopsy 02/12/2018    Perforated chronic gastric ulcer ([facility] 02/11/2018    ETOH abuse 02/11/2018    GI bleed 04/26/2016    Hypotension 04/26/2016    Tachycardia 04/26/2016    Acute blood loss anemia 04/26/2016        Date: 2/27/2018       Mental Status:   [x] Alert [x] Forgetful [  ]  Confused  [  ] Minimally responsive    Communication Status: [  ] Impaired Speech [  ] Nonverbal     Physical Status:   [  ] Oxygen in use  [  ] Hard of Hearing [  ] Vision Impaired  [  ] Ambulatory  [  ] Ambulatory with assistance [  ] Non-ambulatory -N/A    Music Preferences, Background: [facility], [address] [place] [address]. Clinical Problem addressed: Support relaxation, spiritual support. Goal(s) met in session:  Physical/Pain management (Scale of 1-10):    Pre-session rating: Pt denied pain. Post-session rating: Pt denied pain.     [x] Increased relaxation   [  ] Regulated breathing patterns  [  ] Decreased muscle tension   [  ] Minimized physical distress     Emotional/Psychological:  [x] Increased self-expression   [  ] Decreased aggressive behavior   [  ] Decreased sadness   [  ] Discussed healthy coping skills     [x] Improved mood    [  ] Decreased withdrawn behavior     Social:  [  ] Decreased feelings of isolation/loneliness [x] Positive social interaction   [  ] Provided support and/or comfort for family/friends    Spiritual:  [x] Spiritual support    [  ] Expressed peace  [  ] Expressed jennifer    [  ] Discussed beliefs    Techniques Utilized (Check all that apply):   [  ] Procedural support MT [x] Music for relaxation [x] Patient preferred music  [  ] Franci analysis  [  ] Song choice  [  ] Music for validation  [  ] Entrainment  [  ] Progressive muscle relax. [  ] Guided visualization  [  ] Khloe Jackson  [  ] Patient instrument playing [  ] Andi Bo writing  [x] Sing along   [  ] Carry Vinay  [  ] Sensory stimulation  [  ] Active Listening  [x] Music for spiritual support [  ] Making of CDs as gifts    Session Observations:  Referral from Terell Treviño. Patient (pt) was observed to be drowsy and lying comfortably in bed. An aide was at bedside and she was also being prepared for an EKG by another staff member. When asked about her music preferences, pt shared these vaguely, but named one specific group she enjoys. This music therapist (MT) sat at bedside and sang and played a song by one of that music group's members, the [employer]. Pt appeared drowsy during this song, as evidenced by (AEB) closing her eyes and appearing to fall asleep. She awakened at times throughout the song and expressed again her enjoyment of [employer] music. MT sang and played the Four Tops song Sugar Pie, Honey Naylor with guitar and pt remained drowsy. Next, MT sang and played the hymn How Great Thou Art with guitar. Pt increased self-expression in response to the music AEB increased alertness and singing along. She expressed enjoyment in the music. MT sang and played the hymn Amazing Appsembler. javier and pt sang along to this song, too. She increased self-expression, AEB sharing about her daughters. Pt expressed gratitude for the session.  Will follow as katlyn.    PACO Cortés (Music Therapist-Board Certified)  Spiritual Care Department  Referral-based service

## 2018-02-27 NOTE — ROUTINE PROCESS
Bedside and Verbal shift change report given to Luigi Zepeda RN  (oncoming nurse) by Nancy Benítez RN (offgoing nurse). Report included the following information SBAR, Kardex, Procedure Summary, Intake/Output, MAR and Recent Results.

## 2018-02-27 NOTE — PROGRESS NOTES
Nutrition Assessment:    RECOMMENDATIONS/INTERVENTION(S):   1. Continue TPN until PO intakes consistently >25%    TPN recommendations:  D20/5%AA @ the goal rate of 63 ml/hr  Add Lipids 20% (500 ml) @ 42 ml/hr x 12 hr 3x/wk  (TPN @ goal + Lipids provides 1762 kcals, 76 g protein)    2. Consider starting EN vs TPN if PO intakes do not improve in the next 1-2 days  ASSESSMENT:   2/26:  TPN @ goal, CLD started yesterday. Labs/meds reviewed. BG controlled. Na low. PAB 4.5 (2/26), 4.4 (2/23). Hypoactive BS, +BM (soft). Spoke w/ RN, pt anxious, s/p ativan. Poor PO intakes, no interest in eating and/or drinking, not asking for anything. No family in room. SLP following - recs for sips of clears 2/2 decreased alertness. TPN re-ordered per Surgery. Day 11 of TPN. Alk phos, AST remain elevated - stable. Worsening edema. 2/22:  TPN + Lipids running @ goal rate. TG 76. Alk phos, AST elevated. BG controlled. Lytes WDL. No IVF. Pt reporting abd pain, for repeat CT abd today. Last BM 2/21, loose, +BS.         2/19:  Labs/meds reviewed. TPN running @ recommended goal rate. BG controlled, [phone]. On insulin. K continuing to require repletion. PAB 5.5. No TG, Alk phos WDL. NS IVF. Last BM 2/10, hypoactive BS. NGT placed today 2/2 abd pain. Continue TPN per Surgery MD.  WBC's elevated, for CT abd today. Possible kyphoplasty per Ortho. Noted new wt.      2/16:  Pt remains confused, agitated & sedated (CIWA protocol). NPO Day 6. Labs/meds reviewed. K repleted. No Phos. D5 LR IVF. TPN started per Surgery MD today, plans for UGI when able. Likely pt meets criteria for Severe Acute Malnutrition, no new wt, mild edema. TPN recs above. 2/14:  Extubated 2/12. Unable to start PO yesterday per Surgery - plans for UGI p/t PO once off pressors. Discussed case in ICU rounds. Pt agitated, confused overnight - pulled NGT. On sedation per [facility] protocol. NPO. No BM, flatus, absent BS. Labs/meds reviewed. K, Mg WDL. No Phos. 1/2 NS IVF. 2/12:  Chart reviewed 2/2 intubation. 67 yof admitted for intra-abd free air of unknown etiology. Pmhx includes gastric ulcer (EGD x 2 yrs ago), Etoh. Surgery following. POD #1 ex lap repair of perforated gastric ulcer, liver bx. Remains intubated 2/2 shock, respiratory failure. Labs/meds reviewed. K, Mg requiring repletion. No Phos. On Delbert. Propofol currently stopped. On PPI. Ortho following for T12 fx. Surgical incisions to abd, ALYSSA drain, no edema noted. Overwt per advanced age. No recent wt to compare per hx. Energy needs calculated using current wt, vent settings, tmax. Noted n/v after fall yesterday, no reports of inadequate PO prior to fall. Etoh hx. SUBJECTIVE/OBJECTIVE:     Diet Order: CLD; TPN - D20/5% AA @ 63 ml/hr + Lipids 20% (500 ml) @ 42 ml/hr x 12 hr 3x/wk  % Eaten:  No data found. Pertinent Medications: [x] Reviewed     Past Medical History:   Diagnosis Date    Alcoholic cirrhosis of liver without ascites ([facility] 2/12/2018    ETOH abuse 2/11/2018    History of vascular access device 02/16/2018    [facility] VAT - 5 FR triple, R basilic, TPN    Hyperammonemia ([facility] 2/16/2018     Results for Vane Irving (MRN [id number]) as of 2/17/2018 08:43  Ref.  Range 2/16/2018 17:20 Ammonia Latest Ref Range: <32 UMOL/L 69 (H)     Hyperlipidemia     Perforated chronic gastric ulcer ([facility] 2/11/2018       Chemistries:  Lab Results   Component Value Date/Time    Sodium 135 (L) 02/27/2018 03:20 AM    Potassium 4.2 02/27/2018 03:20 AM    Chloride 104 02/27/2018 03:20 AM    CO2 24 02/27/2018 03:20 AM    Anion gap 7 02/27/2018 03:20 AM    Glucose 95 02/27/2018 03:20 AM    BUN 14 02/27/2018 03:20 AM    Creatinine 0.64 02/27/2018 03:20 AM    BUN/Creatinine ratio 22 (H) 02/27/2018 03:20 AM    GFR est AA >60 02/27/2018 03:20 AM    GFR est non-AA >60 02/27/2018 03:20 AM    Calcium 7.5 (L) 02/27/2018 03:20 AM    AST (SGOT) 54 (H) 02/27/2018 03:20 AM    Alk. phosphatase 167 (H) 02/27/2018 03:20 AM    Protein, total 5.4 (L) 02/27/2018 03:20 AM    Albumin 1.3 (L) 02/27/2018 03:20 AM    Globulin 4.1 (H) 02/27/2018 03:20 AM    A-G Ratio 0.3 (L) 02/27/2018 03:20 AM    ALT (SGPT) 32 02/27/2018 03:20 AM      Anthropometrics: Height: 5' 3.5\" (161.3 cm) Weight: 87.9 kg (193 lb 12.6 oz)    IBW (%IBW): 58.7 kg (129 lb 6.6 oz) (131.69 %) UBW (%UBW):  (UTO) (  %)    BMI: Body mass index is 33.79 kg/(m^2). This BMI is indicative of:   [] Underweight    [] Normal    [] Overweight     [x]  Obesity    []  Extreme Obesity (BMI>40)  Estimated Nutrition Needs (Based on): 1661 Kcals/day (BMR (1398) x 1.3 AF) , 70 g (-88 (1.2-1.5 g/kg x IBW)) Protein  Carbohydrate:  At Least 130 g/day  Fluids: 1700 mL/day    Last BM: 2/26 soft   []Active     []Hyperactive  [x]Hypoactive       [] Absent   BS  Skin:    [] Intact   [x] Incision - abd lap sites  [] Breakdown   [] DTI   [] Tears/Excoriation/Abrasion  [x]Edema - 1+ BUE, 2+ pitting BLE [x] Other:ALYSSA drain     Wt Readings from Last 30 Encounters:   02/20/18 87.9 kg (193 lb 12.6 oz)   04/29/16 76.3 kg (168 lb 3.2 oz)      NUTRITION DIAGNOSES:   Problem:  Inadequate oral intake     Etiology: related to altered GI function      Signs/Symptoms: as evidenced by s/p lap w/ perf viscus repair, intubation, NPO x 11, abd pain/repeat CT abd, need to continue TPN    2/27:  Above acute nutrition dx continues - CLD started 2/26, pt not alert & w/ poor PO intake, TPN renewed     NUTRITION INTERVENTIONS:  Meals/Snacks: General/healthful diet Enteral/Parenteral Nutrition: Initiate parenteral nutrition                GOAL:   Continue TPN @ goal until PO intakes improve in the next 1-3 days    Cultural, Voodoo, or Ethnic Dietary Needs: None    EDUCATION & DISCHARGE NEEDS:    [x] None Identified   [] Identified and Education Provided/Documented   [] Identified and Pt declined/was not appropriate      [x] Interdisciplinary Care Plan Reviewed/Documented    [x] Discharge Needs:    TBD   [] No Nutrition Related Discharge Needs    NUTRITION RISK:   Pt Is At Nutrition Risk  [x]     No Nutrition Risk Identified  []       PT SEEN FOR:    []  MD Consult: []Calorie Count      []Diabetic Diet Education        []Diet Education     []Electrolyte Management     []General Nutrition Management and Supplements     []Management of Tube Feeding     []TPN Recommendations   []  RN Referral:  []MST score >=2     []Enteral/Parenteral Nutrition PTA     []Pregnant: Gestational DM or Multigestation                 [] Pressure Ulcer    []  Low BMI      []  Length of Stay       [] Dysphagia Diet         [] Ventilator  [x]  Follow-up - TPN     Previous Recommendations:   [x] Implemented     [] Progressing Towards Goal          [] Not Implemented          [] Not Applicable    Previous Goal:   [x] Met              [] Progressing Towards Goal              [] Not Progressing Towards Goal   [] Not Applicable            Chacorta Hamilton, [address] [place] [address]  Pager [phone]

## 2018-02-27 NOTE — PROGRESS NOTES
[facility]     Name: Foy Bamberger MRN: [id number]   : 1945 Hospital: [facility]   Date: 2018            Impression Plan   1. Dyspnea  2. Abnormal Chest CT: with bilateral pleural effusions, atelectasis,and patchy airspace disease in MIRIAM  3. Leukocytosis  4. Delirium  5. Peforated gastric ulcer, s/p repair  6. Shock, likely septic, resolved  7. Acute hypoxic respiratory failure   8. Hypokalemia, hypomagnesemia  9. EtOH abuse     · Goal sats >90%, wean O2 as tolerated  · ABX stopped today. Follow WBC and fever curve closely as well as white count  · Speech following and appreciate help: sips for now  · Discontinued Ativan given worsening delirium, add IV Haldol  · Check 12 lead EKG prior to Haldol administration  · Scheduled nebs  · Guafenesin   · Post-op management as per surgery  · Pain control; limit benzos as much as possible  · Seroquel at night for sleep  · Monitor lytes, replete as needed  · Encourage IS use  · PT/OT  · OOB and mobilize as much as possible: discussed with nursing today that she needs to get OOB   · May benefit from pulmonary rehab, will consult CM to see if she qualifies  · TPN  · SCDs  · Protonix               Radiology  ( personally reviewed) CXR:  Persistent small bilateral pleural effusions. Right wrist:  No acute findings   ABG No results for input(s): PHI, PO2I, PCO2I in the last 72 hours. Subjective     Overnight events:  TMax 100.3  BP stable  HR in low 100s  O2 sats 91% on 4L NC  WBC 16.8  Hgb 9.0  Mag 1.8  Fluid balance: -840   Per daughter very agitated and confused overnight, has received Ativan multiple times    ROS:   Daughter reports she's having a bad morning. Was very agitated this morning and tried to get out of bed. Notes that she's much more confused today and \"not herself. \" Feels that breathing is about the same. Patient too sleepy right now to give any history.   Arouses when spoken too but nods off easily. Past Medical History:   Diagnosis Date    Alcoholic cirrhosis of liver without ascites ([facility] 2/12/2018    ETOH abuse 2/11/2018    History of vascular access device 02/16/2018    [facility] VAT - 5 FR triple, R basilic, TPN    Hyperammonemia ([facility] 2/16/2018     Results for Narcisa Abdi (MRN [id number]) as of 2/17/2018 08:43  Ref. Range 2/16/2018 17:20 Ammonia Latest Ref Range: <32 UMOL/L 69 (H)     Hyperlipidemia     Perforated chronic gastric ulcer ([facility] 2/11/2018      Past Surgical History:   Procedure Laterality Date    HX CATARACT REMOVAL        Prior to Admission medications    Medication Sig Start Date End Date Taking? Authorizing Provider   naproxen sodium (ALEVE) 220 mg tablet Take 220 mg by mouth daily as needed for Pain. Yes Historical Provider   cyanocobalamin (VITAMIN B12) 500 mcg tablet Take 500 mcg by mouth daily. Yes Historical Provider   multivitamin (ONE A DAY) tablet Take 1 Tab by mouth daily. Yes Historical Provider   omega-3 fatty acids-vitamin e 1,000 mg cap Take 2 Caps by mouth daily.    Yes Historical Provider     Current Facility-Administered Medications   Medication Dose Route Frequency    TPN ADULT - CENTRAL   IntraVENous CONTINUOUS    TPN ADULT - CENTRAL   IntraVENous CONTINUOUS    methocarbamol (ROBAXIN) tablet 500 mg  500 mg Oral QID    guaiFENesin ER (MUCINEX) tablet 600 mg  600 mg Oral Q12H    fentaNYL (DURAGESIC) 12 mcg/hr patch 1 Patch  1 Patch TransDERmal Q72H    albuterol-ipratropium (DUO-NEB) 2.5 MG-0.5 MG/3 ML  3 mL Nebulization Q6H RT    insulin regular (NOVOLIN R, HUMULIN R) injection   SubCUTAneous Q6H    fat emulsion 20% (LIPOSYN, INTRAlipid) infusion 500 mL  500 mL IntraVENous Q MON, WED & SAT    sodium chloride (NS) flush 10-40 mL  10-40 mL InterCATHeter Q8H    enoxaparin (LOVENOX) injection 40 mg  40 mg SubCUTAneous Q24H    pantoprazole (PROTONIX) injection 40 mg  40 mg IntraVENous Q12H    nystatin (MYCOSTATIN) 100,000 unit/gram powder Topical BID     No Known Allergies   Social History   Substance Use Topics    Smoking status: Former Smoker    Smokeless tobacco: Never Used    Alcohol use 11.4 oz/week     0 Standard drinks or equivalent, 19 Glasses of wine per week      Comment: Hx of heavy etoh use, but quit several months ago      Family History   Problem Relation Age of Onset    Other Other      patient did not know          Laboratory: I have personally reviewed the critical care flowsheet and labs. Recent Labs      02/27/18   0320 02/26/18   0055  02/25/18   0536   WBC  16.8*  15.7*  15.5*   HGB  9.0*  9.3*  9.5*   HCT  28.6*  28.8*  30.0*   PLT  514*  518*  528*     Recent Labs      02/27/18   0320  02/26/18   0055  02/25/18   0536   NA  135*  137  136   K  4.2  4.6  3.8   CL  104  105  104   CO2  24  26  26   GLU  95  95  101*   BUN  14  14  12   CREA  0.64  0.58  0.50*   CA  7.5*  7.6*  7.7*   MG  1.8  2.0  1.8   PHOS  3.3  4.1  3.3   ALB  1.3*  1.5*  1.5*   SGOT  54*  35  42*   ALT  32  26  26       Objective:          Intake/Output Summary (Last 24 hours) at 02/27/18 1357  Last data filed at 02/27/18 7215   Gross per 24 hour   Intake              240 ml   Output             1080 ml   Net             -840 ml     GENERAL: alert, able to answer questions appropriately, uncomfortable but NAD HEENT:  PERRL, EOMI, no alar flaring or epistaxis, oral mucosa moist without cyanosis, NECK:  no jugular vein distention, no retractions, no thyromegaly or masses, LUNGS: CTAB but diminished at the bases, respirations non-labored, on 4L NC , HEART:  Regular rate and rhythm with no MGR; no edema is present, ABDOMEN:  soft, stable CDI, drain dressing CDI EXTREMITIES:  warm with no cyanosis, NP LE swelling SKIN:  no jaundice or ecchymosis and NEUROLOGIC: alert, oriented, grossly non-focal    Piotr Sanderson, NP  [facility] [place]

## 2018-02-28 ENCOUNTER — APPOINTMENT (OUTPATIENT)
Dept: CT IMAGING | Age: 73
DRG: 853 | End: 2018-02-28
Attending: PHYSICIAN ASSISTANT
Payer: MEDICARE

## 2018-02-28 ENCOUNTER — APPOINTMENT (OUTPATIENT)
Dept: GENERAL RADIOLOGY | Age: 73
DRG: 853 | End: 2018-02-28
Attending: SURGERY
Payer: MEDICARE

## 2018-02-28 LAB
ALP SERPL-CCNC: 166 U/L (ref 45–117)
ALT SERPL-CCNC: 27 U/L (ref 12–78)
ANION GAP SERPL CALC-SCNC: 7 MMOL/L (ref 5–15)
ARTERIAL PATENCY WRIST A: YES
AST SERPL-CCNC: 31 U/L (ref 15–37)
ATRIAL RATE: 103 BPM
BASE DEFICIT BLDA-SCNC: 1 MMOL/L
BASOPHILS # BLD: 0.1 K/UL (ref 0–0.1)
BASOPHILS NFR BLD: 0 % (ref 0–1)
BDY SITE: ABNORMAL
BNP SERPL-MCNC: 476 PG/ML (ref 0–125)
BUN SERPL-MCNC: 13 MG/DL (ref 6–20)
BUN/CREAT SERPL: 25 (ref 12–20)
CALCIUM SERPL-MCNC: 7.8 MG/DL (ref 8.5–10.1)
CALCULATED P AXIS, ECG09: 60 DEGREES
CALCULATED R AXIS, ECG10: 52 DEGREES
CALCULATED T AXIS, ECG11: 51 DEGREES
CHLORIDE SERPL-SCNC: 105 MMOL/L (ref 97–108)
CO2 SERPL-SCNC: 25 MMOL/L (ref 21–32)
CREAT SERPL-MCNC: 0.52 MG/DL (ref 0.55–1.02)
DIAGNOSIS, 93000: NORMAL
DIFFERENTIAL METHOD BLD: ABNORMAL
EOSINOPHIL # BLD: 0.2 K/UL (ref 0–0.4)
EOSINOPHIL NFR BLD: 1 % (ref 0–7)
ERYTHROCYTE [DISTWIDTH] IN BLOOD BY AUTOMATED COUNT: 14.6 % (ref 11.5–14.5)
GAS FLOW.O2 O2 DELIVERY SYS: 3 L/MIN
GLUCOSE BLD STRIP.AUTO-MCNC: 102 MG/DL (ref 65–100)
GLUCOSE BLD STRIP.AUTO-MCNC: 115 MG/DL (ref 65–100)
GLUCOSE BLD STRIP.AUTO-MCNC: 118 MG/DL (ref 65–100)
GLUCOSE BLD STRIP.AUTO-MCNC: 138 MG/DL (ref 65–100)
GLUCOSE SERPL-MCNC: 101 MG/DL (ref 65–100)
HCO3 BLDA-SCNC: 22 MMOL/L (ref 22–26)
HCT VFR BLD AUTO: 29.6 % (ref 35–47)
HGB BLD-MCNC: 9.3 G/DL (ref 11.5–16)
IMM GRANULOCYTES # BLD: 0.2 K/UL (ref 0–0.04)
IMM GRANULOCYTES NFR BLD AUTO: 1 % (ref 0–0.5)
INR PPP: 1.2 (ref 0.9–1.1)
LYMPHOCYTES # BLD: 1.8 K/UL (ref 0.8–3.5)
LYMPHOCYTES NFR BLD: 10 % (ref 12–49)
MAGNESIUM SERPL-MCNC: 2 MG/DL (ref 1.6–2.4)
MCH RBC QN AUTO: 33.2 PG (ref 26–34)
MCHC RBC AUTO-ENTMCNC: 31.4 G/DL (ref 30–36.5)
MCV RBC AUTO: 105.7 FL (ref 80–99)
MONOCYTES # BLD: 1.2 K/UL (ref 0–1)
MONOCYTES NFR BLD: 7 % (ref 5–13)
NEUTS SEG # BLD: 14.3 K/UL (ref 1.8–8)
NEUTS SEG NFR BLD: 80 % (ref 32–75)
NRBC # BLD: 0 K/UL (ref 0–0.01)
NRBC BLD-RTO: 0 PER 100 WBC
P-R INTERVAL, ECG05: 154 MS
PCO2 BLDA: 31 MMHG (ref 35–45)
PH BLDA: 7.46 [PH] (ref 7.35–7.45)
PHOSPHATE SERPL-MCNC: 3.3 MG/DL (ref 2.6–4.7)
PLATELET # BLD AUTO: 534 K/UL (ref 150–400)
PMV BLD AUTO: 12.4 FL (ref 8.9–12.9)
PO2 BLDA: 70 MMHG (ref 80–100)
POTASSIUM SERPL-SCNC: 4.5 MMOL/L (ref 3.5–5.1)
PROTHROMBIN TIME: 12.6 SEC (ref 9–11.1)
Q-T INTERVAL, ECG07: 346 MS
QRS DURATION, ECG06: 80 MS
QTC CALCULATION (BEZET), ECG08: 453 MS
RBC # BLD AUTO: 2.8 M/UL (ref 3.8–5.2)
SAO2 % BLD: 95 % (ref 92–97)
SAO2% DEVICE SAO2% SENSOR NAME: ABNORMAL
SERVICE CMNT-IMP: ABNORMAL
SODIUM SERPL-SCNC: 137 MMOL/L (ref 136–145)
SPECIMEN SITE: ABNORMAL
VENTRICULAR RATE, ECG03: 103 BPM
WBC # BLD AUTO: 17.8 K/UL (ref 3.6–11)

## 2018-02-28 PROCEDURE — 87086 URINE CULTURE/COLONY COUNT: CPT | Performed by: INTERNAL MEDICINE

## 2018-02-28 PROCEDURE — 74011250637 HC RX REV CODE- 250/637: Performed by: SURGERY

## 2018-02-28 PROCEDURE — 74011250636 HC RX REV CODE- 250/636: Performed by: SURGERY

## 2018-02-28 PROCEDURE — 94640 AIRWAY INHALATION TREATMENT: CPT

## 2018-02-28 PROCEDURE — 36415 COLL VENOUS BLD VENIPUNCTURE: CPT | Performed by: NURSE PRACTITIONER

## 2018-02-28 PROCEDURE — 85610 PROTHROMBIN TIME: CPT | Performed by: NURSE PRACTITIONER

## 2018-02-28 PROCEDURE — C9113 INJ PANTOPRAZOLE SODIUM, VIA: HCPCS | Performed by: SURGERY

## 2018-02-28 PROCEDURE — 65660000000 HC RM CCU STEPDOWN

## 2018-02-28 PROCEDURE — 84450 TRANSFERASE (AST) (SGOT): CPT | Performed by: NURSE PRACTITIONER

## 2018-02-28 PROCEDURE — 36600 WITHDRAWAL OF ARTERIAL BLOOD: CPT | Performed by: SURGERY

## 2018-02-28 PROCEDURE — 93308 TTE F-UP OR LMTD: CPT

## 2018-02-28 PROCEDURE — 84460 ALANINE AMINO (ALT) (SGPT): CPT | Performed by: NURSE PRACTITIONER

## 2018-02-28 PROCEDURE — 80048 BASIC METABOLIC PNL TOTAL CA: CPT | Performed by: SURGERY

## 2018-02-28 PROCEDURE — 87040 BLOOD CULTURE FOR BACTERIA: CPT | Performed by: NURSE PRACTITIONER

## 2018-02-28 PROCEDURE — 71045 X-RAY EXAM CHEST 1 VIEW: CPT

## 2018-02-28 PROCEDURE — 83735 ASSAY OF MAGNESIUM: CPT | Performed by: SURGERY

## 2018-02-28 PROCEDURE — 74011250637 HC RX REV CODE- 250/637: Performed by: INTERNAL MEDICINE

## 2018-02-28 PROCEDURE — 77010033678 HC OXYGEN DAILY

## 2018-02-28 PROCEDURE — 77030038269 HC DRN EXT URIN PURWCK BARD -A

## 2018-02-28 PROCEDURE — 74011000250 HC RX REV CODE- 250: Performed by: SURGERY

## 2018-02-28 PROCEDURE — 82803 BLOOD GASES ANY COMBINATION: CPT | Performed by: SURGERY

## 2018-02-28 PROCEDURE — 74011250637 HC RX REV CODE- 250/637: Performed by: NURSE PRACTITIONER

## 2018-02-28 PROCEDURE — 97530 THERAPEUTIC ACTIVITIES: CPT

## 2018-02-28 PROCEDURE — 74011000258 HC RX REV CODE- 258: Performed by: SURGERY

## 2018-02-28 PROCEDURE — 84100 ASSAY OF PHOSPHORUS: CPT | Performed by: SURGERY

## 2018-02-28 PROCEDURE — 74011250637 HC RX REV CODE- 250/637: Performed by: FAMILY MEDICINE

## 2018-02-28 PROCEDURE — 74011000250 HC RX REV CODE- 250: Performed by: INTERNAL MEDICINE

## 2018-02-28 PROCEDURE — 87106 FUNGI IDENTIFICATION YEAST: CPT | Performed by: INTERNAL MEDICINE

## 2018-02-28 PROCEDURE — 84075 ASSAY ALKALINE PHOSPHATASE: CPT | Performed by: NURSE PRACTITIONER

## 2018-02-28 PROCEDURE — 74177 CT ABD & PELVIS W/CONTRAST: CPT

## 2018-02-28 PROCEDURE — 85025 COMPLETE CBC W/AUTO DIFF WBC: CPT | Performed by: FAMILY MEDICINE

## 2018-02-28 PROCEDURE — 74011636320 HC RX REV CODE- 636/320: Performed by: RADIOLOGY

## 2018-02-28 PROCEDURE — 87205 SMEAR GRAM STAIN: CPT | Performed by: NURSE PRACTITIONER

## 2018-02-28 PROCEDURE — 74011250636 HC RX REV CODE- 250/636: Performed by: FAMILY MEDICINE

## 2018-02-28 PROCEDURE — 87106 FUNGI IDENTIFICATION YEAST: CPT | Performed by: NURSE PRACTITIONER

## 2018-02-28 PROCEDURE — 83880 ASSAY OF NATRIURETIC PEPTIDE: CPT | Performed by: NURSE PRACTITIONER

## 2018-02-28 PROCEDURE — 74011250636 HC RX REV CODE- 250/636: Performed by: NURSE PRACTITIONER

## 2018-02-28 RX ORDER — HYDROMORPHONE HYDROCHLORIDE 2 MG/ML
1 INJECTION, SOLUTION INTRAMUSCULAR; INTRAVENOUS; SUBCUTANEOUS
Status: DISCONTINUED | OUTPATIENT
Start: 2018-02-28 | End: 2018-03-01

## 2018-02-28 RX ORDER — FENTANYL 25 UG/1
1 PATCH TRANSDERMAL
Status: DISCONTINUED | OUTPATIENT
Start: 2018-02-28 | End: 2018-03-16 | Stop reason: HOSPADM

## 2018-02-28 RX ORDER — OCTREOTIDE ACETATE 100 UG/ML
100 INJECTION, SOLUTION INTRAVENOUS; SUBCUTANEOUS 3 TIMES DAILY
Status: DISCONTINUED | OUTPATIENT
Start: 2018-02-28 | End: 2018-03-01

## 2018-02-28 RX ORDER — HYDROMORPHONE HYDROCHLORIDE 1 MG/ML
1 INJECTION, SOLUTION INTRAMUSCULAR; INTRAVENOUS; SUBCUTANEOUS
Status: DISCONTINUED | OUTPATIENT
Start: 2018-02-28 | End: 2018-02-28

## 2018-02-28 RX ORDER — GUAIFENESIN 600 MG/1
1200 TABLET, EXTENDED RELEASE ORAL EVERY 12 HOURS
Status: DISCONTINUED | OUTPATIENT
Start: 2018-02-28 | End: 2018-03-16 | Stop reason: HOSPADM

## 2018-02-28 RX ORDER — QUETIAPINE FUMARATE 25 MG/1
12.5 TABLET, FILM COATED ORAL
Status: DISCONTINUED | OUTPATIENT
Start: 2018-02-28 | End: 2018-03-01

## 2018-02-28 RX ORDER — HALOPERIDOL 5 MG/ML
1 INJECTION INTRAMUSCULAR
Status: DISCONTINUED | OUTPATIENT
Start: 2018-02-28 | End: 2018-03-16 | Stop reason: HOSPADM

## 2018-02-28 RX ADMIN — I.V. FAT EMULSION 500 ML: 20 EMULSION INTRAVENOUS at 18:26

## 2018-02-28 RX ADMIN — WATER 1 MG: 1 INJECTION INTRAMUSCULAR; INTRAVENOUS; SUBCUTANEOUS at 16:06

## 2018-02-28 RX ADMIN — NYSTATIN: 100000 POWDER TOPICAL at 08:56

## 2018-02-28 RX ADMIN — METHOCARBAMOL 500 MG: 500 TABLET ORAL at 21:30

## 2018-02-28 RX ADMIN — GUAIFENESIN 600 MG: 600 TABLET, EXTENDED RELEASE ORAL at 08:30

## 2018-02-28 RX ADMIN — METHOCARBAMOL 500 MG: 500 TABLET ORAL at 18:12

## 2018-02-28 RX ADMIN — Medication 10 ML: at 16:07

## 2018-02-28 RX ADMIN — NYSTATIN: 100000 POWDER TOPICAL at 18:12

## 2018-02-28 RX ADMIN — WATER 1 MG: 1 INJECTION INTRAMUSCULAR; INTRAVENOUS; SUBCUTANEOUS at 16:07

## 2018-02-28 RX ADMIN — CALCIUM GLUCONATE: 94 INJECTION, SOLUTION INTRAVENOUS at 18:25

## 2018-02-28 RX ADMIN — ACETAMINOPHEN 650 MG: 325 TABLET ORAL at 16:09

## 2018-02-28 RX ADMIN — IPRATROPIUM BROMIDE AND ALBUTEROL SULFATE 3 ML: .5; 3 SOLUTION RESPIRATORY (INHALATION) at 19:22

## 2018-02-28 RX ADMIN — PANTOPRAZOLE SODIUM 40 MG: 40 INJECTION, POWDER, FOR SOLUTION INTRAVENOUS at 08:47

## 2018-02-28 RX ADMIN — ACETAMINOPHEN 650 MG: 325 TABLET ORAL at 19:53

## 2018-02-28 RX ADMIN — IPRATROPIUM BROMIDE AND ALBUTEROL SULFATE 3 ML: .5; 3 SOLUTION RESPIRATORY (INHALATION) at 02:08

## 2018-02-28 RX ADMIN — HALOPERIDOL LACTATE 2 MG: 5 INJECTION, SOLUTION INTRAMUSCULAR at 06:07

## 2018-02-28 RX ADMIN — Medication 10 ML: at 21:31

## 2018-02-28 RX ADMIN — HYDROMORPHONE HYDROCHLORIDE 0.5 MG: 1 INJECTION, SOLUTION INTRAMUSCULAR; INTRAVENOUS; SUBCUTANEOUS at 03:51

## 2018-02-28 RX ADMIN — OCTREOTIDE ACETATE 100 MCG: 100 INJECTION, SOLUTION INTRAVENOUS; SUBCUTANEOUS at 21:34

## 2018-02-28 RX ADMIN — IPRATROPIUM BROMIDE AND ALBUTEROL SULFATE 3 ML: .5; 3 SOLUTION RESPIRATORY (INHALATION) at 14:56

## 2018-02-28 RX ADMIN — IOPAMIDOL 91 ML: 755 INJECTION, SOLUTION INTRAVENOUS at 12:49

## 2018-02-28 RX ADMIN — METHOCARBAMOL 500 MG: 500 TABLET ORAL at 08:31

## 2018-02-28 RX ADMIN — Medication 10 ML: at 06:09

## 2018-02-28 RX ADMIN — OCTREOTIDE ACETATE 100 MCG: 100 INJECTION, SOLUTION INTRAVENOUS; SUBCUTANEOUS at 18:11

## 2018-02-28 RX ADMIN — ENOXAPARIN SODIUM 40 MG: 40 INJECTION SUBCUTANEOUS at 16:06

## 2018-02-28 RX ADMIN — HALOPERIDOL LACTATE 2 MG: 5 INJECTION, SOLUTION INTRAMUSCULAR at 01:13

## 2018-02-28 RX ADMIN — PANTOPRAZOLE SODIUM 40 MG: 40 INJECTION, POWDER, FOR SOLUTION INTRAVENOUS at 20:25

## 2018-02-28 RX ADMIN — QUETIAPINE FUMARATE 12.5 MG: 25 TABLET ORAL at 21:30

## 2018-02-28 RX ADMIN — IPRATROPIUM BROMIDE AND ALBUTEROL SULFATE 3 ML: .5; 3 SOLUTION RESPIRATORY (INHALATION) at 08:25

## 2018-02-28 RX ADMIN — HYDROMORPHONE HYDROCHLORIDE 0.5 MG: 1 INJECTION, SOLUTION INTRAMUSCULAR; INTRAVENOUS; SUBCUTANEOUS at 10:05

## 2018-02-28 RX ADMIN — GUAIFENESIN 1200 MG: 600 TABLET, EXTENDED RELEASE ORAL at 20:24

## 2018-02-28 NOTE — ROUTINE PROCESS
Bedside and Verbal shift change report given to Danita Moreau RN (oncoming nurse) by Duane Balding, RN (offgoing nurse). Report included the following information SBAR, Kardex, ED Summary, OR Summary, Procedure Summary, Intake/Output, MAR and Recent Results.

## 2018-02-28 NOTE — PROGRESS NOTES
Report called to Cali Quintana on [facility] for transfer of care, she is aware that patient will be coming to room from [address].  Call placed to CT dept, aware to transfer to room 320 post CT

## 2018-02-28 NOTE — PROGRESS NOTES
Explained to patient importance of doing incentive spirometer, patient demonstrated with fair technique, encouraged patient to perform 5 times hourly.  Daughter at bedside and very supportive

## 2018-02-28 NOTE — PROGRESS NOTES
Occupational Therapy    Completed chart and discussed patient with nursing with attempt for OT weekly re-assessment. Nursing reports patient with tachycardia and awaiting chest X ray. She will be transferring to step down. Will hold OT at this time and follow up as medically appropriate for OT services.        Thank you,    Abby Nuñez OTR/ALAYNA

## 2018-02-28 NOTE — PROGRESS NOTES
Bedside shift change report given to Chanelle Hernández (oncoming nurse) by Dian Garcia (offgoing nurse). Report included the following information SBAR, Kardex, Procedure Summary, MAR and Recent Results.

## 2018-02-28 NOTE — PROGRESS NOTES
Problem: Mobility Impaired (Adult and Pediatric)  Goal: *Acute Goals and Plan of Care (Insert Text)  Physical Therapy Goals  Initiated 2/20/2018    1. Patient will move from supine to sit and sit to supine , scoot up and down and roll side to side in bed with moderate assistance x 2  within 7 days. Partially Met - Continue  2. Patient will perform sit <> stand with minimal assist x 2  within 7 days. Met - Advance goal below  3. Patient will ambulate with minimal assistance x 2 for 10 feet with RW and assist of 3rd pushing chair from behind within 7 days. Not Met- Continue  4. Patient will verbalize and demonstrate understanding of spinal precautions (No bending, lifting greater than 5 lbs, or twisting; log-roll technique; frequent repositioning as instructed) within 7 days. Not Met- Continue    Physical Therapy Goals  Revised 2/28/2018  1. Patient will move from supine to sit and sit to supine  and roll side to side in bed with moderate assistance x 1  within 7 day(s). 2.  Patient will move from long sitting in bed to edge of bed with minimal assistance x 1 within 7 days. 3.  Patient will transfer from bed to chair and chair to bed with moderate assistance x 1 using the least restrictive device within 7 day(s). 4.  Patient will perform sit <> stand with minimal assistance x 1 within 7 day(s). 5.  Patient will ambulate with minimal assistance x 1  for 15 feet with the least restrictive device within 7 day(s). 6.  Patient will verbalize and demonstrate understanding of spinal precautions (No bending, lifting greater than 5 lbs, or twisting; log-roll technique; frequent repositioning as instructed) within 7 days.   physical Therapy TREATMENT: WEEKLY REASSESSMENT  Patient: Domenica Mccord (84 y.o. female)  Date: 2/28/2018  Diagnosis: Intra-abdominal free air of unknown etiology [K66.8] Perforated chronic gastric ulcer ([facility] [address]  Procedure(s) (LRB):   NASOGASTRIC TUBE PLACEMENT (N/A) 10 Days Post-Op  Precautions: Aspiration, Back, Bed Alarm, Fall, Skin  Chart, physical therapy assessment, plan of care and goals were reviewed. ASSESSMENT: Treatment rendered around 4:30 pm  Patient transferred from fourth unit to step down telemetry due to shortness of breath and increased restlessness and agitation. Patient seen later in the afternoon and patient agreeable to attempt OOB to [facility]. Patient incontinent of diarrhea in bed. Patient's head of bed fully raised and patient moved long sitting to edge of bed with moderate assist x 1. Patient needed minimal assist x 2 for sit<>Stand with RW and pivot to [facility] with mod x 2. Patient needs cues to fully pivot with RW and back to seat. Patient on commode and then able to sustain stance x 3 minutes for cleaning. With increased time patient with increased posterior lean and increased shortness of breath HR to 120 bpm 88-89% on 4L. BP upon sitting 122/66. Patient seated in chair and made comfortable. Vitals measured after sitting x 6 minutes and BP dropped 100/63. Patient complains of dizziness and assisted back to bed with max to moderate assist x 2. Noted patient still soiled with loose stool and asked RN to assist with clean up. Bed alarm set. Patient still recovering from shortness of breath from activity SPO@ hovering at 89% so increased from 4 to 4.5 L during recovery. Informed RN. Pulmonary Dr Oneil Gregorio came into room speaking with patient's daughter and spoke of morning procedure for pleural effusion to aid dyspnea. Daughter at bedside. Mr. Paolo Proctor has been at [facility] for 16 day LOS and is very debilitated, confused at times, decreased insight and can be impulsive but improving. She continues to have poor strength endurance and balance and is high fall risk. She should not be left alone when OOB. Both daughters and ex- supportive and hope patient can discharge to rehab. Patient had her best performance with PT tx today.  More alert and following commands and tolerated donning TLSO brace. Took few steps and sustained standing with RW although posterior lean. Recommend continue PT and build to BID tx if possible. Patient's progression toward goals since last assessment: Wax and waning confusion, hallucinations and agitated yesterday, stabilizing medically for discharge to SNF, daughters have been looking at different facilities, patient transferred to greater level of care this afternoon following CT abdomen. PLAN:  Goals have been updated based on progression since last assessment. Patient continues to benefit from skilled intervention to address the above impairments. Continue to follow the patient 1-2 times per day/4-7 days per week to address goals. Planned Interventions:  [x]              Bed Mobility Training             [x]       Neuromuscular Re-Education  [x]              Transfer Training                   []       Orthotic/Prosthetic Training  [x]              Gait Training                         []       Modalities  [x]              Therapeutic Exercises           []       Edema Management/Control  [x]              Therapeutic Activities            [x]       Patient and Family Training/Education  []              Other (comment):  Discharge Recommendations: rehab   Further Equipment Recommendations for Discharge: TBD post rehab     SUBJECTIVE:   Patient stated I am getting dizzy.     OBJECTIVE DATA SUMMARY:   Critical Behavior:  Neurologic State: Confused, Eyes open spontaneously  Orientation Level: Oriented to person, Disoriented to place, Disoriented to situation  Cognition: Follows commands  Safety/Judgement: Decreased insight into deficits    Strength:   Strength: Generally decreased, functional    Functional Mobility Training:  Bed Mobility:  Supine to Sit: Moderate assistance (moved from long sitting to edge of bed- HOB fully upright)  Sit to Supine: Maximum assistance; Additional time;Assist x2  Scooting: Minimum assistance     Transfers:  Sit to Stand: Minimum assistance  Stand to Sit: Moderate assistance  Stand Pivot Transfers: Assist x2 (mod x 2)     Bed to Chair: Moderate assistance; Additional time;Assist x2    Balance:  Sitting: High guard  Standing - Static: Constant support;Poor (posterior lean)  Standing - Dynamic : Poor (posterior lean)     Ambulation/Gait Training:  Distance (ft): 3 Feet (ft)  Assistive Device: Brace/Splint;Gait belt;Walker, rolling  Ambulation - Level of Assistance: Moderate assistance; Additional time;Assist x2  Gait Abnormalities: Antalgic;Decreased step clearance; Path deviations (posterior lean)  Base of Support: Widened  Speed/Alejandra: Shuffled; Slow  Step Length: Left shortened;Right shortened  Stairs - Level of Assistance:  (NT)       Pain:  Pain Scale 1: Numeric (0 - 10)  Pain Intensity 1: 7  Pain Location 1: Abdomen  Pain Orientation 1: Anterior; Lower;Mid  Pain Description 1: Aching; Intermittent  Pain Intervention(s) 1: Medication (see MAR)  Activity Tolerance:   poor  Please refer to the flowsheet for vital signs taken during this treatment.   After treatment:   []  Patient left in no apparent distress sitting up in chair  [x]  Patient left in no apparent distress in bed  [x]  Call bell left within reach  [x]  Nursing notified  [x]  Caregiver present  [x]  Bed alarm activated    COMMUNICATION/COLLABORATION:   The patients plan of care was discussed with: Registered Nurse    Hillary Taveras, PT   Time Calculation: 45 mins

## 2018-02-28 NOTE — PROGRESS NOTES
Physical Therapy - Patient decline in status per RN and RN has stabilized but patient going to leave unit for CT abdomen and then transferring to step down unit 320 later today. Spoke with patient's daughter's about barriers to making progress with PT. PT will follow up on patient's status later this afternoon and treat if patient medically stable.  Thank you- LATOSHA LairdT

## 2018-02-28 NOTE — PROGRESS NOTES
[facility]     Name: Anu Burch MRN: [id number]   : 1945 Hospital: [address]   Date: 2018            Impression Plan   1. Acute Respiratory Failure with Hypoxia  2. Abnormal Chest CT: with bilateral pleural effusions, atelectasis,and patchy airspace disease in MIRIAM  3. Leukocytosis  4. Delirium  5. Peforated gastric ulcer, s/p repair  6. Shock, likely septic, resolved  7. Acute hypoxic respiratory failure   8. Hypokalemia, hypomagnesemia  9. EtOH abuse     · Goal sats >90%, wean O2 as tolerated  · ABX stopped yesterday. Follow WBC and fever curve closely as well as white count- low threshold for restarting with her current status  · Recheck blood cultures  · Abdominal CT pending  · Speech following and appreciate help: sips for now  · Discontinued Ativan given worsening delirium, c/w IV Haldol  · Check labs  · Scheduled nebs  · Guafenesin   · Post-op management as per surgery  · Pain control; limit benzos as much as possible, now on Fentanyl patch  · Seroquel at night for sleep  · Monitor lytes, replete as needed  · Encourage IS use  · PT/OT  · OOB and mobilize as much as possible: discussed with nursing today that she needs to get OOB   · May benefit from pulmonary rehab, will consult CM to see if she qualifies  · TPN  · SCDs  · Protonix               Radiology  ( personally reviewed) CXR:  Persistent small bilateral pleural effusions. Right wrist:  No acute findings   ABG No results for input(s): PHI, PO2I, PCO2I in the last 72 hours. Subjective     Overnight events:  TMax 99.1 overnight  BP stable  HR in 100-110s  Transferred to [facility] for closer monitoring  O2 sats 94% on 3L NC  WBC 17.8, trending up   Hgb 9.3  ABG 7.46/31/70/22/95% on 3L NC  Fluid balance: -1045, but no record of intake  More clear and oriented today    ROS:  Per daughter, has had more drainage from site which prompted abdominal CT this morning.   Patient reports she continues to have severe pain in abdomen (8/10). Has a cough. Having a hard time coughing up phlegm and taking deep breaths due to pain. Past Medical History:   Diagnosis Date    Alcoholic cirrhosis of liver without ascites ([facility] [address] 2/12/2018    ETOH abuse 2/11/2018    History of vascular access device 02/16/2018    [facility] VAT - 5 FR triple, R basilic, TPN    [facility] 2/16/2018     Results for Eh Dinero (MRN [id number]) as of 2/17/2018 08:43  Ref. Range 2/16/2018 17:20 Ammonia Latest Ref Range: <32 UMOL/L 69 (H)     Hyperlipidemia     Perforated chronic gastric ulcer ([facility] 2/11/2018      Past Surgical History:   Procedure Laterality Date    HX CATARACT REMOVAL        Prior to Admission medications    Medication Sig Start Date End Date Taking? Authorizing Provider   naproxen sodium (ALEVE) 220 mg tablet Take 220 mg by mouth daily as needed for Pain. Yes Historical Provider   cyanocobalamin (VITAMIN B12) 500 mcg tablet Take 500 mcg by mouth daily. Yes Historical Provider   multivitamin (ONE A DAY) tablet Take 1 Tab by mouth daily. Yes Historical Provider   omega-3 fatty acids-vitamin e 1,000 mg cap Take 2 Caps by mouth daily.    Yes Historical Provider     Current Facility-Administered Medications   Medication Dose Route Frequency    fentaNYL (DURAGESIC) 25 mcg/hr patch 1 Patch  1 Patch TransDERmal Q72H    QUEtiapine (SEROquel) tablet 12.5 mg  12.5 mg Oral QHS    TPN ADULT - CENTRAL   IntraVENous CONTINUOUS    octreotide (SANDOSTATIN) injection 100 mcg  100 mcg SubCUTAneous TID    guaiFENesin ER (MUCINEX) tablet 1,200 mg  1,200 mg Oral Q12H    TPN ADULT - CENTRAL   IntraVENous CONTINUOUS    methocarbamol (ROBAXIN) tablet 500 mg  500 mg Oral QID    albuterol-ipratropium (DUO-NEB) 2.5 MG-0.5 MG/3 ML  3 mL Nebulization Q6H RT    insulin regular (NOVOLIN R, HUMULIN R) injection   SubCUTAneous Q6H    fat emulsion 20% (LIPOSYN, INTRAlipid) infusion 500 mL  500 mL IntraVENous Q MON, WED & SAT    sodium chloride (NS) flush 10-40 mL  10-40 mL InterCATHeter Q8H    enoxaparin (LOVENOX) injection 40 mg  40 mg SubCUTAneous Q24H    pantoprazole (PROTONIX) injection 40 mg  40 mg IntraVENous Q12H    nystatin (MYCOSTATIN) 100,000 unit/gram powder   Topical BID     No Known Allergies   Social History   Substance Use Topics    Smoking status: Former Smoker    Smokeless tobacco: Never Used    Alcohol use 11.4 oz/week     0 Standard drinks or equivalent, 19 Glasses of wine per week      Comment: Hx of heavy etoh use, but quit several months ago      Family History   Problem Relation Age of Onset    Other Other      patient did not know          Laboratory: I have personally reviewed the critical care flowsheet and labs. Recent Labs      02/28/18 0552 02/27/18 0320 02/26/18   0055   WBC  17.8*  16.8*  15.7*   HGB  9.3*  9.0*  9.3*   HCT  29.6*  28.6*  28.8*   PLT  534*  514*  518*     Recent Labs      02/28/18 0552 02/27/18 0320  02/26/18   0055   NA  137  135*  137   K  4.5  4.2  4.6   CL  105  104  105   CO2  25  24  26   GLU  101*  95  95   BUN  13  14  14   CREA  0.52*  0.64  0.58   CA  7.8*  7.5*  7.6*   MG  2.0  1.8  2.0   PHOS  3.3  3.3  4.1   ALB   --   1.3*  1.5*   SGOT   --   54*  35   ALT   --   32  26       Objective:          Intake/Output Summary (Last 24 hours) at 02/28/18 1331  Last data filed at 02/28/18 1204   Gross per 24 hour   Intake                0 ml   Output             1045 ml   Net            -1045 ml     GENERAL: alert, able to answer questions appropriately, uncomfortable but NAD HEENT:  PERRL, EOMI, no alar flaring or epistaxis, oral mucosa moist without cyanosis, NECK:  no jugular vein distention, no retractions, no thyromegaly or masses, LUNGS: Mildly tachypneic,  upper respiratory coarse bs on 3L NC , HEART:  Regular rate and rhythm with no MGR; no edema is present, ABDOMEN:  soft, stable CDI, drain dressing CDI EXTREMITIES:  warm with no cyanosis, dependent edema in LEs SKIN:  no jaundice or ecchymosis and NEUROLOGIC: alert, oriented, grossly non-focal    Carole Naval, NP  [facility]

## 2018-02-28 NOTE — PROGRESS NOTES
Progress Note    Patient: Karla Spivey MRN: [id number]  SSN: [id number]    YOB: 1945  Age: 67 y.o. Sex: female      Admit Date: 2018    16 Days Post-Op    Procedure:  Procedure(s):  EX LAP    Subjective:     Mrs. Robertson Pear c/o cough. She has abdominal pain, not changed much. She did not drink much yesterday. Objective:     Visit Vitals    /63 (BP 1 Location: Left arm, BP Patient Position: At rest)    Pulse (!) 109    Temp 98.7 °F (37.1 °C)    Resp 29    Ht 5' 3.5\" (1.613 m)    Wt 193 lb 12.6 oz (87.9 kg)    SpO2 92%    Breastfeeding No    BMI 33.79 kg/m2       Temp (24hrs), Av.3 °F (36.8 °C), Min:97.7 °F (36.5 °C), Max:99.1 °F (37.3 °C)      Physical Exam:    GENERAL: alert, cooperative, no distress, LUNG: rhonchi diffusely, HEART: Tachycardic, regular rhythm, ABDOMEN: Soft, mild distention and tenderness. The ALYSSA is bilious, and the accordion is serous. , EXTREMITIES:  edema bilaterally.     Lab Review:   BMP:   Lab Results   Component Value Date/Time     2018 05:52 AM    K 4.5 2018 05:52 AM     2018 05:52 AM    CO2 25 2018 05:52 AM    AGAP 7 2018 05:52 AM     (H) 2018 05:52 AM    BUN 13 2018 05:52 AM    CREA 0.52 (L) 2018 05:52 AM    GFRAA >60 2018 05:52 AM    GFRNA >60 2018 05:52 AM     CBC:   Lab Results   Component Value Date/Time    WBC 17.8 (H) 2018 05:52 AM    HGB 9.3 (L) 2018 05:52 AM    HCT 29.6 (L) 2018 05:52 AM     (H) 2018 05:52 AM       Assessment:     Hospital Problems  Date Reviewed: 2016          Codes Class Noted POA    Hypokalemia ICD-10-CM: E87.6  ICD-9-CM: 276.8  2018 No        Leukocytosis ICD-10-CM: D72.829  ICD-9-CM: 288.60  2018 Yes        Severe protein-calorie malnutrition ([address] ICD-10-CM: E43  ICD-9-CM: 262  2018 Yes        Acute metabolic encephalopathy PAUL-68-XQ: G93.41  ICD-9-CM: 348.31  2018 Yes Hyperammonemia ([facility] [address] ICD-10-CM: E72.20  ICD-9-CM: 270.6  2/16/2018 No    Overview Addendum 2/17/2018  9:08 AM by Owen Kimbrough MD        Ref. Range 2/16/2018 17:20   Ammonia Latest Ref Range: <32 UMOL/L 69 (H)                Free intraperitoneal air ICD-10-CM: K66.8  ICD-9-CM: 568.89  2/12/2018 Yes        S/P exploratory laparotomy ICD-10-CM: Z98.890  ICD-9-CM: V45.89  2/12/2018 No    Overview Signed 2/12/2018 11:57 AM by Daquan Perrin MD     Suture repair of perforated posterior gastric ulcer with Daiva Guiles patch, core needle biopsies of left lobe of the liver. Alcoholic cirrhosis of liver without ascites (HCC) (Chronic) ICD-10-CM: K70.30  ICD-9-CM: 571.2  2/12/2018 Yes    Overview Signed 2/17/2018  8:45 AM by Owen Kimbrough MD     Liver bx 2/12/18:   Cirrhosis with mild chronic portal inflammation and macrovesicular steatosis. Fatty liver determined by biopsy ICD-10-CM: K76.0  ICD-9-CM: 571.8  2/12/2018 Yes    Overview Signed 2/17/2018  8:53 AM by Owen Kimbrough MD        Cirrhosis with mild chronic portal inflammation and macrovesicular steatosis. * (Principal)Perforated chronic gastric ulcer ([facility] [address] (Chronic) ICD-10-CM: K25.5  ICD-9-CM: 531.50  2/11/2018 Yes        ETOH abuse (Chronic) ICD-10-CM: F10.10  ICD-9-CM: 305.00  2/11/2018 Yes              Plan/Recommendations/Medical Decision Making:     Afebrile, WBC up slightly. Off antibiotics. NPO, continue TPN. Drain output up, check CT abdomen. Continue PPI's. Tachypnea and desaturation per nurse. Check ABG and CXR. Persistent tachycardia. Check EKG. Transfer to stepdown.     Signed By: Daquan Perrin MD     February 28, 2018

## 2018-02-28 NOTE — PROGRESS NOTES
Problem: Falls - Risk of  Goal: *Absence of Falls  Document Demarcus Fall Risk and appropriate interventions in the flowsheet.    Outcome: Progressing Towards Goal  Fall Risk Interventions:  Mobility Interventions: Bed/chair exit alarm, Patient to call before getting OOB, Utilize walker, cane, or other assitive device, Communicate number of staff needed for ambulation/transfer    Mentation Interventions: Bed/chair exit alarm, Adequate sleep, hydration, pain control, More frequent rounding, Reorient patient, Family/sitter at bedside    Medication Interventions: Bed/chair exit alarm, Patient to call before getting OOB, Teach patient to arise slowly    Elimination Interventions: Bed/chair exit alarm, Call light in reach, Patient to call for help with toileting needs    History of Falls Interventions: Bed/chair exit alarm

## 2018-02-28 NOTE — PROGRESS NOTES
Dr. Locke Class notified of elevated MEWS score, tachypnea  And report from remote telemetry that patient had quick elevation of heart rate to 153 this morning. Current rate and average rate per tele tech is from 's. New order received to transfer patient to ICU or step down bed, also new orders received for CT scan and sat ABG. Family remains at bedside. Aware of orders placed, family very supportive.

## 2018-02-28 NOTE — PROGRESS NOTES
Family Practice Daily Progress Note: 2/28/2018  Nestor Cole DO    Assessment/Plan:   Perforated chronic gastric ulcer /  Free intraperitoneal air / S/P exploratory laparotomy 2/11. S/p repair in OR with Dr. Magalys Pérez on 2/12/18 - per surg    Severe protein calorie malnutrition-POA  --calcium corrects  --TPN     Acute metabolic encephalopathy. Multifactorial-- medications, alcohol use, sundowning  ---avoid triggers  ---CIWA  ---surgery has ordered haldol PRN  ---maintenance of sleep/wake cycle, and re-orientation   ---Add Seroquel at bedtime to help with sleep wake cycle     Hypoxia / Respiratory distress. CXR with atelectasis and pleural effusion. Continue Abx, nebs, supplemental oxygen and incentive spirom as able. Repeat CT chest with moderate to large persistent left subphrenic collection. ---Pulmonary consulted     ---Bumex.   ---Drain placed by IR     Alcoholic cirrhosis of liver without ascites /  Fatty liver determined by biopsy /  Hyperammonemia. --GI has seen- rec alcohol cessation and outpt follow up     ETOH abuse. Needs to stop ETOH entirely.     Hypokalemia /  Hypoalbuminemia. Being addressed with TPN     Leukocytosis. Up and febrile again  --if spikes again, will need to repeat blood cultures, and urine  --abx per pulm. - Zosyn, and levaquin    Anemia: Hgb up to 9.5  --follow, transfuse <7    Calcium corrects.     Back Pain due to compression fracture of T12  ---Increase Duragesic patch to 25mcg and DIiaudid to 1mg as needed    Debility: due to prolonged hospital stay  --continue PT/OT- needs to get OOB  --will likely need rehab        Problem List:  Problem List as of 2/28/2018  Date Reviewed: 4/26/2016          Codes Class Noted - Resolved    Hypokalemia ICD-10-CM: E87.6  ICD-9-CM: 276.8  2/18/2018 - Present        Leukocytosis ICD-10-CM: D72.829  ICD-9-CM: 288.60  2/18/2018 - Present        Severe protein-calorie malnutrition ([facility] [address] ICD-10-CM: E43  ICD-9-CM: 654 2/18/2018 - Present        Acute metabolic encephalopathy TAA-46-CD: G93.41  ICD-9-CM: 348.31  2/18/2018 - Present        Hyperammonemia (HCC) ICD-10-CM: E72.20  ICD-9-CM: 270.6  2/16/2018 - Present    Overview Addendum 2/17/2018  9:08 AM by Mack Power MD        Ref. Range 2/16/2018 17:20   Ammonia Latest Ref Range: <32 UMOL/L 69 (H)                Free intraperitoneal air ICD-10-CM: K66.8  ICD-9-CM: 568.89  2/12/2018 - Present        S/P exploratory laparotomy ICD-10-CM: Z98.890  ICD-9-CM: V45.89  2/12/2018 - Present    Overview Signed 2/12/2018 11:57 AM by Eddie Palma MD     Suture repair of perforated posterior gastric ulcer with [place] patch, core needle biopsies of left lobe of the liver. Alcoholic cirrhosis of liver without ascites (HCC) (Chronic) ICD-10-CM: K70.30  ICD-9-CM: 571.2  2/12/2018 - Present    Overview Signed 2/17/2018  8:45 AM by Mack Power MD     Liver bx 2/12/18:   Cirrhosis with mild chronic portal inflammation and macrovesicular steatosis. Fatty liver determined by biopsy ICD-10-CM: K76.0  ICD-9-CM: 571.8  2/12/2018 - Present    Overview Signed 2/17/2018  8:53 AM by Mack Power MD        Cirrhosis with mild chronic portal inflammation and macrovesicular steatosis. * (Principal)Perforated chronic gastric ulcer ([address] (Chronic) ICD-10-CM: K25.5  ICD-9-CM: 531.50  2/11/2018 - Present        ETOH abuse (Chronic) ICD-10-CM: F10.10  ICD-9-CM: 305.00  2/11/2018 - Present        GI bleed ICD-10-CM: K92.2  ICD-9-CM: 578.9  4/26/2016 - Present        Hypotension ICD-10-CM: I95.9  ICD-9-CM: 458.9  4/26/2016 - Present        Tachycardia ICD-10-CM: R00.0  ICD-9-CM: 785.0  4/26/2016 - Present        Acute blood loss anemia ICD-10-CM: D62  ICD-9-CM: 285.1  4/26/2016 - Present              Subjective:    67 y.o.  female with EtOH abuse who is admitted to the General Surgery Service with perforated gastric ulcer.   We are asked to evaluate for altered mental status. The patient is poorly interactive at this time and this limits primary hx. Discussed case with family available at bedside providing secondary hx and chart review. Per family, patient has had declining neurological condition over past 48 hours. Notably, an RRT was called for respiratory distress. 2/19: This morning she is a bit more alert at this moment and taking in more complete sentences. She says she does not feel well. She has no specific site of pain other than the NG tube which is bothering her a great deal.  She should improve as time from surg increases. K+ a little low - replacing.     2/20:  Still confused and somnolent at times. Now able to localize pain to ab and converse a bit more. Tmax 100.3  WBC is up again but hopefully reactive - recheck in AM - more incentive spirom and check CXR.     2/21: A little more alert. Knows she is in the hospital. Not able to participate with PT yesterday as she was in too much pain. WBC still at 19.     2/22:  WBC 19K. She is more alert. Daughter at bedside. Both of her daughters live out of town. Looking at SNF options for rehab. Coughing more. Starting to use IS.     2/23: WBC 17. Repeat CT chest with moderate to large persistent left subphrenic collection. IR has been consulted. Vanc and Levaquin added by pulm. 2/24: Pt had a better night last night. Only brief episode of confusion. She is much clearer this AM.  Now in quite a bit of pain from gas and stomach cramping. Thinks she will feel better if she can have a BM. Has been on bedpan for a while without success. On TPN. Daughters very concerned that she won't be successful while lying down. Wanting to get up to bedside commode. Will need PT's help. 2/25: febrile overnight and WBC up after vanc was stopped. Pt c/o more dyspnea today. Known fluid collection that will be drained in IR this week. Up to chair with PT yesterday briefly. +BM yesterday. Da notes pt has been c/o R wrist pain off and on for 2 wks since she fell. No swelling.    2/26:  No new complaints. Still c/o back and ab pain. Still passing gas and had BM. Diuresed with 1 mg Bumex yesterday by Pulmonary. Remains on Zosyn and Levaquin. Blood culture remains neg. Tmax 99.2. X-ray of wrists ok. CXR ok with tube inplace. WBC 15.7K.      2/27:  Looks much better today. Much less work of breathing. Diuresed about 1 liter over last 24h. WBC 16K today. Blood cult remains neg. Off antibiotics. Still on TPN. She will need rehab for PT/OT. 2/28: Very restless during the night. Has not been OOB. Daughter stayed the night with her. WBC is still up. Antibiotics stopped yesterday. She is c/o increased pain in her back and can't get comfortable. More anxious and agitated. Tolerating sips. Past Medical History:   Diagnosis Date    Alcoholic cirrhosis of liver without ascites ([address] 2/12/2018    ETOH abuse 2/11/2018    History of vascular access device 02/16/2018    [facility] - 5 FR triple, R basilic, TPN    Hyperammonemia ([address] 2/16/2018     Results for Aminta Guallpa (MRN [id number]) as of 2/17/2018 08:43  Ref. Range 2/16/2018 17:20 Ammonia Latest Ref Range: <32 UMOL/L 69 (H)     Hyperlipidemia     Perforated chronic gastric ulcer ([address] 2/11/2018     Past Surgical History:   Procedure Laterality Date    HX CATARACT REMOVAL       Social History     Social History    Marital status: SINGLE     Spouse name: N/A    Number of children: N/A    Years of education: N/A     Occupational History    Not on file.      Social History Main Topics    Smoking status: Former Smoker    Smokeless tobacco: Never Used    Alcohol use 11.4 oz/week     0 Standard drinks or equivalent, 19 Glasses of wine per week      Comment: Hx of heavy etoh use, but quit several months ago    Drug use: No    Sexual activity: Not on file     Other Topics Concern    Not on file     Social History Narrative Family History   Problem Relation Age of Onset    Other Other      patient did not know       Review of Systems:   Review of systems not obtained due to patient factors. Objective:   Physical Exam:     Visit Vitals    /66 (BP 1 Location: Left arm, BP Patient Position: At rest)    Pulse (!) 108    Temp 98.1 °F (36.7 °C)    Resp 23    Ht 5' 3.5\" (1.613 m)    Wt 193 lb 12.6 oz (87.9 kg)    SpO2 91%    Breastfeeding No    BMI 33.79 kg/m2    O2 Flow Rate (L/min): 4 l/min O2 Device: Nasal cannula    Temp (24hrs), Av.4 °F (36.9 °C), Min:97.7 °F (36.5 °C), Max:99.1 °F (37.3 °C)    701 - 1900  In: -   Out: 350 [Drains:350]   1901 -  0700  In: 240 [P.O.:240]  Out: 1695 [Urine:1300; Drains:160]    General:  Awake, uncomfortable, appears stated age. Head:  Normocephalic, without obvious abnormality, atraumatic. Eyes:  Conjunctivae/corneas clear. EOMs intact. Nose: Patent, on NC   Throat: Lips, mucosa, and tongue dry. Neck: Supple, symmetrical, trachea midline, no adenopathy, thyroid: no enlargement/tenderness/nodules, no carotid bruit and no JVD. Lungs:    no intercostal use, breath sounds diminished    Chest wall:  No tenderness or deformity. Heart:  Tachycardic rate with regular rhythm, S1, S2 normal, no murmur, click, rub or gallop. Abdomen:   Soft, distended, with mild generalized tenderness. Bowel sounds present. Dressings dry. Incision dry without redness, staple line intact. Drains patent   Extremities: no cyanosis. 1+ LE edema. No calf tenderness or cords, no erythema or swelling   Skin: Skin color, texture, turgor normal. No rashes or lesions   Neurologic:   AOx2,  Gait not tested at this time. Sensation grossly normal to touch.   Gross motor of extremities normal.       Data Review:       Recent Days:  Recent Labs      18   0552  18   0320  18   0055   WBC  17.8*  16.8*  15.7*   HGB  9.3*  9.0*  9.3*   HCT  29.6*  28.6*  28.8*   PLT 534*  514*  518*     Recent Labs      02/28/18   1452  02/28/18   0552  02/27/18   0320  02/26/18   0055   NA   --   137  135*  137   K   --   4.5  4.2  4.6   CL   --   105  104  105   CO2   --   25  24  26   GLU   --   101*  95  95   BUN   --   13  14  14   CREA   --   0.52*  0.64  0.58   CA   --   7.8*  7.5*  7.6*   MG   --   2.0  1.8  2.0   PHOS   --   3.3  3.3  4.1   ALB   --    --   1.3*  1.5*   TBILI   --    --   1.0  1.1*   SGOT  31   --   54*  35   ALT  27   --   32  26   INR  1.2*   --    --    --      Recent Labs      02/28/18   1038   PH  7.46*   PCO2  31*   PO2  70*   HCO3  22       24 Hour Results:  Recent Results (from the past 24 hour(s))   GLUCOSE, POC    Collection Time: 02/27/18  6:01 PM   Result Value Ref Range    Glucose (POC) 106 (H) 65 - 100 mg/dL    Performed by Art Sanches (PCT)    GLUCOSE, POC    Collection Time: 02/27/18 11:58 PM   Result Value Ref Range    Glucose (POC) 118 (H) 65 - 100 mg/dL    Performed by Vladimir Beavers    GLUCOSE, POC    Collection Time: 02/28/18  5:43 AM   Result Value Ref Range    Glucose (POC) 102 (H) 65 - 100 mg/dL    Performed by Vladimir Beavers    CBC WITH AUTOMATED DIFF    Collection Time: 02/28/18  5:52 AM   Result Value Ref Range    WBC 17.8 (H) 3.6 - 11.0 K/uL    RBC 2.80 (L) 3.80 - 5.20 M/uL    HGB 9.3 (L) 11.5 - 16.0 g/dL    HCT 29.6 (L) 35.0 - 47.0 %    .7 (H) 80.0 - 99.0 FL    MCH 33.2 26.0 - 34.0 PG    MCHC 31.4 30.0 - 36.5 g/dL    RDW 14.6 (H) 11.5 - 14.5 %    PLATELET 798 (H) 049 - 400 K/uL    MPV 12.4 8.9 - 12.9 FL    NRBC 0.0 0  WBC    ABSOLUTE NRBC 0.00 0.00 - 0.01 K/uL    NEUTROPHILS 80 (H) 32 - 75 %    LYMPHOCYTES 10 (L) 12 - 49 %    MONOCYTES 7 5 - 13 %    EOSINOPHILS 1 0 - 7 %    BASOPHILS 0 0 - 1 %    IMMATURE GRANULOCYTES 1 (H) 0.0 - 0.5 %    ABS. NEUTROPHILS 14.3 (H) 1.8 - 8.0 K/UL    ABS. LYMPHOCYTES 1.8 0.8 - 3.5 K/UL    ABS. MONOCYTES 1.2 (H) 0.0 - 1.0 K/UL    ABS. EOSINOPHILS 0.2 0.0 - 0.4 K/UL    ABS.  BASOPHILS 0.1 0.0 - 0.1 K/UL    ABS. IMM. GRANS. 0.2 (H) 0.00 - 0.04 K/UL    DF AUTOMATED     PHOSPHORUS    Collection Time: 02/28/18  5:52 AM   Result Value Ref Range    Phosphorus 3.3 2.6 - 4.7 MG/DL   MAGNESIUM    Collection Time: 02/28/18  5:52 AM   Result Value Ref Range    Magnesium 2.0 1.6 - 2.4 mg/dL   METABOLIC PANEL, BASIC    Collection Time: 02/28/18  5:52 AM   Result Value Ref Range    Sodium 137 136 - 145 mmol/L    Potassium 4.5 3.5 - 5.1 mmol/L    Chloride 105 97 - 108 mmol/L    CO2 25 21 - 32 mmol/L    Anion gap 7 5 - 15 mmol/L    Glucose 101 (H) 65 - 100 mg/dL    BUN 13 6 - 20 MG/DL    Creatinine 0.52 (L) 0.55 - 1.02 MG/DL    BUN/Creatinine ratio 25 (H) 12 - 20      GFR est AA >60 >60 ml/min/1.73m2    GFR est non-AA >60 >60 ml/min/1.73m2    Calcium 7.8 (L) 8.5 - 10.1 MG/DL   BLOOD GAS, ARTERIAL    Collection Time: 02/28/18 10:38 AM   Result Value Ref Range    pH 7.46 (H) 7.35 - 7.45      PCO2 31 (L) 35.0 - 45.0 mmHg    PO2 70 (L) 80 - 100 mmHg    O2 SAT 95 92 - 97 %    BICARBONATE 22 22 - 26 mmol/L    BASE DEFICIT 1.0 mmol/L    O2 METHOD NASAL O2      O2 FLOW RATE 3.00 L/min    Sample source ARTERIAL      SITE LEFT RADIAL      GRISEL'S TEST YES     GLUCOSE, POC    Collection Time: 02/28/18 12:09 PM   Result Value Ref Range    Glucose (POC) 115 (H) 65 - 100 mg/dL    Performed by Luz Lewis (PCT)    NT-PRO BNP    Collection Time: 02/28/18  2:52 PM   Result Value Ref Range    NT pro- (H) 0 - 125 PG/ML   PROTHROMBIN TIME + INR    Collection Time: 02/28/18  2:52 PM   Result Value Ref Range    INR 1.2 (H) 0.9 - 1.1      Prothrombin time 12.6 (H) 9.0 - 11.1 sec   AST    Collection Time: 02/28/18  2:52 PM   Result Value Ref Range    AST (SGOT) 31 15 - 37 U/L   ALT    Collection Time: 02/28/18  2:52 PM   Result Value Ref Range    ALT (SGPT) 27 12 - 78 U/L   ALK PHOS    Collection Time: 02/28/18  2:52 PM   Result Value Ref Range    Alk.  phosphatase 166 (H) 45 - 117 U/L       Medications reviewed  Current Facility-Administered Medications   Medication Dose Route Frequency    fentaNYL (DURAGESIC) 25 mcg/hr patch 1 Patch  1 Patch TransDERmal Q72H    HYDROmorphone (PF) (DILAUDID) injection 1 mg  1 mg IntraVENous Q4H PRN    QUEtiapine (SEROquel) tablet 12.5 mg  12.5 mg Oral QHS    haloperidol lactate (HALDOL) injection 1 mg  1 mg IntraVENous Q4H PRN    TPN ADULT - CENTRAL   IntraVENous CONTINUOUS    octreotide (SANDOSTATIN) injection 100 mcg  100 mcg SubCUTAneous TID    guaiFENesin ER (MUCINEX) tablet 1,200 mg  1,200 mg Oral Q12H    TPN ADULT - CENTRAL   IntraVENous CONTINUOUS    methocarbamol (ROBAXIN) tablet 500 mg  500 mg Oral QID    albuterol-ipratropium (DUO-NEB) 2.5 MG-0.5 MG/3 ML  3 mL Nebulization Q6H RT    acetaminophen (TYLENOL) tablet 650 mg  650 mg Oral Q4H PRN    insulin regular (NOVOLIN R, HUMULIN R) injection   SubCUTAneous Q6H    fat emulsion 20% (LIPOSYN, INTRAlipid) infusion 500 mL  500 mL IntraVENous Q MON, WED & SAT    glucose chewable tablet 16 g  4 Tab Oral PRN    glucagon (GLUCAGEN) injection 1 mg  1 mg IntraMUSCular PRN    dextrose (D50W) injection syrg 12.5-25 g  12.5-25 g IntraVENous PRN    alteplase (CATHFLO) 1 mg in sterile water (preservative free) 1 mL injection  1 mg InterCATHeter PRN    sodium chloride (NS) flush 10-30 mL  10-30 mL InterCATHeter PRN    sodium chloride (NS) flush 10-40 mL  10-40 mL InterCATHeter Q8H    naloxone (NARCAN) injection 0.4 mg  0.4 mg IntraVENous PRN    ondansetron (ZOFRAN) injection 4 mg  4 mg IntraVENous Q4H PRN    enoxaparin (LOVENOX) injection 40 mg  40 mg SubCUTAneous Q24H    pantoprazole (PROTONIX) injection 40 mg  40 mg IntraVENous Q12H    phenol throat spray (CHLORASEPTIC) 1 Spray  1 Spray Oral PRN    nystatin (MYCOSTATIN) 100,000 unit/gram powder   Topical BID    sodium chloride (NS) flush 5-10 mL  5-10 mL IntraVENous PRN       Care Plan discussed with: Patient and daughter and Nurse     Total time spent with patient: 27 minutes.     Reyna Leventhal, MD

## 2018-02-28 NOTE — PROGRESS NOTES
Gastroenterology Progress Note    February 28, 2018  Admit Date: 2/11/2018         Narrative Assessment and Plan   · Perforated gastric ulcer   · Tachycardia  · leukocytosis  · Cirrhosis  · Alcohol abuse  · Ascites    Patient does not appear well today. Increased respirations, tachycardia, WBC rising, and significant increase in bilious output from ALYSSA drain. Concern for leak. Discussed with Dr. Angelo Dorsey - will make patient NPO, repeat CT scan, she is being transferred to ICU. Investigations ongoing. Lakshmi Henry MD       Subjective:   · C/O abdominal pain and back pain. Increased respirations and shallow breathing. Some nausea, no vomiting. ROS:  The previous review of systems on initial consultation / H&P is noted and reviewed. Specific changes noted above in HPI.     Current Medications:     Current Facility-Administered Medications   Medication Dose Route Frequency    fentaNYL (DURAGESIC) 25 mcg/hr patch 1 Patch  1 Patch TransDERmal Q72H    HYDROmorphone (PF) (DILAUDID) injection 1 mg  1 mg IntraVENous Q4H PRN    QUEtiapine (SEROquel) tablet 12.5 mg  12.5 mg Oral QHS    haloperidol lactate (HALDOL) injection 1 mg  1 mg IntraVENous Q4H PRN    TPN ADULT - CENTRAL   IntraVENous CONTINUOUS    TPN ADULT - CENTRAL   IntraVENous CONTINUOUS    methocarbamol (ROBAXIN) tablet 500 mg  500 mg Oral QID    guaiFENesin ER (MUCINEX) tablet 600 mg  600 mg Oral Q12H    albuterol-ipratropium (DUO-NEB) 2.5 MG-0.5 MG/3 ML  3 mL Nebulization Q6H RT    acetaminophen (TYLENOL) tablet 650 mg  650 mg Oral Q4H PRN    bisacodyl (DULCOLAX) suppository 10 mg  10 mg Rectal DAILY PRN    insulin regular (NOVOLIN R, HUMULIN R) injection   SubCUTAneous Q6H    fat emulsion 20% (LIPOSYN, INTRAlipid) infusion 500 mL  500 mL IntraVENous Q MON, WED & SAT    glucose chewable tablet 16 g  4 Tab Oral PRN    glucagon (GLUCAGEN) injection 1 mg  1 mg IntraMUSCular PRN    dextrose (D50W) injection syrg 12.5-25 g  12.5-25 g IntraVENous PRN    alteplase (CATHFLO) 1 mg in sterile water (preservative free) 1 mL injection  1 mg InterCATHeter PRN    sodium chloride (NS) flush 10-30 mL  10-30 mL InterCATHeter PRN    sodium chloride (NS) flush 10-40 mL  10-40 mL InterCATHeter Q8H    naloxone (NARCAN) injection 0.4 mg  0.4 mg IntraVENous PRN    ondansetron (ZOFRAN) injection 4 mg  4 mg IntraVENous Q4H PRN    enoxaparin (LOVENOX) injection 40 mg  40 mg SubCUTAneous Q24H    pantoprazole (PROTONIX) injection 40 mg  40 mg IntraVENous Q12H    phenol throat spray (CHLORASEPTIC) 1 Spray  1 Spray Oral PRN    nystatin (MYCOSTATIN) 100,000 unit/gram powder   Topical BID    sodium chloride (NS) flush 5-10 mL  5-10 mL IntraVENous PRN       Objective:     VITALS:   Last 24hrs VS reviewed since prior progress note.  Most recent are:  Visit Vitals    /63 (BP 1 Location: Left arm, BP Patient Position: At rest)    Pulse (!) 109    Temp 98.7 °F (37.1 °C)    Resp 29    Ht 5' 3.5\" (1.613 m)    Wt 87.9 kg (193 lb 12.6 oz)    SpO2 92%    Breastfeeding No    BMI 33.79 kg/m2     Temp (24hrs), Av.3 °F (36.8 °C), Min:97.7 °F (36.5 °C), Max:99.1 °F (37.3 °C)      Intake/Output Summary (Last 24 hours) at 18 1004  Last data filed at 18 0950   Gross per 24 hour   Intake                0 ml   Output              870 ml   Net             -870 ml       EXAM:  General: Anxious   Lungs:  Diminished breath sounds, tachypnea  Heart:              tachycardic  Abdomen: Soft, diffuse tenderness to palpation, ALYSSA drain 75cc bilious output (emptied by tech and immediately                                began to fill up again)  Neurologic:  Cranial nerves grossly intact, moves all 4 extremities    Lab Data Reviewed:   Recent Labs      18   0552  18   0320  18   0055   WBC  17.8*  16.8*  15.7*   HGB  9.3*  9.0*  9.3*   HCT  29.6*  28.6*  28.8*   PLT  534*  514*  518*     Recent Labs      18   0552  18   0326 02/26/18   0055   NA  137  135*  137   K  4.5  4.2  4.6   CL  105  104  105   CO2  25  24  26   GLU  101*  95  95   BUN  13  14  14   CREA  0.52*  0.64  0.58   CA  7.8*  7.5*  7.6*   MG  2.0  1.8  2.0   PHOS  3.3  3.3  4.1   ALB   --   1.3*  1.5*   TBILI   --   1.0  1.1*   SGOT   --   54*  35   ALT   --   32  26     Lab Results   Component Value Date/Time    Glucose (POC) 102 (H) 02/28/2018 05:43 AM    Glucose (POC) 118 (H) 02/27/2018 11:58 PM    Glucose (POC) 106 (H) 02/27/2018 06:01 PM    Glucose (POC) 123 (H) 02/27/2018 11:21 AM    Glucose (POC) 102 (H) 02/27/2018 05:38 AM     No results for input(s): PH, PCO2, PO2, HCO3, FIO2 in the last 72 hours. No results for input(s): INR in the last 72 hours. No lab exists for component: INREXT, INREXT        Assessment:   (See above)  Principal Problem:    Perforated chronic gastric ulcer ([address] (2/11/2018)    Active Problems:    ETOH abuse (2/11/2018)      Free intraperitoneal air (2/12/2018)      S/P exploratory laparotomy (2/12/2018)      Overview: Suture repair of perforated posterior gastric ulcer with Henry Darell patch,       core needle biopsies of left lobe of the liver. Alcoholic cirrhosis of liver without ascites ([address] (2/12/2018)      Overview: Liver bx 2/12/18:       Cirrhosis with mild chronic portal inflammation and macrovesicular       steatosis. Fatty liver determined by biopsy (2/12/2018)      Overview:        Cirrhosis with mild chronic portal inflammation and macrovesicular       steatosis. Hyperammonemia ([address] (2/16/2018)      Overview:        Ref.  Range 2/16/2018 17:20       Ammonia Latest Ref Range: <32 UMOL/L 69 (H)             Hypokalemia (2/18/2018)      Leukocytosis (2/18/2018)      Severe protein-calorie malnutrition ([address] (2/18/2018)      Acute metabolic encephalopathy (8/88/7881)        Plan:   (See above)    Signed By:  Louis Meza PA-C  2/28/2018  10:04 AM

## 2018-02-28 NOTE — PROGRESS NOTES
Spoke with Dr. Chucky Dean states ALYSSA drain is in place and is aware that patient does not have pigtail chest tube

## 2018-02-28 NOTE — PROGRESS NOTES
Bed has been assigned for room 320, awaiting room to be cleaned prior to giving report. Patient is in bed, alert and responsive.  Family members at bedside

## 2018-03-01 ENCOUNTER — APPOINTMENT (OUTPATIENT)
Dept: GENERAL RADIOLOGY | Age: 73
DRG: 853 | End: 2018-03-01
Attending: RADIOLOGY
Payer: MEDICARE

## 2018-03-01 ENCOUNTER — APPOINTMENT (OUTPATIENT)
Dept: GENERAL RADIOLOGY | Age: 73
DRG: 853 | End: 2018-03-01
Attending: SURGERY
Payer: MEDICARE

## 2018-03-01 ENCOUNTER — APPOINTMENT (OUTPATIENT)
Dept: INTERVENTIONAL RADIOLOGY/VASCULAR | Age: 73
DRG: 853 | End: 2018-03-01
Attending: SURGERY
Payer: MEDICARE

## 2018-03-01 ENCOUNTER — APPOINTMENT (OUTPATIENT)
Dept: GENERAL RADIOLOGY | Age: 73
DRG: 853 | End: 2018-03-01
Attending: INTERNAL MEDICINE
Payer: MEDICARE

## 2018-03-01 LAB
ALBUMIN SERPL-MCNC: 1.4 G/DL (ref 3.5–5)
ANION GAP SERPL CALC-SCNC: 5 MMOL/L (ref 5–15)
APPEARANCE FLD: ABNORMAL
BASOPHILS # BLD: 0.1 K/UL (ref 0–0.1)
BASOPHILS NFR BLD: 1 % (ref 0–1)
BUN SERPL-MCNC: 11 MG/DL (ref 6–20)
BUN/CREAT SERPL: 21 (ref 12–20)
CALCIUM SERPL-MCNC: 7.9 MG/DL (ref 8.5–10.1)
CHLORIDE SERPL-SCNC: 107 MMOL/L (ref 97–108)
CO2 SERPL-SCNC: 25 MMOL/L (ref 21–32)
COLOR FLD: ABNORMAL
CREAT SERPL-MCNC: 0.53 MG/DL (ref 0.55–1.02)
DIFFERENTIAL METHOD BLD: ABNORMAL
EOSINOPHIL # BLD: 0.2 K/UL (ref 0–0.4)
EOSINOPHIL NFR BLD: 1 % (ref 0–7)
ERYTHROCYTE [DISTWIDTH] IN BLOOD BY AUTOMATED COUNT: 14.7 % (ref 11.5–14.5)
GLUCOSE BLD STRIP.AUTO-MCNC: 113 MG/DL (ref 65–100)
GLUCOSE BLD STRIP.AUTO-MCNC: 157 MG/DL (ref 65–100)
GLUCOSE BLD STRIP.AUTO-MCNC: 197 MG/DL (ref 65–100)
GLUCOSE BLD STRIP.AUTO-MCNC: 85 MG/DL (ref 65–100)
GLUCOSE SERPL-MCNC: 174 MG/DL (ref 65–100)
HCT VFR BLD AUTO: 31.7 % (ref 35–47)
HGB BLD-MCNC: 9.9 G/DL (ref 11.5–16)
IMM GRANULOCYTES # BLD: 0.2 K/UL (ref 0–0.04)
IMM GRANULOCYTES NFR BLD AUTO: 1 % (ref 0–0.5)
LYMPHOCYTES # BLD: 1.7 K/UL (ref 0.8–3.5)
LYMPHOCYTES NFR BLD: 9 % (ref 12–49)
LYMPHOCYTES NFR FLD: 11 %
MAGNESIUM SERPL-MCNC: 2 MG/DL (ref 1.6–2.4)
MCH RBC QN AUTO: 33.2 PG (ref 26–34)
MCHC RBC AUTO-ENTMCNC: 31.2 G/DL (ref 30–36.5)
MCV RBC AUTO: 106.4 FL (ref 80–99)
MESOTHL CELL NFR FLD: 3 %
MONOCYTES # BLD: 0.9 K/UL (ref 0–1)
MONOCYTES NFR BLD: 5 % (ref 5–13)
MONOS+MACROS NFR FLD: 4 %
NEUTROPHILS NFR FLD: 82 %
NEUTS SEG # BLD: 15.5 K/UL (ref 1.8–8)
NEUTS SEG NFR BLD: 83 % (ref 32–75)
NRBC # BLD: 0 K/UL (ref 0–0.01)
NRBC BLD-RTO: 0 PER 100 WBC
NUC CELL # FLD: 6631 /CU MM
PHOSPHATE SERPL-MCNC: 3.6 MG/DL (ref 2.6–4.7)
PLATELET # BLD AUTO: 525 K/UL (ref 150–400)
PMV BLD AUTO: 12.2 FL (ref 8.9–12.9)
POTASSIUM SERPL-SCNC: 4.3 MMOL/L (ref 3.5–5.1)
PREALB SERPL-MCNC: 3.8 MG/DL (ref 20–40)
RBC # BLD AUTO: 2.98 M/UL (ref 3.8–5.2)
RBC # FLD: >100 /CU MM
SERVICE CMNT-IMP: ABNORMAL
SERVICE CMNT-IMP: NORMAL
SODIUM SERPL-SCNC: 137 MMOL/L (ref 136–145)
SPECIMEN SOURCE FLD: ABNORMAL
WBC # BLD AUTO: 18.6 K/UL (ref 3.6–11)

## 2018-03-01 PROCEDURE — 89050 BODY FLUID CELL COUNT: CPT | Performed by: RADIOLOGY

## 2018-03-01 PROCEDURE — 74011250636 HC RX REV CODE- 250/636: Performed by: INTERNAL MEDICINE

## 2018-03-01 PROCEDURE — 74011250637 HC RX REV CODE- 250/637: Performed by: INTERNAL MEDICINE

## 2018-03-01 PROCEDURE — 84100 ASSAY OF PHOSPHORUS: CPT | Performed by: SURGERY

## 2018-03-01 PROCEDURE — 77030038269 HC DRN EXT URIN PURWCK BARD -A

## 2018-03-01 PROCEDURE — C9113 INJ PANTOPRAZOLE SODIUM, VIA: HCPCS | Performed by: SURGERY

## 2018-03-01 PROCEDURE — 80048 BASIC METABOLIC PNL TOTAL CA: CPT | Performed by: SURGERY

## 2018-03-01 PROCEDURE — 87075 CULTR BACTERIA EXCEPT BLOOD: CPT | Performed by: RADIOLOGY

## 2018-03-01 PROCEDURE — 74018 RADEX ABDOMEN 1 VIEW: CPT

## 2018-03-01 PROCEDURE — C1769 GUIDE WIRE: HCPCS

## 2018-03-01 PROCEDURE — 82962 GLUCOSE BLOOD TEST: CPT

## 2018-03-01 PROCEDURE — C1894 INTRO/SHEATH, NON-LASER: HCPCS

## 2018-03-01 PROCEDURE — 77030018719 HC DRSG PTCH ANTIMIC J&J -A

## 2018-03-01 PROCEDURE — 84157 ASSAY OF PROTEIN OTHER: CPT | Performed by: PHYSICIAN ASSISTANT

## 2018-03-01 PROCEDURE — 77010033678 HC OXYGEN DAILY

## 2018-03-01 PROCEDURE — 85025 COMPLETE CBC W/AUTO DIFF WBC: CPT | Performed by: FAMILY MEDICINE

## 2018-03-01 PROCEDURE — 71045 X-RAY EXAM CHEST 1 VIEW: CPT

## 2018-03-01 PROCEDURE — 77030010546 HC BG URIN DRNG URES -B

## 2018-03-01 PROCEDURE — 74011000250 HC RX REV CODE- 250: Performed by: SURGERY

## 2018-03-01 PROCEDURE — 74011000250 HC RX REV CODE- 250: Performed by: RADIOLOGY

## 2018-03-01 PROCEDURE — 74011250637 HC RX REV CODE- 250/637: Performed by: SURGERY

## 2018-03-01 PROCEDURE — 0W993ZZ DRAINAGE OF RIGHT PLEURAL CAVITY, PERCUTANEOUS APPROACH: ICD-10-PCS | Performed by: RADIOLOGY

## 2018-03-01 PROCEDURE — 74011000250 HC RX REV CODE- 250: Performed by: INTERNAL MEDICINE

## 2018-03-01 PROCEDURE — 02PYX3Z REMOVAL OF INFUSION DEVICE FROM GREAT VESSEL, EXTERNAL APPROACH: ICD-10-PCS | Performed by: RADIOLOGY

## 2018-03-01 PROCEDURE — 87205 SMEAR GRAM STAIN: CPT | Performed by: RADIOLOGY

## 2018-03-01 PROCEDURE — C1751 CATH, INF, PER/CENT/MIDLINE: HCPCS

## 2018-03-01 PROCEDURE — 76942 ECHO GUIDE FOR BIOPSY: CPT

## 2018-03-01 PROCEDURE — 83986 ASSAY PH BODY FLUID NOS: CPT | Performed by: PHYSICIAN ASSISTANT

## 2018-03-01 PROCEDURE — 74011636637 HC RX REV CODE- 636/637: Performed by: SURGERY

## 2018-03-01 PROCEDURE — 83735 ASSAY OF MAGNESIUM: CPT | Performed by: SURGERY

## 2018-03-01 PROCEDURE — 02HV33Z INSERTION OF INFUSION DEVICE INTO SUPERIOR VENA CAVA, PERCUTANEOUS APPROACH: ICD-10-PCS | Performed by: RADIOLOGY

## 2018-03-01 PROCEDURE — 94640 AIRWAY INHALATION TREATMENT: CPT

## 2018-03-01 PROCEDURE — 74011250637 HC RX REV CODE- 250/637: Performed by: FAMILY MEDICINE

## 2018-03-01 PROCEDURE — C1729 CATH, DRAINAGE: HCPCS

## 2018-03-01 PROCEDURE — 82040 ASSAY OF SERUM ALBUMIN: CPT | Performed by: FAMILY MEDICINE

## 2018-03-01 PROCEDURE — 36415 COLL VENOUS BLD VENIPUNCTURE: CPT | Performed by: FAMILY MEDICINE

## 2018-03-01 PROCEDURE — 65660000000 HC RM CCU STEPDOWN

## 2018-03-01 PROCEDURE — 82945 GLUCOSE OTHER FLUID: CPT | Performed by: PHYSICIAN ASSISTANT

## 2018-03-01 PROCEDURE — 32557 INSERT CATH PLEURA W/ IMAGE: CPT

## 2018-03-01 PROCEDURE — 74011000258 HC RX REV CODE- 258: Performed by: SURGERY

## 2018-03-01 PROCEDURE — 36584 COMPL RPLCMT PICC RS&I: CPT

## 2018-03-01 PROCEDURE — 84134 ASSAY OF PREALBUMIN: CPT | Performed by: SURGERY

## 2018-03-01 PROCEDURE — 74011250637 HC RX REV CODE- 250/637: Performed by: NURSE PRACTITIONER

## 2018-03-01 PROCEDURE — 74011250636 HC RX REV CODE- 250/636: Performed by: SURGERY

## 2018-03-01 PROCEDURE — 74011636320 HC RX REV CODE- 636/320: Performed by: RADIOLOGY

## 2018-03-01 PROCEDURE — 77030009378 HC DEV TORQ OLCT F/GWIRE MANIP COOK -A

## 2018-03-01 PROCEDURE — 83615 LACTATE (LD) (LDH) ENZYME: CPT | Performed by: PHYSICIAN ASSISTANT

## 2018-03-01 PROCEDURE — 49405 IMAGE CATH FLUID COLXN VISC: CPT

## 2018-03-01 PROCEDURE — 88112 CYTOPATH CELL ENHANCE TECH: CPT | Performed by: SURGERY

## 2018-03-01 PROCEDURE — 74011250636 HC RX REV CODE- 250/636: Performed by: RADIOLOGY

## 2018-03-01 PROCEDURE — 0W2GX0Z CHANGE DRAINAGE DEVICE IN PERITONEAL CAVITY, EXTERNAL APPROACH: ICD-10-PCS | Performed by: RADIOLOGY

## 2018-03-01 PROCEDURE — 74011000250 HC RX REV CODE- 250: Performed by: NURSE PRACTITIONER

## 2018-03-01 PROCEDURE — 77030012390 HC DRN CHST BTL GTNG -B

## 2018-03-01 PROCEDURE — C1887 CATHETER, GUIDING: HCPCS

## 2018-03-01 PROCEDURE — 88305 TISSUE EXAM BY PATHOLOGIST: CPT | Performed by: SURGERY

## 2018-03-01 PROCEDURE — 74011250636 HC RX REV CODE- 250/636: Performed by: FAMILY MEDICINE

## 2018-03-01 RX ORDER — FLUCONAZOLE 2 MG/ML
200 INJECTION, SOLUTION INTRAVENOUS EVERY 24 HOURS
Status: DISCONTINUED | OUTPATIENT
Start: 2018-03-01 | End: 2018-03-07

## 2018-03-01 RX ORDER — IPRATROPIUM BROMIDE AND ALBUTEROL SULFATE 2.5; .5 MG/3ML; MG/3ML
3 SOLUTION RESPIRATORY (INHALATION)
Status: DISCONTINUED | OUTPATIENT
Start: 2018-03-01 | End: 2018-03-05

## 2018-03-01 RX ORDER — HEPARIN SODIUM 200 [USP'U]/100ML
500 INJECTION, SOLUTION INTRAVENOUS ONCE
Status: COMPLETED | OUTPATIENT
Start: 2018-03-01 | End: 2018-03-05

## 2018-03-01 RX ORDER — SPIRONOLACTONE 25 MG/1
25 TABLET ORAL 2 TIMES DAILY
Status: DISCONTINUED | OUTPATIENT
Start: 2018-03-01 | End: 2018-03-05

## 2018-03-01 RX ORDER — HYDROMORPHONE HYDROCHLORIDE 2 MG/ML
2 INJECTION, SOLUTION INTRAMUSCULAR; INTRAVENOUS; SUBCUTANEOUS
Status: DISCONTINUED | OUTPATIENT
Start: 2018-03-01 | End: 2018-03-02

## 2018-03-01 RX ORDER — FENTANYL CITRATE 50 UG/ML
25-200 INJECTION, SOLUTION INTRAMUSCULAR; INTRAVENOUS
Status: DISCONTINUED | OUTPATIENT
Start: 2018-03-01 | End: 2018-03-01 | Stop reason: HOSPADM

## 2018-03-01 RX ORDER — LIDOCAINE HYDROCHLORIDE 10 MG/ML
20 INJECTION INFILTRATION; PERINEURAL
Status: DISCONTINUED | OUTPATIENT
Start: 2018-03-01 | End: 2018-03-01 | Stop reason: HOSPADM

## 2018-03-01 RX ORDER — FENTANYL CITRATE 50 UG/ML
25 INJECTION, SOLUTION INTRAMUSCULAR; INTRAVENOUS ONCE
Status: COMPLETED | OUTPATIENT
Start: 2018-03-01 | End: 2018-03-01

## 2018-03-01 RX ORDER — CEFAZOLIN SODIUM 1 G/3ML
2 INJECTION, POWDER, FOR SOLUTION INTRAMUSCULAR; INTRAVENOUS ONCE
Status: CANCELLED | OUTPATIENT
Start: 2018-03-01 | End: 2018-03-01

## 2018-03-01 RX ORDER — MIDAZOLAM HYDROCHLORIDE 1 MG/ML
.5-1 INJECTION, SOLUTION INTRAMUSCULAR; INTRAVENOUS
Status: DISCONTINUED | OUTPATIENT
Start: 2018-03-01 | End: 2018-03-01 | Stop reason: HOSPADM

## 2018-03-01 RX ADMIN — Medication 10 ML: at 05:39

## 2018-03-01 RX ADMIN — IPRATROPIUM BROMIDE AND ALBUTEROL SULFATE 3 ML: .5; 3 SOLUTION RESPIRATORY (INHALATION) at 01:13

## 2018-03-01 RX ADMIN — GUAIFENESIN 1200 MG: 600 TABLET, EXTENDED RELEASE ORAL at 08:14

## 2018-03-01 RX ADMIN — PANTOPRAZOLE SODIUM 40 MG: 40 INJECTION, POWDER, FOR SOLUTION INTRAVENOUS at 08:14

## 2018-03-01 RX ADMIN — NYSTATIN: 100000 POWDER TOPICAL at 09:24

## 2018-03-01 RX ADMIN — PANTOPRAZOLE SODIUM 40 MG: 40 INJECTION, POWDER, FOR SOLUTION INTRAVENOUS at 20:14

## 2018-03-01 RX ADMIN — HEPARIN SODIUM 1000 UNITS: 200 INJECTION, SOLUTION INTRAVENOUS at 13:00

## 2018-03-01 RX ADMIN — IPRATROPIUM BROMIDE AND ALBUTEROL SULFATE 3 ML: .5; 3 SOLUTION RESPIRATORY (INHALATION) at 13:55

## 2018-03-01 RX ADMIN — SPIRONOLACTONE 25 MG: 25 TABLET, FILM COATED ORAL at 09:22

## 2018-03-01 RX ADMIN — IPRATROPIUM BROMIDE AND ALBUTEROL SULFATE 3 ML: .5; 3 SOLUTION RESPIRATORY (INHALATION) at 07:10

## 2018-03-01 RX ADMIN — SPIRONOLACTONE 25 MG: 25 TABLET, FILM COATED ORAL at 18:00

## 2018-03-01 RX ADMIN — ENOXAPARIN SODIUM 40 MG: 40 INJECTION SUBCUTANEOUS at 16:03

## 2018-03-01 RX ADMIN — MIDAZOLAM HYDROCHLORIDE 0.5 MG: 1 INJECTION, SOLUTION INTRAMUSCULAR; INTRAVENOUS at 14:31

## 2018-03-01 RX ADMIN — ACETAMINOPHEN 650 MG: 325 TABLET ORAL at 19:44

## 2018-03-01 RX ADMIN — HYDROMORPHONE HYDROCHLORIDE 1 MG: 2 INJECTION, SOLUTION INTRAMUSCULAR; INTRAVENOUS; SUBCUTANEOUS at 10:04

## 2018-03-01 RX ADMIN — IPRATROPIUM BROMIDE AND ALBUTEROL SULFATE 3 ML: .5; 3 SOLUTION RESPIRATORY (INHALATION) at 20:39

## 2018-03-01 RX ADMIN — GUAIFENESIN 1200 MG: 600 TABLET, EXTENDED RELEASE ORAL at 20:13

## 2018-03-01 RX ADMIN — Medication 10 ML: at 16:32

## 2018-03-01 RX ADMIN — METHOCARBAMOL 500 MG: 500 TABLET ORAL at 08:14

## 2018-03-01 RX ADMIN — Medication 10 ML: at 08:14

## 2018-03-01 RX ADMIN — MIDAZOLAM HYDROCHLORIDE 0.5 MG: 1 INJECTION, SOLUTION INTRAMUSCULAR; INTRAVENOUS at 13:47

## 2018-03-01 RX ADMIN — HYDROMORPHONE HYDROCHLORIDE 1 MG: 2 INJECTION, SOLUTION INTRAMUSCULAR; INTRAVENOUS; SUBCUTANEOUS at 00:28

## 2018-03-01 RX ADMIN — HEPARIN SODIUM 1000 UNITS: 200 INJECTION, SOLUTION INTRAVENOUS at 13:17

## 2018-03-01 RX ADMIN — HYDROMORPHONE HYDROCHLORIDE 1 MG: 2 INJECTION, SOLUTION INTRAMUSCULAR; INTRAVENOUS; SUBCUTANEOUS at 11:11

## 2018-03-01 RX ADMIN — HUMAN INSULIN 2 UNITS: 100 INJECTION, SOLUTION SUBCUTANEOUS at 00:28

## 2018-03-01 RX ADMIN — CALCIUM GLUCONATE: 94 INJECTION, SOLUTION INTRAVENOUS at 18:21

## 2018-03-01 RX ADMIN — Medication 10 ML: at 10:04

## 2018-03-01 RX ADMIN — Medication 10 ML: at 21:46

## 2018-03-01 RX ADMIN — ACETAMINOPHEN 650 MG: 325 TABLET ORAL at 04:33

## 2018-03-01 RX ADMIN — FENTANYL CITRATE 25 MCG: 50 INJECTION, SOLUTION INTRAMUSCULAR; INTRAVENOUS at 17:23

## 2018-03-01 RX ADMIN — FENTANYL CITRATE 25 MCG: 50 INJECTION, SOLUTION INTRAMUSCULAR; INTRAVENOUS at 13:37

## 2018-03-01 RX ADMIN — OCTREOTIDE ACETATE 100 MCG: 100 INJECTION, SOLUTION INTRAVENOUS; SUBCUTANEOUS at 09:22

## 2018-03-01 RX ADMIN — IOPAMIDOL 20 ML: 755 INJECTION, SOLUTION INTRAVENOUS at 13:00

## 2018-03-01 RX ADMIN — FLUCONAZOLE 200 MG: 2 INJECTION INTRAVENOUS at 16:03

## 2018-03-01 RX ADMIN — HYDROMORPHONE HYDROCHLORIDE 2 MG: 2 INJECTION, SOLUTION INTRAMUSCULAR; INTRAVENOUS; SUBCUTANEOUS at 16:31

## 2018-03-01 RX ADMIN — FENTANYL CITRATE 25 MCG: 50 INJECTION, SOLUTION INTRAMUSCULAR; INTRAVENOUS at 14:31

## 2018-03-01 RX ADMIN — LIDOCAINE HYDROCHLORIDE 20 ML: 10 INJECTION, SOLUTION INFILTRATION; PERINEURAL at 13:39

## 2018-03-01 RX ADMIN — FENTANYL CITRATE 25 MCG: 50 INJECTION, SOLUTION INTRAMUSCULAR; INTRAVENOUS at 13:43

## 2018-03-01 NOTE — PROGRESS NOTES
Received phone call from Dr Roque Selby, Interventional Radiologist. After speaking with Dr Otoniel Abdi it has been decided that patient will need to come to the Angio department for readjustments of current pigtail drain on left side and readjustment of existing PICC line. Patient will also be having a pigtail drain placed on right side. Joel Ashby RN to notify of procedures that will be happening today. Advised to keep patient NPO and hold any blood thinners.

## 2018-03-01 NOTE — PROGRESS NOTES
I met with daughters who are also [facility] Christina Davison

## 2018-03-01 NOTE — PROGRESS NOTES
Occupational Therapy Note:  Chart reviewed and spoke with nursing. Patient just had NG tube placed at bedside. RN requesting holding therapy today as patient is not stable for activity. Thank you.   Dre Clancy OTR/L

## 2018-03-01 NOTE — PROGRESS NOTES
15:00,assumed care of pt who remains confused but follows simple commands. Bed linen changed ALYSSA emptied of 50 cc brown/green drainage. Report called to floor. Pt transported back to 320. Daughter called,message left.

## 2018-03-01 NOTE — PROGRESS NOTES
Progress Note    Patient: Mirian Kawasaki MRN: [id number]  SSN: [id number]    YOB: 1945  Age: 67 y.o. Sex: female      Admit Date: 2018    17 Days Post-Op    Procedure:  Procedure(s):   EX LAP    Subjective:     Mrs. Paolo Proctor c/o abdominal pain and distention. Objective:     Visit Vitals    /61    Pulse (!) 112    Temp 97.7 °F (36.5 °C)    Resp 28    Ht 5' 3.5\" (1.613 m)    Wt 187 lb 8 oz (85 kg)    SpO2 92%    Breastfeeding No    BMI 32.69 kg/m2       Temp (24hrs), Av.2 °F (36.8 °C), Min:97.4 °F (36.3 °C), Max:98.9 °F (37.2 °C)      Physical Exam:    GENERAL: fatigued, cooperative, mild distress, LUNG: rhonchi bilaterally, HEART: Tachycardic, regular rhythym, ABDOMEN: Soft, +BS, distended and tender in mid abdomen. The ALYSSA fluid is chalky and bilious. The accordion is serous. , EXTREMITIES:  pitting edema    Data Review: reviewed  CT abdomen and pelvis and CXR.     Lab Review:   BMP:   Lab Results   Component Value Date/Time     2018 01:21 AM    K 4.3 2018 01:21 AM     2018 01:21 AM    CO2 25 2018 01:21 AM    AGAP 5 2018 01:21 AM     (H) 2018 01:21 AM    BUN 11 2018 01:21 AM    CREA 0.53 (L) 2018 01:21 AM    GFRAA >60 2018 01:21 AM    GFRNA >60 2018 01:21 AM     CBC:   Lab Results   Component Value Date/Time    WBC 18.6 (H) 2018 01:21 AM    HGB 9.9 (L) 2018 01:21 AM    HCT 31.7 (L) 2018 01:21 AM     (H) 2018 01:21 AM       Assessment:     Hospital Problems  Date Reviewed: 2016          Codes Class Noted POA    Hypokalemia ICD-10-CM: E87.6  ICD-9-CM: 276.8  2018 No        Leukocytosis ICD-10-CM: D72.829  ICD-9-CM: 288.60  2018 Yes        Severe protein-calorie malnutrition ([facility] ICD-10-CM: E43  ICD-9-CM: 326  2018 Yes        Acute metabolic encephalopathy AKW-48-: G93.41  ICD-9-CM: 348.31  2018 Yes        Hyperammonemia ([facility] ICD-10-CM: E72.20  ICD-9-CM: 270.6  2/16/2018 No    Overview Addendum 2/17/2018  9:08 AM by Madeleine Colorado MD        Ref. Range 2/16/2018 17:20   Ammonia Latest Ref Range: <32 UMOL/L 69 (H)                Free intraperitoneal air ICD-10-CM: K66.8  ICD-9-CM: 568.89  2/12/2018 Yes        S/P exploratory laparotomy ICD-10-CM: Z98.890  ICD-9-CM: V45.89  2/12/2018 No    Overview Signed 2/12/2018 11:57 AM by Kristen Malik MD     Suture repair of perforated posterior gastric ulcer with Larnell Breaker patch, core needle biopsies of left lobe of the liver. Alcoholic cirrhosis of liver without ascites (HCC) (Chronic) ICD-10-CM: K70.30  ICD-9-CM: 571.2  2/12/2018 Yes    Overview Signed 2/17/2018  8:45 AM by Madeleine Colorado MD     Liver bx 2/12/18:   Cirrhosis with mild chronic portal inflammation and macrovesicular steatosis. Fatty liver determined by biopsy ICD-10-CM: K76.0  ICD-9-CM: 571.8  2/12/2018 Yes    Overview Signed 2/17/2018  8:53 AM by Madeleine Colorado MD        Cirrhosis with mild chronic portal inflammation and macrovesicular steatosis. * (Principal)Perforated chronic gastric ulcer ([address] (Chronic) ICD-10-CM: K25.5  ICD-9-CM: 531.50  2/11/2018 Yes        ETOH abuse (Chronic) ICD-10-CM: F10.10  ICD-9-CM: 305.00  2/11/2018 Yes              Plan/Recommendations/Medical Decision Making:     Strict NPO, continue TPN. Place NGT. May be developing partial SBO as seen on CT. No evidence of leak or free air. This is probably the reason that the ALYSSA output has gone back up. Afebrile, WBC 18. Reposition accordion drain. Thoracentesis on right. Pain control prn. Seems to be declining. Probably would not do well with returning to the OR, but will do so if any findings suggest need to do so. I have discussed with the family regarding code status.         Signed By: Kristen Malik MD     March 1, 2018

## 2018-03-01 NOTE — PROGRESS NOTES
2:35PM  TRANSFER - IN REPORT:    Verbal report received from [facility] RN(name) on Hurley Mimes  being received from [address] [facility]) for routine progression of care      Report consisted of patients Situation, Background, Assessment and   Recommendations(SBAR). Information from the following report(s) Procedure Summary was reviewed with the receiving nurse. Opportunity for questions and clarification was provided. Assessment completed upon patients arrival to unit and care assumed.

## 2018-03-01 NOTE — PROGRESS NOTES
Shift Summary  2/28/2018  Arrival until 1900    1320 Pt arrived to [facility] from CT scan via transporters in bed. VSS, pt in no acute distress. Family at bedside. Skin assessment complete. 1609 Cathflo instilled into two lumens of PICC, end caps changed, left to dwell. Did not use cathflo in lumen in which TPN is currently infusing. Cultures sent from both ALYSSA drain, urine, and accordion drain. Blood culture obtained peripherally by phlebotomist.     1815 Unable to withdraw cathflow or get blood return from either capped lumen of PICC. All 3 lumens are sluggish to flush even with patient taking deep breaths and repositioning. No blood return from any of 3 lumens. New TPN line set up and infusing in same lumen as TPN running previously. Unable to draw central line blood culture. 1855 Pt restless and wanting to get OOB. Patient repositioned by coworker Josee Crews RN. 1900 Bedside and Verbal shift change report given to Aidee Irving (oncoming nurse) by [address] (offgoing nurse). Report included the following information SBAR, Kardex, Intake/Output, MAR, Accordion, Recent Results and Cardiac Rhythm sinus tach.

## 2018-03-01 NOTE — PROGRESS NOTES
Nutrition Assessment:    RECOMMENDATIONS/INTERVENTION(S):   1. Continue TPN until PO intakes consistently >25%    Continuous TPN recommendations:  D20/6%AA @ the goal rate of 63 ml/hr  Lipids 20% (500 ml) @ 42 ml/hr x 12 hr 3x/wk  (TPN @ goal + Lipids provides 1823 kcals, 91 g protein, 1512 ml fluid)      2. Recommend transition to Cyclic TPN to spare liver if pt continues on TPN in the next 1-2 days    Cyclic TPN recommendations (12 hour cycle):  D20/6%AA + Lipids 20% (500 ml) @ 42 ml/hr x 12 hr 3x/wk  (TPN @ goal + Lipids provides 1823 kcals, 91 g protein, 1512 ml fluid)  1st hour: 63 ml/hr  2nd-11th hour: 138 ml/hr  12th hour: 63 ml/hr    Will continue to monitor the plan of care & provide nutrition recs/interventions prn  ASSESSMENT:   3/1:  Consult received per Surgery MD regarding PAB trending down on TPN. Noted pt NPO yesterday, NGT placed for possible SBO. Labs/meds reviewed. BG controlled. Alk phos elevated, stable. PAB 3.8 (was 4.5 2/26). Pt w/ worsening edema. Likely a factor in PAB. TPN @ goal rate x 10 d to meet 100% of energy & protein needs. 10% wt increase x 2.5 wks since admission. Trial increase in protein (+19.7%) - TPN recs above. Cyclic TPN recs above if pt continues on TPN in the next 1-2 days to spare liver. 2/26:  TPN @ goal, CLD started yesterday. Labs/meds reviewed. BG controlled. Na low. PAB 4.5 (2/26), 4.4 (2/23). Hypoactive BS, +BM (soft). Spoke w/ RN, pt anxious, s/p ativan. Poor PO intakes, no interest in eating and/or drinking, not asking for anything. No family in room. SLP following - recs for sips of clears 2/2 decreased alertness. TPN re-ordered per Surgery. Day 11 of TPN. Alk phos, AST remain elevated - stable. Worsening edema. 2/22:  TPN + Lipids running @ goal rate. TG 76. Alk phos, AST elevated. BG controlled. Lytes WDL. No IVF. Pt reporting abd pain, for repeat CT abd today.   Last BM 2/21, loose, +BS.         2/19:  Labs/meds reviewed. TPN running @ recommended goal rate. BG controlled, [phone]. On insulin. K continuing to require repletion. PAB 5.5. No TG, Alk phos WDL. NS IVF. Last BM 2/10, hypoactive BS. NGT placed today 2/2 abd pain. Continue TPN per Surgery MD.  WBC's elevated, for CT abd today. Possible kyphoplasty per Ortho. Noted new wt.      2/16:  Pt remains confused, agitated & sedated (CIWA protocol). NPO Day 6. Labs/meds reviewed. K repleted. No Phos. D5 LR IVF. TPN started per Surgery MD today, plans for UGI when able. Likely pt meets criteria for Severe Acute Malnutrition, no new wt, mild edema. TPN recs above. 2/14:  Extubated 2/12. Unable to start PO yesterday per Surgery - plans for UGI p/t PO once off pressors. Discussed case in ICU rounds. Pt agitated, confused overnight - pulled NGT. On sedation per CIWA protocol. NPO. No BM, flatus, absent BS. Labs/meds reviewed. K, Mg WDL. No Phos. 1/2 NS IVF. 2/12:  Chart reviewed 2/2 intubation. 67 yof admitted for intra-abd free air of unknown etiology. Pmhx includes gastric ulcer (EGD x 2 yrs ago), Etoh. Surgery following. POD #1 ex lap repair of perforated gastric ulcer, liver bx. Remains intubated 2/2 shock, respiratory failure. Labs/meds reviewed. K, Mg requiring repletion. No Phos. On Delbert. Propofol currently stopped. On PPI. Ortho following for T12 fx. Surgical incisions to abd, ALYSSA drain, no edema noted. Overwt per advanced age. No recent wt to compare per hx. Energy needs calculated using current wt, vent settings, tmax. Noted n/v after fall yesterday, no reports of inadequate PO prior to fall. Etoh hx. SUBJECTIVE/OBJECTIVE:     Diet Order: NPO; TPN - D20/5% AA @ 63 ml/hr + Lipids 20% (500 ml) @ 42 ml/hr x 12 hr 3x/wk  % Eaten:  No data found.     Pertinent Medications: [x] Reviewed     Past Medical History:   Diagnosis Date    Alcoholic cirrhosis of liver without ascites ([address] 2/12/2018    ETOH abuse 2/11/2018    History of vascular access device 02/16/2018    [facility] VAT - 5 FR triple, R basilic, TPN    Hyperammonemia ([facility] 2/16/2018     Results for Claudia Alexander (MRN [id number]) as of 2/17/2018 08:43  Ref. Range 2/16/2018 17:20 Ammonia Latest Ref Range: <32 UMOL/L 69 (H)     Hyperlipidemia     Perforated chronic gastric ulcer ([facility] 2/11/2018       Chemistries:  Lab Results   Component Value Date/Time    Sodium 137 03/01/2018 01:21 AM    Potassium 4.3 03/01/2018 01:21 AM    Chloride 107 03/01/2018 01:21 AM    CO2 25 03/01/2018 01:21 AM    Anion gap 5 03/01/2018 01:21 AM    Glucose 174 (H) 03/01/2018 01:21 AM    BUN 11 03/01/2018 01:21 AM    Creatinine 0.53 (L) 03/01/2018 01:21 AM    BUN/Creatinine ratio 21 (H) 03/01/2018 01:21 AM    GFR est AA >60 03/01/2018 01:21 AM    GFR est non-AA >60 03/01/2018 01:21 AM    Calcium 7.9 (L) 03/01/2018 01:21 AM    AST (SGOT) 31 02/28/2018 02:52 PM    Alk. phosphatase 166 (H) 02/28/2018 02:52 PM    Protein, total 5.4 (L) 02/27/2018 03:20 AM    Albumin 1.4 (L) 03/01/2018 01:21 AM    Globulin 4.1 (H) 02/27/2018 03:20 AM    A-G Ratio 0.3 (L) 02/27/2018 03:20 AM    ALT (SGPT) 27 02/28/2018 02:52 PM      Anthropometrics: Height: 5' 3.5\" (161.3 cm) Weight: 85 kg (187 lb 8 oz)    IBW (%IBW): 58.7 kg (129 lb 6.6 oz) (131.69 %) UBW (%UBW):  (UTO) (  %)    BMI: Body mass index is 32.69 kg/(m^2). This BMI is indicative of:   [] Underweight    [] Normal    [x] Overweight - per advanced age     []  Obesity    []  Extreme Obesity (BMI>40)  Estimated Nutrition Needs (Based on): 9617 (BMR (1332) x 1.3 AF , 70 g (-88 (1.2-1.5 g/kg x IBW)) Protein  Carbohydrate:  At Least 130 g/day  Fluids: 1700 mL/day    Last BM: 2/26 soft   [x]Active     []Hyperactive  []Hypoactive       [] Absent   BS  Skin:    [] Intact   [x] Incision - abd lap sites  [] Breakdown   [] DTI   [] Tears/Excoriation/Abrasion  [x]Edema - 2+ generalized, 2+ pitting BLE [x] Other:ALYSSA drain     Wt Readings from Last 30 Encounters:   02/28/18 85 kg (187 lb 8 oz)   04/29/16 76.3 kg (168 lb 3.2 oz)      NUTRITION DIAGNOSES:   Problem:  Inadequate oral intake     Etiology: related to altered GI function      Signs/Symptoms: as evidenced by s/p lap w/ perf viscus repair, intubation, NPO x 11, abd pain/repeat CT abd, need to continue TPN    2/27:  Above acute nutrition dx continues - CLD started 2/26, pt not alert & w/ poor PO intake, TPN renewed  3/1:  Above acute nutrition dx continues - NPO, NGT for possible SBO, alter TPN composition per MD     NUTRITION INTERVENTIONS:  Meals/Snacks: General/healthful diet - once PO resumed Enteral/Parenteral Nutrition: Initiate parenteral nutrition                GOAL:   Continue TPN @ goal until PO intakes improve in the next 2-4 days    Cultural, Spiritism, or Ethnic Dietary Needs: None    EDUCATION & DISCHARGE NEEDS:    [x] None Identified   [] Identified and Education Provided/Documented   [] Identified and Pt declined/was not appropriate      [x] Interdisciplinary Care Plan Reviewed/Documented    [x] Discharge Needs:    TBD   [] No Nutrition Related Discharge Needs    NUTRITION RISK:   Pt Is At Nutrition Risk  [x]     No Nutrition Risk Identified  []       PT SEEN FOR:    [x]  MD Consult: []Calorie Count      []Diabetic Diet Education        []Diet Education     []Electrolyte Management     []General Nutrition Management and Supplements     []Management of Tube Feeding     [x]TPN Recommendations   []  RN Referral:  []MST score >=2     []Enteral/Parenteral Nutrition PTA     []Pregnant: Gestational DM or Multigestation                 [] Pressure Ulcer    []  Low BMI      []  Length of Stay       [] Dysphagia Diet         [] Ventilator  []  Follow-up - TPN     Previous Recommendations:   [x] Implemented     [] Progressing Towards Goal          [] Not Implemented          [] Not Applicable    Previous Goal:   [x] Met              [] Progressing Towards Goal              [] Not Progressing Towards Goal   [] Not Applicable            Cece Rutherford, [address] [place] [address]  Pager [phone]

## 2018-03-01 NOTE — PROGRESS NOTES
[facility] department received an IR consult request in regards to patients pigtail drain, that was placed on 2/22/2018, not draining. Went to patients room to evaluate. Removed dressing, drain was kinked. Straightened drain and replaced dressing with a biopatch, gauze and tegaderm. Attempted to aspirate fluid with no success. Per bedside RN Pincus Saint, patient had about 10cc fluid output yesterday and there was 30cc fluid in drainage bag. Spoke with the Interventional Radiologists, Dr Regulo Pelayo, regarding output and if the drain does need to be repositioned. He would like to speak with Dr Yinka Mitchell, surgeon, regarding drain prior to moving forward. Contacted Dr Autumn Gonzalez' office and left a message with  to have Dr Autumn Gonzalez call Dr Regulo Pelayo for consult. Spoke with Pincus Saint, RN to update.

## 2018-03-01 NOTE — PROGRESS NOTES
[facility]     Name: Rashard Sy MRN: [id number]   : 1945 Hospital: [address]   Date: 3/1/2018            Impression Plan   1. Acute Respiratory Failure with Hypoxia  2. Abnormal Chest CT: with bilateral pleural effusions, atelectasis,and patchy airspace disease in MIRIAM; s/p thoracentesis on 3/1  3. Leukocytosis, worsening  4. ALYSSA drain culture: + yeast  5. Delirium; slightly improved  6. Peforated gastric ulcer, s/p repair  7. Hypokalemia, hypomagnesemia; resolved  8. EtOH abuse     · Goal sats >90%, wean O2 as tolerated  · Started on Diflucan. Continue to follow WBC and cultures, fever curve  · F/U blood cultures  · F/U cultures from abdominal drains  · Speech following and appreciate help: sips for now  · C/W IV Haldol  · Scheduled nebs only whe awake  · Guafenesin   · Post-op management as per surgery  · Pain control; limit benzos as much as possible, now on Fentanyl patch and improved  · Seroquel at night for sleep  · Monitor lytes, replete as needed  · Encourage IS use  · PT/OT  · OOB and mobilize as much as possible  · May benefit from pulmonary rehab, will consult CM to see if she qualifies  · TPN  · SCDs  · Protonix               Radiology  ( personally reviewed) CXR:  Persistent small bilateral pleural effusions. Right wrist:  No acute findings   ABG No results for input(s): PHI, PO2I, PCO2I in the last 72 hours. Subjective     Overnight events:  Afebrile  BP stable  HR in 100-110s  O2 sats 94% on 4L NC  WBC 18.6, trending up   Hgb 9.9  INR 1.2  ALYSSA culture growing moderate yeast  Liver enzymes trending down  Alk phos: 166  Fluid balance: -142, but no record of intake  S/p thoracentesis today with 800mLs removed from right    ROS:  Feeling somewhat better since having thoracentesis. Breathing improved. Still confused. Denies other ocmplaints currently. Per daughter, pain better controlled now.         Past Medical History:   Diagnosis Date    Alcoholic cirrhosis of liver without ascites ([facility] 2/12/2018    ETOH abuse 2/11/2018    History of vascular access device 02/16/2018    [facility] VAT - 5 FR triple, R basilic, TPN    Hyperammonemia ([facility] 2/16/2018     Results for Yanely Loyola (MRN [id number]) as of 2/17/2018 08:43  Ref. Range 2/16/2018 17:20 Ammonia Latest Ref Range: <32 UMOL/L 69 (H)     Hyperlipidemia     Perforated chronic gastric ulcer ([facility] 2/11/2018      Past Surgical History:   Procedure Laterality Date    HX CATARACT REMOVAL        Prior to Admission medications    Medication Sig Start Date End Date Taking? Authorizing Provider   naproxen sodium (ALEVE) 220 mg tablet Take 220 mg by mouth daily as needed for Pain. Yes Historical Provider   cyanocobalamin (VITAMIN B12) 500 mcg tablet Take 500 mcg by mouth daily. Yes Historical Provider   multivitamin (ONE A DAY) tablet Take 1 Tab by mouth daily. Yes Historical Provider   omega-3 fatty acids-vitamin e 1,000 mg cap Take 2 Caps by mouth daily.    Yes Historical Provider     Current Facility-Administered Medications   Medication Dose Route Frequency    spironolactone (ALDACTONE) tablet 25 mg  25 mg Oral BID    TPN ADULT - CENTRAL   IntraVENous CONTINUOUS    fluconazole (DIFLUCAN) 200mg/100 mL IVPB (premix)  200 mg IntraVENous Q24H    albuterol-ipratropium (DUO-NEB) 2.5 MG-0.5 MG/3 ML  3 mL Nebulization Q6HWA RT    fentaNYL (DURAGESIC) 25 mcg/hr patch 1 Patch  1 Patch TransDERmal Q72H    TPN ADULT - CENTRAL   IntraVENous CONTINUOUS    guaiFENesin ER (MUCINEX) tablet 1,200 mg  1,200 mg Oral Q12H    insulin regular (NOVOLIN R, HUMULIN R) injection   SubCUTAneous Q6H    fat emulsion 20% (LIPOSYN, INTRAlipid) infusion 500 mL  500 mL IntraVENous Q MON, WED & SAT    sodium chloride (NS) flush 10-40 mL  10-40 mL InterCATHeter Q8H    enoxaparin (LOVENOX) injection 40 mg  40 mg SubCUTAneous Q24H    pantoprazole (PROTONIX) injection 40 mg  40 mg IntraVENous Q12H    nystatin (MYCOSTATIN) 100,000 unit/gram powder   Topical BID     No Known Allergies   Social History   Substance Use Topics    Smoking status: Former Smoker    Smokeless tobacco: Never Used    Alcohol use 11.4 oz/week     0 Standard drinks or equivalent, 19 Glasses of wine per week      Comment: Hx of heavy etoh use, but quit several months ago      Family History   Problem Relation Age of Onset    Other Other      patient did not know          Laboratory: I have personally reviewed the critical care flowsheet and labs. Recent Labs      03/01/18   0121  02/28/18   0552  02/27/18   0320   WBC  18.6*  17.8*  16.8*   HGB  9.9*  9.3*  9.0*   HCT  31.7*  29.6*  28.6*   PLT  525*  534*  514*     Recent Labs      03/01/18   0121  02/28/18   1452  02/28/18   0552  02/27/18   0320   NA  137   --   137  135*   K  4.3   --   4.5  4.2   CL  107   --   105  104   CO2  25   --   25  24   GLU  174*   --   101*  95   BUN  11   --   13  14   CREA  0.53*   --   0.52*  0.64   CA  7.9*   --   7.8*  7.5*   MG  2.0   --   2.0  1.8   PHOS  3.6   --   3.3  3.3   ALB  1.4*   --    --   1.3*   SGOT   --   31   --   54*   ALT   --   27   --   32   INR   --   1.2*   --    --        Objective:          Intake/Output Summary (Last 24 hours) at 03/01/18 1602  Last data filed at 03/01/18 1508   Gross per 24 hour   Intake          1587.75 ml   Output             1730 ml   Net          -142.25 ml     GENERAL: alert, able to answer questions appropriately, uncomfortable but NAD HEENT:  PERRL, EOMI, no alar flaring or epistaxis, oral mucosa moist without cyanosis, NECK:  no jugular vein distention, no retractions, no thyromegaly or masses, LUNGS: Decreased bs bilaterally, anterior exam only , HEART:  Regular rate and rhythm with no MGR; no edema is present, ABDOMEN:  soft, stable CDI, drain dressing CDI EXTREMITIES:  warm with no cyanosis, dependent edema in LEs SKIN:  no jaundice or ecchymosis and NEUROLOGIC: alert, oriented, grossly non-focal    Wheatland Stager Norma Whalen NP  [facility]

## 2018-03-01 NOTE — PROGRESS NOTES
Family Practice Daily Progress Note: 3/1/2018  Jhon Carrasco Memos, DO    Assessment/Plan:   Perforated chronic gastric ulcer /  Free intraperitoneal air / S/P exploratory laparotomy 2/11. S/p repair in OR with Dr. Suman Duggan on 2/12/18 - per surg    Severe protein calorie malnutrition-POA  --TPN per GI     Acute metabolic encephalopathy. Multifactorial-- medications, alcohol use, sundowning  ---avoid triggers  ---CIWA  ---maintenance of sleep/wake cycle, and re-orientation   ---Added Seroquel at bedtime to help with sleep wake cycle     Hypoxia / Respiratory distress. CXR with atelectasis and pleural effusion. Continue Abx, nebs, supplemental oxygen and incentive spirom as able. Repeat CT chest with moderate to large persistent left subphrenic collection. ---Pulmonary consulted     ---Bumex as needed. Added Aldactone. ---Drain placed by IR     Alcoholic cirrhosis of liver without ascites /  Fatty liver determined by biopsy /  Hyperammonemia. --GI has seen- rec alcohol cessation and outpt follow up     ETOH abuse. Needs to stop ETOH entirely.     Hypokalemia /  Hypoalbuminemia. Being addressed with TPN     Leukocytosis. Up again  -- repeat blood cultures, and urine 2/28  --was on Zosyn, and levaquin - cultures drawn off antibiotics  --consulted ID 3/1    Anemia: watch Hgb   --follow, transfuse <7    Calcium corrects.     Back Pain due to compression fracture of T12  ---Increase Duragesic patch to 25mcg and DIiaudid to 1mg as needed    Debility: due to prolonged hospital stay  --continue PT/OT- needs to get OOB  --will likely need rehab        Problem List:  Problem List as of 3/1/2018  Date Reviewed: 4/26/2016          Codes Class Noted - Resolved    Hypokalemia ICD-10-CM: E87.6  ICD-9-CM: 276.8  2/18/2018 - Present        Leukocytosis ICD-10-CM: D72.829  ICD-9-CM: 288.60  2/18/2018 - Present        Severe protein-calorie malnutrition ([facility] ICD-10-CM: E43  ICD-9-CM: 278  2/18/2018 - Present        Acute metabolic encephalopathy RHY-27-UE: G93.41  ICD-9-CM: 348.31  2/18/2018 - Present        Hyperammonemia ([facility] [address] ICD-10-CM: E72.20  ICD-9-CM: 270.6  2/16/2018 - Present    Overview Addendum 2/17/2018  9:08 AM by Charles Hager MD        Ref. Range 2/16/2018 17:20   Ammonia Latest Ref Range: <32 UMOL/L 69 (H)                Free intraperitoneal air ICD-10-CM: K66.8  ICD-9-CM: 568.89  2/12/2018 - Present        S/P exploratory laparotomy ICD-10-CM: Z98.890  ICD-9-CM: V45.89  2/12/2018 - Present    Overview Signed 2/12/2018 11:57 AM by Brooklynn Gómez MD     Suture repair of perforated posterior gastric ulcer with Kong Sinner patch, core needle biopsies of left lobe of the liver. Alcoholic cirrhosis of liver without ascites (HCC) (Chronic) ICD-10-CM: K70.30  ICD-9-CM: 571.2  2/12/2018 - Present    Overview Signed 2/17/2018  8:45 AM by Charles Hager MD     Liver bx 2/12/18:   Cirrhosis with mild chronic portal inflammation and macrovesicular steatosis. Fatty liver determined by biopsy ICD-10-CM: K76.0  ICD-9-CM: 571.8  2/12/2018 - Present    Overview Signed 2/17/2018  8:53 AM by Charles Hager MD        Cirrhosis with mild chronic portal inflammation and macrovesicular steatosis. * (Principal)Perforated chronic gastric ulcer ([facility] (Chronic) ICD-10-CM: K25.5  ICD-9-CM: 531.50  2/11/2018 - Present        ETOH abuse (Chronic) ICD-10-CM: F10.10  ICD-9-CM: 305.00  2/11/2018 - Present        GI bleed ICD-10-CM: K92.2  ICD-9-CM: 578.9  4/26/2016 - Present        Hypotension ICD-10-CM: I95.9  ICD-9-CM: 458.9  4/26/2016 - Present        Tachycardia ICD-10-CM: R00.0  ICD-9-CM: 785.0  4/26/2016 - Present        Acute blood loss anemia ICD-10-CM: D62  ICD-9-CM: 285.1  4/26/2016 - Present              Subjective:    67 y.o.  female with EtOH abuse who is admitted to the General Surgery Service with perforated gastric ulcer.   We are asked to evaluate for altered mental status. The patient is poorly interactive at this time and this limits primary hx. Discussed case with family available at bedside providing secondary hx and chart review. Per family, patient has had declining neurological condition over past 48 hours. Notably, an RRT was called for respiratory distress. 2/19: This morning she is a bit more alert at this moment and taking in more complete sentences. She says she does not feel well. She has no specific site of pain other than the NG tube which is bothering her a great deal.  She should improve as time from surg increases. K+ a little low - replacing.     2/20:  Still confused and somnolent at times. Now able to localize pain to ab and converse a bit more. Tmax 100.3  WBC is up again but hopefully reactive - recheck in AM - more incentive spirom and check CXR.     2/21: A little more alert. Knows she is in the hospital. Not able to participate with PT yesterday as she was in too much pain. WBC still at 19.     2/22:  WBC 19K. She is more alert. Daughter at bedside. Both of her daughters live out of town. Looking at SNF options for rehab. Coughing more. Starting to use IS.     2/23: WBC 17. Repeat CT chest with moderate to large persistent left subphrenic collection. IR has been consulted. Vanc and Levaquin added by pulm. 2/24: Pt had a better night last night. Only brief episode of confusion. She is much clearer this AM.  Now in quite a bit of pain from gas and stomach cramping. Thinks she will feel better if she can have a BM. Has been on bedpan for a while without success. On TPN. Daughters very concerned that she won't be successful while lying down. Wanting to get up to bedside commode. Will need PT's help. 2/25: febrile overnight and WBC up after vanc was stopped. Pt c/o more dyspnea today. Known fluid collection that will be drained in IR this week. Up to chair with PT yesterday briefly. +BM yesterday.   Da notes pt has been c/o R wrist pain off and on for 2 wks since she fell. No swelling.    2/26:  No new complaints. Still c/o back and ab pain. Still passing gas and had BM. Diuresed with 1 mg Bumex yesterday by Pulmonary. Remains on Zosyn and Levaquin. Blood culture remains neg. Tmax 99.2. X-ray of wrists ok. CXR ok with tube inplace. WBC 15.7K.      2/27:  Looks much better today. Much less work of breathing. Diuresed about 1 liter over last 24h. WBC 16K today. Blood cult remains neg. Off antibiotics. Still on TPN. She will need rehab for PT/OT. 2/28: Very restless during the night. Has not been OOB. Daughter stayed the night with her. WBC is still up. Antibiotics stopped yesterday. She is c/o increased pain in her back and can't get comfortable. More anxious and agitated. Tolerating sips. 3/1:  She reports she feels better this AM after she had some sleep. Afeb. WBC still up - a bit more today with increased neutrophils. Will also get ID to see also. CT AB and Pelvis as below. Cultures done yesterday off antibiotics - so far no growth. No output from drain - may need to be repositioned. More edema today but not as short of breath today while sitting up - add albumin to todays lab. Add Aldactone if ok with GI and Bumex as per Pul. Discussed cirrhosis/ascites/edema with pts daughter. Past Medical History:   Diagnosis Date    Alcoholic cirrhosis of liver without ascites ([facility] [address] 2/12/2018    ETOH abuse 2/11/2018    History of vascular access device 02/16/2018    [facility] VAT - 5 FR triple, R basilic, TPN    Hyperammonemia ([facility] [address] [facility] 2/16/2018     Results for Virgen Simons (MRN [id number]) as of 2/17/2018 08:43  Ref.  Range 2/16/2018 17:20 Ammonia Latest Ref Range: <32 UMOL/L 69 (H)     Hyperlipidemia     Perforated chronic gastric ulcer ([facility] [address] 2/11/2018     Past Surgical History:   Procedure Laterality Date    HX CATARACT REMOVAL       Social History     Social History    Marital status: SINGLE     Spouse name: N/A    Number of children: N/A    Years of education: N/A     Occupational History    Not on file. Social History Main Topics    Smoking status: Former Smoker    Smokeless tobacco: Never Used    Alcohol use 11.4 oz/week     0 Standard drinks or equivalent, 19 Glasses of wine per week      Comment: Hx of heavy etoh use, but quit several months ago    Drug use: No    Sexual activity: Not on file     Other Topics Concern    Not on file     Social History Narrative     Family History   Problem Relation Age of Onset    Other Other      patient did not know     Review of Systems:   Review of systems not obtained due to patient factors. Objective:   Physical Exam:     Visit Vitals    /69    Pulse (!) 112    Temp 97.7 °F (36.5 °C)    Resp 22    Ht 5' 3.5\" (1.613 m)    Wt 85 kg (187 lb 8 oz)    SpO2 90%    Breastfeeding No    BMI 32.69 kg/m2    O2 Flow Rate (L/min): 4 l/min O2 Device: Nasal cannula    Temp (24hrs), Av.2 °F (36.8 °C), Min:97.4 °F (36.3 °C), Max:98.9 °F (37.2 °C)        1901 -  0700  In: 1587.8 [P.O.:400; I.V.:1187.8]  Out:  [Urine:1500; Drains:560]    General:  Awake, uncomfortable, appears stated age. Head:  Normocephalic, without obvious abnormality, atraumatic. Eyes:  Conjunctivae/corneas clear. EOMs intact. Throat: Lips, mucosa, and tongue dry. Neck: Supple, symmetrical, trachea midline, no adenopathy, thyroid: no enlargement/tenderness/nodules, no carotid bruit and no JVD. Lungs:    no intercostal use, breath sounds diminished. She has a few basilar crackles at this moment. Chest wall:  No tenderness or deformity. Heart:  Tachycardic rate with regular rhythm, S1, S2 normal, no murmur, click, rub or gallop. Abdomen:   Soft, distended, with mild generalized tenderness. Bowel sounds present. Dressings dry. Incision dry without redness, staple line intact. Extremities: no cyanosis. 2+ LE edema.  No calf tenderness or cords, no erythema or swelling   Skin: Skin color, texture, turgor normal. No rashes or lesions   Neurologic:   AOx2,  Gait not tested at this time. Sensation grossly normal to touch. Gross motor of extremities normal.       Data Review:    CT AB and Pelvis 2/28/18: IMPRESSION:  1. There is residual fluid in the posterior portion of the left subphrenic  collection which does not appear well drained by the pigtail catheter. This  could be repositioned versus new drainage catheter placement if clinically  indicated. 2. Persistent ascites. 3. Increasing right pleural effusion and right lower lobe atelectasis   4. Distended duodenum and proximal small bowel of doubtful clinical  Significance.     Recent Days:  Recent Labs      03/01/18   0121  02/28/18   0552  02/27/18   0320   WBC  18.6*  17.8*  16.8*   HGB  9.9*  9.3*  9.0*   HCT  31.7*  29.6*  28.6*   PLT  525*  534*  514*     Recent Labs      03/01/18   0121  02/28/18   1452  02/28/18   0552  02/27/18   0320   NA  137   --   137  135*   K  4.3   --   4.5  4.2   CL  107   --   105  104   CO2  25   --   25  24   GLU  174*   --   101*  95   BUN  11   --   13  14   CREA  0.53*   --   0.52*  0.64   CA  7.9*   --   7.8*  7.5*   MG  2.0   --   2.0  1.8   PHOS  3.6   --   3.3  3.3   ALB   --    --    --   1.3*   TBILI   --    --    --   1.0   SGOT   --   31   --   54*   ALT   --   27   --   32   INR   --   1.2*   --    --      Recent Labs      02/28/18   1038   PH  7.46*   PCO2  31*   PO2  70*   HCO3  22       24 Hour Results:  Recent Results (from the past 24 hour(s))   BLOOD GAS, ARTERIAL    Collection Time: 02/28/18 10:38 AM   Result Value Ref Range    pH 7.46 (H) 7.35 - 7.45      PCO2 31 (L) 35.0 - 45.0 mmHg    PO2 70 (L) 80 - 100 mmHg    O2 SAT 95 92 - 97 %    BICARBONATE 22 22 - 26 mmol/L    BASE DEFICIT 1.0 mmol/L    O2 METHOD NASAL O2      O2 FLOW RATE 3.00 L/min    Sample source ARTERIAL      SITE LEFT RADIAL      GRISEL'S TEST YES     GLUCOSE, POC    Collection Time: 02/28/18 12:09 PM   Result Value Ref Range    Glucose (POC) 115 (H) 65 - 100 mg/dL    Performed by Niharika Ladd (PCT)    CULTURE, BODY FLUID W GRAM STAIN    Collection Time: 02/28/18  2:24 PM   Result Value Ref Range    Special Requests: NO SPECIAL REQUESTS      GRAM STAIN Culture performed on Unspun Fluid      Culture result: PENDING    CULTURE, BODY FLUID W GRAM STAIN    Collection Time: 02/28/18  2:24 PM   Result Value Ref Range    Special Requests: NO SPECIAL REQUESTS      GRAM STAIN OCCASIONAL WBCS SEEN      GRAM STAIN NO ORGANISMS SEEN      Culture result: PENDING    NT-PRO BNP    Collection Time: 02/28/18  2:52 PM   Result Value Ref Range    NT pro- (H) 0 - 125 PG/ML   PROTHROMBIN TIME + INR    Collection Time: 02/28/18  2:52 PM   Result Value Ref Range    INR 1.2 (H) 0.9 - 1.1      Prothrombin time 12.6 (H) 9.0 - 11.1 sec   AST    Collection Time: 02/28/18  2:52 PM   Result Value Ref Range    AST (SGOT) 31 15 - 37 U/L   ALT    Collection Time: 02/28/18  2:52 PM   Result Value Ref Range    ALT (SGPT) 27 12 - 78 U/L   ALK PHOS    Collection Time: 02/28/18  2:52 PM   Result Value Ref Range    Alk.  phosphatase 166 (H) 45 - 117 U/L   CULTURE, BLOOD, PERIPHERAL    Collection Time: 02/28/18  2:52 PM   Result Value Ref Range    Special Requests: NO SPECIAL REQUESTS      Culture result: NO GROWTH AFTER 16 HOURS     GLUCOSE, POC    Collection Time: 02/28/18  6:02 PM   Result Value Ref Range    Glucose (POC) 138 (H) 65 - 100 mg/dL    Performed by Elvis Dunlap    GLUCOSE, POC    Collection Time: 03/01/18 12:20 AM   Result Value Ref Range    Glucose (POC) 197 (H) 65 - 100 mg/dL    Performed by MIKE DARDEN [facility]    CBC WITH AUTOMATED DIFF    Collection Time: 03/01/18  1:21 AM   Result Value Ref Range    WBC 18.6 (H) 3.6 - 11.0 K/uL    RBC 2.98 (L) 3.80 - 5.20 M/uL    HGB 9.9 (L) 11.5 - 16.0 g/dL    HCT 31.7 (L) 35.0 - 47.0 %    .4 (H) 80.0 - 99.0 FL    MCH 33.2 26.0 - 34.0 PG    MCHC 31.2 30.0 - 36.5 g/dL    RDW 14.7 (H) 11.5 - 14.5 %    PLATELET 147 (H) 251 - 400 K/uL    MPV 12.2 8.9 - 12.9 FL    NRBC 0.0 0  WBC    ABSOLUTE NRBC 0.00 0.00 - 0.01 K/uL    NEUTROPHILS 83 (H) 32 - 75 %    LYMPHOCYTES 9 (L) 12 - 49 %    MONOCYTES 5 5 - 13 %    EOSINOPHILS 1 0 - 7 %    BASOPHILS 1 0 - 1 %    IMMATURE GRANULOCYTES 1 (H) 0.0 - 0.5 %    ABS. NEUTROPHILS 15.5 (H) 1.8 - 8.0 K/UL    ABS. LYMPHOCYTES 1.7 0.8 - 3.5 K/UL    ABS. MONOCYTES 0.9 0.0 - 1.0 K/UL    ABS. EOSINOPHILS 0.2 0.0 - 0.4 K/UL    ABS. BASOPHILS 0.1 0.0 - 0.1 K/UL    ABS. IMM.  GRANS. 0.2 (H) 0.00 - 0.04 K/UL    DF AUTOMATED     PHOSPHORUS    Collection Time: 03/01/18  1:21 AM   Result Value Ref Range    Phosphorus 3.6 2.6 - 4.7 MG/DL   MAGNESIUM    Collection Time: 03/01/18  1:21 AM   Result Value Ref Range    Magnesium 2.0 1.6 - 2.4 mg/dL   METABOLIC PANEL, BASIC    Collection Time: 03/01/18  1:21 AM   Result Value Ref Range    Sodium 137 136 - 145 mmol/L    Potassium 4.3 3.5 - 5.1 mmol/L    Chloride 107 97 - 108 mmol/L    CO2 25 21 - 32 mmol/L    Anion gap 5 5 - 15 mmol/L    Glucose 174 (H) 65 - 100 mg/dL    BUN 11 6 - 20 MG/DL    Creatinine 0.53 (L) 0.55 - 1.02 MG/DL    BUN/Creatinine ratio 21 (H) 12 - 20      GFR est AA >60 >60 ml/min/1.73m2    GFR est non-AA >60 >60 ml/min/1.73m2    Calcium 7.9 (L) 8.5 - 10.1 MG/DL   GLUCOSE, POC    Collection Time: 03/01/18  5:34 AM   Result Value Ref Range    Glucose (POC) 113 (H) 65 - 100 mg/dL    Performed by Fadia Esteves        Medications reviewed  Current Facility-Administered Medications   Medication Dose Route Frequency    fentaNYL (DURAGESIC) 25 mcg/hr patch 1 Patch  1 Patch TransDERmal Q72H    QUEtiapine (SEROquel) tablet 12.5 mg  12.5 mg Oral QHS    haloperidol lactate (HALDOL) injection 1 mg  1 mg IntraVENous Q4H PRN    TPN ADULT - CENTRAL   IntraVENous CONTINUOUS    octreotide (SANDOSTATIN) injection 100 mcg  100 mcg SubCUTAneous TID    guaiFENesin ER (MUCINEX) tablet 1,200 mg 1,200 mg Oral Q12H    HYDROmorphone (PF) (DILAUDID) injection 1 mg  1 mg IntraVENous Q4H PRN    methocarbamol (ROBAXIN) tablet 500 mg  500 mg Oral QID    albuterol-ipratropium (DUO-NEB) 2.5 MG-0.5 MG/3 ML  3 mL Nebulization Q6H RT    acetaminophen (TYLENOL) tablet 650 mg  650 mg Oral Q4H PRN    insulin regular (NOVOLIN R, HUMULIN R) injection   SubCUTAneous Q6H    fat emulsion 20% (LIPOSYN, INTRAlipid) infusion 500 mL  500 mL IntraVENous Q MON, WED & SAT    glucose chewable tablet 16 g  4 Tab Oral PRN    glucagon (GLUCAGEN) injection 1 mg  1 mg IntraMUSCular PRN    dextrose (D50W) injection syrg 12.5-25 g  12.5-25 g IntraVENous PRN    alteplase (CATHFLO) 1 mg in sterile water (preservative free) 1 mL injection  1 mg InterCATHeter PRN    sodium chloride (NS) flush 10-30 mL  10-30 mL InterCATHeter PRN    sodium chloride (NS) flush 10-40 mL  10-40 mL InterCATHeter Q8H    naloxone (NARCAN) injection 0.4 mg  0.4 mg IntraVENous PRN    ondansetron (ZOFRAN) injection 4 mg  4 mg IntraVENous Q4H PRN    enoxaparin (LOVENOX) injection 40 mg  40 mg SubCUTAneous Q24H    pantoprazole (PROTONIX) injection 40 mg  40 mg IntraVENous Q12H    phenol throat spray (CHLORASEPTIC) 1 Spray  1 Spray Oral PRN    nystatin (MYCOSTATIN) 100,000 unit/gram powder   Topical BID    sodium chloride (NS) flush 5-10 mL  5-10 mL IntraVENous PRN     Care Plan discussed with: Patient and daughter and Nurse   Total time spent with patient: 30 minutes.   Dana Meyer MD

## 2018-03-01 NOTE — PROGRESS NOTES
I  met with pt.'s William Villarreal in pt.'s room. Toni Pham lives in [facility]. Her number is [phone]. Daughter is requesting I research SNFs in [place], [place] for pt I will pull up a list so daughter can look into those. For SNF placement,choices are [facility]. Clinicals faxed to these SNFs in hopes of pt going to SNF placement here. Raisa Murrell  [phone]   I also sent clinicals to [address] [facility] in [facility] and to [address] [facility] in [place].   Raisa Murrell

## 2018-03-01 NOTE — PROGRESS NOTES
Problem: Falls - Risk of  Goal: *Absence of Falls  Document Demarcus Fall Risk and appropriate interventions in the flowsheet.    Outcome: Progressing Towards Goal  Fall Risk Interventions:  Mobility Interventions: Assess mobility with egress test, Bed/chair exit alarm, Communicate number of staff needed for ambulation/transfer, OT consult for ADLs, Patient to call before getting OOB, PT Consult for mobility concerns, PT Consult for assist device competence, Strengthening exercises (ROM-active/passive), Utilize walker, cane, or other assitive device    Mentation Interventions: Adequate sleep, hydration, pain control, Bed/chair exit alarm, Door open when patient unattended, Evaluate medications/consider consulting pharmacy, Eyeglasses and hearing aids, Familiar objects from home, Family/sitter at bedside, Gait belt with transfers/ambulation, Increase mobility, More frequent rounding, Reorient patient, Room close to nurse's station, Self-releasing belt, Toileting rounds, Update white board    Medication Interventions: Assess postural VS orthostatic hypotension, Bed/chair exit alarm, Evaluate medications/consider consulting pharmacy, Patient to call before getting OOB, Teach patient to arise slowly    Elimination Interventions: Bed/chair exit alarm, Call light in reach, Elevated toilet seat, Patient to call for help with toileting needs, Toilet paper/wipes in reach, Toileting schedule/hourly rounds    History of Falls Interventions: Bed/chair exit alarm, Consult care management for discharge planning, Door open when patient unattended, Evaluate medications/consider consulting pharmacy, Investigate reason for fall, Room close to nurse's station

## 2018-03-01 NOTE — PROGRESS NOTES
Physical therapy    1044 Chart reviewed Spoke with RN. PT medically unstable and not appropriate for physical therapy at this time. PT will continue to follow. Precious Yuan

## 2018-03-01 NOTE — H&P
Radiology History and Physical    Patient: Jason Moncada 67 y.o. female       Chief Complaint: Fall      History of Present Illness: left subphrenic abscess, right effusion     History:    Past Medical History:   Diagnosis Date    Alcoholic cirrhosis of liver without ascites ([facility] [address] 2/12/2018    ETOH abuse 2/11/2018    History of vascular access device 02/16/2018    [facility] VAT - 5 FR triple, R basilic, TPN    Hyperammonemia ([facility] [address] 2/16/2018     Results for Tabatha Kee (MRN [id number]) as of 2/17/2018 08:43  Ref. Range 2/16/2018 17:20 Ammonia Latest Ref Range: <32 UMOL/L 69 (H)     Hyperlipidemia     Perforated chronic gastric ulcer ([facility] [address] 2/11/2018     Family History   Problem Relation Age of Onset    Other Other      patient did not know     Social History     Social History    Marital status: SINGLE     Spouse name: N/A    Number of children: N/A    Years of education: N/A     Occupational History    Not on file.      Social History Main Topics    Smoking status: Former Smoker    Smokeless tobacco: Never Used    Alcohol use 11.4 oz/week     0 Standard drinks or equivalent, 19 Glasses of wine per week      Comment: Hx of heavy etoh use, but quit several months ago    Drug use: No    Sexual activity: Not on file     Other Topics Concern    Not on file     Social History Narrative       Allergies: No Known Allergies    Current Medications:  Current Facility-Administered Medications   Medication Dose Route Frequency    spironolactone (ALDACTONE) tablet 25 mg  25 mg Oral BID    TPN ADULT - CENTRAL   IntraVENous CONTINUOUS    HYDROmorphone (PF) (DILAUDID) injection 2 mg  2 mg IntraVENous Q3H PRN    fluconazole (DIFLUCAN) 200mg/100 mL IVPB (premix)  200 mg IntraVENous Q24H    fentaNYL (DURAGESIC) 25 mcg/hr patch 1 Patch  1 Patch TransDERmal Q72H    haloperidol lactate (HALDOL) injection 1 mg  1 mg IntraVENous Q4H PRN    TPN ADULT - CENTRAL   IntraVENous CONTINUOUS    guaiFENesin ER (MUCINEX) tablet 1,200 mg  1,200 mg Oral Q12H    albuterol-ipratropium (DUO-NEB) 2.5 MG-0.5 MG/3 ML  3 mL Nebulization Q6H RT    acetaminophen (TYLENOL) tablet 650 mg  650 mg Oral Q4H PRN    insulin regular (NOVOLIN R, HUMULIN R) injection   SubCUTAneous Q6H    fat emulsion 20% (LIPOSYN, INTRAlipid) infusion 500 mL  500 mL IntraVENous Q MON, WED & SAT    glucose chewable tablet 16 g  4 Tab Oral PRN    glucagon (GLUCAGEN) injection 1 mg  1 mg IntraMUSCular PRN    dextrose (D50W) injection syrg 12.5-25 g  12.5-25 g IntraVENous PRN    alteplase (CATHFLO) 1 mg in sterile water (preservative free) 1 mL injection  1 mg InterCATHeter PRN    sodium chloride (NS) flush 10-30 mL  10-30 mL InterCATHeter PRN    sodium chloride (NS) flush 10-40 mL  10-40 mL InterCATHeter Q8H    naloxone (NARCAN) injection 0.4 mg  0.4 mg IntraVENous PRN    ondansetron (ZOFRAN) injection 4 mg  4 mg IntraVENous Q4H PRN    enoxaparin (LOVENOX) injection 40 mg  40 mg SubCUTAneous Q24H    pantoprazole (PROTONIX) injection 40 mg  40 mg IntraVENous Q12H    phenol throat spray (CHLORASEPTIC) 1 Spray  1 Spray Oral PRN    nystatin (MYCOSTATIN) 100,000 unit/gram powder   Topical BID    sodium chloride (NS) flush 5-10 mL  5-10 mL IntraVENous PRN        Physical Exam:  Blood pressure 104/54, pulse (!) 106, temperature 97.7 °F (36.5 °C), resp. rate 23, height 5' 3.5\" (1.613 m), weight 85 kg (187 lb 8 oz), SpO2 94 %, not currently breastfeeding.   LUNG: clear to auscultation bilaterally, HEART: regular rate and rhythm, S1, S2 normal, no murmur, click, rub or gallop      Alerts:    Hospital Problems  Date Reviewed: 4/26/2016          Codes Class Noted POA    Hypokalemia ICD-10-CM: E87.6  ICD-9-CM: 276.8  2/18/2018 No        Leukocytosis ICD-10-CM: D72.829  ICD-9-CM: 288.60  2/18/2018 Yes        Severe protein-calorie malnutrition ([facility] [address] ICD-10-CM: E43  ICD-9-CM: 226  2/18/2018 Yes        Acute metabolic encephalopathy QUB-31-VJ: G93.41  ICD-9-CM: 348.31 2/18/2018 Yes        Hyperammonemia (HCC) ICD-10-CM: E72.20  ICD-9-CM: 270.6  2/16/2018 No    Overview Addendum 2/17/2018  9:08 AM by Sheryl Cotto MD        Ref. Range 2/16/2018 17:20   Ammonia Latest Ref Range: <32 UMOL/L 69 (H)                Free intraperitoneal air ICD-10-CM: K66.8  ICD-9-CM: 568.89  2/12/2018 Yes        S/P exploratory laparotomy ICD-10-CM: Z98.890  ICD-9-CM: V45.89  2/12/2018 No    Overview Signed 2/12/2018 11:57 AM by Ramy Villeda MD     Suture repair of perforated posterior gastric ulcer with Florrie Dodrill patch, core needle biopsies of left lobe of the liver. Alcoholic cirrhosis of liver without ascites (HCC) (Chronic) ICD-10-CM: K70.30  ICD-9-CM: 571.2  2/12/2018 Yes    Overview Signed 2/17/2018  8:45 AM by Sheryl Cotto MD     Liver bx 2/12/18:   Cirrhosis with mild chronic portal inflammation and macrovesicular steatosis. Fatty liver determined by biopsy ICD-10-CM: K76.0  ICD-9-CM: 571.8  2/12/2018 Yes    Overview Signed 2/17/2018  8:53 AM by Sheryl Cotto MD        Cirrhosis with mild chronic portal inflammation and macrovesicular steatosis. * (Principal)Perforated chronic gastric ulcer ([address] (Chronic) ICD-10-CM: K25.5  ICD-9-CM: 531.50  2/11/2018 Yes        ETOH abuse (Chronic) ICD-10-CM: F10.10  ICD-9-CM: 305.00  2/11/2018 Yes              Laboratory:      Recent Labs      03/01/18   0121  02/28/18   1452   HGB  9.9*   --    HCT  31.7*   --    WBC  18.6*   --    PLT  525*   --    INR   --   1.2*   BUN  11   --    CREA  0.53*   --    K  4.3   --          Plan of Care/Planned Procedure:  Risks, benefits, and alternatives reviewed with patient and she agrees to proceed with the procedure.      Plan is for right thora, left drain repostion, and PICC replacement       Elba Campos MD

## 2018-03-01 NOTE — PROGRESS NOTES
Phone call from telemetry desk with pox in mid 80's and HR into the 120's noted pt trying to get OOB without assist setting off bed alarm. Pt reminded about gettting OOB without assistance and the need to wear O2 to maintain adequate pox readings. Pt confused thinking she is at home.  Pt reoriented to room 320 at [facility]. Pt agreeable to call for help when getting OOB,

## 2018-03-01 NOTE — PROGRESS NOTES
Lab reported abdominal fluid drawn on 2/28/18 is growing moderate yeast.  Notified Dr. Anabelle Briceno' office and spoke to [place]. No new orders at this time. Notified primary RN.

## 2018-03-01 NOTE — ROUTINE PROCESS
Bedside and Verbal shift change report given to Carmelita Garcia RN (oncoming nurse) by Yodit Brito RN (offgoing nurse). Report included the following information SBAR, Kardex, Intake/Output, MAR, Accordion, Recent Results, Cardiac Rhythm SR/ST and Alarm Parameters .

## 2018-03-02 ENCOUNTER — APPOINTMENT (OUTPATIENT)
Dept: GENERAL RADIOLOGY | Age: 73
DRG: 853 | End: 2018-03-02
Attending: SURGERY
Payer: MEDICARE

## 2018-03-02 LAB
ALBUMIN SERPL-MCNC: 1.4 G/DL (ref 3.5–5)
ALBUMIN/GLOB SERPL: 0.3 {RATIO} (ref 1.1–2.2)
ALP SERPL-CCNC: 137 U/L (ref 45–117)
ALT SERPL-CCNC: 19 U/L (ref 12–78)
ANION GAP SERPL CALC-SCNC: 8 MMOL/L (ref 5–15)
AST SERPL-CCNC: 20 U/L (ref 15–37)
BASOPHILS # BLD: 0.1 K/UL (ref 0–0.1)
BASOPHILS NFR BLD: 1 % (ref 0–1)
BILIRUB SERPL-MCNC: 0.7 MG/DL (ref 0.2–1)
BODY FLD TYPE: NORMAL
BUN SERPL-MCNC: 11 MG/DL (ref 6–20)
BUN/CREAT SERPL: 26 (ref 12–20)
CALCIUM SERPL-MCNC: 8.1 MG/DL (ref 8.5–10.1)
CHLORIDE SERPL-SCNC: 104 MMOL/L (ref 97–108)
CO2 SERPL-SCNC: 24 MMOL/L (ref 21–32)
CREAT SERPL-MCNC: 0.43 MG/DL (ref 0.55–1.02)
DIFFERENTIAL METHOD BLD: ABNORMAL
EOSINOPHIL # BLD: 0.3 K/UL (ref 0–0.4)
EOSINOPHIL NFR BLD: 2 % (ref 0–7)
ERYTHROCYTE [DISTWIDTH] IN BLOOD BY AUTOMATED COUNT: 14.8 % (ref 11.5–14.5)
GLOBULIN SER CALC-MCNC: 4.6 G/DL (ref 2–4)
GLUCOSE BLD STRIP.AUTO-MCNC: 114 MG/DL (ref 65–100)
GLUCOSE BLD STRIP.AUTO-MCNC: 125 MG/DL (ref 65–100)
GLUCOSE BLD STRIP.AUTO-MCNC: 125 MG/DL (ref 65–100)
GLUCOSE BLD STRIP.AUTO-MCNC: 92 MG/DL (ref 65–100)
GLUCOSE FLD-MCNC: 117 MG/DL
GLUCOSE SERPL-MCNC: 120 MG/DL (ref 65–100)
HCT VFR BLD AUTO: 29.6 % (ref 35–47)
HGB BLD-MCNC: 9.2 G/DL (ref 11.5–16)
IMM GRANULOCYTES # BLD: 0.2 K/UL (ref 0–0.04)
IMM GRANULOCYTES NFR BLD AUTO: 1 % (ref 0–0.5)
LDH FLD L TO P-CCNC: 204 U/L
LYMPHOCYTES # BLD: 1.5 K/UL (ref 0.8–3.5)
LYMPHOCYTES NFR BLD: 9 % (ref 12–49)
MAGNESIUM SERPL-MCNC: 1.8 MG/DL (ref 1.6–2.4)
MCH RBC QN AUTO: 32.5 PG (ref 26–34)
MCHC RBC AUTO-ENTMCNC: 31.1 G/DL (ref 30–36.5)
MCV RBC AUTO: 104.6 FL (ref 80–99)
MONOCYTES # BLD: 1 K/UL (ref 0–1)
MONOCYTES NFR BLD: 6 % (ref 5–13)
NEUTS SEG # BLD: 14.3 K/UL (ref 1.8–8)
NEUTS SEG NFR BLD: 82 % (ref 32–75)
NRBC # BLD: 0 K/UL (ref 0–0.01)
NRBC BLD-RTO: 0 PER 100 WBC
PH FLD: 7.3 [PH]
PHOSPHATE SERPL-MCNC: 3.5 MG/DL (ref 2.6–4.7)
PLATELET # BLD AUTO: 503 K/UL (ref 150–400)
PMV BLD AUTO: 11.9 FL (ref 8.9–12.9)
POTASSIUM SERPL-SCNC: 4.6 MMOL/L (ref 3.5–5.1)
PROT FLD-MCNC: 3 G/DL
PROT SERPL-MCNC: 6 G/DL (ref 6.4–8.2)
RBC # BLD AUTO: 2.83 M/UL (ref 3.8–5.2)
SERVICE CMNT-IMP: ABNORMAL
SERVICE CMNT-IMP: NORMAL
SODIUM SERPL-SCNC: 136 MMOL/L (ref 136–145)
SPECIMEN SOURCE FLD: NORMAL
WBC # BLD AUTO: 17.5 K/UL (ref 3.6–11)

## 2018-03-02 PROCEDURE — 84100 ASSAY OF PHOSPHORUS: CPT | Performed by: SURGERY

## 2018-03-02 PROCEDURE — 85025 COMPLETE CBC W/AUTO DIFF WBC: CPT | Performed by: FAMILY MEDICINE

## 2018-03-02 PROCEDURE — 74011000250 HC RX REV CODE- 250: Performed by: NURSE PRACTITIONER

## 2018-03-02 PROCEDURE — 74011000258 HC RX REV CODE- 258: Performed by: SURGERY

## 2018-03-02 PROCEDURE — 94640 AIRWAY INHALATION TREATMENT: CPT

## 2018-03-02 PROCEDURE — 97530 THERAPEUTIC ACTIVITIES: CPT

## 2018-03-02 PROCEDURE — 74011000250 HC RX REV CODE- 250

## 2018-03-02 PROCEDURE — 65660000000 HC RM CCU STEPDOWN

## 2018-03-02 PROCEDURE — 80053 COMPREHEN METABOLIC PANEL: CPT | Performed by: SURGERY

## 2018-03-02 PROCEDURE — 82962 GLUCOSE BLOOD TEST: CPT

## 2018-03-02 PROCEDURE — 74011250637 HC RX REV CODE- 250/637: Performed by: INTERNAL MEDICINE

## 2018-03-02 PROCEDURE — 83735 ASSAY OF MAGNESIUM: CPT | Performed by: SURGERY

## 2018-03-02 PROCEDURE — C9113 INJ PANTOPRAZOLE SODIUM, VIA: HCPCS | Performed by: SURGERY

## 2018-03-02 PROCEDURE — 74011250637 HC RX REV CODE- 250/637: Performed by: NURSE PRACTITIONER

## 2018-03-02 PROCEDURE — 74011250636 HC RX REV CODE- 250/636: Performed by: SURGERY

## 2018-03-02 PROCEDURE — 97535 SELF CARE MNGMENT TRAINING: CPT

## 2018-03-02 PROCEDURE — 76000 FLUOROSCOPY <1 HR PHYS/QHP: CPT

## 2018-03-02 PROCEDURE — 74011250637 HC RX REV CODE- 250/637: Performed by: FAMILY MEDICINE

## 2018-03-02 PROCEDURE — 36415 COLL VENOUS BLD VENIPUNCTURE: CPT | Performed by: FAMILY MEDICINE

## 2018-03-02 PROCEDURE — 74011000250 HC RX REV CODE- 250: Performed by: SURGERY

## 2018-03-02 RX ORDER — HYDROMORPHONE HYDROCHLORIDE 2 MG/ML
1 INJECTION, SOLUTION INTRAMUSCULAR; INTRAVENOUS; SUBCUTANEOUS
Status: DISCONTINUED | OUTPATIENT
Start: 2018-03-02 | End: 2018-03-16 | Stop reason: HOSPADM

## 2018-03-02 RX ORDER — LIDOCAINE HYDROCHLORIDE 20 MG/ML
JELLY TOPICAL
Status: COMPLETED
Start: 2018-03-02 | End: 2018-03-02

## 2018-03-02 RX ORDER — LIDOCAINE HYDROCHLORIDE 20 MG/ML
JELLY TOPICAL ONCE
Status: DISPENSED | OUTPATIENT
Start: 2018-03-02 | End: 2018-03-03

## 2018-03-02 RX ADMIN — ACETAMINOPHEN 650 MG: 325 TABLET ORAL at 03:01

## 2018-03-02 RX ADMIN — Medication 10 ML: at 05:46

## 2018-03-02 RX ADMIN — ENOXAPARIN SODIUM 40 MG: 40 INJECTION SUBCUTANEOUS at 18:07

## 2018-03-02 RX ADMIN — NYSTATIN: 100000 POWDER TOPICAL at 18:09

## 2018-03-02 RX ADMIN — PANTOPRAZOLE SODIUM 40 MG: 40 INJECTION, POWDER, FOR SOLUTION INTRAVENOUS at 21:47

## 2018-03-02 RX ADMIN — HYDROMORPHONE HYDROCHLORIDE 2 MG: 2 INJECTION, SOLUTION INTRAMUSCULAR; INTRAVENOUS; SUBCUTANEOUS at 03:01

## 2018-03-02 RX ADMIN — LIDOCAINE HYDROCHLORIDE: 20 JELLY TOPICAL at 15:25

## 2018-03-02 RX ADMIN — FLUCONAZOLE 200 MG: 2 INJECTION INTRAVENOUS at 18:07

## 2018-03-02 RX ADMIN — IPRATROPIUM BROMIDE AND ALBUTEROL SULFATE 3 ML: .5; 3 SOLUTION RESPIRATORY (INHALATION) at 07:11

## 2018-03-02 RX ADMIN — HYDROMORPHONE HYDROCHLORIDE 1 MG: 2 INJECTION, SOLUTION INTRAMUSCULAR; INTRAVENOUS; SUBCUTANEOUS at 10:06

## 2018-03-02 RX ADMIN — IPRATROPIUM BROMIDE AND ALBUTEROL SULFATE 3 ML: .5; 3 SOLUTION RESPIRATORY (INHALATION) at 13:07

## 2018-03-02 RX ADMIN — PANTOPRAZOLE SODIUM 40 MG: 40 INJECTION, POWDER, FOR SOLUTION INTRAVENOUS at 08:28

## 2018-03-02 RX ADMIN — SPIRONOLACTONE 25 MG: 25 TABLET, FILM COATED ORAL at 08:28

## 2018-03-02 RX ADMIN — HYDROMORPHONE HYDROCHLORIDE 1 MG: 2 INJECTION, SOLUTION INTRAMUSCULAR; INTRAVENOUS; SUBCUTANEOUS at 13:59

## 2018-03-02 RX ADMIN — NYSTATIN: 100000 POWDER TOPICAL at 08:40

## 2018-03-02 RX ADMIN — HYDROMORPHONE HYDROCHLORIDE 1 MG: 2 INJECTION, SOLUTION INTRAMUSCULAR; INTRAVENOUS; SUBCUTANEOUS at 19:48

## 2018-03-02 RX ADMIN — Medication 10 ML: at 19:48

## 2018-03-02 RX ADMIN — Medication 10 ML: at 21:47

## 2018-03-02 RX ADMIN — GUAIFENESIN 1200 MG: 600 TABLET, EXTENDED RELEASE ORAL at 08:28

## 2018-03-02 RX ADMIN — GUAIFENESIN 1200 MG: 600 TABLET, EXTENDED RELEASE ORAL at 21:47

## 2018-03-02 RX ADMIN — Medication 40 ML: at 18:08

## 2018-03-02 RX ADMIN — CALCIUM GLUCONATE: 94 INJECTION, SOLUTION INTRAVENOUS at 18:38

## 2018-03-02 RX ADMIN — IPRATROPIUM BROMIDE AND ALBUTEROL SULFATE 3 ML: .5; 3 SOLUTION RESPIRATORY (INHALATION) at 19:50

## 2018-03-02 RX ADMIN — SPIRONOLACTONE 25 MG: 25 TABLET, FILM COATED ORAL at 18:07

## 2018-03-02 RX ADMIN — HYDROMORPHONE HYDROCHLORIDE 1 MG: 2 INJECTION, SOLUTION INTRAMUSCULAR; INTRAVENOUS; SUBCUTANEOUS at 22:52

## 2018-03-02 NOTE — CONSULTS
[address] Kay[address]rea  MR#: [id number]  : 1945  ACCOUNT #: [de-identified]   DATE OF SERVICE: 2018    REQUESTING PHYSICIAN:  Dr. Tucker Ascencio:  Abdominal pain. HISTORY OF PRESENT ILLNESS:  Patient is a 65-year-old female with history of heavy alcohol use and dyslipidemia, who was admitted to [place] [address] with the aforementioned complaint. Per chart records, patient was doing fine on the day of admission when she suffered a ground-level fall. She subsequently developed back and abdominal pain and was brought to [facility] for further evaluation and treatment. Workup revealed free air in the abdomen. She was taken to the OR where she was found to have a perforated gastric ulcer. Her postoperative course has been complicated by a controlled fistula, as well as ongoing leukocytosis. A CT scan was repeated on  due to leukocytosis and abdominal pain. This revealed a left upper quadrant collection concerning for abscess. Interventional Radiology was consulted and a CT-guided drain was placed on . Cultures were sterile. Repeat cultures have been drawn from the ALYSSA drain on  and are growing yeast.  The patient has been on piperacillin-tazobactam from  up to . She also received several days of vancomycin and levofloxacin. She has not been on any further antibiotics since the  with the exception of fluconazole, which was just started. The infectious diseases service has been asked to assist with antibiotic management. PAST MEDICAL HISTORY:  Dyslipidemia. PAST SURGICAL HISTORY:  Cataract removal.    FAMILY HISTORY:  Unknown. SOCIAL HISTORY:  The patient drank 10 glasses of wine weekly, but reportedly quit several months ago. OUTPATIENT MEDICATIONS:  Please see above for details. She was not on antibiotics prior to admission.     ALLERGIES:  NO KNOWN DRUG ALLERGIES. LABORATORY DATA:  White blood cell count 17,000, platelet count 327,724. Creatinine is 0.4. Cultures from 03/01 revealed no growth at 1 day. These are labeled body fluid; it is not clear if these were taken from thoracentesis or from repositioning of the drain. Urine culture is growing 30,000 colonies per mL of yeast.  Blood cultures from 02/28 reveal no growth to date. Cultures taken from the ALYSSA drain on 02/28 revealed moderate yeast.      CT scan from 02/28 reveals residual fluid in the posterior portion of the left subphrenic collection, which does not appear well drained by the pigtail catheter. Persistent ascites. Increasing right pleural effusion and right lower lobe atelectasis. Please see body of chart for details. PHYSICAL EXAMINATION   VITAL SIGNS:  Temperature 98.4 degrees Fahrenheit, maximum temperature was 101.3, heart rate 106 beats per minute, blood pressure 139/69 and saturation 95% on 4 liters nasal cannula. GENERAL:  Alert, no acute distress. HEENT:  Normocephalic, atraumatic. Mucous membranes dry. HEART:  Tachycardic. No murmurs, gallops or rubs. PULMONARY:  Decreased breath sounds anteriorly. ABDOMEN:  Mildly tender to palpation. There is a ALYSSA drain with bilious fluid and an accordion drain with serous fluid. EXTREMITIES:  Pitting edema, bilateral lower extremities. SKIN:  No rashes. ASSESSMENT AND PLAN  1. Fever and leukocytosis. Multiple potential etiologies. The patient has been started on fluconazole for yeast growing from the ALYSSA drain. The significance of this yeast is uncertain as it likely represents colonization or selective pressure from the 14 days of piperacillin-tazobactam, but will continue fluconazole for now and follow. Elevated white count may be due to developing small-bowel obstruction or related to the subphrenic collection or pleural effusion. She has also developed loose stools.   I have asked the nurse to send a Clostridium difficile assay if the loose stools continue. Agree with monitoring the patient off further antibiotics besides the antifungal at this time. The infectious diseases service will continue to follow. 2.  Perforated gastric ulcer. The patient underwent exploratory laparotomy with repair on 02/12. Thank you for allowing me to participate in the care of this patient.       DO UMAIR Prado / PN  D: 03/02/2018 16:52     T: 03/02/2018 18:03  JOB #: [id number]

## 2018-03-02 NOTE — PROGRESS NOTES
Entered room and found pt's pillows, gown, and sheets on floor. With gown on floor, noted a large amount of blood on gown. Pt stated, \"I was trying to change my gown but no one would help me. \" Pt cautioned on pulling any sheets or gown from self or bed because pt has multiple attachments (ALYSSA drain, accordian drain, Purewick, PICC line, telemetry lines). Pt agreeable to allow staff to assist with pt care needs. Staff to round more often, keep room door open for observation.

## 2018-03-02 NOTE — PROGRESS NOTES
I received notification that pt has not been accepted @ [facility]. Waiting to hear back fgrom [facility].   Jermaine Lim

## 2018-03-02 NOTE — PROGRESS NOTES
Problem: Self Care Deficits Care Plan (Adult)  Goal: *Acute Goals and Plan of Care (Insert Text)  Occupational Therapy Goals  Revised 3/2/2018  1. Patient will perform grooming with modified independence for >5 minutes with supervision/setup with < 2 verbal cues within 7 day(s). 2.  Patient will perform upper body dressing and bathing with modified independence within 7 day(s). 3.  Patient will perform lower body dressing and bathing with moderate assistance using adaptive equipment within 7 day(s). 4.  Patient will perform toilet transfers to [facility] with minimal assistance x1 within 7 day(s). 5.  Patient will perform all aspects of toileting with minimal assistance within 7 day(s). 6.  Patient will participate in upper extremity therapeutic exercise/activities with supervision/set-up for 10 minutes within 7 day(s). 7.  Patient will utilize energy conservation techniques during functional activities with verbal cues within 7 day(s). 8.  Patient will don/doff back brace at supervision/set-up within 7 days. 9.  Patient will verbalize/demonstrate all spinal precautions during ADL tasks without cues within 7 days. Initiated 2/20/2018; revised at re-assess (see above); 1. Patient will perform upper body dressing with moderate assistance in unsupported sitting within 7 day(s). 2.  Patient will perform upper body bathing with moderate assistance  within 7 day(s). 3.  Patient will perform grooming with minimal assistance/contact guard assist within 7 day(s). 4.  Patient will perform toilet transfers with maximal assistance  within 7 day(s). 5.  Patient will perform all aspects of toileting with maximal assistance within 7 day(s). 6.  Patient will participate in upper extremity therapeutic exercise/activities with minimal assistance/contact guard assist for 5 minutes within 7 day(s).        Occupational Therapy TREATMENT: WEEKLY REASSESSMENT  Patient: Cinthya Hitchcock (73 y.o. female)  Date: 3/2/2018  Diagnosis: Intra-abdominal free air of unknown etiology [K66.8] Perforated chronic gastric ulcer ([facility] [address]  Procedure(s) (LRB):   NASOGASTRIC TUBE PLACEMENT (N/A) 12 Days Post-Op  Precautions: Aspiration, fall, spinal/Back, brace on when OOB  Chart, occupational therapy assessment, plan of care, and goals were reviewed. ASSESSMENT:  Ms. Sherryle Scrape was received returning to bed with staff. Patient's two daughters present for treatment, supportive, and engaged with all family training + education. Patient assisted from [facility] to bed with min A x2 using rolling walker. She returned to bed but required assistance for additional hygiene from bed level d/t urine incontinence. Patient and daughters educated on log roll technique. She required max A to roll L and R in bed however able to initiate movement. Education also provided for brace application, wear schedule, and proper adjustments. Provided handout for log roll technique and brace information; Handouts displayed in room per family request at white board to assist with carryover with all staff. Patient limited in activity tolerance d/t fatigue and weakness. She is currently requiring min to max A for UB ADLs and total A for LB ADLs. Patient educated on simple UE exercises, demonstrated understanding however needed constant cuing to continue with activity and for accuracy. Attention and command following impaired  however suspect negatively impacted d/t fatigue; Currently following ~50% of commands consistently. Since initial evaluation, she is improved with general activity tolerance and requiring less assistance. Participation has been limited within the last week d/t multiple procedures + tests as well as increased confusion and agitation intermittently. Patient would benefit from continued skilled OT to progress towards goals and improve overall independence.        Progression toward goals:  [x]          Improving appropriately and progressing toward goals  []          Improving slowly and progressing toward goals  []          Not making progress toward goals and plan of care will be adjusted     PLAN:  Goals have been updated based on progression since last assessment. Patient continues to benefit from skilled intervention to address the above impairments. Continue to follow patient 5 times a week to address goals. Planned Interventions:  [x]                  Self Care Training                  [x]           Therapeutic Activities  [x]                  Functional Mobility Training    []           Cognitive Retraining  [x]                  Therapeutic Exercises           [x]           Endurance Activities  [x]                  Balance Training                   []           Neuromuscular Re-Education  []                  Visual/Perceptual Training     [x]      Home Safety Training  [x]                  Patient Education                 [x]           Family Training/Education  []                  Other (comment):  Discharge Recommendations: Rehab  Further Equipment Recommendations for Discharge: none at this time     SUBJECTIVE:   Patient stated Okay.     OBJECTIVE DATA SUMMARY:   Cognitive/Behavioral Status:  Neurologic State: Alert  Orientation Level: Oriented to person;Oriented to place; Disoriented to time  Cognition: Decreased attention/concentration;Decreased command following; Impaired decision making  Perception: Appears intact  Perseveration: No perseveration noted  Safety/Judgement: Awareness of environment     Functional Mobility and Transfers for ADLs:   Bed Mobility:  Rolling: Maximum assistance;Assist x1;Additional time (log roll technique; provided handout and placed on board )  Supine to Sit: Moderate assistance; Additional time;Assist x1  Sit to Supine: Moderate assistance; Additional time;Assist x1  Scooting: Moderate assistance; Additional time;Assist x1    Transfers:  Sit to Stand: Minimum assistance;Assist x2; Additional time Balance:  Sitting: Impaired  Sitting - Static: Fair (occasional)  Sitting - Dynamic: Fair (occasional)  Standing: Impaired  Standing - Static: Fair  Standing - Dynamic : Fair  ADL Intervention:  Feeding  Feeding Assistance:  (NPO)    Grooming  Grooming Assistance: Minimum assistance  Washing Face: Minimum assistance  Washing Hands: Minimum assistance    Upper Body Bathing  Bathing Assistance: Maximum assistance    Lower Body Bathing  Bathing Assistance: Total assistance(dependent)    Upper Body Dressing Assistance  Dressing Assistance: Maximum assistance  Orthotics(Brace): Maximum assistance (don/doff with max instruction + education)    Lower Body Dressing Assistance  Dressing Assistance: Total assistance(dependent)    Toileting  Toileting Assistance: Total assistance(dependent)  Bladder Hygiene: Total assistance (dependent)  Bowel Hygiene: Total assistance (dependent)  Clothing Management: Total assistance (dependent)  Cues: Verbal cues provided    Cognitive Retraining  Safety/Judgement: Awareness of environment     Dressing brace: Patient instructed and demonstrated to don/doff velcro on brace using dominant side, keeping non-dominant side intact. Patient instructed and demonstrated in meantime of being able to stand with back against wall to don/doff brace, to don/doff seated using lap and bed/chair surface to support brace while manipulating. Standing: Patient instructed to walk up to sink/counter top/surfaces, step into walker, square off while using objects, slide objects along surfaces, to increase adherence to back precautions and fall prevention. Patient instructed to increase amount of time standing in order to increase independence and tolerance with ADLs. Therapeutic Exercises:   Patient educated on the benefits of exercise and encouraged to complete frequently throughout the day as tolerated.   Instruction provided for the following exercises: shoulder shrugs, shoulder flexion/extension, chest press/back row, elbow flexion/extension, wrist flexion/extension, finger flexion/extension. Patient tolerated 1 sets x 5-8 reps; limited d/t fatigue and decreased attention/command following as she fatigued. Activity Tolerance:    Patient with fair activity tolerance. Please refer to the flowsheet for vital signs taken during this treatment. After treatment:   [] Patient left in no apparent distress sitting up in chair  [x] Patient left in no apparent distress in bed  [x] Call bell left within reach  [x] Nursing notified  [x] Caregiver present  [] Bed alarm activated    COMMUNICATION/COLLABORATION:   The patients plan of care was discussed with: Physical Therapist, Registered Nurse and patient.     Shawna Sykes OTR/L  Time Calculation: 38 mins

## 2018-03-02 NOTE — PROGRESS NOTES
Problem: Mobility Impaired (Adult and Pediatric)  Goal: *Acute Goals and Plan of Care (Insert Text)  Physical Therapy Goals  Initiated 2/20/2018    1. Patient will move from supine to sit and sit to supine , scoot up and down and roll side to side in bed with moderate assistance x 2  within 7 days. Partially Met - Continue  2. Patient will perform sit <> stand with minimal assist x 2  within 7 days. Met - Advance goal below  3. Patient will ambulate with minimal assistance x 2 for 10 feet with RW and assist of 3rd pushing chair from behind within 7 days. Not Met- Continue  4. Patient will verbalize and demonstrate understanding of spinal precautions (No bending, lifting greater than 5 lbs, or twisting; log-roll technique; frequent repositioning as instructed) within 7 days. Not Met- Continue    Physical Therapy Goals  Revised 2/28/2018  1. Patient will move from supine to sit and sit to supine  and roll side to side in bed with moderate assistance x 1  within 7 day(s). 2.  Patient will move from long sitting in bed to edge of bed with minimal assistance x 1 within 7 days. 3.  Patient will transfer from bed to chair and chair to bed with moderate assistance x 1 using the least restrictive device within 7 day(s). 4.  Patient will perform sit <> stand with minimal assistance x 1 within 7 day(s). 5.  Patient will ambulate with minimal assistance x 1  for 15 feet with the least restrictive device within 7 day(s). 6.  Patient will verbalize and demonstrate understanding of spinal precautions (No bending, lifting greater than 5 lbs, or twisting; log-roll technique; frequent repositioning as instructed) within 7 days.    physical Therapy TREATMENT  Patient: Ashley Stewart (50 y.o. female)  Date: 3/2/2018  Diagnosis: Intra-abdominal free air of unknown etiology [K66.8] Perforated chronic gastric ulcer ([facility] [address]  Procedure(s) (LRB):   NASOGASTRIC TUBE PLACEMENT (N/A) 12 Days Post-Op  Precautions: Aspiration, Back, Bed Alarm, Fall, Skin  Chart, physical therapy assessment, plan of care and goals were reviewed. ASSESSMENT:  Pt received in bed, alert and agreeable to participate with physical therapy. Using 4L 92% at rest. Sitter at bedside. Mod A for bed mobility>sitting EOB. Max A to don TLSO. Tolerated sitting EOB x 3min. Pull to stand using RW with mod A x2, verbal cues for PLB. Gait training x 3' using RW with Min A x2 to chair. Pt agreeable to sit in chair for 30min. Patient demonstrates shallow breathing through mouth, desat to 86% on 4L with activity. Verbal cues/ visual feedback for PLB, recovered to 92-93%. No family present during session. Did speak to daughters in hallway prior to session. Progression toward goals:  []    Improving appropriately and progressing toward goals  [x]    Improving slowly and progressing toward goals  []    Not making progress toward goals and plan of care will be adjusted     PLAN:  Patient continues to benefit from skilled intervention to address the above impairments. Continue treatment per established plan of care. Discharge Recommendations:  Skilled Nursing Facility  Further Equipment Recommendations for Discharge: TBD at rehab     SUBJECTIVE:   Patient stated I would like to do something.     OBJECTIVE DATA SUMMARY:   Critical Behavior:  Neurologic State: Drowsy, Alert  Orientation Level: Disoriented to situation, Oriented to person, Oriented to place, Disoriented to time  Cognition: Decreased attention/concentration, Poor safety awareness, Memory loss, Impulsive, Impaired decision making, Recognition of people/places  Safety/Judgement: Decreased insight into deficits  Functional Mobility Training:  Bed Mobility:     Supine to Sit: Moderate assistance     Scooting: Minimum assistance; Additional time        Transfers:  Sit to Stand: Minimum assistance;Assist x2  Stand to Sit: Moderate assistance;Assist x2        Bed to Chair: Moderate assistance;Assist x2 Balance:  Sitting: With support;High guard  Standing: Impaired; With support;Pull to stand  Standing - Static: Fair;Constant support  Standing - Dynamic : Fair  Ambulation/Gait Training:  Distance (ft): 3 Feet (ft)  Assistive Device: Walker, rolling;Gait belt;Brace/Splint  Ambulation - Level of Assistance: Moderate assistance;Assist x2        Gait Abnormalities: Antalgic;Decreased step clearance        Base of Support: Widened     Speed/Alejandra: Slow;Shuffled                       Stairs:              Neuro Re-Education:    Therapeutic Exercises:     Pain:  Pain Scale 1: Visual  Pain Intensity 1: 0  Pain Location 1: Abdomen     Pain Description 1: Stabbing; Intermittent  Pain Intervention(s) 1: Medication (see MAR)  Activity Tolerance:   Fair  Please refer to the flowsheet for vital signs taken during this treatment.   After treatment:   []    Patient left in no apparent distress sitting up in chair  []    Patient left in no apparent distress in bed  []    Call bell left within reach  []    Nursing notified  []    Caregiver present  []    Bed alarm activated    COMMUNICATION/COLLABORATION:   The patients plan of care was discussed with: Registered Nurse    Zarina Mcdonald   Time Calculation: 27 mins

## 2018-03-02 NOTE — PROGRESS NOTES
SHIFT CHANGE:  7:49 AM Report received from Duran Waldrop RN. SBAR, Kardex, Procedure Summary, Intake/Output, MAR, Recent Results, Med Rec Status and Cardiac Rhythm NSR/ST, PVC were discussed. SHIFT SUMMARY:    9:55 AM  Patients abdomen with increased distention from the start of shift. MD aware per daughters at bedside. Will monitor closely. 1:14 PM  Patient up in the chair with PT and sitting comfortably at this time. 2:00 PM  Patient assisted to the bedside commode for a bowel movement, then assisted back to bed.  2:38 PM  Patient off the floor to XR for NG tube placement. 3:23 PM  Received call from True Greening in XR. Unsuccessful with NG placement. END OF SHIFT REPORT:  7:47 PM  Bedside and Verbal shift change report given to Delmi Inman RN (oncoming nurse) by Dajuan Simons RN (offgoing nurse). Report included the following information SBAR, Kardex, Procedure Summary, Intake/Output, MAR, Recent Results, Med Rec Status and Cardiac Rhythm NSR/ST.

## 2018-03-02 NOTE — PROGRESS NOTES
Problem: Falls - Risk of  Goal: *Absence of Falls  Document Demarcus Fall Risk and appropriate interventions in the flowsheet.    Outcome: Progressing Towards Goal  Fall Risk Interventions:  Mobility Interventions: Assess mobility with egress test, Bed/chair exit alarm, Communicate number of staff needed for ambulation/transfer, OT consult for ADLs, Patient to call before getting OOB, PT Consult for mobility concerns, PT Consult for assist device competence, Strengthening exercises (ROM-active/passive), Utilize walker, cane, or other assitive device, Utilize gait belt for transfers/ambulation    Mentation Interventions: Adequate sleep, hydration, pain control, Bed/chair exit alarm, Door open when patient unattended, Evaluate medications/consider consulting pharmacy, Eyeglasses and hearing aids, Familiar objects from home, Family/sitter at bedside, Gait belt with transfers/ambulation, Increase mobility, More frequent rounding, Reorient patient, Room close to nurse's station, Self-releasing belt, Toileting rounds, Update white board    Medication Interventions: Assess postural VS orthostatic hypotension, Bed/chair exit alarm, Evaluate medications/consider consulting pharmacy, Patient to call before getting OOB, Teach patient to arise slowly, Utilize gait belt for transfers/ambulation    Elimination Interventions: Bed/chair exit alarm, Call light in reach, Elevated toilet seat, Patient to call for help with toileting needs, Toilet paper/wipes in reach, Toileting schedule/hourly rounds    History of Falls Interventions: Bed/chair exit alarm, Consult care management for discharge planning, Door open when patient unattended, Evaluate medications/consider consulting pharmacy, Investigate reason for fall, Room close to nurse's station

## 2018-03-02 NOTE — PROGRESS NOTES
Progress Note    Patient: Domenica Mccord MRN: [id number]  SSN: [id number]    YOB: 1945  Age: 67 y.o. Sex: female      Admit Date: 2018    18 Days Post-Op    Procedure:  Procedure(s):  EX LAP    Subjective:     Mrs. Adal Castro has no complaints. She denies any abdominal or back pain. She is breathing comfortably. She denies flatus or BM. Objective:     Visit Vitals    /58 (BP 1 Location: Left arm, BP Patient Position: At rest)    Pulse 95    Temp 98.4 °F (36.9 °C)    Resp 20    Ht 5' 3.5\" (1.613 m)    Wt 186 lb (84.4 kg)    SpO2 95%    Breastfeeding No    BMI 32.43 kg/m2       Temp (24hrs), Av.9 °F (37.2 °C), Min:97.9 °F (36.6 °C), Max:101.3 °F (38.5 °C)      Physical Exam:    GENERAL: alert, cooperative, no distress, ABDOMEN: Soft, NT, distended. The ALYSSA fluid is bilious. , EXTREMITIES:  edema pitting b/l    Data Review: reviewed  cultures.     Lab Review:   CMP:   Lab Results   Component Value Date/Time     2018 03:08 AM    K 4.6 2018 03:08 AM     2018 03:08 AM    CO2 24 2018 03:08 AM    AGAP 8 2018 03:08 AM     (H) 2018 03:08 AM    BUN 11 2018 03:08 AM    CREA 0.43 (L) 2018 03:08 AM    GFRAA >60 2018 03:08 AM    GFRNA >60 2018 03:08 AM    CA 8.1 (L) 2018 03:08 AM    MG 1.8 2018 03:08 AM    PHOS 3.5 2018 03:08 AM    ALB 1.4 (L) 2018 03:08 AM    TP 6.0 (L) 2018 03:08 AM    GLOB 4.6 (H) 2018 03:08 AM    AGRAT 0.3 (L) 2018 03:08 AM    SGOT 20 2018 03:08 AM    ALT 19 2018 03:08 AM     CBC:   Lab Results   Component Value Date/Time    WBC 17.5 (H) 2018 03:08 AM    HGB 9.2 (L) 2018 03:08 AM    HCT 29.6 (L) 2018 03:08 AM     (H) 2018 03:08 AM       Assessment:     Hospital Problems  Date Reviewed: 2016          Codes Class Noted POA    Hypokalemia ICD-10-CM: E87.6  ICD-9-CM: 276.8  2018 No        Leukocytosis ICD-10-CM: D55.084  ICD-9-CM: 288.60  2/18/2018 Yes        Severe protein-calorie malnutrition ([facility] [address] ICD-10-CM: E43  ICD-9-CM: 256  2/18/2018 Yes        Acute metabolic encephalopathy [facility]-AL: G93.41  ICD-9-CM: 348.31  2/18/2018 Yes        [facility] ICD-10-CM: E72.20  ICD-9-CM: 270.6  2/16/2018 No    Overview Addendum 2/17/2018  9:08 AM by Aron Paul MD        Ref. Range 2/16/2018 17:20   Ammonia Latest Ref Range: <32 UMOL/L 69 (H)                Free intraperitoneal air ICD-10-CM: K66.8  ICD-9-CM: 568.89  2/12/2018 Yes        S/P exploratory laparotomy ICD-10-CM: Z98.890  ICD-9-CM: V45.89  2/12/2018 No    Overview Signed 2/12/2018 11:57 AM by Clifton Paige MD     Suture repair of perforated posterior gastric ulcer with Tarri Para patch, core needle biopsies of left lobe of the liver. Alcoholic cirrhosis of liver without ascites (HCC) (Chronic) ICD-10-CM: K70.30  ICD-9-CM: 571.2  2/12/2018 Yes    Overview Signed 2/17/2018  8:45 AM by Aron Paul MD     Liver bx 2/12/18:   Cirrhosis with mild chronic portal inflammation and macrovesicular steatosis. Fatty liver determined by biopsy ICD-10-CM: K76.0  ICD-9-CM: 571.8  2/12/2018 Yes    Overview Signed 2/17/2018  8:53 AM by Aron Paul MD        Cirrhosis with mild chronic portal inflammation and macrovesicular steatosis. * (Principal)Perforated chronic gastric ulcer ([facility] [address] (Chronic) ICD-10-CM: K25.5  ICD-9-CM: 531.50  2/11/2018 Yes        ETOH abuse (Chronic) ICD-10-CM: F10.10  ICD-9-CM: 305.00  2/11/2018 Yes              Plan/Recommendations/Medical Decision Making:     Fever spike last night, now afebrile. WBC down to 17. Yeast on cultures. Continue Diflucan, ID consult. Continue TPN with adjustment and will start to cycle. NPO. No leak on CT, but controlled fistula since ALYSSA fluid is bilious. Output is down. Continue PPI's. Wean O2, IS. Appreciate IR and Pulmonary input.   OOB with PT.    Signed By: Molly Head MD     March 2, 2018

## 2018-03-02 NOTE — PROGRESS NOTES
[facility]     Name: Thor Bernheim MRN: [id number]   : 1945 [facility]   Date: 3/2/2018            Impression Plan   1. Acute Respiratory Failure with Hypoxia  2. Abnormal Chest CT: with bilateral pleural effusions, atelectasis,and patchy airspace disease in MIRIAM; s/p thoracentesis on 3/1  3. Leukocytosis, worsening  4. ALYSSA drain culture: + yeast  5. Delirium; slightly improved  6. Peforated gastric ulcer, s/p repair  7. Hypokalemia, hypomagnesemia; resolved  8. EtOH abuse     · Goal sats >90%, wean O2 as tolerated  · Continue Diflucan. Continue to follow WBC and cultures, fever curve. · F/U blood cultures  · F/U cultures from abdominal drains  · Speech following and appreciate help: NPO currently  · Will continue follow CXR and repeat thoracentesis if necessary  · C/W IV Haldol  · NG tube to be inserted in IR  · Scheduled nebs only whe awake  · Guafenesin   · Post-op management as per surgery  · Pain control; limit benzos as much as possible, now on Fentanyl patch and improved  · Seroquel at night for sleep  · Monitor lytes, replete as needed  · Encourage IS use  · PT/OT  · OOB and mobilize as much as possible  · May benefit from pulmonary rehab, will consult CM to see if she qualifies  · TPN  · SCDs  · Protonix               Radiology  ( personally reviewed) CXR:  No changes. Small left pleural effusion and significant improvement in right pleural effusion since yesterday    KUB:  Distended loops of bowel in midabdomen   ABG No results for input(s): PHI, PO2I, PCO2I in the last 72 hours.        Subjective     Overnight events:  TMax 101.3 overnight  BP stable  HR low 100s  O2 sats 95% on 4L NC  WBC 17.5, slightly better   Hgb 9.2  Urine culture with yeast  Blood cultures negative to date  ALYSSA culture growing moderate yeast  Liver enzymes trending down  Alk phos: 137, better  Fluid balance: -362  Pleural fluid consistent with exudate but right at the cusp      ROS: Currently sleeping. Daughter reports that she had a good night and rested well. Pain much more controlled. Believes that breathing has somewhat improved. Abdomen much more distended: about to go to IR for NG insertion. Past Medical History:   Diagnosis Date    Alcoholic cirrhosis of liver without ascites ([facility] [address] 2/12/2018    ETOH abuse 2/11/2018    History of vascular access device 02/16/2018    [facility] VAT - 5 FR triple, R basilic, TPN    Hyperammonemia ([facility] [address] 2/16/2018     Results for Yanely Loyola (MRN [id number]) as of 2/17/2018 08:43  Ref. Range 2/16/2018 17:20 Ammonia Latest Ref Range: <32 UMOL/L 69 (H)     Hyperlipidemia     Perforated chronic gastric ulcer ([facility] [address] 2/11/2018      Past Surgical History:   Procedure Laterality Date    HX CATARACT REMOVAL        Prior to Admission medications    Medication Sig Start Date End Date Taking? Authorizing Provider   naproxen sodium (ALEVE) 220 mg tablet Take 220 mg by mouth daily as needed for Pain. Yes Historical Provider   cyanocobalamin (VITAMIN B12) 500 mcg tablet Take 500 mcg by mouth daily. Yes Historical Provider   multivitamin (ONE A DAY) tablet Take 1 Tab by mouth daily. Yes Historical Provider   omega-3 fatty acids-vitamin e 1,000 mg cap Take 2 Caps by mouth daily.    Yes Historical Provider     Current Facility-Administered Medications   Medication Dose Route Frequency    TPN ADULT - CENTRAL   IntraVENous CONTINUOUS    TPN ADULT - CENTRAL   IntraVENous CONTINUOUS    spironolactone (ALDACTONE) tablet 25 mg  25 mg Oral BID    fluconazole (DIFLUCAN) 200mg/100 mL IVPB (premix)  200 mg IntraVENous Q24H    albuterol-ipratropium (DUO-NEB) 2.5 MG-0.5 MG/3 ML  3 mL Nebulization Q6HWA RT    fentaNYL (DURAGESIC) 25 mcg/hr patch 1 Patch  1 Patch TransDERmal Q72H    guaiFENesin ER (MUCINEX) tablet 1,200 mg  1,200 mg Oral Q12H    insulin regular (NOVOLIN R, HUMULIN R) injection   SubCUTAneous Q6H    fat emulsion 20% (LIPOSYN, INTRAlipid) infusion 500 mL  500 mL IntraVENous Q MON, WED & SAT    sodium chloride (NS) flush 10-40 mL  10-40 mL InterCATHeter Q8H    enoxaparin (LOVENOX) injection 40 mg  40 mg SubCUTAneous Q24H    pantoprazole (PROTONIX) injection 40 mg  40 mg IntraVENous Q12H    nystatin (MYCOSTATIN) 100,000 unit/gram powder   Topical BID     No Known Allergies   Social History   Substance Use Topics    Smoking status: Former Smoker    Smokeless tobacco: Never Used    Alcohol use 11.4 oz/week     0 Standard drinks or equivalent, 19 Glasses of wine per week      Comment: Hx of heavy etoh use, but quit several months ago      Family History   Problem Relation Age of Onset    Other Other      patient did not know          Laboratory: I have personally reviewed the critical care flowsheet and labs. Recent Labs      03/02/18   0308  03/01/18   0121  02/28/18   0552   WBC  17.5*  18.6*  17.8*   HGB  9.2*  9.9*  9.3*   HCT  29.6*  31.7*  29.6*   PLT  503*  525*  534*     Recent Labs      03/02/18   0308  03/01/18   0121  02/28/18   1452  02/28/18   0552   NA  136  137   --   137   K  4.6  4.3   --   4.5   CL  104  107   --   105   CO2  24  25   --   25   GLU  120*  174*   --   101*   BUN  11  11   --   13   CREA  0.43*  0.53*   --   0.52*   CA  8.1*  7.9*   --   7.8*   MG  1.8  2.0   --   2.0   PHOS  3.5  3.6   --   3.3   ALB  1.4*  1.4*   --    --    SGOT  20   --   31   --    ALT  19   --   27   --    INR   --    --   1.2*   --        Objective:          Intake/Output Summary (Last 24 hours) at 03/02/18 1432  Last data filed at 03/02/18 0806   Gross per 24 hour   Intake          1167.15 ml   Output             1530 ml   Net          -362.85 ml     GENERAL: alert, able to answer questions appropriately, uncomfortable but NAD HEENT:  PERRL, EOMI, no alar flaring or epistaxis, oral mucosa moist without cyanosis, NECK:  no jugular vein distention, no retractions, no thyromegaly or masses, LUNGS: Decreased bs bilaterally, anterior exam only , HEART:  Regular rate and rhythm with no MGR; no edema is present, ABDOMEN:  soft, stable CDI, drain dressing CDI EXTREMITIES:  warm with no cyanosis, dependent edema in LEs SKIN:  no jaundice or ecchymosis and NEUROLOGIC: alert, oriented, grossly non-focal    Rolanda Clarke, ELVIA  [facility] [place]

## 2018-03-02 NOTE — PROGRESS NOTES
Shift Summary  3/1/2018  [id number]    0700 Pt awake at time of bedside shift report received from [facility].     7524 AM vitals, assessment, and meds complete without difficulty, AM rhythm strip shows sinus tach, PVC. Reviewed today's plan of care and goals with patient/family; plan of care today includes monitor VS, possible radiology procedure for drain placement. 1004 1mg Dilaudid given. 1010 Dr. Suman Duggan in to see patient, I remained at bedside for aprox. 15 minutes, he continues to speak with patient and family at length about expectations for recovery and current plan of care. 1100 NGT placed per Dr. Suman Duggan verbal order. Passed easily, though patient very agitated through procedure. 1111 1mg Dilaudid additional given as patient still moaning and flexing torso in pain. [address] shows improper placement of NGT, Dr. Suman Duggan in room and tube repositioned per his direction. 1137 Repeat XRay shows NGT still not in stomach, tube completely removed at this time and Dr. Suman Duggan notified. Pt resting comfortably with family at bedside after tube removal.     1340 Pt taken downstairs for procedure. Pt advocate in to speak with family while patient out of room. 1620 Pt returned to unit from procedure. Dr Suman Duggan speaking with family outside of room while I assessed patient upon her return. During my time in the room, pt denies pain and is resting comfortably. 0 Pt's family returned to room and report patient in severe pain and distress. 1631 2mg IV dilaudid given. [address] Dr. Elliot Clark in with patient and family. She returned to the desk to discuss case with me. She plans to order additional pain medication and portable xray to evaluate increased pain post procedure. [phone] Patient family member opened door to room and patient yelled out once in pain. I entered the room to tell patient and family that Dr. Elliot Clark is entering additional orders to address pain.  Patient's daughter asked if staff would notice changes in patient condition in their absence. I assured her that patient's door has remained open and hourly rounds completed to assess any changes in patient condition when they are not at the bedside. 1700 While getting newly ordered pain medication for patient out of pyxis, I was informed by coworker Veronica Gudino RN that she will assume care of patient at this time per family request. Report given to Claudia Hurd.

## 2018-03-02 NOTE — PROGRESS NOTES
[facility] is not accepting any pts with HMO insurance. [address] [facility] does not have a bed currently but perhaps by the time pt is stable. I notified [facility] that pt will not be ready for awhile. Pt is on TPN still . There are only a very few SNFs that accept TPN. ( [facility]) but none of these  facilities are on p.'s choice list @ this time and hopefully,the TPN and drains will be discontinued prior to discharge.   Alvaro Espana  [phone]

## 2018-03-02 NOTE — ROUTINE PROCESS
Bedside and Verbal shift change report given to John Ivey RN (oncoming nurse) by Melly Serna RN (offgoing nurse). Report included the following information SBAR, Kardex, Procedure Summary, Intake/Output, MAR, Accordion, Recent Results, Cardiac Rhythm SR/ST and Alarm Parameters .

## 2018-03-02 NOTE — PROGRESS NOTES
Family Practice Daily Progress Note: 3/2/2018  Ashley Powers DO    Assessment/Plan:   Perforated chronic gastric ulcer /  Free intraperitoneal air / S/P exploratory laparotomy 2/11. S/p repair in OR with Dr. Landry Rojas on 2/12/18 - per surg    Severe protein calorie malnutrition-POA  --TPN per GI     Acute metabolic encephalopathy. Multifactorial-- medications, alcohol use, sundowning  ---avoid triggers  ---CIWA  ---maintenance of sleep/wake cycle, and re-orientation   ---Added Seroquel at bedtime to help with sleep wake cycle     Hypoxia / Respiratory distress. CXR with atelectasis and pleural effusion. Continue Abx, nebs, supplemental oxygen and incentive spirom as able. Repeat CT chest with moderate to large persistent left subphrenic collection. ---Pulmonary consulted     ---Bumex as needed. Added Aldactone. ---Drain placed by IR     Alcoholic cirrhosis of liver without ascites /  Fatty liver determined by biopsy /  Hyperammonemia. --GI has seen- rec alcohol cessation and outpt follow up     ETOH abuse. Needs to stop ETOH entirely.     Hypokalemia /  Hypoalbuminemia. Being addressed with TPN     Leukocytosis. Up again  -- repeat blood cultures, and urine 2/28  --was on Zosyn, and levaquin - cultures drawn off antibiotics  --consulted ID 3/1    Anemia: watch Hgb   --follow, transfuse <7    Calcium corrects.     Back Pain due to compression fracture of T12  ---Increase Duragesic patch to 25mcg and DIiaudid to 1mg as needed    Debility: due to prolonged hospital stay  --continue PT/OT- needs to get OOB  --will likely need rehab        Problem List:  Problem List as of 3/2/2018  Date Reviewed: 4/26/2016          Codes Class Noted - Resolved    Hypokalemia ICD-10-CM: E87.6  ICD-9-CM: 276.8  2/18/2018 - Present        Leukocytosis ICD-10-CM: D72.829  ICD-9-CM: 288.60  2/18/2018 - Present        Severe protein-calorie malnutrition ([facility] ICD-10-CM: E43  ICD-9-CM: 155  2/18/2018 - Present        Acute metabolic encephalopathy TTR-70-BX: G93.41  ICD-9-CM: 348.31  2/18/2018 - Present        Hyperammonemia ([facility] ICD-10-CM: E72.20  ICD-9-CM: 270.6  2/16/2018 - Present    Overview Addendum 2/17/2018  9:08 AM by Noel Martin MD        Ref. Range 2/16/2018 17:20   Ammonia Latest Ref Range: <32 UMOL/L 69 (H)                Free intraperitoneal air ICD-10-CM: K66.8  ICD-9-CM: 568.89  2/12/2018 - Present        S/P exploratory laparotomy ICD-10-CM: Z98.890  ICD-9-CM: V45.89  2/12/2018 - Present    Overview Signed 2/12/2018 11:57 AM by Sintia Canales MD     Suture repair of perforated posterior gastric ulcer with Julio Salvage patch, core needle biopsies of left lobe of the liver. Alcoholic cirrhosis of liver without ascites (HCC) (Chronic) ICD-10-CM: K70.30  ICD-9-CM: 571.2  2/12/2018 - Present    Overview Signed 2/17/2018  8:45 AM by Noel Martin MD     Liver bx 2/12/18:   Cirrhosis with mild chronic portal inflammation and macrovesicular steatosis. Fatty liver determined by biopsy ICD-10-CM: K76.0  ICD-9-CM: 571.8  2/12/2018 - Present    Overview Signed 2/17/2018  8:53 AM by Noel Martin MD        Cirrhosis with mild chronic portal inflammation and macrovesicular steatosis. * (Principal)Perforated chronic gastric ulcer ([facility] (Chronic) ICD-10-CM: K25.5  ICD-9-CM: 531.50  2/11/2018 - Present        ETOH abuse (Chronic) ICD-10-CM: F10.10  ICD-9-CM: 305.00  2/11/2018 - Present        GI bleed ICD-10-CM: K92.2  ICD-9-CM: 578.9  4/26/2016 - Present        Hypotension ICD-10-CM: I95.9  ICD-9-CM: 458.9  4/26/2016 - Present        Tachycardia ICD-10-CM: R00.0  ICD-9-CM: 785.0  4/26/2016 - Present        Acute blood loss anemia ICD-10-CM: D62  ICD-9-CM: 285.1  4/26/2016 - Present              Subjective:    67 y.o.  female with EtOH abuse who is admitted to the General Surgery Service with perforated gastric ulcer.   We are asked to evaluate for altered mental status. The patient is poorly interactive at this time and this limits primary hx. Discussed case with family available at bedside providing secondary hx and chart review. Per family, patient has had declining neurological condition over past 48 hours. Notably, an RRT was called for respiratory distress. 2/19: This morning she is a bit more alert at this moment and taking in more complete sentences. She says she does not feel well. She has no specific site of pain other than the NG tube which is bothering her a great deal.  She should improve as time from surg increases. K+ a little low - replacing.     2/20:  Still confused and somnolent at times. Now able to localize pain to ab and converse a bit more. Tmax 100.3  WBC is up again but hopefully reactive - recheck in AM - more incentive spirom and check CXR.     2/21: A little more alert. Knows she is in the hospital. Not able to participate with PT yesterday as she was in too much pain. WBC still at 19.     2/22:  WBC 19K. She is more alert. Daughter at bedside. Both of her daughters live out of town. Looking at SNF options for rehab. Coughing more. Starting to use IS.     2/23: WBC 17. Repeat CT chest with moderate to large persistent left subphrenic collection. IR has been consulted. Vanc and Levaquin added by pulm. 2/24: Pt had a better night last night. Only brief episode of confusion. She is much clearer this AM.  Now in quite a bit of pain from gas and stomach cramping. Thinks she will feel better if she can have a BM. Has been on bedpan for a while without success. On TPN. Daughters very concerned that she won't be successful while lying down. Wanting to get up to bedside commode. Will need PT's help. 2/25: febrile overnight and WBC up after vanc was stopped. Pt c/o more dyspnea today. Known fluid collection that will be drained in IR this week. Up to chair with PT yesterday briefly. +BM yesterday.   Da notes pt has been c/o R wrist pain off and on for 2 wks since she fell. No swelling.    2/26:  No new complaints. Still c/o back and ab pain. Still passing gas and had BM. Diuresed with 1 mg Bumex yesterday by Pulmonary. Remains on Zosyn and Levaquin. Blood culture remains neg. Tmax 99.2. X-ray of wrists ok. CXR ok with tube inplace. WBC 15.7K.      2/27:  Looks much better today. Much less work of breathing. Diuresed about 1 liter over last 24h. WBC 16K today. Blood cult remains neg. Off antibiotics. Still on TPN. She will need rehab for PT/OT. 2/28: Very restless during the night. Has not been OOB. Daughter stayed the night with her. WBC is still up. Antibiotics stopped yesterday. She is c/o increased pain in her back and can't get comfortable. More anxious and agitated. Tolerating sips. 3/1:  She reports she feels better this AM after she had some sleep. Afeb. WBC still up - a bit more today with increased neutrophils. Will also get ID to see also. CT AB and Pelvis as below. Cultures done yesterday off antibiotics - so far no growth. No output from drain - may need to be repositioned. More edema today but not as short of breath today while sitting up - add albumin to todays lab. Add Aldactone if ok with GI and Bumex as per Pul. Discussed cirrhosis/ascites/edema with pts daughter. 3/2:  She reports she feels better, and has slightly less edema, although daughters note they think pts abdomin more swollen. The daughters are more worried about pts LE edema than anything else - explained that with pts low albumin that edema may cont to be a problem. She is more alert, a bit more oriented today and says she has much less pain today. Tmax 101. Culture had yeast - diflucan started. Drain was repositioned, had thoracentesis and new PICC placed yesterday. May need to restart broad spectrum antibiotics - ID consulted.      Past Medical History:   Diagnosis Date    Alcoholic cirrhosis of liver without ascites ([facility] 2018    ETOH abuse 2018    History of vascular access device 2018    [facility] VAT - 5 FR triple, R basilic, TPN    Hyperammonemia ([facility] 2018     Results for Obdulio Patricia (MRN [id number]) as of 2018 08:43  Ref. Range 2018 17:20 Ammonia Latest Ref Range: <32 UMOL/L 69 (H)     Hyperlipidemia     Perforated chronic gastric ulcer ([facility] 2018     Past Surgical History:   Procedure Laterality Date    HX CATARACT REMOVAL       Social History     Social History    Marital status: SINGLE     Spouse name: N/A    Number of children: N/A    Years of education: N/A     Occupational History    Not on file. Social History Main Topics    Smoking status: Former Smoker    Smokeless tobacco: Never Used    Alcohol use 11.4 oz/week     0 Standard drinks or equivalent, 19 Glasses of wine per week      Comment: Hx of heavy etoh use, but quit several months ago    Drug use: No    Sexual activity: Not on file     Other Topics Concern    Not on file     Social History Narrative     Family History   Problem Relation Age of Onset    Other Other      patient did not know     Review of Systems:   Review of systems not obtained due to patient factors. Objective:   Physical Exam:     Visit Vitals    /61    Pulse 96    Temp 98.4 °F (36.9 °C)    Resp 15    Ht 5' 3.5\" (1.613 m)    Wt 84.4 kg (186 lb)    SpO2 96%    Breastfeeding No    BMI 32.43 kg/m2    O2 Flow Rate (L/min): 4 l/min O2 Device: Nasal cannula    Temp (24hrs), Av.9 °F (37.2 °C), Min:97.9 °F (36.6 °C), Max:101.3 °F (38.5 °C)    701 -  1900  In: -   Out: 760 [Urine:700; Drains:60]   1901 -  0700  In: 2754.9 [P.O.:600; I.V.:2154.9]  Out: 7975 [Urine:1450; Drains:970]    General:  Awake, uncomfortable, appears stated age. Head:  Normocephalic, without obvious abnormality, atraumatic. Eyes:  Conjunctivae/corneas clear. EOMs intact.    Throat: Lips, mucosa, and tongue dry. Neck: Supple, symmetrical, trachea midline, no adenopathy, thyroid: no enlargement/tenderness/nodules, no carotid bruit and no JVD. Lungs:    no intercostal use, breath sounds diminished. She has a few basilar crackles at this moment. Chest wall:  No tenderness or deformity. Heart:  Tachycardic rate with regular rhythm, S1, S2 normal, no murmur, click, rub or gallop. Abdomen:   Soft, more distended today, with mild generalized tenderness. Bowel sounds present. Dressings dry. Incision dry without redness, staple line intact. Extremities: no cyanosis. 1+-2+ LE edema. No calf tenderness or cords, no erythema or swelling   Skin: Skin color, texture, turgor normal. No rashes or lesions   Neurologic:   AOx2,  Gait not tested at this time. Sensation grossly normal to touch. Gross motor of extremities normal.       Data Review:    CT AB and Pelvis 2/28/18: IMPRESSION:  1. There is residual fluid in the posterior portion of the left subphrenic  collection which does not appear well drained by the pigtail catheter. This  could be repositioned versus new drainage catheter placement if clinically  indicated. 2. Persistent ascites. 3. Increasing right pleural effusion and right lower lobe atelectasis   4. Distended duodenum and proximal small bowel of doubtful clinical  Significance.     Recent Days:  Recent Labs      03/02/18   0308  03/01/18   0121  02/28/18   0552   WBC  17.5*  18.6*  17.8*   HGB  9.2*  9.9*  9.3*   HCT  29.6*  31.7*  29.6*   PLT  503*  525*  534*     Recent Labs      03/02/18   0308  03/01/18   0121  02/28/18   1452  02/28/18   0552   NA  136  137   --   137   K  4.6  4.3   --   4.5   CL  104  107   --   105   CO2  24  25   --   25   GLU  120*  174*   --   101*   BUN  11  11   --   13   CREA  0.43*  0.53*   --   0.52*   CA  8.1*  7.9*   --   7.8*   MG  1.8  2.0   --   2.0   PHOS  3.5  3.6   --   3.3   ALB  1.4*  1.4*   --    --    TBILI  0.7   --    --    --    SGOT  20   -- 31   --    ALT  19   --   27   --    INR   --    --   1.2*   --      Recent Labs      02/28/18   1038   PH  7.46*   PCO2  31*   PO2  70*   HCO3  22     24 Hour Results:  Recent Results (from the past 24 hour(s))   GLUCOSE, POC    Collection Time: 03/01/18 12:01 PM   Result Value Ref Range    Glucose (POC) 157 (H) 65 - 100 mg/dL    Performed by Ankit Ernst (PCT)    PH, FLUID    Collection Time: 03/01/18  2:00 PM   Result Value Ref Range    FLUID TYPE(15) PLEURAL FLUID      FLUID PH 7.3     CULTURE, BODY FLUID W GRAM STAIN    Collection Time: 03/01/18  2:00 PM   Result Value Ref Range    Special Requests: NO SPECIAL REQUESTS      GRAM STAIN RARE WBCS SEEN      GRAM STAIN NO ORGANISMS SEEN      Culture result: PENDING    CELL COUNT, BODY FLUID    Collection Time: 03/01/18  2:00 PM   Result Value Ref Range    BODY FLUID TYPE BODY FLUID      FLUID COLOR ORANGE      FLUID APPEARANCE TURBID      FLUID RBC CT. >100 (H) 0 /cu mm    FLUID NUCLEATED CELLS 6631 /cu mm    FLD NEUTROPHILS 82 (A) NRRE %    FLD LYMPHS 11 (A) NRRE %    FLD MONO/MACROPHAGES 4 (A) NRRE %    FLUID MESOTHELIAL 3 (A) NRRE %   GLUCOSE, POC    Collection Time: 03/01/18  4:01 PM   Result Value Ref Range    Glucose (POC) 85 65 - 100 mg/dL    Performed by Guilherme Mederos    GLUCOSE, POC    Collection Time: 03/02/18 12:06 AM   Result Value Ref Range    Glucose (POC) 114 (H) 65 - 100 mg/dL    Performed by Park Nyhan (PCT)    CBC WITH AUTOMATED DIFF    Collection Time: 03/02/18  3:08 AM   Result Value Ref Range    WBC 17.5 (H) 3.6 - 11.0 K/uL    RBC 2.83 (L) 3.80 - 5.20 M/uL    HGB 9.2 (L) 11.5 - 16.0 g/dL    HCT 29.6 (L) 35.0 - 47.0 %    .6 (H) 80.0 - 99.0 FL    MCH 32.5 26.0 - 34.0 PG    MCHC 31.1 30.0 - 36.5 g/dL    RDW 14.8 (H) 11.5 - 14.5 %    PLATELET 188 (H) 059 - 400 K/uL    MPV 11.9 8.9 - 12.9 FL    NRBC 0.0 0  WBC    ABSOLUTE NRBC 0.00 0.00 - 0.01 K/uL    NEUTROPHILS 82 (H) 32 - 75 %    LYMPHOCYTES 9 (L) 12 - 49 %    MONOCYTES 6 5 - 13 %    EOSINOPHILS 2 0 - 7 %    BASOPHILS 1 0 - 1 %    IMMATURE GRANULOCYTES 1 (H) 0.0 - 0.5 %    ABS. NEUTROPHILS 14.3 (H) 1.8 - 8.0 K/UL    ABS. LYMPHOCYTES 1.5 0.8 - 3.5 K/UL    ABS. MONOCYTES 1.0 0.0 - 1.0 K/UL    ABS. EOSINOPHILS 0.3 0.0 - 0.4 K/UL    ABS. BASOPHILS 0.1 0.0 - 0.1 K/UL    ABS. IMM. GRANS. 0.2 (H) 0.00 - 0.04 K/UL    DF AUTOMATED     PHOSPHORUS    Collection Time: 03/02/18  3:08 AM   Result Value Ref Range    Phosphorus 3.5 2.6 - 4.7 MG/DL   MAGNESIUM    Collection Time: 03/02/18  3:08 AM   Result Value Ref Range    Magnesium 1.8 1.6 - 2.4 mg/dL   METABOLIC PANEL, COMPREHENSIVE    Collection Time: 03/02/18  3:08 AM   Result Value Ref Range    Sodium 136 136 - 145 mmol/L    Potassium 4.6 3.5 - 5.1 mmol/L    Chloride 104 97 - 108 mmol/L    CO2 24 21 - 32 mmol/L    Anion gap 8 5 - 15 mmol/L    Glucose 120 (H) 65 - 100 mg/dL    BUN 11 6 - 20 MG/DL    Creatinine 0.43 (L) 0.55 - 1.02 MG/DL    BUN/Creatinine ratio 26 (H) 12 - 20      GFR est AA >60 >60 ml/min/1.73m2    GFR est non-AA >60 >60 ml/min/1.73m2    Calcium 8.1 (L) 8.5 - 10.1 MG/DL    Bilirubin, total 0.7 0.2 - 1.0 MG/DL    ALT (SGPT) 19 12 - 78 U/L    AST (SGOT) 20 15 - 37 U/L    Alk.  phosphatase 137 (H) 45 - 117 U/L    Protein, total 6.0 (L) 6.4 - 8.2 g/dL    Albumin 1.4 (L) 3.5 - 5.0 g/dL    Globulin 4.6 (H) 2.0 - 4.0 g/dL    A-G Ratio 0.3 (L) 1.1 - 2.2     GLUCOSE, POC    Collection Time: 03/02/18  6:03 AM   Result Value Ref Range    Glucose (POC) 125 (H) 65 - 100 mg/dL    Performed by Ying Cade (PCT)      Medications reviewed  Current Facility-Administered Medications   Medication Dose Route Frequency    TPN ADULT - CENTRAL   IntraVENous CONTINUOUS    HYDROmorphone (PF) (DILAUDID) injection 1 mg  1 mg IntraVENous Q3H PRN    spironolactone (ALDACTONE) tablet 25 mg  25 mg Oral BID    fluconazole (DIFLUCAN) 200mg/100 mL IVPB (premix)  200 mg IntraVENous Q24H    albuterol-ipratropium (DUO-NEB) 2.5 MG-0.5 MG/3 ML  3 mL Nebulization Q6HWA RT    fentaNYL (DURAGESIC) 25 mcg/hr patch 1 Patch  1 Patch TransDERmal Q72H    haloperidol lactate (HALDOL) injection 1 mg  1 mg IntraVENous Q4H PRN    guaiFENesin ER (MUCINEX) tablet 1,200 mg  1,200 mg Oral Q12H    acetaminophen (TYLENOL) tablet 650 mg  650 mg Oral Q4H PRN    insulin regular (NOVOLIN R, HUMULIN R) injection   SubCUTAneous Q6H    fat emulsion 20% (LIPOSYN, INTRAlipid) infusion 500 mL  500 mL IntraVENous Q MON, WED & SAT    glucose chewable tablet 16 g  4 Tab Oral PRN    glucagon (GLUCAGEN) injection 1 mg  1 mg IntraMUSCular PRN    dextrose (D50W) injection syrg 12.5-25 g  12.5-25 g IntraVENous PRN    alteplase (CATHFLO) 1 mg in sterile water (preservative free) 1 mL injection  1 mg InterCATHeter PRN    sodium chloride (NS) flush 10-30 mL  10-30 mL InterCATHeter PRN    sodium chloride (NS) flush 10-40 mL  10-40 mL InterCATHeter Q8H    naloxone (NARCAN) injection 0.4 mg  0.4 mg IntraVENous PRN    ondansetron (ZOFRAN) injection 4 mg  4 mg IntraVENous Q4H PRN    enoxaparin (LOVENOX) injection 40 mg  40 mg SubCUTAneous Q24H    pantoprazole (PROTONIX) injection 40 mg  40 mg IntraVENous Q12H    phenol throat spray (CHLORASEPTIC) 1 Spray  1 Spray Oral PRN    nystatin (MYCOSTATIN) 100,000 unit/gram powder   Topical BID    sodium chloride (NS) flush 5-10 mL  5-10 mL IntraVENous PRN     Care Plan discussed with: Patient and daughter and Nurse   Total time spent with patient: 30 minutes.   Patricia Jacobson MD

## 2018-03-03 LAB
ANION GAP SERPL CALC-SCNC: 5 MMOL/L (ref 5–15)
BASOPHILS # BLD: 0.1 K/UL (ref 0–0.1)
BASOPHILS # BLD: NORMAL K/UL (ref 0–0.1)
BASOPHILS NFR BLD: 0 % (ref 0–1)
BASOPHILS NFR BLD: NORMAL % (ref 0–1)
BUN SERPL-MCNC: 12 MG/DL (ref 6–20)
BUN/CREAT SERPL: 27 (ref 12–20)
CALCIUM SERPL-MCNC: 8.3 MG/DL (ref 8.5–10.1)
CHLORIDE SERPL-SCNC: 106 MMOL/L (ref 97–108)
CO2 SERPL-SCNC: 25 MMOL/L (ref 21–32)
CREAT SERPL-MCNC: 0.45 MG/DL (ref 0.55–1.02)
DIFFERENTIAL METHOD BLD: ABNORMAL
DIFFERENTIAL METHOD BLD: NORMAL
EOSINOPHIL # BLD: 0.1 K/UL (ref 0–0.4)
EOSINOPHIL # BLD: NORMAL K/UL (ref 0–0.4)
EOSINOPHIL NFR BLD: 1 % (ref 0–7)
EOSINOPHIL NFR BLD: NORMAL % (ref 0–7)
ERYTHROCYTE [DISTWIDTH] IN BLOOD BY AUTOMATED COUNT: 14.9 % (ref 11.5–14.5)
ERYTHROCYTE [DISTWIDTH] IN BLOOD BY AUTOMATED COUNT: NORMAL % (ref 11.5–14.5)
GLUCOSE BLD STRIP.AUTO-MCNC: 104 MG/DL (ref 65–100)
GLUCOSE BLD STRIP.AUTO-MCNC: 108 MG/DL (ref 65–100)
GLUCOSE BLD STRIP.AUTO-MCNC: 108 MG/DL (ref 65–100)
GLUCOSE BLD STRIP.AUTO-MCNC: 90 MG/DL (ref 65–100)
GLUCOSE BLD STRIP.AUTO-MCNC: >600 MG/DL (ref 65–100)
GLUCOSE SERPL-MCNC: 104 MG/DL (ref 65–100)
HCT VFR BLD AUTO: 29.7 % (ref 35–47)
HCT VFR BLD AUTO: NORMAL % (ref 35–47)
HGB BLD-MCNC: 9.3 G/DL (ref 11.5–16)
HGB BLD-MCNC: NORMAL G/DL (ref 11.5–16)
IMM GRANULOCYTES # BLD: 0.3 K/UL (ref 0–0.04)
IMM GRANULOCYTES # BLD: NORMAL K/UL (ref 0–0.04)
IMM GRANULOCYTES NFR BLD AUTO: 2 % (ref 0–0.5)
IMM GRANULOCYTES NFR BLD AUTO: NORMAL % (ref 0–0.5)
LYMPHOCYTES # BLD: 1.6 K/UL (ref 0.8–3.5)
LYMPHOCYTES # BLD: NORMAL K/UL (ref 0.8–3.5)
LYMPHOCYTES NFR BLD: 9 % (ref 12–49)
LYMPHOCYTES NFR BLD: NORMAL % (ref 12–49)
MAGNESIUM SERPL-MCNC: 1.7 MG/DL (ref 1.6–2.4)
MCH RBC QN AUTO: 32.7 PG (ref 26–34)
MCH RBC QN AUTO: NORMAL PG (ref 26–34)
MCHC RBC AUTO-ENTMCNC: 31.3 G/DL (ref 30–36.5)
MCHC RBC AUTO-ENTMCNC: NORMAL G/DL (ref 30–36.5)
MCV RBC AUTO: 104.6 FL (ref 80–99)
MCV RBC AUTO: NORMAL FL (ref 80–99)
MONOCYTES # BLD: 1.3 K/UL (ref 0–1)
MONOCYTES # BLD: NORMAL K/UL (ref 0–1)
MONOCYTES NFR BLD: 7 % (ref 5–13)
MONOCYTES NFR BLD: NORMAL % (ref 5–13)
NEUTS SEG # BLD: 14.3 K/UL (ref 1.8–8)
NEUTS SEG # BLD: NORMAL K/UL (ref 1.8–8)
NEUTS SEG NFR BLD: 81 % (ref 32–75)
NEUTS SEG NFR BLD: NORMAL % (ref 32–75)
NRBC # BLD: 0 K/UL (ref 0–0.01)
NRBC # BLD: NORMAL K/UL (ref 0–0.01)
NRBC BLD-RTO: 0 PER 100 WBC
NRBC BLD-RTO: NORMAL PER 100 WBC
PHOSPHATE SERPL-MCNC: 3.9 MG/DL (ref 2.6–4.7)
PLATELET # BLD AUTO: 531 K/UL (ref 150–400)
PLATELET # BLD AUTO: NORMAL K/UL (ref 150–400)
PMV BLD AUTO: 12 FL (ref 8.9–12.9)
PMV BLD AUTO: NORMAL FL (ref 8.9–12.9)
POTASSIUM SERPL-SCNC: 4.8 MMOL/L (ref 3.5–5.1)
RBC # BLD AUTO: 2.84 M/UL (ref 3.8–5.2)
RBC # BLD AUTO: NORMAL M/UL (ref 3.8–5.2)
SERVICE CMNT-IMP: ABNORMAL
SERVICE CMNT-IMP: NORMAL
SODIUM SERPL-SCNC: 136 MMOL/L (ref 136–145)
WBC # BLD AUTO: 17.6 K/UL (ref 3.6–11)
WBC # BLD AUTO: NORMAL K/UL (ref 3.6–11)

## 2018-03-03 PROCEDURE — 77030038269 HC DRN EXT URIN PURWCK BARD -A

## 2018-03-03 PROCEDURE — 77010033678 HC OXYGEN DAILY

## 2018-03-03 PROCEDURE — 74011000258 HC RX REV CODE- 258: Performed by: SURGERY

## 2018-03-03 PROCEDURE — 36415 COLL VENOUS BLD VENIPUNCTURE: CPT | Performed by: SURGERY

## 2018-03-03 PROCEDURE — 97530 THERAPEUTIC ACTIVITIES: CPT

## 2018-03-03 PROCEDURE — 82962 GLUCOSE BLOOD TEST: CPT

## 2018-03-03 PROCEDURE — 74011250636 HC RX REV CODE- 250/636: Performed by: SURGERY

## 2018-03-03 PROCEDURE — 74011000250 HC RX REV CODE- 250: Performed by: NURSE PRACTITIONER

## 2018-03-03 PROCEDURE — 94640 AIRWAY INHALATION TREATMENT: CPT

## 2018-03-03 PROCEDURE — 74011250637 HC RX REV CODE- 250/637: Performed by: NURSE PRACTITIONER

## 2018-03-03 PROCEDURE — 74011250637 HC RX REV CODE- 250/637: Performed by: FAMILY MEDICINE

## 2018-03-03 PROCEDURE — 74011000250 HC RX REV CODE- 250: Performed by: SURGERY

## 2018-03-03 PROCEDURE — 83735 ASSAY OF MAGNESIUM: CPT | Performed by: SURGERY

## 2018-03-03 PROCEDURE — 65660000000 HC RM CCU STEPDOWN

## 2018-03-03 PROCEDURE — 85025 COMPLETE CBC W/AUTO DIFF WBC: CPT | Performed by: SURGERY

## 2018-03-03 PROCEDURE — 80048 BASIC METABOLIC PNL TOTAL CA: CPT | Performed by: SURGERY

## 2018-03-03 PROCEDURE — C9113 INJ PANTOPRAZOLE SODIUM, VIA: HCPCS | Performed by: SURGERY

## 2018-03-03 PROCEDURE — 84100 ASSAY OF PHOSPHORUS: CPT | Performed by: SURGERY

## 2018-03-03 PROCEDURE — 77030018836 HC SOL IRR NACL ICUM -A

## 2018-03-03 RX ADMIN — PANTOPRAZOLE SODIUM 40 MG: 40 INJECTION, POWDER, FOR SOLUTION INTRAVENOUS at 10:28

## 2018-03-03 RX ADMIN — HYDROMORPHONE HYDROCHLORIDE 1 MG: 2 INJECTION, SOLUTION INTRAMUSCULAR; INTRAVENOUS; SUBCUTANEOUS at 20:05

## 2018-03-03 RX ADMIN — GUAIFENESIN 1200 MG: 600 TABLET, EXTENDED RELEASE ORAL at 10:28

## 2018-03-03 RX ADMIN — IPRATROPIUM BROMIDE AND ALBUTEROL SULFATE 3 ML: .5; 3 SOLUTION RESPIRATORY (INHALATION) at 14:04

## 2018-03-03 RX ADMIN — PANTOPRAZOLE SODIUM 40 MG: 40 INJECTION, POWDER, FOR SOLUTION INTRAVENOUS at 20:58

## 2018-03-03 RX ADMIN — SPIRONOLACTONE 25 MG: 25 TABLET, FILM COATED ORAL at 18:41

## 2018-03-03 RX ADMIN — Medication 10 ML: at 19:46

## 2018-03-03 RX ADMIN — CALCIUM GLUCONATE: 94 INJECTION, SOLUTION INTRAVENOUS at 18:30

## 2018-03-03 RX ADMIN — NYSTATIN: 100000 POWDER TOPICAL at 10:39

## 2018-03-03 RX ADMIN — Medication 10 ML: at 18:49

## 2018-03-03 RX ADMIN — ONDANSETRON 4 MG: 2 INJECTION INTRAMUSCULAR; INTRAVENOUS at 05:14

## 2018-03-03 RX ADMIN — FLUCONAZOLE 200 MG: 2 INJECTION INTRAVENOUS at 18:41

## 2018-03-03 RX ADMIN — SPIRONOLACTONE 25 MG: 25 TABLET, FILM COATED ORAL at 10:28

## 2018-03-03 RX ADMIN — HYDROMORPHONE HYDROCHLORIDE 1 MG: 2 INJECTION, SOLUTION INTRAMUSCULAR; INTRAVENOUS; SUBCUTANEOUS at 05:08

## 2018-03-03 RX ADMIN — IPRATROPIUM BROMIDE AND ALBUTEROL SULFATE 3 ML: .5; 3 SOLUTION RESPIRATORY (INHALATION) at 07:15

## 2018-03-03 RX ADMIN — IPRATROPIUM BROMIDE AND ALBUTEROL SULFATE 3 ML: .5; 3 SOLUTION RESPIRATORY (INHALATION) at 19:24

## 2018-03-03 RX ADMIN — ENOXAPARIN SODIUM 40 MG: 40 INJECTION SUBCUTANEOUS at 18:41

## 2018-03-03 RX ADMIN — HYDROMORPHONE HYDROCHLORIDE 1 MG: 2 INJECTION, SOLUTION INTRAMUSCULAR; INTRAVENOUS; SUBCUTANEOUS at 14:55

## 2018-03-03 RX ADMIN — Medication 10 ML: at 05:09

## 2018-03-03 RX ADMIN — NYSTATIN: 100000 POWDER TOPICAL at 19:46

## 2018-03-03 RX ADMIN — I.V. FAT EMULSION 500 ML: 20 EMULSION INTRAVENOUS at 19:46

## 2018-03-03 RX ADMIN — GUAIFENESIN 1200 MG: 600 TABLET, EXTENDED RELEASE ORAL at 20:58

## 2018-03-03 NOTE — PROGRESS NOTES
Progress Note    Patient: Glen Alberto MRN: [id number]  SSN: [id number]    YOB: 1945  Age: 67 y.o. Sex: female      Admit Date: 2018    19 Days Post-Op    Procedure:  Procedure(s):   EX LAP    Subjective:     Mrs. Jaci Self is doing fine. She denies any abdominal pain. She is having loose BM's. Her breathing is comfortable. Objective:     Visit Vitals    /70 (BP 1 Location: Right arm, BP Patient Position: At rest)    Pulse (!) 102    Temp 98.2 °F (36.8 °C)    Resp 20    Ht 5' 3.5\" (1.613 m)    Wt 186 lb (84.4 kg)    SpO2 91%    Breastfeeding No    BMI 32.43 kg/m2       Temp (24hrs), Av.3 °F (36.8 °C), Min:97.5 °F (36.4 °C), Max:99.1 °F (37.3 °C)      Physical Exam:    GENERAL: alert, cooperative, no distress, ABDOMEN: Soft, +BS, mild distention, NT. The ALYSSA fluid is bilious. , EXTREMITIES:  edema b/l    Lab Review:   BMP:   Lab Results   Component Value Date/Time     2018 03:04 AM    K 4.8 2018 03:04 AM     2018 03:04 AM    CO2 25 2018 03:04 AM    AGAP 5 2018 03:04 AM     (H) 2018 03:04 AM    BUN 12 2018 03:04 AM    CREA 0.45 (L) 2018 03:04 AM    GFRAA >60 2018 03:04 AM    GFRNA >60 2018 03:04 AM     CMP:   Lab Results   Component Value Date/Time     2018 03:04 AM    K 4.8 2018 03:04 AM     2018 03:04 AM    CO2 25 2018 03:04 AM    AGAP 5 2018 03:04 AM     (H) 2018 03:04 AM    BUN 12 2018 03:04 AM    CREA 0.45 (L) 2018 03:04 AM    GFRAA >60 2018 03:04 AM    GFRNA >60 2018 03:04 AM    CA 8.3 (L) 2018 03:04 AM    MG 1.7 2018 03:04 AM    PHOS 3.9 2018 03:04 AM     CBC:   Lab Results   Component Value Date/Time    WBC 17.6 (H) 2018 03:04 AM    HGB 9.3 (L) 2018 03:04 AM    HCT 29.7 (L) 2018 03:04 AM     (H) 2018 03:04 AM       Assessment:     Hospital Problems  Date Reviewed: 4/26/2016          Codes Class Noted POA    Hypokalemia ICD-10-CM: E87.6  ICD-9-CM: 276.8  2/18/2018 No        Leukocytosis ICD-10-CM: D72.829  ICD-9-CM: 288.60  2/18/2018 Yes        Severe protein-calorie malnutrition ([facility] ICD-10-CM: E43  ICD-9-CM: 486  2/18/2018 Yes        Acute metabolic encephalopathy ZKK-19-RO: G93.41  ICD-9-CM: 348.31  2/18/2018 Yes        Hyperammonemia ([facility] ICD-10-CM: E72.20  ICD-9-CM: 270.6  2/16/2018 No    Overview Addendum 2/17/2018  9:08 AM by Zaki Heredia MD        Ref. Range 2/16/2018 17:20   Ammonia Latest Ref Range: <32 UMOL/L 69 (H)                Free intraperitoneal air ICD-10-CM: K66.8  ICD-9-CM: 568.89  2/12/2018 Yes        S/P exploratory laparotomy ICD-10-CM: Z98.890  ICD-9-CM: V45.89  2/12/2018 No    Overview Signed 2/12/2018 11:57 AM by May Villa MD     Suture repair of perforated posterior gastric ulcer with Dawson Handy patch, core needle biopsies of left lobe of the liver. Alcoholic cirrhosis of liver without ascites (HCC) (Chronic) ICD-10-CM: K70.30  ICD-9-CM: 571.2  2/12/2018 Yes    Overview Signed 2/17/2018  8:45 AM by Zaki Heredia MD     Liver bx 2/12/18:   Cirrhosis with mild chronic portal inflammation and macrovesicular steatosis. Fatty liver determined by biopsy ICD-10-CM: K76.0  ICD-9-CM: 571.8  2/12/2018 Yes    Overview Signed 2/17/2018  8:53 AM by Zaki Heredia MD        Cirrhosis with mild chronic portal inflammation and macrovesicular steatosis. * (Principal)Perforated chronic gastric ulcer ([facility] (Chronic) ICD-10-CM: K25.5  ICD-9-CM: 531.50  2/11/2018 Yes        ETOH abuse (Chronic) ICD-10-CM: F10.10  ICD-9-CM: 305.00  2/11/2018 Yes              Plan/Recommendations/Medical Decision Making:     Continue TPN and bowel rest.  Drain output is going down. Continue PPI's. Still having runs of SVT, otherwise HR in 90's. Management per Medicine. WBC at 17.   Continue Diflucan, appreciate ID input.  OOB with PT.     Signed By: Gaurav Mora., MD     March 3, 2018

## 2018-03-03 NOTE — PROGRESS NOTES
[facility]     Name: Amanda Franco MRN: [id number]   : 1945 Hospital: [address]   Date: 3/3/2018            Impression Plan   1. Acute Respiratory Failure with Hypoxia  2. Abnormal Chest CT: with bilateral pleural effusions, atelectasis,and patchy airspace disease in MIRIAM; s/p thoracentesis on 3/1  3. Leukocytosis, stable  4. ALYSSA drain culture: + yeast  5. Delirium; improved  6. Peforated gastric ulcer, s/p repair  7. Hypokalemia, hypomagnesemia; resolved  8. EtOH abuse     · Goal sats >90%, wean O2 as tolerated  · Continue Diflucan. Continue to follow WBC and cultures, fever curve. · F/U blood cultures, NGTD  · F/U cultures from abdominal drains, final yeast  · Speech following and appreciate help: NPO currently  · Will continue follow CXR and repeat thoracentesis if necessary. Repeat ordered for Monday  · C/W IV Haldol  · NG tube unable to be inserted in IR  · Scheduled nebs only whe awake  · Guafenesin   · Post-op management as per surgery  · Pain control; limit benzos as much as possible, now on Fentanyl patch and improved  · Seroquel at night for sleep  · Monitor lytes, replete as needed  · Encourage IS use  · PT/OT  · OOB and mobilize as much as possible  · May benefit from pulmonary rehab, will consult CM to see if she qualifies  · TPN  · SCDs  · Protonix    Will see on Monday. Please call if any questions arise over the remainder of the weekend. Subjective     Did well overnight, awake and in good spirits this morning. Having some soreness but otherwise no complaints. Wants to eat. Past Medical History:   Diagnosis Date    Alcoholic cirrhosis of liver without ascites ([facility] [address] 2018    ETOH abuse 2018    History of vascular access device 2018    [facility] VAT - 5 FR triple, R basilic, TPN    Hyperammonemia ([facility] [address] 2018     Results for [place] Harman (MRN [id number]) as of 2018 08:43  Ref.  Range 2018 17:20 Ammonia Latest Ref Range: <32 UMOL/L 69 (H)     Hyperlipidemia     Perforated chronic gastric ulcer ([facility] [address] 2/11/2018      Past Surgical History:   Procedure Laterality Date    HX CATARACT REMOVAL        Prior to Admission medications    Medication Sig Start Date End Date Taking? Authorizing Provider   naproxen sodium (ALEVE) 220 mg tablet Take 220 mg by mouth daily as needed for Pain. Yes Historical Provider   cyanocobalamin (VITAMIN B12) 500 mcg tablet Take 500 mcg by mouth daily. Yes Historical Provider   multivitamin (ONE A DAY) tablet Take 1 Tab by mouth daily. Yes Historical Provider   omega-3 fatty acids-vitamin e 1,000 mg cap Take 2 Caps by mouth daily.    Yes Historical Provider     Current Facility-Administered Medications   Medication Dose Route Frequency    TPN ADULT - CENTRAL   IntraVENous CONTINUOUS    TPN ADULT - CENTRAL   IntraVENous CONTINUOUS    spironolactone (ALDACTONE) tablet 25 mg  25 mg Oral BID    fluconazole (DIFLUCAN) 200mg/100 mL IVPB (premix)  200 mg IntraVENous Q24H    albuterol-ipratropium (DUO-NEB) 2.5 MG-0.5 MG/3 ML  3 mL Nebulization Q6HWA RT    fentaNYL (DURAGESIC) 25 mcg/hr patch 1 Patch  1 Patch TransDERmal Q72H    guaiFENesin ER (MUCINEX) tablet 1,200 mg  1,200 mg Oral Q12H    insulin regular (NOVOLIN R, HUMULIN R) injection   SubCUTAneous Q6H    fat emulsion 20% (LIPOSYN, INTRAlipid) infusion 500 mL  500 mL IntraVENous Q MON, WED & SAT    sodium chloride (NS) flush 10-40 mL  10-40 mL InterCATHeter Q8H    enoxaparin (LOVENOX) injection 40 mg  40 mg SubCUTAneous Q24H    pantoprazole (PROTONIX) injection 40 mg  40 mg IntraVENous Q12H    nystatin (MYCOSTATIN) 100,000 unit/gram powder   Topical BID     No Known Allergies   Social History   Substance Use Topics    Smoking status: Former Smoker    Smokeless tobacco: Never Used    Alcohol use 11.4 oz/week     0 Standard drinks or equivalent, 19 Glasses of wine per week      Comment: Hx of heavy etoh use, but quit several months ago      Family History   Problem Relation Age of Onset    Other Other      patient did not know          Laboratory: I have personally reviewed the critical care flowsheet and labs. Recent Labs      03/03/18   0304  03/03/18   0132  03/02/18   0308   WBC  17.6*  PLEASE DISREGARD RESULTS  17.5*   HGB  9.3*  PLEASE DISREGARD RESULTS  9.2*   HCT  29.7*  PLEASE DISREGARD RESULTS  29.6*   PLT  531*  PLEASE DISREGARD RESULTS  503*     Recent Labs      03/03/18   0304  03/02/18   0308  03/01/18   0121   NA  136  136  137   K  4.8  4.6  4.3   CL  106  104  107   CO2  25  24  25   GLU  104*  120*  174*   BUN  12  11  11   CREA  0.45*  0.43*  0.53*   CA  8.3*  8.1*  7.9*   MG  1.7  1.8  2.0   PHOS  3.9  3.5  3.6   ALB   --   1.4*  1.4*   SGOT   --   20   --    ALT   --   19   --        Objective:          Intake/Output Summary (Last 24 hours) at 03/03/18 1459  Last data filed at 03/02/18 1942   Gross per 24 hour   Intake                0 ml   Output              940 ml   Net             -940 ml     GENERAL: alert, calm, no distress HEENT:  PERRL, EOMI, no alar flaring or epistaxis, oral mucosa moist without cyanosis, NECK:  no jugular vein distention, no retractions, no thyromegaly or masses, LUNGS: Decreased bs bilaterally, anterior exam only, drain in place with very little output HEART:  Regular rate and rhythm with no MGR; no edema is present, ABDOMEN:  soft, stable CDI, drain dressing CDI EXTREMITIES:  warm with no cyanosis, dependent edema in LEs SKIN:  no jaundice or ecchymosis and NEUROLOGIC: alert, oriented, grossly non-focal    Josephine Cespedes, DO  [facility] [place]

## 2018-03-03 NOTE — PROGRESS NOTES
Problem: Mobility Impaired (Adult and Pediatric)  Goal: *Acute Goals and Plan of Care (Insert Text)  Physical Therapy Goals  Initiated 2/20/2018    1. Patient will move from supine to sit and sit to supine , scoot up and down and roll side to side in bed with moderate assistance x 2  within 7 days. Partially Met - Continue  2. Patient will perform sit <> stand with minimal assist x 2  within 7 days. Met - Advance goal below  3. Patient will ambulate with minimal assistance x 2 for 10 feet with RW and assist of 3rd pushing chair from behind within 7 days. Not Met- Continue  4. Patient will verbalize and demonstrate understanding of spinal precautions (No bending, lifting greater than 5 lbs, or twisting; log-roll technique; frequent repositioning as instructed) within 7 days. Not Met- Continue    Physical Therapy Goals  Revised 2/28/2018  1. Patient will move from supine to sit and sit to supine  and roll side to side in bed with moderate assistance x 1  within 7 day(s). 2.  Patient will move from long sitting in bed to edge of bed with minimal assistance x 1 within 7 days. 3.  Patient will transfer from bed to chair and chair to bed with moderate assistance x 1 using the least restrictive device within 7 day(s). 4.  Patient will perform sit <> stand with minimal assistance x 1 within 7 day(s). 5.  Patient will ambulate with minimal assistance x 1  for 15 feet with the least restrictive device within 7 day(s). 6.  Patient will verbalize and demonstrate understanding of spinal precautions (No bending, lifting greater than 5 lbs, or twisting; log-roll technique; frequent repositioning as instructed) within 7 days.    physical Therapy TREATMENT  Patient: Ashley Stewart (89 y.o. female)  Date: 3/3/2018  Diagnosis: Intra-abdominal free air of unknown etiology [K66.8] Perforated chronic gastric ulcer ([facility] [address]  Procedure(s) (LRB):   NASOGASTRIC TUBE PLACEMENT (N/A) 13 Days Post-Op  Precautions: Aspiration, Back, Bed Alarm, Fall, Skin  Chart, physical therapy assessment, plan of care and goals were reviewed. ASSESSMENT:  Patient continues to require 2 person assist with repeated verbal and tactile cues needed for sequencing functional mobility. Patient with O2 saturation dropping to 84% once EOB increasing to 92% with PLB technique; 4L O2 in use via nasal canula. Patient required Max A for donning of brace prior to transfer to [facility] > Bedside chair with use of RW Min A X 2. Patient easily fatigues and with poor safety awareness in efforts to sit prematurely due to fatigue. Patient's daughter present during treatment and remained with patient at end of treatment. Patient's BP dropped to 117/71 once in the chair but remained stable, patient asymptomatic. Progression toward goals:  []      Improving appropriately and progressing toward goals  []      Improving slowly and progressing toward goals  []      Not making progress toward goals and plan of care will be adjusted     PLAN:  Patient continues to benefit from skilled intervention to address the above impairments. Continue treatment per established plan of care. Discharge Recommendations:  Rehab/Pulmonary Rehab  Further Equipment Recommendations for Discharge:  TBD     SUBJECTIVE:   Patient stated I feel OK.   (sitting up in the bedside chair)    OBJECTIVE DATA SUMMARY:   Critical Behavior:  Neurologic State: Alert, Appropriate for age  Orientation Level: Oriented to person, Oriented to place  Cognition: Impulsive, Impaired decision making, Follows commands, Poor safety awareness  Safety/Judgement: Awareness of environment  Functional Mobility Training:  Bed Mobility:  Rolling: Moderate assistance;Assist x2  Supine to Sit: Moderate assistance;Assist x2               Transfers:  Sit to Stand: Minimum assistance;Assist x2  Stand to Sit: Minimum assistance;Assist x2        Bed to Chair: Minimum assistance; Moderate assistance;Assist x2 (Bed>BSC>Bedside chair) Balance:  Sitting: Impaired; With support  Sitting - Static: Fair (occasional)  Standing: Impaired; With support  Standing - Static: Fair  Standing - Dynamic : Fair  Ambulation/Gait Training:  Distance (ft): 3 Feet (ft)  Assistive Device: Brace/Splint; Walker, rolling;Gait belt  Ambulation - Level of Assistance: Minimal assistance;Assist x2                                                             Activity Tolerance:   Fair. Please refer to the flowsheet for vital signs taken during this treatment.   After treatment:   [x] Patient left in no apparent distress sitting up in chair  [] Patient left in no apparent distress in bed  [x] Call bell left within reach  [x] Nursing notified  [x] Caregiver present  [] Bed alarm activated    COMMUNICATION/COLLABORATION:   The patients plan of care was discussed with: Registered Nurse    Kashmir Sumner PTA   Time Calculation: 30 mins

## 2018-03-03 NOTE — PROGRESS NOTES
Bedside and Verbal shift change report given to Eloina Kendrick (oncoming nurse) by Jose Coulter (offgoing nurse). Report included the following information SBAR, Kardex, ED Summary, Procedure Summary, Intake/Output, MAR, Accordion, Recent Results, Med Rec Status and Cardiac Rhythm ST.     2020: Family requested that sitter leave while they were there, daughters insisted that sitter leave the room. Sitter left. I spoke with patient's daughters in reference to the fact that we would not have a sitter for the patient after 11pm and invited them to stay with their mother if they were able. They stated that they were unable and would be leaving between 9-10pm and that they would let me know when they were about to go. It was agreed that the sitter would come back from when they left until 11pm.     2035: I was called into the run, patient's daughter stated that patient was scared of the sitter and that though he was nice to her no one had explained who the man was and why he was in her room. I went into the patient's room, explained to the patient who the sitter was and why he was in the room. She stated that she understood. 2213: Patient had a run of SVT with her HR increasing to 155. I went in to check on the patient and her HR had decreased to 106 but patient was anxious. I comforted patient and obtained BP, which was 140/63. I will continue to monitor. 2233: Spoke with Dr. Reza Irvin, informed him of patients run of NSVT, no new orders. I will continue to monitor. 0100: Flushed patient's PICC, brittni labs and then used a drop of blood to obtain blood glucose. Glucometer resulted >600. Re-checked via fingerstick and blood glucose was 108.     0200: I rcvd call from labs that some of the results were out of range. I told them that I would stop the TPN gtt for a longer period of time and redraw labs peripherally. I had to re-order the labs. Labs drawn and resubmitted to the lab.    0715:  Bedside and Verbal shift change report given to Kizzy (oncoming nurse) by Licha Hairston (offgoing nurse).  Report included the following information SBAR, Kardex, ED Summary, Procedure Summary, Intake/Output, MAR, Accordion, Recent Results, Med Rec Status and Cardiac Rhythm ST.

## 2018-03-03 NOTE — PROGRESS NOTES
SHIFT CHANGE:  7:24 AM Report received from Vladimir Cortés RN. SBAR, Kardex, Intake/Output, MAR, Recent Results, Med Rec Status and Cardiac Rhythm NSR/ST/SVT were discussed. SHIFT SUMMARY:  1:11 PM  Dr. Morelia You called to inquire about a sleep aid for the patient. 1:14 PM  Dr. Morelia You returned call and does not want to order any sleep aid for patient due to the interaction with her other medications  4:00 PM  Dressing changed on abdominal drain. 5:15 PM  RN entered the room to find patient sitting on the side of the bed trying to get up. Patient had pulled purewick out and there was urine all over the floor. Reoriented, cleaned patient and assisted her back to bed. Bed alarm on. Door left wide open. END OF SHIFT REPORT:  7:45 PM  Bedside and Verbal shift change report given to Vladimir Cortés RN (oncoming nurse) by Derrek Blancas RN (offgoing nurse). Report included the following information SBAR, Kardex, Procedure Summary, Intake/Output, MAR, Recent Results, Med Rec Status and Cardiac Rhythm NSR/ST.

## 2018-03-03 NOTE — PROGRESS NOTES
Family Practice Daily Progress Note: 3/3/2018  Mey Marquez DO    Assessment/Plan:   Perforated chronic gastric ulcer /  Free intraperitoneal air / S/P exploratory laparotomy 2/11. S/p repair in OR with Dr. Baron Ramos on 2/12/18 - per surg    Severe protein calorie malnutrition-POA  --TPN per GI     Acute metabolic encephalopathy. Multifactorial-- medications, alcohol use, sundowning  ---avoid triggers  ---CIWA  ---maintenance of sleep/wake cycle, and re-orientation   ---Added Seroquel at bedtime to help with sleep wake cycle     Hypoxia / Respiratory distress. CXR with atelectasis and pleural effusion. Continue Abx, nebs, supplemental oxygen and incentive spirom as able. Repeat CT chest with moderate to large persistent left subphrenic collection. ---Pulmonary consulted     ---Bumex as needed. Added Aldactone. ---Drain placed by IR     Alcoholic cirrhosis of liver without ascites /  Fatty liver determined by biopsy /  Hyperammonemia. --GI has seen- rec alcohol cessation and outpt follow up     ETOH abuse. Needs to stop ETOH entirely.     Hypokalemia /  Hypoalbuminemia. Being addressed with TPN     Leukocytosis. Up again  -- repeat blood cultures, and urine 2/28  --was on Zosyn, and levaquin - cultures drawn off antibiotics  --consulted ID 3/1    Anemia: watch Hgb   --follow, transfuse <7    Calcium corrects.     Back Pain due to compression fracture of T12  ---Increase Duragesic patch to 25mcg and DIiaudid to 1mg as needed    Debility: due to prolonged hospital stay  --continue PT/OT- needs to get OOB  --will likely need rehab        Problem List:  Problem List as of 3/3/2018  Date Reviewed: 4/26/2016          Codes Class Noted - Resolved    Hypokalemia ICD-10-CM: E87.6  ICD-9-CM: 276.8  2/18/2018 - Present        Leukocytosis ICD-10-CM: D72.829  ICD-9-CM: 288.60  2/18/2018 - Present        Severe protein-calorie malnutrition ([facility] [address] ICD-10-CM: E43  ICD-9-CM: 664  2/18/2018 - Present        Acute metabolic encephalopathy [address]: G93.41  ICD-9-CM: 348.31  2/18/2018 - Present        Hyperammonemia ([facility] ICD-10-CM: E72.20  ICD-9-CM: 270.6  2/16/2018 - Present    Overview Addendum 2/17/2018  9:08 AM by Darcie Foster MD        Ref. Range 2/16/2018 17:20   Ammonia Latest Ref Range: <32 UMOL/L 69 (H)                Free intraperitoneal air ICD-10-CM: K66.8  ICD-9-CM: 568.89  2/12/2018 - Present        S/P exploratory laparotomy ICD-10-CM: Z98.890  ICD-9-CM: V45.89  2/12/2018 - Present    Overview Signed 2/12/2018 11:57 AM by Julia Hogue MD     Suture repair of perforated posterior gastric ulcer with Cabrera Arellano patch, core needle biopsies of left lobe of the liver. Alcoholic cirrhosis of liver without ascites (HCC) (Chronic) ICD-10-CM: K70.30  ICD-9-CM: 571.2  2/12/2018 - Present    Overview Signed 2/17/2018  8:45 AM by Darcie Foster MD     Liver bx 2/12/18:   Cirrhosis with mild chronic portal inflammation and macrovesicular steatosis. Fatty liver determined by biopsy ICD-10-CM: K76.0  ICD-9-CM: 571.8  2/12/2018 - Present    Overview Signed 2/17/2018  8:53 AM by Darcie Foster MD        Cirrhosis with mild chronic portal inflammation and macrovesicular steatosis. * (Principal)Perforated chronic gastric ulcer ([facility] (Chronic) ICD-10-CM: K25.5  ICD-9-CM: 531.50  2/11/2018 - Present        ETOH abuse (Chronic) ICD-10-CM: F10.10  ICD-9-CM: 305.00  2/11/2018 - Present        GI bleed ICD-10-CM: K92.2  ICD-9-CM: 578.9  4/26/2016 - Present        Hypotension ICD-10-CM: I95.9  ICD-9-CM: 458.9  4/26/2016 - Present        Tachycardia ICD-10-CM: R00.0  ICD-9-CM: 785.0  4/26/2016 - Present        Acute blood loss anemia ICD-10-CM: D62  ICD-9-CM: 285.1  4/26/2016 - Present              Subjective:    67 y.o.  female with EtOH abuse who is admitted to the General Surgery Service with perforated gastric ulcer.   We are asked to evaluate for altered mental status. The patient is poorly interactive at this time and this limits primary hx. Discussed case with family available at bedside providing secondary hx and chart review. Per family, patient has had declining neurological condition over past 48 hours. Notably, an RRT was called for respiratory distress. 2/19: This morning she is a bit more alert at this moment and taking in more complete sentences. She says she does not feel well. She has no specific site of pain other than the NG tube which is bothering her a great deal.  She should improve as time from surg increases. K+ a little low - replacing.     2/20:  Still confused and somnolent at times. Now able to localize pain to ab and converse a bit more. Tmax 100.3  WBC is up again but hopefully reactive - recheck in AM - more incentive spirom and check CXR.     2/21: A little more alert. Knows she is in the hospital. Not able to participate with PT yesterday as she was in too much pain. WBC still at 19.     2/22:  WBC 19K. She is more alert. Daughter at bedside. Both of her daughters live out of town. Looking at SNF options for rehab. Coughing more. Starting to use IS.     2/23: WBC 17. Repeat CT chest with moderate to large persistent left subphrenic collection. IR has been consulted. Vanc and Levaquin added by pulm. 2/24: Pt had a better night last night. Only brief episode of confusion. She is much clearer this AM.  Now in quite a bit of pain from gas and stomach cramping. Thinks she will feel better if she can have a BM. Has been on bedpan for a while without success. On TPN. Daughters very concerned that she won't be successful while lying down. Wanting to get up to bedside commode. Will need PT's help. 2/25: febrile overnight and WBC up after vanc was stopped. Pt c/o more dyspnea today. Known fluid collection that will be drained in IR this week. Up to chair with PT yesterday briefly. +BM yesterday.   Da notes pt has been c/o R wrist pain off and on for 2 wks since she fell. No swelling.    2/26:  No new complaints. Still c/o back and ab pain. Still passing gas and had BM. Diuresed with 1 mg Bumex yesterday by Pulmonary. Remains on Zosyn and Levaquin. Blood culture remains neg. Tmax 99.2. X-ray of wrists ok. CXR ok with tube inplace. WBC 15.7K.      2/27:  Looks much better today. Much less work of breathing. Diuresed about 1 liter over last 24h. WBC 16K today. Blood cult remains neg. Off antibiotics. Still on TPN. She will need rehab for PT/OT. 2/28: Very restless during the night. Has not been OOB. Daughter stayed the night with her. WBC is still up. Antibiotics stopped yesterday. She is c/o increased pain in her back and can't get comfortable. More anxious and agitated. Tolerating sips. 3/1:  She reports she feels better this AM after she had some sleep. Afeb. WBC still up - a bit more today with increased neutrophils. Will also get ID to see also. CT AB and Pelvis as below. Cultures done yesterday off antibiotics - so far no growth. No output from drain - may need to be repositioned. More edema today but not as short of breath today while sitting up - add albumin to todays lab. Add Aldactone if ok with GI and Bumex as per Pul. Discussed cirrhosis/ascites/edema with pts daughter. 3/2:  She reports she feels better, and has slightly less edema, although daughters note they think pts abdomin more swollen. The daughters are more worried about pts LE edema than anything else - explained that with pts low albumin that edema may cont to be a problem. She is more alert, a bit more oriented today and says she has much less pain today. Tmax 101. Culture had yeast - diflucan started. Drain was repositioned, had thoracentesis and new PICC placed yesterday. May need to restart broad spectrum antibiotics - ID consulted.      3/3:  She reports she feels better this AM.  More alert and oriented this AM.  BM yesterday. Tmax 99. WBC still 17-18K. ID consulted and advises to watch off antibiotics for now. Less edema LE. Past Medical History:   Diagnosis Date    Alcoholic cirrhosis of liver without ascites ([facility] [address] 2018    ETOH abuse 2018    History of vascular access device 2018    [facility] VAT - 5 FR triple, R basilic, TPN    Hyperammonemia ([facility] [address] 2018     Results for Zuleyma Ji (MRN [id number]) as of 2018 08:43  Ref. Range 2018 17:20 Ammonia Latest Ref Range: <32 UMOL/L 69 (H)     Hyperlipidemia     Perforated chronic gastric ulcer ([facility] [address] 2018     Past Surgical History:   Procedure Laterality Date    HX CATARACT REMOVAL       Social History     Social History    Marital status: SINGLE     Spouse name: N/A    Number of children: N/A    Years of education: N/A     Occupational History    Not on file. Social History Main Topics    Smoking status: Former Smoker    Smokeless tobacco: Never Used    Alcohol use 11.4 oz/week     0 Standard drinks or equivalent, 19 Glasses of wine per week      Comment: Hx of heavy etoh use, but quit several months ago    Drug use: No    Sexual activity: Not on file     Other Topics Concern    Not on file     Social History Narrative     Family History   Problem Relation Age of Onset    Other Other      patient did not know     Review of Systems:   Review of systems not obtained due to patient factors. Objective:   Physical Exam:     Visit Vitals    /67 (BP 1 Location: Right arm, BP Patient Position: At rest)    Pulse 100    Temp 99.1 °F (37.3 °C)    Resp 22    Ht 5' 3.5\" (1.613 m)    Wt 84.4 kg (186 lb)    SpO2 92%    Breastfeeding No    BMI 32.43 kg/m2    O2 Flow Rate (L/min): 4 l/min O2 Device: Nasal cannula    Temp (24hrs), Av.3 °F (36.8 °C), Min:97.5 °F (36.4 °C), Max:99.1 °F (37.3 °C)         1901 -  0700  In: 1167.2 [P.O.:200;  I.V.:967.2]  Out: 9621 [Urine:2250; Drains:170]    General:  Awake, uncomfortable, appears stated age. Head:  Normocephalic, without obvious abnormality, atraumatic. Eyes:  Conjunctivae/corneas clear. EOMs intact. Throat: Lips, mucosa, and tongue dry. Neck: Supple, symmetrical, trachea midline, no adenopathy, thyroid: no enlargement/tenderness/nodules, no carotid bruit and no JVD. Lungs:    no intercostal use, breath sounds diminished. She has a few basilar crackles at this moment. Chest wall:  No tenderness or deformity. Left drain site looks ok   Heart:  Tachycardic rate with regular rhythm, S1, S2 normal, no murmur, click, rub or gallop. Abdomen:   Soft, lessdistended today, with mild generalized tenderness. Bowel sounds present. Dressings dry. Incision dry without redness, staple line intact. Drain site looks ok. Extremities: no cyanosis. Trace to 1+ LE edema. No calf tenderness or cords, no erythema   Skin: turgor normal. No rashes or lesions   Neurologic:   AOx2-3,  Gait not tested at this time. Sensation grossly normal to touch. Gross motor of extremities normal.       Data Review:    CT AB and Pelvis 2/28/18: IMPRESSION:  1. There is residual fluid in the posterior portion of the left subphrenic  collection which does not appear well drained by the pigtail catheter. This  could be repositioned versus new drainage catheter placement if clinically  indicated. 2. Persistent ascites. 3. Increasing right pleural effusion and right lower lobe atelectasis   4. Distended duodenum and proximal small bowel of doubtful clinical  Significance. CXR 3/1/18:  INDICATION:  chest pain and shortness of breath following thoracentesis   EXAM: Portable chest 1716 . Comparison March 1, 2018  FINDINGS: There is no significant change in appearance the lungs. Small left  pleural effusion with left basilar atelectasis unchanged. Cardiomediastinal  silhouette is unchanged. No pneumothorax.  Lines and tubes are unchanged in  position. IMPRESSION:  1.  No interval change    Recent Days:  Recent Labs      03/03/18   0304  03/03/18   0132  03/02/18   0308   WBC  17.6*  PLEASE DISREGARD RESULTS  17.5*   HGB  9.3*  PLEASE DISREGARD RESULTS  9.2*   HCT  29.7*  PLEASE DISREGARD RESULTS  29.6*   PLT  531*  PLEASE DISREGARD RESULTS  503*     Recent Labs      03/03/18   0304  03/02/18   0308  03/01/18   0121  02/28/18   1452   NA  136  136  137   --    K  4.8  4.6  4.3   --    CL  106  104  107   --    CO2  25  24  25   --    GLU  104*  120*  174*   --    BUN  12  11  11   --    CREA  0.45*  0.43*  0.53*   --    CA  8.3*  8.1*  7.9*   --    MG  1.7  1.8  2.0   --    PHOS  3.9  3.5  3.6   --    ALB   --   1.4*  1.4*   --    TBILI   --   0.7   --    --    SGOT   --   20   --   31   ALT   --   19   --   27   INR   --    --    --   1.2*     Recent Labs      02/28/18   1038   PH  7.46*   PCO2  31*   PO2  70*   HCO3  22     24 Hour Results:  Recent Results (from the past 24 hour(s))   GLUCOSE, POC    Collection Time: 03/02/18 12:01 PM   Result Value Ref Range    Glucose (POC) 125 (H) 65 - 100 mg/dL    Performed by Veneda Vishal    GLUCOSE, POC    Collection Time: 03/02/18  6:48 PM   Result Value Ref Range    Glucose (POC) 92 65 - 100 mg/dL    Performed by Veneda Vishal    CBC WITH AUTOMATED DIFF    Collection Time: 03/03/18  1:32 AM   Result Value Ref Range    WBC PLEASE DISREGARD RESULTS 3.6 - 11.0 K/uL    RBC PLEASE DISREGARD RESULTS 3.80 - 5.20 M/uL    HGB PLEASE DISREGARD RESULTS 11.5 - 16.0 g/dL    HCT PLEASE DISREGARD RESULTS 35.0 - 47.0 %    MCV Cannot be calculated 80.0 - 99.0 FL    MCH Cannot be calculated 26.0 - 34.0 PG    MCHC Cannot be calculated 30.0 - 36.5 g/dL    RDW PLEASE DISREGARD RESULTS 11.5 - 14.5 %    PLATELET PLEASE DISREGARD RESULTS 150 - 400 K/uL    MPV PLEASE DISREGARD RESULTS 8.9 - 12.9 FL    NRBC PLEASE DISREGARD RESULTS 0  WBC    ABSOLUTE NRBC PLEASE DISREGARD RESULTS 0.00 - 0.01 K/uL    NEUTROPHILS PLEASE DISREGARD RESULTS 32 - 75 %    LYMPHOCYTES PLEASE DISREGARD RESULTS 12 - 49 %    MONOCYTES PLEASE DISREGARD RESULTS 5 - 13 %    EOSINOPHILS PLEASE DISREGARD RESULTS 0 - 7 %    BASOPHILS PLEASE DISREGARD RESULTS 0 - 1 %    IMMATURE GRANULOCYTES PLEASE DISREGARD RESULTS 0.0 - 0.5 %    ABS. NEUTROPHILS PLEASE DISREGARD RESULTS 1.8 - 8.0 K/UL    ABS. LYMPHOCYTES PLEASE DISREGARD RESULTS 0.8 - 3.5 K/UL    ABS. MONOCYTES PLEASE DISREGARD RESULTS 0.0 - 1.0 K/UL    ABS. EOSINOPHILS PLEASE DISREGARD RESULTS 0.0 - 0.4 K/UL    ABS. BASOPHILS PLEASE DISREGARD RESULTS 0.0 - 0.1 K/UL    ABS. IMM. GRANS. PLEASE DISREGARD RESULTS 0.00 - 0.04 K/UL    DF PLEASE DISREGARD RESULTS     GLUCOSE, POC    Collection Time: 03/03/18  1:37 AM   Result Value Ref Range    Glucose (POC) >600 (HH) 65 - 100 mg/dL    Performed by Glenroy Tejada 15, POC    Collection Time: 03/03/18  1:39 AM   Result Value Ref Range    Glucose (POC) 108 (H) 65 - 100 mg/dL    Performed by Anne Ching    CBC WITH AUTOMATED DIFF    Collection Time: 03/03/18  3:04 AM   Result Value Ref Range    WBC 17.6 (H) 3.6 - 11.0 K/uL    RBC 2.84 (L) 3.80 - 5.20 M/uL    HGB 9.3 (L) 11.5 - 16.0 g/dL    HCT 29.7 (L) 35.0 - 47.0 %    .6 (H) 80.0 - 99.0 FL    MCH 32.7 26.0 - 34.0 PG    MCHC 31.3 30.0 - 36.5 g/dL    RDW 14.9 (H) 11.5 - 14.5 %    PLATELET 736 (H) 244 - 400 K/uL    MPV 12.0 8.9 - 12.9 FL    NRBC 0.0 0  WBC    ABSOLUTE NRBC 0.00 0.00 - 0.01 K/uL    NEUTROPHILS 81 (H) 32 - 75 %    LYMPHOCYTES 9 (L) 12 - 49 %    MONOCYTES 7 5 - 13 %    EOSINOPHILS 1 0 - 7 %    BASOPHILS 0 0 - 1 %    IMMATURE GRANULOCYTES 2 (H) 0.0 - 0.5 %    ABS. NEUTROPHILS 14.3 (H) 1.8 - 8.0 K/UL    ABS. LYMPHOCYTES 1.6 0.8 - 3.5 K/UL    ABS. MONOCYTES 1.3 (H) 0.0 - 1.0 K/UL    ABS. EOSINOPHILS 0.1 0.0 - 0.4 K/UL    ABS. BASOPHILS 0.1 0.0 - 0.1 K/UL    ABS. IMM.  GRANS. 0.3 (H) 0.00 - 0.04 K/UL    DF AUTOMATED     MAGNESIUM    Collection Time: 03/03/18  3:04 AM   Result Value Ref Range Magnesium 1.7 1.6 - 2.4 mg/dL   METABOLIC PANEL, BASIC    Collection Time: 03/03/18  3:04 AM   Result Value Ref Range    Sodium 136 136 - 145 mmol/L    Potassium 4.8 3.5 - 5.1 mmol/L    Chloride 106 97 - 108 mmol/L    CO2 25 21 - 32 mmol/L    Anion gap 5 5 - 15 mmol/L    Glucose 104 (H) 65 - 100 mg/dL    BUN 12 6 - 20 MG/DL    Creatinine 0.45 (L) 0.55 - 1.02 MG/DL    BUN/Creatinine ratio 27 (H) 12 - 20      GFR est AA >60 >60 ml/min/1.73m2    GFR est non-AA >60 >60 ml/min/1.73m2    Calcium 8.3 (L) 8.5 - 10.1 MG/DL   PHOSPHORUS    Collection Time: 03/03/18  3:04 AM   Result Value Ref Range    Phosphorus 3.9 2.6 - 4.7 MG/DL   GLUCOSE, POC    Collection Time: 03/03/18  5:05 AM   Result Value Ref Range    Glucose (POC) 108 (H) 65 - 100 mg/dL    Performed by Bo Reynolds (PCT)      Medications reviewed  Current Facility-Administered Medications   Medication Dose Route Frequency    TPN ADULT - CENTRAL   IntraVENous CONTINUOUS    HYDROmorphone (PF) (DILAUDID) injection 1 mg  1 mg IntraVENous Q3H PRN    spironolactone (ALDACTONE) tablet 25 mg  25 mg Oral BID    fluconazole (DIFLUCAN) 200mg/100 mL IVPB (premix)  200 mg IntraVENous Q24H    albuterol-ipratropium (DUO-NEB) 2.5 MG-0.5 MG/3 ML  3 mL Nebulization Q6HWA RT    fentaNYL (DURAGESIC) 25 mcg/hr patch 1 Patch  1 Patch TransDERmal Q72H    haloperidol lactate (HALDOL) injection 1 mg  1 mg IntraVENous Q4H PRN    guaiFENesin ER (MUCINEX) tablet 1,200 mg  1,200 mg Oral Q12H    acetaminophen (TYLENOL) tablet 650 mg  650 mg Oral Q4H PRN    insulin regular (NOVOLIN R, HUMULIN R) injection   SubCUTAneous Q6H    fat emulsion 20% (LIPOSYN, INTRAlipid) infusion 500 mL  500 mL IntraVENous Q MON, WED & SAT    glucose chewable tablet 16 g  4 Tab Oral PRN    glucagon (GLUCAGEN) injection 1 mg  1 mg IntraMUSCular PRN    dextrose (D50W) injection syrg 12.5-25 g  12.5-25 g IntraVENous PRN    alteplase (CATHFLO) 1 mg in sterile water (preservative free) 1 mL injection  1 mg InterCATHeter PRN    sodium chloride (NS) flush 10-30 mL  10-30 mL InterCATHeter PRN    sodium chloride (NS) flush 10-40 mL  10-40 mL InterCATHeter Q8H    naloxone (NARCAN) injection 0.4 mg  0.4 mg IntraVENous PRN    ondansetron (ZOFRAN) injection 4 mg  4 mg IntraVENous Q4H PRN    enoxaparin (LOVENOX) injection 40 mg  40 mg SubCUTAneous Q24H    pantoprazole (PROTONIX) injection 40 mg  40 mg IntraVENous Q12H    phenol throat spray (CHLORASEPTIC) 1 Spray  1 Spray Oral PRN    nystatin (MYCOSTATIN) 100,000 unit/gram powder   Topical BID    sodium chloride (NS) flush 5-10 mL  5-10 mL IntraVENous PRN     Care Plan discussed with: Patient and daughter and Nurse   Total time spent with patient: 30 minutes.   Jos Brandon MD

## 2018-03-04 LAB
ANION GAP SERPL CALC-SCNC: 6 MMOL/L (ref 5–15)
BACTERIA SPEC CULT: ABNORMAL
BACTERIA SPEC CULT: NORMAL
BASOPHILS # BLD: 0.1 K/UL (ref 0–0.1)
BASOPHILS NFR BLD: 1 % (ref 0–1)
BUN SERPL-MCNC: 14 MG/DL (ref 6–20)
BUN/CREAT SERPL: 30 (ref 12–20)
CALCIUM SERPL-MCNC: 8.2 MG/DL (ref 8.5–10.1)
CC UR VC: ABNORMAL
CHLORIDE SERPL-SCNC: 106 MMOL/L (ref 97–108)
CO2 SERPL-SCNC: 25 MMOL/L (ref 21–32)
CREAT SERPL-MCNC: 0.46 MG/DL (ref 0.55–1.02)
DIFFERENTIAL METHOD BLD: ABNORMAL
EOSINOPHIL # BLD: 0.3 K/UL (ref 0–0.4)
EOSINOPHIL NFR BLD: 2 % (ref 0–7)
ERYTHROCYTE [DISTWIDTH] IN BLOOD BY AUTOMATED COUNT: 15 % (ref 11.5–14.5)
GLUCOSE BLD STRIP.AUTO-MCNC: 120 MG/DL (ref 65–100)
GLUCOSE BLD STRIP.AUTO-MCNC: 127 MG/DL (ref 65–100)
GLUCOSE BLD STRIP.AUTO-MCNC: 85 MG/DL (ref 65–100)
GLUCOSE BLD STRIP.AUTO-MCNC: 97 MG/DL (ref 65–100)
GLUCOSE BLD STRIP.AUTO-MCNC: 99 MG/DL (ref 65–100)
GLUCOSE SERPL-MCNC: 92 MG/DL (ref 65–100)
GRAM STN SPEC: NORMAL
GRAM STN SPEC: NORMAL
HCT VFR BLD AUTO: 29.2 % (ref 35–47)
HGB BLD-MCNC: 9.1 G/DL (ref 11.5–16)
IMM GRANULOCYTES # BLD: 0.3 K/UL (ref 0–0.04)
IMM GRANULOCYTES NFR BLD AUTO: 2 % (ref 0–0.5)
LYMPHOCYTES # BLD: 1.8 K/UL (ref 0.8–3.5)
LYMPHOCYTES NFR BLD: 10 % (ref 12–49)
MAGNESIUM SERPL-MCNC: 1.7 MG/DL (ref 1.6–2.4)
MCH RBC QN AUTO: 32.7 PG (ref 26–34)
MCHC RBC AUTO-ENTMCNC: 31.2 G/DL (ref 30–36.5)
MCV RBC AUTO: 105 FL (ref 80–99)
MONOCYTES # BLD: 1.5 K/UL (ref 0–1)
MONOCYTES NFR BLD: 8 % (ref 5–13)
NEUTS SEG # BLD: 14.6 K/UL (ref 1.8–8)
NEUTS SEG NFR BLD: 78 % (ref 32–75)
NRBC # BLD: 0 K/UL (ref 0–0.01)
NRBC BLD-RTO: 0 PER 100 WBC
PHOSPHATE SERPL-MCNC: 4.1 MG/DL (ref 2.6–4.7)
PLATELET # BLD AUTO: 492 K/UL (ref 150–400)
PMV BLD AUTO: 12.2 FL (ref 8.9–12.9)
POTASSIUM SERPL-SCNC: 4.8 MMOL/L (ref 3.5–5.1)
RBC # BLD AUTO: 2.78 M/UL (ref 3.8–5.2)
SERVICE CMNT-IMP: ABNORMAL
SERVICE CMNT-IMP: NORMAL
SODIUM SERPL-SCNC: 137 MMOL/L (ref 136–145)
WBC # BLD AUTO: 18.6 K/UL (ref 3.6–11)

## 2018-03-04 PROCEDURE — 74011000250 HC RX REV CODE- 250: Performed by: NURSE PRACTITIONER

## 2018-03-04 PROCEDURE — 82962 GLUCOSE BLOOD TEST: CPT

## 2018-03-04 PROCEDURE — 74011000250 HC RX REV CODE- 250: Performed by: SURGERY

## 2018-03-04 PROCEDURE — 74011250637 HC RX REV CODE- 250/637: Performed by: SURGERY

## 2018-03-04 PROCEDURE — 83735 ASSAY OF MAGNESIUM: CPT | Performed by: SURGERY

## 2018-03-04 PROCEDURE — 97535 SELF CARE MNGMENT TRAINING: CPT

## 2018-03-04 PROCEDURE — 80048 BASIC METABOLIC PNL TOTAL CA: CPT | Performed by: SURGERY

## 2018-03-04 PROCEDURE — 65660000000 HC RM CCU STEPDOWN

## 2018-03-04 PROCEDURE — 84100 ASSAY OF PHOSPHORUS: CPT | Performed by: SURGERY

## 2018-03-04 PROCEDURE — 94640 AIRWAY INHALATION TREATMENT: CPT

## 2018-03-04 PROCEDURE — 85025 COMPLETE CBC W/AUTO DIFF WBC: CPT | Performed by: SURGERY

## 2018-03-04 PROCEDURE — 36415 COLL VENOUS BLD VENIPUNCTURE: CPT | Performed by: SURGERY

## 2018-03-04 PROCEDURE — 77030038269 HC DRN EXT URIN PURWCK BARD -A

## 2018-03-04 PROCEDURE — 74011250636 HC RX REV CODE- 250/636: Performed by: SURGERY

## 2018-03-04 PROCEDURE — C9113 INJ PANTOPRAZOLE SODIUM, VIA: HCPCS | Performed by: SURGERY

## 2018-03-04 PROCEDURE — 77010033678 HC OXYGEN DAILY

## 2018-03-04 PROCEDURE — 74011250636 HC RX REV CODE- 250/636: Performed by: FAMILY MEDICINE

## 2018-03-04 PROCEDURE — 74011250637 HC RX REV CODE- 250/637: Performed by: FAMILY MEDICINE

## 2018-03-04 PROCEDURE — 74011250637 HC RX REV CODE- 250/637: Performed by: NURSE PRACTITIONER

## 2018-03-04 PROCEDURE — 74011000258 HC RX REV CODE- 258: Performed by: SURGERY

## 2018-03-04 RX ORDER — LANOLIN ALCOHOL/MO/W.PET/CERES
1.5 CREAM (GRAM) TOPICAL
Status: DISCONTINUED | OUTPATIENT
Start: 2018-03-04 | End: 2018-03-16 | Stop reason: HOSPADM

## 2018-03-04 RX ADMIN — HYDROMORPHONE HYDROCHLORIDE 1 MG: 2 INJECTION, SOLUTION INTRAMUSCULAR; INTRAVENOUS; SUBCUTANEOUS at 23:07

## 2018-03-04 RX ADMIN — Medication 10 ML: at 04:02

## 2018-03-04 RX ADMIN — Medication 10 ML: at 21:00

## 2018-03-04 RX ADMIN — IPRATROPIUM BROMIDE AND ALBUTEROL SULFATE 3 ML: .5; 3 SOLUTION RESPIRATORY (INHALATION) at 14:12

## 2018-03-04 RX ADMIN — GUAIFENESIN 1200 MG: 600 TABLET, EXTENDED RELEASE ORAL at 09:42

## 2018-03-04 RX ADMIN — HALOPERIDOL LACTATE 1 MG: 5 INJECTION, SOLUTION INTRAMUSCULAR at 04:01

## 2018-03-04 RX ADMIN — CALCIUM GLUCONATE: 94 INJECTION, SOLUTION INTRAVENOUS at 18:55

## 2018-03-04 RX ADMIN — HYDROMORPHONE HYDROCHLORIDE 1 MG: 2 INJECTION, SOLUTION INTRAMUSCULAR; INTRAVENOUS; SUBCUTANEOUS at 00:03

## 2018-03-04 RX ADMIN — PANTOPRAZOLE SODIUM 40 MG: 40 INJECTION, POWDER, FOR SOLUTION INTRAVENOUS at 21:00

## 2018-03-04 RX ADMIN — Medication 40 ML: at 14:20

## 2018-03-04 RX ADMIN — SPIRONOLACTONE 25 MG: 25 TABLET, FILM COATED ORAL at 21:00

## 2018-03-04 RX ADMIN — IPRATROPIUM BROMIDE AND ALBUTEROL SULFATE 3 ML: .5; 3 SOLUTION RESPIRATORY (INHALATION) at 07:26

## 2018-03-04 RX ADMIN — IPRATROPIUM BROMIDE AND ALBUTEROL SULFATE 3 ML: .5; 3 SOLUTION RESPIRATORY (INHALATION) at 19:44

## 2018-03-04 RX ADMIN — MELATONIN TAB 3 MG 1.5 MG: 3 TAB at 23:07

## 2018-03-04 RX ADMIN — PANTOPRAZOLE SODIUM 40 MG: 40 INJECTION, POWDER, FOR SOLUTION INTRAVENOUS at 09:42

## 2018-03-04 RX ADMIN — FLUCONAZOLE 200 MG: 2 INJECTION INTRAVENOUS at 15:00

## 2018-03-04 RX ADMIN — HYDROMORPHONE HYDROCHLORIDE 1 MG: 2 INJECTION, SOLUTION INTRAMUSCULAR; INTRAVENOUS; SUBCUTANEOUS at 06:35

## 2018-03-04 RX ADMIN — ENOXAPARIN SODIUM 40 MG: 40 INJECTION SUBCUTANEOUS at 14:55

## 2018-03-04 RX ADMIN — GUAIFENESIN 1200 MG: 600 TABLET, EXTENDED RELEASE ORAL at 21:00

## 2018-03-04 RX ADMIN — SPIRONOLACTONE 25 MG: 25 TABLET, FILM COATED ORAL at 09:42

## 2018-03-04 RX ADMIN — NYSTATIN: 100000 POWDER TOPICAL at 21:01

## 2018-03-04 NOTE — PROGRESS NOTES
SHIFT CHANGE:  7:04 AM Report received from Srinath Olivo RN. SBAR, Kardex, Procedure Summary, Intake/Output, MAR, Recent Results, Med Rec Status and Cardiac Rhythm NSR/ST were discussed. SHIFT SUMMARY:    2:24 PM  RN notified by telemetry that patients HR up briefly in to 160's and SVT. Patient HR now 98. Will monitor closely and notify MD if it happens again. END OF SHIFT REPORT:  7:30 PM  Bedside and Verbal shift change report given to Srinath Olivo RN (oncoming nurse) by Hanh Quinteros RN (offgoing nurse). Report included the following information SBAR, Kardex, Procedure Summary, Intake/Output, MAR, Recent Results, Med Rec Status and Cardiac Rhythm Sinus Arrhythmia.

## 2018-03-04 NOTE — PROGRESS NOTES
Problem: Self Care Deficits Care Plan (Adult)  Goal: *Acute Goals and Plan of Care (Insert Text)  Occupational Therapy Goals  Revised 3/2/2018  1. Patient will perform grooming with modified independence for >5 minutes with supervision/setup with < 2 verbal cues within 7 day(s). 2.  Patient will perform upper body dressing and bathing with modified independence within 7 day(s). 3.  Patient will perform lower body dressing and bathing with moderate assistance using adaptive equipment within 7 day(s). 4.  Patient will perform toilet transfers to [facility] with minimal assistance x1 within 7 day(s). 5.  Patient will perform all aspects of toileting with minimal assistance within 7 day(s). 6.  Patient will participate in upper extremity therapeutic exercise/activities with supervision/set-up for 10 minutes within 7 day(s). 7.  Patient will utilize energy conservation techniques during functional activities with verbal cues within 7 day(s). 8.  Patient will don/doff back brace at supervision/set-up within 7 days. 9.  Patient will verbalize/demonstrate all spinal precautions during ADL tasks without cues within 7 days. Initiated 2/20/2018; revised at re-assess (see above); 1. Patient will perform upper body dressing with moderate assistance in unsupported sitting within 7 day(s). 2.  Patient will perform upper body bathing with moderate assistance  within 7 day(s). 3.  Patient will perform grooming with minimal assistance/contact guard assist within 7 day(s). 4.  Patient will perform toilet transfers with maximal assistance  within 7 day(s). 5.  Patient will perform all aspects of toileting with maximal assistance within 7 day(s). 6.  Patient will participate in upper extremity therapeutic exercise/activities with minimal assistance/contact guard assist for 5 minutes within 7 day(s).         Occupational Therapy TREATMENT  Patient: Mey Fox (16 y.o. female)  Date: 3/4/2018  Diagnosis: Intra-abdominal free air of unknown etiology [K66.8] Perforated chronic gastric ulcer ([address]  Procedure(s) (LRB):   NASOGASTRIC TUBE PLACEMENT (N/A) 14 Days Post-Op  Precautions: Aspiration, Back, Bed Alarm, Fall, Skin  Chart, occupational therapy assessment, plan of care, and goals were reviewed. ASSESSMENT:  Pt agreeable to OT, daughters present and engaged with treatment. Pt oriented to person, place, year, month and President. After verbal cueing to log roll to right side of bed she sat up with moderate assist. Min assist to scoot to edge of bed. Pts daughter verbalized she was taught how to don brace on herself but has never assisted her mom to don brace. Educated daughter as to correct placement and adjusted L strap for proper fit. Pt was able to take both velcro straps to tighten across her midline with min assist. Pt stood to transfer to chair but than verbalized pain L upper quadrant where she has drain placement. Therapist removed brace and RN present to place padding where drain placement is located. Instructed RN to place back brace back on pt for transfer back to bed and than to remove back brace once pt in supine. RN in agreement. /66, heart rate 102, O2 sats 96% on 4 L post activity. Recommend rehab. Progression toward goals:  []          Improving appropriately and progressing toward goals  [x]          Improving slowly and progressing toward goals  []          Not making progress toward goals and plan of care will be adjusted     PLAN:  Patient continues to benefit from skilled intervention to address the above impairments. Continue treatment per established plan of care. Discharge Recommendations:  Rehab  Further Equipment Recommendations for Discharge:  None     SUBJECTIVE:   Patient stated I know it's March.     OBJECTIVE DATA SUMMARY:   Cognitive/Behavioral Status:  Neurologic State: Alert  Orientation Level: Oriented to person;Oriented to place  Cognition: Memory loss Functional Mobility and Transfers for ADLs:   Bed Mobility:      Moderate assist supine to sit           Transfers:  Sit to Stand: Minimum assistance;Assist x1       Balance:  Sitting: Intact; High guard  Sitting - Static: Fair (occasional)  Sitting - Dynamic: Fair (occasional)  Standing: Impaired; With support  ADL Intervention:       Lower Body Dressing Assistance  Socks: Total assistance (dependent)  Leg Crossed Method Used: No  Position Performed: Seated edge of bed  Cues: Physical assistance       Pain:  Pain Scale 1: Visual  Pain Intensity 1: 0  Pain Location 1: Abdomen  Pain Orientation 1: Anterior  Pain Description 1: Sharp  Pain Intervention(s) 1: Medication (see MAR)    Activity Tolerance:    Fair  Please refer to the flowsheet for vital signs taken during this treatment.   After treatment:   [x]  Patient left in no apparent distress sitting up in chair  []  Patient left in no apparent distress in bed  [x]  Call bell left within reach  [x]  Nursing notified  [x]  Caregiver present  []  Bed alarm activated    COMMUNICATION/COLLABORATION:   The patients plan of care was discussed with: Occupational Therapist and Registered Nurse    ISRAEL Blanton  Time Calculation: 32 mins

## 2018-03-04 NOTE — PROGRESS NOTES
Progress Note    Patient: Francisca Franco MRN: [id number]  SSN: [id number]    YOB: 1945  Age: 67 y.o. Sex: female      Admit Date: 2018    20 Days Post-Op    Procedure:  Procedure(s):  EX LAP    Subjective:     Mrs. Donn Hardy c/o not sleeping. She wants to eat. She denies any flatus or BM. She denies any pain. Objective:     Visit Vitals    /61    Pulse 93    Temp 98.4 °F (36.9 °C)    Resp 13    Ht 5' 3.5\" (1.613 m)    Wt 182 lb (82.6 kg)    SpO2 94%    Breastfeeding No    BMI 31.73 kg/m2       Temp (24hrs), Av.3 °F (36.8 °C), Min:97.7 °F (36.5 °C), Max:98.7 °F (37.1 °C)      Physical Exam:    GENERAL: alert, cooperative, no distress, ABDOMEN: Soft, less distended, NT. The ALYSSA fluid is greenish-tan., EXTREMITIES:  edema less.     Lab Review:   BMP:   Lab Results   Component Value Date/Time     2018 12:09 AM    K 4.8 2018 12:09 AM     2018 12:09 AM    CO2 25 2018 12:09 AM    AGAP 6 2018 12:09 AM    GLU 92 2018 12:09 AM    BUN 14 2018 12:09 AM    CREA 0.46 (L) 2018 12:09 AM    GFRAA >60 2018 12:09 AM    GFRNA >60 2018 12:09 AM     CMP:   Lab Results   Component Value Date/Time     2018 12:09 AM    K 4.8 2018 12:09 AM     2018 12:09 AM    CO2 25 2018 12:09 AM    AGAP 6 2018 12:09 AM    GLU 92 2018 12:09 AM    BUN 14 2018 12:09 AM    CREA 0.46 (L) 2018 12:09 AM    GFRAA >60 2018 12:09 AM    GFRNA >60 2018 12:09 AM    CA 8.2 (L) 2018 12:09 AM    MG 1.7 2018 12:09 AM    PHOS 4.1 2018 12:09 AM     CBC:   Lab Results   Component Value Date/Time    WBC 18.6 (H) 2018 12:09 AM    HGB 9.1 (L) 2018 12:09 AM    HCT 29.2 (L) 2018 12:09 AM     (H) 2018 12:09 AM       Assessment:     Hospital Problems  Date Reviewed: 2016          Codes Class Noted POA    Hypokalemia ICD-10-CM: E87.6  ICD-9-CM: 276.8  2/18/2018 No        Leukocytosis ICD-10-CM: D72.829  ICD-9-CM: 288.60  2/18/2018 Yes        Severe protein-calorie malnutrition ([facility] ICD-10-CM: E43  ICD-9-CM: 262  2/18/2018 Yes        Acute metabolic encephalopathy NHO-67-ZT: G93.41  ICD-9-CM: 348.31  2/18/2018 Yes        Hyperammonemia ([facility] ICD-10-CM: E72.20  ICD-9-CM: 270.6  2/16/2018 No    Overview Addendum 2/17/2018  9:08 AM by Brenda Lyon MD        Ref. Range 2/16/2018 17:20   Ammonia Latest Ref Range: <32 UMOL/L 69 (H)                Free intraperitoneal air ICD-10-CM: K66.8  ICD-9-CM: 568.89  2/12/2018 Yes        S/P exploratory laparotomy ICD-10-CM: Z98.890  ICD-9-CM: V45.89  2/12/2018 No    Overview Signed 2/12/2018 11:57 AM by Molly Head MD     Suture repair of perforated posterior gastric ulcer with Henry Darell patch, core needle biopsies of left lobe of the liver. Alcoholic cirrhosis of liver without ascites (HCC) (Chronic) ICD-10-CM: K70.30  ICD-9-CM: 571.2  2/12/2018 Yes    Overview Signed 2/17/2018  8:45 AM by Brenda Lyon MD     Liver bx 2/12/18:   Cirrhosis with mild chronic portal inflammation and macrovesicular steatosis. Fatty liver determined by biopsy ICD-10-CM: K76.0  ICD-9-CM: 571.8  2/12/2018 Yes    Overview Signed 2/17/2018  8:53 AM by Brenda Lyon MD        Cirrhosis with mild chronic portal inflammation and macrovesicular steatosis. * (Principal)Perforated chronic gastric ulcer ([facility] [address] (Chronic) ICD-10-CM: K25.5  ICD-9-CM: 531.50  2/11/2018 Yes        ETOH abuse (Chronic) ICD-10-CM: F10.10  ICD-9-CM: 305.00  2/11/2018 Yes              Plan/Recommendations/Medical Decision Making:     Looking better overall. Continue TPN and bowel rest.  Drain output down. Hopefully, can start clears soon. Continue Diflucan. WBC still high. Add Melatonin which she takes at home. OOB with PT.     Signed By: Molly Head MD     March 4, 2018

## 2018-03-04 NOTE — PROGRESS NOTES
Bedside and Verbal shift change report given to Kale Mclaughlin (oncoming nurse) by Terry Bullock (offgoing nurse). Report included the following information SBAR, Kardex, ED Summary, Procedure Summary, Intake/Output, MAR, Accordion, Recent Results, Med Rec Status and Cardiac Rhythm ST.     0139: Patient anxious. HR in the low 100's. Bathed patient. Will continue to monitor. 0400: Patient is constantly removing nasal cannula from her nose and desating to the low 80s. Pt has attempted to get up on her own, which has triggered her bed alarm. I have also walked in and patient had her abdominal drain wrapped around her arm and also pulled at ALYSSA drain. Pt is forgetful and does not remember not to pull at her lines. Administered Haldol. Will continue to monitor. 0720: Bedside and Verbal shift change report given to Kizzy (oncoming nurse) by Kale Mclaughlin (offgoing nurse). Report included the following information SBAR, Kardex, ED Summary, Procedure Summary, Intake/Output, MAR, Accordion, Recent Results, Med Rec Status and Cardiac Rhythm NSR w/ PVCs.

## 2018-03-04 NOTE — PROGRESS NOTES
Family Practice Daily Progress Note: 3/4/2018  Leigh Ann Mari  Becky Dee Si, DO    Assessment/Plan:   Perforated chronic gastric ulcer /  Free intraperitoneal air / S/P exploratory laparotomy 2/11. S/p repair in OR with Dr. Felipa Arias on 2/12/18 - per surg    Severe protein calorie malnutrition-POA  --TPN per GI     Acute metabolic encephalopathy. Multifactorial-- medications, alcohol use, sundowning  ---avoid triggers  ---CIWA  ---maintenance of sleep/wake cycle, and re-orientation   ---Added Seroquel at bedtime to help with sleep wake cycle     Hypoxia / Respiratory distress. CXR with atelectasis and pleural effusion. Continue Abx, nebs, supplemental oxygen and incentive spirom as able. Repeat CT chest with moderate to large persistent left subphrenic collection. ---Pulmonary consulted     ---Bumex as needed. Added Aldactone. ---Drain placed by IR     Alcoholic cirrhosis of liver without ascites /  Fatty liver determined by biopsy /  Hyperammonemia. --GI has seen- rec alcohol cessation and outpt follow up     ETOH abuse. Needs to stop ETOH entirely.     Hypokalemia /  Hypoalbuminemia. Being addressed with TPN     Leukocytosis. Up again  -- repeat blood cultures, and urine 2/28  --was on Zosyn, and levaquin - cultures drawn off antibiotics  --consulted ID 3/1    Anemia: watch Hgb   --follow, transfuse <7    Calcium corrects.     Back Pain due to compression fracture of T12  ---Increase Duragesic patch to 25mcg and DIiaudid to 1mg as needed    Debility: due to prolonged hospital stay  --continue PT/OT- needs to get OOB  --will likely need rehab        Problem List:  Problem List as of 3/4/2018  Date Reviewed: 4/26/2016          Codes Class Noted - Resolved    Hypokalemia ICD-10-CM: E87.6  ICD-9-CM: 276.8  2/18/2018 - Present        Leukocytosis ICD-10-CM: D72.829  ICD-9-CM: 288.60  2/18/2018 - Present        Severe protein-calorie malnutrition ([facility] ICD-10-CM: E43  ICD-9-CM: 108  2/18/2018 - Present        Acute metabolic encephalopathy [id number]-GF: G93.41  ICD-9-CM: 348.31  2/18/2018 - Present        Hyperammonemia ([facility] ICD-10-CM: E72.20  ICD-9-CM: 270.6  2/16/2018 - Present    Overview Addendum 2/17/2018  9:08 AM by Madeleine Colorado MD        Ref. Range 2/16/2018 17:20   Ammonia Latest Ref Range: <32 UMOL/L 69 (H)                Free intraperitoneal air ICD-10-CM: K66.8  ICD-9-CM: 568.89  2/12/2018 - Present        S/P exploratory laparotomy ICD-10-CM: Z98.890  ICD-9-CM: V45.89  2/12/2018 - Present    Overview Signed 2/12/2018 11:57 AM by Kristen Malik MD     Suture repair of perforated posterior gastric ulcer with Larnell Breaker patch, core needle biopsies of left lobe of the liver. Alcoholic cirrhosis of liver without ascites (HCC) (Chronic) ICD-10-CM: K70.30  ICD-9-CM: 571.2  2/12/2018 - Present    Overview Signed 2/17/2018  8:45 AM by Madeleine Colorado MD     Liver bx 2/12/18:   Cirrhosis with mild chronic portal inflammation and macrovesicular steatosis. Fatty liver determined by biopsy ICD-10-CM: K76.0  ICD-9-CM: 571.8  2/12/2018 - Present    Overview Signed 2/17/2018  8:53 AM by Madeleine Colorado MD        Cirrhosis with mild chronic portal inflammation and macrovesicular steatosis. * (Principal)Perfor[facility] (Chronic) ICD-10-CM: K25.5  ICD-9-CM: 531.50  2/11/2018 - Present        ETOH abuse (Chronic) ICD-10-CM: F10.10  ICD-9-CM: 305.00  2/11/2018 - Present        GI bleed ICD-10-CM: K92.2  ICD-9-CM: 578.9  4/26/2016 - Present        Hypotension ICD-10-CM: I95.9  ICD-9-CM: 458.9  4/26/2016 - Present        Tachycardia ICD-10-CM: R00.0  ICD-9-CM: 785.0  4/26/2016 - Present        Acute blood loss anemia ICD-10-CM: D62  ICD-9-CM: 285.1  4/26/2016 - Present              Subjective:    67 y.o.  female with EtOH abuse who is admitted to the General Surgery Service with perforated gastric ulcer.   We are asked to evaluate for altered mental status. The patient is poorly interactive at this time and this limits primary hx. Discussed case with family available at bedside providing secondary hx and chart review. Per family, patient has had declining neurological condition over past 48 hours. Notably, an RRT was called for respiratory distress. 2/19: This morning she is a bit more alert at this moment and taking in more complete sentences. She says she does not feel well. She has no specific site of pain other than the NG tube which is bothering her a great deal.  She should improve as time from surg increases. K+ a little low - replacing.     2/20:  Still confused and somnolent at times. Now able to localize pain to ab and converse a bit more. Tmax 100.3  WBC is up again but hopefully reactive - recheck in AM - more incentive spirom and check CXR.     2/21: A little more alert. Knows she is in the hospital. Not able to participate with PT yesterday as she was in too much pain. WBC still at 19.     2/22:  WBC 19K. She is more alert. Daughter at bedside. Both of her daughters live out of town. Looking at SNF options for rehab. Coughing more. Starting to use IS.     2/23: WBC 17. Repeat CT chest with moderate to large persistent left subphrenic collection. IR has been consulted. Vanc and Levaquin added by pulm. 2/24: Pt had a better night last night. Only brief episode of confusion. She is much clearer this AM.  Now in quite a bit of pain from gas and stomach cramping. Thinks she will feel better if she can have a BM. Has been on bedpan for a while without success. On TPN. Daughters very concerned that she won't be successful while lying down. Wanting to get up to bedside commode. Will need PT's help. 2/25: febrile overnight and WBC up after vanc was stopped. Pt c/o more dyspnea today. Known fluid collection that will be drained in IR this week. Up to chair with PT yesterday briefly. +BM yesterday.   Da notes pt has been c/o R wrist pain off and on for 2 wks since she fell. No swelling.    2/26:  No new complaints. Still c/o back and ab pain. Still passing gas and had BM. Diuresed with 1 mg Bumex yesterday by Pulmonary. Remains on Zosyn and Levaquin. Blood culture remains neg. Tmax 99.2. X-ray of wrists ok. CXR ok with tube inplace. WBC 15.7K.      2/27:  Looks much better today. Much less work of breathing. Diuresed about 1 liter over last 24h. WBC 16K today. Blood cult remains neg. Off antibiotics. Still on TPN. She will need rehab for PT/OT. 2/28: Very restless during the night. Has not been OOB. Daughter stayed the night with her. WBC is still up. Antibiotics stopped yesterday. She is c/o increased pain in her back and can't get comfortable. More anxious and agitated. Tolerating sips. 3/1:  She reports she feels better this AM after she had some sleep. Afeb. WBC still up - a bit more today with increased neutrophils. Will also get ID to see also. CT AB and Pelvis as below. Cultures done yesterday off antibiotics - so far no growth. No output from drain - may need to be repositioned. More edema today but not as short of breath today while sitting up - add albumin to todays lab. Add Aldactone if ok with GI and Bumex as per Pul. Discussed cirrhosis/ascites/edema with pts daughter. 3/2:  She reports she feels better, and has slightly less edema, although daughters note they think pts abdomin more swollen. The daughters are more worried about pts LE edema than anything else - explained that with pts low albumin that edema may cont to be a problem. She is more alert, a bit more oriented today and says she has much less pain today. Tmax 101. Culture had yeast - diflucan started. Drain was repositioned, had thoracentesis and new PICC placed yesterday. May need to restart broad spectrum antibiotics - ID consulted.      3/3:  She reports she feels better this AM.  More alert and oriented this AM.  BM yesterday. Tmax 99. WBC still 17-18K. ID consulted and advises to watch off antibiotics for now. Less edema LE.      3/4:  Doing much better today and much more alert. Little pain. Off sleeping meds for now - not sleeping well but reports \"never was a good sleeper. \"  Anxious at night. WBC still up. Discussed rehab with daughters and pt and pt willing to go. Past Medical History:   Diagnosis Date    Alcoholic cirrhosis of liver without ascites ([facility] 2/12/2018    ETOH abuse 2/11/2018    History of vascular access device 02/16/2018    [facility] VAT - 5 FR triple, R basilic, TPN    Hyperammonemia ([facility] [facility] 2/16/2018     Results for Moncho Ravi (MRN [id number]) as of 2/17/2018 08:43  Ref. Range 2/16/2018 17:20 Ammonia Latest Ref Range: <32 UMOL/L 69 (H)     Hyperlipidemia     Perforated chronic gastric ulcer ([facility] [address] [facility] 2/11/2018     Past Surgical History:   Procedure Laterality Date    HX CATARACT REMOVAL       Social History     Social History    Marital status: SINGLE     Spouse name: N/A    Number of children: N/A    Years of education: N/A     Occupational History    Not on file. Social History Main Topics    Smoking status: Former Smoker    Smokeless tobacco: Never Used    Alcohol use 11.4 oz/week     0 Standard drinks or equivalent, 19 Glasses of wine per week      Comment: Hx of heavy etoh use, but quit several months ago    Drug use: No    Sexual activity: Not on file     Other Topics Concern    Not on file     Social History Narrative     Family History   Problem Relation Age of Onset    Other Other      patient did not know     Review of Systems:   Review of systems not obtained due to patient factors.     Objective:   Physical Exam:     Visit Vitals    /61    Pulse 93    Temp 98.4 °F (36.9 °C)    Resp 13    Ht 5' 3.5\" (1.613 m)    Wt 82.6 kg (182 lb)    SpO2 94%    Breastfeeding No    BMI 31.73 kg/m2    O2 Flow Rate (L/min): 4 l/min O2 Device: Nasal cannula    Temp (24hrs), Av.3 °F (36.8 °C), Min:97.7 °F (36.5 °C), Max:98.7 °F (37.1 °C)        1901 -  0700  In: 0   Out:  [Urine:2650; Drains:65]    General:  Awake, uncomfortable, appears stated age. Head:  Normocephalic, without obvious abnormality, atraumatic. Eyes:  Conjunctivae/corneas clear. EOMs intact. Throat: Lips, mucosa, and tongue dry. Neck: Supple, symmetrical, trachea midline, no adenopathy, thyroid: no enlargement/tenderness/nodules, no carotid bruit and no JVD. Lungs:    no intercostal use, breath sounds diminished. She has a few basilar crackles at this moment. Chest wall:  No tenderness or deformity. Left drain site looks ok and little drainage. Heart:  Tachycardic rate with regular rhythm, S1, S2 normal, no murmur, click, rub or gallop. Abdomen:   Soft, less distended today, with no tenderness. Bowel sounds present. Dressings dry. Incision dry without redness, staple line intact. Drain site looks ok and drainage clearing. Extremities: no cyanosis. Trace to 1+ LE edema. No calf tenderness or cords, no erythema   Skin: turgor normal. No rashes or lesions   Neurologic:   AOx2-3,  Gait not tested at this time. Sensation grossly normal to touch. Gross motor of extremities normal.       Data Review:    CT AB and Pelvis 18: IMPRESSION:  1. There is residual fluid in the posterior portion of the left subphrenic  collection which does not appear well drained by the pigtail catheter. This  could be repositioned versus new drainage catheter placement if clinically  indicated. 2. Persistent ascites. 3. Increasing right pleural effusion and right lower lobe atelectasis   4. Distended duodenum and proximal small bowel of doubtful clinical  Significance. CXR 3/1/18:  INDICATION:  chest pain and shortness of breath following thoracentesis   EXAM: Portable chest 1716 .  Comparison 2018  FINDINGS: There is no significant change in appearance the lungs. Small left  pleural effusion with left basilar atelectasis unchanged. Cardiomediastinal  silhouette is unchanged. No pneumothorax. Lines and tubes are unchanged in  position. IMPRESSION:  1. No interval change    Recent Days:  Recent Labs      03/04/18   0009  03/03/18   0304  03/03/18   0132   WBC  18.6*  17.6*  PLEASE DISREGARD RESULTS   HGB  9.1*  9.3*  PLEASE DISREGARD RESULTS   HCT  29.2*  29.7*  PLEASE DISREGARD RESULTS   PLT  492*  531*  PLEASE DISREGARD RESULTS     Recent Labs      03/04/18   0009  03/03/18   0304  03/02/18   0308   NA  137  136  136   K  4.8  4.8  4.6   CL  106  106  104   CO2  25  25  24   GLU  92  104*  120*   BUN  14  12  11   CREA  0.46*  0.45*  0.43*   CA  8.2*  8.3*  8.1*   MG  1.7  1.7  1.8   PHOS  4.1  3.9  3.5   ALB   --    --   1.4*   TBILI   --    --   0.7   SGOT   --    --   20   ALT   --    --   19     No results for input(s): PH, PCO2, PO2, HCO3, FIO2 in the last 72 hours.   24 Hour Results:  Recent Results (from the past 24 hour(s))   GLUCOSE, POC    Collection Time: 03/03/18 11:47 AM   Result Value Ref Range    Glucose (POC) 104 (H) 65 - 100 mg/dL    Performed by Elisabeth Santiago (PCT)    GLUCOSE, POC    Collection Time: 03/03/18  7:23 PM   Result Value Ref Range    Glucose (POC) 90 65 - 100 mg/dL    Performed by Elisabeth Santiago (PCT)    GLUCOSE, POC    Collection Time: 03/04/18 12:06 AM   Result Value Ref Range    Glucose (POC) 97 65 - 100 mg/dL    Performed by [email or web address]    Collection Time: 03/04/18 12:09 AM   Result Value Ref Range    Magnesium 1.7 1.6 - 2.4 mg/dL   METABOLIC PANEL, BASIC    Collection Time: 03/04/18 12:09 AM   Result Value Ref Range    Sodium 137 136 - 145 mmol/L    Potassium 4.8 3.5 - 5.1 mmol/L    Chloride 106 97 - 108 mmol/L    CO2 25 21 - 32 mmol/L    Anion gap 6 5 - 15 mmol/L    Glucose 92 65 - 100 mg/dL    BUN 14 6 - 20 MG/DL    Creatinine 0.46 (L) 0.55 - 1.02 MG/DL    BUN/Creatinine ratio 30 (H) 12 - 20      GFR est AA >60 >60 ml/min/1.73m2    GFR est non-AA >60 >60 ml/min/1.73m2    Calcium 8.2 (L) 8.5 - 10.1 MG/DL   CBC WITH AUTOMATED DIFF    Collection Time: 03/04/18 12:09 AM   Result Value Ref Range    WBC 18.6 (H) 3.6 - 11.0 K/uL    RBC 2.78 (L) 3.80 - 5.20 M/uL    HGB 9.1 (L) 11.5 - 16.0 g/dL    HCT 29.2 (L) 35.0 - 47.0 %    .0 (H) 80.0 - 99.0 FL    MCH 32.7 26.0 - 34.0 PG    MCHC 31.2 30.0 - 36.5 g/dL    RDW 15.0 (H) 11.5 - 14.5 %    PLATELET 620 (H) 694 - 400 K/uL    MPV 12.2 8.9 - 12.9 FL    NRBC 0.0 0  WBC    ABSOLUTE NRBC 0.00 0.00 - 0.01 K/uL    NEUTROPHILS 78 (H) 32 - 75 %    LYMPHOCYTES 10 (L) 12 - 49 %    MONOCYTES 8 5 - 13 %    EOSINOPHILS 2 0 - 7 %    BASOPHILS 1 0 - 1 %    IMMATURE GRANULOCYTES 2 (H) 0.0 - 0.5 %    ABS. NEUTROPHILS 14.6 (H) 1.8 - 8.0 K/UL    ABS. LYMPHOCYTES 1.8 0.8 - 3.5 K/UL    ABS. MONOCYTES 1.5 (H) 0.0 - 1.0 K/UL    ABS. EOSINOPHILS 0.3 0.0 - 0.4 K/UL    ABS. BASOPHILS 0.1 0.0 - 0.1 K/UL    ABS. IMM.  GRANS. 0.3 (H) 0.00 - 0.04 K/UL    DF AUTOMATED     PHOSPHORUS    Collection Time: 03/04/18 12:09 AM   Result Value Ref Range    Phosphorus 4.1 2.6 - 4.7 MG/DL   GLUCOSE, POC    Collection Time: 03/04/18  5:43 AM   Result Value Ref Range    Glucose (POC) 120 (H) 65 - 100 mg/dL    Performed by Franchesca Signs (PCT)      Medications reviewed  Current Facility-Administered Medications   Medication Dose Route Frequency    melatonin tablet 1.5 mg  1.5 mg Oral QHS PRN    HYDROmorphone (PF) (DILAUDID) injection 1 mg  1 mg IntraVENous Q3H PRN    spironolactone (ALDACTONE) tablet 25 mg  25 mg Oral BID    fluconazole (DIFLUCAN) 200mg/100 mL IVPB (premix)  200 mg IntraVENous Q24H    albuterol-ipratropium (DUO-NEB) 2.5 MG-0.5 MG/3 ML  3 mL Nebulization Q6HWA RT    fentaNYL (DURAGESIC) 25 mcg/hr patch 1 Patch  1 Patch TransDERmal Q72H    haloperidol lactate (HALDOL) injection 1 mg  1 mg IntraVENous Q4H PRN    guaiFENesin ER (MUCINEX) tablet 1,200 mg  1,200 mg Oral Q12H    acetaminophen (TYLENOL) tablet 650 mg  650 mg Oral Q4H PRN    insulin regular (NOVOLIN R, HUMULIN R) injection   SubCUTAneous Q6H    fat emulsion 20% (LIPOSYN, INTRAlipid) infusion 500 mL  500 mL IntraVENous Q MON, WED & SAT    glucose chewable tablet 16 g  4 Tab Oral PRN    glucagon (GLUCAGEN) injection 1 mg  1 mg IntraMUSCular PRN    dextrose (D50W) injection syrg 12.5-25 g  12.5-25 g IntraVENous PRN    alteplase (CATHFLO) 1 mg in sterile water (preservative free) 1 mL injection  1 mg InterCATHeter PRN    sodium chloride (NS) flush 10-30 mL  10-30 mL InterCATHeter PRN    sodium chloride (NS) flush 10-40 mL  10-40 mL InterCATHeter Q8H    naloxone (NARCAN) injection 0.4 mg  0.4 mg IntraVENous PRN    ondansetron (ZOFRAN) injection 4 mg  4 mg IntraVENous Q4H PRN    enoxaparin (LOVENOX) injection 40 mg  40 mg SubCUTAneous Q24H    pantoprazole (PROTONIX) injection 40 mg  40 mg IntraVENous Q12H    phenol throat spray (CHLORASEPTIC) 1 Spray  1 Spray Oral PRN    nystatin (MYCOSTATIN) 100,000 unit/gram powder   Topical BID    sodium chloride (NS) flush 5-10 mL  5-10 mL IntraVENous PRN     Care Plan discussed with: Patient and daughter and Nurse   Total time spent with patient: 30 minutes.   Sander Goodpasture, MD

## 2018-03-05 ENCOUNTER — APPOINTMENT (OUTPATIENT)
Dept: GENERAL RADIOLOGY | Age: 73
DRG: 853 | End: 2018-03-05
Attending: INTERNAL MEDICINE
Payer: MEDICARE

## 2018-03-05 LAB
ALBUMIN SERPL-MCNC: 1.5 G/DL (ref 3.5–5)
ALBUMIN/GLOB SERPL: 0.3 {RATIO} (ref 1.1–2.2)
ALP SERPL-CCNC: 178 U/L (ref 45–117)
ALT SERPL-CCNC: 30 U/L (ref 12–78)
ANION GAP SERPL CALC-SCNC: 7 MMOL/L (ref 5–15)
AST SERPL-CCNC: 39 U/L (ref 15–37)
BACTERIA SPEC CULT: NORMAL
BASOPHILS # BLD: 0.1 K/UL (ref 0–0.1)
BASOPHILS NFR BLD: 1 % (ref 0–1)
BILIRUB SERPL-MCNC: 0.6 MG/DL (ref 0.2–1)
BUN SERPL-MCNC: 15 MG/DL (ref 6–20)
BUN/CREAT SERPL: 29 (ref 12–20)
CALCIUM SERPL-MCNC: 8.4 MG/DL (ref 8.5–10.1)
CHLORIDE SERPL-SCNC: 106 MMOL/L (ref 97–108)
CO2 SERPL-SCNC: 24 MMOL/L (ref 21–32)
CREAT SERPL-MCNC: 0.51 MG/DL (ref 0.55–1.02)
DIFFERENTIAL METHOD BLD: ABNORMAL
EOSINOPHIL # BLD: 0.4 K/UL (ref 0–0.4)
EOSINOPHIL NFR BLD: 2 % (ref 0–7)
ERYTHROCYTE [DISTWIDTH] IN BLOOD BY AUTOMATED COUNT: 15.2 % (ref 11.5–14.5)
GLOBULIN SER CALC-MCNC: 4.7 G/DL (ref 2–4)
GLUCOSE BLD STRIP.AUTO-MCNC: 114 MG/DL (ref 65–100)
GLUCOSE BLD STRIP.AUTO-MCNC: 81 MG/DL (ref 65–100)
GLUCOSE BLD STRIP.AUTO-MCNC: 94 MG/DL (ref 65–100)
GLUCOSE SERPL-MCNC: 96 MG/DL (ref 65–100)
GRAM STN SPEC: NORMAL
GRAM STN SPEC: NORMAL
HCT VFR BLD AUTO: 27.8 % (ref 35–47)
HGB BLD-MCNC: 8.6 G/DL (ref 11.5–16)
IMM GRANULOCYTES # BLD: 0.3 K/UL (ref 0–0.04)
IMM GRANULOCYTES NFR BLD AUTO: 2 % (ref 0–0.5)
LYMPHOCYTES # BLD: 1.8 K/UL (ref 0.8–3.5)
LYMPHOCYTES NFR BLD: 11 % (ref 12–49)
MAGNESIUM SERPL-MCNC: 1.8 MG/DL (ref 1.6–2.4)
MCH RBC QN AUTO: 32.5 PG (ref 26–34)
MCHC RBC AUTO-ENTMCNC: 30.9 G/DL (ref 30–36.5)
MCV RBC AUTO: 104.9 FL (ref 80–99)
MONOCYTES # BLD: 1.3 K/UL (ref 0–1)
MONOCYTES NFR BLD: 8 % (ref 5–13)
NEUTS SEG # BLD: 12 K/UL (ref 1.8–8)
NEUTS SEG NFR BLD: 76 % (ref 32–75)
NRBC # BLD: 0 K/UL (ref 0–0.01)
NRBC BLD-RTO: 0 PER 100 WBC
PHOSPHATE SERPL-MCNC: 4.6 MG/DL (ref 2.6–4.7)
PLATELET # BLD AUTO: 461 K/UL (ref 150–400)
PMV BLD AUTO: 12.1 FL (ref 8.9–12.9)
POTASSIUM SERPL-SCNC: 5.3 MMOL/L (ref 3.5–5.1)
PROT SERPL-MCNC: 6.2 G/DL (ref 6.4–8.2)
RBC # BLD AUTO: 2.65 M/UL (ref 3.8–5.2)
SERVICE CMNT-IMP: ABNORMAL
SERVICE CMNT-IMP: NORMAL
SODIUM SERPL-SCNC: 137 MMOL/L (ref 136–145)
WBC # BLD AUTO: 15.8 K/UL (ref 3.6–11)

## 2018-03-05 PROCEDURE — 82962 GLUCOSE BLOOD TEST: CPT

## 2018-03-05 PROCEDURE — 97116 GAIT TRAINING THERAPY: CPT

## 2018-03-05 PROCEDURE — 74011000250 HC RX REV CODE- 250: Performed by: SURGERY

## 2018-03-05 PROCEDURE — 85025 COMPLETE CBC W/AUTO DIFF WBC: CPT | Performed by: SURGERY

## 2018-03-05 PROCEDURE — 80053 COMPREHEN METABOLIC PANEL: CPT | Performed by: SURGERY

## 2018-03-05 PROCEDURE — 97530 THERAPEUTIC ACTIVITIES: CPT

## 2018-03-05 PROCEDURE — 74011000250 HC RX REV CODE- 250: Performed by: NURSE PRACTITIONER

## 2018-03-05 PROCEDURE — 74011250637 HC RX REV CODE- 250/637: Performed by: SURGERY

## 2018-03-05 PROCEDURE — 77030038269 HC DRN EXT URIN PURWCK BARD -A

## 2018-03-05 PROCEDURE — 65660000000 HC RM CCU STEPDOWN

## 2018-03-05 PROCEDURE — 36415 COLL VENOUS BLD VENIPUNCTURE: CPT | Performed by: SURGERY

## 2018-03-05 PROCEDURE — 84100 ASSAY OF PHOSPHORUS: CPT | Performed by: SURGERY

## 2018-03-05 PROCEDURE — 77010033678 HC OXYGEN DAILY

## 2018-03-05 PROCEDURE — 74011250636 HC RX REV CODE- 250/636: Performed by: SURGERY

## 2018-03-05 PROCEDURE — C9113 INJ PANTOPRAZOLE SODIUM, VIA: HCPCS | Performed by: SURGERY

## 2018-03-05 PROCEDURE — 74011000258 HC RX REV CODE- 258: Performed by: SURGERY

## 2018-03-05 PROCEDURE — 94640 AIRWAY INHALATION TREATMENT: CPT

## 2018-03-05 PROCEDURE — 83735 ASSAY OF MAGNESIUM: CPT | Performed by: SURGERY

## 2018-03-05 PROCEDURE — 71045 X-RAY EXAM CHEST 1 VIEW: CPT

## 2018-03-05 PROCEDURE — 65270000029 HC RM PRIVATE

## 2018-03-05 PROCEDURE — 74011250637 HC RX REV CODE- 250/637: Performed by: NURSE PRACTITIONER

## 2018-03-05 RX ORDER — IPRATROPIUM BROMIDE AND ALBUTEROL SULFATE 2.5; .5 MG/3ML; MG/3ML
3 SOLUTION RESPIRATORY (INHALATION)
Status: DISCONTINUED | OUTPATIENT
Start: 2018-03-05 | End: 2018-03-16 | Stop reason: HOSPADM

## 2018-03-05 RX ADMIN — MELATONIN TAB 3 MG 1.5 MG: 3 TAB at 21:20

## 2018-03-05 RX ADMIN — GUAIFENESIN 1200 MG: 600 TABLET, EXTENDED RELEASE ORAL at 09:08

## 2018-03-05 RX ADMIN — NYSTATIN: 100000 POWDER TOPICAL at 18:47

## 2018-03-05 RX ADMIN — PANTOPRAZOLE SODIUM 40 MG: 40 INJECTION, POWDER, FOR SOLUTION INTRAVENOUS at 09:08

## 2018-03-05 RX ADMIN — HYDROMORPHONE HYDROCHLORIDE 0.5 MG: 2 INJECTION, SOLUTION INTRAMUSCULAR; INTRAVENOUS; SUBCUTANEOUS at 09:24

## 2018-03-05 RX ADMIN — Medication 10 ML: at 21:14

## 2018-03-05 RX ADMIN — IPRATROPIUM BROMIDE AND ALBUTEROL SULFATE 3 ML: .5; 3 SOLUTION RESPIRATORY (INHALATION) at 13:26

## 2018-03-05 RX ADMIN — CALCIUM GLUCONATE: 94 INJECTION, SOLUTION INTRAVENOUS at 18:41

## 2018-03-05 RX ADMIN — FLUCONAZOLE 200 MG: 2 INJECTION INTRAVENOUS at 15:35

## 2018-03-05 RX ADMIN — IPRATROPIUM BROMIDE AND ALBUTEROL SULFATE 3 ML: .5; 3 SOLUTION RESPIRATORY (INHALATION) at 07:13

## 2018-03-05 RX ADMIN — Medication 10 ML: at 07:49

## 2018-03-05 RX ADMIN — NYSTATIN: 100000 POWDER TOPICAL at 09:08

## 2018-03-05 RX ADMIN — PANTOPRAZOLE SODIUM 40 MG: 40 INJECTION, POWDER, FOR SOLUTION INTRAVENOUS at 21:14

## 2018-03-05 RX ADMIN — Medication 10 ML: at 15:38

## 2018-03-05 RX ADMIN — ONDANSETRON 4 MG: 2 INJECTION INTRAMUSCULAR; INTRAVENOUS at 09:24

## 2018-03-05 RX ADMIN — GUAIFENESIN 1200 MG: 600 TABLET, EXTENDED RELEASE ORAL at 21:14

## 2018-03-05 RX ADMIN — HYDROMORPHONE HYDROCHLORIDE 1 MG: 2 INJECTION, SOLUTION INTRAMUSCULAR; INTRAVENOUS; SUBCUTANEOUS at 21:15

## 2018-03-05 RX ADMIN — ENOXAPARIN SODIUM 40 MG: 40 INJECTION SUBCUTANEOUS at 15:37

## 2018-03-05 RX ADMIN — I.V. FAT EMULSION 500 ML: 20 EMULSION INTRAVENOUS at 18:31

## 2018-03-05 NOTE — PROGRESS NOTES
03/05/18     Follow up on SNF's availability for rehab. Met with the patient and daughter, Bindu Fish. Patient much more alert and oriented. Patient taking liquid diet and hope is to be off TPN before discharge. TC to [facility]. They are not able to take a 2 person assist patient at this time but can re-refer if the patient improves with one person. TC to [facility] to see about bed availability but had to leave a message. Also left a message with [facility]. Informed patient and dtr. That [facility] [employer] not taking the patient's ins. Talked with the patient who now reported that MD came in and due to food going into drain, MD informed them that the patient would have to be on TPN now for approx 8 weeks. Dtrs reported that the patient was upset and disappointed. Dtr Devendra Caceres thought patient could stay in hospital for 8 weeks but MSW explained this is not possible if patient is medically stable. Informed them of 3 SNF's that take TPN. [facility]. They feel [place] [address] is [place] far.       Jose Rafael Chávez MSW

## 2018-03-05 NOTE — PROGRESS NOTES
Progress Note    Patient: Thor Bernheim MRN: [id number]  SSN: [id number]    YOB: 1945  Age: 67 y.o. Sex: female      Admit Date: 2018    21 Days Post-Op    Procedure:  Procedure(s):   EX LAP    Subjective:     Mrs. Rosie Abraham wants to eat. She denies any pain. She is passing flatus, but no BM. Objective:     Visit Vitals    /63 (BP 1 Location: Left arm, BP Patient Position: At rest)    Pulse 91    Temp 98.6 °F (37 °C)    Resp 14    Ht 5' 3.5\" (1.613 m)    Wt 178 lb 9.6 oz (81 kg)    SpO2 97%    Breastfeeding No    BMI 31.14 kg/m2       Temp (24hrs), Av.5 °F (36.9 °C), Min:98.3 °F (36.8 °C), Max:98.7 °F (37.1 °C)      Physical Exam:    GENERAL: alert, cooperative, no distress, ABDOMEN: Soft, +BS, less distended, NT.   The ALYSSA fluid is bilious, but minimal.    Lab Review:   BMP:   Lab Results   Component Value Date/Time     2018 02:56 AM    K 5.3 (H) 2018 02:56 AM     2018 02:56 AM    CO2 24 2018 02:56 AM    AGAP 7 2018 02:56 AM    GLU 96 2018 02:56 AM    BUN 15 2018 02:56 AM    CREA 0.51 (L) 2018 02:56 AM    GFRAA >60 2018 02:56 AM    GFRNA >60 2018 02:56 AM     CMP:   Lab Results   Component Value Date/Time     2018 02:56 AM    K 5.3 (H) 2018 02:56 AM     2018 02:56 AM    CO2 24 2018 02:56 AM    AGAP 7 2018 02:56 AM    GLU 96 2018 02:56 AM    BUN 15 2018 02:56 AM    CREA 0.51 (L) 2018 02:56 AM    GFRAA >60 2018 02:56 AM    GFRNA >60 2018 02:56 AM    CA 8.4 (L) 2018 02:56 AM    MG 1.8 2018 02:56 AM    PHOS 4.6 2018 02:56 AM    ALB 1.5 (L) 2018 02:56 AM    TP 6.2 (L) 2018 02:56 AM    GLOB 4.7 (H) 2018 02:56 AM    AGRAT 0.3 (L) 2018 02:56 AM    SGOT 39 (H) 2018 02:56 AM    ALT 30 2018 02:56 AM     CBC:   Lab Results   Component Value Date/Time    WBC 15.8 (H) 2018 02:56 AM HGB 8.6 (L) 03/05/2018 02:56 AM    HCT 27.8 (L) 03/05/2018 02:56 AM     (H) 03/05/2018 02:56 AM       Assessment:     Hospital Problems  Date Reviewed: 4/26/2016          Codes Class Noted POA    Hypokalemia ICD-10-CM: E87.6  ICD-9-CM: 276.8  2/18/2018 No        Leukocytosis ICD-10-CM: D72.829  ICD-9-CM: 288.60  2/18/2018 Yes        Severe protein-calorie malnutrition ([facility] ICD-10-CM: E43  ICD-9-CM: 220  2/18/2018 Yes        Acute metabolic encephalopathy ORJ-69-TY: G93.41  ICD-9-CM: 348.31  2/18/2018 Yes        Hyperammonemia ([facility] ICD-10-CM: E72.20  ICD-9-CM: 270.6  2/16/2018 No    Overview Addendum 2/17/2018  9:08 AM by Jacque Campbell MD        Ref. Range 2/16/2018 17:20   Ammonia Latest Ref Range: <32 UMOL/L 69 (H)                Free intraperitoneal air ICD-10-CM: K66.8  ICD-9-CM: 568.89  2/12/2018 Yes        S/P exploratory laparotomy ICD-10-CM: Z98.890  ICD-9-CM: V45.89  2/12/2018 No    Overview Signed 2/12/2018 11:57 AM by Elpidio Silvestre MD     Suture repair of perforated posterior gastric ulcer with Harle Damian patch, core needle biopsies of left lobe of the liver. Alcoholic cirrhosis of liver without ascites (HCC) (Chronic) ICD-10-CM: K70.30  ICD-9-CM: 571.2  2/12/2018 Yes    Overview Signed 2/17/2018  8:45 AM by Jacque Campbell MD     Liver bx 2/12/18:   Cirrhosis with mild chronic portal inflammation and macrovesicular steatosis. Fatty liver determined by biopsy ICD-10-CM: K76.0  ICD-9-CM: 571.8  2/12/2018 Yes    Overview Signed 2/17/2018  8:53 AM by Jacque Campbell MD        Cirrhosis with mild chronic portal inflammation and macrovesicular steatosis. * (Principal)Perforated chronic gastric ulcer ([facility] (Chronic) ICD-10-CM: K25.5  ICD-9-CM: 531.50  2/11/2018 Yes        ETOH abuse (Chronic) ICD-10-CM: F10.10  ICD-9-CM: 305.00  2/11/2018 Yes              Plan/Recommendations/Medical Decision Making:     Afebrile and WBC 15. Continue Diflucan. Minimal output from subphrenic drain. Will D/C this week. Drain output no more than 5 ml each day for the past 48 hours. Trial of clears. Continue cycled TPN. OOB with PT. Tachycardia resolving. Transfer to floor.     Signed By: Meet Prakash MD     March 5, 2018

## 2018-03-05 NOTE — PROGRESS NOTES
Family Practice Daily Progress Note: 3/5/2018  Cinthya Love DO    Assessment/Plan:   Perforated chronic gastric ulcer /  Free intraperitoneal air / S/P exploratory laparotomy 2/11. S/p repair in OR with Dr. Rian Concepcion on 2/12/18 - per surg    Severe protein calorie malnutrition-POA  --TPN per GI - advance diet as per surg     Acute metabolic encephalopathy. Improved. Multifactorial-- medications, alcohol use, sundowning  ---avoid triggers  ---CIWA  ---maintenance of sleep/wake cycle, and re-orientation   ---Added Seroquel at bedtime to help with sleep wake cycle - now off due to somnolence     Hypoxia / Respiratory distress. Improved. Pleural effusions   ---Nebs as needed  ---Pulmonary consulted     ---Bumex as needed. Added Aldactone. ---Drain placed by IR     Alcoholic cirrhosis of liver without ascites /  Fatty liver determined by biopsy /  Hyperammonemia. --GI has seen- rec alcohol cessation and outpt follow up  --hold aldactone with elevated K+     ETOH abuse. Needs to stop ETOH entirely.     Hypokalemia /  Hypoalbuminemia. Being addressed with TPN     Leukocytosis.    -- repeat blood cultures, and urine 2/28 neg  --was on Zosyn, and levaquin - cultures drawn off antibiotics neg except yeast - Diflucan added  --consulted ID 3/1    Anemia: watch Hgb   --follow, transfuse <7    Back Pain due to compression fracture of T12  ---Increased Duragesic patch to 25mcg and DIiaudid to 1mg as needed    Debility: due to prolonged hospital stay  --continue PT/OT- needs to get OOB  --will likely need rehab        Problem List:  Problem List as of 3/5/2018  Date Reviewed: 4/26/2016          Codes Class Noted - Resolved    Hypokalemia ICD-10-CM: E87.6  ICD-9-CM: 276.8  2/18/2018 - Present        Leukocytosis ICD-10-CM: D72.829  ICD-9-CM: 288.60  2/18/2018 - Present        Severe protein-calorie malnutrition ([facility] [address] ICD-10-CM: E43  ICD-9-CM: 262  2/18/2018 - Present        Acute metabolic encephalopathy ICD-10-CM: G93.41  ICD-9-CM: 348.31  2/18/2018 - Present        Hyperammonemia (HCC) ICD-10-CM: E72.20  ICD-9-CM: 270.6  2/16/2018 - Present    Overview Addendum 2/17/2018  9:08 AM by Mack Power MD        Ref. Range 2/16/2018 17:20   Ammonia Latest Ref Range: <32 UMOL/L 69 (H)                Free intraperitoneal air ICD-10-CM: K66.8  ICD-9-CM: 568.89  2/12/2018 - Present        S/P exploratory laparotomy ICD-10-CM: Z98.890  ICD-9-CM: V45.89  2/12/2018 - Present    Overview Signed 2/12/2018 11:57 AM by Eddie Palma MD     Suture repair of perforated posterior gastric ulcer with Aron Aitkin patch, core needle biopsies of left lobe of the liver. Alcoholic cirrhosis of liver without ascites (HCC) (Chronic) ICD-10-CM: K70.30  ICD-9-CM: 571.2  2/12/2018 - Present    Overview Signed 2/17/2018  8:45 AM by Mack Power MD     Liver bx 2/12/18:   Cirrhosis with mild chronic portal inflammation and macrovesicular steatosis. Fatty liver determined by biopsy ICD-10-CM: K76.0  ICD-9-CM: 571.8  2/12/2018 - Present    Overview Signed 2/17/2018  8:53 AM by Mack Power MD        Cirrhosis with mild chronic portal inflammation and macrovesicular steatosis. * (Principal)Perforated chronic gastric ulcer ([address] (Chronic) ICD-10-CM: K25.5  ICD-9-CM: 531.50  2/11/2018 - Present        ETOH abuse (Chronic) ICD-10-CM: F10.10  ICD-9-CM: 305.00  2/11/2018 - Present        GI bleed ICD-10-CM: K92.2  ICD-9-CM: 578.9  4/26/2016 - Present        Hypotension ICD-10-CM: I95.9  ICD-9-CM: 458.9  4/26/2016 - Present        Tachycardia ICD-10-CM: R00.0  ICD-9-CM: 785.0  4/26/2016 - Present        Acute blood loss anemia ICD-10-CM: D62  ICD-9-CM: 285.1  4/26/2016 - Present              Subjective:    67 y.o.  female with EtOH abuse who is admitted to the General Surgery Service with perforated gastric ulcer. We are asked to evaluate for altered mental status.  The patient is poorly interactive at this time and this limits primary hx. Discussed case with family available at bedside providing secondary hx and chart review. Per family, patient has had declining neurological condition over past 48 hours. Notably, an RRT was called for respiratory distress. 2/19: This morning she is a bit more alert at this moment and taking in more complete sentences. She says she does not feel well. She has no specific site of pain other than the NG tube which is bothering her a great deal.  She should improve as time from surg increases. K+ a little low - replacing.     2/20:  Still confused and somnolent at times. Now able to localize pain to ab and converse a bit more. Tmax 100.3  WBC is up again but hopefully reactive - recheck in AM - more incentive spirom and check CXR.     2/21: A little more alert. Knows she is in the hospital. Not able to participate with PT yesterday as she was in too much pain. WBC still at 19.     2/22:  WBC 19K. She is more alert. Daughter at bedside. Both of her daughters live out of town. Looking at SNF options for rehab. Coughing more. Starting to use IS.     2/23: WBC 17. Repeat CT chest with moderate to large persistent left subphrenic collection. IR has been consulted. Vanc and Levaquin added by pulm. 2/24: Pt had a better night last night. Only brief episode of confusion. She is much clearer this AM.  Now in quite a bit of pain from gas and stomach cramping. Thinks she will feel better if she can have a BM. Has been on bedpan for a while without success. On TPN. Daughters very concerned that she won't be successful while lying down. Wanting to get up to bedside commode. Will need PT's help. 2/25: febrile overnight and WBC up after vanc was stopped. Pt c/o more dyspnea today. Known fluid collection that will be drained in IR this week. Up to chair with PT yesterday briefly. +BM yesterday.   Da notes pt has been c/o R wrist pain off and on for 2 wks since she fell. No swelling.    2/26:  No new complaints. Still c/o back and ab pain. Still passing gas and had BM. Diuresed with 1 mg Bumex yesterday by Pulmonary. Remains on Zosyn and Levaquin. Blood culture remains neg. Tmax 99.2. X-ray of wrists ok. CXR ok with tube inplace. WBC 15.7K.      2/27:  Looks much better today. Much less work of breathing. Diuresed about 1 liter over last 24h. WBC 16K today. Blood cult remains neg. Off antibiotics. Still on TPN. She will need rehab for PT/OT. 2/28: Very restless during the night. Has not been OOB. Daughter stayed the night with her. WBC is still up. Antibiotics stopped yesterday. She is c/o increased pain in her back and can't get comfortable. More anxious and agitated. Tolerating sips. 3/1:  She reports she feels better this AM after she had some sleep. Afeb. WBC still up - a bit more today with increased neutrophils. Will also get ID to see also. CT AB and Pelvis as below. Cultures done yesterday off antibiotics - so far no growth. No output from drain - may need to be repositioned. More edema today but not as short of breath today while sitting up - add albumin to todays lab. Add Aldactone if ok with GI and Bumex as per Pul. Discussed cirrhosis/ascites/edema with pts daughter. 3/2:  She reports she feels better, and has slightly less edema, although daughters note they think pts abdomin more swollen. The daughters are more worried about pts LE edema than anything else - explained that with pts low albumin that edema may cont to be a problem. She is more alert, a bit more oriented today and says she has much less pain today. Tmax 101. Culture had yeast - diflucan started. Drain was repositioned, had thoracentesis and new PICC placed yesterday. May need to restart broad spectrum antibiotics - ID consulted. 3/3:  She reports she feels better this AM.  More alert and oriented this AM.  BM yesterday. Tmax 99. WBC still 17-18K. ID consulted and advises to watch off antibiotics for now. Less edema LE.      3/4:  Doing much better today and much more alert. Little pain. Off sleeping meds for now - not sleeping well but reports \"never was a good sleeper. \"  Anxious at night. WBC still up. Discussed rehab with daughters and pt and pt willing to go. 3/5:  Continues to improve. She wants to eat solid food - she now has an appetite. Passing gas. Has been up to chair. K+ 5.3 - will DC aldactone for now and restart if edema returns. CXR pending today. Past Medical History:   Diagnosis Date    Alcoholic cirrhosis of liver without ascites ([facility] [address] 2/12/2018    ETOH abuse 2/11/2018    History of vascular access device 02/16/2018    [facility] VAT - 5 FR triple, R basilic, TPN    Hyperammonemia ([facility] [address] 2/16/2018     Results for Maddi Seen (MRN [id number]) as of 2/17/2018 08:43  Ref. Range 2/16/2018 17:20 Ammonia Latest Ref Range: <32 UMOL/L 69 (H)     Hyperlipidemia     Perforated chronic gastric ulcer ([facility] [address] 2/11/2018     Past Surgical History:   Procedure Laterality Date    HX CATARACT REMOVAL       Social History     Social History    Marital status: SINGLE     Spouse name: N/A    Number of children: N/A    Years of education: N/A     Occupational History    Not on file. Social History Main Topics    Smoking status: Former Smoker    Smokeless tobacco: Never Used    Alcohol use 11.4 oz/week     0 Standard drinks or equivalent, 19 Glasses of wine per week      Comment: Hx of heavy etoh use, but quit several months ago    Drug use: No    Sexual activity: Not on file     Other Topics Concern    Not on file     Social History Narrative     Family History   Problem Relation Age of Onset    Other Other      patient did not know     Review of Systems:   Review of systems not obtained due to patient factors.     Objective:   Physical Exam:     Visit Vitals    /63 (BP 1 Location: Left arm, BP Patient Position: At rest)    Pulse 91    Temp 98.6 °F (37 °C)    Resp 14    Ht 5' 3.5\" (1.613 m)    Wt 81 kg (178 lb 9.6 oz)    SpO2 97%    Breastfeeding No    BMI 31.14 kg/m2    O2 Flow Rate (L/min): 4 l/min O2 Device: Nasal cannula    Temp (24hrs), Av.5 °F (36.9 °C), Min:98.3 °F (36.8 °C), Max:98.7 °F (37.1 °C)    1901 - 700  In: -   Out: 1000 [Urine:1000]   701 - 1900  In: 0   Out: 1278 [Urine:1250; Drains:28]    General:  Awake, uncomfortable, appears stated age. Head:  Normocephalic, without obvious abnormality, atraumatic. Eyes:  Conjunctivae/corneas clear. EOMs intact. Throat: Lips, mucosa, and tongue dry. Neck: Supple, symmetrical, trachea midline, no adenopathy, thyroid: no enlargement/tenderness/nodules, no carotid bruit and no JVD. Lungs:    no intercostal use, breath sounds diminished. She has a few basilar crackles. Chest wall:  No tenderness or deformity. Left drain site looks ok and little drainage. Heart:  Tachycardic rate with regular rhythm,  no murmur, click, rub or gallop. Abdomen:   Soft, less distended today, with no tenderness. Bowel sounds present. Dressings dry. Incision dry without redness, staple line intact. Drain site looks ok and drainage clearing. Extremities: no cyanosis. Minimal to Trace LE edema. No calf tenderness or cords, no erythema   Skin: turgor normal. No rashes or lesions   Neurologic:   AOx2-3,  Gait not tested at this time. Sensation grossly normal to touch. Gross motor of extremities normal.       Data Review:    CT AB and Pelvis 18: IMPRESSION:  1. There is residual fluid in the posterior portion of the left subphrenic  collection which does not appear well drained by the pigtail catheter. This  could be repositioned versus new drainage catheter placement if clinically  indicated. 2. Persistent ascites. 3. Increasing right pleural effusion and right lower lobe atelectasis   4.  Distended duodenum and proximal small bowel of doubtful clinical  Significance. CXR 3/1/18:  INDICATION:  chest pain and shortness of breath following thoracentesis   EXAM: Portable chest 1716 . Comparison March 1, 2018  FINDINGS: There is no significant change in appearance the lungs. Small left  pleural effusion with left basilar atelectasis unchanged. Cardiomediastinal  silhouette is unchanged. No pneumothorax. Lines and tubes are unchanged in  position. IMPRESSION:  1. No interval change    Recent Days:  Recent Labs      03/05/18 0256  03/04/18   0009  03/03/18   0304   WBC  15.8*  18.6*  17.6*   HGB  8.6*  9.1*  9.3*   HCT  27.8*  29.2*  29.7*   PLT  461*  492*  531*     Recent Labs      03/05/18 0256 03/04/18   0009  03/03/18   0304   NA  137  137  136   K  5.3*  4.8  4.8   CL  106  106  106   CO2  24  25  25   GLU  96  92  104*   BUN  15  14  12   CREA  0.51*  0.46*  0.45*   CA  8.4*  8.2*  8.3*   MG  1.8  1.7  1.7   PHOS  4.6  4.1  3.9   ALB  1.5*   --    --    TBILI  0.6   --    --    SGOT  39*   --    --    ALT  30   --    --      No results for input(s): PH, PCO2, PO2, HCO3, FIO2 in the last 72 hours.   24 Hour Results:  Recent Results (from the past 24 hour(s))   GLUCOSE, POC    Collection Time: 03/04/18 11:32 AM   Result Value Ref Range    Glucose (POC) 99 65 - 100 mg/dL    Performed by [employer] (PCT)    GLUCOSE, POC    Collection Time: 03/04/18  8:18 PM   Result Value Ref Range    Glucose (POC) 85 65 - 100 mg/dL    Performed by [employer] (PCT)    GLUCOSE, POC    Collection Time: 03/04/18 11:11 PM   Result Value Ref Range    Glucose (POC) 127 (H) 65 - 100 mg/dL    Performed by [address] [employer]    Collection Time: 03/05/18  2:56 AM   Result Value Ref Range    Magnesium 1.8 1.6 - 2.4 mg/dL   CBC WITH AUTOMATED DIFF    Collection Time: 03/05/18  2:56 AM   Result Value Ref Range    WBC 15.8 (H) 3.6 - 11.0 K/uL    RBC 2.65 (L) 3.80 - 5.20 M/uL    HGB 8.6 (L) 11.5 - 16.0 g/dL    HCT 27.8 (L) 35.0 - 47.0 %    .9 (H) 80.0 - 99.0 FL    MCH 32.5 26.0 - 34.0 PG    MCHC 30.9 30.0 - 36.5 g/dL    RDW 15.2 (H) 11.5 - 14.5 %    PLATELET 367 (H) 851 - 400 K/uL    MPV 12.1 8.9 - 12.9 FL    NRBC 0.0 0  WBC    ABSOLUTE NRBC 0.00 0.00 - 0.01 K/uL    NEUTROPHILS 76 (H) 32 - 75 %    LYMPHOCYTES 11 (L) 12 - 49 %    MONOCYTES 8 5 - 13 %    EOSINOPHILS 2 0 - 7 %    BASOPHILS 1 0 - 1 %    IMMATURE GRANULOCYTES 2 (H) 0.0 - 0.5 %    ABS. NEUTROPHILS 12.0 (H) 1.8 - 8.0 K/UL    ABS. LYMPHOCYTES 1.8 0.8 - 3.5 K/UL    ABS. MONOCYTES 1.3 (H) 0.0 - 1.0 K/UL    ABS. EOSINOPHILS 0.4 0.0 - 0.4 K/UL    ABS. BASOPHILS 0.1 0.0 - 0.1 K/UL    ABS. IMM. GRANS. 0.3 (H) 0.00 - 0.04 K/UL    DF AUTOMATED     METABOLIC PANEL, COMPREHENSIVE    Collection Time: 03/05/18  2:56 AM   Result Value Ref Range    Sodium 137 136 - 145 mmol/L    Potassium 5.3 (H) 3.5 - 5.1 mmol/L    Chloride 106 97 - 108 mmol/L    CO2 24 21 - 32 mmol/L    Anion gap 7 5 - 15 mmol/L    Glucose 96 65 - 100 mg/dL    BUN 15 6 - 20 MG/DL    Creatinine 0.51 (L) 0.55 - 1.02 MG/DL    BUN/Creatinine ratio 29 (H) 12 - 20      GFR est AA >60 >60 ml/min/1.73m2    GFR est non-AA >60 >60 ml/min/1.73m2    Calcium 8.4 (L) 8.5 - 10.1 MG/DL    Bilirubin, total 0.6 0.2 - 1.0 MG/DL    ALT (SGPT) 30 12 - 78 U/L    AST (SGOT) 39 (H) 15 - 37 U/L    Alk.  phosphatase 178 (H) 45 - 117 U/L    Protein, total 6.2 (L) 6.4 - 8.2 g/dL    Albumin 1.5 (L) 3.5 - 5.0 g/dL    Globulin 4.7 (H) 2.0 - 4.0 g/dL    A-G Ratio 0.3 (L) 1.1 - 2.2     PHOSPHORUS    Collection Time: 03/05/18  2:56 AM   Result Value Ref Range    Phosphorus 4.6 2.6 - 4.7 MG/DL   GLUCOSE, POC    Collection Time: 03/05/18  5:25 AM   Result Value Ref Range    Glucose (POC) 114 (H) 65 - 100 mg/dL    Performed by [employer] (PCT)      Medications reviewed  Current Facility-Administered Medications   Medication Dose Route Frequency    melatonin tablet 1.5 mg  1.5 mg Oral QHS PRN    HYDROmorphone (PF) (DILAUDID) injection 1 mg  1 mg IntraVENous Q3H PRN    spironolactone (ALDACTONE) tablet 25 mg  25 mg Oral BID    fluconazole (DIFLUCAN) 200mg/100 mL IVPB (premix)  200 mg IntraVENous Q24H    albuterol-ipratropium (DUO-NEB) 2.5 MG-0.5 MG/3 ML  3 mL Nebulization Q6HWA RT    fentaNYL (DURAGESIC) 25 mcg/hr patch 1 Patch  1 Patch TransDERmal Q72H    haloperidol lactate (HALDOL) injection 1 mg  1 mg IntraVENous Q4H PRN    guaiFENesin ER (MUCINEX) tablet 1,200 mg  1,200 mg Oral Q12H    acetaminophen (TYLENOL) tablet 650 mg  650 mg Oral Q4H PRN    insulin regular (NOVOLIN R, HUMULIN R) injection   SubCUTAneous Q6H    fat emulsion 20% (LIPOSYN, INTRAlipid) infusion 500 mL  500 mL IntraVENous Q MON, WED & SAT    glucose chewable tablet 16 g  4 Tab Oral PRN    glucagon (GLUCAGEN) injection 1 mg  1 mg IntraMUSCular PRN    dextrose (D50W) injection syrg 12.5-25 g  12.5-25 g IntraVENous PRN    alteplase (CATHFLO) 1 mg in sterile water (preservative free) 1 mL injection  1 mg InterCATHeter PRN    sodium chloride (NS) flush 10-30 mL  10-30 mL InterCATHeter PRN    sodium chloride (NS) flush 10-40 mL  10-40 mL InterCATHeter Q8H    naloxone (NARCAN) injection 0.4 mg  0.4 mg IntraVENous PRN    ondansetron (ZOFRAN) injection 4 mg  4 mg IntraVENous Q4H PRN    enoxaparin (LOVENOX) injection 40 mg  40 mg SubCUTAneous Q24H    pantoprazole (PROTONIX) injection 40 mg  40 mg IntraVENous Q12H    phenol throat spray (CHLORASEPTIC) 1 Spray  1 Spray Oral PRN    nystatin (MYCOSTATIN) 100,000 unit/gram powder   Topical BID    sodium chloride (NS) flush 5-10 mL  5-10 mL IntraVENous PRN     Care Plan discussed with: Patient and daughter and Nurse   Total time spent with patient: 30 minutes.   Lilly Jeffers MD

## 2018-03-05 NOTE — ROUTINE PROCESS
0700: Bedside and Verbal shift change report given to Federica RN (oncoming nurse) by Alex Diaz RN (offgoing nurse). Report included the following information SBAR, Kardex, Procedure Summary, Intake/Output, MAR, Accordion, Recent Results and Med Rec Status. 1015: Bedside and Verbal shift change report given to Charlie Fishman RN (oncoming nurse) by SNEHAL Stallworth (offgoing nurse). Report included the following information SBAR, Kardex, Procedure Summary, Intake/Output, MAR, Accordion, Recent Results and Med Rec Status.

## 2018-03-05 NOTE — PROGRESS NOTES
Occupational Therapy Note:  Chart reviewed and spoke with nursing. RN reports she has given report to 4th floor nurse and patient is preparing to transfer to the 4th floor. Patient requesting deferral until transfer to the floor. Will continue to follow.   Polly Adhikari OTR/L

## 2018-03-05 NOTE — PROGRESS NOTES
[facility]     Name: Chepe Burton MRN: [id number]   : 1945 Hospital: [address]   Date: 3/5/2018            Impression Plan   1. Acute Respiratory Failure with Hypoxia  2. Abnormal Chest CT: with bilateral pleural effusions, atelectasis,and patchy airspace disease in [place]IR[place]; s/p thoracentesis on 3/1  3. Leukocytosis, stable  4. ALYSSA drain culture: + yeast  5. Delirium; improved  6. Peforated gastric ulcer, s/p repair  7. Hypokalemia, hypomagnesemia; resolved  8. EtOH abuse     · Goal sats >90%, wean O2 as tolerated; RN will attempt to wean today  · Continue Diflucan. Continue to follow WBC and cultures, fever curve. · F/U blood cultures  · F/U cultures from abdominal drains  · Speech following and appreciate help: NPO currently  · Will continue follow CXR and repeat thoracentesis if necessary  · C/W IV Haldol  · NG tube unable to be inserted in IR  · Switch nebs to PRN  · Guafenesin   · Post-op management as per surgery  · Pain control; limit benzos as much as possible, now on Fentanyl patch and improved  · Seroquel at night for sleep  · Monitor lytes, replete as needed  · Encourage IS use  · PT/OT  · OOB and mobilize as much as possible- discussed with RN  · TPN  · SCDs  · Protonix                             Subjective     Overnight Events    Afebrile  Started on clear liquids-but started to have significant drainage from ALYSSA and now NPO  BP stable  O2 sats 93% on 3L NC  Fluid balance: -1900 but no documentation of intake  WBC 15.8  Hgb 8.6  Mag 1.8    ROS    Discouraged because she can't eat again. Daughter tearful and frustrated as well. Reports that she is going to be restarting TPN. Denies further complaints currently. Breathing feels okay. 12 point ROS reviewed and negative other than noted above.       Past Medical History:   Diagnosis Date    Alcoholic cirrhosis of liver without ascites ([facility] [address] 2018    ETOH abuse 2018    History of vascular access device 02/16/2018    [facility] VAT - 5 FR triple, R basilic, TPN    Hyperammonemia ([facility] [address] 2/16/2018     Results for Moncho Ravi (MRN [id number]) as of 2/17/2018 08:43  Ref. Range 2/16/2018 17:20 Ammonia Latest Ref Range: <32 UMOL/L 69 (H)     Hyperlipidemia     Perforated chronic gastric ulcer ([facility] [address] 2/11/2018      Past Surgical History:   Procedure Laterality Date    HX CATARACT REMOVAL        Prior to Admission medications    Medication Sig Start Date End Date Taking? Authorizing Provider   naproxen sodium (ALEVE) 220 mg tablet Take 220 mg by mouth daily as needed for Pain. Yes Historical Provider   cyanocobalamin (VITAMIN B12) 500 mcg tablet Take 500 mcg by mouth daily. Yes Historical Provider   multivitamin (ONE A DAY) tablet Take 1 Tab by mouth daily. Yes Historical Provider   omega-3 fatty acids-vitamin e 1,000 mg cap Take 2 Caps by mouth daily.    Yes Historical Provider     Current Facility-Administered Medications   Medication Dose Route Frequency    TPN ADULT - CENTRAL   IntraVENous CONTINUOUS    fluconazole (DIFLUCAN) 200mg/100 mL IVPB (premix)  200 mg IntraVENous Q24H    albuterol-ipratropium (DUO-NEB) 2.5 MG-0.5 MG/3 ML  3 mL Nebulization Q6HWA RT    fentaNYL (DURAGESIC) 25 mcg/hr patch 1 Patch  1 Patch TransDERmal Q72H    guaiFENesin ER (MUCINEX) tablet 1,200 mg  1,200 mg Oral Q12H    insulin regular (NOVOLIN R, HUMULIN R) injection   SubCUTAneous Q6H    fat emulsion 20% (LIPOSYN, INTRAlipid) infusion 500 mL  500 mL IntraVENous Q MON, WED & SAT    sodium chloride (NS) flush 10-40 mL  10-40 mL InterCATHeter Q8H    enoxaparin (LOVENOX) injection 40 mg  40 mg SubCUTAneous Q24H    pantoprazole (PROTONIX) injection 40 mg  40 mg IntraVENous Q12H    nystatin (MYCOSTATIN) 100,000 unit/gram powder   Topical BID     No Known Allergies   Social History   Substance Use Topics    Smoking status: Former Smoker    Smokeless tobacco: Never Used    Alcohol use 11.4 oz/week     0 Standard drinks or equivalent, 19 Glasses of wine per week      Comment: Hx of heavy etoh use, but quit several months ago      Family History   Problem Relation Age of Onset    Other Other      patient did not know          Laboratory: I have personally reviewed the critical care flowsheet and labs. Recent Labs      03/05/18 0256  03/04/18   0009  03/03/18   0304   WBC  15.8*  18.6*  17.6*   HGB  8.6*  9.1*  9.3*   HCT  27.8*  29.2*  29.7*   PLT  461*  492*  531*     Recent Labs      03/05/18   0256  03/04/18   0009  03/03/18   0304   NA  137  137  136   K  5.3*  4.8  4.8   CL  106  106  106   CO2  24  25  25   GLU  96  92  104*   BUN  15  14  12   CREA  0.51*  0.46*  0.45*   CA  8.4*  8.2*  8.3*   MG  1.8  1.7  1.7   PHOS  4.6  4.1  3.9   ALB  1.5*   --    --    SGOT  39*   --    --    ALT  30   --    --        Objective:          Intake/Output Summary (Last 24 hours) at 03/05/18 1510  Last data filed at 03/05/18 5215   Gross per 24 hour   Intake                0 ml   Output             1900 ml   Net            -1900 ml     GENERAL: alert, calm, no distress HEENT:  PERRL, EOMI, no alar flaring or epistaxis, oral mucosa moist without cyanosis, NECK:  no jugular vein distention, no retractions, no thyromegaly or masses, LUNGS: Decreased bs bilaterally, drain in place with very little output HEART:  Regular rate and rhythm with no MGR; no edema is present, ABDOMEN:  soft, stable CDI, drain dressing CDI EXTREMITIES:  warm with no cyanosis, dependent edema in LEs SKIN:  no jaundice or ecchymosis and NEUROLOGIC: alert, oriented, grossly non-focal    Lenora Justice NP  [facility] [place]

## 2018-03-05 NOTE — PROGRESS NOTES
Nutrition Assessment:    RECOMMENDATIONS/INTERVENTION(S):   1. Further diet advancement per Surgery MD    2. Continue Cyclic TPN until PO intakes consistently >27%    Cyclic TPN recommendations (12 hour cycle):  D20/6%AA + Lipids 20% (500 ml) @ 42 ml/hr x 12 hr 3x/wk  (TPN @ goal + Lipids provides 1823 kcals, 91 g protein, 1512 ml fluid)  1st hour: 63 ml/hr  2nd-11th hour: 138 ml/hr  12th hour: 63 ml/hr    ASSESSMENT:   3/5:  Continuous TPN transitioned to Cyclic TPN @ goal rate x 4 days. BG controlled. Alk phos elevated (trending up), no new TG.  Mg, Phos, Na WDL. K elevated - KCl d/c-ed. Last BM 3/3 +flatus. CLD started for breakfast today per Surgery. Visited pt this afternoon. Daughter at bedside. Tolerating diet, ~50% PO of breakfast, interested in diet advancement. # (@ 104% of UBW), eating 2-3 meals/d PTA. Assisted pt w/ lunch order. 3/1:  Consult received per Surgery MD regarding PAB trending down on TPN. Noted pt NPO yesterday, NGT placed for possible SBO. Labs/meds reviewed. BG controlled. Alk phos elevated, stable. PAB 3.8 (was 4.5 2/26). Pt w/ worsening edema. Likely a factor in PAB. TPN @ goal rate x 10 d to meet 100% of energy & protein needs. 10% wt increase x 2.5 wks since admission. Trial increase in protein (+19.7%) - TPN recs above. Cyclic TPN recs above if pt continues on TPN in the next 1-2 days to spare liver. 2/26:  TPN @ goal, CLD started yesterday. Labs/meds reviewed. BG controlled. Na low. PAB 4.5 (2/26), 4.4 (2/23). Hypoactive BS, +BM (soft). Spoke w/ RN, pt anxious, s/p ativan. Poor PO intakes, no interest in eating and/or drinking, not asking for anything. No family in room. SLP following - recs for sips of clears 2/2 decreased alertness. TPN re-ordered per Surgery. Day 11 of TPN. Alk phos, AST remain elevated - stable. Worsening edema. 2/22:  TPN + Lipids running @ goal rate. TG 76. Alk phos, AST elevated. BG controlled. Lytes WDL. No IVF. Pt reporting abd pain, for repeat CT abd today. Last BM 2/21, loose, +BS.         2/19:  Labs/meds reviewed. TPN running @ recommended goal rate. BG controlled, [phone]. On insulin. K continuing to require repletion. PAB 5.5. No TG, Alk phos WDL. NS IVF. Last BM 2/10, hypoactive BS. NGT placed today 2/2 abd pain. Continue TPN per Surgery MD.  WBC's elevated, for CT abd today. Possible kyphoplasty per Ortho. Noted new wt.      2/16:  Pt remains confused, agitated & sedated (CIWA protocol). NPO Day 6. Labs/meds reviewed. K repleted. No Phos. D5 LR IVF. TPN started per Surgery MD today, plans for UGI when able. Likely pt meets criteria for Severe Acute Malnutrition, no new wt, mild edema. TPN recs above. 2/14:  Extubated 2/12. Unable to start PO yesterday per Surgery - plans for UGI p/t PO once off pressors. Discussed case in ICU rounds. Pt agitated, confused overnight - pulled NGT. On sedation per CIWA protocol. NPO. No BM, flatus, absent BS. Labs/meds reviewed. K, Mg WDL. No Phos. 1/2 NS IVF. 2/12:  Chart reviewed 2/2 intubation. 67 yof admitted for intra-abd free air of unknown etiology. Pmhx includes gastric ulcer (EGD x 2 yrs ago), Etoh. Surgery following. POD #1 ex lap repair of perforated gastric ulcer, liver bx. Remains intubated 2/2 shock, respiratory failure. Labs/meds reviewed. K, Mg requiring repletion. No Phos. On Delbert. Propofol currently stopped. On PPI. Ortho following for T12 fx. Surgical incisions to abd, ALYSSA drain, no edema noted. Overwt per advanced age. No recent wt to compare per hx. Energy needs calculated using current wt, vent settings, tmax. Noted n/v after fall yesterday, no reports of inadequate PO prior to fall. Etoh hx. SUBJECTIVE/OBJECTIVE:     Diet Order: CLD;  Cyclic TPN x 12 hrs/d -> D20/6% 63 ml x 1, 138 ml x 10, 63 ml x 1 + Lipids 20% (500 ml) @ 42 ml/hr x 12 hr 3x/wk  % Eaten:  No data found. Pertinent Medications: [x] Reviewed     Past Medical History:   Diagnosis Date    Alcoholic cirrhosis of liver without ascites ([facility] 2/12/2018    ETOH abuse 2/11/2018    History of vascular access device 02/16/2018    [facility] VAT - 5 FR triple, R basilic, TPN    Hyperammonemia ([facility] 2/16/2018     Results for Merlin Manna (MRN [id number]) as of 2/17/2018 08:43  Ref. Range 2/16/2018 17:20 Ammonia Latest Ref Range: <32 UMOL/L 69 (H)     Hyperlipidemia     Perforated chronic gastric ulcer ([facility] 2/11/2018       Chemistries:  Lab Results   Component Value Date/Time    Sodium 137 03/05/2018 02:56 AM    Potassium 5.3 (H) 03/05/2018 02:56 AM    Chloride 106 03/05/2018 02:56 AM    CO2 24 03/05/2018 02:56 AM    Anion gap 7 03/05/2018 02:56 AM    Glucose 96 03/05/2018 02:56 AM    BUN 15 03/05/2018 02:56 AM    Creatinine 0.51 (L) 03/05/2018 02:56 AM    BUN/Creatinine ratio 29 (H) 03/05/2018 02:56 AM    GFR est AA >60 03/05/2018 02:56 AM    GFR est non-AA >60 03/05/2018 02:56 AM    Calcium 8.4 (L) 03/05/2018 02:56 AM    AST (SGOT) 39 (H) 03/05/2018 02:56 AM    Alk. phosphatase 178 (H) 03/05/2018 02:56 AM    Protein, total 6.2 (L) 03/05/2018 02:56 AM    Albumin 1.5 (L) 03/05/2018 02:56 AM    Globulin 4.7 (H) 03/05/2018 02:56 AM    A-G Ratio 0.3 (L) 03/05/2018 02:56 AM    ALT (SGPT) 30 03/05/2018 02:56 AM      Anthropometrics: Height: 5' 3.5\" (161.3 cm) Weight: 81 kg (178 lb 9.2 oz)    IBW (%IBW): 58.7 kg (129 lb 6.6 oz) (131.69 %) UBW (%UBW): 77.1 kg (170 lb) (105.04 %)    BMI: Body mass index is 31.14 kg/(m^2). This BMI is indicative of:   [] Underweight    [] Normal    [x] Overweight - per advanced age     []  Obesity    []  Extreme Obesity (BMI>40)  Estimated Nutrition Needs (Based on): 2622 (BMR (1332) x 1.3 AF , 70 g (-88 (1.2-1.5 g/kg x IBW)) Protein  Carbohydrate:  At Least 130 g/day  Fluids: 1700 mL/day    Last BM: 3/3, distended abd   [x]Active     []Hyperactive  []Hypoactive       [] Absent BS  Skin:    [] Intact   [x] Incision - abd lap sites  [] Breakdown   [] DTI   [] Tears/Excoriation/Abrasion  [x]Edema - 1+ generalized [x] Other:ALYSSA drain     Wt Readings from Last 30 Encounters:   03/05/18 81 kg (178 lb 9.2 oz)   04/29/16 76.3 kg (168 lb 3.2 oz)      NUTRITION DIAGNOSES:   Problem:  Inadequate oral intake     Etiology: related to altered GI function      Signs/Symptoms: as evidenced by s/p lap w/ perf viscus repair, intubation, NPO x 11, abd pain/repeat CT abd, need to continue TPN    2/27:  Above acute nutrition dx continues - CLD started 2/26, pt not alert & w/ poor PO intake, TPN renewed  3/1:  Above acute nutrition dx continues - NPO, NGT for possible SBO, alter TPN composition per MD     NUTRITION INTERVENTIONS:  Meals/Snacks: General/healthful diet Enteral/Parenteral Nutrition: Initiate parenteral nutrition - continue cyclic TPN                GOAL:   Continue cyclic TPN until PO intakes >/= 50% in the next 1-3 days    Cultural, Quaker, or Ethnic Dietary Needs: None    EDUCATION & DISCHARGE NEEDS:    [x] None Identified   [] Identified and Education Provided/Documented   [] Identified and Pt declined/was not appropriate      [x] Interdisciplinary Care Plan Reviewed/Documented    [x] Discharge Needs:    TBD   [] No Nutrition Related Discharge Needs    NUTRITION RISK:   Pt Is At Nutrition Risk  [x]     No Nutrition Risk Identified  []       PT SEEN FOR:    []  MD Consult: []Calorie Count      []Diabetic Diet Education        []Diet Education     []Electrolyte Management     []General Nutrition Management and Supplements     []Management of Tube Feeding     []TPN Recommendations   []  RN Referral:  []MST score >=2     []Enteral/Parenteral Nutrition PTA     []Pregnant: Gestational DM or Multigestation                 [] Pressure Ulcer    []  Low BMI      []  Length of Stay       [] Dysphagia Diet         [] Ventilator  [x]  Follow-up - TPN     Previous Recommendations:   [x] Implemented     [] Progressing Towards Goal          [] Not Implemented          [] Not Applicable    Previous Goal:   [x] Met              [] Progressing Towards Goal              [] Not Progressing Towards Goal   [] Not Applicable            Neha Treviño, [address]  Pager [phone]

## 2018-03-05 NOTE — PROGRESS NOTES
Bedside and Verbal shift change report given to Alex Diaz (oncoming nurse) by Tasia Mckeon (offgoing nurse). Report included the following information SBAR, Kardex, ED Summary, Procedure Summary, Intake/Output, MAR, Accordion, Recent Results, Med Rec Status, Cardiac Rhythm NSR w/ frequent PVCS and Alarm Parameters .

## 2018-03-05 NOTE — PROGRESS NOTES
Problem: Mobility Impaired (Adult and Pediatric)  Goal: *Acute Goals and Plan of Care (Insert Text)  Physical Therapy Goals  Initiated 2/20/2018    1. Patient will move from supine to sit and sit to supine , scoot up and down and roll side to side in bed with moderate assistance x 2  within 7 days. Partially Met - Continue  2. Patient will perform sit <> stand with minimal assist x 2  within 7 days. Met - Advance goal below  3. Patient will ambulate with minimal assistance x 2 for 10 feet with RW and assist of 3rd pushing chair from behind within 7 days. Not Met- Continue  4. Patient will verbalize and demonstrate understanding of spinal precautions (No bending, lifting greater than 5 lbs, or twisting; log-roll technique; frequent repositioning as instructed) within 7 days. Not Met- Continue    Physical Therapy Goals  Revised 2/28/2018  1. Patient will move from supine to sit and sit to supine  and roll side to side in bed with moderate assistance x 1  within 7 day(s). 2.  Patient will move from long sitting in bed to edge of bed with minimal assistance x 1 within 7 days. 3.  Patient will transfer from bed to chair and chair to bed with moderate assistance x 1 using the least restrictive device within 7 day(s). 4.  Patient will perform sit <> stand with minimal assistance x 1 within 7 day(s). 5.  Patient will ambulate with minimal assistance x 1  for 15 feet with the least restrictive device within 7 day(s). 6.  Patient will verbalize and demonstrate understanding of spinal precautions (No bending, lifting greater than 5 lbs, or twisting; log-roll technique; frequent repositioning as instructed) within 7 days.    physical Therapy TREATMENT  Patient: Cinthya Hitchcock (45 y.o. female)  Date: 3/5/2018  Diagnosis: Intra-abdominal free air of unknown etiology [K66.8] Perforated chronic gastric ulcer ([facility]  Procedure(s) (LRB):   NASOGASTRIC TUBE PLACEMENT (N/A) 15 Days Post-Op  Precautions: Aspiration, Back, Bed Alarm, Fall, Skin  Chart, physical therapy assessment, plan of care and goals were reviewed. ASSESSMENT:  Pt transferred from [facility] to 4th floor. Two family members at bedside. Pt reports fatigue but agreeable to participate with physical therapy. Min A with verbal cues for rolling bed mobility Mod A to come to sitting EOB. Denies dizziness upon sitting EOB. TLSO not donned for session secondary to drain placement and pt experiencing increased discomfort with brace in place. Sit<>stand with MIn A x2 using RW and verbal cues for  Proper hand placement. Gait training x 20' using RW with Min Ax1 and second person to assist with managing lines and leads. Pt requested to return to bed secondary to fatigue. CGA for stand>sit with verbal cues for safe technique. Mod A for sit>supine.  at highest with activity. O2 sat 93% using 4L with activity. Progression toward goals:  []    Improving appropriately and progressing toward goals  [x]    Improving slowly and progressing toward goals  []    Not making progress toward goals and plan of care will be adjusted     PLAN:  Patient continues to benefit from skilled intervention to address the above impairments. Continue treatment per established plan of care. Discharge Recommendations:  Rehab  Further Equipment Recommendations for Discharge:  TBD     SUBJECTIVE:   Patient stated I will try.     OBJECTIVE DATA SUMMARY:   Critical Behavior:  Neurologic State: Alert  Orientation Level: Oriented X4  Cognition: Follows commands  Safety/Judgement: Awareness of environment  Functional Mobility Training:  Bed Mobility:  Rolling: Minimum assistance  Supine to Sit: Moderate assistance  Sit to Supine: Moderate assistance  Scooting: Minimum assistance; Additional time        Transfers:  Sit to Stand: Minimum assistance;Assist x2  Stand to Sit: Minimum assistance;Assist x1                             Balance:  Sitting: Intact; High guard  Standing: Impaired; With support  Standing - Static: Fair  Standing - Dynamic : Fair  Ambulation/Gait Training:  Distance (ft): 20 Feet (ft)  Assistive Device: Walker, rolling;Gait belt  Ambulation - Level of Assistance: Minimal assistance        Gait Abnormalities: Antalgic;Decreased step clearance        Base of Support: Widened                             Stairs:              Neuro Re-Education:    Therapeutic Exercises:     Pain:  Pain Scale 1: Numeric (0 - 10)  Pain Intensity 1: 8  Pain Location 1: Abdomen  Pain Orientation 1: Anterior  Pain Description 1: Sharp  Pain Intervention(s) 1: Medication (see MAR)  Activity Tolerance:   Good  Please refer to the flowsheet for vital signs taken during this treatment.   After treatment:   []    Patient left in no apparent distress sitting up in chair  [x]    Patient left in no apparent distress in bed  [x]    Call bell left within reach  [x]    Nursing notified  [x]    Caregiver present  []    Bed alarm activated    COMMUNICATION/COLLABORATION:   The patients plan of care was discussed with: Registered Nurse    Tanya Major   Time Calculation: 33 mins

## 2018-03-06 ENCOUNTER — APPOINTMENT (OUTPATIENT)
Dept: GENERAL RADIOLOGY | Age: 73
DRG: 853 | End: 2018-03-06
Attending: SURGERY
Payer: MEDICARE

## 2018-03-06 LAB
ANION GAP SERPL CALC-SCNC: 7 MMOL/L (ref 5–15)
BACTERIA SPEC CULT: ABNORMAL
BACTERIA SPEC CULT: ABNORMAL
BACTERIA SPEC CULT: NORMAL
BASOPHILS # BLD: 0.1 K/UL (ref 0–0.1)
BASOPHILS NFR BLD: 1 % (ref 0–1)
BUN SERPL-MCNC: 16 MG/DL (ref 6–20)
BUN/CREAT SERPL: 28 (ref 12–20)
CALCIUM SERPL-MCNC: 8.2 MG/DL (ref 8.5–10.1)
CHLORIDE SERPL-SCNC: 104 MMOL/L (ref 97–108)
CO2 SERPL-SCNC: 24 MMOL/L (ref 21–32)
CREAT SERPL-MCNC: 0.57 MG/DL (ref 0.55–1.02)
DIFFERENTIAL METHOD BLD: ABNORMAL
EOSINOPHIL # BLD: 0.5 K/UL (ref 0–0.4)
EOSINOPHIL NFR BLD: 3 % (ref 0–7)
ERYTHROCYTE [DISTWIDTH] IN BLOOD BY AUTOMATED COUNT: 15.2 % (ref 11.5–14.5)
GLUCOSE BLD STRIP.AUTO-MCNC: 117 MG/DL (ref 65–100)
GLUCOSE BLD STRIP.AUTO-MCNC: 129 MG/DL (ref 65–100)
GLUCOSE BLD STRIP.AUTO-MCNC: 140 MG/DL (ref 65–100)
GLUCOSE BLD STRIP.AUTO-MCNC: 78 MG/DL (ref 65–100)
GLUCOSE BLD STRIP.AUTO-MCNC: 90 MG/DL (ref 65–100)
GLUCOSE SERPL-MCNC: 93 MG/DL (ref 65–100)
GRAM STN SPEC: ABNORMAL
GRAM STN SPEC: ABNORMAL
HCT VFR BLD AUTO: 27.1 % (ref 35–47)
HGB BLD-MCNC: 8.2 G/DL (ref 11.5–16)
IMM GRANULOCYTES # BLD: 0.3 K/UL (ref 0–0.04)
IMM GRANULOCYTES NFR BLD AUTO: 2 % (ref 0–0.5)
LYMPHOCYTES # BLD: 1.7 K/UL (ref 0.8–3.5)
LYMPHOCYTES NFR BLD: 10 % (ref 12–49)
MAGNESIUM SERPL-MCNC: 1.6 MG/DL (ref 1.6–2.4)
MCH RBC QN AUTO: 32.2 PG (ref 26–34)
MCHC RBC AUTO-ENTMCNC: 30.3 G/DL (ref 30–36.5)
MCV RBC AUTO: 106.3 FL (ref 80–99)
MONOCYTES # BLD: 1 K/UL (ref 0–1)
MONOCYTES NFR BLD: 6 % (ref 5–13)
NEUTS SEG # BLD: 13.7 K/UL (ref 1.8–8)
NEUTS SEG NFR BLD: 79 % (ref 32–75)
NRBC # BLD: 0 K/UL (ref 0–0.01)
NRBC BLD-RTO: 0 PER 100 WBC
PHOSPHATE SERPL-MCNC: 3.5 MG/DL (ref 2.6–4.7)
PLATELET # BLD AUTO: 392 K/UL (ref 150–400)
PMV BLD AUTO: 11.6 FL (ref 8.9–12.9)
POTASSIUM SERPL-SCNC: 4.9 MMOL/L (ref 3.5–5.1)
RBC # BLD AUTO: 2.55 M/UL (ref 3.8–5.2)
SERVICE CMNT-IMP: ABNORMAL
SERVICE CMNT-IMP: NORMAL
SODIUM SERPL-SCNC: 135 MMOL/L (ref 136–145)
WBC # BLD AUTO: 17.4 K/UL (ref 3.6–11)

## 2018-03-06 PROCEDURE — 74011250637 HC RX REV CODE- 250/637: Performed by: FAMILY MEDICINE

## 2018-03-06 PROCEDURE — 74011636637 HC RX REV CODE- 636/637: Performed by: SURGERY

## 2018-03-06 PROCEDURE — 36415 COLL VENOUS BLD VENIPUNCTURE: CPT | Performed by: NURSE PRACTITIONER

## 2018-03-06 PROCEDURE — 74011250637 HC RX REV CODE- 250/637: Performed by: SURGERY

## 2018-03-06 PROCEDURE — 74011250637 HC RX REV CODE- 250/637: Performed by: NURSE PRACTITIONER

## 2018-03-06 PROCEDURE — 74011250636 HC RX REV CODE- 250/636: Performed by: SURGERY

## 2018-03-06 PROCEDURE — 80048 BASIC METABOLIC PNL TOTAL CA: CPT | Performed by: SURGERY

## 2018-03-06 PROCEDURE — 74011250636 HC RX REV CODE- 250/636: Performed by: NURSE PRACTITIONER

## 2018-03-06 PROCEDURE — 77030038269 HC DRN EXT URIN PURWCK BARD -A

## 2018-03-06 PROCEDURE — 74011000258 HC RX REV CODE- 258: Performed by: SURGERY

## 2018-03-06 PROCEDURE — 65270000029 HC RM PRIVATE

## 2018-03-06 PROCEDURE — 74022 RADEX COMPL AQT ABD SERIES: CPT

## 2018-03-06 PROCEDURE — 85025 COMPLETE CBC W/AUTO DIFF WBC: CPT | Performed by: NURSE PRACTITIONER

## 2018-03-06 PROCEDURE — 77010033678 HC OXYGEN DAILY

## 2018-03-06 PROCEDURE — C9113 INJ PANTOPRAZOLE SODIUM, VIA: HCPCS | Performed by: SURGERY

## 2018-03-06 PROCEDURE — 82962 GLUCOSE BLOOD TEST: CPT

## 2018-03-06 PROCEDURE — 97110 THERAPEUTIC EXERCISES: CPT

## 2018-03-06 PROCEDURE — 74011000250 HC RX REV CODE- 250: Performed by: SURGERY

## 2018-03-06 PROCEDURE — 65660000000 HC RM CCU STEPDOWN

## 2018-03-06 PROCEDURE — 83735 ASSAY OF MAGNESIUM: CPT | Performed by: NURSE PRACTITIONER

## 2018-03-06 PROCEDURE — 97530 THERAPEUTIC ACTIVITIES: CPT

## 2018-03-06 PROCEDURE — 84100 ASSAY OF PHOSPHORUS: CPT | Performed by: NURSE PRACTITIONER

## 2018-03-06 RX ORDER — MAGNESIUM SULFATE HEPTAHYDRATE 40 MG/ML
2 INJECTION, SOLUTION INTRAVENOUS ONCE
Status: COMPLETED | OUTPATIENT
Start: 2018-03-06 | End: 2018-03-11

## 2018-03-06 RX ORDER — SPIRONOLACTONE 25 MG/1
25 TABLET ORAL DAILY
Status: DISCONTINUED | OUTPATIENT
Start: 2018-03-06 | End: 2018-03-16 | Stop reason: HOSPADM

## 2018-03-06 RX ADMIN — Medication 10 ML: at 14:09

## 2018-03-06 RX ADMIN — DEXTROSE MONOHYDRATE 12.5 G: 25 INJECTION, SOLUTION INTRAVENOUS at 18:14

## 2018-03-06 RX ADMIN — HYDROMORPHONE HYDROCHLORIDE 1 MG: 2 INJECTION, SOLUTION INTRAMUSCULAR; INTRAVENOUS; SUBCUTANEOUS at 09:02

## 2018-03-06 RX ADMIN — HUMAN INSULIN 2 UNITS: 100 INJECTION, SOLUTION SUBCUTANEOUS at 06:13

## 2018-03-06 RX ADMIN — MELATONIN TAB 3 MG 1.5 MG: 3 TAB at 20:37

## 2018-03-06 RX ADMIN — GUAIFENESIN 1200 MG: 600 TABLET, EXTENDED RELEASE ORAL at 20:37

## 2018-03-06 RX ADMIN — HYDROMORPHONE HYDROCHLORIDE 0.5 MG: 2 INJECTION, SOLUTION INTRAMUSCULAR; INTRAVENOUS; SUBCUTANEOUS at 16:02

## 2018-03-06 RX ADMIN — ENOXAPARIN SODIUM 40 MG: 40 INJECTION SUBCUTANEOUS at 15:01

## 2018-03-06 RX ADMIN — HYDROMORPHONE HYDROCHLORIDE 1 MG: 2 INJECTION, SOLUTION INTRAMUSCULAR; INTRAVENOUS; SUBCUTANEOUS at 03:19

## 2018-03-06 RX ADMIN — Medication 10 ML: at 20:37

## 2018-03-06 RX ADMIN — Medication 10 ML: at 17:06

## 2018-03-06 RX ADMIN — FLUCONAZOLE 200 MG: 2 INJECTION INTRAVENOUS at 17:06

## 2018-03-06 RX ADMIN — SPIRONOLACTONE 25 MG: 25 TABLET, FILM COATED ORAL at 09:02

## 2018-03-06 RX ADMIN — HYDROMORPHONE HYDROCHLORIDE 1 MG: 2 INJECTION, SOLUTION INTRAMUSCULAR; INTRAVENOUS; SUBCUTANEOUS at 20:37

## 2018-03-06 RX ADMIN — NYSTATIN: 100000 POWDER TOPICAL at 09:02

## 2018-03-06 RX ADMIN — NYSTATIN: 100000 POWDER TOPICAL at 18:19

## 2018-03-06 RX ADMIN — GUAIFENESIN 1200 MG: 600 TABLET, EXTENDED RELEASE ORAL at 09:02

## 2018-03-06 RX ADMIN — PANTOPRAZOLE SODIUM 40 MG: 40 INJECTION, POWDER, FOR SOLUTION INTRAVENOUS at 09:02

## 2018-03-06 RX ADMIN — PANTOPRAZOLE SODIUM 40 MG: 40 INJECTION, POWDER, FOR SOLUTION INTRAVENOUS at 20:37

## 2018-03-06 RX ADMIN — MAGNESIUM SULFATE HEPTAHYDRATE 2 G: 40 INJECTION, SOLUTION INTRAVENOUS at 14:09

## 2018-03-06 RX ADMIN — CALCIUM GLUCONATE: 94 INJECTION, SOLUTION INTRAVENOUS at 19:05

## 2018-03-06 NOTE — PROGRESS NOTES
Problem: Self Care Deficits Care Plan (Adult)  Goal: *Acute Goals and Plan of Care (Insert Text)  Occupational Therapy Goals  Revised 3/2/2018  1. Patient will perform grooming with modified independence for >5 minutes with supervision/setup with < 2 verbal cues within 7 day(s). 2.  Patient will perform upper body dressing and bathing with modified independence within 7 day(s). 3.  Patient will perform lower body dressing and bathing with moderate assistance using adaptive equipment within 7 day(s). 4.  Patient will perform toilet transfers to [facility] with minimal assistance x1 within 7 day(s). 5.  Patient will perform all aspects of toileting with minimal assistance within 7 day(s). 6.  Patient will participate in upper extremity therapeutic exercise/activities with supervision/set-up for 10 minutes within 7 day(s). 7.  Patient will utilize energy conservation techniques during functional activities with verbal cues within 7 day(s). 8.  Patient will don/doff back brace at supervision/set-up within 7 days. 9.  Patient will verbalize/demonstrate all spinal precautions during ADL tasks without cues within 7 days. Initiated 2/20/2018; revised at re-assess (see above); 1. Patient will perform upper body dressing with moderate assistance in unsupported sitting within 7 day(s). 2.  Patient will perform upper body bathing with moderate assistance  within 7 day(s). 3.  Patient will perform grooming with minimal assistance/contact guard assist within 7 day(s). 4.  Patient will perform toilet transfers with maximal assistance  within 7 day(s). 5.  Patient will perform all aspects of toileting with maximal assistance within 7 day(s). 6.  Patient will participate in upper extremity therapeutic exercise/activities with minimal assistance/contact guard assist for 5 minutes within 7 day(s).         Occupational Therapy TREATMENT  Patient: Bird Sam (46 y.o. female)  Date: 3/6/2018  Diagnosis: Intra-abdominal free air of unknown etiology [K66.8] Perforated chronic gastric ulcer ([address]  Procedure(s) (LRB):   NASOGASTRIC TUBE PLACEMENT (N/A) 16 Days Post-Op  Precautions: Aspiration, Back, Bed Alarm, Fall  Chart, occupational therapy assessment, plan of care, and goals were reviewed. ASSESSMENT:  Pt fatigued but willing to participate with UE bed exercises to increase strength and endurance for functional tasks (see below). Daughter wrote exercises down and will have pt work on them again tonight. Recommend rehab. Progression toward goals:  []          Improving appropriately and progressing toward goals  [x]          Improving slowly and progressing toward goals  []          Not making progress toward goals and plan of care will be adjusted     PLAN:  Patient continues to benefit from skilled intervention to address the above impairments. Continue treatment per established plan of care. Discharge Recommendations: Rehab  Further Equipment Recommendations for Discharge: None     SUBJECTIVE:   Patient stated I don't remember.     OBJECTIVE DATA SUMMARY:   Cognitive/Behavioral Status:  Neurologic State: Alert  Orientation Level: Oriented X4  Cognition: Follows commands           Functional Mobility and Transfers for ADLs:   Bed Mobility:  Rolling:  (moves from long sitting position)  Supine to Sit: Minimum assistance  Sit to Supine: Moderate assistance  Scooting: Additional time     Transfers:  Sit to Stand: Minimum assistance; Additional time;Assist x2       Balance:  Sitting: Intact; High guard  Sitting - Static: Good (unsupported)  Sitting - Dynamic: Poor (constant support)  Standing: Impaired  Standing - Static: Constant support;Occassional  Standing - Dynamic : Poor  Therapeutic Exercises:     EXERCISE   Sets   Reps   Active Active Assist   Passive   Comments   Finger flex/ext 1 10 [x]           []           []              Wrist flex/ext 1 10 [x]           []           []              Elbow flex/ext 1 10 [x]           []           []              Chest presses 1 10 [x]           []           []              Shoulder flex/ext 1 10 [x]           []           []              Reaching horizontal ab/add 1 10 [x]           []           []                 []           []           []                 []           []           []                 []           []           []                 []           []           []                 []           []           []                    Pain:  Pain Scale 1: Numeric (0 - 10)  Pain Intensity 1: 9  Pain Location 1: Abdomen  Pain Orientation 1: Left  Pain Description 1: Aching  Pain Intervention(s) 1: Medication (see MAR)    Activity Tolerance:    Fair  Please refer to the flowsheet for vital signs taken during this treatment.   After treatment:   []  Patient left in no apparent distress sitting up in chair  [x]  Patient left in no apparent distress in bed  [x]  Call bell left within reach  []  Nursing notified  [x]  Caregiver present  []  Bed alarm activated    COMMUNICATION/COLLABORATION:   The patients plan of care was discussed with: Occupational Therapist    MEME Samaniego/L  Time Calculation: 20 mins

## 2018-03-06 NOTE — PROGRESS NOTES
[facility] Progress Note           Jossue Stage DO    [phone] Office  [phone]  Fax    3/6/2018      Assessment & Plan:   1. Leukocytosis. Stable. Remains afebrile. Multiple potential etiologies. Follow on fluconazole. Consider tagged WBC scan if no improvement or patient develops fever. Watch for cdiff Plan to monitor the patient off further antibiotics (except fluconazole) for now    2. Yeast growing from the ALYSSA drain. The significance of this yeast is uncertain as it likely represents colonization or selective pressure from the 14 days of piperacillin-tazobactam.  Plan 7 days empiric fluconazole for now and follow. 3. Perforated gastric ulcer. The patient underwent exploratory laparotomy with repair on .            Subjective:     C/o generalized weakness    Objective:     Vitals:   Visit Vitals    /53 (BP 1 Location: Left arm, BP Patient Position: At rest)    Pulse 88    Temp 97.6 °F (36.4 °C)    Resp 17    Ht 5' 3.5\" (1.613 m)    Wt 81 kg (178 lb 9.2 oz)    SpO2 91%    Breastfeeding No    BMI 31.14 kg/m2        Tmax:  Temp (24hrs), Av.3 °F (36.8 °C), Min:97.6 °F (36.4 °C), Max:99 °F (37.2 °C)      Exam:   Patient is intubated:  no    Physical Examination:   General:  Alert, cooperative, no distress   Head:  Normocephalic, without obvious abnormality, atraumatic. Eyes:  Conjunctivae/corneas clear. .   Neck: Supple, symmetrical, trachea midline, no adenopathy       Lungs:   Clear to auscultation anteriorly   Chest wall:  No tenderness or deformity. Heart:  Regular rate and rhythm, S1, S2 normal,. Abdomen:   Soft, non-tender. ALYSSA and accordian drain in place. Extremities: Extremities normal, atraumatic, no cyanosis or edema. Skin: Skin color, texture, turgor normal. No rashes or lesions   Neurologic: CNII-XII intact.       Labs:        No lab exists for component: ITNL   No results for input(s): CPK, CKMB, TROIQ in the last 72 hours. Recent Labs      03/06/18   1114  03/06/18   0214  03/05/18   0256  03/04/18   0009   NA  135*   --   137  137   K  4.9   --   5.3*  4.8   CL  104   --   106  106   CO2  24   --   24  25   BUN  16   --   15  14   CREA  0.57   --   0.51*  0.46*   GLU  93   --   96  92   PHOS   --   3.5  4.6  4.1   MG   --   1.6  1.8  1.7   ALB   --    --   1.5*   --    WBC   --   17.4*  15.8*  18.6*   HGB   --   8.2*  8.6*  9.1*   HCT   --   27.1*  27.8*  29.2*   PLT   --   392  461*  492*     No results for input(s): INR, PTP, APTT in the last 72 hours.     No lab exists for component: INREXT  Needs: urine analysis, urine sodium, protein and creatinine  No results found for: CHIKIS, CREAU      Cultures:     No results found for: SDES  Lab Results   Component Value Date/Time    Culture result: Culture performed on Fluid swab specimen 03/01/2018 02:00 PM    Culture result: NO GROWTH 4 DAYS 03/01/2018 02:00 PM    Culture result: NO GROWTH 4 DAYS 03/01/2018 02:00 PM       Radiology:     Medications       Current Facility-Administered Medications   Medication Dose Route Frequency Last Dose    spironolactone (ALDACTONE) tablet 25 mg  25 mg Oral DAILY 25 mg at 03/06/18 0902    albuterol-ipratropium (DUO-NEB) 2.5 MG-0.5 MG/3 ML  3 mL Nebulization Q4H PRN      melatonin tablet 1.5 mg  1.5 mg Oral QHS PRN 1.5 mg at 03/05/18 2120    HYDROmorphone (PF) (DILAUDID) injection 1 mg  1 mg IntraVENous Q3H PRN 1 mg at 03/06/18 0902    fluconazole (DIFLUCAN) 200mg/100 mL IVPB (premix)  200 mg IntraVENous Q24H 200 mg at 03/05/18 1535    fentaNYL (DURAGESIC) 25 mcg/hr patch 1 Patch  1 Patch TransDERmal Q72H 1 Patch at 03/06/18 1004    haloperidol lactate (HALDOL) injection 1 mg  1 mg IntraVENous Q4H PRN 1 mg at 03/04/18 0401    guaiFENesin ER (MUCINEX) tablet 1,200 mg  1,200 mg Oral Q12H 1,200 mg at 03/06/18 0902    acetaminophen (TYLENOL) tablet 650 mg  650 mg Oral Q4H  mg at 03/02/18 0301    insulin regular (NOVOLIN R, HUMULIN R) injection   SubCUTAneous Q6H Stopped at 03/06/18 1200    fat emulsion 20% (LIPOSYN, INTRAlipid) infusion 500 mL  500 mL IntraVENous Q MON, WED &  mL at 03/05/18 1831    glucose chewable tablet 16 g  4 Tab Oral PRN      glucagon (GLUCAGEN) injection 1 mg  1 mg IntraMUSCular PRN      dextrose (D50W) injection syrg 12.5-25 g  12.5-25 g IntraVENous PRN      alteplase (CATHFLO) 1 mg in sterile water (preservative free) 1 mL injection  1 mg InterCATHeter PRN 1 mg at 02/28/18 1607    sodium chloride (NS) flush 10-30 mL  10-30 mL InterCATHeter PRN 10 mL at 03/01/18 1632    sodium chloride (NS) flush 10-40 mL  10-40 mL InterCATHeter Q8H Stopped at 03/06/18 0559    naloxone (NARCAN) injection 0.4 mg  0.4 mg IntraVENous PRN      ondansetron (ZOFRAN) injection 4 mg  4 mg IntraVENous Q4H PRN 4 mg at 03/05/18 0924    enoxaparin (LOVENOX) injection 40 mg  40 mg SubCUTAneous Q24H 40 mg at 03/05/18 1537    pantoprazole (PROTONIX) injection 40 mg  40 mg IntraVENous Q12H 40 mg at 03/06/18 0902    phenol throat spray (CHLORASEPTIC) 1 Spray  1 Spray Oral PRN 1 Spray at 02/13/18 1803    nystatin (MYCOSTATIN) 100,000 unit/gram powder   Topical BID      sodium chloride (NS) flush 5-10 mL  5-10 mL IntraVENous PRN 10 mL at 02/28/18 1607           Case discussed with:      Rajesh Pearl DO

## 2018-03-06 NOTE — ROUTINE PROCESS
Bedside shift change report given to SNEHAL White (oncoming nurse) by Elian Steele RN (offgoing nurse). Report included the following information SBAR, Kardex, Procedure Summary, Intake/Output and Recent Results.

## 2018-03-06 NOTE — PROGRESS NOTES
[facility]     Name: Yunior Frias MRN: [id number]   : 1945 Hospital: [address]   Date: 3/6/2018            Impression Plan   1. Acute Respiratory Failure with Hypoxia  2. Abnormal Chest CT: with bilateral pleural effusions, atelectasis,and patchy airspace disease in MIRIAM; s/p thoracentesis on 3/1  3. Leukocytosis, increased today  4. ALYSSA drain culture: + yeast  5. Delirium; improved  6. Peforated gastric ulcer, s/p repair  7. Hypokalemia, hypomagnesemia; resolved  8. EtOH abuse     · Goal sats >90%, wean O2 as tolerated; RN will attempt to wean today  · Continue Diflucan. Continue to follow WBC and cultures, fever curve. ID following. · F/U blood cultures  · F/U cultures from abdominal drains  · Speech following and appreciate help: NPO currently  · Will continue to follow CXR and repeat thoracentesis if necessary  · C/W IV Haldol as needed for delerium  · Switch nebs to PRN  · Guafenesin   · Post-op management as per surgery  · Pain control  · Seroquel at night for sleep  · Monitor lytes, replete as needed. Will replete Magnesium today  · Encourage IS use  · PT/OT  · OOB and mobilize as much as possible- discussed with RN to get out of bed today  · TPN  · SCDs  · Protonix                             Subjective     Overnight Events    TMax 99  BP stable  O2 sats 91% on 2L NC  Fluid balance: -1595 but no documentation of intake  WBC 17.4: up today  Hgb 8.2  Mag 1.6  Pleural fluid cytology with mesothelial reactive atypia with marked acute inflammation. ROS    Doing okay. Tearful that she is so weak and that she can't eat or drink. Feels anxious. Feels that her breathing is improved. 12 point ROS reviewed and negative other than noted above.       Past Medical History:   Diagnosis Date    Alcoholic cirrhosis of liver without ascites ([facility] [address] 2018    ETOH abuse 2018    History of vascular access device 2018    [facility] VAT - 5 FR triple, R basilic, TPN    Hyperammonemia ([facility] [address] 2/16/2018     Results for Celso Lunsford (MRN [id number]) as of 2/17/2018 08:43  Ref. Range 2/16/2018 17:20 Ammonia Latest Ref Range: <32 UMOL/L 69 (H)     Hyperlipidemia     Perforated chronic gastric ulcer ([facility] [address] 2/11/2018      Past Surgical History:   Procedure Laterality Date    HX CATARACT REMOVAL        Prior to Admission medications    Medication Sig Start Date End Date Taking? Authorizing Provider   naproxen sodium (ALEVE) 220 mg tablet Take 220 mg by mouth daily as needed for Pain. Yes Historical Provider   cyanocobalamin (VITAMIN B12) 500 mcg tablet Take 500 mcg by mouth daily. Yes Historical Provider   multivitamin (ONE A DAY) tablet Take 1 Tab by mouth daily. Yes Historical Provider   omega-3 fatty acids-vitamin e 1,000 mg cap Take 2 Caps by mouth daily.    Yes Historical Provider     Current Facility-Administered Medications   Medication Dose Route Frequency    spironolactone (ALDACTONE) tablet 25 mg  25 mg Oral DAILY    TPN ADULT - CENTRAL   IntraVENous CONTINUOUS    fluconazole (DIFLUCAN) 200mg/100 mL IVPB (premix)  200 mg IntraVENous Q24H    fentaNYL (DURAGESIC) 25 mcg/hr patch 1 Patch  1 Patch TransDERmal Q72H    guaiFENesin ER (MUCINEX) tablet 1,200 mg  1,200 mg Oral Q12H    insulin regular (NOVOLIN R, HUMULIN R) injection   SubCUTAneous Q6H    fat emulsion 20% (LIPOSYN, INTRAlipid) infusion 500 mL  500 mL IntraVENous Q MON, WED & SAT    sodium chloride (NS) flush 10-40 mL  10-40 mL InterCATHeter Q8H    enoxaparin (LOVENOX) injection 40 mg  40 mg SubCUTAneous Q24H    pantoprazole (PROTONIX) injection 40 mg  40 mg IntraVENous Q12H    nystatin (MYCOSTATIN) 100,000 unit/gram powder   Topical BID     No Known Allergies   Social History   Substance Use Topics    Smoking status: Former Smoker    Smokeless tobacco: Never Used    Alcohol use 11.4 oz/week     0 Standard drinks or equivalent, 19 Glasses of wine per week      Comment: Hx of heavy etoh use, but quit several months ago      Family History   Problem Relation Age of Onset    Other Other      patient did not know          Laboratory: I have personally reviewed the critical care flowsheet and labs. Recent Labs      03/06/18   0214  03/05/18   0256  03/04/18   0009   WBC  17.4*  15.8*  18.6*   HGB  8.2*  8.6*  9.1*   HCT  27.1*  27.8*  29.2*   PLT  392  461*  492*     Recent Labs      03/06/18   1114  03/06/18   0214  03/05/18   0256  03/04/18   0009   NA  135*   --   137  137   K  4.9   --   5.3*  4.8   CL  104   --   106  106   CO2  24   --   24  25   GLU  93   --   96  92   BUN  16   --   15  14   CREA  0.57   --   0.51*  0.46*   CA  8.2*   --   8.4*  8.2*   MG   --   1.6  1.8  1.7   PHOS   --   3.5  4.6  4.1   ALB   --    --   1.5*   --    SGOT   --    --   39*   --    ALT   --    --   30   --        Objective:          Intake/Output Summary (Last 24 hours) at 03/06/18 1331  Last data filed at 03/06/18 0656   Gross per 24 hour   Intake                0 ml   Output             1595 ml   Net            -1595 ml     GENERAL: alert, calm, no distress HEENT:  PERRL, EOMI, no alar flaring or epistaxis, oral mucosa moist without cyanosis, NECK:  no jugular vein distention, no retractions, no thyromegaly or masses, LUNGS: Decreased bs bilaterally, drain in place with very little output HEART:  Regular rate and rhythm with no MGR; no edema is present, ABDOMEN:  soft, stable CDI, drain dressing CDI EXTREMITIES:  warm with no cyanosis, dependent edema in LEs SKIN:  no jaundice or ecchymosis and NEUROLOGIC: alert, oriented, grossly non-focal    Lenora Justice, NP  [facility] [place]

## 2018-03-06 NOTE — PROGRESS NOTES
Progress Note    Patient: Jeffrey Rodriguez MRN: [id number]  SSN: [id number]    YOB: 1945  Age: 67 y.o. Sex: female      Admit Date: 2018    22 Days Post-Op    Procedure:  Procedure(s):   EX LAP    Subjective:     Mrs. Singh Backers c/o crampy abdominal pain. She is not passing flatus or BM today. Objective:     Visit Vitals    /56 (BP 1 Location: Left arm, BP Patient Position: At rest)    Pulse 95    Temp 98.7 °F (37.1 °C)    Resp 18    Ht 5' 3.5\" (1.613 m)    Wt 178 lb 9.2 oz (81 kg)    SpO2 92%    Breastfeeding No    BMI 31.14 kg/m2       Temp (24hrs), Av.4 °F (36.9 °C), Min:97.6 °F (36.4 °C), Max:99 °F (37.2 °C)      Physical Exam:    GENERAL: alert, cooperative, no distress, ABDOMEN: Soft, +BS, NT, mild distention. Lab Review:   BMP: No results found for: NA, K, CL, CO2, AGAP, GLU, BUN, CREA, GFRAA, GFRNA  CMP:   Lab Results   Component Value Date/Time    MG 1.6 2018 02:14 AM    PHOS 3.5 2018 02:14 AM     CBC:   Lab Results   Component Value Date/Time    WBC 17.4 (H) 2018 02:14 AM    HGB 8.2 (L) 2018 02:14 AM    HCT 27.1 (L) 2018 02:14 AM     2018 02:14 AM       Assessment:     Hospital Problems  Date Reviewed: 2016          Codes Class Noted POA    Hypokalemia ICD-10-CM: E87.6  ICD-9-CM: 276.8  2018 No        Leukocytosis ICD-10-CM: D72.829  ICD-9-CM: 288.60  2018 Yes        Severe protein-calorie malnutrition ([address] ICD-10-CM: E43  ICD-9-CM: 262  2018 Yes        Acute metabolic encephalopathy GCA-32-ALYSSA: G93.41  ICD-9-CM: 348.31  2018 Yes        Hyperammonemia (HCC) ICD-10-CM: E72.20  ICD-9-CM: 270.6  2018 No    Overview Addendum 2018  9:08 AM by Brenda Lyon MD        Ref.  Range 2018 17:20   Ammonia Latest Ref Range: <32 UMOL/L 69 (H)                Free intraperitoneal air ICD-10-CM: K66.8  ICD-9-CM: 568.89  2018 Yes        S/P exploratory laparotomy ICD-10-CM: O11.966  ICD-9-CM: V45.89  2/12/2018 No    Overview Signed 2/12/2018 11:57 AM by May iVlla MD     Suture repair of perforated posterior gastric ulcer with Dawson Handy patch, core needle biopsies of left lobe of the liver. Alcoholic cirrhosis of liver without ascites (HCC) (Chronic) ICD-10-CM: K70.30  ICD-9-CM: 571.2  2/12/2018 Yes    Overview Signed 2/17/2018  8:45 AM by Zaki Heredia MD     Liver bx 2/12/18:   Cirrhosis with mild chronic portal inflammation and macrovesicular steatosis. Fatty liver determined by biopsy ICD-10-CM: K76.0  ICD-9-CM: 571.8  2/12/2018 Yes    Overview Signed 2/17/2018  8:53 AM by Zaki Heredia MD        Cirrhosis with mild chronic portal inflammation and macrovesicular steatosis. * (Principal)Perforated chronic gastric ulcer ([address] (Chronic) ICD-10-CM: K25.5  ICD-9-CM: 531.50  2/11/2018 Yes        ETOH abuse (Chronic) ICD-10-CM: F10.10  ICD-9-CM: 305.00  2/11/2018 Yes              Plan/Recommendations/Medical Decision Making:     Continue TPN, bowel rest.  Drain output down. Check obstruction series. Continue PPI. Ambulate with PT.     Signed By: May Villa MD     March 6, 2018

## 2018-03-06 NOTE — PROGRESS NOTES
Bedside shift change report given to Maggy Johnson (oncoming nurse) by Molly Zazueta (offgoing nurse). Report included the following information SBAR, Kardex, Procedure Summary, MAR and Recent Results. Dr. Baron Ramos explained to patient's daughters that it might be a challenge to find a SNF that will accept a patient on TPN. The daughters are concerned about this and would like to consult with case management to determine if this is something that can be accomplished.

## 2018-03-06 NOTE — PROGRESS NOTES
Gastroenterology Progress Note    March 6, 2018  Admit Date: 2/11/2018         Narrative Assessment and Plan   · Perforated gastric ulcer   · Cirrhosis  · Alcohol abuse  · Ascites    Plan  - no new recommendations from GI standpoint   - postop management per surgery - pt remains on TPN and getting obstruction series today  - again discussed with patient as she is more alert in regards to importance of alcohol cessation  - will continue to check in from time to time      Subjective:   · Intermittent cramping abdominal pain. No nausea or vomiting. No BM. Per daughter increased drain output after trial clear liquids yesterday. ROS:  The previous review of systems on initial consultation / H&P is noted and reviewed. Specific changes noted above in HPI.     Current Medications:     Current Facility-Administered Medications   Medication Dose Route Frequency    spironolactone (ALDACTONE) tablet 25 mg  25 mg Oral DAILY    albuterol-ipratropium (DUO-NEB) 2.5 MG-0.5 MG/3 ML  3 mL Nebulization Q4H PRN    melatonin tablet 1.5 mg  1.5 mg Oral QHS PRN    HYDROmorphone (PF) (DILAUDID) injection 1 mg  1 mg IntraVENous Q3H PRN    fluconazole (DIFLUCAN) 200mg/100 mL IVPB (premix)  200 mg IntraVENous Q24H    fentaNYL (DURAGESIC) 25 mcg/hr patch 1 Patch  1 Patch TransDERmal Q72H    haloperidol lactate (HALDOL) injection 1 mg  1 mg IntraVENous Q4H PRN    guaiFENesin ER (MUCINEX) tablet 1,200 mg  1,200 mg Oral Q12H    acetaminophen (TYLENOL) tablet 650 mg  650 mg Oral Q4H PRN    insulin regular (NOVOLIN R, HUMULIN R) injection   SubCUTAneous Q6H    fat emulsion 20% (LIPOSYN, INTRAlipid) infusion 500 mL  500 mL IntraVENous Q MON, WED & SAT    glucose chewable tablet 16 g  4 Tab Oral PRN    glucagon (GLUCAGEN) injection 1 mg  1 mg IntraMUSCular PRN    dextrose (D50W) injection syrg 12.5-25 g  12.5-25 g IntraVENous PRN    alteplase (CATHFLO) 1 mg in sterile water (preservative free) 1 mL injection  1 mg InterCATHeter PRN    sodium chloride (NS) flush 10-30 mL  10-30 mL InterCATHeter PRN    sodium chloride (NS) flush 10-40 mL  10-40 mL InterCATHeter Q8H    naloxone (NARCAN) injection 0.4 mg  0.4 mg IntraVENous PRN    ondansetron (ZOFRAN) injection 4 mg  4 mg IntraVENous Q4H PRN    enoxaparin (LOVENOX) injection 40 mg  40 mg SubCUTAneous Q24H    pantoprazole (PROTONIX) injection 40 mg  40 mg IntraVENous Q12H    phenol throat spray (CHLORASEPTIC) 1 Spray  1 Spray Oral PRN    nystatin (MYCOSTATIN) 100,000 unit/gram powder   Topical BID    sodium chloride (NS) flush 5-10 mL  5-10 mL IntraVENous PRN       Objective:     VITALS:   Last 24hrs VS reviewed since prior progress note. Most recent are:  Visit Vitals    /56 (BP 1 Location: Left arm, BP Patient Position: At rest)    Pulse 95    Temp 98.7 °F (37.1 °C)    Resp 18    Ht 5' 3.5\" (1.613 m)    Wt 81 kg (178 lb 9.2 oz)    SpO2 92%    Breastfeeding No    BMI 31.14 kg/m2     Temp (24hrs), Av.4 °F (36.9 °C), Min:97.6 °F (36.4 °C), Max:99 °F (37.2 °C)      Intake/Output Summary (Last 24 hours) at 18 1106  Last data filed at 18 0656   Gross per 24 hour   Intake                0 ml   Output             1595 ml   Net            -1595 ml       EXAM:  General: No acute distress   Abdomen:  Bowel sounds hypoactive, soft, mild distention, nontender    Lab Data Reviewed:   Recent Labs      18   0214  18   0256  18   0009   WBC  17.4*  15.8*  18.6*   HGB  8.2*  8.6*  9.1*   HCT  27.1*  27.8*  29.2*   PLT  392  461*  492*     Recent Labs      18   0214  18   0256  18   0009   NA   --   137  137   K   --   5.3*  4.8   CL   --   106  106   CO2   --   24  25   GLU   --   96  92   BUN   --   15  14   CREA   --   0.51*  0.46*   CA   --   8.4*  8.2*   MG  1.6  1.8  1.7   PHOS  3.5  4.6  4.1   ALB   --   1.5*   --    TBILI   --   0.6   --    SGOT   --   39*   --    ALT   --   30   --      Lab Results   Component Value Date/Time    Glucose (POC) 140 (H) 03/06/2018 05:38 AM    Glucose (POC) 129 (H) 03/06/2018 12:00 AM    Glucose (POC) 81 03/05/2018 06:06 PM    Glucose (POC) 94 03/05/2018 11:37 AM    Glucose (POC) 114 (H) 03/05/2018 05:25 AM     No results for input(s): PH, PCO2, PO2, HCO3, FIO2 in the last 72 hours. No results for input(s): INR in the last 72 hours. No lab exists for component: INREXT, INREXT        Assessment:   (See above)  Principal Problem:    Perforated chronic gastric ulcer ([address] (2/11/2018)    Active Problems:    ETOH abuse (2/11/2018)      Free intraperitoneal air (2/12/2018)      S/P exploratory laparotomy (2/12/2018)      Overview: Suture repair of perforated posterior gastric ulcer with Daleen Fend patch,       core needle biopsies of left lobe of the liver. Alcoholic cirrhosis of liver without ascites ([address] (2/12/2018)      Overview: Liver bx 2/12/18:       Cirrhosis with mild chronic portal inflammation and macrovesicular       steatosis. Fatty liver determined by biopsy (2/12/2018)      Overview:        Cirrhosis with mild chronic portal inflammation and macrovesicular       steatosis. Hyperammonemia ([address] (2/16/2018)      Overview:        Ref.  Range 2/16/2018 17:20       Ammonia Latest Ref Range: <32 UMOL/L 69 (H)             Hypokalemia (2/18/2018)      Leukocytosis (2/18/2018)      Severe protein-calorie malnutrition ([address] (2/18/2018)      Acute metabolic encephalopathy (4/78/0178)        Plan:   (See above)    Signed By:  Kavon Andres PA-C  3/6/2018  11:06 AM    I have interviewed and examined patient with addendum to note above and formulation care plan    Sudarshan Pena M.D.

## 2018-03-06 NOTE — PROGRESS NOTES
Problem: Mobility Impaired (Adult and Pediatric)  Goal: *Acute Goals and Plan of Care (Insert Text)  Physical Therapy Goals  Initiated 2/20/2018    1. Patient will move from supine to sit and sit to supine , scoot up and down and roll side to side in bed with moderate assistance x 2  within 7 days. Partially Met - Continue  2. Patient will perform sit <> stand with minimal assist x 2  within 7 days. Met - Advance goal below  3. Patient will ambulate with minimal assistance x 2 for 10 feet with RW and assist of 3rd pushing chair from behind within 7 days. Not Met- Continue  4. Patient will verbalize and demonstrate understanding of spinal precautions (No bending, lifting greater than 5 lbs, or twisting; log-roll technique; frequent repositioning as instructed) within 7 days. Not Met- Continue    Physical Therapy Goals  Revised 2/28/2018  1. Patient will move from supine to sit and sit to supine  and roll side to side in bed with moderate assistance x 1  within 7 day(s). 2.  Patient will move from long sitting in bed to edge of bed with minimal assistance x 1 within 7 days. 3.  Patient will transfer from bed to chair and chair to bed with moderate assistance x 1 using the least restrictive device within 7 day(s). 4.  Patient will perform sit <> stand with minimal assistance x 1 within 7 day(s). 5.  Patient will ambulate with minimal assistance x 1  for 15 feet with the least restrictive device within 7 day(s). 6.  Patient will verbalize and demonstrate understanding of spinal precautions (No bending, lifting greater than 5 lbs, or twisting; log-roll technique; frequent repositioning as instructed) within 7 days.    physical Therapy TREATMENT  Patient: Mirian Kawasaki (47 y.o. female)  Date: 3/6/2018  Diagnosis: Intra-abdominal free air of unknown etiology [K66.8] Perforated chronic gastric ulcer ([facility] [address]  Procedure(s) (LRB):   NASOGASTRIC TUBE PLACEMENT (N/A) 16 Days Post-Op  Precautions: Aspiration, Back, Bed Alarm, Fall  Chart, physical therapy assessment, plan of care and goals were reviewed. ASSESSMENT:  Patient with too much pain from lateral drain tube on left to tolerate wearing TLSO and specifically request to not wear TLSO brace. Patient agreed to OOB but at 3pm in afternoon has not been OOB to chair at all today. Left sign posted in room that patient needs up to chair 3 x daily for hour. Patient tolerated 30' ambulation with RW with chair brought behind. Had to increase from 2L to 4L oxygen for activity and recovery and significant orthostatic hypotension with activity/OOB. Extremely debilitated. Due for reassessment tomorrow. Trying to build to BID tx. Documentation for home O2:     2L    AT REST  Head of bed up in bed   O2 SATS  88 HR  98 BP  120/54 Denies s/sx   2L and to 3L WITH ACTIVITY-edge of bed sit   02 SATS  90 HR  98 105/52 Dizziness& lightheaded   (3  ) LITERS OF O2 WITH ACTIVITY- amb 30' O2 SATS  85  Increase to 4L-87% HR  114 88/41  4min-94/51  6 min-88/48, 86/48      Worsening lightheadedness   4L LITERS OF 02 PATIENT LEFT COMFORTABLY  SITTING/SUPINE 02 SATS  89 HR  100 13 min sitting  101/59 Slowly improving       Progression toward goals:  []      Improving appropriately and progressing toward goals  [x]      Improving slowly and progressing toward goals  []      Not making progress toward goals and plan of care will be adjusted     PLAN:  Patient continues to benefit from skilled intervention to address the above impairments. Continue treatment per established plan of care. Discharge Recommendations:  Rehab  Further Equipment Recommendations for Discharge:  TBD     SUBJECTIVE:   Patient stated I dont feel good at all.    The patient stated 0/4 back precautions. Reviewed all 4 with patient.     OBJECTIVE DATA SUMMARY:   Critical Behavior:  Neurologic State: Alert (confused at times)  Orientation Level: Oriented X4  Cognition: Follows commands  Safety/Judgement: Awareness of environment  Functional Mobility Training:  Bed Mobility:  Log Rolling:  (moves from long sitting position)  Supine to Sit: Minimum assistance  Sit to Supine:  (to chair)  Scooting: Additional time  Brace not donned per patient request    Transfers:  Sit to Stand: Minimum assistance; Additional time;Assist x2  Stand to Sit: Minimum assistance; Additional time;Assist x2  Stand Pivot Transfers: Assist x2     Bed to Chair: Minimum assistance; Additional time;Assist x2                    Balance:  Sitting: Intact; High guard  Sitting - Static: Good (unsupported)  Sitting - Dynamic: Poor (constant support)  Standing: Impaired  Standing - Static: Constant support;Occassional  Standing - Dynamic : Poor  Ambulation/Gait Training:  Distance (ft): 30 Feet (ft)  Assistive Device: Gait belt;Walker, rolling  Ambulation - Level of Assistance: Minimal assistance; Additional time;Assist x2  Gait Abnormalities: Antalgic;Decreased step clearance  Base of Support: Widened  Speed/Alejandra: Shuffled; Slow  Stairs - Level of Assistance:  (NT)       Pain:  Pain Scale 1: Numeric (0 - 10)  Pain Intensity 1: 9  Pain Location 1: Abdomen  Pain Orientation 1: Left  Pain Description 1: Aching  Pain Intervention(s) 1: Medication (see MAR)  Activity Tolerance:   poor  Please refer to the flowsheet for vital signs taken during this treatment.   After treatment:   [x]  Patient left in no apparent distress sitting up in chair  []  Patient left in no apparent distress in bed  [x]  Call bell left within reach  [x]  Nursing notified  [x]  Caregiver present  [x]   alarm activated    COMMUNICATION/COLLABORATION:   The patients plan of care was discussed with: Registered Nurse    Favian Wheatley, PT   Time Calculation: 38 mins

## 2018-03-06 NOTE — PROGRESS NOTES
Family Practice Daily Progress Note: 3/6/2018  Nateiker Pena Leoncio Fairly, DO    Assessment/Plan:   Perforated chronic gastric ulcer /  Free intraperitoneal air / S/P exploratory laparotomy 2/11. S/p repair in OR with Dr. Jayden Rodriguez on 2/12/18 - per surg    Severe protein calorie malnutrition-POA  --TPN per GI - advance diet as per surg     Acute metabolic encephalopathy. Improved. Multifactorial-- medications, alcohol use, sundowning  ---avoid triggers  ---CIWA  ---maintenance of sleep/wake cycle, and re-orientation   ---Added Seroquel at bedtime to help with sleep wake cycle - now off due to somnolence     Hypoxia / Respiratory distress. Improved. Pleural effusions   ---Nebs as needed  ---Pulmonary consulted     ---Bumex as needed. Added Aldactone. ---Drain placed by IR     Alcoholic cirrhosis of liver without ascites /  Fatty liver determined by biopsy /  Hyperammonemia. --GI has seen- rec alcohol cessation and outpt follow up  --hold aldactone with elevated K+     ETOH abuse. Needs to stop ETOH entirely.     Hypokalemia /  Hypoalbuminemia. Being addressed with TPN     Leukocytosis.    -- repeat blood cultures, and urine 2/28 neg  --was on Zosyn, and levaquin - cultures drawn off antibiotics neg except yeast - Diflucan added  --consulted ID 3/1    Anemia: watch Hgb   --follow, transfuse <7    Back Pain due to compression fracture of T12  ---Increased Duragesic patch to 25mcg and DIiaudid to 1mg as needed    Debility: due to prolonged hospital stay  --continue PT/OT- needs to get OOB  --will likely need rehab        Problem List:  Problem List as of 3/6/2018  Date Reviewed: 4/26/2016          Codes Class Noted - Resolved    Hypokalemia ICD-10-CM: E87.6  ICD-9-CM: 276.8  2/18/2018 - Present        Leukocytosis ICD-10-CM: D72.829  ICD-9-CM: 288.60  2/18/2018 - Present        Severe protein-calorie malnutrition ([address] ICD-10-CM: E43  ICD-9-CM: 262  2/18/2018 - Present        Acute metabolic encephalopathy ICD-10-CM: G93.41  ICD-9-CM: 348.31  2/18/2018 - Present        Hyperammonemia (HCC) ICD-10-CM: E72.20  ICD-9-CM: 270.6  2/16/2018 - Present    Overview Addendum 2/17/2018  9:08 AM by Mack Power MD        Ref. Range 2/16/2018 17:20   Ammonia Latest Ref Range: <32 UMOL/L 69 (H)                Free intraperitoneal air ICD-10-CM: K66.8  ICD-9-CM: 568.89  2/12/2018 - Present        S/P exploratory laparotomy ICD-10-CM: Z98.890  ICD-9-CM: V45.89  2/12/2018 - Present    Overview Signed 2/12/2018 11:57 AM by Eddie Palma MD     Suture repair of perforated posterior gastric ulcer with Aron Lynn patch, core needle biopsies of left lobe of the liver. Alcoholic cirrhosis of liver without ascites (HCC) (Chronic) ICD-10-CM: K70.30  ICD-9-CM: 571.2  2/12/2018 - Present    Overview Signed 2/17/2018  8:45 AM by Makc Power MD     Liver bx 2/12/18:   Cirrhosis with mild chronic portal inflammation and macrovesicular steatosis. Fatty liver determined by biopsy ICD-10-CM: K76.0  ICD-9-CM: 571.8  2/12/2018 - Present    Overview Signed 2/17/2018  8:53 AM by Mack Power MD        Cirrhosis with mild chronic portal inflammation and macrovesicular steatosis. * (Principal)Perforated chronic gastric ulcer ([address] (Chronic) ICD-10-CM: K25.5  ICD-9-CM: 531.50  2/11/2018 - Present        ETOH abuse (Chronic) ICD-10-CM: F10.10  ICD-9-CM: 305.00  2/11/2018 - Present        GI bleed ICD-10-CM: K92.2  ICD-9-CM: 578.9  4/26/2016 - Present        Hypotension ICD-10-CM: I95.9  ICD-9-CM: 458.9  4/26/2016 - Present        Tachycardia ICD-10-CM: R00.0  ICD-9-CM: 785.0  4/26/2016 - Present        Acute blood loss anemia ICD-10-CM: D62  ICD-9-CM: 285.1  4/26/2016 - Present              Subjective:    67 y.o.  female with EtOH abuse who is admitted to the General Surgery Service with perforated gastric ulcer. We are asked to evaluate for altered mental status.  The patient is poorly interactive at this time and this limits primary hx. Discussed case with family available at bedside providing secondary hx and chart review. Per family, patient has had declining neurological condition over past 48 hours. Notably, an RRT was called for respiratory distress. 2/19: This morning she is a bit more alert at this moment and taking in more complete sentences. She says she does not feel well. She has no specific site of pain other than the NG tube which is bothering her a great deal.  She should improve as time from surg increases. K+ a little low - replacing.     2/20:  Still confused and somnolent at times. Now able to localize pain to ab and converse a bit more. Tmax 100.3  WBC is up again but hopefully reactive - recheck in AM - more incentive spirom and check CXR.     2/21: A little more alert. Knows she is in the hospital. Not able to participate with PT yesterday as she was in too much pain. WBC still at 19.     2/22:  WBC 19K. She is more alert. Daughter at bedside. Both of her daughters live out of town. Looking at SNF options for rehab. Coughing more. Starting to use IS.     2/23: WBC 17. Repeat CT chest with moderate to large persistent left subphrenic collection. IR has been consulted. Vanc and Levaquin added by pulm. 2/24: Pt had a better night last night. Only brief episode of confusion. She is much clearer this AM.  Now in quite a bit of pain from gas and stomach cramping. Thinks she will feel better if she can have a BM. Has been on bedpan for a while without success. On TPN. Daughters very concerned that she won't be successful while lying down. Wanting to get up to bedside commode. Will need PT's help. 2/25: febrile overnight and WBC up after vanc was stopped. Pt c/o more dyspnea today. Known fluid collection that will be drained in IR this week. Up to chair with PT yesterday briefly. +BM yesterday.   Da notes pt has been c/o R wrist pain off and on for 2 wks since she fell. No swelling.    2/26:  No new complaints. Still c/o back and ab pain. Still passing gas and had BM. Diuresed with 1 mg Bumex yesterday by Pulmonary. Remains on Zosyn and Levaquin. Blood culture remains neg. Tmax 99.2. X-ray of wrists ok. CXR ok with tube inplace. WBC 15.7K.      2/27:  Looks much better today. Much less work of breathing. Diuresed about 1 liter over last 24h. WBC 16K today. Blood cult remains neg. Off antibiotics. Still on TPN. She will need rehab for PT/OT. 2/28: Very restless during the night. Has not been OOB. Daughter stayed the night with her. WBC is still up. Antibiotics stopped yesterday. She is c/o increased pain in her back and can't get comfortable. More anxious and agitated. Tolerating sips. 3/1:  She reports she feels better this AM after she had some sleep. Afeb. WBC still up - a bit more today with increased neutrophils. Will also get ID to see also. CT AB and Pelvis as below. Cultures done yesterday off antibiotics - so far no growth. No output from drain - may need to be repositioned. More edema today but not as short of breath today while sitting up - add albumin to todays lab. Add Aldactone if ok with GI and Bumex as per Pul. Discussed cirrhosis/ascites/edema with pts daughter. 3/2:  She reports she feels better, and has slightly less edema, although daughters note they think pts abdomin more swollen. The daughters are more worried about pts LE edema than anything else - explained that with pts low albumin that edema may cont to be a problem. She is more alert, a bit more oriented today and says she has much less pain today. Tmax 101. Culture had yeast - diflucan started. Drain was repositioned, had thoracentesis and new PICC placed yesterday. May need to restart broad spectrum antibiotics - ID consulted. 3/3:  She reports she feels better this AM.  More alert and oriented this AM.  BM yesterday. Tmax 99. WBC still 17-18K. ID consulted and advises to watch off antibiotics for now. Less edema LE.      3/4:  Doing much better today and much more alert. Little pain. Off sleeping meds for now - not sleeping well but reports \"never was a good sleeper. \"  Anxious at night. WBC still up. Discussed rehab with daughters and pt and pt willing to go. 3/5:  Continues to improve. She wants to eat solid food - she now has an appetite. Passing gas. Has been up to chair. K+ 5.3 - will DC aldactone for now and restart if edema returns. CXR pending today. 3/6: She reports she feels better. Yesterday had marked increase in ALYSSA output and now NPO and back on TPN today. Will resume Aldactone again (at lower dose) as sl more edema today and hold again if K+ creeping up again. AM labs pending. Past Medical History:   Diagnosis Date    Alcoholic cirrhosis of liver without ascites ([facility] [address] 2/12/2018    ETOH abuse 2/11/2018    History of vascular access device 02/16/2018    [facility] VAT - 5 FR triple, R basilic, TPN    Hyperammonemia ([facility] [address] 2/16/2018     Results for Dahlia Check (MRN [id number]) as of 2/17/2018 08:43  Ref. Range 2/16/2018 17:20 Ammonia Latest Ref Range: <32 UMOL/L 69 (H)     Hyperlipidemia     Perforated chronic gastric ulcer ([facility] [address] 2/11/2018     Past Surgical History:   Procedure Laterality Date    HX CATARACT REMOVAL       Social History     Social History    Marital status: SINGLE     Spouse name: N/A    Number of children: N/A    Years of education: N/A     Occupational History    Not on file.      Social History Main Topics    Smoking status: Former Smoker    Smokeless tobacco: Never Used    Alcohol use 11.4 oz/week     0 Standard drinks or equivalent, 19 Glasses of wine per week      Comment: Hx of heavy etoh use, but quit several months ago    Drug use: No    Sexual activity: Not on file     Other Topics Concern    Not on file     Social History Narrative     Family History   Problem Relation Age of Onset    Other Other      patient did not know     Review of Systems:   Review of systems not obtained due to patient factors. Objective:   Physical Exam:     Visit Vitals    /58 (BP 1 Location: Left arm, BP Patient Position: At rest)    Pulse (!) 102    Temp 99 °F (37.2 °C)    Resp 18    Ht 5' 3.5\" (1.613 m)    Wt 81 kg (178 lb 9.2 oz)    SpO2 92%    Breastfeeding No    BMI 31.14 kg/m2    O2 Flow Rate (L/min): 3 l/min O2 Device: Nasal cannula    Temp (24hrs), Av.5 °F (36.9 °C), Min:97.6 °F (36.4 °C), Max:99 °F (37.2 °C)    1901 -  0700  In: -   Out: 1140 [Urine:1100; Drains:40]    0701 -  190  In: -   Out: 1009 [Urine:2400; Drains:3]    General:  Awake, uncomfortable, appears stated age. Head:  Normocephalic, without obvious abnormality, atraumatic. Eyes:  Conjunctivae/corneas clear. EOMs intact. Throat: Lips, mucosa, and tongue dry. Neck: Supple, symmetrical, trachea midline, no adenopathy, thyroid: no enlargement/tenderness/nodules, no carotid bruit and no JVD. Lungs:    no intercostal use, breath sounds diminished. Clear at this time. Chest wall:  No tenderness or deformity. Left drain site looks ok and little drainage. Heart:  Tachycardic rate with regular rhythm,  no murmur, click, rub or gallop. Abdomen:   Soft, less distended today, with no tenderness. Bowel sounds present. Dressings dry. Incision dry without redness, staple line intact. Drain site looks ok and drainage clearing. Extremities: no cyanosis. Minimal to Trace LE edema. No calf tenderness or cords, no erythema   Skin: turgor normal. No rashes or lesions   Neurologic:   AOx2-3,  Gait not tested at this time. Sensation grossly normal to touch. Gross motor of extremities normal.       Data Review:    CT AB and Pelvis 18: IMPRESSION:  1.  There is residual fluid in the posterior portion of the left subphrenic  collection which does not appear well drained by the pigtail catheter. This  could be repositioned versus new drainage catheter placement if clinically  indicated. 2. Persistent ascites. 3. Increasing right pleural effusion and right lower lobe atelectasis   4. Distended duodenum and proximal small bowel of doubtful clinical  Significance. CXR 3/1/18:  INDICATION:  chest pain and shortness of breath following thoracentesis   EXAM: Portable chest 1716 . Comparison March 1, 2018  FINDINGS: There is no significant change in appearance the lungs. Small left  pleural effusion with left basilar atelectasis unchanged. Cardiomediastinal  silhouette is unchanged. No pneumothorax. Lines and tubes are unchanged in  position. IMPRESSION:  1. No interval change    Recent Days:  Recent Labs      03/06/18 0214 03/05/18 0256  03/04/18   0009   WBC  17.4*  15.8*  18.6*   HGB  8.2*  8.6*  9.1*   HCT  27.1*  27.8*  29.2*   PLT  392  461*  492*     Recent Labs      03/06/18 0214 03/05/18 0256  03/04/18   0009   NA   --   137  137   K   --   5.3*  4.8   CL   --   106  106   CO2   --   24  25   GLU   --   96  92   BUN   --   15  14   CREA   --   0.51*  0.46*   CA   --   8.4*  8.2*   MG  1.6  1.8  1.7   PHOS  3.5  4.6  4.1   ALB   --   1.5*   --    TBILI   --   0.6   --    SGOT   --   39*   --    ALT   --   30   --      No results for input(s): PH, PCO2, PO2, HCO3, FIO2 in the last 72 hours.   24 Hour Results:  Recent Results (from the past 24 hour(s))   GLUCOSE, POC    Collection Time: 03/05/18 11:37 AM   Result Value Ref Range    Glucose (POC) 94 65 - 100 mg/dL    Performed by Favian Newell    GLUCOSE, POC    Collection Time: 03/05/18  6:06 PM   Result Value Ref Range    Glucose (POC) 81 65 - 100 mg/dL    Performed by Kailyn Velasquez (PCT)    GLUCOSE, POC    Collection Time: 03/06/18 12:00 AM   Result Value Ref Range    Glucose (POC) 129 (H) 65 - 100 mg/dL    Performed by Fenton Epley (PCT)    CBC WITH AUTOMATED DIFF    Collection Time: 03/06/18 2:14 AM   Result Value Ref Range    WBC 17.4 (H) 3.6 - 11.0 K/uL    RBC 2.55 (L) 3.80 - 5.20 M/uL    HGB 8.2 (L) 11.5 - 16.0 g/dL    HCT 27.1 (L) 35.0 - 47.0 %    .3 (H) 80.0 - 99.0 FL    MCH 32.2 26.0 - 34.0 PG    MCHC 30.3 30.0 - 36.5 g/dL    RDW 15.2 (H) 11.5 - 14.5 %    PLATELET 137 362 - 542 K/uL    MPV 11.6 8.9 - 12.9 FL    NRBC 0.0 0  WBC    ABSOLUTE NRBC 0.00 0.00 - 0.01 K/uL    NEUTROPHILS 79 (H) 32 - 75 %    LYMPHOCYTES 10 (L) 12 - 49 %    MONOCYTES 6 5 - 13 %    EOSINOPHILS 3 0 - 7 %    BASOPHILS 1 0 - 1 %    IMMATURE GRANULOCYTES 2 (H) 0.0 - 0.5 %    ABS. NEUTROPHILS 13.7 (H) 1.8 - 8.0 K/UL    ABS. LYMPHOCYTES 1.7 0.8 - 3.5 K/UL    ABS. MONOCYTES 1.0 0.0 - 1.0 K/UL    ABS. EOSINOPHILS 0.5 (H) 0.0 - 0.4 K/UL    ABS. BASOPHILS 0.1 0.0 - 0.1 K/UL    ABS. IMM.  GRANS. 0.3 (H) 0.00 - 0.04 K/UL    DF AUTOMATED     MAGNESIUM    Collection Time: 03/06/18  2:14 AM   Result Value Ref Range    Magnesium 1.6 1.6 - 2.4 mg/dL   PHOSPHORUS    Collection Time: 03/06/18  2:14 AM   Result Value Ref Range    Phosphorus 3.5 2.6 - 4.7 MG/DL   GLUCOSE, POC    Collection Time: 03/06/18  5:38 AM   Result Value Ref Range    Glucose (POC) 140 (H) 65 - 100 mg/dL    Performed by Kristine Crook (PCT)      Medications reviewed  Current Facility-Administered Medications   Medication Dose Route Frequency    albuterol-ipratropium (DUO-NEB) 2.5 MG-0.5 MG/3 ML  3 mL Nebulization Q4H PRN    melatonin tablet 1.5 mg  1.5 mg Oral QHS PRN    HYDROmorphone (PF) (DILAUDID) injection 1 mg  1 mg IntraVENous Q3H PRN    fluconazole (DIFLUCAN) 200mg/100 mL IVPB (premix)  200 mg IntraVENous Q24H    fentaNYL (DURAGESIC) 25 mcg/hr patch 1 Patch  1 Patch TransDERmal Q72H    haloperidol lactate (HALDOL) injection 1 mg  1 mg IntraVENous Q4H PRN    guaiFENesin ER (MUCINEX) tablet 1,200 mg  1,200 mg Oral Q12H    acetaminophen (TYLENOL) tablet 650 mg  650 mg Oral Q4H PRN    insulin regular (NOVOLIN R, HUMULIN R) injection   SubCUTAneous Q6H    fat emulsion 20% (LIPOSYN, INTRAlipid) infusion 500 mL  500 mL IntraVENous Q MON, WED & SAT    glucose chewable tablet 16 g  4 Tab Oral PRN    glucagon (GLUCAGEN) injection 1 mg  1 mg IntraMUSCular PRN    dextrose (D50W) injection syrg 12.5-25 g  12.5-25 g IntraVENous PRN    alteplase (CATHFLO) 1 mg in sterile water (preservative free) 1 mL injection  1 mg InterCATHeter PRN    sodium chloride (NS) flush 10-30 mL  10-30 mL InterCATHeter PRN    sodium chloride (NS) flush 10-40 mL  10-40 mL InterCATHeter Q8H    naloxone (NARCAN) injection 0.4 mg  0.4 mg IntraVENous PRN    ondansetron (ZOFRAN) injection 4 mg  4 mg IntraVENous Q4H PRN    enoxaparin (LOVENOX) injection 40 mg  40 mg SubCUTAneous Q24H    pantoprazole (PROTONIX) injection 40 mg  40 mg IntraVENous Q12H    phenol throat spray (CHLORASEPTIC) 1 Spray  1 Spray Oral PRN    nystatin (MYCOSTATIN) 100,000 unit/gram powder   Topical BID    sodium chloride (NS) flush 5-10 mL  5-10 mL IntraVENous PRN     Care Plan discussed with: Patient and daughter and Nurse   Total time spent with patient: 30 minutes.   Ale Cosby MD

## 2018-03-06 NOTE — PROGRESS NOTES
Problem: Falls - Risk of  Goal: *Absence of Falls  Document Demarcus Fall Risk and appropriate interventions in the flowsheet.    Outcome: Progressing Towards Goal  Fall Risk Interventions:  Mobility Interventions: Patient to call before getting OOB    Mentation Interventions: Bed/chair exit alarm, Eyeglasses and hearing aids    Medication Interventions: Patient to call before getting OOB, Teach patient to arise slowly    Elimination Interventions: Call light in reach, Patient to call for help with toileting needs    History of Falls Interventions: Bed/chair exit alarm

## 2018-03-06 NOTE — PROGRESS NOTES
Problem: Mobility Impaired (Adult and Pediatric)  Goal: *Acute Goals and Plan of Care (Insert Text)  Physical Therapy Goals  Initiated 2/20/2018    1. Patient will move from supine to sit and sit to supine , scoot up and down and roll side to side in bed with moderate assistance x 2  within 7 days. Partially Met - Continue  2. Patient will perform sit <> stand with minimal assist x 2  within 7 days. Met - Advance goal below  3. Patient will ambulate with minimal assistance x 2 for 10 feet with RW and assist of 3rd pushing chair from behind within 7 days. Not Met- Continue  4. Patient will verbalize and demonstrate understanding of spinal precautions (No bending, lifting greater than 5 lbs, or twisting; log-roll technique; frequent repositioning as instructed) within 7 days. Not Met- Continue    Physical Therapy Goals  Revised 2/28/2018  1. Patient will move from supine to sit and sit to supine  and roll side to side in bed with moderate assistance x 1  within 7 day(s). 2.  Patient will move from long sitting in bed to edge of bed with minimal assistance x 1 within 7 days. 3.  Patient will transfer from bed to chair and chair to bed with moderate assistance x 1 using the least restrictive device within 7 day(s). 4.  Patient will perform sit <> stand with minimal assistance x 1 within 7 day(s). 5.  Patient will ambulate with minimal assistance x 1  for 15 feet with the least restrictive device within 7 day(s). 6.  Patient will verbalize and demonstrate understanding of spinal precautions (No bending, lifting greater than 5 lbs, or twisting; log-roll technique; frequent repositioning as instructed) within 7 days. physical Therapy TREATMENT  Patient: Cinthya Hitchcock (56 y.o. female)  Date: 3/6/2018  Precautions: Aspiration, Back, Bed Alarm, Fall  Chart, physical therapy assessment, plan of care and goals were reviewed. ASSESSMENT: seen for back to bed.  See previous note from hour prior with all Vital sign measures  Patient tolerated sitting x 25 minutes total and needed back to bed. BP improved 101/59 and on 4L 90% HR 104bpm in chair. Stand pivot transfer chair to bed with RW and without LSO brace (patient requested due to pain) with minimal assist x 1 on 4L. Moderate assist x 1 to reposition back to bed in midline orientation. Patient making very slow gains- needs BID and posted sign on patient's board for patient to be OOB and in chair x hour three times daily. Will continue BID tomorrow as able. Needs rehab. Progression toward goals:  []      Improving appropriately and progressing toward goals  [x]      Improving slowly and progressing toward goals  []      Not making progress toward goals and plan of care will be adjusted     PLAN:  Patient continues to benefit from skilled intervention to address the above impairments. Continue treatment per established plan of care. Discharge Recommendations:  Rehab  Further Equipment Recommendations for Discharge:  none     SUBJECTIVE:   Patient stated I am so weak.        OBJECTIVE DATA SUMMARY:   Functional Mobility Training:  Bed Mobility:  Log Rolling:  (moves from long sitting position)  Supine to Sit: Minimum assistance  Sit to Supine: Moderate assistance  Scooting: Additional time          Transfers:  Sit to Stand: Minimum assistance; Additional time;Assist x2  Stand to Sit: Minimum assistance; Additional time;Assist x2  Stand Pivot Transfers: Assist x2     Bed to Chair: Minimum assistance; Additional time;Assist x2                    Ambulation/Gait Training:  Distance (ft): 30 Feet (ft)  Assistive Device: Gait belt;Walker, rolling  Ambulation - Level of Assistance: Minimal assistance; Additional time;Assist x2        Gait Abnormalities: Antalgic;Decreased step clearance        Base of Support: Widened     Speed/Alejandra: Shuffled; Slow       Stairs - Level of Assistance:  (NT)       Pain:  Pain Scale 1: Numeric (0 - 10)  Pain Intensity 1: 9  Pain Location 1: Abdomen  Pain Orientation 1: Left  Pain Description 1: Aching  Pain Intervention(s) 1: Medication (see MAR)  Activity Tolerance:   Very poor- see previous note  Please refer to the flowsheet for vital signs taken during this treatment.   After treatment:   []  Patient left in no apparent distress sitting up in chair  [x]  Patient left in no apparent distress in bed  [x]  Call bell left within reach  [x]  Nursing notified  []  Caregiver present  []  Bed alarm activated    COMMUNICATION/COLLABORATION:   The patients plan of care was discussed with: Registered Nurse    Javed Ratliff, PT   Time Calculation: 29 mins

## 2018-03-07 LAB
ANION GAP SERPL CALC-SCNC: 8 MMOL/L (ref 5–15)
BUN SERPL-MCNC: 18 MG/DL (ref 6–20)
BUN/CREAT SERPL: 37 (ref 12–20)
CALCIUM SERPL-MCNC: 8.2 MG/DL (ref 8.5–10.1)
CHLORIDE SERPL-SCNC: 103 MMOL/L (ref 97–108)
CO2 SERPL-SCNC: 24 MMOL/L (ref 21–32)
CREAT SERPL-MCNC: 0.49 MG/DL (ref 0.55–1.02)
GLUCOSE BLD STRIP.AUTO-MCNC: 133 MG/DL (ref 65–100)
GLUCOSE BLD STRIP.AUTO-MCNC: 145 MG/DL (ref 65–100)
GLUCOSE BLD STRIP.AUTO-MCNC: 164 MG/DL (ref 65–100)
GLUCOSE BLD STRIP.AUTO-MCNC: 88 MG/DL (ref 65–100)
GLUCOSE BLD STRIP.AUTO-MCNC: 97 MG/DL (ref 65–100)
GLUCOSE SERPL-MCNC: 169 MG/DL (ref 65–100)
MAGNESIUM SERPL-MCNC: 1.9 MG/DL (ref 1.6–2.4)
PHOSPHATE SERPL-MCNC: 3.3 MG/DL (ref 2.6–4.7)
POTASSIUM SERPL-SCNC: 5.4 MMOL/L (ref 3.5–5.1)
SERVICE CMNT-IMP: ABNORMAL
SERVICE CMNT-IMP: NORMAL
SERVICE CMNT-IMP: NORMAL
SODIUM SERPL-SCNC: 135 MMOL/L (ref 136–145)
TRIGL SERPL-MCNC: 70 MG/DL (ref ?–150)

## 2018-03-07 PROCEDURE — 77030038269 HC DRN EXT URIN PURWCK BARD -A

## 2018-03-07 PROCEDURE — 74011000250 HC RX REV CODE- 250: Performed by: SURGERY

## 2018-03-07 PROCEDURE — 74011250637 HC RX REV CODE- 250/637: Performed by: NURSE PRACTITIONER

## 2018-03-07 PROCEDURE — 74011000258 HC RX REV CODE- 258: Performed by: SURGERY

## 2018-03-07 PROCEDURE — 36415 COLL VENOUS BLD VENIPUNCTURE: CPT | Performed by: SURGERY

## 2018-03-07 PROCEDURE — 74011636637 HC RX REV CODE- 636/637: Performed by: SURGERY

## 2018-03-07 PROCEDURE — 74011250637 HC RX REV CODE- 250/637: Performed by: SURGERY

## 2018-03-07 PROCEDURE — 97110 THERAPEUTIC EXERCISES: CPT

## 2018-03-07 PROCEDURE — 74011250636 HC RX REV CODE- 250/636: Performed by: SURGERY

## 2018-03-07 PROCEDURE — C9113 INJ PANTOPRAZOLE SODIUM, VIA: HCPCS | Performed by: SURGERY

## 2018-03-07 PROCEDURE — 65660000000 HC RM CCU STEPDOWN

## 2018-03-07 PROCEDURE — 74011250637 HC RX REV CODE- 250/637: Performed by: FAMILY MEDICINE

## 2018-03-07 PROCEDURE — 83735 ASSAY OF MAGNESIUM: CPT | Performed by: SURGERY

## 2018-03-07 PROCEDURE — 97530 THERAPEUTIC ACTIVITIES: CPT

## 2018-03-07 PROCEDURE — 74011250636 HC RX REV CODE- 250/636: Performed by: INTERNAL MEDICINE

## 2018-03-07 PROCEDURE — 82962 GLUCOSE BLOOD TEST: CPT

## 2018-03-07 PROCEDURE — 84100 ASSAY OF PHOSPHORUS: CPT | Performed by: SURGERY

## 2018-03-07 PROCEDURE — 80048 BASIC METABOLIC PNL TOTAL CA: CPT | Performed by: SURGERY

## 2018-03-07 PROCEDURE — 97535 SELF CARE MNGMENT TRAINING: CPT

## 2018-03-07 PROCEDURE — 97116 GAIT TRAINING THERAPY: CPT

## 2018-03-07 PROCEDURE — 84478 ASSAY OF TRIGLYCERIDES: CPT | Performed by: SURGERY

## 2018-03-07 PROCEDURE — 77010033678 HC OXYGEN DAILY

## 2018-03-07 RX ORDER — FACIAL-BODY WIPES
10 EACH TOPICAL ONCE
Status: COMPLETED | OUTPATIENT
Start: 2018-03-07 | End: 2018-03-07

## 2018-03-07 RX ADMIN — GUAIFENESIN 1200 MG: 600 TABLET, EXTENDED RELEASE ORAL at 20:50

## 2018-03-07 RX ADMIN — Medication 10 ML: at 15:45

## 2018-03-07 RX ADMIN — I.V. FAT EMULSION 500 ML: 20 EMULSION INTRAVENOUS at 20:49

## 2018-03-07 RX ADMIN — SPIRONOLACTONE 25 MG: 25 TABLET, FILM COATED ORAL at 09:35

## 2018-03-07 RX ADMIN — Medication 10 ML: at 04:27

## 2018-03-07 RX ADMIN — HYDROMORPHONE HYDROCHLORIDE 1 MG: 2 INJECTION, SOLUTION INTRAMUSCULAR; INTRAVENOUS; SUBCUTANEOUS at 04:27

## 2018-03-07 RX ADMIN — SODIUM CHLORIDE 200 MG: 900 INJECTION, SOLUTION INTRAVENOUS at 11:19

## 2018-03-07 RX ADMIN — PANTOPRAZOLE SODIUM 40 MG: 40 INJECTION, POWDER, FOR SOLUTION INTRAVENOUS at 20:50

## 2018-03-07 RX ADMIN — HUMAN INSULIN 2 UNITS: 100 INJECTION, SOLUTION SUBCUTANEOUS at 23:13

## 2018-03-07 RX ADMIN — PANTOPRAZOLE SODIUM 40 MG: 40 INJECTION, POWDER, FOR SOLUTION INTRAVENOUS at 09:35

## 2018-03-07 RX ADMIN — BISACODYL 10 MG: 10 SUPPOSITORY RECTAL at 11:25

## 2018-03-07 RX ADMIN — MELATONIN TAB 3 MG 1.5 MG: 3 TAB at 20:50

## 2018-03-07 RX ADMIN — ENOXAPARIN SODIUM 40 MG: 40 INJECTION SUBCUTANEOUS at 15:45

## 2018-03-07 RX ADMIN — Medication 10 ML: at 20:49

## 2018-03-07 RX ADMIN — GUAIFENESIN 1200 MG: 600 TABLET, EXTENDED RELEASE ORAL at 09:35

## 2018-03-07 RX ADMIN — CALCIUM GLUCONATE: 94 INJECTION, SOLUTION INTRAVENOUS at 19:07

## 2018-03-07 RX ADMIN — HUMAN INSULIN 2 UNITS: 100 INJECTION, SOLUTION SUBCUTANEOUS at 05:58

## 2018-03-07 RX ADMIN — NYSTATIN: 100000 POWDER TOPICAL at 09:35

## 2018-03-07 NOTE — PROGRESS NOTES
[facility]     Name: Anu Burch MRN: [id number]   : 1945 Hospital: [address]   Date: 3/7/2018            Impression Plan   1. Acute Respiratory Failure with Hypoxia  2. Abnormal Chest CT: with bilateral pleural effusions, atelectasis,and patchy airspace disease in MIR[place]; s/p thoracentesis on 3/1  3. Leukocytosis, increased today  4. ALYSSA drain culture: + yeast  5. Delirium; improved  6. Peforated gastric ulcer, s/p repair  7. Hypokalemia, hypomagnesemia; resolved  8. EtOH abuse     · Goal sats >90%, wean O2 as tolerated; RN will attempt to wean today  · ID following-continue Eraxis  · F/U blood cultures  · F/U cultures from abdominal drains  · Speech following and appreciate help: NPO currently  · Will  repeat CXR tomorrow AM  · Check CBC tomorrow  · C/W IV Haldol as needed for delerium  · Switch nebs to PRN  · Guafenesin   · Post-op management as per surgery  · Pain control  · Seroquel at night for sleep  · Monitor lytes, replete as needed. · Encourage IS use  · PT/OT  · OOB and mobilize as much as possible  · TPN  · SCDs  · Protonix                             Subjective     Overnight Events    TMax 99  BP stable  O2 sats 92% on 4L NC- increased requirements today  Fluid balance: -1580 but again not accurate documentation of intake  K 5.4  Creat . 49      ROS    Has gotten up twice today for two hours and is now tired and sleeping. Daughter reports decreased pain and seems better mentally today. Denies further complaints. 12 point ROS reviewed and negative other than noted above. Past Medical History:   Diagnosis Date    Alcoholic cirrhosis of liver without ascites ([facility] [address] 2018    ETOH abuse 2018    History of vascular access device 2018    [facility] VAT - 5 FR triple, R basilic, TPN    Hyperammonemia ([facility] [address] 2018     Results for Nael Garcias (MRN [id number]) as of 2018 08:43  Ref.  Range 2018 17:20 Ammonia Latest Ref Range: <32 UMOL/L 69 (H)     Hyperlipidemia     Perforated chronic gastric ulcer ([address] 2/11/2018      Past Surgical History:   Procedure Laterality Date    HX CATARACT REMOVAL        Prior to Admission medications    Medication Sig Start Date End Date Taking? Authorizing Provider   naproxen sodium (ALEVE) 220 mg tablet Take 220 mg by mouth daily as needed for Pain. Yes Historical Provider   cyanocobalamin (VITAMIN B12) 500 mcg tablet Take 500 mcg by mouth daily. Yes Historical Provider   multivitamin (ONE A DAY) tablet Take 1 Tab by mouth daily. Yes Historical Provider   omega-3 fatty acids-vitamin e 1,000 mg cap Take 2 Caps by mouth daily.    Yes Historical Provider     Current Facility-Administered Medications   Medication Dose Route Frequency    TPN ADULT - CENTRAL   IntraVENous CONTINUOUS    [START ON 3/8/2018] anidulafungin (ERAXIS) 100 mg in 0.9% sodium chloride 130 mL IVPB  100 mg IntraVENous Q24H    spironolactone (ALDACTONE) tablet 25 mg  25 mg Oral DAILY    fentaNYL (DURAGESIC) 25 mcg/hr patch 1 Patch  1 Patch TransDERmal Q72H    guaiFENesin ER (MUCINEX) tablet 1,200 mg  1,200 mg Oral Q12H    insulin regular (NOVOLIN R, HUMULIN R) injection   SubCUTAneous Q6H    fat emulsion 20% (LIPOSYN, INTRAlipid) infusion 500 mL  500 mL IntraVENous Q MON, WED & SAT    sodium chloride (NS) flush 10-40 mL  10-40 mL InterCATHeter Q8H    enoxaparin (LOVENOX) injection 40 mg  40 mg SubCUTAneous Q24H    pantoprazole (PROTONIX) injection 40 mg  40 mg IntraVENous Q12H    nystatin (MYCOSTATIN) 100,000 unit/gram powder   Topical BID     No Known Allergies   Social History   Substance Use Topics    Smoking status: Former Smoker    Smokeless tobacco: Never Used    Alcohol use 11.4 oz/week     0 Standard drinks or equivalent, 19 Glasses of wine per week      Comment: Hx of heavy etoh use, but quit several months ago      Family History   Problem Relation Age of Onset    Other Other      patient did not know Laboratory: I have personally reviewed the critical care flowsheet and labs. Recent Labs      03/06/18   0214  03/05/18   0256   WBC  17.4*  15.8*   HGB  8.2*  8.6*   HCT  27.1*  27.8*   PLT  392  461*     Recent Labs      03/07/18   0426  03/06/18   1114  03/06/18   0214  03/05/18   0256   NA  135*  135*   --   137   K  5.4*  4.9   --   5.3*   CL  103  104   --   106   CO2  24  24   --   24   GLU  169*  93   --   96   BUN  18  16   --   15   CREA  0.49*  0.57   --   0.51*   CA  8.2*  8.2*   --   8.4*   MG  1.9   --   1.6  1.8   PHOS  3.3   --   3.5  4.6   ALB   --    --    --   1.5*   SGOT   --    --    --   39*   ALT   --    --    --   30       Objective:          Intake/Output Summary (Last 24 hours) at 03/07/18 1529  Last data filed at 03/07/18 0445   Gross per 24 hour   Intake              100 ml   Output             1680 ml   Net            -1580 ml     GENERAL: alert, calm, no distress HEENT:  PERRL, EOMI, no alar flaring or epistaxis, oral mucosa moist without cyanosis, NECK:  no jugular vein distention, no retractions, no thyromegaly or masses, LUNGS: Decreased bs bilaterally, drain in place with very little output HEART:  Regular rate and rhythm with no MGR; no edema is present, ABDOMEN:  soft, stable CDI, drain dressing CDI EXTREMITIES:  warm with no cyanosis, dependent edema in LEs SKIN:  no jaundice or ecchymosis and NEUROLOGIC: alert, oriented, grossly non-focal    Moris Michelle, ELVIA  [facility] [place]

## 2018-03-07 NOTE — PROGRESS NOTES
Problem: Mobility Impaired (Adult and Pediatric)  Goal: *Acute Goals and Plan of Care (Insert Text)  Physical Therapy Goals  Revised 3/7/2018  1. Patient will move from long sitting to sit edge of bed and sit to supine and roll side to side in bed with modified independence 7 day(s). 2.  Patient will transfer from bed to chair and chair to bed with contact guard assist x1 using RW within 7 day(s). 3.  Patient will perform sit <>stand with contact guard assist x 1 within 7 day(s). 4.  Patient will ambulate with minimal assistance x 1  for 50 feet with RW within 7 day(s). 5.  Patient will verbalize and demonstrate understanding of spinal precautions (No bending, lifting greater than 5 lbs, or twisting; log-roll technique; frequent repositioning as instructed) within 7 days. Physical Therapy Goals  Revised 2/28/2018  1. Patient will move from supine to sit and sit to supine  and roll side to side in bed with moderate assistance x 1  within 7 day(s). MET, upgrade  2. Patient will move from long sitting in bed to edge of bed with minimal assistance x 1 within 7 days. MET, advance  3. Patient will transfer from bed to chair and chair to bed with moderate assistance x 1 using the least restrictive device within 7 day(s). 4.  Patient will perform sit <> stand with minimal assistance x 1 within 7 day(s). NOT MET - continue  5. Patient will ambulate with minimal assistance x 1  for 15 feet with the least restrictive device within 7 day(s). MET, advance  6. Patient will verbalize and demonstrate understanding of spinal precautions (No bending, lifting greater than 5 lbs, or twisting; log-roll technique; frequent repositioning as instructed) within 7 days. NOT MET- continue    Physical Therapy Goals  Initiated 2/20/2018  1. Patient will move from supine to sit and sit to supine , scoot up and down and roll side to side in bed with moderate assistance x 2  within 7 days. Partially Met - Continue  2.  Patient will perform sit <> stand with minimal assist x 2  within 7 days. Met - Advance goal below  3. Patient will ambulate with minimal assistance x 2 for 10 feet with RW and assist of 3rd pushing chair from behind within 7 days. Not Met- Continue  4. Patient will verbalize and demonstrate understanding of spinal precautions (No bending, lifting greater than 5 lbs, or twisting; log-roll technique; frequent repositioning as instructed) within 7 days. Not Met- Continue    physical Therapy TREATMENT: WEEKLY REASSESSMENT  Patient: Anthony Otoole (24 y.o. female)  Date: 3/7/2018  Diagnosis: Intra-abdominal free air of unknown etiology [K66.8] Perforated chronic gastric ulcer ([facility] [address]  Procedure(s) (LRB):   NASOGASTRIC TUBE PLACEMENT (N/A) 17 Days Post-Op  Precautions: Aspiration, Back, Bed Alarm, Fall, Skin  Chart, physical therapy assessment, plan of care and goals were reviewed. ASSESSMENT: Patient seen twice today - 30 minutes each session  Complex medical case and medical course of 68 yo female with perforated gastric ulcer, burst fracture T12 after GLF while home alone and intoxicated and ETOH abuse histor/cirrohosis. Today 23rd day length of stay. Slow progress but improving with PT. More alert and oriented and active participant. Still forgetful at times and does not recall spine precautions. Patient unwilling to wear TLSO due to weight of brace and because of pain from drain tube site left lateral flank. Patient able to maneuver amb around bed twice and sit up in chair. New schedule posted in room and RN did get patient OOB>chair  this am for 30 minutes. PT advising patient she will stay up in chair for an hour 1-2 pm. Patient's vitals stable- see flow sheet and not with severe orthostatic hypotension as experienced yesterday with sitting in chair. Patient still requiring 4L oxygen per NC for activity to keep sats >90% and +dyspnea with activity. At rest she is on 2L and SPO2 average 90%.  Patient needs rehab at discharge. Will continue BID as able. Patient still is NPO. Patient given exercises to be performed in bed and exercises also posted in room so she may complete them with her daughter's assistance. Patient is extremely debilitated. PRE sit edge bed 113/54 90% 2L, 93% 4L 102 bpm  POST chair sit 128/57 93% 4L 124bpm +dyspnea +lightheadedness , resolving with rest  Patient's progression toward goals since last assessment: Slow progress with PT, advancing to BID and advanced today 3/7 to OOB to chair 3x/day for hour each     PLAN:  Goals have been updated based on progression since last assessment. Patient continues to benefit from skilled intervention to address the above impairments. Continue to follow the patient 1-2 times per day/4-7 days per week to address goals. Planned Interventions:  [x]              Bed Mobility Training             []       Neuromuscular Re-Education  [x]              Transfer Training                   []       Orthotic/Prosthetic Training  [x]              Gait Training                         []       Modalities  [x]              Therapeutic Exercises           []       Edema Management/Control  [x]              Therapeutic Activities            [x]       Patient and Family Training/Education  []              Other (comment):  Discharge Recommendations: Rehab  Further Equipment Recommendations for Discharge: TBD post rehab     SUBJECTIVE:   Patient stated Harriett Goldmann will give it my best. Please dont make me wear that brace.     OBJECTIVE DATA SUMMARY:   Critical Behavior:  Neurologic State: Alert  Orientation Level: Oriented X4  Cognition: Appropriate decision making  Safety/Judgement: Awareness of environment    Functional Mobility Training:  Bed Mobility:  Rolling:  (moves from long sit position - head of bed fully upright)  Supine to Sit: Stand-by assistance (long sit in bed to edge of bed)     Scooting:  Additional time (and max verbal cues)        Transfers:  Sit to Stand: Minimum assistance; Additional time;Assist x2  Stand to Sit: Minimum assistance; Additional time;Assist x2  Stand Pivot Transfers: Assist x2     Bed to Chair: Minimum assistance; Additional time;Assist x2                    Balance:  Sitting: High guard; With support  Sitting - Static: Fair (occasional)  Sitting - Dynamic: Poor (constant support)  Standing: Impaired  Standing - Static: Constant support; Fair  Standing - Dynamic : Poor  Ambulation/Gait Training:  Distance (ft): 20 Feet (ft) (20)  Assistive Device: Gait belt;Walker, rolling (will not wear TLSO due to pain)  Ambulation - Level of Assistance: Minimal assistance;Assist x2  Gait Abnormalities: Antalgic;Decreased step clearance        Base of Support: Widened     Speed/Alejandra: Slow    Therapeutic Exercises:   Issued ankle pumps, Quad sets,  LAQs and knee raises, heel slides , SLRs, and chair push ups to be completed with her daughters assistance. Pain:  Pain Scale 1: Numeric (0 - 10)  Pain Intensity 1: 0  Pain Location 1: Abdomen  Pain Orientation 1: Lateral  Pain Description 1: Aching;Constant  Pain Intervention(s) 1: Medication (see MAR)  Activity Tolerance:   Very limited  Please refer to the flowsheet for vital signs taken during this treatment.   After treatment:   [x]  Patient left in no apparent distress sitting up in chair  []  Patient left in no apparent distress in bed  [x]  Call bell left within reach  [x]  Nursing notified  []  Caregiver present  [x]  alarm activated    COMMUNICATION/COLLABORATION:   The patients plan of care was discussed with: Registered Nurse and     Jann Burks, PT   Time Calculation: 30 mins

## 2018-03-07 NOTE — PROGRESS NOTES
[facility] Progress Note           Isis Caceres DO    [phone] Office  [phone]  Fax    3/7/2018      Assessment & Plan:   1. Leukocytosis. No labs today. Remains afebrile. Multiple potential etiologies. Consider tagged WBC scan if no improvement or patient develops fever. Watch for cdiff. Plan to monitor the patient off further antibiotics (except antifungal) for now. Plan 7 days anidulafungin depending on patient course    2. Candida parapsilosis and krusei  growing from the ALYSSA drain. The significance of this yeast is uncertain as it likely represents colonization or selective pressure from the 14 days of piperacillin-tazobactam.  Plan 7 days empiric anidulafungin for now and follow. 3. Perforated gastric ulcer. The patient underwent exploratory laparotomy with repair on .  Now with fistula. On TPN          Subjective:     Feeling better    Objective:     Vitals:   Visit Vitals    /68 (BP 1 Location: Left arm, BP Patient Position: At rest)    Pulse 89    Temp 99.5 °F (37.5 °C)    Resp 18    Ht 5' 3.5\" (1.613 m)    Wt 81 kg (178 lb 9.2 oz)    SpO2 91%    Breastfeeding No    BMI 31.14 kg/m2        Tmax:  Temp (24hrs), Av.5 °F (36.9 °C), Min:97.6 °F (36.4 °C), Max:99.5 °F (37.5 °C)      Exam:   Patient is intubated:  no    Physical Examination:   General:  Alert, cooperative, no distress   Head:  Normocephalic, without obvious abnormality, atraumatic. Eyes:  Conjunctivae/corneas clear. .   Neck: Supple, symmetrical, trachea midline, no adenopathy       Lungs:   Clear to auscultation anteriorly   Chest wall:  No tenderness or deformity. Heart:  Regular rate and rhythm, S1, S2 normal,. Abdomen:   Soft, non-tender. ALYSSA and accordian drain in place. Extremities: B/l LE edema   Skin: Skin color, texture, turgor normal. No rashes or lesions   Neurologic: CNII-XII intact.       Labs:        No lab exists for component: ITNL   No results for input(s): CPK, CKMB, TROIQ in the last 72 hours. Recent Labs      03/07/18   0426  03/06/18   1114  03/06/18   0214  03/05/18   0256   NA  135*  135*   --   137   K  5.4*  4.9   --   5.3*   CL  103  104   --   106   CO2  24  24   --   24   BUN  18  16   --   15   CREA  0.49*  0.57   --   0.51*   GLU  169*  93   --   96   PHOS  3.3   --   3.5  4.6   MG  1.9   --   1.6  1.8   ALB   --    --    --   1.5*   WBC   --    --   17.4*  15.8*   HGB   --    --   8.2*  8.6*   HCT   --    --   27.1*  27.8*   PLT   --    --   392  461*     No results for input(s): INR, PTP, APTT in the last 72 hours.     No lab exists for component: INREXT, INREXT  Needs: urine analysis, urine sodium, protein and creatinine  No results found for: CHIKIS, CREAU      Cultures:     No results found for: SDES  Lab Results   Component Value Date/Time    Culture result: Culture performed on Fluid swab specimen 03/01/2018 02:00 PM    Culture result: NO GROWTH 4 DAYS 03/01/2018 02:00 PM    Culture result: NO GROWTH 4 DAYS 03/01/2018 02:00 PM       Radiology:     Medications       Current Facility-Administered Medications   Medication Dose Route Frequency Last Dose    TPN ADULT - CENTRAL   IntraVENous CONTINUOUS      spironolactone (ALDACTONE) tablet 25 mg  25 mg Oral DAILY 25 mg at 03/06/18 0902    albuterol-ipratropium (DUO-NEB) 2.5 MG-0.5 MG/3 ML  3 mL Nebulization Q4H PRN      melatonin tablet 1.5 mg  1.5 mg Oral QHS PRN 1.5 mg at 03/06/18 2037    HYDROmorphone (PF) (DILAUDID) injection 1 mg  1 mg IntraVENous Q3H PRN 1 mg at 03/07/18 0427    fluconazole (DIFLUCAN) 200mg/100 mL IVPB (premix)  200 mg IntraVENous Q24H 200 mg at 03/06/18 1706    fentaNYL (DURAGESIC) 25 mcg/hr patch 1 Patch  1 Patch TransDERmal Q72H 1 Patch at 03/06/18 1004    haloperidol lactate (HALDOL) injection 1 mg  1 mg IntraVENous Q4H PRN 1 mg at 03/04/18 0401    guaiFENesin ER (MUCINEX) tablet 1,200 mg  1,200 mg Oral Q12H 1,200 mg at 03/06/18 2037    acetaminophen (TYLENOL) tablet 650 mg  650 mg Oral Q4H  mg at 03/02/18 0301    insulin regular (NOVOLIN R, HUMULIN R) injection   SubCUTAneous Q6H 2 Units at 03/07/18 0558    fat emulsion 20% (LIPOSYN, INTRAlipid) infusion 500 mL  500 mL IntraVENous Q MON, WED &  mL at 03/05/18 1831    glucose chewable tablet 16 g  4 Tab Oral PRN      glucagon (GLUCAGEN) injection 1 mg  1 mg IntraMUSCular PRN      dextrose (D50W) injection syrg 12.5-25 g  12.5-25 g IntraVENous PRN 12.5 g at 03/06/18 1814    alteplase (CATHFLO) 1 mg in sterile water (preservative free) 1 mL injection  1 mg InterCATHeter PRN 1 mg at 02/28/18 1607    sodium chloride (NS) flush 10-30 mL  10-30 mL InterCATHeter PRN 10 mL at 03/01/18 1632    sodium chloride (NS) flush 10-40 mL  10-40 mL InterCATHeter Q8H 10 mL at 03/07/18 0427    naloxone (NARCAN) injection 0.4 mg  0.4 mg IntraVENous PRN      ondansetron (ZOFRAN) injection 4 mg  4 mg IntraVENous Q4H PRN 4 mg at 03/05/18 0924    enoxaparin (LOVENOX) injection 40 mg  40 mg SubCUTAneous Q24H 40 mg at 03/06/18 1501    pantoprazole (PROTONIX) injection 40 mg  40 mg IntraVENous Q12H 40 mg at 03/06/18 2037    phenol throat spray (CHLORASEPTIC) 1 Spray  1 Spray Oral PRN 1 Spray at 02/13/18 1803    nystatin (MYCOSTATIN) 100,000 unit/gram powder   Topical BID      sodium chloride (NS) flush 5-10 mL  5-10 mL IntraVENous PRN 10 mL at 03/06/18 1706           Case discussed with:  surgery      Kate Lundberg DO

## 2018-03-07 NOTE — PROGRESS NOTES
Nutrition Assessment:    RECOMMENDATIONS/INTERVENTION(S):   1. Resume PO & diet advancement per Surgery MD    2. Continue Cyclic TPN until PO intakes consistently >01%    Cyclic TPN recommendations (12 hour cycle):  D20/6%AA + Lipids 20% (500 ml) @ 42 ml/hr x 12 hr 3x/wk  (TPN @ goal + Lipids provides 1823 kcals, 91 g protein, 1512 ml fluid)  1st hour: 63 ml/hr  2nd-11th hour: 138 ml/hr  12th hour: 63 ml/hr    ASSESSMENT:   3/7:  CLD x 1 d, NPO resumed. Pt w/ fistula. Output trending down. Cyclic TPN + Lipids @ goal rate. Labs/meds reviewed. [phone]. K elevated - adjusted in TPN. TG 70. No new PAB. Na low, trending up. No IVF. Wt @ 98% of admit wt. 1+ generalized edema charted on 3/5.      3/5:  Continuous TPN transitioned to Cyclic TPN @ goal rate x 4 days. BG controlled. Alk phos elevated (trending up), no new TG.  Mg, Phos, Na WDL. K elevated - KCl d/c-ed. Last BM 3/3 +flatus. CLD started for breakfast today per Surgery. Visited pt this afternoon. Daughter at bedside. Tolerating diet, ~50% PO of breakfast, interested in diet advancement. # (@ 104% of UBW), eating 2-3 meals/d PTA. Assisted pt w/ lunch order. 3/1:  Consult received per Surgery MD regarding PAB trending down on TPN. Noted pt NPO yesterday, NGT placed for possible SBO. Labs/meds reviewed. BG controlled. Alk phos elevated, stable. PAB 3.8 (was 4.5 2/26). Pt w/ worsening edema. Likely a factor in PAB. TPN @ goal rate x 10 d to meet 100% of energy & protein needs. 10% wt increase x 2.5 wks since admission. Trial increase in protein (+19.7%) - TPN recs above. Cyclic TPN recs above if pt continues on TPN in the next 1-2 days to spare liver. 2/26:  TPN @ goal, CLD started yesterday. Labs/meds reviewed. BG controlled. Na low. PAB 4.5 (2/26), 4.4 (2/23). Hypoactive BS, +BM (soft). Spoke w/ RN, pt anxious, s/p ativan.   Poor PO intakes, no interest in eating and/or drinking, not asking for anything. No family in room. SLP following - recs for sips of clears 2/2 decreased alertness. TPN re-ordered per Surgery. Day 11 of TPN. Alk phos, AST remain elevated - stable. Worsening edema. 2/22:  TPN + Lipids running @ goal rate. TG 76. Alk phos, AST elevated. BG controlled. Lytes WDL. No IVF. Pt reporting abd pain, for repeat CT abd today. Last BM 2/21, loose, +BS.         2/19:  Labs/meds reviewed. TPN running @ recommended goal rate. BG controlled, [phone]. On insulin. K continuing to require repletion. PAB 5.5. No TG, Alk phos WDL. NS IVF. Last BM 2/10, hypoactive BS. NGT placed today 2/2 abd pain. Continue TPN per Surgery MD.  WBC's elevated, for CT abd today. Possible kyphoplasty per Ortho. Noted new wt.      2/16:  Pt remains confused, agitated & sedated (CIWA protocol). NPO Day 6. Labs/meds reviewed. K repleted. No Phos. D5 LR IVF. TPN started per Surgery MD today, plans for UGI when able. Likely pt meets criteria for Severe Acute Malnutrition, no new wt, mild edema. TPN recs above. 2/14:  Extubated 2/12. Unable to start PO yesterday per Surgery - plans for UGI p/t PO once off pressors. Discussed case in ICU rounds. Pt agitated, confused overnight - pulled NGT. On sedation per CIWA protocol. NPO. No BM, flatus, absent BS. Labs/meds reviewed. K, Mg WDL. No Phos. 1/2 NS IVF. 2/12:  Chart reviewed 2/2 intubation. 67 yof admitted for intra-abd free air of unknown etiology. Pmhx includes gastric ulcer (EGD x 2 yrs ago), Etoh. Surgery following. POD #1 ex lap repair of perforated gastric ulcer, liver bx. Remains intubated 2/2 shock, respiratory failure. Labs/meds reviewed. K, Mg requiring repletion. No Phos. On Delbert. Propofol currently stopped. On PPI. Ortho following for T12 fx. Surgical incisions to abd, ALYSSA drain, no edema noted. Overwt per advanced age. No recent wt to compare per hx.   Energy needs calculated using current wt, vent settings, tmax. Noted n/v after fall yesterday, no reports of inadequate PO prior to fall. Etoh hx. SUBJECTIVE/OBJECTIVE:     Diet Order: NPO; Cyclic TPN x 12 hrs/d -> D20/6% 63 ml x 1, 138 ml x 10, 63 ml x 1 + Lipids 20% (500 ml) @ 42 ml/hr x 12 hr 3x/wk  % Eaten:  No data found. Pertinent Medications: [x] Reviewed     Past Medical History:   Diagnosis Date    Alcoholic [facility] liver without ascites [facility] 2/12/2018    ETOH abuse 2/11/2018    History of vascular access device 02/16/2018    [facility] VAT - 5 FR triple, R basilic, TPN    Hyperammonemia ([facility] 2/16/2018     Results for Dano Wisdom (MRN [id number]) as of 2/17/2018 08:43  Ref. Range 2/16/2018 17:20 Ammonia Latest Ref Range: <32 UMOL/L 69 (H)     Hyperlipidemia     Perforated chronic gastric ulcer ([facility] 2/11/2018       Chemistries:  Lab Results   Component Value Date/Time    Sodium 135 (L) 03/07/2018 04:26 AM    Potassium 5.4 (H) 03/07/2018 04:26 AM    Chloride 103 03/07/2018 04:26 AM    CO2 24 03/07/2018 04:26 AM    Anion gap 8 03/07/2018 04:26 AM    Glucose 169 (H) 03/07/2018 04:26 AM    BUN 18 03/07/2018 04:26 AM    Creatinine 0.49 (L) 03/07/2018 04:26 AM    BUN/Creatinine ratio 37 (H) 03/07/2018 04:26 AM    GFR est AA >60 03/07/2018 04:26 AM    GFR est non-AA >60 03/07/2018 04:26 AM    Calcium 8.2 (L) 03/07/2018 04:26 AM    AST (SGOT) 39 (H) 03/05/2018 02:56 AM    Alk. phosphatase 178 (H) 03/05/2018 02:56 AM    Protein, total 6.2 (L) 03/05/2018 02:56 AM    Albumin 1.5 (L) 03/05/2018 02:56 AM    Globulin 4.7 (H) 03/05/2018 02:56 AM    A-G Ratio 0.3 (L) 03/05/2018 02:56 AM    ALT (SGPT) 30 03/05/2018 02:56 AM      Anthropometrics: Height: 5' 3.5\" (161.3 cm) Weight: 81 kg (178 lb 9.2 oz)    IBW (%IBW): 58.7 kg (129 lb 6.6 oz) (131.69 %) UBW (%UBW): 77.1 kg (170 lb) (105.04 %)    BMI: Body mass index is 31.14 kg/(m^2).     This BMI is indicative of:   [] Underweight    [] Normal    [x] Overweight - per advanced age     []  Obesity []  Extreme Obesity (BMI>40)  Estimated Nutrition Needs (Based on): 1731 (BMR (1332) x 1.3 AF , 70 g (-88 (1.2-1.5 g/kg x IBW)) Protein  Carbohydrate:  At Least 130 g/day  Fluids: 1700 mL/day    Last BM: 3/6 loose, distended abd   [x]Active     []Hyperactive  []Hypoactive       [] Absent   BS  Skin:    [] Intact   [x] Incision - abd lap sites  [] Breakdown   [] DTI   [] Tears/Excoriation/Abrasion  [x]Edema - 1+ generalized (last charted 3/5) [x] Other:ALYSSA drain     Wt Readings from Last 30 Encounters:   03/05/18 81 kg (178 lb 9.2 oz)   04/29/16 76.3 kg (168 lb 3.2 oz)      NUTRITION DIAGNOSES:   Problem:  Inadequate oral intake     Etiology: related to altered GI function      Signs/Symptoms: as evidenced by s/p lap w/ perf viscus repair, intubation, NPO x 11, abd pain/repeat CT abd, need to continue TPN    2/27:  Above acute nutrition dx continues - CLD started 2/26, pt not alert & w/ poor PO intake, TPN renewed  3/1:  Above acute nutrition dx continues - NPO, NGT for possible SBO, alter TPN composition per MD  3/7:  Above acute nutrition dx continues - CLD x 1 d, NPO resumed for fistula, high output     NUTRITION INTERVENTIONS:  Meals/Snacks: General/healthful diet - once PO resumes Enteral/Parenteral Nutrition: Initiate parenteral nutrition - continue cyclic TPN                GOAL:   Continue cyclic TPN until PO resumed & tolerated in the next 1-3 days    Cultural, Pentecostal, or Ethnic Dietary Needs: None    EDUCATION & DISCHARGE NEEDS:    [x] None Identified   [] Identified and Education Provided/Documented   [] Identified and Pt declined/was not appropriate      [x] Interdisciplinary Care Plan Reviewed/Documented    [x] Discharge Needs:    TBD   [] No Nutrition Related Discharge Needs    NUTRITION RISK:   Pt Is At Nutrition Risk  [x]     No Nutrition Risk Identified  []       PT SEEN FOR:    []  MD Consult: []Calorie Count      []Diabetic Diet Education        []Diet Education     []Electrolyte Management     []General Nutrition Management and Supplements     []Management of Tube Feeding     []TPN Recommendations   []  RN Referral:  []MST score >=2     []Enteral/Parenteral Nutrition PTA     []Pregnant: Gestational DM or Multigestation                 [] Pressure Ulcer    []  Low BMI      []  Length of Stay       [] Dysphagia Diet         [] Ventilator  [x]  Follow-up - TPN     Previous Recommendations:   [x] Implemented     [] Progressing Towards Goal          [] Not Implemented          [] Not Applicable    Previous Goal:   [x] Met              [] Progressing Towards Goal              [] Not Progressing Towards Goal   [] Not Applicable            Willy Claudio, [address] [place] [address]  Pager [phone]

## 2018-03-07 NOTE — PROGRESS NOTES
Progress Note    Patient: Raisa Fink MRN: [id number]  SSN: [id number]    YOB: 1945  Age: 67 y.o. Sex: female      Admit Date: 2018    23 Days Post-Op    Procedure:  Procedure(s):  EX LAP    Subjective:     Mrs. Migdalia Lopez feels better. She has pain only if her abdomen is pressed. She denies passing flatus or BM. Objective:     Visit Vitals    /68 (BP 1 Location: Left arm, BP Patient Position: At rest)    Pulse 89    Temp 99.5 °F (37.5 °C)    Resp 18    Ht 5' 3.5\" (1.613 m)    Wt 178 lb 9.2 oz (81 kg)    SpO2 91%    Breastfeeding No    BMI 31.14 kg/m2       Temp (24hrs), Av.5 °F (36.9 °C), Min:97.6 °F (36.4 °C), Max:99.5 °F (37.5 °C)      Physical Exam:    GENERAL: alert, cooperative, no distress, ABDOMEN: Soft, +BS, less distended, minimal tenderness. Data Review: reviewed  X Rays of abdomen. Lab Review:   BMP:   Lab Results   Component Value Date/Time     (L) 2018 04:26 AM    K 5.4 (H) 2018 04:26 AM     2018 04:26 AM    CO2 24 2018 04:26 AM    AGAP 8 2018 04:26 AM     (H) 2018 04:26 AM    BUN 18 2018 04:26 AM    CREA 0.49 (L) 2018 04:26 AM    GFRAA >60 2018 04:26 AM    GFRNA >60 2018 04:26 AM     CBC: No results found for: WBC, HGB, HGBEXT, HCT, HCTEXT, PLT, PLTEXT, HGBEXT, HCTEXT, PLTEXT    Assessment:     Hospital Problems  Date Reviewed: 2016          Codes Class Noted POA    Hypokalemia ICD-10-CM: E87.6  ICD-9-CM: 276.8  2018 No        Leukocytosis ICD-10-CM: D72.829  ICD-9-CM: 288.60  2018 Yes        Severe protein-calorie malnutrition ([facility] ICD-10-CM: E43  ICD-9-CM: 617  2018 Yes        Acute metabolic encephalopathy TERRENCE-05-QR: G93.41  ICD-9-CM: 348.31  2018 Yes        Hyperammonemia ([facility] ICD-10-CM: E72.20  ICD-9-CM: 270.6  2018 No    Overview Addendum 2018  9:08 AM by Rahat Fuller MD        Ref.  Range 2018 17:20   Ammonia Latest Ref Range: <32 UMOL/L 69 (H)                Free intraperitoneal air ICD-10-CM: K66.8  ICD-9-CM: 568.89  2/12/2018 Yes        S/P exploratory laparotomy ICD-10-CM: Z98.890  ICD-9-CM: V45.89  2/12/2018 No    Overview Signed 2/12/2018 11:57 AM by Geena Renee MD     Suture repair of perforated posterior gastric ulcer with Lindle Emms patch, core needle biopsies of left lobe of the liver. Alcoholic cirrhosis of liver without ascites (HCC) (Chronic) ICD-10-CM: K70.30  ICD-9-CM: 571.2  2/12/2018 Yes    Overview Signed 2/17/2018  8:45 AM by Maggy Dominguez MD     Liver bx 2/12/18:   Cirrhosis with mild chronic portal inflammation and macrovesicular steatosis. Fatty liver determined by biopsy ICD-10-CM: K76.0  ICD-9-CM: 571.8  2/12/2018 Yes    Overview Signed 2/17/2018  8:53 AM by Maggy Dominguez MD        Cirrhosis with mild chronic portal inflammation and macrovesicular steatosis. * (Principal)Perforated chronic gastric ulcer ([address] (Chronic) ICD-10-CM: K25.5  ICD-9-CM: 531.50  2/11/2018 Yes        ETOH abuse (Chronic) ICD-10-CM: F10.10  ICD-9-CM: 305.00  2/11/2018 Yes              Plan/Recommendations/Medical Decision Making:     Still with fistula, but less output. Continue TPN, adjusted for K+, and bowel rest.  Continue ALYSSA. Antifungal per ID. D/C accordion when output down. Wean O2 as tolerated. OOB with PT.     Signed By: Geena Renee MD     March 7, 2018

## 2018-03-07 NOTE — PROGRESS NOTES
PT - attempted tx. Patient just back to bed after sitting up in chair with RN this am for 30 minutes. Will return later today for BID as able.  Thank you  LATOSHA LrT

## 2018-03-07 NOTE — PROGRESS NOTES
Problem: Falls - Risk of  Goal: *Absence of Falls  Document Demarcus Fall Risk and appropriate interventions in the flowsheet.    Outcome: Progressing Towards Goal  Fall Risk Interventions:  Mobility Interventions: Bed/chair exit alarm, Patient to call before getting OOB    Mentation Interventions: Bed/chair exit alarm, Increase mobility    Medication Interventions: Patient to call before getting OOB, Teach patient to arise slowly    Elimination Interventions: Call light in reach, Patient to call for help with toileting needs    History of Falls Interventions: Bed/chair exit alarm, Door open when patient unattended

## 2018-03-07 NOTE — PROGRESS NOTES
Family Practice Daily Progress Note: 3/7/2018  Betsy Pulido DO    Assessment/Plan:   Perforated chronic gastric ulcer /  Free intraperitoneal air / S/P exploratory laparotomy 2/11. S/p repair in OR with Dr. Angelo Dorsey on 2/12/18 - per surg    Severe protein calorie malnutrition-POA  --TPN per GI - advance diet as per surg     Acute metabolic encephalopathy. Improved. Multifactorial-- medications, alcohol use, sundowning  ---avoid triggers  ---CIWA  ---maintenance of sleep/wake cycle, and re-orientation   ---Added Seroquel at bedtime to help with sleep wake cycle - now off due to somnolence     Hypoxia / Respiratory distress. Improved. Pleural effusions   ---Nebs as needed  ---Pulmonary consulted     ---Bumex as needed. Added Aldactone. ---Drain placed by IR     Alcoholic cirrhosis of liver without ascites /  Fatty liver determined by biopsy /  Hyperammonemia. --GI has seen- rec alcohol cessation and outpt follow up  --hold aldactone with elevated K+     ETOH abuse. Needs to stop ETOH entirely.     Hypokalemia /  Hypoalbuminemia. Being addressed with TPN     Leukocytosis.    -- repeat blood cultures, and urine 2/28 neg  --was on Zosyn, and levaquin - cultures drawn off antibiotics neg except yeast - Diflucan added  --consulted ID 3/1    Anemia: watch Hgb   --follow, transfuse <7    Back Pain due to compression fracture of T12  ---Increased Duragesic patch to 25mcg and DIiaudid to 1mg as needed    Debility: due to prolonged hospital stay  --continue PT/OT- needs to get OOB  --will likely need rehab        Problem List:  Problem List as of 3/7/2018  Date Reviewed: 4/26/2016          Codes Class Noted - Resolved    Hypokalemia ICD-10-CM: E87.6  ICD-9-CM: 276.8  2/18/2018 - Present        Leukocytosis ICD-10-CM: D72.829  ICD-9-CM: 288.60  2/18/2018 - Present        Severe protein-calorie malnutrition ([facility] [address] ICD-10-CM: E43  ICD-9-CM: 262  2/18/2018 - Present        Acute metabolic encephalopathy ICD-10-CM: G93.41  ICD-9-CM: 348.31  2/18/2018 - Present        Hyperammonemia (HCC) ICD-10-CM: E72.20  ICD-9-CM: 270.6  2/16/2018 - Present    Overview Addendum 2/17/2018  9:08 AM by Zaki Heredia MD        Ref. Range 2/16/2018 17:20   Ammonia Latest Ref Range: <32 UMOL/L 69 (H)                Free intraperitoneal air ICD-10-CM: K66.8  ICD-9-CM: 568.89  2/12/2018 - Present        S/P exploratory laparotomy ICD-10-CM: Z98.890  ICD-9-CM: V45.89  2/12/2018 - Present    Overview Signed 2/12/2018 11:57 AM by May Villa MD     Suture repair of perforated posterior gastric ulcer with Dawson Handy patch, core needle biopsies of left lobe of the liver. Alcoholic cirrhosis of liver without ascites (HCC) (Chronic) ICD-10-CM: K70.30  ICD-9-CM: 571.2  2/12/2018 - Present    Overview Signed 2/17/2018  8:45 AM by Zaki Heredia MD     Liver bx 2/12/18:   Cirrhosis with mild chronic portal inflammation and macrovesicular steatosis. Fatty liver determined by biopsy ICD-10-CM: K76.0  ICD-9-CM: 571.8  2/12/2018 - Present    Overview Signed 2/17/2018  8:53 AM by Zaki Heredia MD        Cirrhosis with mild chronic portal inflammation and macrovesicular steatosis. * (Principal)Perforated chronic gastric ulcer ([address] (Chronic) ICD-10-CM: K25.5  ICD-9-CM: 531.50  2/11/2018 - Present        ETOH abuse (Chronic) ICD-10-CM: F10.10  ICD-9-CM: 305.00  2/11/2018 - Present        GI bleed ICD-10-CM: K92.2  ICD-9-CM: 578.9  4/26/2016 - Present        Hypotension ICD-10-CM: I95.9  ICD-9-CM: 458.9  4/26/2016 - Present        Tachycardia ICD-10-CM: R00.0  ICD-9-CM: 785.0  4/26/2016 - Present        Acute blood loss anemia ICD-10-CM: D62  ICD-9-CM: 285.1  4/26/2016 - Present              Subjective:    67 y.o.  female with EtOH abuse who is admitted to the General Surgery Service with perforated gastric ulcer. We are asked to evaluate for altered mental status.  The patient is poorly interactive at this time and this limits primary hx. Discussed case with family available at bedside providing secondary hx and chart review. Per family, patient has had declining neurological condition over past 48 hours. Notably, an RRT was called for respiratory distress. 2/19: This morning she is a bit more alert at this moment and taking in more complete sentences. She says she does not feel well. She has no specific site of pain other than the NG tube which is bothering her a great deal.  She should improve as time from surg increases. K+ a little low - replacing.     2/20:  Still confused and somnolent at times. Now able to localize pain to ab and converse a bit more. Tmax 100.3  WBC is up again but hopefully reactive - recheck in AM - more incentive spirom and check CXR.     2/21: A little more alert. Knows she is in the hospital. Not able to participate with PT yesterday as she was in too much pain. WBC still at 19.     2/22:  WBC 19K. She is more alert. Daughter at bedside. Both of her daughters live out of town. Looking at SNF options for rehab. Coughing more. Starting to use IS.     2/23: WBC 17. Repeat CT chest with moderate to large persistent left subphrenic collection. IR has been consulted. Vanc and Levaquin added by pulm. 2/24: Pt had a better night last night. Only brief episode of confusion. She is much clearer this AM.  Now in quite a bit of pain from gas and stomach cramping. Thinks she will feel better if she can have a BM. Has been on bedpan for a while without success. On TPN. Daughters very concerned that she won't be successful while lying down. Wanting to get up to bedside commode. Will need PT's help. 2/25: febrile overnight and WBC up after vanc was stopped. Pt c/o more dyspnea today. Known fluid collection that will be drained in IR this week. Up to chair with PT yesterday briefly. +BM yesterday.   Da notes pt has been c/o R wrist pain off and on for 2 wks since she fell. No swelling.    2/26:  No new complaints. Still c/o back and ab pain. Still passing gas and had BM. Diuresed with 1 mg Bumex yesterday by Pulmonary. Remains on Zosyn and Levaquin. Blood culture remains neg. Tmax 99.2. X-ray of wrists ok. CXR ok with tube inplace. WBC 15.7K.      2/27:  Looks much better today. Much less work of breathing. Diuresed about 1 liter over last 24h. WBC 16K today. Blood cult remains neg. Off antibiotics. Still on TPN. She will need rehab for PT/OT. 2/28: Very restless during the night. Has not been OOB. Daughter stayed the night with her. WBC is still up. Antibiotics stopped yesterday. She is c/o increased pain in her back and can't get comfortable. More anxious and agitated. Tolerating sips. 3/1:  She reports she feels better this AM after she had some sleep. Afeb. WBC still up - a bit more today with increased neutrophils. Will also get ID to see also. CT AB and Pelvis as below. Cultures done yesterday off antibiotics - so far no growth. No output from drain - may need to be repositioned. More edema today but not as short of breath today while sitting up - add albumin to todays lab. Add Aldactone if ok with GI and Bumex as per Pul. Discussed cirrhosis/ascites/edema with pts daughter. 3/2:  She reports she feels better, and has slightly less edema, although daughters note they think pts abdomin more swollen. The daughters are more worried about pts LE edema than anything else - explained that with pts low albumin that edema may cont to be a problem. She is more alert, a bit more oriented today and says she has much less pain today. Tmax 101. Culture had yeast - diflucan started. Drain was repositioned, had thoracentesis and new PICC placed yesterday. May need to restart broad spectrum antibiotics - ID consulted. 3/3:  She reports she feels better this AM.  More alert and oriented this AM.  BM yesterday. Tmax 99. WBC still 17-18K. ID consulted and advises to watch off antibiotics for now. Less edema LE.      3/4:  Doing much better today and much more alert. Little pain. Off sleeping meds for now - not sleeping well but reports \"never was a good sleeper. \"  Anxious at night. WBC still up. Discussed rehab with daughters and pt and pt willing to go. 3/5:  Continues to improve. She wants to eat solid food - she now has an appetite. Passing gas. Has been up to chair. K+ 5.3 - will DC aldactone for now and restart if edema returns. CXR pending today. 3/6: She reports she feels better. Yesterday had marked increase in ALYSSA output and now NPO and back on TPN today. Will resume Aldactone again (at lower dose) as sl more edema today and hold again if K+ creeping up again. AM labs pending. 3/7:  Reports some ab pain this AM but not severe. Orthostatic hypotension with walking yesterday and K+ back up - will hold aldactone again. Breathing and edema better with the Aldactone. Push incentive spirom - last CXR with atelectasis. O>>I on I&Os but wonder about measurements. Past Medical History:   Diagnosis Date    Alcoholic cirrhosis of liver without ascites ([facility] [address] 2/12/2018    ETOH abuse 2/11/2018    History of vascular access device 02/16/2018    [facility] VAT - 5 FR triple, R basilic, TPN    Hyperammonemia ([facility] [address] 2/16/2018     Results for Maddi Seen (MRN [id number]) as of 2/17/2018 08:43  Ref. Range 2/16/2018 17:20 Ammonia Latest Ref Range: <32 UMOL/L 69 (H)     Hyperlipidemia     Perforated chronic gastric ulcer ([address] 2/11/2018     Past Surgical History:   Procedure Laterality Date    HX CATARACT REMOVAL       Social History     Social History    Marital status: SINGLE     Spouse name: N/A    Number of children: N/A    Years of education: N/A     Occupational History    Not on file.      Social History Main Topics    Smoking status: Former Smoker    Smokeless tobacco: Never Used  Alcohol use 11.4 oz/week     0 Standard drinks or equivalent, 19 Glasses of wine per week      Comment: Hx of heavy etoh use, but quit several months ago    Drug use: No    Sexual activity: Not on file     Other Topics Concern    Not on file     Social History Narrative     Family History   Problem Relation Age of Onset    Other Other      patient did not know     Review of Systems:   Review of systems not obtained due to patient factors. Objective:   Physical Exam:     Visit Vitals    /56 (BP 1 Location: Left arm, BP Patient Position: At rest)    Pulse 94    Temp 98.2 °F (36.8 °C)    Resp 18    Ht 5' 3.5\" (1.613 m)    Wt 81 kg (178 lb 9.2 oz)    SpO2 92%    Breastfeeding No    BMI 31.14 kg/m2    O2 Flow Rate (L/min): 4 l/min (from 2L at rest) O2 Device: Nasal cannula    Temp (24hrs), Av.4 °F (36.9 °C), Min:97.6 °F (36.4 °C), Max:99.3 °F (37.4 °C)         1901 -  0700  In: 600 [I.V.:600]  Out: 3275 [Urine:3200; Drains:75]    General:  Awake, uncomfortable, appears stated age. Head:  Normocephalic, without obvious abnormality, atraumatic. Eyes:  Conjunctivae/corneas clear. EOMs intact. Throat: Lips, mucosa, and tongue dry. Neck: Supple, symmetrical, trachea midline, no adenopathy, thyroid: no enlargement/tenderness/nodules, no carotid bruit and no JVD. Lungs:    no intercostal use, breath sounds diminished. Clear at this time. Chest wall:  No tenderness or deformity. Left drain site looks ok and little drainage. Heart:  Tachycardic rate with regular rhythm,  no murmur, click, rub or gallop. Abdomen:   Soft, sl more distended at this time, with mild generalized tenderness. Bowel sounds present. Dressings dry. Incision dry without redness, staple line intact. Drain site looks ok and less drainage at this moment. Extremities: no cyanosis. No LE edema at this time.  No calf tenderness or cords, no erythema   Skin: turgor normal. No rashes or lesions Neurologic:   AOx2-3,  Gait not tested at this time. Sensation grossly normal to touch. Gross motor of extremities normal.       Data Review:    CT AB and Pelvis 2/28/18: IMPRESSION:  1. There is residual fluid in the posterior portion of the left subphrenic  collection which does not appear well drained by the pigtail catheter. This  could be repositioned versus new drainage catheter placement if clinically  indicated. 2. Persistent ascites. 3. Increasing right pleural effusion and right lower lobe atelectasis   4. Distended duodenum and proximal small bowel of doubtful clinical  Significance. CXR 3/1/18:  INDICATION:  chest pain and shortness of breath following thoracentesis   EXAM: Portable chest 1716 . Comparison March 1, 2018  FINDINGS: There is no significant change in appearance the lungs. Small left  pleural effusion with left basilar atelectasis unchanged. Cardiomediastinal  silhouette is unchanged. No pneumothorax. Lines and tubes are unchanged in  position. IMPRESSION:  1. No interval change    KUB 3/6/18  IMPRESSION: Nonspecific bowel gas pattern with multiple dilated loops of small  bowel but does not appear obstructive. No pneumoperitoneum. Recent Days:  Recent Labs      03/06/18   0214  03/05/18   0256   WBC  17.4*  15.8*   HGB  8.2*  8.6*   HCT  27.1*  27.8*   PLT  392  461*     Recent Labs      03/07/18   0426  03/06/18   1114  03/06/18   0214  03/05/18   0256   NA  135*  135*   --   137   K  5.4*  4.9   --   5.3*   CL  103  104   --   106   CO2  24  24   --   24   GLU  169*  93   --   96   BUN  18  16   --   15   CREA  0.49*  0.57   --   0.51*   CA  8.2*  8.2*   --   8.4*   MG  1.9   --   1.6  1.8   PHOS  3.3   --   3.5  4.6   ALB   --    --    --   1.5*   TBILI   --    --    --   0.6   SGOT   --    --    --   39*   ALT   --    --    --   30     No results for input(s): PH, PCO2, PO2, HCO3, FIO2 in the last 72 hours.   24 Hour Results:  Recent Results (from the past 24 hour(s)) METABOLIC PANEL, BASIC    Collection Time: 03/06/18 11:14 AM   Result Value Ref Range    Sodium 135 (L) 136 - 145 mmol/L    Potassium 4.9 3.5 - 5.1 mmol/L    Chloride 104 97 - 108 mmol/L    CO2 24 21 - 32 mmol/L    Anion gap 7 5 - 15 mmol/L    Glucose 93 65 - 100 mg/dL    BUN 16 6 - 20 MG/DL    Creatinine 0.57 0.55 - 1.02 MG/DL    BUN/Creatinine ratio 28 (H) 12 - 20      GFR est AA >60 >60 ml/min/1.73m2    GFR est non-AA >60 >60 ml/min/1.73m2    Calcium 8.2 (L) 8.5 - 10.1 MG/DL   GLUCOSE, POC    Collection Time: 03/06/18 12:10 PM   Result Value Ref Range    Glucose (POC) 90 65 - 100 mg/dL    Performed by Morgan Muonz (PCT)    GLUCOSE, POC    Collection Time: 03/06/18  5:30 PM   Result Value Ref Range    Glucose (POC) 78 65 - 100 mg/dL    Performed by Morgan Munoz (PCT)    GLUCOSE, POC    Collection Time: 03/06/18  6:33 PM   Result Value Ref Range    Glucose (POC) 117 (H) 65 - 100 mg/dL    Performed by Morgan Munoz (PCT)    GLUCOSE, POC    Collection Time: 03/07/18 12:39 AM   Result Value Ref Range    Glucose (POC) 133 (H) 65 - 100 mg/dL    Performed by Ewelina Harmon (PCT)    MAGNESIUM    Collection Time: 03/07/18  4:26 AM   Result Value Ref Range    Magnesium 1.9 1.6 - 2.4 mg/dL   METABOLIC PANEL, BASIC    Collection Time: 03/07/18  4:26 AM   Result Value Ref Range    Sodium 135 (L) 136 - 145 mmol/L    Potassium 5.4 (H) 3.5 - 5.1 mmol/L    Chloride 103 97 - 108 mmol/L    CO2 24 21 - 32 mmol/L    Anion gap 8 5 - 15 mmol/L    Glucose 169 (H) 65 - 100 mg/dL    BUN 18 6 - 20 MG/DL    Creatinine 0.49 (L) 0.55 - 1.02 MG/DL    BUN/Creatinine ratio 37 (H) 12 - 20      GFR est AA >60 >60 ml/min/1.73m2    GFR est non-AA >60 >60 ml/min/1.73m2    Calcium 8.2 (L) 8.5 - 10.1 MG/DL   PHOSPHORUS    Collection Time: 03/07/18  4:26 AM   Result Value Ref Range    Phosphorus 3.3 2.6 - 4.7 MG/DL   GLUCOSE, POC    Collection Time: 03/07/18  5:32 AM   Result Value Ref Range    Glucose (POC) 145 (H) 65 - 100 mg/dL    Performed by Jackquline Goldberg (PCT)      Medications reviewed  Current Facility-Administered Medications   Medication Dose Route Frequency    spironolactone (ALDACTONE) tablet 25 mg  25 mg Oral DAILY    albuterol-ipratropium (DUO-NEB) 2.5 MG-0.5 MG/3 ML  3 mL Nebulization Q4H PRN    melatonin tablet 1.5 mg  1.5 mg Oral QHS PRN    HYDROmorphone (PF) (DILAUDID) injection 1 mg  1 mg IntraVENous Q3H PRN    fluconazole (DIFLUCAN) 200mg/100 mL IVPB (premix)  200 mg IntraVENous Q24H    fentaNYL (DURAGESIC) 25 mcg/hr patch 1 Patch  1 Patch TransDERmal Q72H    haloperidol lactate (HALDOL) injection 1 mg  1 mg IntraVENous Q4H PRN    guaiFENesin ER (MUCINEX) tablet 1,200 mg  1,200 mg Oral Q12H    acetaminophen (TYLENOL) tablet 650 mg  650 mg Oral Q4H PRN    insulin regular (NOVOLIN R, HUMULIN R) injection   SubCUTAneous Q6H    fat emulsion 20% (LIPOSYN, INTRAlipid) infusion 500 mL  500 mL IntraVENous Q MON, WED & SAT    glucose chewable tablet 16 g  4 Tab Oral PRN    glucagon (GLUCAGEN) injection 1 mg  1 mg IntraMUSCular PRN    dextrose (D50W) injection syrg 12.5-25 g  12.5-25 g IntraVENous PRN    alteplase (CATHFLO) 1 mg in sterile water (preservative free) 1 mL injection  1 mg InterCATHeter PRN    sodium chloride (NS) flush 10-30 mL  10-30 mL InterCATHeter PRN    sodium chloride (NS) flush 10-40 mL  10-40 mL InterCATHeter Q8H    naloxone (NARCAN) injection 0.4 mg  0.4 mg IntraVENous PRN    ondansetron (ZOFRAN) injection 4 mg  4 mg IntraVENous Q4H PRN    enoxaparin (LOVENOX) injection 40 mg  40 mg SubCUTAneous Q24H    pantoprazole (PROTONIX) injection 40 mg  40 mg IntraVENous Q12H    phenol throat spray (CHLORASEPTIC) 1 Spray  1 Spray Oral PRN    nystatin (MYCOSTATIN) 100,000 unit/gram powder   Topical BID    sodium chloride (NS) flush 5-10 mL  5-10 mL IntraVENous PRN     Care Plan discussed with: Patient and daughter and Nurse   Total time spent with patient: 30 minutes.   Greg Jordan MD

## 2018-03-07 NOTE — PROGRESS NOTES
3/7/2018  11:33 AM  CM consulted with attending who reported pt will be discharged with TPN. DC date is unknown at this time. Neither [facility] or [facility] accept pts with TPN. CM spoke with pt and pt's DTR who indicated [facility] as another preferred agency that takes TPN. CM completed referral, and is awaiting response.

## 2018-03-07 NOTE — PROGRESS NOTES
Problem: Mobility Impaired (Adult and Pediatric)  Goal: *Acute Goals and Plan of Care (Insert Text)  Physical Therapy Goals  Revised 3/7/2018  1. Patient will move from long sitting to sit edge of bed and sit to supine and roll side to side in bed with modified independence 7 day(s). 2.  Patient will transfer from bed to chair and chair to bed with contact guard assist x1 using RW within 7 day(s). 3.  Patient will perform sit <>stand with contact guard assist x 1 within 7 day(s). 4.  Patient will ambulate with minimal assistance x 1  for 50 feet with RW within 7 day(s). 5.  Patient will verbalize and demonstrate understanding of spinal precautions (No bending, lifting greater than 5 lbs, or twisting; log-roll technique; frequent repositioning as instructed) within 7 days. Physical Therapy Goals  Revised 2/28/2018  1. Patient will move from supine to sit and sit to supine  and roll side to side in bed with moderate assistance x 1  within 7 day(s). MET, upgrade  2. Patient will move from long sitting in bed to edge of bed with minimal assistance x 1 within 7 days. MET, advance  3. Patient will transfer from bed to chair and chair to bed with moderate assistance x 1 using the least restrictive device within 7 day(s). 4.  Patient will perform sit <> stand with minimal assistance x 1 within 7 day(s). NOT MET - continue  5. Patient will ambulate with minimal assistance x 1  for 15 feet with the least restrictive device within 7 day(s). MET, advance  6. Patient will verbalize and demonstrate understanding of spinal precautions (No bending, lifting greater than 5 lbs, or twisting; log-roll technique; frequent repositioning as instructed) within 7 days. NOT MET- continue    Physical Therapy Goals  Initiated 2/20/2018  1. Patient will move from supine to sit and sit to supine , scoot up and down and roll side to side in bed with moderate assistance x 2  within 7 days. Partially Met - Continue  2.  Patient will perform sit <> stand with minimal assist x 2  within 7 days. Met - Advance goal below  3. Patient will ambulate with minimal assistance x 2 for 10 feet with RW and assist of 3rd pushing chair from behind within 7 days. Not Met- Continue  4. Patient will verbalize and demonstrate understanding of spinal precautions (No bending, lifting greater than 5 lbs, or twisting; log-roll technique; frequent repositioning as instructed) within 7 days. Not Met- Continue       PT NOTE 6910 : Please see previous full entry note. Patient seen for back to bed after sitting in chair x one hour. Needed 25 minutes tx  for sit>stand and , gait, and sit>supine, and hygiene with PCT assistance. Back to supine, bed alarm set, patient NAD. Daughter arrived and updated on progress today. Informed RN    TRUDI Murray  Time calculation : 25 minutes    PT NOTE- exercise program put together in written/picture format and handout verbally reviewed with patient and her daughter. Both verbalize understanding but will need to review again tomorrow. Ankle pumps, Gluteal and Quad sets, Heel slides , Bridging, Knee raises and LAQs.      TRUDI Murray  Time calculations; 15 minutes

## 2018-03-08 ENCOUNTER — APPOINTMENT (OUTPATIENT)
Dept: GENERAL RADIOLOGY | Age: 73
DRG: 853 | End: 2018-03-08
Attending: NURSE PRACTITIONER
Payer: MEDICARE

## 2018-03-08 LAB
ALBUMIN SERPL-MCNC: 1.5 G/DL (ref 3.5–5)
ALBUMIN/GLOB SERPL: 0.3 {RATIO} (ref 1.1–2.2)
ALP SERPL-CCNC: 157 U/L (ref 45–117)
ALT SERPL-CCNC: 30 U/L (ref 12–78)
ANION GAP SERPL CALC-SCNC: 10 MMOL/L (ref 5–15)
ANION GAP SERPL CALC-SCNC: 6 MMOL/L (ref 5–15)
AST SERPL-CCNC: 47 U/L (ref 15–37)
BILIRUB DIRECT SERPL-MCNC: 0.3 MG/DL (ref 0–0.2)
BILIRUB SERPL-MCNC: 0.5 MG/DL (ref 0.2–1)
BUN SERPL-MCNC: 16 MG/DL (ref 6–20)
BUN SERPL-MCNC: 16 MG/DL (ref 6–20)
BUN/CREAT SERPL: 32 (ref 12–20)
BUN/CREAT SERPL: 33 (ref 12–20)
CALCIUM SERPL-MCNC: 8.1 MG/DL (ref 8.5–10.1)
CALCIUM SERPL-MCNC: 8.1 MG/DL (ref 8.5–10.1)
CHLORIDE SERPL-SCNC: 106 MMOL/L (ref 97–108)
CHLORIDE SERPL-SCNC: 107 MMOL/L (ref 97–108)
CO2 SERPL-SCNC: 21 MMOL/L (ref 21–32)
CO2 SERPL-SCNC: 25 MMOL/L (ref 21–32)
CREAT SERPL-MCNC: 0.49 MG/DL (ref 0.55–1.02)
CREAT SERPL-MCNC: 0.5 MG/DL (ref 0.55–1.02)
ERYTHROCYTE [DISTWIDTH] IN BLOOD BY AUTOMATED COUNT: 15.2 % (ref 11.5–14.5)
GLOBULIN SER CALC-MCNC: 4.5 G/DL (ref 2–4)
GLUCOSE BLD STRIP.AUTO-MCNC: 107 MG/DL (ref 65–100)
GLUCOSE BLD STRIP.AUTO-MCNC: 153 MG/DL (ref 65–100)
GLUCOSE BLD STRIP.AUTO-MCNC: 85 MG/DL (ref 65–100)
GLUCOSE SERPL-MCNC: 130 MG/DL (ref 65–100)
GLUCOSE SERPL-MCNC: 82 MG/DL (ref 65–100)
HCT VFR BLD AUTO: 27.5 % (ref 35–47)
HGB BLD-MCNC: 8.3 G/DL (ref 11.5–16)
MAGNESIUM SERPL-MCNC: 1.6 MG/DL (ref 1.6–2.4)
MCH RBC QN AUTO: 31.7 PG (ref 26–34)
MCHC RBC AUTO-ENTMCNC: 30.2 G/DL (ref 30–36.5)
MCV RBC AUTO: 105 FL (ref 80–99)
NRBC # BLD: 0 K/UL (ref 0–0.01)
NRBC BLD-RTO: 0 PER 100 WBC
PHOSPHATE SERPL-MCNC: 3.2 MG/DL (ref 2.6–4.7)
PLATELET # BLD AUTO: 356 K/UL (ref 150–400)
PMV BLD AUTO: 11.9 FL (ref 8.9–12.9)
POTASSIUM SERPL-SCNC: 4.7 MMOL/L (ref 3.5–5.1)
POTASSIUM SERPL-SCNC: 4.8 MMOL/L (ref 3.5–5.1)
PROT SERPL-MCNC: 6 G/DL (ref 6.4–8.2)
RBC # BLD AUTO: 2.62 M/UL (ref 3.8–5.2)
SERVICE CMNT-IMP: ABNORMAL
SERVICE CMNT-IMP: ABNORMAL
SERVICE CMNT-IMP: NORMAL
SODIUM SERPL-SCNC: 137 MMOL/L (ref 136–145)
SODIUM SERPL-SCNC: 138 MMOL/L (ref 136–145)
WBC # BLD AUTO: 16.9 K/UL (ref 3.6–11)

## 2018-03-08 PROCEDURE — 74011000258 HC RX REV CODE- 258: Performed by: SURGERY

## 2018-03-08 PROCEDURE — 77010033678 HC OXYGEN DAILY

## 2018-03-08 PROCEDURE — 74011000250 HC RX REV CODE- 250: Performed by: SURGERY

## 2018-03-08 PROCEDURE — 74011000258 HC RX REV CODE- 258: Performed by: INTERNAL MEDICINE

## 2018-03-08 PROCEDURE — 80048 BASIC METABOLIC PNL TOTAL CA: CPT | Performed by: NURSE PRACTITIONER

## 2018-03-08 PROCEDURE — 71045 X-RAY EXAM CHEST 1 VIEW: CPT

## 2018-03-08 PROCEDURE — C9113 INJ PANTOPRAZOLE SODIUM, VIA: HCPCS | Performed by: SURGERY

## 2018-03-08 PROCEDURE — 84100 ASSAY OF PHOSPHORUS: CPT | Performed by: SURGERY

## 2018-03-08 PROCEDURE — 74011250637 HC RX REV CODE- 250/637: Performed by: FAMILY MEDICINE

## 2018-03-08 PROCEDURE — 83735 ASSAY OF MAGNESIUM: CPT | Performed by: SURGERY

## 2018-03-08 PROCEDURE — 85027 COMPLETE CBC AUTOMATED: CPT | Performed by: NURSE PRACTITIONER

## 2018-03-08 PROCEDURE — 65660000000 HC RM CCU STEPDOWN

## 2018-03-08 PROCEDURE — 74011250637 HC RX REV CODE- 250/637: Performed by: NURSE PRACTITIONER

## 2018-03-08 PROCEDURE — 80048 BASIC METABOLIC PNL TOTAL CA: CPT | Performed by: SURGERY

## 2018-03-08 PROCEDURE — 97530 THERAPEUTIC ACTIVITIES: CPT

## 2018-03-08 PROCEDURE — 36415 COLL VENOUS BLD VENIPUNCTURE: CPT | Performed by: SURGERY

## 2018-03-08 PROCEDURE — 97535 SELF CARE MNGMENT TRAINING: CPT

## 2018-03-08 PROCEDURE — 74011250636 HC RX REV CODE- 250/636: Performed by: INTERNAL MEDICINE

## 2018-03-08 PROCEDURE — 80076 HEPATIC FUNCTION PANEL: CPT | Performed by: INTERNAL MEDICINE

## 2018-03-08 PROCEDURE — 74011250636 HC RX REV CODE- 250/636: Performed by: SURGERY

## 2018-03-08 PROCEDURE — 77030038269 HC DRN EXT URIN PURWCK BARD -A

## 2018-03-08 PROCEDURE — 74011636637 HC RX REV CODE- 636/637: Performed by: SURGERY

## 2018-03-08 PROCEDURE — 82962 GLUCOSE BLOOD TEST: CPT

## 2018-03-08 RX ADMIN — PANTOPRAZOLE SODIUM 40 MG: 40 INJECTION, POWDER, FOR SOLUTION INTRAVENOUS at 22:01

## 2018-03-08 RX ADMIN — NYSTATIN: 100000 POWDER TOPICAL at 09:27

## 2018-03-08 RX ADMIN — Medication 10 ML: at 05:43

## 2018-03-08 RX ADMIN — HYDROMORPHONE HYDROCHLORIDE 1 MG: 2 INJECTION, SOLUTION INTRAMUSCULAR; INTRAVENOUS; SUBCUTANEOUS at 15:24

## 2018-03-08 RX ADMIN — HYDROMORPHONE HYDROCHLORIDE 1 MG: 2 INJECTION, SOLUTION INTRAMUSCULAR; INTRAVENOUS; SUBCUTANEOUS at 02:28

## 2018-03-08 RX ADMIN — HUMAN INSULIN 2 UNITS: 100 INJECTION, SOLUTION SUBCUTANEOUS at 05:43

## 2018-03-08 RX ADMIN — SODIUM CHLORIDE 100 MG: 900 INJECTION, SOLUTION INTRAVENOUS at 10:50

## 2018-03-08 RX ADMIN — GUAIFENESIN 1200 MG: 600 TABLET, EXTENDED RELEASE ORAL at 09:26

## 2018-03-08 RX ADMIN — ONDANSETRON 4 MG: 2 INJECTION INTRAMUSCULAR; INTRAVENOUS at 08:06

## 2018-03-08 RX ADMIN — GUAIFENESIN 1200 MG: 600 TABLET, EXTENDED RELEASE ORAL at 22:03

## 2018-03-08 RX ADMIN — Medication 10 ML: at 22:02

## 2018-03-08 RX ADMIN — SPIRONOLACTONE 25 MG: 25 TABLET, FILM COATED ORAL at 09:26

## 2018-03-08 RX ADMIN — NYSTATIN: 100000 POWDER TOPICAL at 18:10

## 2018-03-08 RX ADMIN — ENOXAPARIN SODIUM 40 MG: 40 INJECTION SUBCUTANEOUS at 15:15

## 2018-03-08 RX ADMIN — CALCIUM GLUCONATE: 94 INJECTION, SOLUTION INTRAVENOUS at 18:06

## 2018-03-08 RX ADMIN — Medication 10 ML: at 13:28

## 2018-03-08 RX ADMIN — PANTOPRAZOLE SODIUM 40 MG: 40 INJECTION, POWDER, FOR SOLUTION INTRAVENOUS at 08:15

## 2018-03-08 NOTE — PROGRESS NOTES
Problem: Falls - Risk of  Goal: *Absence of Falls  Document Demarcus Fall Risk and appropriate interventions in the flowsheet.    Outcome: Progressing Towards Goal  Fall Risk Interventions:  Mobility Interventions: Bed/chair exit alarm    Mentation Interventions: Bed/chair exit alarm, Reorient patient, More frequent rounding    Medication Interventions: Patient to call before getting OOB, Teach patient to arise slowly, Bed/chair exit alarm    Elimination Interventions: Call light in reach, Patient to call for help with toileting needs    History of Falls Interventions: Bed/chair exit alarm

## 2018-03-08 NOTE — PROGRESS NOTES
Problem: Mobility Impaired (Adult and Pediatric)  Goal: *Acute Goals and Plan of Care (Insert Text)  Physical Therapy Goals  Revised 3/7/2018  1. Patient will move from long sitting to sit edge of bed and sit to supine and roll side to side in bed with modified independence 7 day(s). 2.  Patient will transfer from bed to chair and chair to bed with contact guard assist x1 using RW within 7 day(s). 3.  Patient will perform sit <>stand with contact guard assist x 1 within 7 day(s). 4.  Patient will ambulate with minimal assistance x 1  for 50 feet with RW within 7 day(s). 5.  Patient will verbalize and demonstrate understanding of spinal precautions (No bending, lifting greater than 5 lbs, or twisting; log-roll technique; frequent repositioning as instructed) within 7 days. Physical Therapy Goals  Revised 2/28/2018  1. Patient will move from supine to sit and sit to supine  and roll side to side in bed with moderate assistance x 1  within 7 day(s). MET, upgrade  2. Patient will move from long sitting in bed to edge of bed with minimal assistance x 1 within 7 days. MET, advance  3. Patient will transfer from bed to chair and chair to bed with moderate assistance x 1 using the least restrictive device within 7 day(s). 4.  Patient will perform sit <> stand with minimal assistance x 1 within 7 day(s). NOT MET - continue  5. Patient will ambulate with minimal assistance x 1  for 15 feet with the least restrictive device within 7 day(s). MET, advance  6. Patient will verbalize and demonstrate understanding of spinal precautions (No bending, lifting greater than 5 lbs, or twisting; log-roll technique; frequent repositioning as instructed) within 7 days. NOT MET- continue    Physical Therapy Goals  Initiated 2/20/2018  1. Patient will move from supine to sit and sit to supine , scoot up and down and roll side to side in bed with moderate assistance x 2  within 7 days. Partially Met - Continue  2.  Patient will perform sit <> stand with minimal assist x 2  within 7 days. Met - Advance goal below  3. Patient will ambulate with minimal assistance x 2 for 10 feet with RW and assist of 3rd pushing chair from behind within 7 days. Not Met- Continue  4. Patient will verbalize and demonstrate understanding of spinal precautions (No bending, lifting greater than 5 lbs, or twisting; log-roll technique; frequent repositioning as instructed) within 7 days. Not Met- Continue     physical Therapy TREATMENT  Patient: Francisca Franco (36 y.o. female)  Date: 3/8/2018  Diagnosis: Intra-abdominal free air of unknown etiology [K66.8] Perforated chronic gastric ulcer ([address]  Procedure(s) (LRB):   NASOGASTRIC TUBE PLACEMENT (N/A) 18 Days Post-Op  Precautions: Aspiration, Back, Bed Alarm, Fall, Skin  Chart, physical therapy assessment, plan of care and goals were reviewed. ASSESSMENT:  Pt received in bed, using 4L O2sat 96% HR 99 agreeable to participate with physical therapy. Pt reports minimal pain. Bed mobility with CGA/ Min A with increase time to complete task. Sit<>stand using RW with Min A ,verbal cues for proper hand placement. Pt tolerated sitting EOB x 10min when MD examined patient. Pt tearful after physician visit. Pt declined to perform gait training upon standing,reporting BLE pain. agreeable to transfer to chair only with Min A x2. Verbal and tactile cues for stand>sit. Encouraged feet flat on floor for low load prolonged stretch of B achilles. Daughter present and agreeable to assist with proper foot positioning. Progression toward goals:  []    Improving appropriately and progressing toward goals  [x]    Improving slowly and progressing toward goals  []    Not making progress toward goals and plan of care will be adjusted     PLAN:  Patient continues to benefit from skilled intervention to address the above impairments. Continue treatment per established plan of care.   Discharge Recommendations:  Rehab  Further Equipment Recommendations for Discharge:  TBD     SUBJECTIVE:   Patient stated Hui Page will try.     OBJECTIVE DATA SUMMARY:   Critical Behavior:  Neurologic State: Alert, Appropriate for age, Eyes open spontaneously  Orientation Level: Oriented X4, Appropriate for age  Cognition: Follows commands, Appropriate for age attention/concentration  Safety/Judgement: Awareness of environment  Functional Mobility Training:  Bed Mobility:     Supine to Sit: Minimum assistance; Additional time     Scooting: Contact guard assistance; Additional time        Transfers:  Sit to Stand: Additional time;Minimum assistance  Stand to Sit: Minimum assistance; Additional time                             Balance:  Sitting: Intact; High guard  Standing: Impaired  Standing - Static: Fair;Constant support  Standing - Dynamic : Fair  Ambulation/Gait Training:  Distance (ft): 3 Feet (ft)  Assistive Device: Walker, rolling;Gait belt  Ambulation - Level of Assistance: Minimal assistance;Assist x2        Gait Abnormalities: Antalgic;Decreased step clearance                                      Stairs:              Neuro Re-Education:    Therapeutic Exercises:   Reviewed handout of thera ex for BLE. Pain:  Pain Scale 1: Numeric (0 - 10)  Pain Intensity 1: 5 (stated to feel comfortable)  Pain Location 1: Abdomen  Pain Orientation 1: Anterior  Pain Description 1: Aching  Pain Intervention(s) 1: Position  Activity Tolerance:   Fair  Please refer to the flowsheet for vital signs taken during this treatment.   After treatment:   [x]    Patient left in no apparent distress sitting up in chair  []    Patient left in no apparent distress in bed  [x]    Call bell left within reach  [x]    Nursing notified  [x]    Caregiver present  [x]    Chair alarm activated    COMMUNICATION/COLLABORATION:   The patients plan of care was discussed with: Registered Nurse and Occupational Therapist Assistant    Dawit Dry   Time Calculation: 35 mins

## 2018-03-08 NOTE — PROGRESS NOTES
Progress Note    Patient: Betsy Amaya MRN: [id number]  SSN: [id number]    YOB: 1945  Age: 67 y.o. Sex: female      Admit Date: 2018    25 Days Post-Op    Procedure:  Procedure(s):   EX LAP    Subjective:     Mrs. Sherryle Scrape vomited this AM.  She feels better now. She passed flatus and had 2 BM's. She wants to eat. Objective:     Visit Vitals    /69 (BP 1 Location: Left arm, BP Patient Position: At rest)    Pulse 90    Temp 98.5 °F (36.9 °C)    Resp 16    Ht 5' 3.5\" (1.613 m)    Wt 178 lb 9.2 oz (81 kg)    SpO2 98%    Breastfeeding No    BMI 31.14 kg/m2       Temp (24hrs), Av.5 °F (36.9 °C), Min:98.1 °F (36.7 °C), Max:98.9 °F (37.2 °C)      Physical Exam:    GENERAL: alert, cooperative, no distress, ABDOMEN: Soft, +BS, mild distention and tenderness around incision. Lab Review:   BMP:   Lab Results   Component Value Date/Time     2018 02:22 AM    K 4.7 2018 02:22 AM     2018 02:22 AM    CO2 21 2018 02:22 AM    AGAP 10 2018 02:22 AM     (H) 2018 02:22 AM    BUN 16 2018 02:22 AM    CREA 0.50 (L) 2018 02:22 AM    GFRAA >60 2018 02:22 AM    GFRNA >60 2018 02:22 AM       Assessment:     Hospital Problems  Date Reviewed: 2016          Codes Class Noted POA    Hypokalemia ICD-10-CM: E87.6  ICD-9-CM: 276.8  2018 No        Leukocytosis ICD-10-CM: U64.030  ICD-9-CM: 288.60  2018 Yes        Severe protein-calorie malnutrition ([address] ICD-10-CM: E43  ICD-9-CM: 670  2018 Yes        Acute metabolic encephalopathy [facility]-03-WD: G93.41  ICD-9-CM: 348.31  2018 Yes        Hyperammonemia ([address] ICD-10-CM: E72.20  ICD-9-CM: 270.6  2018 No    Overview Addendum 2018  9:08 AM by Charles Hager MD        Ref.  Range 2018 17:20   Ammonia Latest Ref Range: <32 UMOL/L 69 (H)                Free intraperitoneal air ICD-10-CM: K66.8  ICD-9-CM: 568.89  2018 Yes        S/P exploratory laparotomy ICD-10-CM: Z98.890  ICD-9-CM: V45.89  2/12/2018 No    Overview Signed 2/12/2018 11:57 AM by Geena Renee MD     Suture repair of perforated posterior gastric ulcer with Lindle Emms patch, core needle biopsies of left lobe of the liver. Alcoholic cirrhosis of liver without ascites (HCC) (Chronic) ICD-10-CM: K70.30  ICD-9-CM: 571.2  2/12/2018 Yes    Overview Signed 2/17/2018  8:45 AM by Maggy Dominguez MD     Liver bx 2/12/18:   Cirrhosis with mild chronic portal inflammation and macrovesicular steatosis. Fatty liver determined by biopsy ICD-10-CM: K76.0  ICD-9-CM: 571.8  2/12/2018 Yes    Overview Signed 2/17/2018  8:53 AM by Maggy Dominguez MD        Cirrhosis with mild chronic portal inflammation and macrovesicular steatosis. * (Principal)Perforated chronic gastric ulcer ([address] (Chronic) ICD-10-CM: K25.5  ICD-9-CM: 531.50  2/11/2018 Yes        ETOH abuse (Chronic) ICD-10-CM: F10.10  ICD-9-CM: 305.00  2/11/2018 Yes              Plan/Recommendations/Medical Decision Making:     Continue TPN and bowel rest.  Check SBFT if continues to vomit. Continue drain, output decreasing. Continue PT. SNF when no acute issues.     Signed By: Geena Renee MD     March 8, 2018

## 2018-03-08 NOTE — PROGRESS NOTES
Family Practice Daily Progress Note: 3/8/2018  [facility]  Becky Wheat,     Assessment/Plan:   Perforated chronic gastric ulcer /  Free intraperitoneal air / S/P exploratory laparotomy 2/11. S/p repair in OR with Dr. Zakia Diaz on 2/12/18 - per surg    Severe protein calorie malnutrition-POA  --TPN per GI - advance diet as per surg     Acute metabolic encephalopathy. Improved. Multifactorial-- medications, alcohol use, sundowning  ---avoid triggers  ---CIWA  ---maintenance of sleep/wake cycle, and re-orientation     Hypoxia / Respiratory distress. Improved. Pleural effusions   ---Nebs as needed  ---Pulmonary consulted     ---Bumex as needed. Added Aldactone as needed.      Alcoholic cirrhosis of liver without ascites /  Fatty liver determined by biopsy /  Hyperammonemia. --GI has seen- rec alcohol cessation and outpt follow up  --aldactone as needed but hold if     ETOH abuse. Needs to stop ETOH entirely.     Hypokalemia /  Hypoalbuminemia. Being addressed with TPN     Leukocytosis.    -- repeat blood cultures, and urine 2/28 neg  --was on Zosyn, and levaquin - cultures drawn off antibiotics neg except yeast -anidulafungin  added  --consulted ID 3/1    Anemia: watch Hgb   --follow, transfuse <7    Back Pain due to compression fracture of T12  ---Increased Duragesic patch to 25mcg and DIiaudid to 1mg as needed    Debility: due to prolonged hospital stay  --continue PT/OT- needs to get OOB  --will likely need rehab        Problem List:  Problem List as of 3/8/2018  Date Reviewed: 4/26/2016          Codes Class Noted - Resolved    Hypokalemia ICD-10-CM: E87.6  ICD-9-CM: 276.8  2/18/2018 - Present        Leukocytosis ICD-10-CM: D72.829  ICD-9-CM: 288.60  2/18/2018 - Present        Severe protein-calorie malnutrition ([facility] [address] ICD-10-CM: E43  ICD-9-CM: 262  2/18/2018 - Present        Acute metabolic encephalopathy EXF-57-IM: G93.41  ICD-9-CM: 348.31  2/18/2018 - Present        Hyperammonemia ([facility] [address] ICD-10-CM: E72.20  ICD-9-CM: 270.6  2/16/2018 - Present    Overview Addendum 2/17/2018  9:08 AM by Rahat Fuller MD        Ref. Range 2/16/2018 17:20   Ammonia Latest Ref Range: <32 UMOL/L 69 (H)                Free intraperitoneal air ICD-10-CM: K66.8  ICD-9-CM: 568.89  2/12/2018 - Present        S/P exploratory laparotomy ICD-10-CM: Z98.890  ICD-9-CM: V45.89  2/12/2018 - Present    Overview Signed 2/12/2018 11:57 AM by Nataly Morrow MD     Suture repair of perforated posterior gastric ulcer with Delynn Leos patch, core needle biopsies of left lobe of the liver. Alcoholic cirrhosis of liver without ascites (HCC) (Chronic) ICD-10-CM: K70.30  ICD-9-CM: 571.2  2/12/2018 - Present    Overview Signed 2/17/2018  8:45 AM by Rahat Fuller MD     Liver bx 2/12/18:   Cirrhosis with mild chronic portal inflammation and macrovesicular steatosis. Fatty liver determined by biopsy ICD-10-CM: K76.0  ICD-9-CM: 571.8  2/12/2018 - Present    Overview Signed 2/17/2018  8:53 AM by Rahat Fuller MD        Cirrhosis with mild chronic portal inflammation and macrovesicular steatosis. * (Principal)Perforated chronic gastric ulcer ([address] (Chronic) ICD-10-CM: K25.5  ICD-9-CM: 531.50  2/11/2018 - Present        ETOH abuse (Chronic) ICD-10-CM: F10.10  ICD-9-CM: 305.00  2/11/2018 - Present        GI bleed ICD-10-CM: K92.2  ICD-9-CM: 578.9  4/26/2016 - Present        Hypotension ICD-10-CM: I95.9  ICD-9-CM: 458.9  4/26/2016 - Present        Tachycardia ICD-10-CM: R00.0  ICD-9-CM: 785.0  4/26/2016 - Present        Acute blood loss anemia ICD-10-CM: D62  ICD-9-CM: 285.1  4/26/2016 - Present              Subjective:    67 y.o.  female with EtOH abuse who is admitted to the General Surgery Service with perforated gastric ulcer. We are asked to evaluate for altered mental status. The patient is poorly interactive at this time and this limits primary hx.  Discussed case with family available at bedside providing secondary hx and chart review. Per family, patient has had declining neurological condition over past 48 hours. Notably, an RRT was called for respiratory distress. 2/19: This morning she is a bit more alert at this moment and taking in more complete sentences. She says she does not feel well. She has no specific site of pain other than the NG tube which is bothering her a great deal.  She should improve as time from surg increases. K+ a little low - replacing.     2/20:  Still confused and somnolent at times. Now able to localize pain to ab and converse a bit more. Tmax 100.3  WBC is up again but hopefully reactive - recheck in AM - more incentive spirom and check CXR.     2/21: A little more alert. Knows she is in the hospital. Not able to participate with PT yesterday as she was in too much pain. WBC still at 19.     2/22:  WBC 19K. She is more alert. Daughter at bedside. Both of her daughters live out of town. Looking at SNF options for rehab. Coughing more. Starting to use IS.     2/23: WBC 17. Repeat CT chest with moderate to large persistent left subphrenic collection. IR has been consulted. Vanc and Levaquin added by pulm. 2/24: Pt had a better night last night. Only brief episode of confusion. She is much clearer this AM.  Now in quite a bit of pain from gas and stomach cramping. Thinks she will feel better if she can have a BM. Has been on bedpan for a while without success. On TPN. Daughters very concerned that she won't be successful while lying down. Wanting to get up to bedside commode. Will need PT's help. 2/25: febrile overnight and WBC up after vanc was stopped. Pt c/o more dyspnea today. Known fluid collection that will be drained in IR this week. Up to chair with PT yesterday briefly. +BM yesterday. Da notes pt has been c/o R wrist pain off and on for 2 wks since she fell. No swelling.    2/26:  No new complaints.   Still c/o back and ab pain. Still passing gas and had BM. Diuresed with 1 mg Bumex yesterday by Pulmonary. Remains on Zosyn and Levaquin. Blood culture remains neg. Tmax 99.2. X-ray of wrists ok. CXR ok with tube inplace. WBC 15.7K.      2/27:  Looks much better today. Much less work of breathing. Diuresed about 1 liter over last 24h. WBC 16K today. Blood cult remains neg. Off antibiotics. Still on TPN. She will need rehab for PT/OT. 2/28: Very restless during the night. Has not been OOB. Daughter stayed the night with her. WBC is still up. Antibiotics stopped yesterday. She is c/o increased pain in her back and can't get comfortable. More anxious and agitated. Tolerating sips. 3/1:  She reports she feels better this AM after she had some sleep. Afeb. WBC still up - a bit more today with increased neutrophils. Will also get ID to see also. CT AB and Pelvis as below. Cultures done yesterday off antibiotics - so far no growth. No output from drain - may need to be repositioned. More edema today but not as short of breath today while sitting up - add albumin to todays lab. Add Aldactone if ok with GI and Bumex as per Pul. Discussed cirrhosis/ascites/edema with pts daughter. 3/2:  She reports she feels better, and has slightly less edema, although daughters note they think pts abdomin more swollen. The daughters are more worried about pts LE edema than anything else - explained that with pts low albumin that edema may cont to be a problem. She is more alert, a bit more oriented today and says she has much less pain today. Tmax 101. Culture had yeast - diflucan started. Drain was repositioned, had thoracentesis and new PICC placed yesterday. May need to restart broad spectrum antibiotics - ID consulted. 3/3:  She reports she feels better this AM.  More alert and oriented this AM.  BM yesterday. Tmax 99. WBC still 17-18K. ID consulted and advises to watch off antibiotics for now.   Less edema LE.      3/4:  Doing much better today and much more alert. Little pain. Off sleeping meds for now - not sleeping well but reports \"never was a good sleeper. \"  Anxious at night. WBC still up. Discussed rehab with daughters and pt and pt willing to go. 3/5:  Continues to improve. She wants to eat solid food - she now has an appetite. Passing gas. Has been up to chair. K+ 5.3 - will DC aldactone for now and restart if edema returns. CXR pending today. 3/6: She reports she feels better. Yesterday had marked increase in ALYSSA output and now NPO and back on TPN today. Will resume Aldactone again (at lower dose) as sl more edema today and hold again if K+ creeping up again. AM labs pending. 3/7:  Reports some ab pain this AM but not severe. Orthostatic hypotension with walking yesterday and K+ back up - will hold aldactone again. Breathing and edema better with the Aldactone. Push incentive spirom - last CXR with atelectasis. O>>I on I&Os but wonder about measurements. 3/8:  Nausea this AM with 2 episodes of vomiting but reports she feels back to normal now. No ab pain. No SOB at rest.  Wearing nasal O2 at 2 L. Nurse reports yesterdays labs were drawn just before 12MN last night for some reason - still have not resulted and lab called and will run samples now. This AMs labs not drawn yet. Port CXR this AM pending.  O>>I again but Intake not correct. Past Medical History:   Diagnosis Date    Alcoholic cirrhosis of liver without ascites ([facility] [address] 2/12/2018    ETOH abuse 2/11/2018    History of vascular access device 02/16/2018    [facility] VAT - 5 FR triple, R basilic, TPN    Hyperammonemia ([facility] [address] 2/16/2018     Results for Moncho Ravi (MRN [id number]) as of 2/17/2018 08:43  Ref.  Range 2/16/2018 17:20 Ammonia Latest Ref Range: <32 UMOL/L 69 (H)     Hyperlipidemia     Perforated chronic gastric ulcer ([facility] [address] 2/11/2018     Past Surgical History:   Procedure Laterality Date    HX CATARACT REMOVAL       Social History     Social History    Marital status: SINGLE     Spouse name: N/A    Number of children: N/A    Years of education: N/A     Occupational History    Not on file. Social History Main Topics    Smoking status: Former Smoker    Smokeless tobacco: Never Used    Alcohol use 11.4 oz/week     0 Standard drinks or equivalent, 19 Glasses of wine per week      Comment: Hx of heavy etoh use, but quit several months ago    Drug use: No    Sexual activity: Not on file     Other Topics Concern    Not on file     Social History Narrative     Family History   Problem Relation Age of Onset    Other Other      patient did not know     Review of Systems:   Review of systems not obtained due to patient factors. Objective:   Physical Exam:     Visit Vitals    /59 (BP 1 Location: Left arm, BP Patient Position: At rest)    Pulse 86    Temp 98.1 °F (36.7 °C)    Resp 15    Ht 5' 3.5\" (1.613 m)    Wt 81 kg (178 lb 9.2 oz)    SpO2 96%    Breastfeeding No    BMI 31.14 kg/m2    O2 Flow Rate (L/min): 2 l/min O2 Device: Nasal cannula    Temp (24hrs), Av.7 °F (37.1 °C), Min:98.1 °F (36.7 °C), Max:99.5 °F (37.5 °C)         1901 -  0700  In: 250 [P.O.:250]  Out: 2950 [Urine:2950]    General:  Awake, uncomfortable, appears stated age. Head:  Normocephalic, without obvious abnormality, atraumatic. Eyes:  Conjunctivae/corneas clear. EOMs intact. Throat: Lips, mucosa, and tongue dry. Neck: Supple, symmetrical, trachea midline, no adenopathy, thyroid: no enlargement/tenderness/nodules, no carotid bruit and no JVD. Lungs:    no intercostal use, breath sounds diminished. Clear at this time. Chest wall:  No tenderness or deformity. Left drain site looks ok and little drainage. Heart:  Tachycardic rate with regular rhythm,  no murmur, click, rub or gallop. Abdomen:   Soft, less distended at this time, without tenderness. Bowel sounds present.  Dressings dry. Incision dry without redness, staple line intact. Drain site looks ok and less drainage at this moment. Extremities: no cyanosis. No LE edema at this time. No calf tenderness or cords, no erythema   Skin: turgor normal. No rashes or lesions   Neurologic:   AOx2-3,  Gait not tested at this time. Sensation grossly normal to touch. Gross motor of extremities normal.       Data Review:    CT AB and Pelvis 2/28/18: IMPRESSION:  1. There is residual fluid in the posterior portion of the left subphrenic  collection which does not appear well drained by the pigtail catheter. This  could be repositioned versus new drainage catheter placement if clinically  indicated. 2. Persistent ascites. 3. Increasing right pleural effusion and right lower lobe atelectasis   4. Distended duodenum and proximal small bowel of doubtful clinical  Significance. CXR 3/1/18:  INDICATION:  chest pain and shortness of breath following thoracentesis   EXAM: Portable chest 1716 . Comparison March 1, 2018  FINDINGS: There is no significant change in appearance the lungs. Small left  pleural effusion with left basilar atelectasis unchanged. Cardiomediastinal  silhouette is unchanged. No pneumothorax. Lines and tubes are unchanged in  position. IMPRESSION:  1. No interval change    KUB 3/6/18  IMPRESSION: Nonspecific bowel gas pattern with multiple dilated loops of small  bowel but does not appear obstructive. No pneumoperitoneum.     Recent Days:  Recent Labs      03/06/18   0214   WBC  17.4*   HGB  8.2*   HCT  27.1*   PLT  392     Recent Labs      03/08/18   0222  03/07/18   0426  03/06/18   1114  03/06/18   0214   NA   --   135*  135*   --    K   --   5.4*  4.9   --    CL   --   103  104   --    CO2   --   24  24   --    GLU   --   169*  93   --    BUN   --   18  16   --    CREA   --   0.49*  0.57   --    CA   --   8.2*  8.2*   --    MG   --   1.9   --   1.6   PHOS  3.2  3.3   --   3.5     No results for input(s): PH, PCO2, PO2, HCO3, FIO2 in the last 72 hours.   24 Hour Results:  Recent Results (from the past 24 hour(s))   GLUCOSE, POC    Collection Time: 03/07/18 12:16 PM   Result Value Ref Range    Glucose (POC) 97 65 - 100 mg/dL    Performed by Cira Andino (PCT)    GLUCOSE, POC    Collection Time: 03/07/18  5:55 PM   Result Value Ref Range    Glucose (POC) 88 65 - 100 mg/dL    Performed by Cira Andino (PCT)    GLUCOSE, POC    Collection Time: 03/07/18 11:10 PM   Result Value Ref Range    Glucose (POC) 164 (H) 65 - 100 mg/dL    Performed by Kaylah Raya    PHOSPHORUS    Collection Time: 03/08/18  2:22 AM   Result Value Ref Range    Phosphorus 3.2 2.6 - 4.7 MG/DL   GLUCOSE, POC    Collection Time: 03/08/18  5:17 AM   Result Value Ref Range    Glucose (POC) 153 (H) 65 - 100 mg/dL    Performed by Tom Antonio (PCT)      Medications reviewed  Current Facility-Administered Medications   Medication Dose Route Frequency    anidulafungin (ERAXIS) 100 mg in 0.9% sodium chloride 130 mL IVPB  100 mg IntraVENous Q24H    spironolactone (ALDACTONE) tablet 25 mg  25 mg Oral DAILY    albuterol-ipratropium (DUO-NEB) 2.5 MG-0.5 MG/3 ML  3 mL Nebulization Q4H PRN    melatonin tablet 1.5 mg  1.5 mg Oral QHS PRN    HYDROmorphone (PF) (DILAUDID) injection 1 mg  1 mg IntraVENous Q3H PRN    fentaNYL (DURAGESIC) 25 mcg/hr patch 1 Patch  1 Patch TransDERmal Q72H    haloperidol lactate (HALDOL) injection 1 mg  1 mg IntraVENous Q4H PRN    guaiFENesin ER (MUCINEX) tablet 1,200 mg  1,200 mg Oral Q12H    acetaminophen (TYLENOL) tablet 650 mg  650 mg Oral Q4H PRN    insulin regular (NOVOLIN R, HUMULIN R) injection   SubCUTAneous Q6H    fat emulsion 20% (LIPOSYN, INTRAlipid) infusion 500 mL  500 mL IntraVENous Q MON, WED & SAT    glucose chewable tablet 16 g  4 Tab Oral PRN    glucagon (GLUCAGEN) injection 1 mg  1 mg IntraMUSCular PRN    dextrose (D50W) injection syrg 12.5-25 g  12.5-25 g IntraVENous PRN    alteplase (CATHFLO) 1 mg in sterile water (preservative free) 1 mL injection  1 mg InterCATHeter PRN    sodium chloride (NS) flush 10-30 mL  10-30 mL InterCATHeter PRN    sodium chloride (NS) flush 10-40 mL  10-40 mL InterCATHeter Q8H    naloxone (NARCAN) injection 0.4 mg  0.4 mg IntraVENous PRN    ondansetron (ZOFRAN) injection 4 mg  4 mg IntraVENous Q4H PRN    enoxaparin (LOVENOX) injection 40 mg  40 mg SubCUTAneous Q24H    pantoprazole (PROTONIX) injection 40 mg  40 mg IntraVENous Q12H    phenol throat spray (CHLORASEPTIC) 1 Spray  1 Spray Oral PRN    nystatin (MYCOSTATIN) 100,000 unit/gram powder   Topical BID    sodium chloride (NS) flush 5-10 mL  5-10 mL IntraVENous PRN     Care Plan discussed with: Patient/daughter/Pul/Surg/ID and Nurse   Total time spent with patient: 30 minutes.   Duane Back, MD

## 2018-03-08 NOTE — PROGRESS NOTES
[facility] PROGRESS NOTE  Pulmonary, Critical Care, and Sleep Medicine    Name: Vasiliy Pham MRN: [id number]   : 1945 Hospital: [address] [facility]   Date: 3/8/2018  Admission Date: 2018     Chart and notes reviewed. Data reviewed. I have evaluated and examined the patient. Overnight events reviewed:    No acute events overnight. Worked with PT and desatted to upper 80s on RA. Pain seems well controlled, conversationally confused but no distress. Not using IS  Labs reviewed, WBC trending up from previous WBC    ROS negative except what is noted above  CXR personally reviewed: stable    Vital Signs:  Visit Vitals    BP 97/61 (BP 1 Location: Left arm, BP Patient Position: At rest)    Pulse 84    Temp 98.4 °F (36.9 °C)    Resp 16    Ht 5' 3.5\" (1.613 m)    Wt 81 kg (178 lb 9.2 oz)    SpO2 (!) 86%    Breastfeeding No    BMI 31.14 kg/m2       O2 Device: Nasal cannula   O2 Flow Rate (L/min): 2 l/min   Temp (24hrs), Av.4 °F (36.9 °C), Min:98.1 °F (36.7 °C), Max:98.6 °F (37 °C)       Intake/Output:   Last shift:       07 -  1900  In: 10   Out: 25 [Drains:25]  Last 3 shifts:  190 -  0700  In: 250 [P.O.:250]  Out: 2950 [Urine:2950]    Intake/Output Summary (Last 24 hours) at 18 1603  Last data filed at 18 1337   Gross per 24 hour   Intake              260 ml   Output             1325 ml   Net            -1065 ml        Telemetry:     Physical Exam:   General:  Alert, cooperative, no distress, confused to time and place   Head:  Normocephalic, without obvious abnormality, atraumatic. Eyes:  Conjunctivae/corneas clear. PERRL, EOMs intact. Nose: Nares normal. Septum midline. Mucosa normal. No drainage or sinus tenderness. Throat: Lips, mucosa, and tongue normal. Teeth and gums normal.   Neck: Supple, symmetrical, trachea midline, no adenopathy, thyroid: no enlargment/tenderness/nodules, no carotid bruit and no JVD.    Back: Symmetric, no curvature. ROM normal.   Lungs:   Clear to auscultation bilaterally, decreased at that bases   Chest wall:  No tenderness or deformity. Heart:  Regular rate and rhythm, S1, S2 normal, no murmur, click, rub or gallop. Abdomen:   Soft, non-tender. Bowel sounds normal. No masses,  No organomegaly. Extremities: Extremities normal, atraumatic, no cyanosis or edema. Pulses: 2+ and symmetric all extremities. Skin: Skin color, texture, turgor normal. No rashes or lesions   Lymph nodes: Cervical, supraclavicular, and axillary nodes normal.         DATA:  MAR reviewed and pertinent medications noted or modified as needed    Labs:  Recent Labs      03/06/18   0214   WBC  17.4*   HGB  8.2*   HCT  27.1*   PLT  392     Recent Labs      03/08/18   0222  03/07/18   0426  03/06/18   1114  03/06/18   0214   NA  137  135*  135*   --    K  4.7  5.4*  4.9   --    CL  106  103  104   --    CO2  21  24  24   --    GLU  130*  169*  93   --    BUN  16  18  16   --    CREA  0.50*  0.49*  0.57   --    CA  8.1*  8.2*  8.2*   --    MG  1.6  1.9   --   1.6   PHOS  3.2  3.3   --   3.5   ALB  1.5*   --    --    --    TBILI  0.5   --    --    --    SGOT  47*   --    --    --    ALT  30   --    --    --        Imaging:  I have personally reviewed the patients radiographs and reports.       IMPRESSION:   · Acute Respiratory Failure with Hypoxia  · Abnormal Chest CT: with bilateral pleural effusions, atelectasis,and patchy airspace disease in MIRIAM; s/p thoracentesis on 3/1  · Leukocytosis trending up  · ALYSSA drain culture: + yeast  · Delirium; improved  · Peforated gastric ulcer, s/p repair  · EtOH abuse      PLAN:   · Goal sats >90%, wean O2 as tolerated  · ID following-continue Eraxis  · F/U blood cultures  · F/U cultures from abdominal drains  · Speech following and appreciate help  · Repeat CBC tomorrow  · C/W IV Haldol as needed for delerium  · PRN nebs  · Post-op management as per surgery  · Pain control  · Seroquel at night for sleep  · Monitor lytes, replete as needed.     · Encourage IS use  · PT/OT  · OOB and mobilize as much as possible  · TPN  · SCDs  · Protonix         Shahid Treviño NP

## 2018-03-08 NOTE — PROGRESS NOTES
[facility] Progress Note           Marion Osler DO    [phone] Office  [phone]  Fax    3/8/2018      Assessment & Plan:   1. Leukocytosis. Awaiting labs from this AM.   Remains afebrile. Multiple potential etiologies. Consider tagged WBC scan if no improvement or patient develops fever. Watch for cdiff. Plan to monitor the patient off further antibiotics (except antifungal) for now. Plan 7 days anidulafungin depending on patient course    2. Candida parapsilosis and krusei  growing from the ALYSSA drain. The significance of this yeast is uncertain as it likely represents colonization or selective pressure from the 14 days of piperacillin-tazobactam.  Plan 7 days empiric anidulafungin for now and follow. 3. Perforated gastric ulcer. The patient underwent exploratory laparotomy with repair on .  Now with fistula. On TPN  4. N/V. Monitor. May need to change abx if nausea remains unexplained          Subjective:     N/V today    Objective:     Vitals:   Visit Vitals    /70 (BP 1 Location: Left arm, BP Patient Position: At rest;Sitting)    Pulse (!) 102    Temp 98.6 °F (37 °C)    Resp 15    Ht 5' 3.5\" (1.613 m)    Wt 81 kg (178 lb 9.2 oz)    SpO2 94%    Breastfeeding No    BMI 31.14 kg/m2        Tmax:  Temp (24hrs), Av.5 °F (36.9 °C), Min:98.1 °F (36.7 °C), Max:98.9 °F (37.2 °C)      Exam:   Patient is intubated:  no    Physical Examination:   General:  Alert, cooperative, no distress   Head:  Normocephalic, without obvious abnormality, atraumatic. Eyes:  Conjunctivae/corneas clear. .   Neck: Supple, symmetrical, trachea midline, no adenopathy       Lungs:   Clear to auscultation anteriorly   Chest wall:  No tenderness or deformity. Heart:  Regular rate and rhythm, S1, S2 normal,. Abdomen:   Soft, non-tender. ALYSSA and accordian drain in place.    Extremities: B/l LE edema   Skin: Skin color, texture, turgor normal. No rashes or lesions Neurologic: CNII-XII intact. Labs:        No lab exists for component: ITNL   No results for input(s): CPK, CKMB, TROIQ in the last 72 hours. Recent Labs      03/08/18   0222  03/07/18   0426  03/06/18   1114  03/06/18   0214   NA  137  135*  135*   --    K  4.7  5.4*  4.9   --    CL  106  103  104   --    CO2  21  24  24   --    BUN  16  18  16   --    CREA  0.50*  0.49*  0.57   --    GLU  130*  169*  93   --    PHOS  3.2  3.3   --   3.5   MG  1.6  1.9   --   1.6   ALB  1.5*   --    --    --    WBC   --    --    --   17.4*   HGB   --    --    --   8.2*   HCT   --    --    --   27.1*   PLT   --    --    --   392     No results for input(s): INR, PTP, APTT in the last 72 hours.     No lab exists for component: INREXT, INREXT  Needs: urine analysis, urine sodium, protein and creatinine  No results found for: CHIKIS, CREAU      Cultures:     No results found for: SDES  Lab Results   Component Value Date/Time    Culture result: Culture performed on Fluid swab specimen 03/01/2018 02:00 PM    Culture result: NO GROWTH 4 DAYS 03/01/2018 02:00 PM    Culture result: NO GROWTH 4 DAYS 03/01/2018 02:00 PM       Radiology:     Medications       Current Facility-Administered Medications   Medication Dose Route Frequency Last Dose    TPN ADULT - CENTRAL   IntraVENous CONTINUOUS      anidulafungin (ERAXIS) 100 mg in 0.9% sodium chloride 130 mL IVPB  100 mg IntraVENous Q24H      spironolactone (ALDACTONE) tablet 25 mg  25 mg Oral DAILY 25 mg at 03/08/18 0926    albuterol-ipratropium (DUO-NEB) 2.5 MG-0.5 MG/3 ML  3 mL Nebulization Q4H PRN      melatonin tablet 1.5 mg  1.5 mg Oral QHS PRN 1.5 mg at 03/07/18 2050    HYDROmorphone (PF) (DILAUDID) injection 1 mg  1 mg IntraVENous Q3H PRN 1 mg at 03/08/18 0228    fentaNYL (DURAGESIC) 25 mcg/hr patch 1 Patch  1 Patch TransDERmal Q72H 1 Patch at 03/06/18 1004    haloperidol lactate (HALDOL) injection 1 mg  1 mg IntraVENous Q4H PRN 1 mg at 03/04/18 0401    guaiFENesin ER [facility]) tablet 1,200 mg  1,200 mg Oral Q12H 1,200 mg at 03/08/18 0926    acetaminophen (TYLENOL) tablet 650 mg  650 mg Oral Q4H  mg at 03/02/18 0301    insulin regular (NOVOLIN R, HUMULIN R) injection   SubCUTAneous Q6H 2 Units at 03/08/18 0543    fat emulsion 20% (LIPOSYN, INTRAlipid) infusion 500 mL  500 mL IntraVENous Q MON, WED &  mL at 03/07/18 2049    glucose chewable tablet 16 g  4 Tab Oral PRN      glucagon (GLUCAGEN) injection 1 mg  1 mg IntraMUSCular PRN      dextrose (D50W) injection syrg 12.5-25 g  12.5-25 g IntraVENous PRN 12.5 g at 03/06/18 1814    alteplase (CATHFLO) 1 mg in sterile water (preservative free) 1 mL injection  1 mg InterCATHeter PRN 1 mg at 02/28/18 1607    sodium chloride (NS) flush 10-30 mL  10-30 mL InterCATHeter PRN 10 mL at 03/01/18 1632    sodium chloride (NS) flush 10-40 mL  10-40 mL InterCATHeter Q8H 10 mL at 03/08/18 0543    naloxone (NARCAN) injection 0.4 mg  0.4 mg IntraVENous PRN      ondansetron (ZOFRAN) injection 4 mg  4 mg IntraVENous Q4H PRN 4 mg at 03/08/18 0806    enoxaparin (LOVENOX) injection 40 mg  40 mg SubCUTAneous Q24H 40 mg at 03/07/18 1545    pantoprazole (PROTONIX) injection 40 mg  40 mg IntraVENous Q12H 40 mg at 03/08/18 0815    phenol throat spray (CHLORASEPTIC) 1 Spray  1 Spray Oral PRN 1 Spray at 02/13/18 1803    nystatin (MYCOSTATIN) 100,000 unit/gram powder   Topical BID      sodium chloride (NS) flush 5-10 mL  5-10 mL IntraVENous PRN 10 mL at 03/06/18 1706           Case discussed with:  Daughter      Marion OslerDO

## 2018-03-08 NOTE — PROGRESS NOTES
Problem: Self Care Deficits Care Plan (Adult)  Goal: *Acute Goals and Plan of Care (Insert Text)  Occupational Therapy Goals  Revised 3/2/2018  1. Patient will perform grooming with modified independence for >5 minutes with supervision/setup with < 2 verbal cues within 7 day(s). 2.  Patient will perform upper body dressing and bathing with modified independence within 7 day(s). 3.  Patient will perform lower body dressing and bathing with moderate assistance using adaptive equipment within 7 day(s). 4.  Patient will perform toilet transfers to [facility] with minimal assistance x1 within 7 day(s). 5.  Patient will perform all aspects of toileting with minimal assistance within 7 day(s). 6.  Patient will participate in upper extremity therapeutic exercise/activities with supervision/set-up for 10 minutes within 7 day(s). 7.  Patient will utilize energy conservation techniques during functional activities with verbal cues within 7 day(s). 8.  Patient will don/doff back brace at supervision/set-up within 7 days. 9.  Patient will verbalize/demonstrate all spinal precautions during ADL tasks without cues within 7 days. Initiated 2/20/2018; revised at re-assess (see above); 1. Patient will perform upper body dressing with moderate assistance in unsupported sitting within 7 day(s). 2.  Patient will perform upper body bathing with moderate assistance  within 7 day(s). 3.  Patient will perform grooming with minimal assistance/contact guard assist within 7 day(s). 4.  Patient will perform toilet transfers with maximal assistance  within 7 day(s). 5.  Patient will perform all aspects of toileting with maximal assistance within 7 day(s). 6.  Patient will participate in upper extremity therapeutic exercise/activities with minimal assistance/contact guard assist for 5 minutes within 7 day(s).         Occupational Therapy TREATMENT  Patient: Jhonny Mckeon (89 y.o. female)  Date: 3/8/2018  Diagnosis: Intra-abdominal free air of unknown etiology [K66.8] Perforated chronic gastric ulcer ([address]  Procedure(s) (LRB):   NASOGASTRIC TUBE PLACEMENT (N/A) 18 Days Post-Op  Precautions: Aspiration, Back, Bed Alarm, Fall, Skin  Chart, occupational therapy assessment, plan of care, and goals were reviewed. ASSESSMENT:  Pt agreeable to treatment. After sitting edge of bed pt tolerated sitting for approx 10 min while physician examined pt. Intact sitting balance for functional tasks. Pt tearful after MD visit. She needed min assist x 2  to stand and transfer to chair. She declined further activity due to sore R LE calf area. Pt max assist for LB bathing and dressing. Pt encouraged to sit up for an hour. O2 sats 96% on 4 L following activity. Daughter present at end of treatment. Recommend rehab. Progression toward goals:  []          Improving appropriately and progressing toward goals  [x]          Improving slowly and progressing toward goals  []          Not making progress toward goals and plan of care will be adjusted     PLAN:  Patient continues to benefit from skilled intervention to address the above impairments. Continue treatment per established plan of care. Discharge Recommendations:  Rehab  Further Equipment Recommendations for Discharge: None     SUBJECTIVE:   Patient stated An hour? Isabel Trujillo    OBJECTIVE DATA SUMMARY:   Cognitive/Behavioral Status:  Neurologic State: Alert; Appropriate for age  Orientation Level: Oriented X4  Cognition: Follows commands           Functional Mobility and Transfers for ADLs:   Bed Mobility:     Supine to Sit: Minimum assistance; Additional time     Scooting: Contact guard assistance; Additional time     Transfers:  Sit to Stand: Additional time;Minimum assistance       Balance:  Sitting: Intact; High guard  Standing: Impaired  Standing - Static: Fair;Constant support  Standing - Dynamic : Fair  ADL Intervention:     Max assist for bathing and dressing.            Pain:  Pain Scale 1: Numeric (0 - 10)  Pain Intensity 1: 5 (stated to feel comfortable)  Pain Location 1: Abdomen  Pain Orientation 1: Anterior  Pain Description 1: Aching  Pain Intervention(s) 1: Position    Activity Tolerance:    Fair  Please refer to the flowsheet for vital signs taken during this treatment.   After treatment:   [x]  Patient left in no apparent distress sitting up in chair  []  Patient left in no apparent distress in bed  [x]  Call bell left within reach  [x]  Nursing notified  [x]  Caregiver present  []  Bed alarm activated    COMMUNICATION/COLLABORATION:   The patients plan of care was discussed with: Physical Therapy Assistant, Occupational Therapist and Registered Nurse    ISRAEL Espino  Time Calculation: 23 mins

## 2018-03-08 NOTE — PROGRESS NOTES
1050: Primary Nurse Alcides Myers and Marilin Gonzalez, RN performed a dual skin assessment on this patient No impairment noted-Gianfranco score is 16    Bedside and Verbal shift change report given to [id number] [facility] (oncoming nurse) by [facility] (offgoing nurse). Report included the following information SBAR, Kardex, Procedure Summary, Intake/Output, MAR and Recent Results.

## 2018-03-09 LAB
ANION GAP SERPL CALC-SCNC: 7 MMOL/L (ref 5–15)
BASOPHILS # BLD: 0.1 K/UL (ref 0–0.1)
BASOPHILS NFR BLD: 1 % (ref 0–1)
BUN SERPL-MCNC: 19 MG/DL (ref 6–20)
BUN/CREAT SERPL: 39 (ref 12–20)
CALCIUM SERPL-MCNC: 8 MG/DL (ref 8.5–10.1)
CHLORIDE SERPL-SCNC: 107 MMOL/L (ref 97–108)
CO2 SERPL-SCNC: 24 MMOL/L (ref 21–32)
CREAT SERPL-MCNC: 0.49 MG/DL (ref 0.55–1.02)
DIFFERENTIAL METHOD BLD: ABNORMAL
EOSINOPHIL # BLD: 0.3 K/UL (ref 0–0.4)
EOSINOPHIL NFR BLD: 2 % (ref 0–7)
ERYTHROCYTE [DISTWIDTH] IN BLOOD BY AUTOMATED COUNT: 15.5 % (ref 11.5–14.5)
GLUCOSE BLD STRIP.AUTO-MCNC: 111 MG/DL (ref 65–100)
GLUCOSE BLD STRIP.AUTO-MCNC: 126 MG/DL (ref 65–100)
GLUCOSE BLD STRIP.AUTO-MCNC: 132 MG/DL (ref 65–100)
GLUCOSE BLD STRIP.AUTO-MCNC: 143 MG/DL (ref 65–100)
GLUCOSE BLD STRIP.AUTO-MCNC: 78 MG/DL (ref 65–100)
GLUCOSE BLD STRIP.AUTO-MCNC: 91 MG/DL (ref 65–100)
GLUCOSE SERPL-MCNC: 129 MG/DL (ref 65–100)
HCT VFR BLD AUTO: 25.8 % (ref 35–47)
HGB BLD-MCNC: 7.8 G/DL (ref 11.5–16)
IMM GRANULOCYTES # BLD: 0.2 K/UL (ref 0–0.04)
IMM GRANULOCYTES NFR BLD AUTO: 1 % (ref 0–0.5)
LYMPHOCYTES # BLD: 1.4 K/UL (ref 0.8–3.5)
LYMPHOCYTES NFR BLD: 11 % (ref 12–49)
MAGNESIUM SERPL-MCNC: 1.6 MG/DL (ref 1.6–2.4)
MCH RBC QN AUTO: 32.1 PG (ref 26–34)
MCHC RBC AUTO-ENTMCNC: 30.2 G/DL (ref 30–36.5)
MCV RBC AUTO: 106.2 FL (ref 80–99)
MONOCYTES # BLD: 0.7 K/UL (ref 0–1)
MONOCYTES NFR BLD: 5 % (ref 5–13)
NEUTS SEG # BLD: 10 K/UL (ref 1.8–8)
NEUTS SEG NFR BLD: 79 % (ref 32–75)
NRBC # BLD: 0 K/UL (ref 0–0.01)
NRBC BLD-RTO: 0 PER 100 WBC
PHOSPHATE SERPL-MCNC: 3.5 MG/DL (ref 2.6–4.7)
PLATELET # BLD AUTO: 323 K/UL (ref 150–400)
PMV BLD AUTO: 11.6 FL (ref 8.9–12.9)
POTASSIUM SERPL-SCNC: 5 MMOL/L (ref 3.5–5.1)
PREALB SERPL-MCNC: 6 MG/DL (ref 20–40)
RBC # BLD AUTO: 2.43 M/UL (ref 3.8–5.2)
SERVICE CMNT-IMP: ABNORMAL
SERVICE CMNT-IMP: NORMAL
SERVICE CMNT-IMP: NORMAL
SODIUM SERPL-SCNC: 138 MMOL/L (ref 136–145)
WBC # BLD AUTO: 12.6 K/UL (ref 3.6–11)

## 2018-03-09 PROCEDURE — 74011250637 HC RX REV CODE- 250/637: Performed by: FAMILY MEDICINE

## 2018-03-09 PROCEDURE — 77030038269 HC DRN EXT URIN PURWCK BARD -A

## 2018-03-09 PROCEDURE — 85025 COMPLETE CBC W/AUTO DIFF WBC: CPT | Performed by: NURSE PRACTITIONER

## 2018-03-09 PROCEDURE — 65660000000 HC RM CCU STEPDOWN

## 2018-03-09 PROCEDURE — 74011250637 HC RX REV CODE- 250/637: Performed by: NURSE PRACTITIONER

## 2018-03-09 PROCEDURE — 74011000250 HC RX REV CODE- 250: Performed by: SURGERY

## 2018-03-09 PROCEDURE — C9113 INJ PANTOPRAZOLE SODIUM, VIA: HCPCS | Performed by: SURGERY

## 2018-03-09 PROCEDURE — 36415 COLL VENOUS BLD VENIPUNCTURE: CPT | Performed by: SURGERY

## 2018-03-09 PROCEDURE — 74011250636 HC RX REV CODE- 250/636: Performed by: SURGERY

## 2018-03-09 PROCEDURE — 74011250637 HC RX REV CODE- 250/637: Performed by: SURGERY

## 2018-03-09 PROCEDURE — 74011250636 HC RX REV CODE- 250/636: Performed by: INTERNAL MEDICINE

## 2018-03-09 PROCEDURE — 77010033678 HC OXYGEN DAILY

## 2018-03-09 PROCEDURE — 83735 ASSAY OF MAGNESIUM: CPT | Performed by: NURSE PRACTITIONER

## 2018-03-09 PROCEDURE — 74011000258 HC RX REV CODE- 258: Performed by: INTERNAL MEDICINE

## 2018-03-09 PROCEDURE — 97530 THERAPEUTIC ACTIVITIES: CPT

## 2018-03-09 PROCEDURE — 84100 ASSAY OF PHOSPHORUS: CPT | Performed by: NURSE PRACTITIONER

## 2018-03-09 PROCEDURE — 74011000258 HC RX REV CODE- 258: Performed by: SURGERY

## 2018-03-09 PROCEDURE — 82962 GLUCOSE BLOOD TEST: CPT

## 2018-03-09 PROCEDURE — 84134 ASSAY OF PREALBUMIN: CPT | Performed by: SURGERY

## 2018-03-09 PROCEDURE — 80048 BASIC METABOLIC PNL TOTAL CA: CPT | Performed by: NURSE PRACTITIONER

## 2018-03-09 PROCEDURE — 97116 GAIT TRAINING THERAPY: CPT

## 2018-03-09 RX ADMIN — Medication 20 ML: at 13:09

## 2018-03-09 RX ADMIN — Medication 10 ML: at 21:44

## 2018-03-09 RX ADMIN — ENOXAPARIN SODIUM 40 MG: 40 INJECTION SUBCUTANEOUS at 14:42

## 2018-03-09 RX ADMIN — Medication 10 ML: at 06:51

## 2018-03-09 RX ADMIN — PANTOPRAZOLE SODIUM 40 MG: 40 INJECTION, POWDER, FOR SOLUTION INTRAVENOUS at 09:29

## 2018-03-09 RX ADMIN — GUAIFENESIN 1200 MG: 600 TABLET, EXTENDED RELEASE ORAL at 21:44

## 2018-03-09 RX ADMIN — GUAIFENESIN 1200 MG: 600 TABLET, EXTENDED RELEASE ORAL at 09:29

## 2018-03-09 RX ADMIN — SPIRONOLACTONE 25 MG: 25 TABLET, FILM COATED ORAL at 09:29

## 2018-03-09 RX ADMIN — MELATONIN TAB 3 MG 1.5 MG: 3 TAB at 23:26

## 2018-03-09 RX ADMIN — DEXTROSE MONOHYDRATE 12.5 G: 25 INJECTION, SOLUTION INTRAVENOUS at 17:36

## 2018-03-09 RX ADMIN — CALCIUM GLUCONATE: 94 INJECTION, SOLUTION INTRAVENOUS at 17:30

## 2018-03-09 RX ADMIN — SODIUM CHLORIDE 100 MG: 900 INJECTION, SOLUTION INTRAVENOUS at 10:11

## 2018-03-09 RX ADMIN — NYSTATIN: 100000 POWDER TOPICAL at 09:49

## 2018-03-09 RX ADMIN — PANTOPRAZOLE SODIUM 40 MG: 40 INJECTION, POWDER, FOR SOLUTION INTRAVENOUS at 21:44

## 2018-03-09 RX ADMIN — HYDROMORPHONE HYDROCHLORIDE 1 MG: 2 INJECTION, SOLUTION INTRAMUSCULAR; INTRAVENOUS; SUBCUTANEOUS at 01:32

## 2018-03-09 RX ADMIN — NYSTATIN: 100000 POWDER TOPICAL at 17:30

## 2018-03-09 NOTE — PROGRESS NOTES
Progress Note    Patient: Phu Adam MRN: [id number]  SSN: [id number]    YOB: 1945  Age: 67 y.o. Sex: female      Admit Date: 2018    25 Days Post-Op    Procedure:  Procedure(s):   EX LAP    Subjective:     Mrs. [place] feels fine. She denies any pain or n/v. She is passing flatus and BM. Objective:     Visit Vitals    /62    Pulse 94    Temp 97.8 °F (36.6 °C)    Resp 18    Ht 5' 3.5\" (1.613 m)    Wt 178 lb 9.2 oz (81 kg)    SpO2 94%    Breastfeeding No    BMI 31.14 kg/m2       Temp (24hrs), Av.1 °F (36.7 °C), Min:97.5 °F (36.4 °C), Max:98.5 °F (36.9 °C)      Physical Exam:    GENERAL: alert, cooperative, no distress, ABDOMEN: Soft, NT, ND. Lab Review:   BMP:   Lab Results   Component Value Date/Time     2018 04:43 AM    K 5.0 2018 04:43 AM     2018 04:43 AM    CO2 24 2018 04:43 AM    AGAP 7 2018 04:43 AM     (H) 2018 04:43 AM    BUN 19 2018 04:43 AM    CREA 0.49 (L) 2018 04:43 AM    GFRAA >60 2018 04:43 AM    GFRNA >60 2018 04:43 AM     CBC:   Lab Results   Component Value Date/Time    WBC 12.6 (H) 2018 04:43 AM    HGB 7.8 (L) 2018 04:43 AM    HCT 25.8 (L) 2018 04:43 AM     2018 04:43 AM       Assessment:     Hospital Problems  Date Reviewed: 2016          Codes Class Noted POA    Hypokalemia ICD-10-CM: E87.6  ICD-9-CM: 276.8  2018 No        Leukocytosis ICD-10-CM: D72.829  ICD-9-CM: 288.60  2018 Yes        Severe protein-calorie malnutrition ([address] ICD-10-CM: E43  ICD-9-CM: 905  2018 Yes        Acute metabolic encephalopathy YQC-74-LL: G93.41  ICD-9-CM: 348.31  2018 Yes        Hyperammonemia (HCC) ICD-10-CM: E72.20  ICD-9-CM: 270.6  2018 No    Overview Addendum 2018  9:08 AM by Jaime Mosley MD        Ref.  Range 2018 17:20   Ammonia Latest Ref Range: <32 UMOL/L 69 (H)                Free intraperitoneal air ICD-10-CM: I99.3  ICD-9-CM: 568.89  2/12/2018 Yes        S/P exploratory laparotomy ICD-10-CM: M57.629  ICD-9-CM: V45.89  2/12/2018 No    Overview Signed 2/12/2018 11:57 AM by Lesly Guzmán MD     Suture repair of perforated posterior gastric ulcer with Annamaria Core patch, core needle biopsies of left lobe of the liver. Alcoholic cirrhosis of liver without ascites (HCC) (Chronic) ICD-10-CM: K70.30  ICD-9-CM: 571.2  2/12/2018 Yes    Overview Signed 2/17/2018  8:45 AM by Elliot Sun MD     Liver bx 2/12/18:   Cirrhosis with mild chronic portal inflammation and macrovesicular steatosis. Fatty liver determined by biopsy ICD-10-CM: K76.0  ICD-9-CM: 571.8  2/12/2018 Yes    Overview Signed 2/17/2018  8:53 AM by Elliot Sun MD        Cirrhosis with mild chronic portal inflammation and macrovesicular steatosis. * (Principal)Perforated chronic gastric ulcer ([address] (Chronic) ICD-10-CM: K25.5  ICD-9-CM: 531.50  2/11/2018 Yes        ETOH abuse (Chronic) ICD-10-CM: F10.10  ICD-9-CM: 305.00  2/11/2018 Yes              Plan/Recommendations/Medical Decision Making:     WBC 12. Continue antifungal per ID. Continue bowel rest, TPN. Leave ALYSSA. Continue PT. Possible SNF next week if no acute issues over the weekend.     Signed By: Lesly Guzmán MD     March 9, 2018

## 2018-03-09 NOTE — PROGRESS NOTES
Family Practice Daily Progress Note: 3/9/2018  [place] [facility]  Becky Solis DO    Assessment/Plan:   Perforated chronic gastric ulcer /  Free intraperitoneal air / S/P exploratory laparotomy 2/11. S/p repair in OR with Dr. Tonny Montgomery on 2/12/18 - per surg    Severe protein calorie malnutrition-POA  --TPN per GI - advance diet as per surg     Acute metabolic encephalopathy. Improved. Multifactorial-- medications, alcohol use, sundowning  ---avoid triggers  ---CIWA  ---maintenance of sleep/wake cycle, and re-orientation     Hypoxia / Respiratory distress. Improved. Pleural effusions   ---Nebs as needed  ---Pulmonary consulted     ---Bumex/Aldactone as needed.      Alcoholic cirrhosis of liver without ascites /  Fatty liver determined by biopsy /  Hyperammonemia. --GI has seen- rec alcohol cessation and outpt follow up  --aldactone as needed but hold if     ETOH abuse. Needs to stop ETOH entirely.     Hypokalemia /  Hypoalbuminemia. Being addressed with TPN     Leukocytosis.    -- repeat blood cultures, and urine 2/28 neg  --was on Zosyn, and levaquin - cultures drawn off antibiotics neg except yeast -anidulafungin  added  --consulted ID 3/1    Anemia: watch Hgb   --follow, transfuse <7    Back Pain due to compression fracture of T12  ---Increased Duragesic patch to 25mcg and DIiaudid to 1mg as needed    Debility: due to prolonged hospital stay  --continue PT/OT- needs to get OOB  --will likely need rehab        Problem List:  Problem List as of 3/9/2018  Date Reviewed: 4/26/2016          Codes Class Noted - Resolved    Hypokalemia ICD-10-CM: E87.6  ICD-9-CM: 276.8  2/18/2018 - Present        Leukocytosis ICD-10-CM: D72.829  ICD-9-CM: 288.60  2/18/2018 - Present        Severe protein-calorie malnutrition ([facility] [address] ICD-10-CM: E43  ICD-9-CM: 262  2/18/2018 - Present        Acute metabolic encephalopathy KARLA-04-GD: G93.41  ICD-9-CM: 348.31  2/18/2018 - Present        Hyperammonemia ([facility] [address] ICD-10-CM: E72.20  ICD-9-CM: 270.6  2/16/2018 - Present    Overview Addendum 2/17/2018  9:08 AM by Darcie Foster MD        Ref. Range 2/16/2018 17:20   Ammonia Latest Ref Range: <32 UMOL/L 69 (H)                Free intraperitoneal air ICD-10-CM: K66.8  ICD-9-CM: 568.89  2/12/2018 - Present        S/P exploratory laparotomy ICD-10-CM: Z98.890  ICD-9-CM: V45.89  2/12/2018 - Present    Overview Signed 2/12/2018 11:57 AM by Julia Hogue MD     Suture repair of perforated posterior gastric ulcer with Cabrera Arellano patch, core needle biopsies of left lobe of the liver. Alcoholic cirrhosis of liver without ascites (HCC) (Chronic) ICD-10-CM: K70.30  ICD-9-CM: 571.2  2/12/2018 - Present    Overview Signed 2/17/2018  8:45 AM by Darcie Foster MD     Liver bx 2/12/18:   Cirrhosis with mild chronic portal inflammation and macrovesicular steatosis. Fatty liver determined by biopsy ICD-10-CM: K76.0  ICD-9-CM: 571.8  2/12/2018 - Present    Overview Signed 2/17/2018  8:53 AM by Darcie Foster MD        Cirrhosis with mild chronic portal inflammation and macrovesicular steatosis. * (Principal)Perforated chronic gastric ulcer ([address] (Chronic) ICD-10-CM: K25.5  ICD-9-CM: 531.50  2/11/2018 - Present        ETOH abuse (Chronic) ICD-10-CM: F10.10  ICD-9-CM: 305.00  2/11/2018 - Present        GI bleed ICD-10-CM: K92.2  ICD-9-CM: 578.9  4/26/2016 - Present        Hypotension ICD-10-CM: I95.9  ICD-9-CM: 458.9  4/26/2016 - Present        Tachycardia ICD-10-CM: R00.0  ICD-9-CM: 785.0  4/26/2016 - Present        Acute blood loss anemia ICD-10-CM: D62  ICD-9-CM: 285.1  4/26/2016 - Present              Subjective:    67 y.o.  female with EtOH abuse who is admitted to the General Surgery Service with perforated gastric ulcer. We are asked to evaluate for altered mental status. The patient is poorly interactive at this time and this limits primary hx.  Discussed case with family available at bedside providing secondary hx and chart review. Per family, patient has had declining neurological condition over past 48 hours. Notably, an RRT was called for respiratory distress. 2/19: This morning she is a bit more alert at this moment and taking in more complete sentences. She says she does not feel well. She has no specific site of pain other than the NG tube which is bothering her a great deal.  She should improve as time from surg increases. K+ a little low - replacing.     2/20:  Still confused and somnolent at times. Now able to localize pain to ab and converse a bit more. Tmax 100.3  WBC is up again but hopefully reactive - recheck in AM - more incentive spirom and check CXR.     2/21: A little more alert. Knows she is in the hospital. Not able to participate with PT yesterday as she was in too much pain. WBC still at 19.     2/22:  WBC 19K. She is more alert. Daughter at bedside. Both of her daughters live out of town. Looking at SNF options for rehab. Coughing more. Starting to use IS.     2/23: WBC 17. Repeat CT chest with moderate to large persistent left subphrenic collection. IR has been consulted. Vanc and Levaquin added by pulm. 2/24: Pt had a better night last night. Only brief episode of confusion. She is much clearer this AM.  Now in quite a bit of pain from gas and stomach cramping. Thinks she will feel better if she can have a BM. Has been on bedpan for a while without success. On TPN. Daughters very concerned that she won't be successful while lying down. Wanting to get up to bedside commode. Will need PT's help. 2/25: febrile overnight and WBC up after vanc was stopped. Pt c/o more dyspnea today. Known fluid collection that will be drained in IR this week. Up to chair with PT yesterday briefly. +BM yesterday. Da notes pt has been c/o R wrist pain off and on for 2 wks since she fell. No swelling.    2/26:  No new complaints. Still c/o back and ab pain. Still passing gas and had BM. Diuresed with 1 mg Bumex yesterday by Pulmonary. Remains on Zosyn and Levaquin. Blood culture remains neg. Tmax 99.2. X-ray of wrists ok. CXR ok with tube inplace. WBC 15.7K.      2/27:  Looks much better today. Much less work of breathing. Diuresed about 1 liter over last 24h. WBC 16K today. Blood cult remains neg. Off antibiotics. Still on TPN. She will need rehab for PT/OT. 2/28: Very restless during the night. Has not been OOB. Daughter stayed the night with her. WBC is still up. Antibiotics stopped yesterday. She is c/o increased pain in her back and can't get comfortable. More anxious and agitated. Tolerating sips. 3/1:  She reports she feels better this AM after she had some sleep. Afeb. WBC still up - a bit more today with increased neutrophils. Will also get ID to see also. CT AB and Pelvis as below. Cultures done yesterday off antibiotics - so far no growth. No output from drain - may need to be repositioned. More edema today but not as short of breath today while sitting up - add albumin to todays lab. Add Aldactone if ok with GI and Bumex as per Pul. Discussed cirrhosis/ascites/edema with pts daughter. 3/2:  She reports she feels better, and has slightly less edema, although daughters note they think pts abdomin more swollen. The daughters are more worried about pts LE edema than anything else - explained that with pts low albumin that edema may cont to be a problem. She is more alert, a bit more oriented today and says she has much less pain today. Tmax 101. Culture had yeast - diflucan started. Drain was repositioned, had thoracentesis and new PICC placed yesterday. May need to restart broad spectrum antibiotics - ID consulted. 3/3:  She reports she feels better this AM.  More alert and oriented this AM.  BM yesterday. Tmax 99. WBC still 17-18K. ID consulted and advises to watch off antibiotics for now. Less edema LE. 3/4:  Doing much better today and much more alert. Little pain. Off sleeping meds for now - not sleeping well but reports \"never was a good sleeper. \"  Anxious at night. WBC still up. Discussed rehab with daughters and pt and pt willing to go. 3/5:  Continues to improve. She wants to eat solid food - she now has an appetite. Passing gas. Has been up to chair. K+ 5.3 - will DC aldactone for now and restart if edema returns. CXR pending today. 3/6: She reports she feels better. Yesterday had marked increase in ALYSSA output and now NPO and back on TPN today. Will resume Aldactone again (at lower dose) as sl more edema today and hold again if K+ creeping up again. AM labs pending. 3/7:  Reports some ab pain this AM but not severe. Orthostatic hypotension with walking yesterday and K+ back up - will hold aldactone again. Breathing and edema better with the Aldactone. Push incentive spirom - last CXR with atelectasis. O>>I on I&Os but wonder about measurements. 3/8:  Nausea this AM with 2 episodes of vomiting but reports she feels back to normal now. No ab pain. No SOB at rest.  Wearing nasal O2 at 2 L. Nurse reports yesterdays labs were drawn just before 12MN last night for some reason - still have not resulted and lab called and will run samples now. This AMs labs not drawn yet. Port CXR this AM pending.  O>>I again but Intake not correct. 3/9:  No nausea this AM.  No further vomiting. Had BM yesterday. Desats with exercise. Was up in chair some yesterday. WBC down to 12K. Hb a little lower at 7.8 - recheck later today. Seems to be making slow progress.      Past Medical History:   Diagnosis Date    Alcoholic cirrhosis of liver without ascites ([facility] [address] 2/12/2018    ETOH abuse 2/11/2018    History of vascular access device 02/16/2018    [facility] VAT - 5 FR triple, R basilic, TPN    Hyperammonemia ([facility] [address] 2/16/2018     Results for Dahlia Check (MRN [id number]) as of 2018 08:43  Ref. Range 2018 17:20 Ammonia Latest Ref Range: <32 UMOL/L 69 (H)     Hyperlipidemia     Perforated chronic gastric ulcer ([address] 2018     Past Surgical History:   Procedure Laterality Date    HX CATARACT REMOVAL       Social History     Social History    Marital status: SINGLE     Spouse name: N/A    Number of children: N/A    Years of education: N/A     Occupational History    Not on file. Social History Main Topics    Smoking status: Former Smoker    Smokeless tobacco: Never Used    Alcohol use 11.4 oz/week     0 Standard drinks or equivalent, 19 Glasses of wine per week      Comment: Hx of heavy etoh use, but quit several months ago    Drug use: No    Sexual activity: Not on file     Other Topics Concern    Not on file     Social History Narrative     Family History   Problem Relation Age of Onset    Other Other      patient did not know     Review of Systems:   Review of systems not obtained due to patient factors. Objective:   Physical Exam:     Visit Vitals    /62    Pulse 94    Temp 97.8 °F (36.6 °C)    Resp 18    Ht 5' 3.5\" (1.613 m)    Wt 81 kg (178 lb 9.2 oz)    SpO2 94%    Breastfeeding No    BMI 31.14 kg/m2    O2 Flow Rate (L/min): 2 l/min O2 Device: Nasal cannula    Temp (24hrs), Av.2 °F (36.8 °C), Min:97.5 °F (36.4 °C), Max:98.6 °F (37 °C)    1901 -  0700  In: 10   Out: 20 [Drains:20]    07 -  1900  In: 12 [P.O.:250]  Out: 5194 [Urine:1500; Drains:25]    General:  Awake, uncomfortable, appears stated age. Head:  Normocephalic, without obvious abnormality, atraumatic. Eyes:  Conjunctivae/corneas clear. EOMs intact. Throat: Lips, mucosa, and tongue dry. Neck: Supple, symmetrical, trachea midline, no adenopathy, thyroid: no enlargement/tenderness/nodules, no carotid bruit and no JVD. Lungs:    no intercostal use, breath sounds diminished. Clear at this time. Chest wall:  No tenderness or deformity. Left drain site looks ok and little drainage. Heart:  reg rate with regular rhythm,  no murmur, click, rub or gallop. Abdomen:   Soft, less distended at this time, mild generalized tenderness. No R/G. Bowel sounds present. Incision dry without redness. Drain sites look ok and less drainage at this moment. Extremities: no cyanosis. No LE edema at this time. No calf tenderness or cords, no erythema   Skin: turgor normal. No rashes or lesions   Neurologic:   AOx2-3,  Gait not tested at this time. Sensation grossly normal to touch. Gross motor of extremities normal.       Data Review:    CT AB and Pelvis 2/28/18: IMPRESSION:  1. There is residual fluid in the posterior portion of the left subphrenic  collection which does not appear well drained by the pigtail catheter. This  could be repositioned versus new drainage catheter placement if clinically  indicated. 2. Persistent ascites. 3. Increasing right pleural effusion and right lower lobe atelectasis   4. Distended duodenum and proximal small bowel of doubtful clinical  Significance. CXR 3/1/18:  INDICATION:  chest pain and shortness of breath following thoracentesis   EXAM: Portable chest 1716 . Comparison March 1, 2018  FINDINGS: There is no significant change in appearance the lungs. Small left  pleural effusion with left basilar atelectasis unchanged. Cardiomediastinal  silhouette is unchanged. No pneumothorax. Lines and tubes are unchanged in  position. IMPRESSION:  1. No interval change    KUB 3/6/18  IMPRESSION: Nonspecific bowel gas pattern with multiple dilated loops of small  bowel but does not appear obstructive. No pneumoperitoneum.     Recent Days:  Recent Labs      03/09/18   0443  03/08/18   1757   WBC  12.6*  16.9*   HGB  7.8*  8.3*   HCT  25.8*  27.5*   PLT  323  356     Recent Labs      03/09/18   0443  03/08/18   1757  03/08/18   0222  03/07/18   0426   NA  138  138  137  135*   K  5.0  4.8  4.7  5.4*   CL  107  107  106  103 CO2  24  25  21  24   GLU  129*  82  130*  169*   BUN  19  16  16  18   CREA  0.49*  0.49*  0.50*  0.49*   CA  8.0*  8.1*  8.1*  8.2*   MG  1.6   --   1.6  1.9   PHOS  3.5   --   3.2  3.3   ALB   --    --   1.5*   --    TBILI   --    --   0.5   --    SGOT   --    --   47*   --    ALT   --    --   30   --      No results for input(s): PH, PCO2, PO2, HCO3, FIO2 in the last 72 hours.   24 Hour Results:  Recent Results (from the past 24 hour(s))   GLUCOSE, POC    Collection Time: 03/08/18  1:03 PM   Result Value Ref Range    Glucose (POC) 107 (H) 65 - 100 mg/dL    Performed by Casandra rOtega (PCT)    CBC W/O DIFF    Collection Time: 03/08/18  5:57 PM   Result Value Ref Range    WBC 16.9 (H) 3.6 - 11.0 K/uL    RBC 2.62 (L) 3.80 - 5.20 M/uL    HGB 8.3 (L) 11.5 - 16.0 g/dL    HCT 27.5 (L) 35.0 - 47.0 %    .0 (H) 80.0 - 99.0 FL    MCH 31.7 26.0 - 34.0 PG    MCHC 30.2 30.0 - 36.5 g/dL    RDW 15.2 (H) 11.5 - 14.5 %    PLATELET 403 938 - 799 K/uL    MPV 11.9 8.9 - 12.9 FL    NRBC 0.0 0  WBC    ABSOLUTE NRBC 0.00 0.00 - 3.24 K/uL   METABOLIC PANEL, BASIC    Collection Time: 03/08/18  5:57 PM   Result Value Ref Range    Sodium 138 136 - 145 mmol/L    Potassium 4.8 3.5 - 5.1 mmol/L    Chloride 107 97 - 108 mmol/L    CO2 25 21 - 32 mmol/L    Anion gap 6 5 - 15 mmol/L    Glucose 82 65 - 100 mg/dL    BUN 16 6 - 20 MG/DL    Creatinine 0.49 (L) 0.55 - 1.02 MG/DL    BUN/Creatinine ratio 33 (H) 12 - 20      GFR est AA >60 >60 ml/min/1.73m2    GFR est non-AA >60 >60 ml/min/1.73m2    Calcium 8.1 (L) 8.5 - 10.1 MG/DL   GLUCOSE, POC    Collection Time: 03/08/18  6:04 PM   Result Value Ref Range    Glucose (POC) 85 65 - 100 mg/dL    Performed by Casandra Ortega (PCT)    GLUCOSE, POC    Collection Time: 03/09/18 12:17 AM   Result Value Ref Range    Glucose (POC) 132 (H) 65 - 100 mg/dL    Performed by Yolanda Aleman    CBC WITH AUTOMATED DIFF    Collection Time: 03/09/18  4:43 AM   Result Value Ref Range    WBC 12.6 (H) 3.6 - 11.0 K/uL    RBC 2.43 (L) 3.80 - 5.20 M/uL    HGB 7.8 (L) 11.5 - 16.0 g/dL    HCT 25.8 (L) 35.0 - 47.0 %    .2 (H) 80.0 - 99.0 FL    MCH 32.1 26.0 - 34.0 PG    MCHC 30.2 30.0 - 36.5 g/dL    RDW 15.5 (H) 11.5 - 14.5 %    PLATELET 252 798 - 138 K/uL    MPV 11.6 8.9 - 12.9 FL    NRBC 0.0 0  WBC    ABSOLUTE NRBC 0.00 0.00 - 0.01 K/uL    NEUTROPHILS 79 (H) 32 - 75 %    LYMPHOCYTES 11 (L) 12 - 49 %    MONOCYTES 5 5 - 13 %    EOSINOPHILS 2 0 - 7 %    BASOPHILS 1 0 - 1 %    IMMATURE GRANULOCYTES 1 (H) 0.0 - 0.5 %    ABS. NEUTROPHILS 10.0 (H) 1.8 - 8.0 K/UL    ABS. LYMPHOCYTES 1.4 0.8 - 3.5 K/UL    ABS. MONOCYTES 0.7 0.0 - 1.0 K/UL    ABS. EOSINOPHILS 0.3 0.0 - 0.4 K/UL    ABS. BASOPHILS 0.1 0.0 - 0.1 K/UL    ABS. IMM.  GRANS. 0.2 (H) 0.00 - 0.04 K/UL    DF AUTOMATED     METABOLIC PANEL, BASIC    Collection Time: 03/09/18  4:43 AM   Result Value Ref Range    Sodium 138 136 - 145 mmol/L    Potassium 5.0 3.5 - 5.1 mmol/L    Chloride 107 97 - 108 mmol/L    CO2 24 21 - 32 mmol/L    Anion gap 7 5 - 15 mmol/L    Glucose 129 (H) 65 - 100 mg/dL    BUN 19 6 - 20 MG/DL    Creatinine 0.49 (L) 0.55 - 1.02 MG/DL    BUN/Creatinine ratio 39 (H) 12 - 20      GFR est AA >60 >60 ml/min/1.73m2    GFR est non-AA >60 >60 ml/min/1.73m2    Calcium 8.0 (L) 8.5 - 10.1 MG/DL   MAGNESIUM    Collection Time: 03/09/18  4:43 AM   Result Value Ref Range    Magnesium 1.6 1.6 - 2.4 mg/dL   PHOSPHORUS    Collection Time: 03/09/18  4:43 AM   Result Value Ref Range    Phosphorus 3.5 2.6 - 4.7 MG/DL   GLUCOSE, POC    Collection Time: 03/09/18  6:37 AM   Result Value Ref Range    Glucose (POC) 111 (H) 65 - 100 mg/dL    Performed by Rajesh Dennis (PCT)      Medications reviewed  Current Facility-Administered Medications   Medication Dose Route Frequency    anidulafungin (ERAXIS) 100 mg in 0.9% sodium chloride 130 mL IVPB  100 mg IntraVENous Q24H    spironolactone (ALDACTONE) tablet 25 mg  25 mg Oral DAILY    albuterol-ipratropium (DUO-NEB) 2.5 MG-0.5 MG/3 ML  3 mL Nebulization Q4H PRN    melatonin tablet 1.5 mg  1.5 mg Oral QHS PRN    HYDROmorphone (PF) (DILAUDID) injection 1 mg  1 mg IntraVENous Q3H PRN    fentaNYL (DURAGESIC) 25 mcg/hr patch 1 Patch  1 Patch TransDERmal Q72H    haloperidol lactate (HALDOL) injection 1 mg  1 mg IntraVENous Q4H PRN    guaiFENesin ER (MUCINEX) tablet 1,200 mg  1,200 mg Oral Q12H    acetaminophen (TYLENOL) tablet 650 mg  650 mg Oral Q4H PRN    insulin regular (NOVOLIN R, HUMULIN R) injection   SubCUTAneous Q6H    fat emulsion 20% (LIPOSYN, INTRAlipid) infusion 500 mL  500 mL IntraVENous Q MON, WED & SAT    glucose chewable tablet 16 g  4 Tab Oral PRN    glucagon (GLUCAGEN) injection 1 mg  1 mg IntraMUSCular PRN    dextrose (D50W) injection syrg 12.5-25 g  12.5-25 g IntraVENous PRN    alteplase (CATHFLO) 1 mg in sterile water (preservative free) 1 mL injection  1 mg InterCATHeter PRN    sodium chloride (NS) flush 10-30 mL  10-30 mL InterCATHeter PRN    sodium chloride (NS) flush 10-40 mL  10-40 mL InterCATHeter Q8H    naloxone (NARCAN) injection 0.4 mg  0.4 mg IntraVENous PRN    ondansetron (ZOFRAN) injection 4 mg  4 mg IntraVENous Q4H PRN    enoxaparin (LOVENOX) injection 40 mg  40 mg SubCUTAneous Q24H    pantoprazole (PROTONIX) injection 40 mg  40 mg IntraVENous Q12H    phenol throat spray (CHLORASEPTIC) 1 Spray  1 Spray Oral PRN    nystatin (MYCOSTATIN) 100,000 unit/gram powder   Topical BID    sodium chloride (NS) flush 5-10 mL  5-10 mL IntraVENous PRN     Care Plan discussed with: Patient/daughter/Pul/Surg/ID and Nurse   Total time spent with patient: 30 minutes.   Segundo Kumar MD

## 2018-03-09 NOTE — PROGRESS NOTES
Problem: Mobility Impaired (Adult and Pediatric)  Goal: *Acute Goals and Plan of Care (Insert Text)  Physical Therapy Goals  Revised 3/7/2018  1. Patient will move from long sitting to sit edge of bed and sit to supine and roll side to side in bed with modified independence 7 day(s). 2.  Patient will transfer from bed to chair and chair to bed with contact guard assist x1 using RW within 7 day(s). 3.  Patient will perform sit <>stand with contact guard assist x 1 within 7 day(s). 4.  Patient will ambulate with minimal assistance x 1  for 50 feet with RW within 7 day(s). 5.  Patient will verbalize and demonstrate understanding of spinal precautions (No bending, lifting greater than 5 lbs, or twisting; log-roll technique; frequent repositioning as instructed) within 7 days. Physical Therapy Goals  Revised 2/28/2018  1. Patient will move from supine to sit and sit to supine  and roll side to side in bed with moderate assistance x 1  within 7 day(s). MET, upgrade  2. Patient will move from long sitting in bed to edge of bed with minimal assistance x 1 within 7 days. MET, advance  3. Patient will transfer from bed to chair and chair to bed with moderate assistance x 1 using the least restrictive device within 7 day(s). 4.  Patient will perform sit <> stand with minimal assistance x 1 within 7 day(s). NOT MET - continue  5. Patient will ambulate with minimal assistance x 1  for 15 feet with the least restrictive device within 7 day(s). MET, advance  6. Patient will verbalize and demonstrate understanding of spinal precautions (No bending, lifting greater than 5 lbs, or twisting; log-roll technique; frequent repositioning as instructed) within 7 days. NOT MET- continue    Physical Therapy Goals  Initiated 2/20/2018  1. Patient will move from supine to sit and sit to supine , scoot up and down and roll side to side in bed with moderate assistance x 2  within 7 days. Partially Met - Continue  2.  Patient will perform sit <> stand with minimal assist x 2  within 7 days. Met - Advance goal below  3. Patient will ambulate with minimal assistance x 2 for 10 feet with RW and assist of 3rd pushing chair from behind within 7 days. Not Met- Continue  4. Patient will verbalize and demonstrate understanding of spinal precautions (No bending, lifting greater than 5 lbs, or twisting; log-roll technique; frequent repositioning as instructed) within 7 days. Not Met- Continue     physical Therapy TREATMENT  Patient: Elaina Winn (81 y.o. female)  Date: 3/9/2018  Diagnosis: Intra-abdominal free air of unknown etiology [K66.8] Perforated chronic gastric ulcer ([address]  Procedure(s) (LRB):   NASOGASTRIC TUBE PLACEMENT (N/A) 19 Days Post-Op  Precautions: Aspiration, Back, Bed Alarm, Fall, Skin  Chart, physical therapy assessment, plan of care and goals were reviewed. ASSESSMENT:  Significant improvement today. Patient up with RN staff following OOB>chair schedule as posted by PT. PT for 12:30 OOB and patient agreeable. Patient ambulated 108' on 2-3 L with RW and minimal assistance x 1 and second person's assist to bring portable O2 and chair behind patient. Patient then took long seated rest before ambulation another 25' to bedside chair. Excellent improvement today - appears to be tolerating more each day and sitting tolerance improving. If able will see BID at minimum for sit/supine ther exercises per program given on 3/7/18. Per CM notes appear patient will be at [facility] through most of next week. Patient continues to insist she cannot tolerate wearing TLSO brace and she is unable to recall back precautions - so reviewed again. NAD in chair - vitals stable on 3L.      Documentation for continued O2: Needs 3 L for activity, 2L at rest     ROOM AIR    AT REST   O2 SATS  81 HR  100   ROOM AIR WITH ACTIVITY 02 SATS  NT HR  NT   (2    ) LITERS OF O2 WITH ACTIVITY O2 SATS  85% HR  114   (3   )LITERS OF 02 PATIENT LEFT COMFORTABLY  SITTING/SUPINE 02 SATS  93 HR  104       Progression toward goals:  []    Improving appropriately and progressing toward goals  [x]    Improving slowly and progressing toward goals  []    Not making progress toward goals and plan of care will be adjusted     PLAN:  Patient continues to benefit from skilled intervention to address the above impairments. Continue treatment per established plan of care. Discharge Recommendations:  Rehab and [facility]  Further Equipment Recommendations for Discharge:  TBD after rehab     SUBJECTIVE:   Patient stated I cant walk around unit a second time but I will try it tomorrow.     OBJECTIVE DATA SUMMARY:   Critical Behavior:  Neurologic State: Alert, Confused  Orientation Level: Oriented to person, Oriented to place, Oriented to situation, Disoriented to time  Cognition: Follows commands  Safety/Judgement: Awareness of environment, Decreased insight into deficits, Fall prevention, Home safety  Functional Mobility Training:  Bed Mobility:     Supine to Sit: Stand-by assistance (from long sitting in hospital bed, head fully elevated)  Sit to Supine:  (NT - to chair )  Scooting: Additional time        Transfers:  Sit to Stand: Minimum assistance; Additional time;Assist x1  Stand to Sit: Minimum assistance; Additional time;Assist x1  Stand Pivot Transfers: (P) Minimum assistance (SBA of second and chair brought behind)     Bed to Chair: Minimum assistance; Additional time;Assist x1 (SBA of second)                    Balance:  Sitting: Intact; With support  Sitting - Static: Good (unsupported)  Sitting - Dynamic: Fair (occasional)  Standing: Impaired; With support (RW)  Standing - Static: Constant support;Good  Standing - Dynamic : Fair  Ambulation/Gait Training:  Distance (ft): (P) 108 Feet (ft) (108' and then long seated rest and 24' back to bedside chair)  Assistive Device: (P) Gait belt;Walker, rolling  Ambulation - Level of Assistance: (P) Minimal assistance  Gait Abnormalities: (P) Antalgic;Decreased step clearance  Base of Support: (P) Widened  Speed/Alejandra: (P) Fluctuations  Step Length: (P) Left shortened;Right shortened  Stairs - Level of Assistance: (P)  (NT)        Pain:  Pain Scale 1: Numeric (0 - 10)  Pain Intensity 1: 0  Activity Tolerance:   Slowly improving -25 days in hospital today  Please refer to the flowsheet for vital signs taken during this treatment.   After treatment:   [x]    Patient left in no apparent distress sitting up in chair  []    Patient left in no apparent distress in bed  [x]    Call bell left within reach  [x]    Nursing notified  []    Caregiver present  [x]    Chair alarm activated    COMMUNICATION/COLLABORATION:   The patients plan of care was discussed with: Registered Nurse    Fermin Lott PT   Time Calculation: 27 mins

## 2018-03-09 NOTE — PROGRESS NOTES
3/8/2018  CM spoke with attending who reported pt will likely not DC until next week. CM informed Valeria.

## 2018-03-09 NOTE — PROGRESS NOTES
[facility] Progress Note           Mary Lizama DO    [phone] Office  [phone]  Fax    3/9/2018      Assessment & Plan:   1. Leukocytosis. Trending down. Remains afebrile. Multiple potential etiologies. Consider tagged WBC scan if no improvement or patient develops fever. Watch for cdiff. Plan to monitor the patient off further antibiotics (except antifungal) for now. Plan 7 days anidulafungin depending on patient course    2. Candida parapsilosis and krusei  growing from the ALYSSA drain. The significance of this yeast is uncertain as it likely represents colonization or selective pressure from the 14 days of piperacillin-tazobactam.  Plan 7 days empiric anidulafungin for now and follow. 3. Perforated gastric ulcer. The patient underwent exploratory laparotomy with repair on .  Now with fistula. On TPN  4. Abnormal LFT's. Monitor          Subjective:     No complaints    Objective:     Vitals:   Visit Vitals    /67 (BP 1 Location: Left arm, BP Patient Position: At rest)    Pulse 89    Temp 98.6 °F (37 °C)    Resp 18    Ht 5' 3.5\" (1.613 m)    Wt 81 kg (178 lb 9.2 oz)    SpO2 98%    Breastfeeding No    BMI 31.14 kg/m2        Tmax:  Temp (24hrs), Av.2 °F (36.8 °C), Min:97.5 °F (36.4 °C), Max:98.7 °F (37.1 °C)      Exam:   Patient is intubated:  no    Physical Examination:   General:  Alert, cooperative, no distress   Head:  Normocephalic, without obvious abnormality, atraumatic. Eyes:  Conjunctivae/corneas clear. .   Neck: Supple, symmetrical, trachea midline, no adenopathy       Lungs:   Clear to auscultation anteriorly   Chest wall:  No tenderness or deformity. Heart:  Regular rate and rhythm, S1, S2 normal,. Abdomen:   Soft, non-tender. ALYSSA and accordian drain in place. Extremities: B/l LE edema   Skin: Skin color, texture, turgor normal. No rashes or lesions   Neurologic: CNII-XII intact.       Labs:        No lab exists for component: ITNL   No results for input(s): CPK, CKMB, TROIQ in the last 72 hours. Recent Labs      03/09/18   0443  03/08/18   1757  03/08/18   0222  03/07/18   0426   NA  138  138  137  135*   K  5.0  4.8  4.7  5.4*   CL  107  107  106  103   CO2  24  25  21  24   BUN  19  16  16  18   CREA  0.49*  0.49*  0.50*  0.49*   GLU  129*  82  130*  169*   PHOS  3.5   --   3.2  3.3   MG  1.6   --   1.6  1.9   ALB   --    --   1.5*   --    WBC  12.6*  16.9*   --    --    HGB  7.8*  8.3*   --    --    HCT  25.8*  27.5*   --    --    PLT  323  356   --    --      No results for input(s): INR, PTP, APTT in the last 72 hours.     No lab exists for component: INREXT, INREXT  Needs: urine analysis, urine sodium, protein and creatinine  No results found for: CHIKIS, CREAU      Cultures:     No results found for: SDES  Lab Results   Component Value Date/Time    Culture result: Culture performed on Fluid swab specimen 03/01/2018 02:00 PM    Culture result: NO GROWTH 4 DAYS 03/01/2018 02:00 PM    Culture result: NO GROWTH 4 DAYS 03/01/2018 02:00 PM       Radiology:     Medications       Current Facility-Administered Medications   Medication Dose Route Frequency Last Dose    TPN ADULT - CENTRAL   IntraVENous CONTINUOUS      anidulafungin (ERAXIS) 100 mg in 0.9% sodium chloride 130 mL IVPB  100 mg IntraVENous Q24H 100 mg at 03/09/18 1011    spironolactone (ALDACTONE) tablet 25 mg  25 mg Oral DAILY 25 mg at 03/09/18 0929    albuterol-ipratropium (DUO-NEB) 2.5 MG-0.5 MG/3 ML  3 mL Nebulization Q4H PRN      melatonin tablet 1.5 mg  1.5 mg Oral QHS PRN 1.5 mg at 03/07/18 2050    HYDROmorphone (PF) (DILAUDID) injection 1 mg  1 mg IntraVENous Q3H PRN 1 mg at 03/09/18 0132    fentaNYL (DURAGESIC) 25 mcg/hr patch 1 Patch  1 Patch TransDERmal Q72H 1 Patch at 03/09/18 0741    haloperidol lactate (HALDOL) injection 1 mg  1 mg IntraVENous Q4H PRN 1 mg at 03/04/18 0401    guaiFENesin ER (MUCINEX) tablet 1,200 mg  1,200 mg Oral Q12H 1,200 mg at 03/09/18 8626    acetaminophen (TYLENOL) tablet 650 mg  650 mg Oral Q4H  mg at 03/02/18 0301    insulin regular (NOVOLIN R, HUMULIN R) injection   SubCUTAneous Q6H Stopped at 03/08/18 1200    fat emulsion 20% (LIPOSYN, INTRAlipid) infusion 500 mL  500 mL IntraVENous Q MON, WED &  mL at 03/07/18 2049    glucose chewable tablet 16 g  4 Tab Oral PRN      glucagon (GLUCAGEN) injection 1 mg  1 mg IntraMUSCular PRN      dextrose (D50W) injection syrg 12.5-25 g  12.5-25 g IntraVENous PRN 12.5 g at 03/06/18 1814    alteplase (CATHFLO) 1 mg in sterile water (preservative free) 1 mL injection  1 mg InterCATHeter PRN 1 mg at 02/28/18 1607    sodium chloride (NS) flush 10-30 mL  10-30 mL InterCATHeter PRN 10 mL at 03/01/18 1632    sodium chloride (NS) flush 10-40 mL  10-40 mL InterCATHeter Q8H 20 mL at 03/09/18 1309    naloxone (NARCAN) injection 0.4 mg  0.4 mg IntraVENous PRN      ondansetron (ZOFRAN) injection 4 mg  4 mg IntraVENous Q4H PRN 4 mg at 03/08/18 0806    enoxaparin (LOVENOX) injection 40 mg  40 mg SubCUTAneous Q24H 40 mg at 03/08/18 1515    pantoprazole (PROTONIX) injection 40 mg  40 mg IntraVENous Q12H 40 mg at 03/09/18 0929    phenol throat spray (CHLORASEPTIC) 1 Spray  1 Spray Oral PRN 1 Spray at 02/13/18 1803    nystatin (MYCOSTATIN) 100,000 unit/gram powder   Topical BID      sodium chloride (NS) flush 5-10 mL  5-10 mL IntraVENous PRN 10 mL at 03/06/18 1706           Case discussed with:       Jasmeet Valencia DO

## 2018-03-09 NOTE — PROGRESS NOTES
[facility] PROGRESS NOTE  Pulmonary, Critical Care, and Sleep Medicine    Name: Jhonny Mckeon MRN: [id number]   : 1945 Hospital: [facility]   Date: 3/9/2018  Admission Date: 2018     Chart and notes reviewed. Data reviewed. I have evaluated and examined the patient. Overnight events reviewed:  Afebrile  BP stable  O2 sats 98% on 2L  WBC 12.6: improved  Hgb 7.8    ROS    Feels better each day. Daughter notes that she has been up and walked some today. Getting up again shortly to the chair. She notes that it still hurts to take a deep breath. Using IS regularly. Vital Signs:  Visit Vitals    /67 (BP 1 Location: Left arm, BP Patient Position: At rest)    Pulse 78    Temp 98.6 °F (37 °C)    Resp 18    Ht 5' 3.5\" (1.613 m)    Wt 81 kg (178 lb 9.2 oz)    SpO2 98%    Breastfeeding No    BMI 31.14 kg/m2       O2 Device: Nasal cannula   O2 Flow Rate (L/min): 2 l/min   Temp (24hrs), Av.2 °F (36.8 °C), Min:97.5 °F (36.4 °C), Max:98.7 °F (37.1 °C)       Intake/Output:   Last shift:      701 - 1900  In: 10   Out: 610 [Urine:600; Drains:10]  Last 3 shifts:  1901 -  0700  In: 20   Out: 945 [Urine:900; Drains:45]    Intake/Output Summary (Last 24 hours) at 18 1522  Last data filed at 18 0312   Gross per 24 hour   Intake               20 ml   Output              830 ml   Net             -810 ml        Telemetry:     Physical Exam:   General:  Alert, cooperative, no distress, confused to time and place   Head:  Normocephalic, without obvious abnormality, atraumatic. Eyes:  Conjunctivae/corneas clear. PERRL, EOMs intact. Nose: Nares normal. Septum midline. Mucosa normal. No drainage or sinus tenderness. Throat: Lips, mucosa, and tongue normal. Teeth and gums normal.   Neck: Supple, symmetrical, trachea midline, no adenopathy, thyroid: no enlargment/tenderness/nodules, no carotid bruit and no JVD.    Back: Symmetric, no curvature. ROM normal.   Lungs:   Clear to auscultation bilaterally, decreased at that bases   Chest wall:  No tenderness or deformity. Heart:  Regular rate and rhythm, S1, S2 normal, no murmur, click, rub or gallop. Abdomen:   Soft, non-tender. Bowel sounds normal. No masses,  No organomegaly. Extremities: Extremities normal, atraumatic, no cyanosis or edema. Pulses: 2+ and symmetric all extremities. Skin: Skin color, texture, turgor normal. No rashes or lesions   Lymph nodes: Cervical, supraclavicular, and axillary nodes normal.         DATA:  MAR reviewed and pertinent medications noted or modified as needed    Labs:  Recent Labs      03/09/18 0443 03/08/18 1757   WBC  12.6*  16.9*   HGB  7.8*  8.3*   HCT  25.8*  27.5*   PLT  323  356     Recent Labs      03/09/18 0443 03/08/18   1757  03/08/18   0222  03/07/18   0426   NA  138  138  137  135*   K  5.0  4.8  4.7  5.4*   CL  107  107  106  103   CO2  24  25  21  24   GLU  129*  82  130*  169*   BUN  19  16  16  18   CREA  0.49*  0.49*  0.50*  0.49*   CA  8.0*  8.1*  8.1*  8.2*   MG  1.6   --   1.6  1.9   PHOS  3.5   --   3.2  3.3   ALB   --    --   1.5*   --    TBILI   --    --   0.5   --    SGOT   --    --   47*   --    ALT   --    --   30   --        Imaging:  I have personally reviewed the patients radiographs and reports. CXR 3/8:  Stable small bilateral pleural effusions.       IMPRESSION:   · Acute Respiratory Failure with Hypoxia  · Abnormal Chest CT: with bilateral pleural effusions, atelectasis,and patchy airspace disease in MIRIAM; s/p thoracentesis on 3/1  · Leukocytosis trending up  · ALYSSA drain culture: + yeast  · Delirium; improved  · Peforated gastric ulcer, s/p repair  · EtOH abuse      PLAN:   · Goal sats >90%, wean O2 as tolerated  · ID following-continue Eraxis for 7 days total  · C/W IV Haldol as needed for delerium  · PRN nebs  · Post-op management as per surgery  · Pain control  · Seroquel at night for sleep  · Monitor lytes, replete as needed. · Encourage IS use  · PT/OT  · OOB and mobilize as much as possible  · TPN  · SCDs  · Protonix   Ms. Ailyn Goff is stable from a pulmonary perspective. We will sign off and arrange for outpatient follow-up in 2-3 weeks with . Thank you for allowing us to participate in 's care.       Kristie Valles NP

## 2018-03-09 NOTE — PROGRESS NOTES
Nutrition Assessment:    RECOMMENDATIONS/INTERVENTION(S):   1. Resume PO & diet advancement per Surgery MD    2. Continue Cyclic TPN until PO intakes consistently >59%    Cyclic TPN recommendations (12 hour cycle):  D20/6%AA + Lipids 20% (500 ml) @ 42 ml/hr x 12 hr 3x/wk  (TPN @ goal + Lipids provides 1823 kcals, 91 g protein, 1512 ml fluid)  1st hour: 63 ml/hr  2nd-11th hour: 138 ml/hr  12th hour: 63 ml/hr    3. Continue to monitor BG, lytes, TG (weekly while on TPN)    4. Obtain new wt - Standing scale if able    ASSESSMENT:   3/9:  POD #25. Continue bowel rest, TPN, keep ALYSSA drain per Surgery MD.  Cyclic TPN + Lipids @ goal rate w/ no s/s of intolerance. Edema improving. PAB increased to 6.0 (was 3.8 3/1). Noted plans for d/c to SNF next week per CM - continued NPO, cyclic TPN?      3/7:  CLD x 1 d, NPO resumed. Pt w/ fistula. Output trending down. Cyclic TPN + Lipids @ goal rate. Labs/meds reviewed. -[id number]. K elevated - adjusted in TPN. TG 70. No new PAB. Na low, trending up. No IVF. Wt @ 98% of admit wt. 1+ generalized edema charted on 3/5.      3/5:  Continuous TPN transitioned to Cyclic TPN @ goal rate x 4 days. BG controlled. Alk phos elevated (trending up), no new TG.  Mg, Phos, Na WDL. K elevated - KCl d/c-ed. Last BM 3/3 +flatus. CLD started for breakfast today per Surgery. Visited pt this afternoon. Daughter at bedside. Tolerating diet, ~50% PO of breakfast, interested in diet advancement. # (@ 104% of UBW), eating 2-3 meals/d PTA. Assisted pt w/ lunch order. 3/1:  Consult received per Surgery MD regarding PAB trending down on TPN. Noted pt NPO yesterday, NGT placed for possible SBO. Labs/meds reviewed. BG controlled. Alk phos elevated, stable. PAB 3.8 (was 4.5 2/26). Pt w/ worsening edema. Likely a factor in PAB. TPN @ goal rate x 10 d to meet 100% of energy & protein needs. 10% wt increase x 2.5 wks since admission.   Trial increase in protein (+19.7%) - TPN recs above. Cyclic TPN recs above if pt continues on TPN in the next 1-2 days to spare liver. 2/26:  TPN @ goal, CLD started yesterday. Labs/meds reviewed. BG controlled. Na low. PAB 4.5 (2/26), 4.4 (2/23). Hypoactive BS, +BM (soft). Spoke w/ RN, pt anxious, s/p ativan. Poor PO intakes, no interest in eating and/or drinking, not asking for anything. No family in room. SLP following - recs for sips of clears 2/2 decreased alertness. TPN re-ordered per Surgery. Day 11 of TPN. Alk phos, AST remain elevated - stable. Worsening edema. 2/22:  TPN + Lipids running @ goal rate. TG 76. Alk phos, AST elevated. BG controlled. Lytes WDL. No IVF. Pt reporting abd pain, for repeat CT abd today. Last BM 2/21, loose, +BS.         2/19:  Labs/meds reviewed. TPN running @ recommended goal rate. BG controlled, [phone]. On insulin. K continuing to require repletion. PAB 5.5. No TG, Alk phos WDL. NS IVF. Last BM 2/10, hypoactive BS. NGT placed today 2/2 abd pain. Continue TPN per Surgery MD.  WBC's elevated, for CT abd today. Possible kyphoplasty per Ortho. Noted new wt.      2/16:  Pt remains confused, agitated & sedated (CIWA protocol). NPO Day 6. Labs/meds reviewed. K repleted. No Phos. D5 LR IVF. TPN started per Surgery MD today, plans for UGI when able. Likely pt meets criteria for Severe Acute Malnutrition, no new wt, mild edema. TPN recs above. 2/14:  Extubated 2/12. Unable to start PO yesterday per Surgery - plans for UGI p/t PO once off pressors. Discussed case in ICU rounds. Pt agitated, confused overnight - pulled NGT. On sedation per CIWA protocol. NPO. No BM, flatus, absent BS. Labs/meds reviewed. K, Mg WDL. No Phos. 1/2 NS IVF. 2/12:  Chart reviewed 2/2 intubation. 67 yof admitted for intra-abd free air of unknown etiology. Pmhx includes gastric ulcer (EGD x 2 yrs ago), Etoh. Surgery following.   POD #1 ex lap repair of perforated gastric ulcer, liver bx. Remains intubated 2/2 shock, respiratory failure. Labs/meds reviewed. K, Mg requiring repletion. No Phos. On Delbert. Propofol currently stopped. On PPI. Ortho following for T12 fx. Surgical incisions to abd, ALYSSA drain, no edema noted. Overwt per advanced age. No recent wt to compare per hx. Energy needs calculated using current wt, vent settings, tmax. Noted n/v after fall yesterday, no reports of inadequate PO prior to fall. Etoh hx. SUBJECTIVE/OBJECTIVE:     Diet Order: NPO; Cyclic TPN x 12 hrs/d -> D20/6% 63 ml x 1, 138 ml x 10, 63 ml x 1 + Lipids 20% (500 ml) @ 42 ml/hr x 12 hr 3x/wk  % Eaten:  No data found. Pertinent Medications: [x] Reviewed     Past Medical History:   Diagnosis Date    [facility] 2/12/2018    ETOH abuse 2/11/2018    History of vascular access device 02/16/2018    [facility] VAT - 5 FR triple, R basilic, TPN    Hyperammonemia ([facility] 2/16/2018     Results for Verena Duval (MRN [id number]) as of 2/17/2018 08:43  Ref. Range 2/16/2018 17:20 Ammonia Latest Ref Range: <32 UMOL/L 69 (H)     Hyperlipidemia     Perforated chronic gastric ulcer ([facility] 2/11/2018       Chemistries:  Lab Results   Component Value Date/Time    Sodium 138 03/09/2018 04:43 AM    Potassium 5.0 03/09/2018 04:43 AM    Chloride 107 03/09/2018 04:43 AM    CO2 24 03/09/2018 04:43 AM    Anion gap 7 03/09/2018 04:43 AM    Glucose 129 (H) 03/09/2018 04:43 AM    BUN 19 03/09/2018 04:43 AM    Creatinine 0.49 (L) 03/09/2018 04:43 AM    BUN/Creatinine ratio 39 (H) 03/09/2018 04:43 AM    GFR est AA >60 03/09/2018 04:43 AM    GFR est non-AA >60 03/09/2018 04:43 AM    Calcium 8.0 (L) 03/09/2018 04:43 AM    AST (SGOT) 47 (H) 03/08/2018 02:22 AM    Alk.  phosphatase 157 (H) 03/08/2018 02:22 AM    Protein, total 6.0 (L) 03/08/2018 02:22 AM    Albumin 1.5 (L) 03/08/2018 02:22 AM    Globulin 4.5 (H) 03/08/2018 02:22 AM    A-G Ratio 0.3 (L) 03/08/2018 02:22 AM    ALT (SGPT) 30 03/08/2018 02:22 AM      Anthropometrics: Height: 5' 3.5\" (161.3 cm) Weight: 81 kg (178 lb 9.2 oz)    IBW (%IBW): 58.7 kg (129 lb 6.6 oz) (131.69 %) UBW (%UBW): 77.1 kg (170 lb) (105.04 %)    BMI: Body mass index is 31.14 kg/(m^2). This BMI is indicative of:   [] Underweight    [] Normal    [x] Overweight - per advanced age     []  Obesity    []  Extreme Obesity (BMI>40)  Estimated Nutrition Needs (Based on): 3077 (BMR (1332) x 1.3 AF , 70 g (-88 (1.2-1.5 g/kg x IBW)) Protein  Carbohydrate:  At Least 130 g/day  Fluids: 1700 mL/day    Last BM: 3/8 loose   []Active     []Hyperactive  [x]Hypoactive       [] Absent   BS  Skin:    [] Intact   [x] Incision - abd lap sites  [] Breakdown   [] DTI   [x] Tears/Excoriation/Abrasion - abrasion R elbow [x]Edema - non-pitting BLE [x] Other:ALYSSA drain     Wt Readings from Last 30 Encounters:   03/05/18 81 kg (178 lb 9.2 oz)   04/29/16 76.3 kg (168 lb 3.2 oz)      NUTRITION DIAGNOSES:   Problem:  Inadequate oral intake     Etiology: related to altered GI function      Signs/Symptoms: as evidenced by s/p lap w/ perf viscus repair, intubation, NPO x 11, abd pain/repeat CT abd, need to continue TPN    2/27:  Above acute nutrition dx continues - CLD started 2/26, pt not alert & w/ poor PO intake, TPN renewed  3/1:  Above acute nutrition dx continues - NPO, NGT for possible SBO, alter TPN composition per MD  3/7:  Above acute nutrition dx continues - CLD x 1 d, NPO resumed for fistula, high output     NUTRITION INTERVENTIONS:  Meals/Snacks: General/healthful diet - once PO resumes Enteral/Parenteral Nutrition: Initiate parenteral nutrition - continue cyclic TPN                GOAL:   Continue cyclic TPN until PO resumed & tolerated in the next 2-4 days    Cultural, Sabianism, or Ethnic Dietary Needs: None    EDUCATION & DISCHARGE NEEDS:    [x] None Identified   [] Identified and Education Provided/Documented   [] Identified and Pt declined/was not appropriate [x] Interdisciplinary Care Plan Reviewed/Documented    [x] Discharge Needs:    TBD   [] No Nutrition Related Discharge Needs    NUTRITION RISK:   Pt Is At Nutrition Risk  [x]     No Nutrition Risk Identified  []       PT SEEN FOR:    []  MD Consult: []Calorie Count      []Diabetic Diet Education        []Diet Education     []Electrolyte Management     []General Nutrition Management and Supplements     []Management of Tube Feeding     []TPN Recommendations   []  RN Referral:  []MST score >=2     []Enteral/Parenteral Nutrition PTA     []Pregnant: Gestational DM or Multigestation                 [] Pressure Ulcer    []  Low BMI      []  Length of Stay       [] Dysphagia Diet         [] Ventilator  [x]  Follow-up - TPN     Previous Recommendations:   [x] Implemented     [] Progressing Towards Goal          [] Not Implemented          [] Not Applicable    Previous Goal:   [x] Met              [] Progressing Towards Goal              [] Not Progressing Towards Goal   [] Not Applicable            Martha Oscar, [address] [place] [address]  Pager [phone]

## 2018-03-09 NOTE — PROGRESS NOTES
Problem: Self Care Deficits Care Plan (Adult)  Goal: *Acute Goals and Plan of Care (Insert Text)  Occupational Therapy Goals  Revised 3/2/2018. Reviewed 3/9/2018 and updated below to be met within the next 7 days. 1.  Patient will perform grooming with modified independence for >5 minutes with supervision/setup with < 2 verbal cues within 7 day(s)- not met and continue. 2.  Patient will perform upper body dressing and bathing with modified independence within 7 day(s) - not met and continue. 3.  Patient will perform lower body dressing and bathing with moderate assistance using adaptive equipment within 7 day(s)- met 3/9/18 and upgrade to min A.  4.  Patient will perform toilet transfers to [facility] with minimal assistance x1 within 7 day(s)- - not met and continue. 5.  Patient will perform all aspects of toileting with minimal assistance within 7 day(s)- - not met and continue. 6.  Patient will participate in upper extremity therapeutic exercise/activities with supervision/set-up for 10 minutes within 7 day(s)- not met and continue. 7.  Patient will utilize energy conservation techniques during functional activities with verbal cues within 7 day(s)- not met and continue. 8.  Patient will don/doff back brace at supervision/set-up within 7 days- not met and continue. 9.  Patient will verbalize/demonstrate all spinal precautions during ADL tasks without cues within 7 days - not met and continue. Initiated 2/20/2018; revised at re-assess (see above); 1. Patient will perform upper body dressing with moderate assistance in unsupported sitting within 7 day(s). 2.  Patient will perform upper body bathing with moderate assistance  within 7 day(s). 3.  Patient will perform grooming with minimal assistance/contact guard assist within 7 day(s). 4.  Patient will perform toilet transfers with maximal assistance  within 7 day(s).   5.  Patient will perform all aspects of toileting with maximal assistance within 7 day(s). 6.  Patient will participate in upper extremity therapeutic exercise/activities with minimal assistance/contact guard assist for 5 minutes within 7 day(s). Occupational Therapy TREATMENT: WEEKLY REASSESSMENT  Patient: Glen Alberto (57 y.o. female)  Date: 3/9/2018  Diagnosis: Intra-abdominal free air of unknown etiology [K66.8] Perforated chronic gastric ulcer ([address]  Procedure(s) (LRB):   NASOGASTRIC TUBE PLACEMENT (N/A) 19 Days Post-Op  Precautions: Aspiration, Back, Bed Alarm, Fall, Skin  Chart, occupational therapy assessment, plan of care, and goals were reviewed. ASSESSMENT:  Pt is making slow, steady gains toward goals. She is motivated for increased independence and willing to participate in therapy. She required overall mod A for bed mobility, sit to stand and amb with RW, mod A for LE ADLs and max A for toileting. She remains limited by B calf pain to touch and with WBing, decreased strength, endurance, mobility, balance, safety and impaired cognition. Recommend rehab at discharge. Progression toward goals:  []            Improving appropriately and progressing toward goals  [x]            Improving slowly and progressing toward goals  []            Not making progress toward goals and plan of care will be adjusted     PLAN:  Goals have been updated based on progression since last assessment. Patient continues to benefit from skilled intervention to address the above impairments. Continue to follow patient 5 times a week to address goals.   Planned Interventions:  [x]                    Self Care Training                  [x]             Therapeutic Activities  [x]                    Functional Mobility Training    [x]             Cognitive Retraining  [x]                    Therapeutic Exercises           [x]             Endurance Activities  [x]                    Balance Training                   [x]             Neuromuscular Re-Education  [] Visual/Perceptual Training     [x]        Home Safety Training  [x]                    Patient Education                 [x]             Family Training/Education  []                    Other (comment):  Discharge Recommendations: Rehab  Further Equipment Recommendations for Discharge: TBD     SUBJECTIVE:   Patient stated I was already up today for breakfast and to go to the bathroom.     OBJECTIVE DATA SUMMARY:   Cognitive/Behavioral Status:  Neurologic State: Alert;Confused  Orientation Level: Oriented to person;Oriented to place;Oriented to situation;Disoriented to time  Cognition: Follows commands  Perception: Appears intact  Perseveration: No perseveration noted  Safety/Judgement: Awareness of environment;Decreased insight into deficits; Fall prevention;Home safety    Functional Mobility and Transfers for ADLs:  Bed Mobility:  Supine to Sit: Minimum assistance; Additional time;Assist x1 (from sidelying- A for upper body)  Sit to Supine: Moderate assistance; Additional time;Assist x1 (A for BLEs)  Scooting: Contact guard assistance; Additional time;Assist x1    Transfers:  Sit to Stand: Moderate assistance; Additional time;Assist x1 (A for forward wt shift and inc B calf pain)  Functional Transfers  Bathroom Mobility: Moderate assistance (using RW- impaired balance and increased pain in B calves)  Toilet Transfer : Moderate assistance; Additional time;Assist x1 (using RW and grab bar)  Cues: Physical assistance; Tactile cues provided;Verbal cues provided;Visual cues provided    Balance:  Sitting: Intact; With support  Standing: Impaired; With support (RW)  Standing - Static: Fair  Standing - Dynamic : Fair    ADL Intervention:  Lower Body Dressing Assistance  Socks: Moderate assistance (pt able to cross legs, but required 1 UE for support )  Leg Crossed Method Used: Yes  Position Performed: Seated edge of bed  Cues: Don;Doff;Physical assistance; Tactile cues provided;Verbal cues provided;Visual cues provided    Toileting  Toileting Assistance: Maximum assistance  Bladder Hygiene: Minimum assistance (due to back and abd pain)  Bowel Hygiene: Maximum assistance (decreased mobility to reach buttocks)  Clothing Management: Maximum assistance (impaired balance in standing)  Cues: Tactile cues provided;Verbal cues provided;Visual cues provided  Adaptive Equipment: Walker    Cognitive Retraining  Safety/Judgement: Awareness of environment;Decreased insight into deficits; Fall prevention;Home safety    Patient was educated on the benefits of maintaining activity tolerance, functional mobility, and independence with self care tasks during acute stay. Encouraged patient to be out of bed for all meals, perform daily ADLs (as approved by RN/MD regarding bathing etc) with assist, performing functional mobility to/from bathroom using RW, and increasing time OOB daily all with assist. Patient educated about the importance of maintaining activity tolerance to ensure safe return home and to baseline. Patient verbalized understanding of education. Pain:  Pain Scale 1: Numeric (0 - 10)  Pain Intensity 1: 0  Pain Location 1: Abdomen  Pain Orientation 1: Anterior  Pain Description 1: Aching  Pain Intervention(s) 1: Medication (see MAR)    Activity Tolerance:   Fair  Please refer to the flowsheet for vital signs taken during this treatment.   After treatment:   [] Patient left in no apparent distress sitting up in chair  [x] Patient left in no apparent distress in bed  [x] Call bell left within reach  [x] Nursing notified  [] Caregiver present  [] Bed alarm activated    COMMUNICATION/COLLABORATION:   The patients plan of care was discussed with: Physical Therapist and Registered Nurse    Ap Perez OT  Time Calculation: 23 mins

## 2018-03-10 LAB
GLUCOSE BLD STRIP.AUTO-MCNC: 111 MG/DL (ref 65–100)
GLUCOSE BLD STRIP.AUTO-MCNC: 120 MG/DL (ref 65–100)
GLUCOSE BLD STRIP.AUTO-MCNC: 95 MG/DL (ref 65–100)
GLUCOSE BLD STRIP.AUTO-MCNC: 99 MG/DL (ref 65–100)
MAGNESIUM SERPL-MCNC: 1.6 MG/DL (ref 1.6–2.4)
PHOSPHATE SERPL-MCNC: 3 MG/DL (ref 2.6–4.7)
SERVICE CMNT-IMP: ABNORMAL
SERVICE CMNT-IMP: ABNORMAL
SERVICE CMNT-IMP: NORMAL
SERVICE CMNT-IMP: NORMAL

## 2018-03-10 PROCEDURE — 83735 ASSAY OF MAGNESIUM: CPT | Performed by: SURGERY

## 2018-03-10 PROCEDURE — 84100 ASSAY OF PHOSPHORUS: CPT | Performed by: SURGERY

## 2018-03-10 PROCEDURE — 77030038269 HC DRN EXT URIN PURWCK BARD -A

## 2018-03-10 PROCEDURE — 74011250637 HC RX REV CODE- 250/637: Performed by: FAMILY MEDICINE

## 2018-03-10 PROCEDURE — 74011250636 HC RX REV CODE- 250/636: Performed by: INTERNAL MEDICINE

## 2018-03-10 PROCEDURE — 82962 GLUCOSE BLOOD TEST: CPT

## 2018-03-10 PROCEDURE — 74011000250 HC RX REV CODE- 250: Performed by: SURGERY

## 2018-03-10 PROCEDURE — C9113 INJ PANTOPRAZOLE SODIUM, VIA: HCPCS | Performed by: SURGERY

## 2018-03-10 PROCEDURE — 74011250637 HC RX REV CODE- 250/637: Performed by: NURSE PRACTITIONER

## 2018-03-10 PROCEDURE — 74011250636 HC RX REV CODE- 250/636: Performed by: SURGERY

## 2018-03-10 PROCEDURE — 74011000258 HC RX REV CODE- 258: Performed by: SURGERY

## 2018-03-10 PROCEDURE — 74011000258 HC RX REV CODE- 258: Performed by: INTERNAL MEDICINE

## 2018-03-10 PROCEDURE — 74011636637 HC RX REV CODE- 636/637: Performed by: SURGERY

## 2018-03-10 PROCEDURE — 36415 COLL VENOUS BLD VENIPUNCTURE: CPT | Performed by: SURGERY

## 2018-03-10 PROCEDURE — 65660000000 HC RM CCU STEPDOWN

## 2018-03-10 PROCEDURE — 77010033678 HC OXYGEN DAILY

## 2018-03-10 PROCEDURE — 74011250637 HC RX REV CODE- 250/637: Performed by: SURGERY

## 2018-03-10 PROCEDURE — 74011250637 HC RX REV CODE- 250/637: Performed by: INTERNAL MEDICINE

## 2018-03-10 RX ADMIN — GUAIFENESIN 1200 MG: 600 TABLET, EXTENDED RELEASE ORAL at 20:42

## 2018-03-10 RX ADMIN — ENOXAPARIN SODIUM 40 MG: 40 INJECTION SUBCUTANEOUS at 14:31

## 2018-03-10 RX ADMIN — Medication 10 ML: at 14:31

## 2018-03-10 RX ADMIN — PANTOPRAZOLE SODIUM 40 MG: 40 INJECTION, POWDER, FOR SOLUTION INTRAVENOUS at 10:08

## 2018-03-10 RX ADMIN — NYSTATIN: 100000 POWDER TOPICAL at 10:08

## 2018-03-10 RX ADMIN — CALCIUM GLUCONATE: 94 INJECTION, SOLUTION INTRAVENOUS at 17:31

## 2018-03-10 RX ADMIN — NYSTATIN: 100000 POWDER TOPICAL at 17:31

## 2018-03-10 RX ADMIN — Medication 10 ML: at 05:41

## 2018-03-10 RX ADMIN — SODIUM CHLORIDE 100 MG: 900 INJECTION, SOLUTION INTRAVENOUS at 10:08

## 2018-03-10 RX ADMIN — SPIRONOLACTONE 25 MG: 25 TABLET, FILM COATED ORAL at 10:10

## 2018-03-10 RX ADMIN — Medication 10 ML: at 22:00

## 2018-03-10 RX ADMIN — ACETAMINOPHEN 650 MG: 325 TABLET ORAL at 07:07

## 2018-03-10 RX ADMIN — CALCIUM GLUCONATE: 94 INJECTION, SOLUTION INTRAVENOUS at 04:35

## 2018-03-10 RX ADMIN — ACETAMINOPHEN 650 MG: 325 TABLET ORAL at 16:16

## 2018-03-10 RX ADMIN — PANTOPRAZOLE SODIUM 40 MG: 40 INJECTION, POWDER, FOR SOLUTION INTRAVENOUS at 20:42

## 2018-03-10 RX ADMIN — I.V. FAT EMULSION 500 ML: 20 EMULSION INTRAVENOUS at 17:32

## 2018-03-10 RX ADMIN — MELATONIN TAB 3 MG 1.5 MG: 3 TAB at 23:59

## 2018-03-10 NOTE — PROGRESS NOTES
Bedside and Verbal shift change report given to [facility] (oncoming nurse) by Torey Larose RN (offgoing nurse). Report included the following information SBAR, Kardex, Procedure Summary, Intake/Output, MAR and Recent Results.

## 2018-03-10 NOTE — PROGRESS NOTES
Bedside and Verbal shift change report given to Oksana Goodman (oncoming nurse) by Paul Major RN (offgoing nurse). Report included the following information SBAR, Kardex, Procedure Summary, Intake/Output, MAR and Recent Results.

## 2018-03-10 NOTE — PROGRESS NOTES
Family Practice Daily Progress Note: 3/10/2018  Vasiliy Shelleyfanadelia Fang Fairly, DO    Assessment/Plan:   Perforated chronic gastric ulcer /  Free intraperitoneal air / S/P exploratory laparotomy 2/11. S/p repair in OR with Dr. Jayden Rodriguez on 2/12/18 - per surg    Severe protein calorie malnutrition-POA  --TPN per GI - advance diet as per surg     Acute metabolic encephalopathy/Delerium. Much improved - back to baseline. Multifactorial-- medications, alcohol use, sundowning  ---seroquel at night, haldol prn    Hypoxia / Respiratory distress. Improved. Pleural effusions   ---pulmonary has signed off  --wean O2 off as tolerated for sats >32%     Alcoholic cirrhosis of liver without ascites /  Fatty liver determined by biopsy /  Hyperammonemia. --GI has seen- rec alcohol cessation and outpt follow up     ETOH abuse. Needs to stop ETOH entirely.     Hypokalemia /  Hypoalbuminemia. Being addressed with TPN     Leukocytosis.  -- recheck tomorrow  -- repeat blood cultures, and urine 2/28 neg  --was on Zosyn, and levaquin - cultures drawn off antibiotics neg except yeast -anidulafungin  added  --ID following    Anemia: watch Hgb   --follow, transfuse <7  --recheck in AM    Back Pain due to compression fracture of T12  ---Increased Duragesic patch to 25mcg and DIiaudid to 1mg as needed  --ortho to re-eval Monday per daughter's request    Debility: due to prolonged hospital stay  --continue PT/OT- needs to get OOB  --will likely need rehab    DVT proph - lovenox        Problem List:  Problem List as of 3/10/2018  Date Reviewed: 4/26/2016          Codes Class Noted - Resolved    Hypokalemia ICD-10-CM: E87.6  ICD-9-CM: 276.8  2/18/2018 - Present        Leukocytosis ICD-10-CM: D72.829  ICD-9-CM: 288.60  2/18/2018 - Present        Severe protein-calorie malnutrition ([facility] [address] ICD-10-CM: E43  ICD-9-CM: 262  2/18/2018 - Present        Acute metabolic encephalopathy O-08-ST: G93.41  ICD-9-CM: 348.31  2/18/2018 - Present Hyperammonemia (HCC) ICD-10-CM: E72.20  ICD-9-CM: 270.6  2/16/2018 - Present    Overview Addendum 2/17/2018  9:08 AM by Maggy Dominguez MD        Ref. Range 2/16/2018 17:20   Ammonia Latest Ref Range: <32 UMOL/L 69 (H)                Free intraperitoneal air ICD-10-CM: K66.8  ICD-9-CM: 568.89  2/12/2018 - Present        S/P exploratory laparotomy ICD-10-CM: Z98.890  ICD-9-CM: V45.89  2/12/2018 - Present    Overview Signed 2/12/2018 11:57 AM by Geena Renee MD     Suture repair of perforated posterior gastric ulcer with Lindle Emms patch, core needle biopsies of left lobe of the liver. Alcoholic cirrhosis of liver without ascites (HCC) (Chronic) ICD-10-CM: K70.30  ICD-9-CM: 571.2  2/12/2018 - Present    Overview Signed 2/17/2018  8:45 AM by Maggy Dominguez MD     Liver bx 2/12/18:   Cirrhosis with mild chronic portal inflammation and macrovesicular steatosis. Fatty liver determined by biopsy ICD-10-CM: K76.0  ICD-9-CM: 571.8  2/12/2018 - Present    Overview Signed 2/17/2018  8:53 AM by Maggy Dominguez MD        Cirrhosis with mild chronic portal inflammation and macrovesicular steatosis. * (Principal)Perforated chronic gastric ulcer ([address] (Chronic) ICD-10-CM: K25.5  ICD-9-CM: 531.50  2/11/2018 - Present        ETOH abuse (Chronic) ICD-10-CM: F10.10  ICD-9-CM: 305.00  2/11/2018 - Present        GI bleed ICD-10-CM: K92.2  ICD-9-CM: 578.9  4/26/2016 - Present        Hypotension ICD-10-CM: I95.9  ICD-9-CM: 458.9  4/26/2016 - Present        Tachycardia ICD-10-CM: R00.0  ICD-9-CM: 785.0  4/26/2016 - Present        Acute blood loss anemia ICD-10-CM: D62  ICD-9-CM: 285.1  4/26/2016 - Present              Subjective:    67 y.o.  female with EtOH abuse who is admitted to the General Surgery Service with perforated gastric ulcer. We are asked to evaluate for altered mental status. The patient is poorly interactive at this time and this limits primary hx.  Discussed case with family available at bedside providing secondary hx and chart review. Per family, patient has had declining neurological condition over past 48 hours. Notably, an RRT was called for respiratory distress. 2/19: This morning she is a bit more alert at this moment and taking in more complete sentences. She says she does not feel well. She has no specific site of pain other than the NG tube which is bothering her a great deal.  She should improve as time from surg increases. K+ a little low - replacing.     2/20:  Still confused and somnolent at times. Now able to localize pain to ab and converse a bit more. Tmax 100.3  WBC is up again but hopefully reactive - recheck in AM - more incentive spirom and check CXR.     2/21: A little more alert. Knows she is in the hospital. Not able to participate with PT yesterday as she was in too much pain. WBC still at 19.     2/22:  WBC 19K. She is more alert. Daughter at bedside. Both of her daughters live out of town. Looking at SNF options for rehab. Coughing more. Starting to use IS.     2/23: WBC 17. Repeat CT chest with moderate to large persistent left subphrenic collection. IR has been consulted. Vanc and Levaquin added by pulm. 2/24: Pt had a better night last night. Only brief episode of confusion. She is much clearer this AM.  Now in quite a bit of pain from gas and stomach cramping. Thinks she will feel better if she can have a BM. Has been on bedpan for a while without success. On TPN. Daughters very concerned that she won't be successful while lying down. Wanting to get up to bedside commode. Will need PT's help. 2/25: febrile overnight and WBC up after vanc was stopped. Pt c/o more dyspnea today. Known fluid collection that will be drained in IR this week. Up to chair with PT yesterday briefly. +BM yesterday. Da notes pt has been c/o R wrist pain off and on for 2 wks since she fell. No swelling.    2/26:  No new complaints. Still c/o back and ab pain. Still passing gas and had BM. Diuresed with 1 mg Bumex yesterday by Pulmonary. Remains on Zosyn and Levaquin. Blood culture remains neg. Tmax 99.2. X-ray of wrists ok. CXR ok with tube inplace. WBC 15.7K.      2/27:  Looks much better today. Much less work of breathing. Diuresed about 1 liter over last 24h. WBC 16K today. Blood cult remains neg. Off antibiotics. Still on TPN. She will need rehab for PT/OT. 2/28: Very restless during the night. Has not been OOB. Daughter stayed the night with her. WBC is still up. Antibiotics stopped yesterday. She is c/o increased pain in her back and can't get comfortable. More anxious and agitated. Tolerating sips. 3/1:  She reports she feels better this AM after she had some sleep. Afeb. WBC still up - a bit more today with increased neutrophils. Will also get ID to see also. CT AB and Pelvis as below. Cultures done yesterday off antibiotics - so far no growth. No output from drain - may need to be repositioned. More edema today but not as short of breath today while sitting up - add albumin to todays lab. Add Aldactone if ok with GI and Bumex as per Pul. Discussed cirrhosis/ascites/edema with pts daughter. 3/2:  She reports she feels better, and has slightly less edema, although daughters note they think pts abdomin more swollen. The daughters are more worried about pts LE edema than anything else - explained that with pts low albumin that edema may cont to be a problem. She is more alert, a bit more oriented today and says she has much less pain today. Tmax 101. Culture had yeast - diflucan started. Drain was repositioned, had thoracentesis and new PICC placed yesterday. May need to restart broad spectrum antibiotics - ID consulted. 3/3:  She reports she feels better this AM.  More alert and oriented this AM.  BM yesterday. Tmax 99. WBC still 17-18K.   ID consulted and advises to watch off antibiotics for now.  Less edema LE.      3/4:  Doing much better today and much more alert. Little pain. Off sleeping meds for now - not sleeping well but reports \"never was a good sleeper. \"  Anxious at night. WBC still up. Discussed rehab with daughters and pt and pt willing to go. 3/5:  Continues to improve. She wants to eat solid food - she now has an appetite. Passing gas. Has been up to chair. K+ 5.3 - will DC aldactone for now and restart if edema returns. CXR pending today. 3/6: She reports she feels better. Yesterday had marked increase in ALYSSA output and now NPO and back on TPN today. Will resume Aldactone again (at lower dose) as sl more edema today and hold again if K+ creeping up again. AM labs pending. 3/7:  Reports some ab pain this AM but not severe. Orthostatic hypotension with walking yesterday and K+ back up - will hold aldactone again. Breathing and edema better with the Aldactone. Push incentive spirom - last CXR with atelectasis. O>>I on I&Os but wonder about measurements. 3/8:  Nausea this AM with 2 episodes of vomiting but reports she feels back to normal now. No ab pain. No SOB at rest.  Wearing nasal O2 at 2 L. Nurse reports yesterdays labs were drawn just before 12MN last night for some reason - still have not resulted and lab called and will run samples now. This AMs labs not drawn yet. Port CXR this AM pending.  O>>I again but Intake not correct. 3/9:  No nausea this AM.  No further vomiting. Had BM yesterday. Desats with exercise. Was up in chair some yesterday. WBC down to 12K. Hb a little lower at 7.8 - recheck later today. Seems to be making slow progress. 3/10: [place] some [place] No N/V. Pain minimal at this time, some in back. No recent confusion. BM 2 days ago.     Past Medical History:   Diagnosis Date    Alcoholic cirrhosis of liver without ascites ([facility] [address] 2/12/2018    ETOH abuse 2/11/2018    History of vascular access device 2018    [facility] VAT - 5 FR triple, R basilic, TPN    Hyperammonemia ([facility] [address] 2018     Results for Elvie Coates (MRN [id number]) as of 2018 08:43  Ref. Range 2018 17:20 Ammonia Latest Ref Range: <32 UMOL/L 69 (H)     Hyperlipidemia     Perforated chronic gastric ulcer ([facility] [address] 2018     Past Surgical History:   Procedure Laterality Date    HX CATARACT REMOVAL       Social History     Social History    Marital status: SINGLE     Spouse name: N/A    Number of children: N/A    Years of education: N/A     Occupational History    Not on file. Social History Main Topics    Smoking status: Former Smoker    Smokeless tobacco: Never Used    Alcohol use 11.4 oz/week     0 Standard drinks or equivalent, 19 Glasses of wine per week      Comment: Hx of heavy etoh use, but quit several months ago    Drug use: No    Sexual activity: Not on file     Other Topics Concern    Not on file     Social History Narrative     Family History   Problem Relation Age of Onset    Other Other      patient did not know     Review of Systems:   A comprehensive review of systems was negative except for that written in the HPI. Objective:   Physical Exam:     Visit Vitals    /60 (BP 1 Location: Left arm)    Pulse 94    Temp 97.9 °F (36.6 °C)    Resp 16    Ht 5' 3.5\" (1.613 m)    Wt 81 kg (178 lb 9.2 oz)    SpO2 93%    Breastfeeding No    BMI 31.14 kg/m2    O2 Flow Rate (L/min): 2 l/min O2 Device: Nasal cannula    Temp (24hrs), Av.6 °F (37 °C), Min:97.9 °F (36.6 °C), Max:98.7 °F (37.1 °C)         1901 - 03/10 0700  In: 30   Out: 1550 [Urine:1500; Drains:50]    General:  Awake, uncomfortable, appears stated age. Head:  Normocephalic, without obvious abnormality, atraumatic. Eyes:  Conjunctivae/corneas clear. EOMs intact. Throat: Lips, mucosa, and tongue dry.    Neck: Supple, symmetrical, trachea midline, no adenopathy   Lungs:    CTAB, no w/r/r    Heart:  reg rate with regular rhythm,  no murmur, click, rub or gallop. Abdomen:   Soft,minimal distension, ttp ruq otherwise wnl. No R/G. Bowel sounds present. Incision dry without redness. Drain sites covered in clean dressing. Extremities: no cyanosis. No LE edema at this time. No calf tenderness or cords, no erythema   Skin: turgor normal. No rashes or lesions   Neurologic: AOx2-3,  Gait not tested at this time. Sensation grossly normal to touch. Gross motor of extremities normal.       Data Review:    CT AB and Pelvis 2/28/18: IMPRESSION:  1. There is residual fluid in the posterior portion of the left subphrenic  collection which does not appear well drained by the pigtail catheter. This  could be repositioned versus new drainage catheter placement if clinically  indicated. 2. Persistent ascites. 3. Increasing right pleural effusion and right lower lobe atelectasis   4. Distended duodenum and proximal small bowel of doubtful clinical  Significance. CXR 3/1/18:  INDICATION:  chest pain and shortness of breath following thoracentesis   EXAM: Portable chest 1716 . Comparison March 1, 2018  FINDINGS: There is no significant change in appearance the lungs. Small left  pleural effusion with left basilar atelectasis unchanged. Cardiomediastinal  silhouette is unchanged. No pneumothorax. Lines and tubes are unchanged in  position. IMPRESSION:  1. No interval change    KUB 3/6/18  IMPRESSION: Nonspecific bowel gas pattern with multiple dilated loops of small  bowel but does not appear obstructive. No pneumoperitoneum.     Recent Days:  Recent Labs      03/09/18 0443  03/08/18   1757   WBC  12.6*  16.9*   HGB  7.8*  8.3*   HCT  25.8*  27.5*   PLT  323  356     Recent Labs      03/10/18   0227  03/09/18   0443  03/08/18   1757  03/08/18   0222   NA   --   138  138  137   K   --   5.0  4.8  4.7   CL   --   107  107  106   CO2   --   24  25  21   GLU   --   129*  82  130*   BUN   --   19  16  16   CREA   --   0.49*  0.49*  0.50*   CA --   8.0*  8.1*  8.1*   MG  1.6  1.6   --   1.6   PHOS  3.0  3.5   --   3.2   ALB   --    --    --   1.5*   TBILI   --    --    --   0.5   SGOT   --    --    --   47*   ALT   --    --    --   30     No results for input(s): PH, PCO2, PO2, HCO3, FIO2 in the last 72 hours.   24 Hour Results:  Recent Results (from the past 24 hour(s))   GLUCOSE, POC    Collection Time: 03/09/18 12:26 PM   Result Value Ref Range    Glucose (POC) 91 65 - 100 mg/dL    Performed by Shahana Thompson (PCT)    GLUCOSE, POC    Collection Time: 03/09/18  5:32 PM   Result Value Ref Range    Glucose (POC) 78 65 - 100 mg/dL    Performed by Obey Rausch (PCT)    GLUCOSE, POC    Collection Time: 03/09/18  5:56 PM   Result Value Ref Range    Glucose (POC) 143 (H) 65 - 100 mg/dL    Performed by Obey Rausch (PCT)    GLUCOSE, POC    Collection Time: 03/09/18 11:21 PM   Result Value Ref Range    Glucose (POC) 126 (H) 65 - 100 mg/dL    Performed by Nyla Bosch    PHOSPHORUS    Collection Time: 03/10/18  2:27 AM   Result Value Ref Range    Phosphorus 3.0 2.6 - 4.7 MG/DL   MAGNESIUM    Collection Time: 03/10/18  2:27 AM   Result Value Ref Range    Magnesium 1.6 1.6 - 2.4 mg/dL   GLUCOSE, POC    Collection Time: 03/10/18  5:40 AM   Result Value Ref Range    Glucose (POC) 111 (H) 65 - 100 mg/dL    Performed by Nyla Bosch      Medications reviewed  Current Facility-Administered Medications   Medication Dose Route Frequency    anidulafungin (ERAXIS) 100 mg in 0.9% sodium chloride 130 mL IVPB  100 mg IntraVENous Q24H    spironolactone (ALDACTONE) tablet 25 mg  25 mg Oral DAILY    albuterol-ipratropium (DUO-NEB) 2.5 MG-0.5 MG/3 ML  3 mL Nebulization Q4H PRN    melatonin tablet 1.5 mg  1.5 mg Oral QHS PRN    HYDROmorphone (PF) (DILAUDID) injection 1 mg  1 mg IntraVENous Q3H PRN    fentaNYL (DURAGESIC) 25 mcg/hr patch 1 Patch  1 Patch TransDERmal Q72H    haloperidol lactate (HALDOL) injection 1 mg  1 mg IntraVENous Q4H PRN    guaiFENesin ER (MUCINEX) tablet 1,200 mg  1,200 mg Oral Q12H    acetaminophen (TYLENOL) tablet 650 mg  650 mg Oral Q4H PRN    insulin regular (NOVOLIN R, HUMULIN R) injection   SubCUTAneous Q6H    fat emulsion 20% (LIPOSYN, INTRAlipid) infusion 500 mL  500 mL IntraVENous Q MON, WED & SAT    glucose chewable tablet 16 g  4 Tab Oral PRN    glucagon (GLUCAGEN) injection 1 mg  1 mg IntraMUSCular PRN    dextrose (D50W) injection syrg 12.5-25 g  12.5-25 g IntraVENous PRN    alteplase (CATHFLO) 1 mg in sterile water (preservative free) 1 mL injection  1 mg InterCATHeter PRN    sodium chloride (NS) flush 10-30 mL  10-30 mL InterCATHeter PRN    sodium chloride (NS) flush 10-40 mL  10-40 mL InterCATHeter Q8H    naloxone (NARCAN) injection 0.4 mg  0.4 mg IntraVENous PRN    ondansetron (ZOFRAN) injection 4 mg  4 mg IntraVENous Q4H PRN    enoxaparin (LOVENOX) injection 40 mg  40 mg SubCUTAneous Q24H    pantoprazole (PROTONIX) injection 40 mg  40 mg IntraVENous Q12H    phenol throat spray (CHLORASEPTIC) 1 Spray  1 Spray Oral PRN    nystatin (MYCOSTATIN) 100,000 unit/gram powder   Topical BID    sodium chloride (NS) flush 5-10 mL  5-10 mL IntraVENous PRN       Kirsten Wilson M.D.

## 2018-03-10 NOTE — PROGRESS NOTES
Progress Note    Patient: Anu Burch MRN: [id number]  SSN: [id number]    YOB: 1945  Age: 67 y.o. Sex: female      Admit Date: 2018    20 Days Post-Op    Procedure:  Procedure(s):   NASOGASTRIC TUBE PLACEMENT    Subjective:     Patient slept well; headache resolved; ambulated x 2 yesterday. Objective:     Visit Vitals    /60 (BP 1 Location: Left arm)    Pulse 94    Temp 97.9 °F (36.6 °C)    Resp 16    Ht 5' 3.5\" (1.613 m)    Wt 178 lb 9.2 oz (81 kg)    SpO2 93%    Breastfeeding No    BMI 31.14 kg/m2       Temp (24hrs), Av.5 °F (36.9 °C), Min:97.9 °F (36.6 °C), Max:98.7 °F (37.1 °C)      Physical Exam:    ABDOMEN: Non-distended, soft; incisions healed; purulent, non-bilious fluid in drains; appropriate incisional pain with palpation.     Data Review: VS, I/O's, Labs    Lab Review:   Recent Results (from the past 12 hour(s))   GLUCOSE, POC    Collection Time: 18 11:21 PM   Result Value Ref Range    Glucose (POC) 126 (H) 65 - 100 mg/dL    Performed by Linda Roll    PHOSPHORUS    Collection Time: 03/10/18  2:27 AM   Result Value Ref Range    Phosphorus 3.0 2.6 - 4.7 MG/DL   MAGNESIUM    Collection Time: 03/10/18  2:27 AM   Result Value Ref Range    Magnesium 1.6 1.6 - 2.4 mg/dL   GLUCOSE, POC    Collection Time: 03/10/18  5:40 AM   Result Value Ref Range    Glucose (POC) 111 (H) 65 - 100 mg/dL    Performed by Linda Roll        Assessment:     Hospital Problems  Date Reviewed: 2016          Codes Class Noted POA    Hypokalemia ICD-10-CM: E87.6  ICD-9-CM: 276.8  2018 No        Leukocytosis ICD-10-CM: D72.829  ICD-9-CM: 288.60  2018 Yes        Severe protein-calorie malnutrition ([facility] [address] ICD-10-CM: E43  ICD-9-CM: 174  2018 Yes        Acute metabolic encephalopathy FWM-62-YZ: G93.41  ICD-9-CM: 348.31  2018 Yes        Hyperammonemia ([address] ICD-10-CM: E72.20  ICD-9-CM: 270.6  2018 No    Overview Addendum 2018  9:08 AM by Maritza Arnold Arvilla Severs, MD        Ref. Range 2018 17:20   Ammonia Latest Ref Range: <32 UMOL/L 69 (H)                Free intraperitoneal air ICD-10-CM: K66.8  ICD-9-CM: 568.89  2018 Yes        S/P exploratory laparotomy ICD-10-CM: Z98.890  ICD-9-CM: V45.89  2018 No    Overview Signed 2018 11:57 AM by Zena Hodgkins., MD     Suture repair of perforated posterior gastric ulcer with Juliaette Nims patch, core needle biopsies of left lobe of the liver. Alcoholic cirrhosis of liver without ascites (HCC) (Chronic) ICD-10-CM: K70.30  ICD-9-CM: 571.2  2018 Yes    Overview Signed 2018  8:45 AM by Jesika Wall MD     Liver bx 18:   Cirrhosis with mild chronic portal inflammation and macrovesicular steatosis. Fatty liver determined by biopsy ICD-10-CM: K76.0  ICD-9-CM: 571.8  2018 Yes    Overview Signed 2018  8:53 AM by Jesika Wall MD        Cirrhosis with mild chronic portal inflammation and macrovesicular steatosis. * (Principal)Perforated chronic gastric ulcer ([address] (Chronic) ICD-10-CM: K25.5  ICD-9-CM: 531.50  2018 Yes        ETOH abuse (Chronic) ICD-10-CM: F10.10  ICD-9-CM: 305.00  2018 Yes            Drain output decreasing (also non-bilious; suggests healing). Plan/Recommendations/Medical Decision Makin. Continue antibiotics, drains, TPN. 2. Increase ambulation, spirometry.   3. Labs in AM.      Signed By: Chio Ricketts MD     March 10, 2018

## 2018-03-10 NOTE — PROGRESS NOTES
Problem: Falls - Risk of  Goal: *Absence of Falls  Document Demarcus Fall Risk and appropriate interventions in the flowsheet.    Outcome: Progressing Towards Goal  Fall Risk Interventions:  Mobility Interventions: Bed/chair exit alarm, Patient to call before getting OOB, Communicate number of staff needed for ambulation/transfer    Mentation Interventions: Bed/chair exit alarm    Medication Interventions: Bed/chair exit alarm, Patient to call before getting OOB, Teach patient to arise slowly    Elimination Interventions: Call light in reach, Bed/chair exit alarm    History of Falls Interventions: Bed/chair exit alarm

## 2018-03-11 LAB
ALBUMIN SERPL-MCNC: 1.6 G/DL (ref 3.5–5)
ALBUMIN/GLOB SERPL: 0.3 {RATIO} (ref 1.1–2.2)
ALP SERPL-CCNC: 148 U/L (ref 45–117)
ALT SERPL-CCNC: 25 U/L (ref 12–78)
ANION GAP SERPL CALC-SCNC: 6 MMOL/L (ref 5–15)
AST SERPL-CCNC: 30 U/L (ref 15–37)
BASOPHILS # BLD: 0.1 K/UL (ref 0–0.1)
BASOPHILS NFR BLD: 1 % (ref 0–1)
BILIRUB SERPL-MCNC: 0.4 MG/DL (ref 0.2–1)
BUN SERPL-MCNC: 13 MG/DL (ref 6–20)
BUN/CREAT SERPL: 27 (ref 12–20)
CALCIUM SERPL-MCNC: 8.3 MG/DL (ref 8.5–10.1)
CHLORIDE SERPL-SCNC: 107 MMOL/L (ref 97–108)
CO2 SERPL-SCNC: 24 MMOL/L (ref 21–32)
CREAT SERPL-MCNC: 0.48 MG/DL (ref 0.55–1.02)
DIFFERENTIAL METHOD BLD: ABNORMAL
EOSINOPHIL # BLD: 0.2 K/UL (ref 0–0.4)
EOSINOPHIL NFR BLD: 2 % (ref 0–7)
ERYTHROCYTE [DISTWIDTH] IN BLOOD BY AUTOMATED COUNT: 15.6 % (ref 11.5–14.5)
GLOBULIN SER CALC-MCNC: 4.6 G/DL (ref 2–4)
GLUCOSE BLD STRIP.AUTO-MCNC: 106 MG/DL (ref 65–100)
GLUCOSE BLD STRIP.AUTO-MCNC: 108 MG/DL (ref 65–100)
GLUCOSE BLD STRIP.AUTO-MCNC: 113 MG/DL (ref 65–100)
GLUCOSE BLD STRIP.AUTO-MCNC: 128 MG/DL (ref 65–100)
GLUCOSE BLD STRIP.AUTO-MCNC: 79 MG/DL (ref 65–100)
GLUCOSE SERPL-MCNC: 117 MG/DL (ref 65–100)
HCT VFR BLD AUTO: 28.2 % (ref 35–47)
HGB BLD-MCNC: 8.6 G/DL (ref 11.5–16)
IMM GRANULOCYTES # BLD: 0.1 K/UL (ref 0–0.04)
IMM GRANULOCYTES NFR BLD AUTO: 1 % (ref 0–0.5)
LYMPHOCYTES # BLD: 1.5 K/UL (ref 0.8–3.5)
LYMPHOCYTES NFR BLD: 10 % (ref 12–49)
MAGNESIUM SERPL-MCNC: 1.5 MG/DL (ref 1.6–2.4)
MCH RBC QN AUTO: 31.9 PG (ref 26–34)
MCHC RBC AUTO-ENTMCNC: 30.5 G/DL (ref 30–36.5)
MCV RBC AUTO: 104.4 FL (ref 80–99)
MONOCYTES # BLD: 0.7 K/UL (ref 0–1)
MONOCYTES NFR BLD: 5 % (ref 5–13)
NEUTS SEG # BLD: 12 K/UL (ref 1.8–8)
NEUTS SEG NFR BLD: 83 % (ref 32–75)
NRBC # BLD: 0 K/UL (ref 0–0.01)
NRBC BLD-RTO: 0 PER 100 WBC
PHOSPHATE SERPL-MCNC: 3.1 MG/DL (ref 2.6–4.7)
PLATELET # BLD AUTO: 317 K/UL (ref 150–400)
PMV BLD AUTO: 12 FL (ref 8.9–12.9)
POTASSIUM SERPL-SCNC: 4.3 MMOL/L (ref 3.5–5.1)
PREALB SERPL-MCNC: 7 MG/DL (ref 20–40)
PROT SERPL-MCNC: 6.2 G/DL (ref 6.4–8.2)
RBC # BLD AUTO: 2.7 M/UL (ref 3.8–5.2)
SERVICE CMNT-IMP: ABNORMAL
SERVICE CMNT-IMP: NORMAL
SODIUM SERPL-SCNC: 137 MMOL/L (ref 136–145)
WBC # BLD AUTO: 14.6 K/UL (ref 3.6–11)

## 2018-03-11 PROCEDURE — 74011000250 HC RX REV CODE- 250: Performed by: SURGERY

## 2018-03-11 PROCEDURE — 84134 ASSAY OF PREALBUMIN: CPT | Performed by: FAMILY MEDICINE

## 2018-03-11 PROCEDURE — 97116 GAIT TRAINING THERAPY: CPT

## 2018-03-11 PROCEDURE — 74011250637 HC RX REV CODE- 250/637: Performed by: NURSE PRACTITIONER

## 2018-03-11 PROCEDURE — 85025 COMPLETE CBC W/AUTO DIFF WBC: CPT | Performed by: FAMILY MEDICINE

## 2018-03-11 PROCEDURE — 74011000258 HC RX REV CODE- 258: Performed by: SURGERY

## 2018-03-11 PROCEDURE — 74011250636 HC RX REV CODE- 250/636: Performed by: SURGERY

## 2018-03-11 PROCEDURE — 74011636637 HC RX REV CODE- 636/637: Performed by: SURGERY

## 2018-03-11 PROCEDURE — 83735 ASSAY OF MAGNESIUM: CPT | Performed by: FAMILY MEDICINE

## 2018-03-11 PROCEDURE — 74011000258 HC RX REV CODE- 258: Performed by: INTERNAL MEDICINE

## 2018-03-11 PROCEDURE — 36415 COLL VENOUS BLD VENIPUNCTURE: CPT | Performed by: FAMILY MEDICINE

## 2018-03-11 PROCEDURE — 74011250637 HC RX REV CODE- 250/637: Performed by: INTERNAL MEDICINE

## 2018-03-11 PROCEDURE — C9113 INJ PANTOPRAZOLE SODIUM, VIA: HCPCS | Performed by: SURGERY

## 2018-03-11 PROCEDURE — 74011250637 HC RX REV CODE- 250/637: Performed by: SURGERY

## 2018-03-11 PROCEDURE — 74011250637 HC RX REV CODE- 250/637: Performed by: FAMILY MEDICINE

## 2018-03-11 PROCEDURE — 97530 THERAPEUTIC ACTIVITIES: CPT

## 2018-03-11 PROCEDURE — 65660000000 HC RM CCU STEPDOWN

## 2018-03-11 PROCEDURE — 74011250636 HC RX REV CODE- 250/636: Performed by: INTERNAL MEDICINE

## 2018-03-11 PROCEDURE — 80053 COMPREHEN METABOLIC PANEL: CPT | Performed by: FAMILY MEDICINE

## 2018-03-11 PROCEDURE — 84100 ASSAY OF PHOSPHORUS: CPT | Performed by: FAMILY MEDICINE

## 2018-03-11 PROCEDURE — 77010033678 HC OXYGEN DAILY

## 2018-03-11 PROCEDURE — 82962 GLUCOSE BLOOD TEST: CPT

## 2018-03-11 RX ORDER — MAGNESIUM SULFATE HEPTAHYDRATE 40 MG/ML
2 INJECTION, SOLUTION INTRAVENOUS ONCE
Status: COMPLETED | OUTPATIENT
Start: 2018-03-11 | End: 2018-03-11

## 2018-03-11 RX ORDER — MAGNESIUM SULFATE 1 G/100ML
1 INJECTION INTRAVENOUS ONCE
Status: DISCONTINUED | OUTPATIENT
Start: 2018-03-11 | End: 2018-03-11

## 2018-03-11 RX ORDER — FACIAL-BODY WIPES
10 EACH TOPICAL ONCE
Status: ACTIVE | OUTPATIENT
Start: 2018-03-11 | End: 2018-03-11

## 2018-03-11 RX ADMIN — CALCIUM GLUCONATE: 94 INJECTION, SOLUTION INTRAVENOUS at 18:20

## 2018-03-11 RX ADMIN — ENOXAPARIN SODIUM 40 MG: 40 INJECTION SUBCUTANEOUS at 16:42

## 2018-03-11 RX ADMIN — PANTOPRAZOLE SODIUM 40 MG: 40 INJECTION, POWDER, FOR SOLUTION INTRAVENOUS at 21:11

## 2018-03-11 RX ADMIN — NYSTATIN: 100000 POWDER TOPICAL at 09:24

## 2018-03-11 RX ADMIN — SODIUM CHLORIDE 100 MG: 900 INJECTION, SOLUTION INTRAVENOUS at 11:21

## 2018-03-11 RX ADMIN — ACETAMINOPHEN 650 MG: 325 TABLET ORAL at 23:35

## 2018-03-11 RX ADMIN — Medication 10 ML: at 06:00

## 2018-03-11 RX ADMIN — Medication 10 ML: at 21:13

## 2018-03-11 RX ADMIN — ACETAMINOPHEN 650 MG: 325 TABLET ORAL at 09:24

## 2018-03-11 RX ADMIN — MAGNESIUM SULFATE HEPTAHYDRATE 2 G: 40 INJECTION, SOLUTION INTRAVENOUS at 09:32

## 2018-03-11 RX ADMIN — Medication 10 ML: at 16:44

## 2018-03-11 RX ADMIN — PANTOPRAZOLE SODIUM 40 MG: 40 INJECTION, POWDER, FOR SOLUTION INTRAVENOUS at 09:24

## 2018-03-11 RX ADMIN — ACETAMINOPHEN 650 MG: 325 TABLET ORAL at 04:20

## 2018-03-11 RX ADMIN — GUAIFENESIN 1200 MG: 600 TABLET, EXTENDED RELEASE ORAL at 21:11

## 2018-03-11 RX ADMIN — SPIRONOLACTONE 25 MG: 25 TABLET, FILM COATED ORAL at 09:24

## 2018-03-11 RX ADMIN — GUAIFENESIN 1200 MG: 600 TABLET, EXTENDED RELEASE ORAL at 09:32

## 2018-03-11 RX ADMIN — MELATONIN TAB 3 MG 1.5 MG: 3 TAB at 23:35

## 2018-03-11 RX ADMIN — NYSTATIN: 100000 POWDER TOPICAL at 18:28

## 2018-03-11 NOTE — PROGRESS NOTES
SURGERY PROGRESS NOTE      Admit Date: 2018    POD 21 Days Post-Op    Procedure: Procedure(s):   NASOGASTRIC TUBE PLACEMENT      Subjective:     Patient has no complaints      Objective:     Visit Vitals    BP 96/56 (BP 1 Location: Left arm, BP Patient Position: At rest)    Pulse 80    Temp 97.8 °F (36.6 °C)    Resp 18    Ht 5' 3.5\" (1.613 m)    Wt 178 lb 9.2 oz (81 kg)    SpO2 97%    Breastfeeding No    BMI 31.14 kg/m2        Temp (24hrs), Av °F (36.7 °C), Min:97.8 °F (36.6 °C), Max:98.4 °F (36.9 °C)          190 -  0700  In: 79623.3 [I.V.:11303.3]  Out:  [Urine:; Drains:20]    Physical Exam:    General:  alert, cooperative, no distress, appears stated age   Abdomen: soft, bowel sounds active, non-tender    ALYSSA - non-bilious and low volume    Pigtail - minimal serous output    Incision:   healing well, no drainage, no erythema, no hernia, no seroma, no swelling, no dehiscence, incision well approximated           Lab Results  Component Value Date/Time   WBC 14.6 (H) 2018 01:04 AM   HGB 8.6 (L) 2018 01:04 AM   Hemoglobin (POC) 18.0 (H) 2018 09:53 PM   HCT 28.2 (L) 2018 01:04 AM   Hematocrit (POC) 53 (H) 2018 09:53 PM   PLATELET 822  01:04 AM   .4 (H) 2018 01:04 AM     Lab Results  Component Value Date/Time   GFR est non-AA >60 2018 02:04 AM   GFRNA, POC 54 (L) 2018 09:53 PM   GFR est AA >60 2018 02:04 AM   GFRAA, POC >60 2018 09:53 PM   Creatinine 0.48 (L) 2018 02:04 AM   Creatinine (POC) 1.0 2018 09:53 PM   BUN 13 2018 02:04 AM   BUN (POC) 14 2018 09:53 PM   Sodium 137 2018 02:04 AM   Sodium (POC) 135 (L) 2018 09:53 PM   Potassium 4.3 2018 02:04 AM   Potassium (POC) 4.0 2018 09:53 PM   Chloride 107 2018 02:04 AM   Chloride (POC) 101 2018 09:53 PM   CO2 24 2018 02:04 AM   Magnesium 1.5 (L) 2018 02:04 AM   Phosphorus 3.1 2018 02:04 AM       Assessment:     Principal Problem:    Perforated chronic gastric ulcer ([address] (2/11/2018)    Active Problems:    ETOH abuse (2/11/2018)      Free intraperitoneal air (2/12/2018)      S/P exploratory laparotomy (2/12/2018)      Overview: Suture repair of perforated posterior gastric ulcer with Julious March patch,       core needle biopsies of left lobe of the liver. Alcoholic cirrhosis of liver without ascites ([address] (2/12/2018)      Overview: Liver bx 2/12/18:       Cirrhosis with mild chronic portal inflammation and macrovesicular       steatosis. Fatty liver determined by biopsy (2/12/2018)      Overview:        Cirrhosis with mild chronic portal inflammation and macrovesicular       steatosis. Hyperammonemia ([address] (2/16/2018)      Overview:        Ref. Range 2/16/2018 17:20       Ammonia Latest Ref Range: <32 UMOL/L 69 (H)             Hypokalemia (2/18/2018)      Leukocytosis (2/18/2018)      Severe protein-calorie malnutrition ([address] (2/18/2018)      Acute metabolic encephalopathy (7/48/4126)      Doing well.     Plan:       Continue present treatment

## 2018-03-11 NOTE — PROGRESS NOTES
Problem: Mobility Impaired (Adult and Pediatric)  Goal: *Acute Goals and Plan of Care (Insert Text)  Physical Therapy Goals  Revised 3/7/2018  1. Patient will move from long sitting to sit edge of bed and sit to supine and roll side to side in bed with modified independence 7 day(s). 2.  Patient will transfer from bed to chair and chair to bed with contact guard assist x1 using RW within 7 day(s). 3.  Patient will perform sit <>stand with contact guard assist x 1 within 7 day(s). 4.  Patient will ambulate with minimal assistance x 1  for 50 feet with RW within 7 day(s). 5.  Patient will verbalize and demonstrate understanding of spinal precautions (No bending, lifting greater than 5 lbs, or twisting; log-roll technique; frequent repositioning as instructed) within 7 days. Physical Therapy Goals  Revised 2/28/2018  1. Patient will move from supine to sit and sit to supine  and roll side to side in bed with moderate assistance x 1  within 7 day(s). MET, upgrade  2. Patient will move from long sitting in bed to edge of bed with minimal assistance x 1 within 7 days. MET, advance  3. Patient will transfer from bed to chair and chair to bed with moderate assistance x 1 using the least restrictive device within 7 day(s). 4.  Patient will perform sit <> stand with minimal assistance x 1 within 7 day(s). NOT MET - continue  5. Patient will ambulate with minimal assistance x 1  for 15 feet with the least restrictive device within 7 day(s). MET, advance  6. Patient will verbalize and demonstrate understanding of spinal precautions (No bending, lifting greater than 5 lbs, or twisting; log-roll technique; frequent repositioning as instructed) within 7 days. NOT MET- continue    Physical Therapy Goals  Initiated 2/20/2018  1. Patient will move from supine to sit and sit to supine , scoot up and down and roll side to side in bed with moderate assistance x 2  within 7 days. Partially Met - Continue  2.  Patient will perform sit <> stand with minimal assist x 2  within 7 days. Met - Advance goal below  3. Patient will ambulate with minimal assistance x 2 for 10 feet with RW and assist of 3rd pushing chair from behind within 7 days. Not Met- Continue  4. Patient will verbalize and demonstrate understanding of spinal precautions (No bending, lifting greater than 5 lbs, or twisting; log-roll technique; frequent repositioning as instructed) within 7 days. Not Met- Continue     physical Therapy TREATMENT  Patient: Teri Gonzalez (50 y.o. female)  Date: 3/11/2018  Diagnosis: Intra-abdominal free air of unknown etiology [K66.8] Perforated chronic gastric ulcer ([address]  Procedure(s) (LRB):   NASOGASTRIC TUBE PLACEMENT (N/A) 21 Days Post-Op  Precautions: Aspiration, Back, Bed Alarm, Fall, Skin  Chart, physical therapy assessment, plan of care and goals were reviewed. ASSESSMENT:  Pt received in bed, daughter at bedside. Agreeable to with physical therapy. Min A for bed mobility with increased time and verbal cues for sequencing. Denies dizziness with positional change. Increased cough with sitting and throughout session. Min A x 2 for sit<>stand using RW with verbal cues for safe technique. Gait training x 100' with Min A x1 and second person to manage O2. desat on 3L to 85% increased to 4L during gait training.  Pt returned to chair. O2 97% on 3L post activity. Increased cough throughout session. RN notified. Progression toward goals:  []    Improving appropriately and progressing toward goals  [x]    Improving slowly and progressing toward goals  []    Not making progress toward goals and plan of care will be adjusted     PLAN:  Patient continues to benefit from skilled intervention to address the above impairments. Continue treatment per established plan of care.   Discharge Recommendations:  Rehab  Further Equipment Recommendations for Discharge:  TBD     SUBJECTIVE:   Patient stated I don't want to but I will Madison Soliman     OBJECTIVE DATA SUMMARY:   Critical Behavior:  Neurologic State: Alert  Orientation Level: Oriented X4  Cognition: Follows commands  Safety/Judgement: Awareness of environment, Decreased insight into deficits, Fall prevention, Home safety  Functional Mobility Training:  Bed Mobility:     Supine to Sit: Minimum assistance; Additional time     Scooting: Contact guard assistance        Transfers:  Sit to Stand: Minimum assistance;Assist x2  Stand to Sit: Contact guard assistance                             Balance:  Sitting: Intact; High guard  Standing: Impaired; With support  Standing - Static: Fair;Constant support  Standing - Dynamic : Fair  Ambulation/Gait Training:  Distance (ft): 100 Feet (ft)  Assistive Device: Walker, rolling;Gait belt  Ambulation - Level of Assistance: Minimal assistance;Assist x1        Gait Abnormalities: Antalgic;Decreased step clearance        Base of Support: Widened     Speed/Alejandra: Accelerated                       Stairs:              Neuro Re-Education:    Therapeutic Exercises:   Reviewed seated HEP. pt and daughter agreeable to perform once several times/ day. Pain:                    Activity Tolerance:   fair  Please refer to the flowsheet for vital signs taken during this treatment.   After treatment:   [x]    Patient left in no apparent distress sitting up in chair  []    Patient left in no apparent distress in bed  [x]    Call bell left within reach  []    Nursing notified  [x]    Caregiver present  [x]    Bed alarm activated    COMMUNICATION/COLLABORATION:   The patients plan of care was discussed with: Registered Nurse    Tiffanie Mohamud   Time Calculation: 24 mins

## 2018-03-11 NOTE — ROUTINE PROCESS
Bedside shift change report given to Eliazar Bolanos (oncoming nurse) by Bee Wilson (offgoing nurse). Report included the following information SBAR, Kardex, Intake/Output and MAR.

## 2018-03-11 NOTE — PROGRESS NOTES
Patient was able to sit up twice in the chair during shift. She sat for approximately 95 minutes. Bedside shift change report given to Zelalem Solis (oncoming nurse) by Marisol Wahl (offgoing nurse). Report included the following information SBAR, Kardex, Procedure Summary, MAR and Recent Results.

## 2018-03-11 NOTE — PROGRESS NOTES
Family Practice Daily Progress Note: 3/11/2018  Raisa Edwards DO    Assessment/Plan:   Perforated chronic gastric ulcer /  Free intraperitoneal air / S/P exploratory laparotomy 2/11. S/p repair in OR with Dr. Alfredo Pinzon on 2/12/18 - per surg    Severe protein calorie malnutrition-POA  --TPN per GI - advance diet as per surg  --Replete K/Mg today     Acute metabolic encephalopathy/Delerium. Much improved - back to baseline. Multifactorial-- medications, alcohol use, sundowning  ---seroquel at night, haldol prn    Hypoxia / Respiratory distress. Improved. Pleural effusions   ---pulmonary has signed off  --wean O2 off as tolerated for sats >28%     Alcoholic cirrhosis of liver without ascites /  Fatty liver determined by biopsy /  Hyperammonemia. --GI has seen- rec alcohol cessation and outpt follow up     ETOH abuse. Needs to stop ETOH entirely.     Hypokalemia /  Hypoalbuminemia. Being addressed with TPN     Leukocytosis.  -- trending back up  -- repeat blood cultures, and urine 2/28 neg  --was on Zosyn, and levaquin - cultures drawn off antibiotics neg except yeast -anidulafungin  added  --ID following    Anemia: watch Hgb   --follow, transfuse <7  --recheck in AM    Back Pain due to compression fracture of T12  ---Increased Duragesic patch to 25mcg and DIiaudid to 1mg as needed  --ortho to re-eval Monday per daughter's request    Debility: due to prolonged hospital stay  --continue PT/OT- needs to get OOB  --will likely need rehab    DVT proph - lovenox        Problem List:  Problem List as of 3/11/2018  Date Reviewed: 4/26/2016          Codes Class Noted - Resolved    Hypokalemia ICD-10-CM: E87.6  ICD-9-CM: 276.8  2/18/2018 - Present        Leukocytosis ICD-10-CM: D72.829  ICD-9-CM: 288.60  2/18/2018 - Present        Severe protein-calorie malnutrition ([address] ICD-10-CM: E43  ICD-9-CM: 262  2/18/2018 - Present        Acute metabolic encephalopathy AXQ-87-VZ: G93.41  ICD-9-CM: 348.31 2/18/2018 - Present        Hyperammonemia (HCC) ICD-10-CM: E72.20  ICD-9-CM: 270.6  2/16/2018 - Present    Overview Addendum 2/17/2018  9:08 AM by Minoo Berrios MD        Ref. Range 2/16/2018 17:20   Ammonia Latest Ref Range: <32 UMOL/L 69 (H)                Free intraperitoneal air ICD-10-CM: K66.8  ICD-9-CM: 568.89  2/12/2018 - Present        S/P exploratory laparotomy ICD-10-CM: Z98.890  ICD-9-CM: V45.89  2/12/2018 - Present    Overview Signed 2/12/2018 11:57 AM by Kelvin Ramos MD     Suture repair of perforated posterior gastric ulcer with Theadore Jhoan patch, core needle biopsies of left lobe of the liver. Alcoholic cirrhosis of liver without ascites (HCC) (Chronic) ICD-10-CM: K70.30  ICD-9-CM: 571.2  2/12/2018 - Present    Overview Signed 2/17/2018  8:45 AM by Minoo Berrios MD     Liver bx 2/12/18:   Cirrhosis with mild chronic portal inflammation and macrovesicular steatosis. Fatty liver determined by biopsy ICD-10-CM: K76.0  ICD-9-CM: 571.8  2/12/2018 - Present    Overview Signed 2/17/2018  8:53 AM by Minoo Berrios MD        Cirrhosis with mild chronic portal inflammation and macrovesicular steatosis. * (Principal)Perforated chronic gastric ulcer ([address] (Chronic) ICD-10-CM: K25.5  ICD-9-CM: 531.50  2/11/2018 - Present        ETOH abuse (Chronic) ICD-10-CM: F10.10  ICD-9-CM: 305.00  2/11/2018 - Present        GI bleed ICD-10-CM: K92.2  ICD-9-CM: 578.9  4/26/2016 - Present        Hypotension ICD-10-CM: I95.9  ICD-9-CM: 458.9  4/26/2016 - Present        Tachycardia ICD-10-CM: R00.0  ICD-9-CM: 785.0  4/26/2016 - Present        Acute blood loss anemia ICD-10-CM: D62  ICD-9-CM: 285.1  4/26/2016 - Present              Subjective:    67 y.o.  female with EtOH abuse who is admitted to the General Surgery Service with perforated gastric ulcer. We are asked to evaluate for altered mental status.  The patient is poorly interactive at this time and this limits primary hx. Discussed case with family available at bedside providing secondary hx and chart review. Per family, patient has had declining neurological condition over past 48 hours. Notably, an RRT was called for respiratory distress. 2/19: This morning she is a bit more alert at this moment and taking in more complete sentences. She says she does not feel well. She has no specific site of pain other than the NG tube which is bothering her a great deal.  She should improve as time from surg increases. K+ a little low - replacing.     2/20:  Still confused and somnolent at times. Now able to localize pain to ab and converse a bit more. Tmax 100.3  WBC is up again but hopefully reactive - recheck in AM - more incentive spirom and check CXR.     2/21: A little more alert. Knows she is in the hospital. Not able to participate with PT yesterday as she was in too much pain. WBC still at 19.     2/22:  WBC 19K. She is more alert. Daughter at bedside. Both of her daughters live out of town. Looking at SNF options for rehab. Coughing more. Starting to use IS.     2/23: WBC 17. Repeat CT chest with moderate to large persistent left subphrenic collection. IR has been consulted. Vanc and Levaquin added by pulm. 2/24: Pt had a better night last night. Only brief episode of confusion. She is much clearer this AM.  Now in quite a bit of pain from gas and stomach cramping. Thinks she will feel better if she can have a BM. Has been on bedpan for a while without success. On TPN. Daughters very concerned that she won't be successful while lying down. Wanting to get up to bedside commode. Will need PT's help. 2/25: febrile overnight and WBC up after vanc was stopped. Pt c/o more dyspnea today. Known fluid collection that will be drained in IR this week. Up to chair with PT yesterday briefly. +BM yesterday. Da notes pt has been c/o R wrist pain off and on for 2 wks since she fell.   No swelling.    2/26:  No new complaints. Still c/o back and ab pain. Still passing gas and had BM. Diuresed with 1 mg Bumex yesterday by Pulmonary. Remains on Zosyn and Levaquin. Blood culture remains neg. Tmax 99.2. X-ray of wrists ok. CXR ok with tube inplace. WBC 15.7K.      2/27:  Looks much better today. Much less work of breathing. Diuresed about 1 liter over last 24h. WBC 16K today. Blood cult remains neg. Off antibiotics. Still on TPN. She will need rehab for PT/OT. 2/28: Very restless during the night. Has not been OOB. Daughter stayed the night with her. WBC is still up. Antibiotics stopped yesterday. She is c/o increased pain in her back and can't get comfortable. More anxious and agitated. Tolerating sips. 3/1:  She reports she feels better this AM after she had some sleep. Afeb. WBC still up - a bit more today with increased neutrophils. Will also get ID to see also. CT AB and Pelvis as below. Cultures done yesterday off antibiotics - so far no growth. No output from drain - may need to be repositioned. More edema today but not as short of breath today while sitting up - add albumin to todays lab. Add Aldactone if ok with GI and Bumex as per Pul. Discussed cirrhosis/ascites/edema with pts daughter. 3/2:  She reports she feels better, and has slightly less edema, although daughters note they think pts abdomin more swollen. The daughters are more worried about pts LE edema than anything else - explained that with pts low albumin that edema may cont to be a problem. She is more alert, a bit more oriented today and says she has much less pain today. Tmax 101. Culture had yeast - diflucan started. Drain was repositioned, had thoracentesis and new PICC placed yesterday. May need to restart broad spectrum antibiotics - ID consulted. 3/3:  She reports she feels better this AM.  More alert and oriented this AM.  BM yesterday. Tmax 99. WBC still 17-18K.   ID consulted and advises to watch off antibiotics for now. Less edema LE.      3/4:  Doing much better today and much more alert. Little pain. Off sleeping meds for now - not sleeping well but reports \"never was a good sleeper. \"  Anxious at night. WBC still up. Discussed rehab with daughters and pt and pt willing to go. 3/5:  Continues to improve. She wants to eat solid food - she now has an appetite. Passing gas. Has been up to chair. K+ 5.3 - will DC aldactone for now and restart if edema returns. CXR pending today. 3/6: She reports she feels better. Yesterday had marked increase in ALYSSA output and now NPO and back on TPN today. Will resume Aldactone again (at lower dose) as sl more edema today and hold again if K+ creeping up again. AM labs pending. 3/7:  Reports some ab pain this AM but not severe. Orthostatic hypotension with walking yesterday and K+ back up - will hold aldactone again. Breathing and edema better with the Aldactone. Push incentive spirom - last CXR with atelectasis. O>>I on I&Os but wonder about measurements. 3/8:  Nausea this AM with 2 episodes of vomiting but reports she feels back to normal now. No ab pain. No SOB at rest.  Wearing nasal O2 at 2 L. Nurse reports yesterdays labs were drawn just before 12MN last night for some reason - still have not resulted and lab called and will run samples now. This AMs labs not drawn yet. Port CXR this AM pending.  O>>I again but Intake not correct. 3/9:  No nausea this AM.  No further vomiting. Had BM yesterday. Desats with exercise. Was up in chair some yesterday. WBC down to 12K. Hb a little lower at 7.8 - recheck later today. Seems to be making slow progress. 3/10: [place] some [place] No N/V. Pain minimal at this time, some in back. No recent confusion. BM 2 days ago. 3/11: Walked this AM with PT. Up in chair now and back hurts, worse with deep breaths. No BM in a few days now.     Past Medical History:   Diagnosis Date    Alcoholic cirrhosis of liver without ascites ([facility] 2018    ETOH abuse 2018    History of vascular access device 2018    [facility] VAT - 5 FR triple, R basilic, TPN    Hyperammonemia ([facility] 2018     Results for Hetal Castellon (MRN [id number]) as of 2018 08:43  Ref. Range 2018 17:20 Ammonia Latest Ref Range: <32 UMOL/L 69 (H)     Hyperlipidemia     Perforated chronic gastric ulcer ([facility] 2018     Past Surgical History:   Procedure Laterality Date    HX CATARACT REMOVAL       Social History     Social History    Marital status: SINGLE     Spouse name: N/A    Number of children: N/A    Years of education: N/A     Occupational History    Not on file. Social History Main Topics    Smoking status: Former Smoker    Smokeless tobacco: Never Used    Alcohol use 11.4 oz/week     0 Standard drinks or equivalent, 19 Glasses of wine per week      Comment: Hx of heavy etoh use, but quit several months ago    Drug use: No    Sexual activity: Not on file     Other Topics Concern    Not on file     Social History Narrative     Family History   Problem Relation Age of Onset    Other Other      patient did not know     Review of Systems:   A comprehensive review of systems was negative except for that written in the HPI. Objective:   Physical Exam:     Visit Vitals    /71 (BP 1 Location: Left arm, BP Patient Position: At rest)    Pulse 88    Temp 98.3 °F (36.8 °C)    Resp 20    Ht 5' 3.5\" (1.613 m)    Wt 81 kg (178 lb 9.2 oz)    SpO2 98%    Breastfeeding No    BMI 31.14 kg/m2    O2 Flow Rate (L/min): 2 l/min O2 Device: Nasal cannula    Temp (24hrs), Av °F (36.7 °C), Min:97.7 °F (36.5 °C), Max:98.4 °F (36.9 °C)        1901 -  0700  In: 80608.3 [I.V.:54288.3]  Out:  [Urine:; Drains:20]    General:  Awake, uncomfortable, appears stated age. Head:  Normocephalic, without obvious abnormality, atraumatic. Eyes:  Conjunctivae/corneas clear. EOMs intact. Throat: Lips, mucosa, and tongue dry. Neck: Supple, symmetrical, trachea midline, no adenopathy   Lungs:    CTAB, no w/r/r    Heart:  reg rate with regular rhythm,  no murmur, click, rub or gallop. Abdomen:   Soft,minimal distension   Extremities: no cyanosis. No LE edema at this time. No calf tenderness or cords, no erythema   Skin: turgor normal. No rashes or lesions   Neurologic: AOx2-3, . Sensation grossly normal to touch. Gross motor of extremities normal.       Data Review:    CT AB and Pelvis 2/28/18: IMPRESSION:  1. There is residual fluid in the posterior portion of the left subphrenic  collection which does not appear well drained by the pigtail catheter. This  could be repositioned versus new drainage catheter placement if clinically  indicated. 2. Persistent ascites. 3. Increasing right pleural effusion and right lower lobe atelectasis   4. Distended duodenum and proximal small bowel of doubtful clinical  Significance. CXR 3/1/18:  INDICATION:  chest pain and shortness of breath following thoracentesis   EXAM: Portable chest 1716 . Comparison March 1, 2018  FINDINGS: There is no significant change in appearance the lungs. Small left  pleural effusion with left basilar atelectasis unchanged. Cardiomediastinal  silhouette is unchanged. No pneumothorax. Lines and tubes are unchanged in  position. IMPRESSION:  1. No interval change    KUB 3/6/18  IMPRESSION: Nonspecific bowel gas pattern with multiple dilated loops of small  bowel but does not appear obstructive. No pneumoperitoneum.     Recent Days:  Recent Labs      03/11/18   0104  03/09/18   0443  03/08/18   1757   WBC  14.6*  12.6*  16.9*   HGB  8.6*  7.8*  8.3*   HCT  28.2*  25.8*  27.5*   PLT  317  323  356     Recent Labs      03/11/18   0204  03/10/18   0227  03/09/18   0443  03/08/18   1757   NA  137   --   138  138   K  4.3   --   5.0  4.8   CL  107   --   107  107   CO2  24   -- 24  25   GLU  117*   --   129*  82   BUN  13   --   19  16   CREA  0.48*   --   0.49*  0.49*   CA  8.3*   --   8.0*  8.1*   MG  1.5*  1.6  1.6   --    PHOS  3.1  3.0  3.5   --    ALB  1.6*   --    --    --    TBILI  0.4   --    --    --    SGOT  30   --    --    --    ALT  25   --    --    --      No results for input(s): PH, PCO2, PO2, HCO3, FIO2 in the last 72 hours. 24 Hour Results:  Recent Results (from the past 24 hour(s))   GLUCOSE, POC    Collection Time: 03/10/18 11:39 AM   Result Value Ref Range    Glucose (POC) 99 65 - 100 mg/dL    Performed by Brianda Marcos ([facility])    GLUCOSE, POC    Collection Time: 03/10/18  5:41 PM   Result Value Ref Range    Glucose (POC) 95 65 - 100 mg/dL    Performed by Johan Aleman ([facility])    GLUCOSE, POC    Collection Time: 03/10/18 11:46 PM   Result Value Ref Range    Glucose (POC) 120 (H) 65 - 100 mg/dL    Performed by Anitra Canadaland    CBC WITH AUTOMATED DIFF    Collection Time: 03/11/18  1:04 AM   Result Value Ref Range    WBC 14.6 (H) 3.6 - 11.0 K/uL    RBC 2.70 (L) 3.80 - 5.20 M/uL    HGB 8.6 (L) 11.5 - 16.0 g/dL    HCT 28.2 (L) 35.0 - 47.0 %    .4 (H) 80.0 - 99.0 FL    MCH 31.9 26.0 - 34.0 PG    MCHC 30.5 30.0 - 36.5 g/dL    RDW 15.6 (H) 11.5 - 14.5 %    PLATELET 304 922 - 022 K/uL    MPV 12.0 8.9 - 12.9 FL    NRBC 0.0 0  WBC    ABSOLUTE NRBC 0.00 0.00 - 0.01 K/uL    NEUTROPHILS 83 (H) 32 - 75 %    LYMPHOCYTES 10 (L) 12 - 49 %    MONOCYTES 5 5 - 13 %    EOSINOPHILS 2 0 - 7 %    BASOPHILS 1 0 - 1 %    IMMATURE GRANULOCYTES 1 (H) 0.0 - 0.5 %    ABS. NEUTROPHILS 12.0 (H) 1.8 - 8.0 K/UL    ABS. LYMPHOCYTES 1.5 0.8 - 3.5 K/UL    ABS. MONOCYTES 0.7 0.0 - 1.0 K/UL    ABS. EOSINOPHILS 0.2 0.0 - 0.4 K/UL    ABS. BASOPHILS 0.1 0.0 - 0.1 K/UL    ABS. IMM.  GRANS. 0.1 (H) 0.00 - 0.04 K/UL    DF AUTOMATED     METABOLIC PANEL, COMPREHENSIVE    Collection Time: 03/11/18  2:04 AM   Result Value Ref Range    Sodium 137 136 - 145 mmol/L    Potassium 4.3 3.5 - 5.1 mmol/L Chloride 107 97 - 108 mmol/L    CO2 24 21 - 32 mmol/L    Anion gap 6 5 - 15 mmol/L    Glucose 117 (H) 65 - 100 mg/dL    BUN 13 6 - 20 MG/DL    Creatinine 0.48 (L) 0.55 - 1.02 MG/DL    BUN/Creatinine ratio 27 (H) 12 - 20      GFR est AA >60 >60 ml/min/1.73m2    GFR est non-AA >60 >60 ml/min/1.73m2    Calcium 8.3 (L) 8.5 - 10.1 MG/DL    Bilirubin, total 0.4 0.2 - 1.0 MG/DL    ALT (SGPT) 25 12 - 78 U/L    AST (SGOT) 30 15 - 37 U/L    Alk.  phosphatase 148 (H) 45 - 117 U/L    Protein, total 6.2 (L) 6.4 - 8.2 g/dL    Albumin 1.6 (L) 3.5 - 5.0 g/dL    Globulin 4.6 (H) 2.0 - 4.0 g/dL    A-G Ratio 0.3 (L) 1.1 - 2.2     MAGNESIUM    Collection Time: 03/11/18  2:04 AM   Result Value Ref Range    Magnesium 1.5 (L) 1.6 - 2.4 mg/dL   PHOSPHORUS    Collection Time: 03/11/18  2:04 AM   Result Value Ref Range    Phosphorus 3.1 2.6 - 4.7 MG/DL   GLUCOSE, POC    Collection Time: 03/11/18  6:13 AM   Result Value Ref Range    Glucose (POC) 108 (H) 65 - 100 mg/dL    Performed by Ewelina Harmon ([facility])      Medications reviewed  Current Facility-Administered Medications   Medication Dose Route Frequency    magnesium sulfate 1 g/100 ml IVPB (premix or compounded)  1 g IntraVENous ONCE    anidulafungin (ERAXIS) 100 mg in 0.9% sodium chloride 130 mL IVPB  100 mg IntraVENous Q24H    spironolactone (ALDACTONE) tablet 25 mg  25 mg Oral DAILY    albuterol-ipratropium (DUO-NEB) 2.5 MG-0.5 MG/3 ML  3 mL Nebulization Q4H PRN    melatonin tablet 1.5 mg  1.5 mg Oral QHS PRN    HYDROmorphone (PF) (DILAUDID) injection 1 mg  1 mg IntraVENous Q3H PRN    fentaNYL (DURAGESIC) 25 mcg/hr patch 1 Patch  1 Patch TransDERmal Q72H    haloperidol lactate (HALDOL) injection 1 mg  1 mg IntraVENous Q4H PRN    guaiFENesin ER (MUCINEX) tablet 1,200 mg  1,200 mg Oral Q12H    acetaminophen (TYLENOL) tablet 650 mg  650 mg Oral Q4H PRN    insulin regular (NOVOLIN R, HUMULIN R) injection   SubCUTAneous Q6H    fat emulsion 20% (LIPOSYN, INTRAlipid) infusion 500 mL  500 mL IntraVENous Q MON, WED & SAT    glucose chewable tablet 16 g  4 Tab Oral PRN    glucagon (GLUCAGEN) injection 1 mg  1 mg IntraMUSCular PRN    dextrose (D50W) injection syrg 12.5-25 g  12.5-25 g IntraVENous PRN    alteplase (CATHFLO) 1 mg in sterile water (preservative free) 1 mL injection  1 mg InterCATHeter PRN    sodium chloride (NS) flush 10-30 mL  10-30 mL InterCATHeter PRN    sodium chloride (NS) flush 10-40 mL  10-40 mL InterCATHeter Q8H    naloxone (NARCAN) injection 0.4 mg  0.4 mg IntraVENous PRN    ondansetron (ZOFRAN) injection 4 mg  4 mg IntraVENous Q4H PRN    enoxaparin (LOVENOX) injection 40 mg  40 mg SubCUTAneous Q24H    pantoprazole (PROTONIX) injection 40 mg  40 mg IntraVENous Q12H    phenol throat spray (CHLORASEPTIC) 1 Spray  1 Spray Oral PRN    nystatin (MYCOSTATIN) 100,000 unit/gram powder   Topical BID    sodium chloride (NS) flush 5-10 mL  5-10 mL IntraVENous PRN       Dilia Chamtan M.D.

## 2018-03-12 ENCOUNTER — APPOINTMENT (OUTPATIENT)
Dept: GENERAL RADIOLOGY | Age: 73
DRG: 853 | End: 2018-03-12
Attending: PHYSICIAN ASSISTANT
Payer: MEDICARE

## 2018-03-12 LAB
ALBUMIN SERPL-MCNC: 1.7 G/DL (ref 3.5–5)
ALBUMIN/GLOB SERPL: 0.3 {RATIO} (ref 1.1–2.2)
ALP SERPL-CCNC: 157 U/L (ref 45–117)
ALT SERPL-CCNC: 20 U/L (ref 12–78)
ANION GAP SERPL CALC-SCNC: 10 MMOL/L (ref 5–15)
AST SERPL-CCNC: 26 U/L (ref 15–37)
BACTERIA SPEC CULT: NORMAL
BILIRUB SERPL-MCNC: 0.4 MG/DL (ref 0.2–1)
BUN SERPL-MCNC: 17 MG/DL (ref 6–20)
BUN/CREAT SERPL: 33 (ref 12–20)
CALCIUM SERPL-MCNC: 8.4 MG/DL (ref 8.5–10.1)
CHLORIDE SERPL-SCNC: 104 MMOL/L (ref 97–108)
CO2 SERPL-SCNC: 22 MMOL/L (ref 21–32)
CREAT SERPL-MCNC: 0.51 MG/DL (ref 0.55–1.02)
GLOBULIN SER CALC-MCNC: 5 G/DL (ref 2–4)
GLUCOSE BLD STRIP.AUTO-MCNC: 80 MG/DL (ref 65–100)
GLUCOSE BLD STRIP.AUTO-MCNC: 91 MG/DL (ref 65–100)
GLUCOSE BLD STRIP.AUTO-MCNC: 93 MG/DL (ref 65–100)
GLUCOSE SERPL-MCNC: 98 MG/DL (ref 65–100)
MAGNESIUM SERPL-MCNC: 2.6 MG/DL (ref 1.6–2.4)
PHOSPHATE SERPL-MCNC: 11.8 MG/DL (ref 2.6–4.7)
PHOSPHATE SERPL-MCNC: 3.5 MG/DL (ref 2.6–4.7)
POTASSIUM SERPL-SCNC: 4.6 MMOL/L (ref 3.5–5.1)
PROT SERPL-MCNC: 6.7 G/DL (ref 6.4–8.2)
SERVICE CMNT-IMP: NORMAL
SODIUM SERPL-SCNC: 136 MMOL/L (ref 136–145)

## 2018-03-12 PROCEDURE — 36592 COLLECT BLOOD FROM PICC: CPT

## 2018-03-12 PROCEDURE — 84100 ASSAY OF PHOSPHORUS: CPT | Performed by: SURGERY

## 2018-03-12 PROCEDURE — 74011000250 HC RX REV CODE- 250: Performed by: SURGERY

## 2018-03-12 PROCEDURE — 74011250637 HC RX REV CODE- 250/637: Performed by: FAMILY MEDICINE

## 2018-03-12 PROCEDURE — 65270000029 HC RM PRIVATE

## 2018-03-12 PROCEDURE — C9113 INJ PANTOPRAZOLE SODIUM, VIA: HCPCS | Performed by: SURGERY

## 2018-03-12 PROCEDURE — 36415 COLL VENOUS BLD VENIPUNCTURE: CPT | Performed by: SURGERY

## 2018-03-12 PROCEDURE — 77030038269 HC DRN EXT URIN PURWCK BARD -A

## 2018-03-12 PROCEDURE — 74011250636 HC RX REV CODE- 250/636: Performed by: INTERNAL MEDICINE

## 2018-03-12 PROCEDURE — 77010033678 HC OXYGEN DAILY

## 2018-03-12 PROCEDURE — 82962 GLUCOSE BLOOD TEST: CPT

## 2018-03-12 PROCEDURE — 72100 X-RAY EXAM L-S SPINE 2/3 VWS: CPT

## 2018-03-12 PROCEDURE — 74011250637 HC RX REV CODE- 250/637: Performed by: SURGERY

## 2018-03-12 PROCEDURE — 74011250637 HC RX REV CODE- 250/637: Performed by: INTERNAL MEDICINE

## 2018-03-12 PROCEDURE — 97535 SELF CARE MNGMENT TRAINING: CPT

## 2018-03-12 PROCEDURE — 97116 GAIT TRAINING THERAPY: CPT

## 2018-03-12 PROCEDURE — 83735 ASSAY OF MAGNESIUM: CPT | Performed by: SURGERY

## 2018-03-12 PROCEDURE — 74011636637 HC RX REV CODE- 636/637: Performed by: SURGERY

## 2018-03-12 PROCEDURE — 80053 COMPREHEN METABOLIC PANEL: CPT | Performed by: FAMILY MEDICINE

## 2018-03-12 PROCEDURE — 74011250636 HC RX REV CODE- 250/636: Performed by: SURGERY

## 2018-03-12 PROCEDURE — 72072 X-RAY EXAM THORAC SPINE 3VWS: CPT

## 2018-03-12 PROCEDURE — 97530 THERAPEUTIC ACTIVITIES: CPT

## 2018-03-12 PROCEDURE — 74011000258 HC RX REV CODE- 258: Performed by: SURGERY

## 2018-03-12 PROCEDURE — 74011000258 HC RX REV CODE- 258: Performed by: INTERNAL MEDICINE

## 2018-03-12 PROCEDURE — 74011250637 HC RX REV CODE- 250/637: Performed by: NURSE PRACTITIONER

## 2018-03-12 RX ADMIN — I.V. FAT EMULSION 500 ML: 20 EMULSION INTRAVENOUS at 18:26

## 2018-03-12 RX ADMIN — NYSTATIN: 100000 POWDER TOPICAL at 18:32

## 2018-03-12 RX ADMIN — NYSTATIN: 100000 POWDER TOPICAL at 09:15

## 2018-03-12 RX ADMIN — ACETAMINOPHEN 650 MG: 325 TABLET ORAL at 22:05

## 2018-03-12 RX ADMIN — Medication 20 ML: at 06:00

## 2018-03-12 RX ADMIN — GUAIFENESIN 1200 MG: 600 TABLET, EXTENDED RELEASE ORAL at 09:08

## 2018-03-12 RX ADMIN — SODIUM CHLORIDE 100 MG: 900 INJECTION, SOLUTION INTRAVENOUS at 10:29

## 2018-03-12 RX ADMIN — MELATONIN TAB 3 MG 1.5 MG: 3 TAB at 22:05

## 2018-03-12 RX ADMIN — SPIRONOLACTONE 25 MG: 25 TABLET, FILM COATED ORAL at 09:08

## 2018-03-12 RX ADMIN — CALCIUM GLUCONATE: 94 INJECTION, SOLUTION INTRAVENOUS at 18:28

## 2018-03-12 RX ADMIN — PANTOPRAZOLE SODIUM 40 MG: 40 INJECTION, POWDER, FOR SOLUTION INTRAVENOUS at 09:08

## 2018-03-12 RX ADMIN — Medication 10 ML: at 15:07

## 2018-03-12 RX ADMIN — GUAIFENESIN 1200 MG: 600 TABLET, EXTENDED RELEASE ORAL at 20:59

## 2018-03-12 RX ADMIN — Medication 40 ML: at 22:00

## 2018-03-12 RX ADMIN — PANTOPRAZOLE SODIUM 40 MG: 40 INJECTION, POWDER, FOR SOLUTION INTRAVENOUS at 20:59

## 2018-03-12 RX ADMIN — ENOXAPARIN SODIUM 40 MG: 40 INJECTION SUBCUTANEOUS at 15:07

## 2018-03-12 NOTE — ROUTINE PROCESS
Bedside shift change report given to [address] (oncoming nurse) by Rashawn Gomez (offgoing nurse). Report included the following information SBAR, Kardex and MAR.

## 2018-03-12 NOTE — PROGRESS NOTES
3/12/18 - 521 PM    The ALYSSA drain was pulled almost all the way out. Only about 5 cm was still in, so it was removed completely.

## 2018-03-12 NOTE — PROGRESS NOTES
[facility] Progress Note           Samuel Ward DO    [phone] Office  [phone]  Fax    3/12/2018      Assessment & Plan:   1. Leukocytosis. Trending down. Stable overall. Likely  Multifactorial.  No evidence of ongoing infection. Plan 7 days anidulafungin through 3/14. 2. Candida parapsilosis and krusei  growing from the ALYSSA drain. The significance of this yeast is uncertain as it likely represents colonization or selective pressure from the 14 days of piperacillin-tazobactam.  Plan 7 days empiric anidulafungin for now and follow. 3. Perforated gastric ulcer. The patient underwent exploratory laparotomy with repair on .  Now with fistula. On TPN  4. Abnormal LFT's. Stable          Subjective:     No complaints    Objective:     Vitals:   Visit Vitals    /67 (BP 1 Location: Left arm, BP Patient Position: At rest)    Pulse 93    Temp 98.3 °F (36.8 °C)    Resp 18    Ht 5' 3.5\" (1.613 m)    Wt 81 kg (178 lb 9.2 oz)    SpO2 98%    Breastfeeding No    BMI 31.14 kg/m2        Tmax:  Temp (24hrs), Av °F (36.7 °C), Min:97.4 °F (36.3 °C), Max:99.2 °F (37.3 °C)      Exam:   Patient is intubated:  no    Physical Examination:   General:  Alert, cooperative, no distress   Head:  Normocephalic, without obvious abnormality, atraumatic. Eyes:  Conjunctivae/corneas clear. .   Neck: Supple, symmetrical, trachea midline, no adenopathy       Lungs:   Clear to auscultation anteriorly   Chest wall:  No tenderness or deformity. Heart:  Regular rate and rhythm, S1, S2 normal,. Abdomen:   Soft, non-tender. ALYSSA and accordian drain in place. Extremities: B/l LE edema   Skin: Skin color, texture, turgor normal. No rashes or lesions   Neurologic: CNII-XII intact. Labs:        No lab exists for component: ITNL   No results for input(s): CPK, CKMB, TROIQ in the last 72 hours.   Recent Labs      18   0055  18   1676  18   0104  03/10/18   6729 NA   --   137   --    --    K   --   4.3   --    --    CL   --   107   --    --    CO2   --   24   --    --    BUN   --   13   --    --    CREA   --   0.48*   --    --    GLU   --   117*   --    --    PHOS  11.8*  3.1   --   3.0   MG  2.6*  1.5*   --   1.6   ALB   --   1.6*   --    --    WBC   --    --   14.6*   --    HGB   --    --   8.6*   --    HCT   --    --   28.2*   --    PLT   --    --   317   --      No results for input(s): INR, PTP, APTT in the last 72 hours.     No lab exists for component: INREXT, INREXT  Needs: urine analysis, urine sodium, protein and creatinine  No results found for: CHIKIS, CREAU      Cultures:     No results found for: SDES  Lab Results   Component Value Date/Time    Culture result: Culture performed on Fluid swab specimen 03/01/2018 02:00 PM    Culture result: NO GROWTH 4 DAYS 03/01/2018 02:00 PM    Culture result: NO GROWTH 4 DAYS 03/01/2018 02:00 PM       Radiology:     Medications       Current Facility-Administered Medications   Medication Dose Route Frequency Last Dose    TPN ADULT - CENTRAL   IntraVENous CONTINUOUS      anidulafungin (ERAXIS) 100 mg in 0.9% sodium chloride 130 mL IVPB  100 mg IntraVENous Q24H 100 mg at 03/11/18 1121    spironolactone (ALDACTONE) tablet 25 mg  25 mg Oral DAILY 25 mg at 03/12/18 0908    albuterol-ipratropium (DUO-NEB) 2.5 MG-0.5 MG/3 ML  3 mL Nebulization Q4H PRN      melatonin tablet 1.5 mg  1.5 mg Oral QHS PRN 1.5 mg at 03/11/18 2335    HYDROmorphone (PF) (DILAUDID) injection 1 mg  1 mg IntraVENous Q3H PRN 1 mg at 03/09/18 0132    fentaNYL (DURAGESIC) 25 mcg/hr patch 1 Patch  1 Patch TransDERmal Q72H 1 Patch at 03/12/18 0913    haloperidol lactate (HALDOL) injection 1 mg  1 mg IntraVENous Q4H PRN 1 mg at 03/04/18 0401    guaiFENesin ER (MUCINEX) tablet 1,200 mg  1,200 mg Oral Q12H 1,200 mg at 03/12/18 0908    acetaminophen (TYLENOL) tablet 650 mg  650 mg Oral Q4H  mg at 03/11/18 2335    insulin regular (NOVOLIN R, HUMULIN R) injection   SubCUTAneous Q6H Stopped at 03/08/18 1200    fat emulsion 20% (LIPOSYN, INTRAlipid) infusion 500 mL  500 mL IntraVENous Q MON, WED &  mL at 03/10/18 1732    glucose chewable tablet 16 g  4 Tab Oral PRN      glucagon (GLUCAGEN) injection 1 mg  1 mg IntraMUSCular PRN      dextrose (D50W) injection syrg 12.5-25 g  12.5-25 g IntraVENous PRN 12.5 g at 03/09/18 1736    alteplase (CATHFLO) 1 mg in sterile water (preservative free) 1 mL injection  1 mg InterCATHeter PRN 1 mg at 02/28/18 1607    sodium chloride (NS) flush 10-30 mL  10-30 mL InterCATHeter PRN 10 mL at 03/01/18 1632    sodium chloride (NS) flush 10-40 mL  10-40 mL InterCATHeter Q8H 20 mL at 03/12/18 0600    naloxone (NARCAN) injection 0.4 mg  0.4 mg IntraVENous PRN      ondansetron (ZOFRAN) injection 4 mg  4 mg IntraVENous Q4H PRN 4 mg at 03/08/18 0806    enoxaparin (LOVENOX) injection 40 mg  40 mg SubCUTAneous Q24H 40 mg at 03/11/18 1642    pantoprazole (PROTONIX) injection 40 mg  40 mg IntraVENous Q12H 40 mg at 03/12/18 0908    phenol throat spray (CHLORASEPTIC) 1 Spray  1 Spray Oral PRN 1 Spray at 02/13/18 1803    nystatin (MYCOSTATIN) 100,000 unit/gram powder   Topical BID      sodium chloride (NS) flush 5-10 mL  5-10 mL IntraVENous PRN 10 mL at 03/06/18 1706           Case discussed with: Daughter at [address], DO

## 2018-03-12 NOTE — PROGRESS NOTES
Progress Note    Patient: Amanda Franco MRN: [id number]  SSN: [id number]    YOB: 1945  Age: 67 y.o. Sex: female      Admit Date: 2018    28 Days Post-Op    Procedure:  Procedure(s):  Ex lap    Subjective:     Mrs. Reinaldo Armenta is doing fine. She is passing flatus and BM. She denies any pain. Objective:     Visit Vitals    /67 (BP 1 Location: Left arm, BP Patient Position: At rest)    Pulse 93    Temp 98.3 °F (36.8 °C)    Resp 18    Ht 5' 3.5\" (1.613 m)    Wt 178 lb 9.2 oz (81 kg)    SpO2 98%    Breastfeeding No    BMI 31.14 kg/m2       Temp (24hrs), Av °F (36.7 °C), Min:97.4 °F (36.3 °C), Max:99.2 °F (37.3 °C)      Physical Exam:    GENERAL: alert, cooperative, no distress, ABDOMEN: Soft, +BS, NT, ND. Lab Review:   BMP: No results found for: NA, K, CL, CO2, AGAP, GLU, BUN, CREA, GFRAA, GFRNA  CBC: No results found for: WBC, HGB, HGBEXT, HCT, HCTEXT, PLT, PLTEXT, HGBEXT, HCTEXT, PLTEXT    Assessment:     Hospital Problems  Date Reviewed: 2016          Codes Class Noted POA    Hypokalemia ICD-10-CM: E87.6  ICD-9-CM: 276.8  2018 No        Leukocytosis ICD-10-CM: D72.829  ICD-9-CM: 288.60  2018 Yes        Severe protein-calorie malnutrition ([address] ICD-10-CM: E43  ICD-9-CM: 782  2018 Yes        Acute metabolic encephalopathy MNU-21-OA: G93.41  ICD-9-CM: 348.31  2018 Yes        Hyperammonemia ([facility] [address] ICD-10-CM: E72.20  ICD-9-CM: 270.6  2018 No    Overview Addendum 2018  9:08 AM by Owen Kimbrough MD        Ref. Range 2018 17:20   Ammonia Latest Ref Range: <32 UMOL/L 69 (H)                Free intraperitoneal air ICD-10-CM: K66.8  ICD-9-CM: 568.89  2018 Yes        S/P exploratory laparotomy ICD-10-CM: Z98.890  ICD-9-CM: V45.89  2018 No    Overview Signed 2018 11:57 AM by Daquan Perrin MD     Suture repair of perforated posterior gastric ulcer with Daiva Guiles patch, core needle biopsies of left lobe of the liver. Alcoholic cirrhosis of liver without ascites (HCC) (Chronic) ICD-10-CM: K70.30  ICD-9-CM: 571.2  2/12/2018 Yes    Overview Signed 2/17/2018  8:45 AM by Brenda Lyon MD     Liver bx 2/12/18:   Cirrhosis with mild chronic portal inflammation and macrovesicular steatosis. Fatty liver determined by biopsy ICD-10-CM: K76.0  ICD-9-CM: 571.8  2/12/2018 Yes    Overview Signed 2/17/2018  8:53 AM by Brenda Lyon MD        Cirrhosis with mild chronic portal inflammation and macrovesicular steatosis. * (Principal)Perforated chronic gastric ulcer ([address] (Chronic) ICD-10-CM: K25.5  ICD-9-CM: 531.50  2/11/2018 Yes        ETOH abuse (Chronic) ICD-10-CM: F10.10  ICD-9-CM: 305.00  2/11/2018 Yes              Plan/Recommendations/Medical Decision Making:     D/C accordion drain. Continue TPN and bowel rest.  Continue ALYSSA. UGI this week. Continue PT.  SNF placement.     Signed By: Molly Head MD     March 12, 2018

## 2018-03-12 NOTE — PROGRESS NOTES
Family Practice Daily Progress Note: 3/12/2018  Domenica Steinberg DO    Assessment/Plan:   Perforated chronic gastric ulcer /  Free intraperitoneal air / S/P exploratory laparotomy 2/11. S/p repair in OR with Dr. Annabella Freeman on 2/12/18 - per surg    Severe protein calorie malnutrition-POA  --TPN per GI - advance diet as per surg  --Replete K/Mg as needed     Acute metabolic encephalopathy/Delerium. Much improved - back to baseline. Multifactorial-- medications, alcohol use, sundowning  ---seroquel at night, haldol prn    Hypoxia / Respiratory distress. Improved. Pleural effusions   ---pulmonary has signed off  --wean O2 off as tolerated for sats >37%     Alcoholic cirrhosis of liver without ascites /  Fatty liver determined by biopsy /  Hyperammonemia. --GI has seen- rec alcohol cessation and outpt follow up     ETOH abuse. Needs to stop ETOH entirely.     Hypokalemia /  Hypoalbuminemia. Being addressed with TPN     Leukocytosis.  -- trending back up  -- repeat blood cultures, and urine 2/28 neg  --was on Zosyn, and levaquin - cultures drawn off antibiotics neg except yeast -anidulafungin  added  --ID following    Anemia: watch Hgb   --follow, transfuse <7  --recheck in AM    Back Pain due to compression fracture of T12  ---Increased Duragesic patch to 25mcg and DIiaudid to 1mg as needed  --ortho to re-eval Monday per daughter's request    Debility: due to prolonged hospital stay  --continue PT/OT- needs to get OOB  --will likely need rehab    DVT proph - lovenox        Problem List:  Problem List as of 3/12/2018  Date Reviewed: 4/26/2016          Codes Class Noted - Resolved    Hypokalemia ICD-10-CM: E87.6  ICD-9-CM: 276.8  2/18/2018 - Present        Leukocytosis ICD-10-CM: D72.829  ICD-9-CM: 288.60  2/18/2018 - Present        Severe protein-calorie malnutrition ([address] ICD-10-CM: E43  ICD-9-CM: 262  2/18/2018 - Present        Acute metabolic encephalopathy [facility]-RO: G93.41  ICD-9-CM: 348.31  2/18/2018 - Present        Hyperammonemia (HCC) ICD-10-CM: E72.20  ICD-9-CM: 270.6  2/16/2018 - Present    Overview Addendum 2/17/2018  9:08 AM by Charles Hager MD        Ref. Range 2/16/2018 17:20   Ammonia Latest Ref Range: <32 UMOL/L 69 (H)                Free intraperitoneal air ICD-10-CM: K66.8  ICD-9-CM: 568.89  2/12/2018 - Present        S/P exploratory laparotomy ICD-10-CM: Z98.890  ICD-9-CM: V45.89  2/12/2018 - Present    Overview Signed 2/12/2018 11:57 AM by Brooklynn Gómez MD     Suture repair of perforated posterior gastric ulcer with Kong Sinner patch, core needle biopsies of left lobe of the liver. Alcoholic cirrhosis of liver without ascites (HCC) (Chronic) ICD-10-CM: K70.30  ICD-9-CM: 571.2  2/12/2018 - Present    Overview Signed 2/17/2018  8:45 AM by Charles Hager MD     Liver bx 2/12/18:   Cirrhosis with mild chronic portal inflammation and macrovesicular steatosis. Fatty liver determined by biopsy ICD-10-CM: K76.0  ICD-9-CM: 571.8  2/12/2018 - Present    Overview Signed 2/17/2018  8:53 AM by Charles Hager MD        Cirrhosis with mild chronic portal inflammation and macrovesicular steatosis. * (Principal)Perforated chronic gastric ulcer ([address] (Chronic) ICD-10-CM: K25.5  ICD-9-CM: 531.50  2/11/2018 - Present        ETOH abuse (Chronic) ICD-10-CM: F10.10  ICD-9-CM: 305.00  2/11/2018 - Present        GI bleed ICD-10-CM: K92.2  ICD-9-CM: 578.9  4/26/2016 - Present        Hypotension ICD-10-CM: I95.9  ICD-9-CM: 458.9  4/26/2016 - Present        Tachycardia ICD-10-CM: R00.0  ICD-9-CM: 785.0  4/26/2016 - Present        Acute blood loss anemia ICD-10-CM: D62  ICD-9-CM: 285.1  4/26/2016 - Present              Subjective:    67 y.o.  female with EtOH abuse who is admitted to the General Surgery Service with perforated gastric ulcer. We are asked to evaluate for altered mental status.  The patient is poorly interactive at this time and this limits primary hx. Discussed case with family available at bedside providing secondary hx and chart review. Per family, patient has had declining neurological condition over past 48 hours. Notably, an RRT was called for respiratory distress. 2/19: This morning she is a bit more alert at this moment and taking in more complete sentences. She says she does not feel well. She has no specific site of pain other than the NG tube which is bothering her a great deal.  She should improve as time from surg increases. K+ a little low - replacing.     2/20:  Still confused and somnolent at times. Now able to localize pain to ab and converse a bit more. Tmax 100.3  WBC is up again but hopefully reactive - recheck in AM - more incentive spirom and check CXR.     2/21: A little more alert. Knows she is in the hospital. Not able to participate with PT yesterday as she was in too much pain. WBC still at 19.     2/22:  WBC 19K. She is more alert. Daughter at bedside. Both of her daughters live out of town. Looking at SNF options for rehab. Coughing more. Starting to use IS.     2/23: WBC 17. Repeat CT chest with moderate to large persistent left subphrenic collection. IR has been consulted. Vanc and Levaquin added by pulm. 2/24: Pt had a better night last night. Only brief episode of confusion. She is much clearer this AM.  Now in quite a bit of pain from gas and stomach cramping. Thinks she will feel better if she can have a BM. Has been on bedpan for a while without success. On TPN. Daughters very concerned that she won't be successful while lying down. Wanting to get up to bedside commode. Will need PT's help. 2/25: febrile overnight and WBC up after vanc was stopped. Pt c/o more dyspnea today. Known fluid collection that will be drained in IR this week. Up to chair with PT yesterday briefly. +BM yesterday. Da notes pt has been c/o R wrist pain off and on for 2 wks since she fell.   No swelling.    2/26:  No new complaints. Still c/o back and ab pain. Still passing gas and had BM. Diuresed with 1 mg Bumex yesterday by Pulmonary. Remains on Zosyn and Levaquin. Blood culture remains neg. Tmax 99.2. X-ray of wrists ok. CXR ok with tube inplace. WBC 15.7K.      2/27:  Looks much better today. Much less work of breathing. Diuresed about 1 liter over last 24h. WBC 16K today. Blood cult remains neg. Off antibiotics. Still on TPN. She will need rehab for PT/OT. 2/28: Very restless during the night. Has not been OOB. Daughter stayed the night with her. WBC is still up. Antibiotics stopped yesterday. She is c/o increased pain in her back and can't get comfortable. More anxious and agitated. Tolerating sips. 3/1:  She reports she feels better this AM after she had some sleep. Afeb. WBC still up - a bit more today with increased neutrophils. Will also get ID to see also. CT AB and Pelvis as below. Cultures done yesterday off antibiotics - so far no growth. No output from drain - may need to be repositioned. More edema today but not as short of breath today while sitting up - add albumin to todays lab. Add Aldactone if ok with GI and Bumex as per Pul. Discussed cirrhosis/ascites/edema with pts daughter. 3/2:  She reports she feels better, and has slightly less edema, although daughters note they think pts abdomin more swollen. The daughters are more worried about pts LE edema than anything else - explained that with pts low albumin that edema may cont to be a problem. She is more alert, a bit more oriented today and says she has much less pain today. Tmax 101. Culture had yeast - diflucan started. Drain was repositioned, had thoracentesis and new PICC placed yesterday. May need to restart broad spectrum antibiotics - ID consulted. 3/3:  She reports she feels better this AM.  More alert and oriented this AM.  BM yesterday. Tmax 99. WBC still 17-18K.   ID consulted and advises to watch off antibiotics for now. Less edema LE.      3/4:  Doing much better today and much more alert. Little pain. Off sleeping meds for now - not sleeping well but reports \"never was a good sleeper. \"  Anxious at night. WBC still up. Discussed rehab with daughters and pt and pt willing to go. 3/5:  Continues to improve. She wants to eat solid food - she now has an appetite. Passing gas. Has been up to chair. K+ 5.3 - will DC aldactone for now and restart if edema returns. CXR pending today. 3/6: She reports she feels better. Yesterday had marked increase in ALYSSA output and now NPO and back on TPN today. Will resume Aldactone again (at lower dose) as sl more edema today and hold again if K+ creeping up again. AM labs pending. 3/7:  Reports some ab pain this AM but not severe. Orthostatic hypotension with walking yesterday and K+ back up - will hold aldactone again. Breathing and edema better with the Aldactone. Push incentive spirom - last CXR with atelectasis. O>>I on I&Os but wonder about measurements. 3/8:  Nausea this AM with 2 episodes of vomiting but reports she feels back to normal now. No ab pain. No SOB at rest.  Wearing nasal O2 at 2 L. Nurse reports yesterdays labs were drawn just before 12MN last night for some reason - still have not resulted and lab called and will run samples now. This AMs labs not drawn yet. Port CXR this AM pending.  O>>I again but Intake not correct. 3/9:  No nausea this AM.  No further vomiting. Had BM yesterday. Desats with exercise. Was up in chair some yesterday. WBC down to 12K. Hb a little lower at 7.8 - recheck later today. Seems to be making slow progress. 3/10: [place]/V. Pain minimal at this time, some in back. No recent confusion. BM 2 days ago. 3/11: Walked this AM with PT. Up in chair now and back hurts, worse with deep breaths. No BM in a few days now.     3/12:  Pt reports BM yesterday. No pain. Breathing stable. AM CBC pending. Add: 327pm:  Labs back - phos up but Ca+ ok - will discuss with surg. Past Medical History:   Diagnosis Date    Alcoholic cirrhosis of liver without ascites ([facility] [address] 2018    ETOH abuse 2018    History of vascular access device 2018    [facility] VAT - 5 FR triple, R basilic, TPN    Hyperammonemia ([facility] [address] 2018     Results for Harry Phillips (MRN [id number]) as of 2018 08:43  Ref. Range 2018 17:20 Ammonia Latest Ref Range: <32 UMOL/L 69 (H)     Hyperlipidemia     Perforated chronic gastric ulcer ([facility] [address] 2018     Past Surgical History:   Procedure Laterality Date    HX CATARACT REMOVAL       Social History     Social History    Marital status: SINGLE     Spouse name: N/A    Number of children: N/A    Years of education: N/A     Occupational History    Not on file. Social History Main Topics    Smoking status: Former Smoker    Smokeless tobacco: Never Used    Alcohol use 11.4 oz/week     0 Standard drinks or equivalent, 19 Glasses of wine per week      Comment: Hx of heavy etoh use, but quit several months ago    Drug use: No    Sexual activity: Not on file     Other Topics Concern    Not on file     Social History Narrative     Family History   Problem Relation Age of Onset    Other Other      patient did not know     Review of Systems:   A comprehensive review of systems was negative except for that written in the HPI.     Objective:   Physical Exam:     Visit Vitals    /61 (BP 1 Location: Left arm, BP Patient Position: At rest)    Pulse 92    Temp 98.2 °F (36.8 °C)    Resp 18    Ht 5' 3.5\" (1.613 m)    Wt 81 kg (178 lb 9.2 oz)    SpO2 97%    Breastfeeding No    BMI 31.14 kg/m2    O2 Flow Rate (L/min): 2 l/min O2 Device: Nasal cannula    Temp (24hrs), Av.1 °F (36.7 °C), Min:97.4 °F (36.3 °C), Max:99.2 °F (37.3 °C)        03/10 1901 -  0700  In: 89645.5 [I.V.:90554.5]  Out: 0 [Urine:2700; Drains:10]    General:  Awake, uncomfortable, appears stated age. Head:  Normocephalic, without obvious abnormality, atraumatic. Eyes:  Conjunctivae/corneas clear. EOMs intact. Throat: Lips, mucosa, and tongue dry. Neck: Supple, symmetrical, trachea midline, no adenopathy   Lungs:    CTAB, no w/r/r    Heart:  reg rate with regular rhythm,  no murmur, click, rub or gallop. Abdomen:   Soft,minimal distension. Drains with little output. Extremities: no cyanosis. No LE edema at this time. No calf tenderness or cords, no erythema   Skin: turgor normal. No rashes or lesions   Neurologic: AOx2-3, . Sensation grossly normal to touch. Gross motor of extremities normal.       Data Review:    CT AB and Pelvis 2/28/18: IMPRESSION:  1. There is residual fluid in the posterior portion of the left subphrenic  collection which does not appear well drained by the pigtail catheter. This  could be repositioned versus new drainage catheter placement if clinically  indicated. 2. Persistent ascites. 3. Increasing right pleural effusion and right lower lobe atelectasis   4. Distended duodenum and proximal small bowel of doubtful clinical  Significance. CXR 3/1/18:  INDICATION:  chest pain and shortness of breath following thoracentesis   EXAM: Portable chest 1716 . Comparison March 1, 2018  FINDINGS: There is no significant change in appearance the lungs. Small left  pleural effusion with left basilar atelectasis unchanged. Cardiomediastinal  silhouette is unchanged. No pneumothorax. Lines and tubes are unchanged in  position. IMPRESSION:  1. No interval change    KUB 3/6/18  IMPRESSION: Nonspecific bowel gas pattern with multiple dilated loops of small  bowel but does not appear obstructive. No pneumoperitoneum.     Recent Days:  Recent Labs      03/11/18   0104   WBC  14.6*   HGB  8.6*   HCT  28.2*   PLT  317     Recent Labs      03/12/18   0927  03/12/18   0055  03/11/18   0204  03/10/18 0227   NA  136   --   137   --    K  4.6   --   4.3   --    CL  104   --   107   --    CO2  22   --   24   --    GLU  98   --   117*   --    BUN  17   --   13   --    CREA  0.51*   --   0.48*   --    CA  8.4*   --   8.3*   --    MG   --   2.6*  1.5*  1.6   PHOS   --   11.8*  3.1  3.0   ALB  1.7*   --   1.6*   --    TBILI  0.4   --   0.4   --    SGOT  26   --   30   --    ALT  20   --   25   --      No results for input(s): PH, PCO2, PO2, HCO3, FIO2 in the last 72 hours.   24 Hour Results:  Recent Results (from the past 24 hour(s))   GLUCOSE, POC    Collection Time: 03/11/18  6:31 PM   Result Value Ref Range    Glucose (POC) 79 65 - 100 mg/dL    Performed by [address] [place] [address], POC    Collection Time: 03/11/18  7:49 PM   Result Value Ref Range    Glucose (POC) 128 (H) 65 - 100 mg/dL    Performed by Cla Richardson    GLUCOSE, POC    Collection Time: 03/11/18 11:19 PM   Result Value Ref Range    Glucose (POC) 113 (H) 65 - 100 mg/dL    Performed by Hermelinda Valencia (PCT)    PHOSPHORUS    Collection Time: 03/12/18 12:55 AM   Result Value Ref Range    Phosphorus 11.8 (H) 2.6 - 4.7 MG/DL   MAGNESIUM    Collection Time: 03/12/18 12:55 AM   Result Value Ref Range    Magnesium 2.6 (H) 1.6 - 2.4 mg/dL   GLUCOSE, POC    Collection Time: 03/12/18  6:32 AM   Result Value Ref Range    Glucose (POC) 91 65 - 100 mg/dL    Performed by Hermelinda Valencia (PCT)    METABOLIC PANEL, COMPREHENSIVE    Collection Time: 03/12/18  9:27 AM   Result Value Ref Range    Sodium 136 136 - 145 mmol/L    Potassium 4.6 3.5 - 5.1 mmol/L    Chloride 104 97 - 108 mmol/L    CO2 22 21 - 32 mmol/L    Anion gap 10 5 - 15 mmol/L    Glucose 98 65 - 100 mg/dL    BUN 17 6 - 20 MG/DL    Creatinine 0.51 (L) 0.55 - 1.02 MG/DL    BUN/Creatinine ratio 33 (H) 12 - 20      GFR est AA >60 >60 ml/min/1.73m2    GFR est non-AA >60 >60 ml/min/1.73m2    Calcium 8.4 (L) 8.5 - 10.1 MG/DL    Bilirubin, total 0.4 0.2 - 1.0 MG/DL    ALT (SGPT) 20 12 - 78 U/L    AST (SGOT) 26 15 - 37 U/L    Alk.  phosphatase 157 (H) 45 - 117 U/L    Protein, total 6.7 6.4 - 8.2 g/dL    Albumin 1.7 (L) 3.5 - 5.0 g/dL    Globulin 5.0 (H) 2.0 - 4.0 g/dL    A-G Ratio 0.3 (L) 1.1 - 2.2     GLUCOSE, POC    Collection Time: 03/12/18 11:59 AM   Result Value Ref Range    Glucose (POC) 93 65 - 100 mg/dL    Performed by Kailyn Velasquez (PCT)      Medications reviewed  Current Facility-Administered Medications   Medication Dose Route Frequency    TPN ADULT - CENTRAL   IntraVENous CONTINUOUS    anidulafungin (ERAXIS) 100 mg in 0.9% sodium chloride 130 mL IVPB  100 mg IntraVENous Q24H    spironolactone (ALDACTONE) tablet 25 mg  25 mg Oral DAILY    albuterol-ipratropium (DUO-NEB) 2.5 MG-0.5 MG/3 ML  3 mL Nebulization Q4H PRN    melatonin tablet 1.5 mg  1.5 mg Oral QHS PRN    HYDROmorphone (PF) (DILAUDID) injection 1 mg  1 mg IntraVENous Q3H PRN    fentaNYL (DURAGESIC) 25 mcg/hr patch 1 Patch  1 Patch TransDERmal Q72H    haloperidol lactate (HALDOL) injection 1 mg  1 mg IntraVENous Q4H PRN    guaiFENesin ER (MUCINEX) tablet 1,200 mg  1,200 mg Oral Q12H    acetaminophen (TYLENOL) tablet 650 mg  650 mg Oral Q4H PRN    insulin regular (NOVOLIN R, HUMULIN R) injection   SubCUTAneous Q6H    fat emulsion 20% (LIPOSYN, INTRAlipid) infusion 500 mL  500 mL IntraVENous Q MON, WED & SAT    glucose chewable tablet 16 g  4 Tab Oral PRN    glucagon (GLUCAGEN) injection 1 mg  1 mg IntraMUSCular PRN    dextrose (D50W) injection syrg 12.5-25 g  12.5-25 g IntraVENous PRN    alteplase (CATHFLO) 1 mg in sterile water (preservative free) 1 mL injection  1 mg InterCATHeter PRN    sodium chloride (NS) flush 10-30 mL  10-30 mL InterCATHeter PRN    sodium chloride (NS) flush 10-40 mL  10-40 mL InterCATHeter Q8H    naloxone (NARCAN) injection 0.4 mg  0.4 mg IntraVENous PRN    ondansetron (ZOFRAN) injection 4 mg  4 mg IntraVENous Q4H PRN    enoxaparin (LOVENOX) injection 40 mg  40 mg SubCUTAneous Q24H    pantoprazole (PROTONIX) injection 40 mg  40 mg IntraVENous Q12H    phenol throat spray (CHLORASEPTIC) 1 Spray  1 Spray Oral PRN    nystatin (MYCOSTATIN) 100,000 unit/gram powder   Topical BID    sodium chloride (NS) flush 5-10 mL  5-10 mL IntraVENous PRN       Denver Ralphs, M.D.

## 2018-03-12 NOTE — PROGRESS NOTES
Problem: Mobility Impaired (Adult and Pediatric)  Goal: *Acute Goals and Plan of Care (Insert Text)  Physical Therapy Goals  Revised 3/7/2018  1. Patient will move from long sitting to sit edge of bed and sit to supine and roll side to side in bed with modified independence 7 day(s). 2.  Patient will transfer from bed to chair and chair to bed with contact guard assist x1 using RW within 7 day(s). 3.  Patient will perform sit <>stand with contact guard assist x 1 within 7 day(s). 4.  Patient will ambulate with minimal assistance x 1  for 50 feet with RW within 7 day(s). 5.  Patient will verbalize and demonstrate understanding of spinal precautions (No bending, lifting greater than 5 lbs, or twisting; log-roll technique; frequent repositioning as instructed) within 7 days. Physical Therapy Goals  Revised 2/28/2018  1. Patient will move from supine to sit and sit to supine  and roll side to side in bed with moderate assistance x 1  within 7 day(s). MET, upgrade  2. Patient will move from long sitting in bed to edge of bed with minimal assistance x 1 within 7 days. MET, advance  3. Patient will transfer from bed to chair and chair to bed with moderate assistance x 1 using the least restrictive device within 7 day(s). 4.  Patient will perform sit <> stand with minimal assistance x 1 within 7 day(s). NOT MET - continue  5. Patient will ambulate with minimal assistance x 1  for 15 feet with the least restrictive device within 7 day(s). MET, advance  6. Patient will verbalize and demonstrate understanding of spinal precautions (No bending, lifting greater than 5 lbs, or twisting; log-roll technique; frequent repositioning as instructed) within 7 days. NOT MET- continue    Physical Therapy Goals  Initiated 2/20/2018  1. Patient will move from supine to sit and sit to supine , scoot up and down and roll side to side in bed with moderate assistance x 2  within 7 days. Partially Met - Continue  2.  Patient will perform sit <> stand with minimal assist x 2  within 7 days. Met - Advance goal below  3. Patient will ambulate with minimal assistance x 2 for 10 feet with RW and assist of 3rd pushing chair from behind within 7 days. Not Met- Continue  4. Patient will verbalize and demonstrate understanding of spinal precautions (No bending, lifting greater than 5 lbs, or twisting; log-roll technique; frequent repositioning as instructed) within 7 days. Not Met- Continue     physical Therapy TREATMENT  Patient: Raisa Fink (26 y.o. female)  Date: 3/12/2018  Diagnosis: Intra-abdominal free air of unknown etiology [K66.8] Perforated chronic gastric ulcer ([address]  Procedure(s) (LRB):   NASOGASTRIC TUBE PLACEMENT (N/A) 22 Days Post-Op  Precautions: Aspiration, Back, Bed Alarm, Fall, Skin  Chart, physical therapy assessment, plan of care and goals were reviewed. ASSESSMENT:  Ordered acknowledged to convert TLSO to LSO, removed sternal support, and shoulder straps, pt tolerated high back without complaint. Pt received in bed, daughters at bedside agreeable to participate with physical therapy. CGA for bed mobility with increased time to complete task, pt performed bathroom transfers with VALENTINE. Returned to chair, agreeable to perform gait training after 5 min rest. Gait training using RW with Min A x 100' Maintained 93% O2sat using 2L with constant verbal cues for proper PLB, throughout acitivity. Verbal cues for pacing gait as patient demonstrates accelerated gait  \"want to get it over with\" Pt returned to chair, for hair washing with daughters. Much improved tolerance for brace after removing sternal support. Progression toward goals:  []    Improving appropriately and progressing toward goals  []    Improving slowly and progressing toward goals  []    Not making progress toward goals and plan of care will be adjusted     PLAN:  Patient continues to benefit from skilled intervention to address the above impairments.   Continue treatment per established plan of care. Discharge Recommendations:  Rehab  Further Equipment Recommendations for Discharge:  TBD     SUBJECTIVE:   Patient stated Toyin Munoz will give it a try.     OBJECTIVE DATA SUMMARY:   Critical Behavior:  Neurologic State: Alert  Orientation Level: Oriented X4  Cognition: Follows commands  Safety/Judgement: Awareness of environment, Decreased insight into deficits, Fall prevention, Home safety  Functional Mobility Training:  Bed Mobility:     Supine to Sit: Contact guard assistance     Scooting: Contact guard assistance        Transfers:  Sit to Stand: Minimum assistance  Stand to Sit: Minimum assistance                             Balance:  Sitting: Intact; High guard  Standing: Impaired; With support  Standing - Static: Constant support; Fair  Standing - Dynamic : Fair  Ambulation/Gait Training:  Distance (ft):  (100' and 25')  Assistive Device: Walker, rolling;Gait belt;Brace/Splint  Ambulation - Level of Assistance: Minimal assistance (second person to manage IV and O2)        Gait Abnormalities: Antalgic;Decreased step clearance; Path deviations        Base of Support: Widened     Speed/Alejandra: Accelerated                       Stairs:              Neuro Re-Education:    Therapeutic Exercises:     Pain:  Pain Scale 1: Numeric (0 - 10)  Pain Intensity 1: 0              Activity Tolerance:   Good  Please refer to the flowsheet for vital signs taken during this treatment.   After treatment:   [x]    Patient left in no apparent distress sitting up in chair  []    Patient left in no apparent distress in bed  [x]    Call bell left within reach  [x]    Nursing notified  [x]    Caregiver present  [x]    Chair alarm activated    COMMUNICATION/COLLABORATION:   The patients plan of care was discussed with: Certified Occupational Therapy Assistant and Registered Nurse    Samuel Love   Time Calculation: 25 mins

## 2018-03-12 NOTE — PROGRESS NOTES
Orthopaedic Progress Note  Post Op day: 22 Days Post-Op    March 12, 2018 12:15 PM     Patient: Jhonny Mckeon MRN: [id number]  SSN: [id number]    YOB: 1945  Age: 67 y.o. Sex: female      Admit date:  2/11/2018  Date of Surgery:  2/18/2018   Procedures:  Procedure(s):   [address]  Admitting Physician:  Jennie Alarcon MD   Surgeon:  Renato Harden) and Role:     * Kehinde Elkins MD - Primary    Consulting Physician(s): Treatment Team: Attending Provider: Jennie Alarcon MD; Consulting Provider: Jennie Alarcon MD; Consulting Provider: Terrie Rodriguez DO; Consulting Provider: Vasiliy Gregory MD; Physician: Eleanor Blunt MD; Physician: Hulon Ahumada, MD; Utilization Review: Jessica Soalres RN; Care Manager: Dave Hudson; Consulting Provider: Jasmeet Valencia DO; Consulting Provider: Cody Bailon MD    SUBJECTIVE:     Jhonny Mckeon is a 67 y.o. female is 25 Days Post-Op s/p Procedure(s):   [address] who we have been following for a T12 burst fracture. She had a CT of her abdomen and pelvis done that had incidental finding of the above fracture. She was fit for a TLSO, but has not been wearing due to it being uncomfortable. She has no bowel or bladder complaints today. She denies numbness or paraesthesias in her bilateral lower extremities. She states the TLSO brace is too heavy and wanted to discuss other options for bracing. OBJECTIVE:       Physical Exam:  General: Alert, cooperative, no distress. Respiratory: Respirations unlabored  Neurological:  Neurovascular exam within normal limits. Motor: + DF/PF. Musculoskeletal: Calves soft, supple, non-tender upon palpation. EHL/EFL intact and 5/5. Thoracic spine without stepoff. No lesion or ecchymosis present.          Vital Signs:      Patient Vitals for the past 8 hrs:   BP Temp Pulse Resp SpO2   03/12/18 0814 118/67 98.3 °F (36.8 °C) 93 18 98 % 18 0700 - - 92 - -   18 0424 112/55 97.4 °F (36.3 °C) 94 18 99 %                                          Temp (24hrs), Av.1 °F (36.7 °C), Min:97.4 °F (36.3 °C), Max:99.2 °F (37.3 °C)      Labs:        Recent Labs      18   0104   HCT  28.2*   HGB  8.6*     Lab Results   Component Value Date/Time    Sodium 136 2018 09:27 AM    Potassium 4.6 2018 09:27 AM    Chloride 104 2018 09:27 AM    CO2 22 2018 09:27 AM    Glucose 98 2018 09:27 AM    BUN 17 2018 09:27 AM    Creatinine 0.51 (L) 2018 09:27 AM    Calcium 8.4 (L) 2018 09:27 AM       PT/OT:        Gait  Base of Support: Widened  Speed/Alejandra: Accelerated  Step Length: Left shortened, Right shortened  Gait Abnormalities: Antalgic, Decreased step clearance  Ambulation - Level of Assistance: Minimal assistance, Assist x1  Distance (ft): 100 Feet (ft)  Assistive Device: Walker, rolling, Gait belt  Stairs - Level of Assistance:  (NT)       Patient mobility  Bed Mobility Training  Rolling:  (moves from long sit position - head of bed fully upright)  Supine to Sit: Minimum assistance, Additional time  Sit to Supine:  (NT - to chair )  Scooting: Contact guard assistance  Transfer Training  Sit to Stand: Minimum assistance, Assist x2  Stand to Sit: Contact guard assistance  Bed to Chair: Minimum assistance, Additional time, Assist x1 (SBA of second)      Gait Training  Assistive Device: Walker, rolling, Gait belt  Ambulation - Level of Assistance: Minimal assistance, Assist x1  Distance (ft): 100 Feet (ft)  Stairs - Level of Assistance:  (NT)            ASSESSMENT / PLAN:   Principal Problem:    Perforated chronic gastric ulcer ([address] (2018)    Active Problems:    ETOH abuse (2018)      Free intraperitoneal air (2018)      S/P exploratory laparotomy (2018)      Overview: Suture repair of perforated posterior gastric ulcer with [place] patch,       core needle biopsies of left lobe of the liver. Alcoholic cirrhosis of liver without ascites ([address] (2/12/2018)      Overview: Liver bx 2/12/18:       Cirrhosis with mild chronic portal inflammation and macrovesicular       steatosis. Fatty liver determined by biopsy (2/12/2018)      Overview:        Cirrhosis with mild chronic portal inflammation and macrovesicular       steatosis. Hyperammonemia ([address] (2/16/2018)      Overview:        Ref. Range 2/16/2018 17:20       Ammonia Latest Ref Range: <32 UMOL/L 69 (H)             Hypokalemia (2/18/2018)      Leukocytosis (2/18/2018)      Severe protein-calorie malnutrition ([address] (2/18/2018)      Acute metabolic encephalopathy (8/22/0743)         A: T12 burst fracture without retropulsion. P: 1. Will get plain film xrays of thoracolumbar spine. 2. There is no need for MRI currently as patient has no neurological status change only pain which is likely multifactorial due to recent abdominal surgery. 3. PT to see to alter TLSO to LSO with high back for comfort reasons. 4. LSO at all times when ambulating or in chair. 5. Will certainly need placement due to debility from surgery and T12 fracture. 6. Orthopedics will sign off. If any changes in neurologic status please reconsult.         Signed By:  Yumi Suarez, [address] [facility] 114

## 2018-03-12 NOTE — PROGRESS NOTES
Problem: Self Care Deficits Care Plan (Adult)  Goal: *Acute Goals and Plan of Care (Insert Text)  Occupational Therapy Goals  Revised 3/2/2018. Reviewed 3/9/2018 and updated below to be met within the next 7 days. 1.  Patient will perform grooming with modified independence for >5 minutes with supervision/setup with < 2 verbal cues within 7 day(s)- not met and continue. 2.  Patient will perform upper body dressing and bathing with modified independence within 7 day(s) - not met and continue. 3.  Patient will perform lower body dressing and bathing with moderate assistance using adaptive equipment within 7 day(s)- met 3/9/18 and upgrade to min A.  4.  Patient will perform toilet transfers to [facility] with minimal assistance x1 within 7 day(s)- - not met and continue. 5.  Patient will perform all aspects of toileting with minimal assistance within 7 day(s)- - not met and continue. 6.  Patient will participate in upper extremity therapeutic exercise/activities with supervision/set-up for 10 minutes within 7 day(s)- not met and continue. 7.  Patient will utilize energy conservation techniques during functional activities with verbal cues within 7 day(s)- not met and continue. 8.  Patient will don/doff back brace at supervision/set-up within 7 days- not met and continue. 9.  Patient will verbalize/demonstrate all spinal precautions during ADL tasks without cues within 7 days - not met and continue. Initiated 2/20/2018; revised at re-assess (see above); 1. Patient will perform upper body dressing with moderate assistance in unsupported sitting within 7 day(s). 2.  Patient will perform upper body bathing with moderate assistance  within 7 day(s). 3.  Patient will perform grooming with minimal assistance/contact guard assist within 7 day(s). 4.  Patient will perform toilet transfers with maximal assistance  within 7 day(s).   5.  Patient will perform all aspects of toileting with maximal assistance within 7 day(s). 6.  Patient will participate in upper extremity therapeutic exercise/activities with minimal assistance/contact guard assist for 5 minutes within 7 day(s). Occupational Therapy TREATMENT  Patient: Mey Fox (74 y.o. female)  Date: 3/12/2018  Diagnosis: Intra-abdominal free air of unknown etiology [K66.8] Perforated chronic gastric ulcer ([address]  Procedure(s) (LRB):   NASOGASTRIC TUBE PLACEMENT (N/A) 22 Days Post-Op  Precautions: Aspiration, Back, Bed Alarm, Fall, Skin  Chart, occupational therapy assessment, plan of care, and goals were reviewed. ASSESSMENT:  Pt agreeable to OT. After ambulation to bathroom pt transferred to bedside commode over toilet with moderate assist, min assist for clothing management and contact guard for bladder hygiene. She does need constant verbal cueing to perform pursed lip breathing or else O2 sats decrease and to 88-89% on 2 L. She has fair balance to wipe her hands. Verbal cueing for stand to sit and with hand placement. Moderate assist to haley/don [facility] gown. Daughters present for treatment. Recommend rehab. Progression toward goals:  []       Improving appropriately and progressing toward goals  [x]       Improving slowly and progressing toward goals  []       Not making progress toward goals and plan of care will be adjusted     PLAN:  Patient continues to benefit from skilled intervention to address the above impairments. Continue treatment per established plan of care. Discharge Recommendations:  Rehab  Further Equipment Recommendations for Discharge:None     SUBJECTIVE:   Patient stated Now what? Kelsey Omer    OBJECTIVE DATA SUMMARY:   Cognitive/Behavioral Status:  Neurologic State: Alert  Orientation Level: Oriented X4  Cognition: Follows commands             Functional Mobility and Transfers for ADLs:  Bed Mobility:   Supervision supine to sit    Transfers:     Functional Transfers  Toilet Transfer :  Moderate assistance  Cues: Physical assistance  Adaptive Equipment: Bedside commode;Walker (comment)    Balance:  Sitting: Intact  Standing: Impaired; With support  Standing - Static: Fair  Standing - Dynamic : Fair    ADL Intervention:       Toileting  Toileting Assistance: Moderate assistance  Bladder Hygiene: Contact guard assistance  Clothing Management: Minimum assistance     Pain:  Pain Scale 1: Numeric (0 - 10)  Pain Intensity 1: 0              Activity Tolerance:   Fair  Please refer to the flowsheet for vital signs taken during this treatment.   After treatment:   [x] Patient left in no apparent distress sitting up in chair  [] Patient left in no apparent distress in bed  [x] Call bell left within reach  [x] Nursing notified  [x] Caregiver present  [] Bed alarm activated    COMMUNICATION/COLLABORATION:   The patients plan of care was discussed with: Physical Therapy Assistant, Occupational Therapist and Registered Nurse    ISRAEL Owusu  Time Calculation: 33 mins

## 2018-03-13 ENCOUNTER — APPOINTMENT (OUTPATIENT)
Dept: GENERAL RADIOLOGY | Age: 73
DRG: 853 | End: 2018-03-13
Attending: SURGERY
Payer: MEDICARE

## 2018-03-13 LAB
ALBUMIN SERPL-MCNC: 1.6 G/DL (ref 3.5–5)
ALBUMIN/GLOB SERPL: 0.4 {RATIO} (ref 1.1–2.2)
ALP SERPL-CCNC: 142 U/L (ref 45–117)
ALT SERPL-CCNC: 20 U/L (ref 12–78)
ANION GAP SERPL CALC-SCNC: 4 MMOL/L (ref 5–15)
AST SERPL-CCNC: 20 U/L (ref 15–37)
BASOPHILS # BLD: 0.1 K/UL (ref 0–0.1)
BASOPHILS NFR BLD: 1 % (ref 0–1)
BILIRUB SERPL-MCNC: 0.3 MG/DL (ref 0.2–1)
BUN SERPL-MCNC: 14 MG/DL (ref 6–20)
BUN/CREAT SERPL: 30 (ref 12–20)
CALCIUM SERPL-MCNC: 7.9 MG/DL (ref 8.5–10.1)
CHLORIDE SERPL-SCNC: 109 MMOL/L (ref 97–108)
CO2 SERPL-SCNC: 25 MMOL/L (ref 21–32)
CREAT SERPL-MCNC: 0.46 MG/DL (ref 0.55–1.02)
DIFFERENTIAL METHOD BLD: ABNORMAL
EOSINOPHIL # BLD: 0.3 K/UL (ref 0–0.4)
EOSINOPHIL NFR BLD: 3 % (ref 0–7)
ERYTHROCYTE [DISTWIDTH] IN BLOOD BY AUTOMATED COUNT: 15.5 % (ref 11.5–14.5)
GLOBULIN SER CALC-MCNC: 4.5 G/DL (ref 2–4)
GLUCOSE BLD STRIP.AUTO-MCNC: 107 MG/DL (ref 65–100)
GLUCOSE BLD STRIP.AUTO-MCNC: 121 MG/DL (ref 65–100)
GLUCOSE BLD STRIP.AUTO-MCNC: 123 MG/DL (ref 65–100)
GLUCOSE BLD STRIP.AUTO-MCNC: 90 MG/DL (ref 65–100)
GLUCOSE BLD STRIP.AUTO-MCNC: 99 MG/DL (ref 65–100)
GLUCOSE SERPL-MCNC: 78 MG/DL (ref 65–100)
HCT VFR BLD AUTO: 27 % (ref 35–47)
HGB BLD-MCNC: 8.4 G/DL (ref 11.5–16)
IMM GRANULOCYTES # BLD: 0.1 K/UL (ref 0–0.04)
IMM GRANULOCYTES NFR BLD AUTO: 1 % (ref 0–0.5)
LYMPHOCYTES # BLD: 2 K/UL (ref 0.8–3.5)
LYMPHOCYTES NFR BLD: 15 % (ref 12–49)
MAGNESIUM SERPL-MCNC: 1.6 MG/DL (ref 1.6–2.4)
MCH RBC QN AUTO: 32.3 PG (ref 26–34)
MCHC RBC AUTO-ENTMCNC: 31.1 G/DL (ref 30–36.5)
MCV RBC AUTO: 103.8 FL (ref 80–99)
MONOCYTES # BLD: 0.8 K/UL (ref 0–1)
MONOCYTES NFR BLD: 6 % (ref 5–13)
NEUTS SEG # BLD: 9.8 K/UL (ref 1.8–8)
NEUTS SEG NFR BLD: 75 % (ref 32–75)
NRBC # BLD: 0 K/UL (ref 0–0.01)
NRBC BLD-RTO: 0 PER 100 WBC
PHOSPHATE SERPL-MCNC: 3.5 MG/DL (ref 2.6–4.7)
PLATELET # BLD AUTO: 290 K/UL (ref 150–400)
PMV BLD AUTO: 11.8 FL (ref 8.9–12.9)
POTASSIUM SERPL-SCNC: 4.4 MMOL/L (ref 3.5–5.1)
PROT SERPL-MCNC: 6.1 G/DL (ref 6.4–8.2)
RBC # BLD AUTO: 2.6 M/UL (ref 3.8–5.2)
SERVICE CMNT-IMP: ABNORMAL
SERVICE CMNT-IMP: NORMAL
SERVICE CMNT-IMP: NORMAL
SODIUM SERPL-SCNC: 138 MMOL/L (ref 136–145)
WBC # BLD AUTO: 13.1 K/UL (ref 3.6–11)

## 2018-03-13 PROCEDURE — 97110 THERAPEUTIC EXERCISES: CPT

## 2018-03-13 PROCEDURE — C9113 INJ PANTOPRAZOLE SODIUM, VIA: HCPCS | Performed by: SURGERY

## 2018-03-13 PROCEDURE — 97535 SELF CARE MNGMENT TRAINING: CPT

## 2018-03-13 PROCEDURE — 77030038269 HC DRN EXT URIN PURWCK BARD -A

## 2018-03-13 PROCEDURE — 65270000029 HC RM PRIVATE

## 2018-03-13 PROCEDURE — 74011250637 HC RX REV CODE- 250/637: Performed by: FAMILY MEDICINE

## 2018-03-13 PROCEDURE — 74011250637 HC RX REV CODE- 250/637: Performed by: NURSE PRACTITIONER

## 2018-03-13 PROCEDURE — 74241 XR UPPER GI SERIES W KUB: CPT

## 2018-03-13 PROCEDURE — 85025 COMPLETE CBC W/AUTO DIFF WBC: CPT | Performed by: FAMILY MEDICINE

## 2018-03-13 PROCEDURE — 74011250637 HC RX REV CODE- 250/637: Performed by: INTERNAL MEDICINE

## 2018-03-13 PROCEDURE — 80053 COMPREHEN METABOLIC PANEL: CPT | Performed by: FAMILY MEDICINE

## 2018-03-13 PROCEDURE — 74011636637 HC RX REV CODE- 636/637: Performed by: SURGERY

## 2018-03-13 PROCEDURE — 97116 GAIT TRAINING THERAPY: CPT

## 2018-03-13 PROCEDURE — 36415 COLL VENOUS BLD VENIPUNCTURE: CPT | Performed by: FAMILY MEDICINE

## 2018-03-13 PROCEDURE — 74011250636 HC RX REV CODE- 250/636: Performed by: SURGERY

## 2018-03-13 PROCEDURE — 83735 ASSAY OF MAGNESIUM: CPT | Performed by: FAMILY MEDICINE

## 2018-03-13 PROCEDURE — 74011250636 HC RX REV CODE- 250/636: Performed by: INTERNAL MEDICINE

## 2018-03-13 PROCEDURE — 74011000250 HC RX REV CODE- 250: Performed by: SURGERY

## 2018-03-13 PROCEDURE — 74011000258 HC RX REV CODE- 258: Performed by: SURGERY

## 2018-03-13 PROCEDURE — 74011000258 HC RX REV CODE- 258: Performed by: INTERNAL MEDICINE

## 2018-03-13 PROCEDURE — 84100 ASSAY OF PHOSPHORUS: CPT | Performed by: FAMILY MEDICINE

## 2018-03-13 PROCEDURE — 82962 GLUCOSE BLOOD TEST: CPT

## 2018-03-13 PROCEDURE — 74018 RADEX ABDOMEN 1 VIEW: CPT

## 2018-03-13 RX ADMIN — ENOXAPARIN SODIUM 40 MG: 40 INJECTION SUBCUTANEOUS at 16:07

## 2018-03-13 RX ADMIN — ACETAMINOPHEN 650 MG: 325 TABLET ORAL at 04:02

## 2018-03-13 RX ADMIN — PANTOPRAZOLE SODIUM 40 MG: 40 INJECTION, POWDER, FOR SOLUTION INTRAVENOUS at 20:35

## 2018-03-13 RX ADMIN — PANTOPRAZOLE SODIUM 40 MG: 40 INJECTION, POWDER, FOR SOLUTION INTRAVENOUS at 10:45

## 2018-03-13 RX ADMIN — NYSTATIN: 100000 POWDER TOPICAL at 18:00

## 2018-03-13 RX ADMIN — GUAIFENESIN 1200 MG: 600 TABLET, EXTENDED RELEASE ORAL at 20:35

## 2018-03-13 RX ADMIN — NYSTATIN: 100000 POWDER TOPICAL at 11:01

## 2018-03-13 RX ADMIN — SODIUM CHLORIDE 100 MG: 900 INJECTION, SOLUTION INTRAVENOUS at 11:01

## 2018-03-13 RX ADMIN — ACETAMINOPHEN 650 MG: 325 TABLET ORAL at 20:42

## 2018-03-13 RX ADMIN — CALCIUM GLUCONATE: 94 INJECTION, SOLUTION INTRAVENOUS at 18:14

## 2018-03-13 RX ADMIN — ACETAMINOPHEN 650 MG: 325 TABLET ORAL at 10:45

## 2018-03-13 RX ADMIN — GUAIFENESIN 1200 MG: 600 TABLET, EXTENDED RELEASE ORAL at 10:45

## 2018-03-13 RX ADMIN — Medication 20 ML: at 14:28

## 2018-03-13 RX ADMIN — SPIRONOLACTONE 25 MG: 25 TABLET, FILM COATED ORAL at 10:45

## 2018-03-13 NOTE — PROGRESS NOTES
Problem: Falls - Risk of  Goal: *Absence of Falls  Document Demarcus Fall Risk and appropriate interventions in the flowsheet.    Outcome: Progressing Towards Goal  Fall Risk Interventions:  Mobility Interventions: Bed/chair exit alarm, Communicate number of staff needed for ambulation/transfer, Patient to call before getting OOB    Mentation Interventions: Bed/chair exit alarm, Door open when patient unattended, Familiar objects from home, Room close to nurse's station, Toileting rounds    Medication Interventions: Bed/chair exit alarm, Patient to call before getting OOB    Elimination Interventions: Bed/chair exit alarm, Call light in reach, Patient to call for help with toileting needs, Toileting schedule/hourly rounds    History of Falls Interventions: Bed/chair exit alarm, Door open when patient unattended, Room close to nurse's station        Problem: Nutrition Deficit  Goal: *Optimize nutritional status  Outcome: Progressing Towards Goal  Patient on TPN and lipids

## 2018-03-13 NOTE — PROGRESS NOTES
Nutrition Assessment:    RECOMMENDATIONS/INTERVENTION(S):   1. Resume PO & diet advancement per Surgery MD    2. Continue Cyclic TPN until PO intakes consistently >67%    Cyclic TPN recommendations (12 hour cycle):  D20/6%AA + Lipids 20% (500 ml) @ 42 ml/hr x 12 hr 3x/wk  (TPN @ goal + Lipids provides 1823 kcals, 91 g protein, 1512 ml fluid)  1st hour: 63 ml/hr  2nd-11th hour: 138 ml/hr  12th hour: 63 ml/hr    3. Continue to monitor BG, lytes, TG (weekly while on TPN)    4. Obtain new wt - Standing scale if able    ASSESSMENT:   3/13:  GI series/SBFT this morning - MD notes to start CLD if negative. Pt remains NPO this afternoon. Cyclic TPN + Lipids @ goal.  Labs/meds reviewed. BG -270. PAB 7.0. Lytes WDL. Last BM 3/12, +BS. Trace generalized edema. No new wt.      3/9:  POD #25. Continue bowel rest, TPN, keep ALYSSA drain per Surgery MD.  Cyclic TPN + Lipids @ goal rate w/ no s/s of intolerance. Edema improving. PAB increased to 6.0 (was 3.8 3/1). Noted plans for d/c to SNF next week per CM - continued NPO, cyclic TPN?      3/7:  CLD x 1 d, NPO resumed. Pt w/ fistula. Output trending down. Cyclic TPN + Lipids @ goal rate. Labs/meds reviewed. -[id number]. K elevated - adjusted in TPN. TG 70. No new PAB. Na low, trending up. No IVF. Wt @ 98% of admit wt. 1+ generalized edema charted on 3/5.      3/5:  Continuous TPN transitioned to Cyclic TPN @ goal rate x 4 days. BG controlled. Alk phos elevated (trending up), no new TG.  Mg, Phos, Na WDL. K elevated - KCl d/c-ed. Last BM 3/3 +flatus. CLD started for breakfast today per Surgery. Visited pt this afternoon. Daughter at bedside. Tolerating diet, ~50% PO of breakfast, interested in diet advancement. # (@ 104% of UBW), eating 2-3 meals/d PTA. Assisted pt w/ lunch order. 3/1:  Consult received per Surgery MD regarding PAB trending down on TPN. Noted pt NPO yesterday, NGT placed for possible SBO.   Labs/meds reviewed. BG controlled. Alk phos elevated, stable. PAB 3.8 (was 4.5 2/26). Pt w/ worsening edema. Likely a factor in PAB. TPN @ goal rate x 10 d to meet 100% of energy & protein needs. 10% wt increase x 2.5 wks since admission. Trial increase in protein (+19.7%) - TPN recs above. Cyclic TPN recs above if pt continues on TPN in the next 1-2 days to spare liver. 2/26:  TPN @ goal, CLD started yesterday. Labs/meds reviewed. BG controlled. Na low. PAB 4.5 (2/26), 4.4 (2/23). Hypoactive BS, +BM (soft). Spoke w/ RN, pt anxious, s/p ativan. Poor PO intakes, no interest in eating and/or drinking, not asking for anything. No family in room. SLP following - recs for sips of clears 2/2 decreased alertness. TPN re-ordered per Surgery. Day 11 of TPN. Alk phos, AST remain elevated - stable. Worsening edema. 2/22:  TPN + Lipids running @ goal rate. TG 76. Alk phos, AST elevated. BG controlled. Lytes WDL. No IVF. Pt reporting abd pain, for repeat CT abd today. Last BM 2/21, loose, +BS.         2/19:  Labs/meds reviewed. TPN running @ recommended goal rate. BG controlled, [phone]. On insulin. K continuing to require repletion. PAB 5.5. No TG, Alk phos WDL. NS IVF. Last BM 2/10, hypoactive BS. NGT placed today 2/2 abd pain. Continue TPN per Surgery MD.  WBC's elevated, for CT abd today. Possible kyphoplasty per Ortho. Noted new wt.      2/16:  Pt remains confused, agitated & sedated (CIWA protocol). NPO Day 6. Labs/meds reviewed. K repleted. No Phos. D5 LR IVF. TPN started per Surgery MD today, plans for UGI when able. Likely pt meets criteria for Severe Acute Malnutrition, no new wt, mild edema. TPN recs above. 2/14:  Extubated 2/12. Unable to start PO yesterday per Surgery - plans for UGI p/t PO once off pressors. Discussed case in ICU rounds. Pt agitated, confused overnight - pulled NGT. On sedation per CIWA protocol. NPO. No BM, flatus, absent BS. Labs/meds reviewed. K, Mg WDL. No Phos. 1/2 NS IVF. 2/12:  Chart reviewed 2/2 intubation. 67 yof admitted for intra-abd free air of unknown etiology. Pmhx includes gastric ulcer (EGD x 2 yrs ago), Etoh. Surgery following. POD #1 ex lap repair of perforated gastric ulcer, liver bx. Remains intubated 2/2 shock, respiratory failure. Labs/meds reviewed. K, Mg requiring repletion. No Phos. On Delbert. Propofol currently stopped. On PPI. Ortho following for T12 fx. Surgical incisions to abd, ALYSSA drain, no edema noted. Overwt per advanced age. No recent wt to compare per hx. Energy needs calculated using current wt, vent settings, tmax. Noted n/v after fall yesterday, no reports of inadequate PO prior to fall. Etoh hx. SUBJECTIVE/OBJECTIVE:     Diet Order: NPO; Cyclic TPN x 12 hrs/d -> D20/6% 63 ml x 1, 138 ml x 10, 63 ml x 1 + Lipids 20% (500 ml) @ 42 ml/hr x 12 hr 3x/wk  % Eaten:  No data found. Pertinent Medications: [x] Reviewed     Past Medical History:   Diagnosis Date    Alcoholic cirrhosis of liver without ascites ([facility] [address] 2/12/2018    ETOH abuse 2/11/2018    History of vascular access device 02/16/2018    [facility] VAT - 5 FR triple, R basilic, TPN    Hyperammonemia ([facility] [address] 2/16/2018     Results for Priscella Batch (MRN [id number]) as of 2/17/2018 08:43  Ref.  Range 2/16/2018 17:20 Ammonia Latest Ref Range: <32 UMOL/L 69 (H)     Hyperlipidemia     Perforated chronic gastric ulcer ([facility] [address] 2/11/2018       Chemistries:  Lab Results   Component Value Date/Time    Sodium 138 03/13/2018 02:10 AM    Potassium 4.4 03/13/2018 02:10 AM    Chloride 109 (H) 03/13/2018 02:10 AM    CO2 25 03/13/2018 02:10 AM    Anion gap 4 (L) 03/13/2018 02:10 AM    Glucose 78 03/13/2018 02:10 AM    BUN 14 03/13/2018 02:10 AM    Creatinine 0.46 (L) 03/13/2018 02:10 AM    BUN/Creatinine ratio 30 (H) 03/13/2018 02:10 AM    GFR est AA >60 03/13/2018 02:10 AM    GFR est non-AA >60 03/13/2018 02:10 AM    Calcium 7.9 (L) 03/13/2018 02:10 AM    AST (SGOT) 20 03/13/2018 02:10 AM    Alk. phosphatase 142 (H) 03/13/2018 02:10 AM    Protein, total 6.1 (L) 03/13/2018 02:10 AM    Albumin 1.6 (L) 03/13/2018 02:10 AM    Globulin 4.5 (H) 03/13/2018 02:10 AM    A-G Ratio 0.4 (L) 03/13/2018 02:10 AM    ALT (SGPT) 20 03/13/2018 02:10 AM      Anthropometrics: Height: 5' 3.5\" (161.3 cm) Weight: 81 kg (178 lb 9.2 oz)    IBW (%IBW): 58.7 kg (129 lb 6.6 oz) (131.69 %) UBW (%UBW): 77.1 kg (170 lb) (105.04 %)    BMI: Body mass index is 31.14 kg/(m^2). This BMI is indicative of:   [] Underweight    [] Normal    [x] Overweight - per advanced age     []  Obesity    []  Extreme Obesity (BMI>40)  Estimated Nutrition Needs (Based on): 6496 (BMR (1332) x 1.3 AF , 70 g (-88 (1.2-1.5 g/kg x IBW)) Protein  Carbohydrate:  At Least 130 g/day  Fluids: 1700 mL/day    Last BM: 3/8 loose   [x]Active     []Hyperactive  []Hypoactive       [] Absent   BS  Skin:    [] Intact   [x] Incision - abd lap sites  [] Breakdown   [] DTI   [x] Tears/Excoriation/Abrasion - abrasion R elbow [x]Edema - non-pitting BLE [x] Other:ALYSSA drain     Wt Readings from Last 30 Encounters:   03/05/18 81 kg (178 lb 9.2 oz)   04/29/16 76.3 kg (168 lb 3.2 oz)      NUTRITION DIAGNOSES:   Problem:  Inadequate oral intake     Etiology: related to altered GI function      Signs/Symptoms: as evidenced by s/p lap w/ perf viscus repair, intubation, NPO x 11, abd pain/repeat CT abd, need to continue TPN    2/27:  Above acute nutrition dx continues - CLD started 2/26, pt not alert & w/ poor PO intake, TPN renewed  3/1:  Above acute nutrition dx continues - NPO, NGT for possible SBO, alter TPN composition per MD  3/7:  Above acute nutrition dx continues - CLD x 1 d, NPO resumed for fistula, high output     NUTRITION INTERVENTIONS:  Meals/Snacks: General/healthful diet - once PO resumes Enteral/Parenteral Nutrition: Initiate parenteral nutrition - continue cyclic TPN                GOAL:   Continue cyclic TPN until PO resumed & tolerated in the next 1-3 days    Cultural, Sikhism, or Ethnic Dietary Needs: None    EDUCATION & DISCHARGE NEEDS:    [x] None Identified   [] Identified and Education Provided/Documented   [] Identified and Pt declined/was not appropriate      [x] Interdisciplinary Care Plan Reviewed/Documented    [x] Discharge Needs:    TBD   [] No Nutrition Related Discharge Needs    NUTRITION RISK:   Pt Is At Nutrition Risk  [x]     No Nutrition Risk Identified  []       PT SEEN FOR:    []  MD Consult: []Calorie Count      []Diabetic Diet Education        []Diet Education     []Electrolyte Management     []General Nutrition Management and Supplements     []Management of Tube Feeding     []TPN Recommendations   []  RN Referral:  []MST score >=2     []Enteral/Parenteral Nutrition PTA     []Pregnant: Gestational DM or Multigestation                 [] Pressure Ulcer    []  Low BMI      []  Length of Stay       [] Dysphagia Diet         [] Ventilator  [x]  Follow-up - TPN     Previous Recommendations:   [x] Implemented     [] Progressing Towards Goal          [] Not Implemented          [] Not Applicable    Previous Goal:   [x] Met              [] Progressing Towards Goal              [] Not Progressing Towards Goal   [] Not Applicable            Marisol High, [address] [place] [address]  Pager [phone]

## 2018-03-13 NOTE — PROGRESS NOTES
Assumed care of pt this afternoon. Pt is alert and oriented with no known needs at this time resting in bed.

## 2018-03-13 NOTE — PROGRESS NOTES
Problem: Mobility Impaired (Adult and Pediatric)  Goal: *Acute Goals and Plan of Care (Insert Text)  Physical Therapy Goals  Revised 3/7/2018  1. Patient will move from long sitting to sit edge of bed and sit to supine and roll side to side in bed with modified independence 7 day(s). 2.  Patient will transfer from bed to chair and chair to bed with contact guard assist x1 using RW within 7 day(s). 3.  Patient will perform sit <>stand with contact guard assist x 1 within 7 day(s). 4.  Patient will ambulate with minimal assistance x 1  for 50 feet with RW within 7 day(s). 5.  Patient will verbalize and demonstrate understanding of spinal precautions (No bending, lifting greater than 5 lbs, or twisting; log-roll technique; frequent repositioning as instructed) within 7 days. Physical Therapy Goals  Revised 2/28/2018  1. Patient will move from supine to sit and sit to supine  and roll side to side in bed with moderate assistance x 1  within 7 day(s). MET, upgrade  2. Patient will move from long sitting in bed to edge of bed with minimal assistance x 1 within 7 days. MET, advance  3. Patient will transfer from bed to chair and chair to bed with moderate assistance x 1 using the least restrictive device within 7 day(s). 4.  Patient will perform sit <> stand with minimal assistance x 1 within 7 day(s). NOT MET - continue  5. Patient will ambulate with minimal assistance x 1  for 15 feet with the least restrictive device within 7 day(s). MET, advance  6. Patient will verbalize and demonstrate understanding of spinal precautions (No bending, lifting greater than 5 lbs, or twisting; log-roll technique; frequent repositioning as instructed) within 7 days. NOT MET- continue    Physical Therapy Goals  Initiated 2/20/2018  1. Patient will move from supine to sit and sit to supine , scoot up and down and roll side to side in bed with moderate assistance x 2  within 7 days. Partially Met - Continue  2.  Patient will perform sit <> stand with minimal assist x 2  within 7 days. Met - Advance goal below  3. Patient will ambulate with minimal assistance x 2 for 10 feet with RW and assist of 3rd pushing chair from behind within 7 days. Not Met- Continue  4. Patient will verbalize and demonstrate understanding of spinal precautions (No bending, lifting greater than 5 lbs, or twisting; log-roll technique; frequent repositioning as instructed) within 7 days. Not Met- Continue     physical Therapy TREATMENT  Patient: Nestor Lua (4 y.o. female)  Date: 3/13/2018  Diagnosis: Intra-abdominal free air of unknown etiology [K66.8] Perforated chronic gastric ulcer ([address]  Procedure(s) (LRB):   NASOGASTRIC TUBE PLACEMENT (N/A) 23 Days Post-Op  Precautions: Aspiration, Back, Bed Alarm, Fall, Skin  Chart, physical therapy assessment, plan of care and goals were reviewed. ASSESSMENT:  Due for reassessment tomorrow. Patient has been consisting following up to chair TID and bed/sit exercise program BID established by PT, with assistance of RN staff and her family's support. Patient unable to recall spine precautions but agreed to wear modified TLSO without chest bar. Patient in chair on arrival and performed 3 exercises in sitting ( ankle pumps, Knee raises, LAQs 10 each BLE) then ambulated 100', rest, 110', then back to bed after brace doffed. Last performed supine exercises: Quad sets, Gluteal sets, and Heel slides BLEs 10 each. Patient progressing well with mobility. Unable to recall spine precautions- reviewed again. Will need rehab. Patient having intermittent sharp abdominal pain and imaging/work up in progress today. Will follow for re-eval tomorrow.   Progression toward goals:  [x]      Improving appropriately and progressing toward goals  []      Improving slowly and progressing toward goals  []      Not making progress toward goals and plan of care will be adjusted     PLAN:  Patient continues to benefit from skilled intervention to address the above impairments. Continue treatment per established plan of care. Discharge Recommendations:  Rehab  Further Equipment Recommendations for Discharge:  Pending rehab     SUBJECTIVE:   Patient stated I have this sharp pain here.    The patient stated 0/4 back precautions. Reviewed all 4 with patient. OBJECTIVE DATA SUMMARY:   Critical Behavior:  Neurologic State: Alert  Orientation Level: Oriented X4  Cognition: Appropriate safety awareness, Follows commands  Safety/Judgement: Awareness of environment, Decreased insight into deficits, Fall prevention, Home safety  Functional Mobility Training:  Bed Mobility:  Log Rolling: Minimum assistance     Sit to Supine: Moderate assistance  Scooting: Supervision        Brace doffed with moderate assistance  Transfers:  Sit to Stand: Minimum assistance  Stand to Sit: Minimum assistance  Stand Pivot Transfers: Minimum assistance     Bed to Chair: Minimum assistance                    Balance:  Sitting - Static: Good (unsupported)  Sitting - Dynamic: Fair (occasional)  Standing - Static: Constant support;Good  Standing - Dynamic : Fair  Ambulation/Gait Training:  Distance (ft): 100 Feet (ft) (100-1st, seated rest, then 110' amb> back to bed)  Assistive Device: Brace/Splint;Gait belt;Walker, rolling (wearing modified TLSO)  Ambulation - Level of Assistance: Minimal assistance; Additional time;Assist x1 (at times moderate assist to correct path jade - move RW)        Gait Abnormalities: Antalgic;Decreased step clearance; Path deviations; Shuffling gait;Trunk sway increased        Base of Support: Widened     Speed/Alejandra: Accelerated  Step Length: Left shortened;Right shortened                Stairs - Level of Assistance:  (NT)     Therapeutic Exercises:    Ankle pumps, Knee raises, LAQs, Heel slides, gluteal sets, Quad sets - 10 each BLEs  Pain:  Pain Scale 1: Numeric (0 - 10)  Pain Intensity 1: 5  Pain Location 1: Back  Pain Orientation 1: Lower;Medial  Pain Description 1: Aching  Pain Intervention(s) 1: Medication (see MAR)  Activity Tolerance:   Improving but still very debilitated  PRE 94% 108 bpm   DURING/POST 95% 118bpm and ++dyspneic   Please refer to the flowsheet for vital signs taken during this treatment.   After treatment:   []  Patient left in no apparent distress sitting up in chair  [x]  Patient left in no apparent distress in bed  [x]  Call bell left within reach  [x]  Nursing notified  [x]  Caregiver present  [x]  Bed alarm activated    COMMUNICATION/COLLABORATION:   The patients plan of care was discussed with: Occupational Therapist, Registered Nurse and Certified Nursing Assistant/Patient Care Technician    Yara Escalante PT   Time Calculation: 35 mins

## 2018-03-13 NOTE — PROGRESS NOTES
Family Practice Daily Progress Note: 3/13/2018  Jhon Casey  Becky Danilo Memos, DO    Assessment/Plan:   Perforated chronic gastric ulcer /  Free intraperitoneal air / S/P exploratory laparotomy 2/11. S/p repair in OR with Dr. Suman Duggan on 2/12/18 - per surg    Severe protein calorie malnutrition-POA  --TPN per GI - advance diet as per surg  --Replete K/Mg as needed     Acute metabolic encephalopathy/Delerium. Much improved - back to baseline. Multifactorial-- medications, alcohol use, sundowning  ---seroquel at night, haldol prn    Hypoxia / Respiratory distress. Improved. Pleural effusions   ---pulmonary has signed off  --wean O2 off as tolerated for sats >31%     Alcoholic cirrhosis of liver without ascites /  Fatty liver determined by biopsy /  Hyperammonemia. --GI has seen- rec alcohol cessation and outpt follow up     ETOH abuse. Needs to stop ETOH entirely.     Hypokalemia /  Hypoalbuminemia. Being addressed with TPN     Leukocytosis.  -- trending back up  -- repeat blood cultures, and urine 2/28 neg  --was on Zosyn, and levaquin - cultures drawn off antibiotics neg except yeast -anidulafungin  added  --ID following    Anemia: watch Hgb   --follow, transfuse <7  --recheck in AM    Back Pain due to compression fracture of T12  ---Increased Duragesic patch to 25mcg and DIiaudid to 1mg as needed  --ortho to re-eval Monday per daughter's request    Debility: due to prolonged hospital stay  --continue PT/OT- needs to get OOB  --will likely need rehab    DVT proph - lovenox        Problem List:  Problem List as of 3/13/2018  Date Reviewed: 4/26/2016          Codes Class Noted - Resolved    Hypokalemia ICD-10-CM: E87.6  ICD-9-CM: 276.8  2/18/2018 - Present        Leukocytosis ICD-10-CM: D72.829  ICD-9-CM: 288.60  2/18/2018 - Present        Severe protein-calorie malnutrition ([address] ICD-10-CM: E43  ICD-9-CM: 262  2/18/2018 - Present        Acute metabolic encephalopathy O-69-: G93.41  ICD-9-CM: 348.31  2/18/2018 - Present        Hyperammonemia (HCC) ICD-10-CM: E72.20  ICD-9-CM: 270.6  2/16/2018 - Present    Overview Addendum 2/17/2018  9:08 AM by Jessica Gaines MD        Ref. Range 2/16/2018 17:20   Ammonia Latest Ref Range: <32 UMOL/L 69 (H)                Free intraperitoneal air ICD-10-CM: K66.8  ICD-9-CM: 568.89  2/12/2018 - Present        S/P exploratory laparotomy ICD-10-CM: Z98.890  ICD-9-CM: V45.89  2/12/2018 - Present    Overview Signed 2/12/2018 11:57 AM by Meet Prakash MD     Suture repair of perforated posterior gastric ulcer with Cheron Alden patch, core needle biopsies of left lobe of the liver. Alcoholic cirrhosis of liver without ascites (HCC) (Chronic) ICD-10-CM: K70.30  ICD-9-CM: 571.2  2/12/2018 - Present    Overview Signed 2/17/2018  8:45 AM by Jessica Gaines MD     Liver bx 2/12/18:   Cirrhosis with mild chronic portal inflammation and macrovesicular steatosis. Fatty liver determined by biopsy ICD-10-CM: K76.0  ICD-9-CM: 571.8  2/12/2018 - Present    Overview Signed 2/17/2018  8:53 AM by Jessica Gaines MD        Cirrhosis with mild chronic portal inflammation and macrovesicular steatosis. * (Principal)Perforated chronic gastric ulcer ([address] (Chronic) ICD-10-CM: K25.5  ICD-9-CM: 531.50  2/11/2018 - Present        ETOH abuse (Chronic) ICD-10-CM: F10.10  ICD-9-CM: 305.00  2/11/2018 - Present        GI bleed ICD-10-CM: K92.2  ICD-9-CM: 578.9  4/26/2016 - Present        Hypotension ICD-10-CM: I95.9  ICD-9-CM: 458.9  4/26/2016 - Present        Tachycardia ICD-10-CM: R00.0  ICD-9-CM: 785.0  4/26/2016 - Present        Acute blood loss anemia ICD-10-CM: D62  ICD-9-CM: 285.1  4/26/2016 - Present              Subjective:    67 y.o.  female with EtOH abuse who is admitted to the General Surgery Service with perforated gastric ulcer. We are asked to evaluate for altered mental status.  The patient is poorly interactive at this time and this limits primary hx. Discussed case with family available at bedside providing secondary hx and chart review. Per family, patient has had declining neurological condition over past 48 hours. Notably, an RRT was called for respiratory distress. 2/19: This morning she is a bit more alert at this moment and taking in more complete sentences. She says she does not feel well. She has no specific site of pain other than the NG tube which is bothering her a great deal.  She should improve as time from surg increases. K+ a little low - replacing.     2/20:  Still confused and somnolent at times. Now able to localize pain to ab and converse a bit more. Tmax 100.3  WBC is up again but hopefully reactive - recheck in AM - more incentive spirom and check CXR.     2/21: A little more alert. Knows she is in the hospital. Not able to participate with PT yesterday as she was in too much pain. WBC still at 19.     2/22:  WBC 19K. She is more alert. Daughter at bedside. Both of her daughters live out of town. Looking at SNF options for rehab. Coughing more. Starting to use IS.     2/23: WBC 17. Repeat CT chest with moderate to large persistent left subphrenic collection. IR has been consulted. Vanc and Levaquin added by pulm. 2/24: Pt had a better night last night. Only brief episode of confusion. She is much clearer this AM.  Now in quite a bit of pain from gas and stomach cramping. Thinks she will feel better if she can have a BM. Has been on bedpan for a while without success. On TPN. Daughters very concerned that she won't be successful while lying down. Wanting to get up to bedside commode. Will need PT's help. 2/25: febrile overnight and WBC up after vanc was stopped. Pt c/o more dyspnea today. Known fluid collection that will be drained in IR this week. Up to chair with PT yesterday briefly. +BM yesterday. Da notes pt has been c/o R wrist pain off and on for 2 wks since she fell.   No swelling.    2/26:  No new complaints. Still c/o back and ab pain. Still passing gas and had BM. Diuresed with 1 mg Bumex yesterday by Pulmonary. Remains on Zosyn and Levaquin. Blood culture remains neg. Tmax 99.2. X-ray of wrists ok. CXR ok with tube inplace. WBC 15.7K.      2/27:  Looks much better today. Much less work of breathing. Diuresed about 1 liter over last 24h. WBC 16K today. Blood cult remains neg. Off antibiotics. Still on TPN. She will need rehab for PT/OT. 2/28: Very restless during the night. Has not been OOB. Daughter stayed the night with her. WBC is still up. Antibiotics stopped yesterday. She is c/o increased pain in her back and can't get comfortable. More anxious and agitated. Tolerating sips. 3/1:  She reports she feels better this AM after she had some sleep. Afeb. WBC still up - a bit more today with increased neutrophils. Will also get ID to see also. CT AB and Pelvis as below. Cultures done yesterday off antibiotics - so far no growth. No output from drain - may need to be repositioned. More edema today but not as short of breath today while sitting up - add albumin to todays lab. Add Aldactone if ok with GI and Bumex as per Pul. Discussed cirrhosis/ascites/edema with pts daughter. 3/2:  She reports she feels better, and has slightly less edema, although daughters note they think pts abdomin more swollen. The daughters are more worried about pts LE edema than anything else - explained that with pts low albumin that edema may cont to be a problem. She is more alert, a bit more oriented today and says she has much less pain today. Tmax 101. Culture had yeast - diflucan started. Drain was repositioned, had thoracentesis and new PICC placed yesterday. May need to restart broad spectrum antibiotics - ID consulted. 3/3:  She reports she feels better this AM.  More alert and oriented this AM.  BM yesterday. Tmax 99. WBC still 17-18K.   ID consulted and advises to watch off antibiotics for now. Less edema LE.      3/4:  Doing much better today and much more alert. Little pain. Off sleeping meds for now - not sleeping well but reports \"never was a good sleeper. \"  Anxious at night. WBC still up. Discussed rehab with daughters and pt and pt willing to go. 3/5:  Continues to improve. She wants to eat solid food - she now has an appetite. Passing gas. Has been up to chair. K+ 5.3 - will DC aldactone for now and restart if edema returns. CXR pending today. 3/6: She reports she feels better. Yesterday had marked increase in ALYSSA output and now NPO and back on TPN today. Will resume Aldactone again (at lower dose) as sl more edema today and hold again if K+ creeping up again. AM labs pending. 3/7:  Reports some ab pain this AM but not severe. Orthostatic hypotension with walking yesterday and K+ back up - will hold aldactone again. Breathing and edema better with the Aldactone. Push incentive spirom - last CXR with atelectasis. O>>I on I&Os but wonder about measurements. 3/8:  Nausea this AM with 2 episodes of vomiting but reports she feels back to normal now. No ab pain. No SOB at rest.  Wearing nasal O2 at 2 L. Nurse reports yesterdays labs were drawn just before 12MN last night for some reason - still have not resulted and lab called and will run samples now. This AMs labs not drawn yet. Port CXR this AM pending.  O>>I again but Intake not correct. 3/9:  No nausea this AM.  No further vomiting. Had BM yesterday. Desats with exercise. Was up in chair some yesterday. WBC down to 12K. Hb a little lower at 7.8 - recheck later today. Seems to be making slow progress. 3/10: [place] No N/V. Pain minimal at this time, some in back. No recent confusion. BM 2 days ago. 3/11: Walked this AM with PT. Up in chair now and back hurts, worse with deep breaths. No BM in a few days now.     3/12:  Pt reports BM yesterday. No pain. Breathing stable. AM CBC pending. Add: 327pm:  Labs back - phos up but Ca+ ok - will discuss with surg. 3/13:  Drains out yesterday and doing well so far with little drainage. Elevated Phos was prob an anomaly - todays is back in line with previous ones. Past Medical History:   Diagnosis Date    Alcoholic cirrhosis of liver without ascites ([facility] [address] 2/12/2018    ETOH abuse 2/11/2018    History of vascular access device 02/16/2018    [facility] VAT - 5 FR triple, R basilic, TPN    Hyperammonemia ([facility] [address] 2/16/2018     Results for Martin Hutchinson (MRN [id number]) as of 2/17/2018 08:43  Ref. Range 2/16/2018 17:20 Ammonia Latest Ref Range: <32 UMOL/L 69 (H)     Hyperlipidemia     Perforated chronic gastric ulcer ([facility] [address] 2/11/2018     Past Surgical History:   Procedure Laterality Date    HX CATARACT REMOVAL       Social History     Social History    Marital status: SINGLE     Spouse name: N/A    Number of children: N/A    Years of education: N/A     Occupational History    Not on file. Social History Main Topics    Smoking status: Former Smoker    Smokeless tobacco: Never Used    Alcohol use 11.4 oz/week     0 Standard drinks or equivalent, 19 Glasses of wine per week      Comment: Hx of heavy etoh use, but quit several months ago    Drug use: No    Sexual activity: Not on file     Other Topics Concern    Not on file     Social History Narrative     Family History   Problem Relation Age of Onset    Other Other      patient did not know     Review of Systems:   A comprehensive review of systems was negative except for that written in the HPI.     Objective:   Physical Exam:     Visit Vitals    /63 (BP 1 Location: Left arm, BP Patient Position: At rest)    Pulse 95    Temp 97.7 °F (36.5 °C)    Resp 18    Ht 5' 3.5\" (1.613 m)    Wt 81 kg (178 lb 9.2 oz)    SpO2 96%    Breastfeeding No    BMI 31.14 kg/m2    O2 Flow Rate (L/min): 2 l/min O2 Device: Nasal cannula    Temp (24hrs), Av.1 °F (36.7 °C), Min:97.7 °F (36.5 °C), Max:98.3 °F (36.8 °C)         1901 -  0700  In: 4560 [I.V.:4560]  Out: 2560 [Urine:2550; Drains:10]    General:  Awake, uncomfortable, appears stated age. Head:  Normocephalic, without obvious abnormality, atraumatic. Eyes:  Conjunctivae/corneas clear. EOMs intact. Throat: Lips, mucosa, and tongue dry. Neck: Supple, symmetrical, trachea midline, no adenopathy   Lungs:    CTAB, no w/r/r    Heart:  reg rate with regular rhythm,  no murmur, click, rub or gallop. Abdomen:   Soft,minimal distension. Drains with little output. Extremities: no cyanosis. No LE edema at this time. No calf tenderness or cords, no erythema   Skin: turgor normal. No rashes or lesions   Neurologic: AOx2-3, . Sensation grossly normal to touch. Gross motor of extremities normal.       Data Review:    CT AB and Pelvis 18: IMPRESSION:  1. There is residual fluid in the posterior portion of the left subphrenic  collection which does not appear well drained by the pigtail catheter. This  could be repositioned versus new drainage catheter placement if clinically  indicated. 2. Persistent ascites. 3. Increasing right pleural effusion and right lower lobe atelectasis   4. Distended duodenum and proximal small bowel of doubtful clinical  Significance. CXR 3/1/18:  INDICATION:  chest pain and shortness of breath following thoracentesis   EXAM: Portable chest 1716 . Comparison 2018  FINDINGS: There is no significant change in appearance the lungs. Small left  pleural effusion with left basilar atelectasis unchanged. Cardiomediastinal  silhouette is unchanged. No pneumothorax. Lines and tubes are unchanged in  position. IMPRESSION:  1. No interval change    KUB 3/6/18  IMPRESSION: Nonspecific bowel gas pattern with multiple dilated loops of small  bowel but does not appear obstructive. No pneumoperitoneum.     Recent Days:  Recent Labs 03/13/18   0210  03/11/18   0104   WBC  13.1*  14.6*   HGB  8.4*  8.6*   HCT  27.0*  28.2*   PLT  290  317     Recent Labs      03/13/18   0210  03/12/18   1734  03/12/18   0927  03/12/18   0055  03/11/18   0204   NA  138   --   136   --   137   K  4.4   --   4.6   --   4.3   CL  109*   --   104   --   107   CO2  25   --   22   --   24   GLU  78   --   98   --   117*   BUN  14   --   17   --   13   CREA  0.46*   --   0.51*   --   0.48*   CA  7.9*   --   8.4*   --   8.3*   MG  1.6   --    --   2.6*  1.5*   PHOS  3.5  3.5   --   11.8*  3.1   ALB  1.6*   --   1.7*   --   1.6*   TBILI  0.3   --   0.4   --   0.4   SGOT  20   --   26   --   30   ALT  20   --   20   --   25     No results for input(s): PH, PCO2, PO2, HCO3, FIO2 in the last 72 hours. 24 Hour Results:  Recent Results (from the past 24 hour(s))   METABOLIC PANEL, COMPREHENSIVE    Collection Time: 03/12/18  9:27 AM   Result Value Ref Range    Sodium 136 136 - 145 mmol/L    Potassium 4.6 3.5 - 5.1 mmol/L    Chloride 104 97 - 108 mmol/L    CO2 22 21 - 32 mmol/L    Anion gap 10 5 - 15 mmol/L    Glucose 98 65 - 100 mg/dL    BUN 17 6 - 20 MG/DL    Creatinine 0.51 (L) 0.55 - 1.02 MG/DL    BUN/Creatinine ratio 33 (H) 12 - 20      GFR est AA >60 >60 ml/min/1.73m2    GFR est non-AA >60 >60 ml/min/1.73m2    Calcium 8.4 (L) 8.5 - 10.1 MG/DL    Bilirubin, total 0.4 0.2 - 1.0 MG/DL    ALT (SGPT) 20 12 - 78 U/L    AST (SGOT) 26 15 - 37 U/L    Alk.  phosphatase 157 (H) 45 - 117 U/L    Protein, total 6.7 6.4 - 8.2 g/dL    Albumin 1.7 (L) 3.5 - 5.0 g/dL    Globulin 5.0 (H) 2.0 - 4.0 g/dL    A-G Ratio 0.3 (L) 1.1 - 2.2     GLUCOSE, POC    Collection Time: 03/12/18 11:59 AM   Result Value Ref Range    Glucose (POC) 93 65 - 100 mg/dL    Performed by Pearl Blood (PCT)    PHOSPHORUS    Collection Time: 03/12/18  5:34 PM   Result Value Ref Range    Phosphorus 3.5 2.6 - 4.7 MG/DL   GLUCOSE, POC    Collection Time: 03/12/18  5:57 PM   Result Value Ref Range    Glucose (POC) 80 65 - 100 mg/dL    Performed by Kalyn Rodriguez (PCT)    GLUCOSE, POC    Collection Time: 03/13/18 12:13 AM   Result Value Ref Range    Glucose (POC) 121 (H) 65 - 100 mg/dL    Performed by Devin Powell (PCT)    METABOLIC PANEL, COMPREHENSIVE    Collection Time: 03/13/18  2:10 AM   Result Value Ref Range    Sodium 138 136 - 145 mmol/L    Potassium 4.4 3.5 - 5.1 mmol/L    Chloride 109 (H) 97 - 108 mmol/L    CO2 25 21 - 32 mmol/L    Anion gap 4 (L) 5 - 15 mmol/L    Glucose 78 65 - 100 mg/dL    BUN 14 6 - 20 MG/DL    Creatinine 0.46 (L) 0.55 - 1.02 MG/DL    BUN/Creatinine ratio 30 (H) 12 - 20      GFR est AA >60 >60 ml/min/1.73m2    GFR est non-AA >60 >60 ml/min/1.73m2    Calcium 7.9 (L) 8.5 - 10.1 MG/DL    Bilirubin, total 0.3 0.2 - 1.0 MG/DL    ALT (SGPT) 20 12 - 78 U/L    AST (SGOT) 20 15 - 37 U/L    Alk. phosphatase 142 (H) 45 - 117 U/L    Protein, total 6.1 (L) 6.4 - 8.2 g/dL    Albumin 1.6 (L) 3.5 - 5.0 g/dL    Globulin 4.5 (H) 2.0 - 4.0 g/dL    A-G Ratio 0.4 (L) 1.1 - 2.2     MAGNESIUM    Collection Time: 03/13/18  2:10 AM   Result Value Ref Range    Magnesium 1.6 1.6 - 2.4 mg/dL   CBC WITH AUTOMATED DIFF    Collection Time: 03/13/18  2:10 AM   Result Value Ref Range    WBC 13.1 (H) 3.6 - 11.0 K/uL    RBC 2.60 (L) 3.80 - 5.20 M/uL    HGB 8.4 (L) 11.5 - 16.0 g/dL    HCT 27.0 (L) 35.0 - 47.0 %    .8 (H) 80.0 - 99.0 FL    MCH 32.3 26.0 - 34.0 PG    MCHC 31.1 30.0 - 36.5 g/dL    RDW 15.5 (H) 11.5 - 14.5 %    PLATELET 432 500 - 345 K/uL    MPV 11.8 8.9 - 12.9 FL    NRBC 0.0 0  WBC    ABSOLUTE NRBC 0.00 0.00 - 0.01 K/uL    NEUTROPHILS 75 32 - 75 %    LYMPHOCYTES 15 12 - 49 %    MONOCYTES 6 5 - 13 %    EOSINOPHILS 3 0 - 7 %    BASOPHILS 1 0 - 1 %    IMMATURE GRANULOCYTES 1 (H) 0.0 - 0.5 %    ABS. NEUTROPHILS 9.8 (H) 1.8 - 8.0 K/UL    ABS. LYMPHOCYTES 2.0 0.8 - 3.5 K/UL    ABS. MONOCYTES 0.8 0.0 - 1.0 K/UL    ABS. EOSINOPHILS 0.3 0.0 - 0.4 K/UL    ABS. BASOPHILS 0.1 0.0 - 0.1 K/UL    ABS. IMM.  GRANS. 0.1 (H) 0.00 - 0.04 K/UL    DF AUTOMATED     PHOSPHORUS    Collection Time: 03/13/18  2:10 AM   Result Value Ref Range    Phosphorus 3.5 2.6 - 4.7 MG/DL   GLUCOSE, POC    Collection Time: 03/13/18  5:20 AM   Result Value Ref Range    Glucose (POC) 123 (H) 65 - 100 mg/dL    Performed by Tom Antonio (PCT)      Medications reviewed  Current Facility-Administered Medications   Medication Dose Route Frequency    anidulafungin (ERAXIS) 100 mg in 0.9% sodium chloride 130 mL IVPB  100 mg IntraVENous Q24H    spironolactone (ALDACTONE) tablet 25 mg  25 mg Oral DAILY    albuterol-ipratropium (DUO-NEB) 2.5 MG-0.5 MG/3 ML  3 mL Nebulization Q4H PRN    melatonin tablet 1.5 mg  1.5 mg Oral QHS PRN    HYDROmorphone (PF) (DILAUDID) injection 1 mg  1 mg IntraVENous Q3H PRN    fentaNYL (DURAGESIC) 25 mcg/hr patch 1 Patch  1 Patch TransDERmal Q72H    haloperidol lactate (HALDOL) injection 1 mg  1 mg IntraVENous Q4H PRN    guaiFENesin ER (MUCINEX) tablet 1,200 mg  1,200 mg Oral Q12H    acetaminophen (TYLENOL) tablet 650 mg  650 mg Oral Q4H PRN    insulin regular (NOVOLIN R, HUMULIN R) injection   SubCUTAneous Q6H    fat emulsion 20% (LIPOSYN, INTRAlipid) infusion 500 mL  500 mL IntraVENous Q MON, WED & SAT    glucose chewable tablet 16 g  4 Tab Oral PRN    glucagon (GLUCAGEN) injection 1 mg  1 mg IntraMUSCular PRN    dextrose (D50W) injection syrg 12.5-25 g  12.5-25 g IntraVENous PRN    alteplase (CATHFLO) 1 mg in sterile water (preservative free) 1 mL injection  1 mg InterCATHeter PRN    sodium chloride (NS) flush 10-30 mL  10-30 mL InterCATHeter PRN    sodium chloride (NS) flush 10-40 mL  10-40 mL InterCATHeter Q8H    naloxone (NARCAN) injection 0.4 mg  0.4 mg IntraVENous PRN    ondansetron (ZOFRAN) injection 4 mg  4 mg IntraVENous Q4H PRN    enoxaparin (LOVENOX) injection 40 mg  40 mg SubCUTAneous Q24H    pantoprazole (PROTONIX) injection 40 mg  40 mg IntraVENous Q12H    phenol throat spray (CHLORASEPTIC) 1 Spray  1 Spray Oral PRN    nystatin (MYCOSTATIN) 100,000 unit/gram powder   Topical BID    sodium chloride (NS) flush 5-10 mL  5-10 mL IntraVENous PRN       Rylan Ca M.D.

## 2018-03-13 NOTE — PROGRESS NOTES
Progress Note    Patient: Anu Burch MRN: [id number]  SSN: [id number]    YOB: 1945  Age: 67 y.o. Sex: female      Admit Date: 2018    29 Days Post-Op    Procedure:  Procedure(s):   Ex lap    Subjective:     Mrs. Marvin Bowen is doing fine. She denies pain. She is passing flatus, but no BM. Objective:     Visit Vitals    /60 (BP 1 Location: Left arm, BP Patient Position: Head of bed elevated (Comment degrees))    Pulse 94    Temp 97.5 °F (36.4 °C)    Resp 18    Ht 5' 3.5\" (1.613 m)    Wt 178 lb 9.2 oz (81 kg)    SpO2 100%    Breastfeeding No    BMI 31.14 kg/m2       Temp (24hrs), Av.9 °F (36.6 °C), Min:97.5 °F (36.4 °C), Max:98.3 °F (36.8 °C)      Physical Exam:    GENERAL: alert, cooperative, no distress, ABDOMEN: Soft, NT, ND. Lab Review:   BMP:   Lab Results   Component Value Date/Time     2018 02:10 AM    K 4.4 2018 02:10 AM     (H) 2018 02:10 AM    CO2 25 2018 02:10 AM    AGAP 4 (L) 2018 02:10 AM    GLU 78 2018 02:10 AM    BUN 14 2018 02:10 AM    CREA 0.46 (L) 2018 02:10 AM    GFRAA >60 2018 02:10 AM    GFRNA >60 2018 02:10 AM     CBC:   Lab Results   Component Value Date/Time    WBC 13.1 (H) 2018 02:10 AM    HGB 8.4 (L) 2018 02:10 AM    HCT 27.0 (L) 2018 02:10 AM     2018 02:10 AM       Assessment:     Hospital Problems  Date Reviewed: 2016          Codes Class Noted POA    Hypokalemia ICD-10-CM: E87.6  ICD-9-CM: 276.8  2018 No        Leukocytosis ICD-10-CM: D72.829  ICD-9-CM: 288.60  2018 Yes        Severe protein-calorie malnutrition ([address] ICD-10-CM: E43  ICD-9-CM: 262  2018 Yes        Acute metabolic encephalopathy LNY-15-PD: G93.41  ICD-9-CM: 348.31  2018 Yes        Hyperammonemia (HCC) ICD-10-CM: E72.20  ICD-9-CM: 270.6  2018 No    Overview Addendum 2018  9:08 AM by Deejay Staples MD        Ref.  Range 2018 17:20 Ammonia Latest Ref Range: <32 UMOL/L 69 (H)                Free intraperitoneal air ICD-10-CM: K66.8  ICD-9-CM: 568.89  2/12/2018 Yes        S/P exploratory laparotomy ICD-10-CM: Z98.890  ICD-9-CM: V45.89  2/12/2018 No    Overview Signed 2/12/2018 11:57 AM by Jefry Valentin MD     Suture repair of perforated posterior gastric ulcer with [place] patch, core needle biopsies of left lobe of the liver. Alcoholic cirrhosis of liver without ascites (HCC) (Chronic) ICD-10-CM: K70.30  ICD-9-CM: 571.2  2/12/2018 Yes    Overview Signed 2/17/2018  8:45 AM by Nimisha Lala MD     Liver bx 2/12/18:   Cirrhosis with mild chronic portal inflammation and macrovesicular steatosis. Fatty liver determined by biopsy ICD-10-CM: K76.0  ICD-9-CM: 571.8  2/12/2018 Yes    Overview Signed 2/17/2018  8:53 AM by Nimisha Lala MD        Cirrhosis with mild chronic portal inflammation and macrovesicular steatosis. * (Principal)Perforated chronic gastric ulcer ([address] (Chronic) ICD-10-CM: K25.5  ICD-9-CM: 531.50  2/11/2018 Yes        ETOH abuse (Chronic) ICD-10-CM: F10.10  ICD-9-CM: 305.00  2/11/2018 Yes              Plan/Recommendations/Medical Decision Making:     Continue bowel rest and TPN. UGI/SBFT today. If negative, will start clears. Continue PT.  SNF placement possibly this week.     Signed By: Jefry Valentin MD     March 13, 2018

## 2018-03-13 NOTE — PROGRESS NOTES
[facility] Progress Note           Alyse Vizcarra DO    [phone] Office  [phone]  Fax    3/13/2018      Assessment & Plan:   1. Leukocytosis. Trending down. Stable overall. Likely  Multifactorial.  No evidence of ongoing infection. Plan 7 days anidulafungin through 3/14. 2. Candida parapsilosis and krusei  growing from the ALYSSA drain. The significance of this yeast is uncertain as it likely represents colonization or selective pressure from the 14 days of piperacillin-tazobactam.  Plan 7 days empiric anidulafungin for now and follow. 3. Perforated gastric ulcer. The patient underwent exploratory laparotomy with repair on .  Now with fistula. On TPN  4. Abnormal LFT's. Stable          Subjective:     No complaints. Drains pulled    Objective:     Vitals:   Visit Vitals    /60 (BP 1 Location: Left arm, BP Patient Position: Head of bed elevated (Comment degrees))    Pulse 94    Temp 97.5 °F (36.4 °C)    Resp 18    Ht 5' 3.5\" (1.613 m)    Wt 81 kg (178 lb 9.2 oz)    SpO2 100%    Breastfeeding No    BMI 31.14 kg/m2        Tmax:  Temp (24hrs), Av.9 °F (36.6 °C), Min:97.5 °F (36.4 °C), Max:98.3 °F (36.8 °C)      Exam:   Patient is intubated:  no    Physical Examination:   General:  Alert, cooperative, no distress   Head:  Normocephalic, without obvious abnormality, atraumatic. Eyes:  Conjunctivae/corneas clear. .   Neck: Supple, symmetrical, trachea midline, no adenopathy       Lungs:   Clear to auscultation anteriorly   Chest wall:  No tenderness or deformity. Heart:  Regular rate and rhythm, S1, S2 normal,. Abdomen:   Soft, non-tender. Extremities: B/l LE edema   Skin: Skin color, texture, turgor normal. No rashes or lesions   Neurologic: CNII-XII intact. Labs:        No lab exists for component: ITNL   No results for input(s): CPK, CKMB, TROIQ in the last 72 hours.   Recent Labs      18   0210  18   1734  18   1772 03/12/18   0055  03/11/18   0204   03/11/18   0104   NA  138   --   136   --   137   --    --    K  4.4   --   4.6   --   4.3   --    --    CL  109*   --   104   --   107   --    --    CO2  25   --   22   --   24   --    --    BUN  14   --   17   --   13   --    --    CREA  0.46*   --   0.51*   --   0.48*   --    --    GLU  78   --   98   --   117*   --    --    PHOS  3.5  3.5   --   11.8*  3.1   < >   --    MG  1.6   --    --   2.6*  1.5*   --    --    ALB  1.6*   --   1.7*   --   1.6*   --    --    WBC  13.1*   --    --    --    --    --   14.6*   HGB  8.4*   --    --    --    --    --   8.6*   HCT  27.0*   --    --    --    --    --   28.2*   PLT  290   --    --    --    --    --   317    < > = values in this interval not displayed. No results for input(s): INR, PTP, APTT in the last 72 hours.     No lab exists for component: INREXT, INREXT  Needs: urine analysis, urine sodium, protein and creatinine  No results found for: CHIKIS, CREAU      Cultures:     No results found for: SDES  Lab Results   Component Value Date/Time    Culture result: Culture performed on Fluid swab specimen 03/01/2018 02:00 PM    Culture result: NO GROWTH 4 DAYS 03/01/2018 02:00 PM    Culture result: NO GROWTH 4 DAYS 03/01/2018 02:00 PM       Radiology:     Medications       Current Facility-Administered Medications   Medication Dose Route Frequency Last Dose    TPN ADULT - CENTRAL   IntraVENous CONTINUOUS      iohexol (OMNIPAQUE) 350 mg iodine/mL contrast injection 100 mL  100 mL Oral RAD ONCE      anidulafungin (ERAXIS) 100 mg in 0.9% sodium chloride 130 mL IVPB  100 mg IntraVENous Q24H 100 mg at 03/12/18 1029    spironolactone (ALDACTONE) tablet 25 mg  25 mg Oral DAILY 25 mg at 03/12/18 0908    albuterol-ipratropium (DUO-NEB) 2.5 MG-0.5 MG/3 ML  3 mL Nebulization Q4H PRN      melatonin tablet 1.5 mg  1.5 mg Oral QHS PRN 1.5 mg at 03/12/18 2205    HYDROmorphone (PF) (DILAUDID) injection 1 mg  1 mg IntraVENous Q3H PRN 1 mg at 03/09/18 0132    fentaNYL (DURAGESIC) 25 mcg/hr patch 1 Patch  1 Patch TransDERmal Q72H 1 Patch at 03/12/18 0913    haloperidol lactate (HALDOL) injection 1 mg  1 mg IntraVENous Q4H PRN 1 mg at 03/04/18 0401    guaiFENesin ER (MUCINEX) tablet 1,200 mg  1,200 mg Oral Q12H 1,200 mg at 03/12/18 2059    acetaminophen (TYLENOL) tablet 650 mg  650 mg Oral Q4H  mg at 03/13/18 0402    insulin regular (NOVOLIN R, HUMULIN R) injection   SubCUTAneous Q6H Stopped at 03/08/18 1200    fat emulsion 20% (LIPOSYN, INTRAlipid) infusion 500 mL  500 mL IntraVENous Q MON, WED &  mL at 03/12/18 1826    glucose chewable tablet 16 g  4 Tab Oral PRN      glucagon (GLUCAGEN) injection 1 mg  1 mg IntraMUSCular PRN      dextrose (D50W) injection syrg 12.5-25 g  12.5-25 g IntraVENous PRN 12.5 g at 03/09/18 1736    alteplase (CATHFLO) 1 mg in sterile water (preservative free) 1 mL injection  1 mg InterCATHeter PRN 1 mg at 02/28/18 1607    sodium chloride (NS) flush 10-30 mL  10-30 mL InterCATHeter PRN 10 mL at 03/01/18 1632    sodium chloride (NS) flush 10-40 mL  10-40 mL InterCATHeter Q8H Stopped at 03/13/18 0600    naloxone (NARCAN) injection 0.4 mg  0.4 mg IntraVENous PRN      ondansetron (ZOFRAN) injection 4 mg  4 mg IntraVENous Q4H PRN 4 mg at 03/08/18 0806    enoxaparin (LOVENOX) injection 40 mg  40 mg SubCUTAneous Q24H 40 mg at 03/12/18 1507    pantoprazole (PROTONIX) injection 40 mg  40 mg IntraVENous Q12H 40 mg at 03/12/18 2059    phenol throat spray (CHLORASEPTIC) 1 Spray  1 Spray Oral PRN 1 Spray at 02/13/18 1803    nystatin (MYCOSTATIN) 100,000 unit/gram powder   Topical BID      sodium chloride (NS) flush 5-10 mL  5-10 mL IntraVENous PRN 10 mL at 03/06/18 1706           Case discussed with: Daughter at [address], DO

## 2018-03-13 NOTE — PROGRESS NOTES
Problem: Self Care Deficits Care Plan (Adult)  Goal: *Acute Goals and Plan of Care (Insert Text)  Occupational Therapy Goals  Revised 3/2/2018. Reviewed 3/9/2018 and updated below to be met within the next 7 days. 1.  Patient will perform grooming with modified independence for >5 minutes with supervision/setup with < 2 verbal cues within 7 day(s)- not met and continue. 2.  Patient will perform upper body dressing and bathing with modified independence within 7 day(s) - not met and continue. 3.  Patient will perform lower body dressing and bathing with moderate assistance using adaptive equipment within 7 day(s)- met 3/9/18 and upgrade to min A.  4.  Patient will perform toilet transfers to [facility] with minimal assistance x1 within 7 day(s)- - not met and continue. 5.  Patient will perform all aspects of toileting with minimal assistance within 7 day(s)- - not met and continue. 6.  Patient will participate in upper extremity therapeutic exercise/activities with supervision/set-up for 10 minutes within 7 day(s)- not met and continue. 7.  Patient will utilize energy conservation techniques during functional activities with verbal cues within 7 day(s)- not met and continue. 8.  Patient will don/doff back brace at supervision/set-up within 7 days- not met and continue. 9.  Patient will verbalize/demonstrate all spinal precautions during ADL tasks without cues within 7 days - not met and continue. Initiated 2/20/2018; revised at re-assess (see above); 1. Patient will perform upper body dressing with moderate assistance in unsupported sitting within 7 day(s). 2.  Patient will perform upper body bathing with moderate assistance  within 7 day(s). 3.  Patient will perform grooming with minimal assistance/contact guard assist within 7 day(s). 4.  Patient will perform toilet transfers with maximal assistance  within 7 day(s).   5.  Patient will perform all aspects of toileting with maximal assistance within 7 day(s). 6.  Patient will participate in upper extremity therapeutic exercise/activities with minimal assistance/contact guard assist for 5 minutes within 7 day(s). Occupational Therapy TREATMENT  Patient: Chepe Burton (59 y.o. female)  Date: 3/13/2018  Diagnosis: Intra-abdominal free air of unknown etiology [K66.8] Perforated chronic gastric ulcer ([address]  Procedure(s) (LRB):   NASOGASTRIC TUBE PLACEMENT (N/A) 23 Days Post-Op  Precautions: Aspiration, Back, Bed Alarm, Fall, Skin  Chart, occupational therapy assessment, plan of care, and goals were reviewed. ASSESSMENT:  Pt resting in bed, agreeable to OT. Drains have been removed and pt on room air. She ambulated to bathroom and transferred to bedside commode over toilet with min assist. Min assist for clothing management and contact guard for bladder hygiene. She needed verbal cueing for hand placement for sit to stand and stand to sit. Pt ambulated to sink and washed her hands as well as used mouth wash for oral care. She returned to sit edge of bed with O2 sats 92-93% on room air. Pt able to doff her socks using cross legged method, she was able to don socks with min assist over her toes and getting socks started. Pt performed UE therapeutic exercises to increase strength and endurance for functional tasks (see below). Pt left seated in chair. Recommend rehab. Progression toward goals:  [x]       Improving appropriately and progressing toward goals  []       Improving slowly and progressing toward goals  []       Not making progress toward goals and plan of care will be adjusted     PLAN:  Patient continues to benefit from skilled intervention to address the above impairments. Continue treatment per established plan of care. Discharge Recommendations:  Rehab  Further Equipment Recommendations for Discharge:  None     SUBJECTIVE:   Patient stated It has kind of been a busy day.     OBJECTIVE DATA SUMMARY:   Cognitive/Behavioral Status:  Neurologic State: Alert  Orientation Level: Oriented X4  Cognition: Follows commands             Functional Mobility and Transfers for ADLs:  Bed Mobility:  Rolling: Minimum assistance  Sit to Supine:  Moderate assistance  Scooting: Supervision    Transfers:  Sit to Stand: Minimum assistance  Functional Transfers  Toilet Transfer : Minimum assistance (verbal cueing to reach back for armrests of BSC)  Cues: Verbal cues provided  Adaptive Equipment: Bedside commode;Walker (comment)    Balance:  Sitting: Intact  Sitting - Static: Good (unsupported)  Sitting - Dynamic: Fair (occasional)  Standing - Static: Constant support;Good  Standing - Dynamic : Fair    ADL Intervention:       Grooming  Grooming Assistance: Contact guard assistance (standing at the sink)  Washing Hands: Contact guard assistance  Brushing Teeth: Contact guard assistance (using mouthwash)                   Lower Body Dressing Assistance  Socks: Minimum assistance (starting socks over feet)  Leg Crossed Method Used: Yes  Position Performed: Seated edge of bed  Cues: Physical assistance    Toileting  Toileting Assistance: Minimum assistance  Bladder Hygiene: Contact guard assistance  Clothing Management: Minimum assistance     Therapeutic Exercises:     EXERCISE   Sets   Reps   Active Active Assist   Passive   Comments   Hand/wrist rotation 1 10 [x]           []           []              Forearm supination/pronation 1 10 [x]           []           []              Reaching across her body 1 10 [x]           []           []              Shoulder flex/ext 1 10 [x]           []           []              Elbow flex/ext 1 10 [x]           []           []              Chest presses 1 10 [x]           []           []                 []           []           []                 []           []           []                 []           []           []                 []           []           []                 []           []           [] Pain:  Pain Scale 1: Numeric (0 - 10)  Pain Intensity 1: 0              Activity Tolerance:   No signs/symptoms of distress or discomfort during OT  Please refer to the flowsheet for vital signs taken during this treatment.   After treatment:   [x] Patient left in no apparent distress sitting up in chair  [] Patient left in no apparent distress in bed  [x] Call bell left within reach  [x] Nursing notified  [] Caregiver present  [x] Chair alarm activated    COMMUNICATION/COLLABORATION:   The patients plan of care was discussed with: Occupational Therapist and Registered Nurse    ISRAEL Kramer  Time Calculation: 25 mins

## 2018-03-14 LAB
GLUCOSE BLD STRIP.AUTO-MCNC: 101 MG/DL (ref 65–100)
GLUCOSE BLD STRIP.AUTO-MCNC: 118 MG/DL (ref 65–100)
GLUCOSE BLD STRIP.AUTO-MCNC: 78 MG/DL (ref 65–100)
GLUCOSE BLD STRIP.AUTO-MCNC: 87 MG/DL (ref 65–100)
GLUCOSE BLD STRIP.AUTO-MCNC: 89 MG/DL (ref 65–100)
SERVICE CMNT-IMP: ABNORMAL
SERVICE CMNT-IMP: ABNORMAL
SERVICE CMNT-IMP: NORMAL
TRIGL SERPL-MCNC: 82 MG/DL (ref ?–150)

## 2018-03-14 PROCEDURE — 74011250637 HC RX REV CODE- 250/637: Performed by: INTERNAL MEDICINE

## 2018-03-14 PROCEDURE — 36415 COLL VENOUS BLD VENIPUNCTURE: CPT | Performed by: SURGERY

## 2018-03-14 PROCEDURE — C9113 INJ PANTOPRAZOLE SODIUM, VIA: HCPCS | Performed by: SURGERY

## 2018-03-14 PROCEDURE — 74011250637 HC RX REV CODE- 250/637: Performed by: NURSE PRACTITIONER

## 2018-03-14 PROCEDURE — 74011000250 HC RX REV CODE- 250: Performed by: SURGERY

## 2018-03-14 PROCEDURE — 74011250636 HC RX REV CODE- 250/636: Performed by: INTERNAL MEDICINE

## 2018-03-14 PROCEDURE — 65270000029 HC RM PRIVATE

## 2018-03-14 PROCEDURE — 74011250637 HC RX REV CODE- 250/637: Performed by: FAMILY MEDICINE

## 2018-03-14 PROCEDURE — 84478 ASSAY OF TRIGLYCERIDES: CPT | Performed by: SURGERY

## 2018-03-14 PROCEDURE — 97116 GAIT TRAINING THERAPY: CPT

## 2018-03-14 PROCEDURE — 74011000258 HC RX REV CODE- 258: Performed by: SURGERY

## 2018-03-14 PROCEDURE — 74011250636 HC RX REV CODE- 250/636: Performed by: SURGERY

## 2018-03-14 PROCEDURE — 74011000258 HC RX REV CODE- 258: Performed by: INTERNAL MEDICINE

## 2018-03-14 PROCEDURE — 74011636637 HC RX REV CODE- 636/637: Performed by: SURGERY

## 2018-03-14 PROCEDURE — 82962 GLUCOSE BLOOD TEST: CPT

## 2018-03-14 PROCEDURE — 74011250637 HC RX REV CODE- 250/637: Performed by: SURGERY

## 2018-03-14 RX ADMIN — ENOXAPARIN SODIUM 40 MG: 40 INJECTION SUBCUTANEOUS at 14:22

## 2018-03-14 RX ADMIN — I.V. FAT EMULSION 500 ML: 20 EMULSION INTRAVENOUS at 18:50

## 2018-03-14 RX ADMIN — NYSTATIN: 100000 POWDER TOPICAL at 18:45

## 2018-03-14 RX ADMIN — ACETAMINOPHEN 650 MG: 325 TABLET ORAL at 15:27

## 2018-03-14 RX ADMIN — PANTOPRAZOLE SODIUM 40 MG: 40 INJECTION, POWDER, FOR SOLUTION INTRAVENOUS at 20:08

## 2018-03-14 RX ADMIN — NYSTATIN: 100000 POWDER TOPICAL at 08:22

## 2018-03-14 RX ADMIN — PANTOPRAZOLE SODIUM 40 MG: 40 INJECTION, POWDER, FOR SOLUTION INTRAVENOUS at 08:21

## 2018-03-14 RX ADMIN — Medication 20 ML: at 08:21

## 2018-03-14 RX ADMIN — SODIUM CHLORIDE 100 MG: 900 INJECTION, SOLUTION INTRAVENOUS at 09:39

## 2018-03-14 RX ADMIN — GUAIFENESIN 1200 MG: 600 TABLET, EXTENDED RELEASE ORAL at 20:09

## 2018-03-14 RX ADMIN — MELATONIN TAB 3 MG 1.5 MG: 3 TAB at 22:55

## 2018-03-14 RX ADMIN — SPIRONOLACTONE 25 MG: 25 TABLET, FILM COATED ORAL at 08:21

## 2018-03-14 RX ADMIN — Medication 10 ML: at 20:08

## 2018-03-14 RX ADMIN — GUAIFENESIN 1200 MG: 600 TABLET, EXTENDED RELEASE ORAL at 08:21

## 2018-03-14 RX ADMIN — Medication 20 ML: at 11:23

## 2018-03-14 RX ADMIN — CALCIUM GLUCONATE: 94 INJECTION, SOLUTION INTRAVENOUS at 18:42

## 2018-03-14 RX ADMIN — ACETAMINOPHEN 650 MG: 325 TABLET ORAL at 05:15

## 2018-03-14 NOTE — PROGRESS NOTES
Problem: Mobility Impaired (Adult and Pediatric)  Goal: *Acute Goals and Plan of Care (Insert Text)  Physical Therapy Goals  Revised 3/14/2018  1. Patient will move from flat supine to sit and sit to flat supine and roll side to side in bed with minimal assistance/contact guard assist within 7 day(s). 2.  Patient will transfer from bed <> chair and chair <> bed with supervision/set-up using the least restrictive device within 7 day(s). 3.  Patient will perform sit <>stand with modified independence within 7 day(s). 4.  Patient will ambulate with SBA for 100 feet with RW within 7 day(s). 5. Patient will verbalize and demonstrate understanding of spinal precautions (No bending, lifting greater than 5 lbs, or twisting; log-roll technique; frequent repositioning as instructed) within 7 days. 6. Patient will demonstrate independence completing 10 reps of each exercise of initial exercise program with visual handout within 7 days       Physical Therapy Goals  Revised 3/7/2018  1. Patient will move from long sitting to sit edge of bed and sit to supine and roll side to side in bed with modified independence 7 day(s). Not Met  2. Patient will transfer from bed to chair and chair to bed with contact guard assist x1 using RW within 7 day(s). Nearly Met - upgrade and continue  3. Patient will perform sit <>stand with contact guard assist x 1 within 7 day(s). Nearly Met - upgrade and continue  4. Patient will ambulate with minimal assistance x 1  for 50 feet with RW within 7 day(s). Met - upgrade and continue  5. Patient will verbalize and demonstrate understanding of spinal precautions (No bending, lifting greater than 5 lbs, or twisting; log-roll technique; frequent repositioning as instructed) within 7 days. Not Met - continue    Physical Therapy Goals  Revised 2/28/2018  1. Patient will move from supine to sit and sit to supine  and roll side to side in bed with moderate assistance x 1  within 7 day(s).  MET, upgrade  2. Patient will move from long sitting in bed to edge of bed with minimal assistance x 1 within 7 days. MET, advance  3. Patient will transfer from bed to chair and chair to bed with moderate assistance x 1 using the least restrictive device within 7 day(s). 4.  Patient will perform sit <> stand with minimal assistance x 1 within 7 day(s). NOT MET - continue  5. Patient will ambulate with minimal assistance x 1  for 15 feet with the least restrictive device within 7 day(s). MET, advance  6. Patient will verbalize and demonstrate understanding of spinal precautions (No bending, lifting greater than 5 lbs, or twisting; log-roll technique; frequent repositioning as instructed) within 7 days. NOT MET- continue    Physical Therapy Goals  Initiated 2/20/2018  1. Patient will move from supine to sit and sit to supine , scoot up and down and roll side to side in bed with moderate assistance x 2  within 7 days. Partially Met - Continue  2. Patient will perform sit <> stand with minimal assist x 2  within 7 days. Met - Advance goal below  3. Patient will ambulate with minimal assistance x 2 for 10 feet with RW and assist of 3rd pushing chair from behind within 7 days. Not Met- Continue  4. Patient will verbalize and demonstrate understanding of spinal precautions (No bending, lifting greater than 5 lbs, or twisting; log-roll technique; frequent repositioning as instructed) within 7 days. Not Met- Continue     physical Therapy TREATMENT: WEEKLY REASSESSMENT  Patient: Rashard Sy (20 y.o. female)  Date: 3/14/2018  Diagnosis: Intra-abdominal free air of unknown etiology [K66.8] Perforated chronic gastric ulcer ([facility] [address]  Procedure(s) (LRB):   NASOGASTRIC TUBE PLACEMENT (N/A) 24 Days Post-Op  Precautions: Aspiration, Back, Bed Alarm, Fall, Skin  Chart, physical therapy assessment, plan of care and goals were reviewed. ASSESSMENT:  Received patient alert, NAD, and appearing comfortable sitting up in bed.  She wasn't enthusiastic but agreeable to ambulate with PT. Patient weaned off oxygen today and appears with mild dyspnea with activity but SPO2 stable 92-95% range and -110 throughout treatment on room air. Patient tolerated ambulation 100' -twice with rest break in between and 10 of each exercise per handout completed sitting edge of bed and then once returned to supine. Bed mobility and recalling sequence for log rolling and reverse log roll back to bed still difficult for Ms. Chintan Winn to recall. She also still needs repetition to recall hand placement for transfers. Overall however much improved over last week  Patient's progression toward goals since last assessment: progressive mobility     PLAN:  Goals have been updated based on progression since last assessment. Patient continues to benefit from skilled intervention to address the above impairments. Continue to follow the patient 6 times a week to address goals. Planned Interventions:  [x]              Bed Mobility Training             [x]       Neuromuscular Re-Education  [x]              Transfer Training                   []       Orthotic/Prosthetic Training  [x]              Gait Training                         []       Modalities  [x]              Therapeutic Exercises           []       Edema Management/Control  [x]              Therapeutic Activities            [x]       Patient and Family Training/Education  []              Other (comment):  Discharge Recommendations: Skilled Nursing Facility  Further Equipment Recommendations for Discharge: TBD post rehab     SUBJECTIVE:   Patient stated I have come a long way.     OBJECTIVE DATA SUMMARY:   Critical Behavior:  Neurologic State: Alert  Orientation Level: Oriented X4  Cognition: Appropriate for age attention/concentration, Appropriate safety awareness, Follows commands  Safety/Judgement: Awareness of environment, Decreased insight into deficits, Fall prevention, Home safety      Functional Mobility Training:  Bed Mobility:  Rolling: Moderate assistance (reminder to log roll for in and out of bed)  Supine to Sit: Moderate assistance  Sit to Supine: Moderate assistance  Scooting: Stand-by assistance; Additional time        Transfers:  Sit to Stand: Contact guard assistance  Stand to Sit: Contact guard assistance  Stand Pivot Transfers: Contact guard assistance        Balance:  Sitting - Static: Good (unsupported)  Sitting - Dynamic: Good (unsupported)  Standing - Static: Constant support;Good  Standing - Dynamic : Good  Ambulation/Gait Training:  Distance (ft): 100 Feet (ft) (x 2)  Assistive Device: Brace/Splint;Gait belt;Walker, rolling  Ambulation - Level of Assistance: Minimal assistance        Gait Abnormalities: Antalgic;Decreased step clearance; Path deviations        Base of Support: Widened       Stairs - Level of Assistance:  (NT)    Therapeutic Exercises:   10 each - 1 set Ankle pumps, knee raises, LAQs, Quad sets Gluteal sets, heel slides  Pain:  Pain Scale 1: Numeric (0 - 10)  Pain Intensity 1: 3  Pain Location 1: Back  Pain Orientation 1: Mid  Pain Description 1: Aching  Pain Intervention(s) 1: Medication (see MAR)  Activity Tolerance:   Improving - Off O2, room air for all activity mild dyspneic, denies dizziness  Please refer to the flowsheet for vital signs taken during this treatment.   After treatment:   []  Patient left in no apparent distress sitting up in chair  [x]  Patient left in no apparent distress in bed  [x]  Call bell left within reach  [x]  Nursing notified  []  Caregiver present  []  Bed alarm activated    COMMUNICATION/COLLABORATION:   The patients plan of care was discussed with: Registered Nurse    Nitza Villa PT   Time Calculation: 20 mins

## 2018-03-14 NOTE — PROGRESS NOTES
Occupational Therapy Note: Pt received in the restroom with PCT. Pt fatigued following activity and returned to sit in chair to finish her lunch. Attempted OT second time and pt having lower abdominal pain according to her daughter. Will attempt OT tomorrow.

## 2018-03-14 NOTE — PROGRESS NOTES
Family Practice Daily Progress Note: 3/14/2018  Miranda Gallegos Draft, DO    Assessment/Plan:   Perforated chronic gastric ulcer /  Free intraperitoneal air / S/P exploratory laparotomy 2/11. S/p repair in OR with Dr. Ahsan Lozoya on 2/12/18 - per surg    Severe protein calorie malnutrition-POA  --TPN per GI - advance diet as per surg  --Replete K/Mg as needed     Acute metabolic encephalopathy/Delerium. Much improved - back to baseline. Multifactorial-- medications, alcohol use, sundowning  ---seroquel at night, haldol prn    Hypoxia / Respiratory distress. Improved. Pleural effusions   ---pulmonary has signed off  --wean O2 off as tolerated for sats >52%     Alcoholic cirrhosis of liver without ascites /  Fatty liver determined by biopsy /  Hyperammonemia. --GI has seen- rec alcohol cessation and outpt follow up     ETOH abuse. Needs to stop ETOH entirely.     Hypokalemia /  Hypoalbuminemia. Being addressed with TPN     Leukocytosis.  -- trending back up  -- repeat blood cultures, and urine 2/28 neg  --was on Zosyn, and levaquin - cultures drawn off antibiotics neg except yeast -anidulafungin  added  --ID following    Anemia: watch Hgb   --follow, transfuse <7  --recheck in AM    Back Pain due to compression fracture of T12  ---Increased Duragesic patch to 25mcg and DIiaudid to 1mg as needed  --ortho to re-eval Monday per daughter's request    Debility: due to prolonged hospital stay  --continue PT/OT- needs to get OOB  --will likely need rehab    DVT proph - lovenox        Problem List:  Problem List as of 3/14/2018  Date Reviewed: 4/26/2016          Codes Class Noted - Resolved    Hypokalemia ICD-10-CM: E87.6  ICD-9-CM: 276.8  2/18/2018 - Present        Leukocytosis ICD-10-CM: D72.829  ICD-9-CM: 288.60  2/18/2018 - Present        Severe protein-calorie malnutrition ([address] ICD-10-CM: E43  ICD-9-CM: 262  2/18/2018 - Present        Acute metabolic encephalopathy NEI-22-TZ: G93.41  ICD-9-CM: 348.31  2/18/2018 - Present        Hyperammonemia (HCC) ICD-10-CM: E72.20  ICD-9-CM: 270.6  2/16/2018 - Present    Overview Addendum 2/17/2018  9:08 AM by Berenice Pike MD        Ref. Range 2/16/2018 17:20   Ammonia Latest Ref Range: <32 UMOL/L 69 (H)                Free intraperitoneal air ICD-10-CM: K66.8  ICD-9-CM: 568.89  2/12/2018 - Present        S/P exploratory laparotomy ICD-10-CM: Z98.890  ICD-9-CM: V45.89  2/12/2018 - Present    Overview Signed 2/12/2018 11:57 AM by Teresita Stephens MD     Suture repair of perforated posterior gastric ulcer with Scherry Resides patch, core needle biopsies of left lobe of the liver. Alcoholic cirrhosis of liver without ascites (HCC) (Chronic) ICD-10-CM: K70.30  ICD-9-CM: 571.2  2/12/2018 - Present    Overview Signed 2/17/2018  8:45 AM by Berenice Pike MD     Liver bx 2/12/18:   Cirrhosis with mild chronic portal inflammation and macrovesicular steatosis. Fatty liver determined by biopsy ICD-10-CM: K76.0  ICD-9-CM: 571.8  2/12/2018 - Present    Overview Signed 2/17/2018  8:53 AM by Berenice Pike MD        Cirrhosis with mild chronic portal inflammation and macrovesicular steatosis. * (Principal)Perforated chronic gastric ulcer ([address] (Chronic) ICD-10-CM: K25.5  ICD-9-CM: 531.50  2/11/2018 - Present        ETOH abuse (Chronic) ICD-10-CM: F10.10  ICD-9-CM: 305.00  2/11/2018 - Present        GI bleed ICD-10-CM: K92.2  ICD-9-CM: 578.9  4/26/2016 - Present        Hypotension ICD-10-CM: I95.9  ICD-9-CM: 458.9  4/26/2016 - Present        Tachycardia ICD-10-CM: R00.0  ICD-9-CM: 785.0  4/26/2016 - Present        Acute blood loss anemia ICD-10-CM: D62  ICD-9-CM: 285.1  4/26/2016 - Present              Subjective:    67 y.o.  female with EtOH abuse who is admitted to the General Surgery Service with perforated gastric ulcer. We are asked to evaluate for altered mental status.  The patient is poorly interactive at this time and this limits primary hx. Discussed case with family available at bedside providing secondary hx and chart review. Per family, patient has had declining neurological condition over past 48 hours. Notably, an RRT was called for respiratory distress. 2/19: This morning she is a bit more alert at this moment and taking in more complete sentences. She says she does not feel well. She has no specific site of pain other than the NG tube which is bothering her a great deal.  She should improve as time from surg increases. K+ a little low - replacing.     2/20:  Still confused and somnolent at times. Now able to localize pain to ab and converse a bit more. Tmax 100.3  WBC is up again but hopefully reactive - recheck in AM - more incentive spirom and check CXR.     2/21: A little more alert. Knows she is in the hospital. Not able to participate with PT yesterday as she was in too much pain. WBC still at 19.     2/22:  WBC 19K. She is more alert. Daughter at bedside. Both of her daughters live out of town. Looking at SNF options for rehab. Coughing more. Starting to use IS.     2/23: WBC 17. Repeat CT chest with moderate to large persistent left subphrenic collection. IR has been consulted. Vanc and Levaquin added by pulm. 2/24: Pt had a better night last night. Only brief episode of confusion. She is much clearer this AM.  Now in quite a bit of pain from gas and stomach cramping. Thinks she will feel better if she can have a BM. Has been on bedpan for a while without success. On TPN. Daughters very concerned that she won't be successful while lying down. Wanting to get up to bedside commode. Will need PT's help. 2/25: febrile overnight and WBC up after vanc was stopped. Pt c/o more dyspnea today. Known fluid collection that will be drained in IR this week. Up to chair with PT yesterday briefly. +BM yesterday. Da notes pt has been c/o R wrist pain off and on for 2 wks since she fell.   No swelling.    2/26:  No new complaints. Still c/o back and ab pain. Still passing gas and had BM. Diuresed with 1 mg Bumex yesterday by Pulmonary. Remains on Zosyn and Levaquin. Blood culture remains neg. Tmax 99.2. X-ray of wrists ok. CXR ok with tube inplace. WBC 15.7K.      2/27:  Looks much better today. Much less work of breathing. Diuresed about 1 liter over last 24h. WBC 16K today. Blood cult remains neg. Off antibiotics. Still on TPN. She will need rehab for PT/OT. 2/28: Very restless during the night. Has not been OOB. Daughter stayed the night with her. WBC is still up. Antibiotics stopped yesterday. She is c/o increased pain in her back and can't get comfortable. More anxious and agitated. Tolerating sips. 3/1:  She reports she feels better this AM after she had some sleep. Afeb. WBC still up - a bit more today with increased neutrophils. Will also get ID to see also. CT AB and Pelvis as below. Cultures done yesterday off antibiotics - so far no growth. No output from drain - may need to be repositioned. More edema today but not as short of breath today while sitting up - add albumin to todays lab. Add Aldactone if ok with GI and Bumex as per Pul. Discussed cirrhosis/ascites/edema with pts daughter. 3/2:  She reports she feels better, and has slightly less edema, although daughters note they think pts abdomin more swollen. The daughters are more worried about pts LE edema than anything else - explained that with pts low albumin that edema may cont to be a problem. She is more alert, a bit more oriented today and says she has much less pain today. Tmax 101. Culture had yeast - diflucan started. Drain was repositioned, had thoracentesis and new PICC placed yesterday. May need to restart broad spectrum antibiotics - ID consulted. 3/3:  She reports she feels better this AM.  More alert and oriented this AM.  BM yesterday. Tmax 99. WBC still 17-18K.   ID consulted and advises to watch off antibiotics for now. Less edema LE.      3/4:  Doing much better today and much more alert. Little pain. Off sleeping meds for now - not sleeping well but reports \"never was a good sleeper. \"  Anxious at night. WBC still up. Discussed rehab with daughters and pt and pt willing to go. 3/5:  Continues to improve. She wants to eat solid food - she now has an appetite. Passing gas. Has been up to chair. K+ 5.3 - will DC aldactone for now and restart if edema returns. CXR pending today. 3/6: She reports she feels better. Yesterday had marked increase in ALYSSA output and now NPO and back on TPN today. Will resume Aldactone again (at lower dose) as sl more edema today and hold again if K+ creeping up again. AM labs pending. 3/7:  Reports some ab pain this AM but not severe. Orthostatic hypotension with walking yesterday and K+ back up - will hold aldactone again. Breathing and edema better with the Aldactone. Push incentive spirom - last CXR with atelectasis. O>>I on I&Os but wonder about measurements. 3/8:  Nausea this AM with 2 episodes of vomiting but reports she feels back to normal now. No ab pain. No SOB at rest.  Wearing nasal O2 at 2 L. Nurse reports yesterdays labs were drawn just before 12MN last night for some reason - still have not resulted and lab called and will run samples now. This AMs labs not drawn yet. Port CXR this AM pending.  O>>I again but Intake not correct. 3/9:  No nausea this AM.  No further vomiting. Had BM yesterday. Desats with exercise. Was up in chair some yesterday. WBC down to 12K. Hb a little lower at 7.8 - recheck later today. Seems to be making slow progress. 3/10: [place]/V. Pain minimal at this time, some in back. No recent confusion. BM 2 days ago. 3/11: Walked this AM with PT. Up in chair now and back hurts, worse with deep breaths. No BM in a few days now.     3/12:  Pt reports BM yesterday. No pain. Breathing stable. AM CBC pending. Add: 327pm:  Labs back - phos up but Ca+ ok - will discuss with surg. 3/13:  Drains out yesterday and doing well so far with little drainage. Elevated Phos was prob an anomaly - todays is back in line with previous ones. 3/14: Feeling better today. No ab pain/fullness at this time. Still only on sips. Past Medical History:   Diagnosis Date    Alcoholic cirrhosis of liver without ascites ([facility] 2/12/2018    ETOH abuse 2/11/2018    History of vascular access device 02/16/2018    [facility] VAT - 5 FR triple, R basilic, TPN    Hyperammonemia ([facility] 2/16/2018     Results for [employer] (MRN [id number]) as of 2/17/2018 08:43  Ref. Range 2/16/2018 17:20 Ammonia Latest Ref Range: <32 UMOL/L 69 (H)     Hyperlipidemia     Perforated chronic gastric ulcer ([facility] 2/11/2018     Past Surgical History:   Procedure Laterality Date    HX CATARACT REMOVAL       Social History     Social History    Marital status: SINGLE     Spouse name: N/A    Number of children: N/A    Years of education: N/A     Occupational History    Not on file. Social History Main Topics    Smoking status: Former Smoker    Smokeless tobacco: Never Used    Alcohol use 11.4 oz/week     0 Standard drinks or equivalent, 19 Glasses of wine per week      Comment: Hx of heavy etoh use, but quit several months ago    Drug use: No    Sexual activity: Not on file     Other Topics Concern    Not on file     Social History Narrative     Family History   Problem Relation Age of Onset    Other Other      patient did not know     Review of Systems:   A comprehensive review of systems was negative except for that written in the HPI.     Objective:   Physical Exam:     Visit Vitals    /76 (BP 1 Location: Left arm, BP Patient Position: Head of bed elevated (Comment degrees))    Pulse 99    Temp 98.1 °F (36.7 °C)    Resp 17    Ht 5' 3.5\" (1.613 m)    Wt 81 kg (178 lb 9.2 oz)    SpO2 96%    Breastfeeding No    BMI 31.14 kg/m2    O2 Flow Rate (L/min): 2 l/min O2 Device: Room air    Temp (24hrs), Av.2 °F (36.8 °C), Min:97.5 °F (36.4 °C), Max:98.7 °F (37.1 °C)         1901 -  0700  In: 3195.9 [P.O.:50; I.V.:3145.9]  Out: 4185 [Urine:2750]    General:  Awake, uncomfortable, appears stated age. Head:  Normocephalic, without obvious abnormality, atraumatic. Eyes:  Conjunctivae/corneas clear. EOMs intact. Throat: Lips, mucosa, and tongue dry. Neck: Supple, symmetrical, trachea midline, no adenopathy   Lungs:    CTAB, no w/r/r    Heart:  reg rate with regular rhythm,  no murmur, click, rub or gallop. Abdomen:   Soft,minimal distension. Drains with little output. Extremities: no cyanosis. No LE edema at this time. No calf tenderness or cords, no erythema   Skin: turgor normal. No rashes or lesions   Neurologic: AOx2-3, . Sensation grossly normal to touch. Gross motor of extremities normal.       Data Review:    CT AB and Pelvis 18: IMPRESSION:  1. There is residual fluid in the posterior portion of the left subphrenic  collection which does not appear well drained by the pigtail catheter. This  could be repositioned versus new drainage catheter placement if clinically  indicated. 2. Persistent ascites. 3. Increasing right pleural effusion and right lower lobe atelectasis   4. Distended duodenum and proximal small bowel of doubtful clinical  Significance. CXR 3/1/18:  INDICATION:  chest pain and shortness of breath following thoracentesis   EXAM: Portable chest 1716 . Comparison 2018  FINDINGS: There is no significant change in appearance the lungs. Small left  pleural effusion with left basilar atelectasis unchanged. Cardiomediastinal  silhouette is unchanged. No pneumothorax. Lines and tubes are unchanged in  position. IMPRESSION:  1.  No interval change    KUB 3/6/18  IMPRESSION: Nonspecific bowel gas pattern with multiple dilated loops of small  bowel but does not appear obstructive. No pneumoperitoneum. Recent Days:  Recent Labs      03/13/18   0210   WBC  13.1*   HGB  8.4*   HCT  27.0*   PLT  290     Recent Labs      03/13/18   0210  03/12/18   1734  03/12/18   0927  03/12/18   0055   NA  138   --   136   --    K  4.4   --   4.6   --    CL  109*   --   104   --    CO2  25   --   22   --    GLU  78   --   98   --    BUN  14   --   17   --    CREA  0.46*   --   0.51*   --    CA  7.9*   --   8.4*   --    MG  1.6   --    --   2.6*   PHOS  3.5  3.5   --   11.8*   ALB  1.6*   --   1.7*   --    TBILI  0.3   --   0.4   --    SGOT  20   --   26   --    ALT  20   --   20   --      No results for input(s): PH, PCO2, PO2, HCO3, FIO2 in the last 72 hours.   24 Hour Results:  Recent Results (from the past 24 hour(s))   GLUCOSE, POC    Collection Time: 03/13/18 11:17 AM   Result Value Ref Range    Glucose (POC) 99 65 - 100 mg/dL    Performed by Wei Fraga (PCT)    GLUCOSE, POC    Collection Time: 03/13/18  5:37 PM   Result Value Ref Range    Glucose (POC) 90 65 - 100 mg/dL    Performed by Wei Fraga (PCT)    GLUCOSE, POC    Collection Time: 03/13/18 11:30 PM   Result Value Ref Range    Glucose (POC) 107 (H) 65 - 100 mg/dL    Performed by Kaylah Raya    GLUCOSE, POC    Collection Time: 03/14/18  5:15 AM   Result Value Ref Range    Glucose (POC) 89 65 - 100 mg/dL    Performed by Kaylah Raya      Medications reviewed  Current Facility-Administered Medications   Medication Dose Route Frequency    anidulafungin (ERAXIS) 100 mg in 0.9% sodium chloride 130 mL IVPB  100 mg IntraVENous Q24H    spironolactone (ALDACTONE) tablet 25 mg  25 mg Oral DAILY    albuterol-ipratropium (DUO-NEB) 2.5 MG-0.5 MG/3 ML  3 mL Nebulization Q4H PRN    melatonin tablet 1.5 mg  1.5 mg Oral QHS PRN    HYDROmorphone (PF) (DILAUDID) injection 1 mg  1 mg IntraVENous Q3H PRN    fentaNYL (DURAGESIC) 25 mcg/hr patch 1 Patch  1 Patch TransDERmal Q72H    haloperidol lactate (HALDOL) injection 1 mg  1 mg IntraVENous Q4H PRN    guaiFENesin ER (MUCINEX) tablet 1,200 mg  1,200 mg Oral Q12H    acetaminophen (TYLENOL) tablet 650 mg  650 mg Oral Q4H PRN    insulin regular (NOVOLIN R, HUMULIN R) injection   SubCUTAneous Q6H    fat emulsion 20% (LIPOSYN, INTRAlipid) infusion 500 mL  500 mL IntraVENous Q MON, WED & SAT    glucose chewable tablet 16 g  4 Tab Oral PRN    glucagon (GLUCAGEN) injection 1 mg  1 mg IntraMUSCular PRN    dextrose (D50W) injection syrg 12.5-25 g  12.5-25 g IntraVENous PRN    alteplase (CATHFLO) 1 mg in sterile water (preservative free) 1 mL injection  1 mg InterCATHeter PRN    sodium chloride (NS) flush 10-30 mL  10-30 mL InterCATHeter PRN    sodium chloride (NS) flush 10-40 mL  10-40 mL InterCATHeter Q8H    naloxone (NARCAN) injection 0.4 mg  0.4 mg IntraVENous PRN    ondansetron (ZOFRAN) injection 4 mg  4 mg IntraVENous Q4H PRN    enoxaparin (LOVENOX) injection 40 mg  40 mg SubCUTAneous Q24H    pantoprazole (PROTONIX) injection 40 mg  40 mg IntraVENous Q12H    phenol throat spray (CHLORASEPTIC) 1 Spray  1 Spray Oral PRN    nystatin (MYCOSTATIN) 100,000 unit/gram powder   Topical BID    sodium chloride (NS) flush 5-10 mL  5-10 mL IntraVENous PRN       Falguni Santiago M.D.

## 2018-03-14 NOTE — PROGRESS NOTES
Progress Note    Patient: Myra Soliman MRN: [id number]  SSN: [id number]    YOB: 1945  Age: 67 y.o. Sex: female      Admit Date: 2018    30 Days Post-Op    Procedure:  Procedure(s):   Ex lap    Subjective:     Mrs. Pippa Davidson is doing fine. She is passing flatus and BM. Objective:     Visit Vitals    /68 (BP 1 Location: Left arm, BP Patient Position: At rest)    Pulse 93    Temp 97.5 °F (36.4 °C)    Resp 18    Ht 5' 3.5\" (1.613 m)    Wt 178 lb 9.2 oz (81 kg)    SpO2 93%    Breastfeeding No    BMI 31.14 kg/m2       Temp (24hrs), Av.2 °F (36.8 °C), Min:97.5 °F (36.4 °C), Max:98.7 °F (37.1 °C)      Physical Exam:    GENERAL: alert, cooperative, no distress, ABDOMEN: Soft, NT, ND. Data Review: reviewed  UGI/SBFT. Lab Review:   BMP: No results found for: NA, K, CL, CO2, AGAP, GLU, BUN, CREA, GFRAA, GFRNA    Assessment:     Hospital Problems  Date Reviewed: 2016          Codes Class Noted POA    Hypokalemia ICD-10-CM: E87.6  ICD-9-CM: 276.8  2018 No        Leukocytosis ICD-10-CM: D72.829  ICD-9-CM: 288.60  2018 Yes        Severe protein-calorie malnutrition ([facility] ICD-10-CM: E43  ICD-9-CM: 103  2018 Yes        Acute metabolic encephalopathy [facility]-18-JE: G93.41  ICD-9-CM: 348.31  2018 Yes        Hyperammonemia ([facility] ICD-10-CM: E72.20  ICD-9-CM: 270.6  2018 No    Overview Addendum 2018  9:08 AM by Minoo Berrios MD        Ref. Range 2018 17:20   Ammonia Latest Ref Range: <32 UMOL/L 69 (H)                Free intraperitoneal air ICD-10-CM: K66.8  ICD-9-CM: 568.89  2018 Yes        S/P exploratory laparotomy ICD-10-CM: Z98.890  ICD-9-CM: V45.89  2018 No    Overview Signed 2018 11:57 AM by Kelvin Ramos MD     Suture repair of perforated posterior gastric ulcer with Theadore Jhoan patch, core needle biopsies of left lobe of the liver.              Alcoholic cirrhosis of liver without ascites (HCC) (Chronic) ICD-10-CM: K70.30  ICD-9-CM: 571.2  2/12/2018 Yes    Overview Signed 2/17/2018  8:45 AM by Jacob Riley MD     Liver bx 2/12/18:   Cirrhosis with mild chronic portal inflammation and macrovesicular steatosis. Fatty liver determined by biopsy ICD-10-CM: K76.0  ICD-9-CM: 571.8  2/12/2018 Yes    Overview Signed 2/17/2018  8:53 AM by Jacob Riley MD        Cirrhosis with mild chronic portal inflammation and macrovesicular steatosis. * (Principal)Perforated chronic gastric ulcer ([address] (Chronic) ICD-10-CM: K25.5  ICD-9-CM: 531.50  2/11/2018 Yes        ETOH abuse (Chronic) ICD-10-CM: F10.10  ICD-9-CM: 305.00  2/11/2018 Yes              Plan/Recommendations/Medical Decision Making:     UGI/SBFT negative. Trial of clears. Continue TPN until eating. PT.  SNF placement hopefully this week if tolerates diet. Will not need TPN if tolerates diet.     Signed By: Vandana Esquivel MD     March 14, 2018

## 2018-03-14 NOTE — PROGRESS NOTES
3/14/2018   10:50 AM  Cm consulted [facility] physician who reported pt may be able to be discharged without TPN depending on her tolerance early next week. Pt's DTR indicated that if pt is discharged without TPN, they would prefer [address], [address], or [facility]. CM resubmitted referrals dependent on TPN status, and is awaiting responses.

## 2018-03-15 PROBLEM — K31.6 GASTROCUTANEOUS FISTULA: Status: ACTIVE | Noted: 2018-03-15

## 2018-03-15 LAB
ANION GAP SERPL CALC-SCNC: 9 MMOL/L (ref 5–15)
BUN SERPL-MCNC: 15 MG/DL (ref 6–20)
BUN/CREAT SERPL: 38 (ref 12–20)
CALCIUM SERPL-MCNC: 8.3 MG/DL (ref 8.5–10.1)
CHLORIDE SERPL-SCNC: 105 MMOL/L (ref 97–108)
CO2 SERPL-SCNC: 22 MMOL/L (ref 21–32)
CREAT SERPL-MCNC: 0.4 MG/DL (ref 0.55–1.02)
GLUCOSE BLD STRIP.AUTO-MCNC: 101 MG/DL (ref 65–100)
GLUCOSE BLD STRIP.AUTO-MCNC: 108 MG/DL (ref 65–100)
GLUCOSE BLD STRIP.AUTO-MCNC: 119 MG/DL (ref 65–100)
GLUCOSE BLD STRIP.AUTO-MCNC: 85 MG/DL (ref 65–100)
GLUCOSE SERPL-MCNC: 77 MG/DL (ref 65–100)
MAGNESIUM SERPL-MCNC: 1.5 MG/DL (ref 1.6–2.4)
POTASSIUM SERPL-SCNC: 4.5 MMOL/L (ref 3.5–5.1)
PREALB SERPL-MCNC: 9.7 MG/DL (ref 20–40)
SERVICE CMNT-IMP: ABNORMAL
SERVICE CMNT-IMP: NORMAL
SODIUM SERPL-SCNC: 136 MMOL/L (ref 136–145)

## 2018-03-15 PROCEDURE — 97530 THERAPEUTIC ACTIVITIES: CPT

## 2018-03-15 PROCEDURE — 74011250637 HC RX REV CODE- 250/637: Performed by: FAMILY MEDICINE

## 2018-03-15 PROCEDURE — 74011250637 HC RX REV CODE- 250/637: Performed by: NURSE PRACTITIONER

## 2018-03-15 PROCEDURE — 97535 SELF CARE MNGMENT TRAINING: CPT

## 2018-03-15 PROCEDURE — 74011636637 HC RX REV CODE- 636/637: Performed by: SURGERY

## 2018-03-15 PROCEDURE — 83735 ASSAY OF MAGNESIUM: CPT | Performed by: SURGERY

## 2018-03-15 PROCEDURE — 74011000250 HC RX REV CODE- 250: Performed by: SURGERY

## 2018-03-15 PROCEDURE — 74011250637 HC RX REV CODE- 250/637: Performed by: INTERNAL MEDICINE

## 2018-03-15 PROCEDURE — 74011250636 HC RX REV CODE- 250/636: Performed by: SURGERY

## 2018-03-15 PROCEDURE — 74011250637 HC RX REV CODE- 250/637: Performed by: SURGERY

## 2018-03-15 PROCEDURE — 97116 GAIT TRAINING THERAPY: CPT

## 2018-03-15 PROCEDURE — 36415 COLL VENOUS BLD VENIPUNCTURE: CPT | Performed by: SURGERY

## 2018-03-15 PROCEDURE — 84134 ASSAY OF PREALBUMIN: CPT | Performed by: SURGERY

## 2018-03-15 PROCEDURE — 74011000258 HC RX REV CODE- 258: Performed by: SURGERY

## 2018-03-15 PROCEDURE — C9113 INJ PANTOPRAZOLE SODIUM, VIA: HCPCS | Performed by: SURGERY

## 2018-03-15 PROCEDURE — 82962 GLUCOSE BLOOD TEST: CPT

## 2018-03-15 PROCEDURE — 65270000029 HC RM PRIVATE

## 2018-03-15 PROCEDURE — 80048 BASIC METABOLIC PNL TOTAL CA: CPT | Performed by: SURGERY

## 2018-03-15 RX ORDER — PANTOPRAZOLE SODIUM 40 MG/1
40 TABLET, DELAYED RELEASE ORAL
Status: DISCONTINUED | OUTPATIENT
Start: 2018-03-15 | End: 2018-03-16 | Stop reason: HOSPADM

## 2018-03-15 RX ORDER — LANOLIN ALCOHOL/MO/W.PET/CERES
400 CREAM (GRAM) TOPICAL 2 TIMES DAILY
Status: COMPLETED | OUTPATIENT
Start: 2018-03-15 | End: 2018-03-15

## 2018-03-15 RX ORDER — OXYCODONE HYDROCHLORIDE 5 MG/1
5 TABLET ORAL
Status: DISCONTINUED | OUTPATIENT
Start: 2018-03-15 | End: 2018-03-16 | Stop reason: HOSPADM

## 2018-03-15 RX ADMIN — CALCIUM GLUCONATE: 94 INJECTION, SOLUTION INTRAVENOUS at 18:40

## 2018-03-15 RX ADMIN — Medication 400 MG: at 18:46

## 2018-03-15 RX ADMIN — PANTOPRAZOLE SODIUM 40 MG: 40 INJECTION, POWDER, FOR SOLUTION INTRAVENOUS at 10:05

## 2018-03-15 RX ADMIN — ENOXAPARIN SODIUM 40 MG: 40 INJECTION SUBCUTANEOUS at 14:42

## 2018-03-15 RX ADMIN — PANTOPRAZOLE SODIUM 40 MG: 40 TABLET, DELAYED RELEASE ORAL at 20:22

## 2018-03-15 RX ADMIN — NYSTATIN: 100000 POWDER TOPICAL at 18:46

## 2018-03-15 RX ADMIN — GUAIFENESIN 1200 MG: 600 TABLET, EXTENDED RELEASE ORAL at 20:22

## 2018-03-15 RX ADMIN — Medication 10 ML: at 20:22

## 2018-03-15 RX ADMIN — Medication 10 ML: at 10:09

## 2018-03-15 RX ADMIN — Medication 400 MG: at 10:05

## 2018-03-15 RX ADMIN — ACETAMINOPHEN 650 MG: 325 TABLET ORAL at 11:47

## 2018-03-15 RX ADMIN — NYSTATIN: 100000 POWDER TOPICAL at 10:06

## 2018-03-15 RX ADMIN — MELATONIN TAB 3 MG 1.5 MG: 3 TAB at 23:27

## 2018-03-15 RX ADMIN — GUAIFENESIN 1200 MG: 600 TABLET, EXTENDED RELEASE ORAL at 10:05

## 2018-03-15 RX ADMIN — SPIRONOLACTONE 25 MG: 25 TABLET, FILM COATED ORAL at 10:05

## 2018-03-15 RX ADMIN — ACETAMINOPHEN 650 MG: 325 TABLET ORAL at 04:10

## 2018-03-15 NOTE — ROUTINE PROCESS
Bedside and Verbal shift change report given to Angel Matthews RN (oncoming nurse) by Stephen Whipple RN (offgoing nurse). Report included the following information SBAR and Intake/Output.

## 2018-03-15 NOTE — PROGRESS NOTES
Problem: Self Care Deficits Care Plan (Adult)  Goal: *Acute Goals and Plan of Care (Insert Text)  Occupational Therapy Goals  Revised 3/2/2018. Reviewed 3/9/2018 and updated below to be met within the next 7 days. 1.  Patient will perform grooming with modified independence for >5 minutes with supervision/setup with < 2 verbal cues within 7 day(s)- not met and continue. 2.  Patient will perform upper body dressing and bathing with modified independence within 7 day(s) - not met and continue. 3.  Patient will perform lower body dressing and bathing with moderate assistance using adaptive equipment within 7 day(s)- met 3/9/18 and upgrade to min A.  4.  Patient will perform toilet transfers to [facility] with minimal assistance x1 within 7 day(s)- - not met and continue. 5.  Patient will perform all aspects of toileting with minimal assistance within 7 day(s)- - not met and continue. 6.  Patient will participate in upper extremity therapeutic exercise/activities with supervision/set-up for 10 minutes within 7 day(s)- not met and continue. 7.  Patient will utilize energy conservation techniques during functional activities with verbal cues within 7 day(s)- not met and continue. 8.  Patient will don/doff back brace at supervision/set-up within 7 days- not met and continue. 9.  Patient will verbalize/demonstrate all spinal precautions during ADL tasks without cues within 7 days - not met and continue. Initiated 2/20/2018; revised at re-assess (see above); 1. Patient will perform upper body dressing with moderate assistance in unsupported sitting within 7 day(s). 2.  Patient will perform upper body bathing with moderate assistance  within 7 day(s). 3.  Patient will perform grooming with minimal assistance/contact guard assist within 7 day(s). 4.  Patient will perform toilet transfers with maximal assistance  within 7 day(s).   5.  Patient will perform all aspects of toileting with maximal assistance within 7 day(s). 6.  Patient will participate in upper extremity therapeutic exercise/activities with minimal assistance/contact guard assist for 5 minutes within 7 day(s). Occupational Therapy TREATMENT  Patient: Jason Archibald (20 y.o. female)  Date: 3/15/2018  Diagnosis: Intra-abdominal free air of unknown etiology [K66.8] Perforated chronic gastric ulcer ([address]  Procedure(s) (LRB):   NASOGASTRIC TUBE PLACEMENT (N/A) 25 Days Post-Op  Precautions: Aspiration, Back, Bed Alarm, Fall, Skin  Chart, occupational therapy assessment, plan of care, and goals were reviewed. ASSESSMENT:  Pt agreeable to OT. She participated with ADL's this AM and improvement noted with lining herself up with bedside commode over toilet for use. She was able to perform hygiene in standing and min assist managing underwear. Pt than set-up at sink to bathe upper body with moderate assist for her back and able to doff/don hospital gown with min assist. Pt stood to brush her hair and simple grooming with contact guard. Pt left seated in chair, O2 sats 96% on room air. Recommend SNF. Progression toward goals:  [x]       Improving appropriately and progressing toward goals  []       Improving slowly and progressing toward goals  []       Not making progress toward goals and plan of care will be adjusted     PLAN:  Patient continues to benefit from skilled intervention to address the above impairments. Continue treatment per established plan of care. Discharge Recommendations: SNF  Further Equipment Recommendations for Discharge: None     SUBJECTIVE:   Patient stated I can get up.     OBJECTIVE DATA SUMMARY:   Cognitive/Behavioral Status:  Neurologic State: Alert  Orientation Level: Oriented X4  Cognition: Follows commands             Functional Mobility and Transfers for ADLs:  Bed Mobility:  Supine to Sit: Minimum assistance    Transfers:     Functional Transfers  Toilet Transfer : Minimum assistance  Adaptive Equipment: Bedside commode;Walker (comment)    Balance:  Sitting: Intact  Standing: Impaired    ADL Intervention:       Grooming  Washing Face: Contact guard assistance  Washing Hands: Contact guard assistance  Brushing/Combing Hair: Contact guard assistance    Upper Body Bathing  Bathing Assistance: Moderate assistance  Position Performed: Standing  Cues: Physical assistance         Upper Body Dressing Assistance  Dressing Assistance: Moderate assistance  Hospital Gown: Moderate assistance  Cues: Physical assistance         Toileting  Bladder Hygiene: Contact guard assistance  Clothing Management: Minimum assistance       Pain:  Pain Scale 1: Numeric (0 - 10)  Pain Intensity 1: 0           Pain Intervention(s) 1: Medication (see MAR)  Activity Tolerance:   Fair  Please refer to the flowsheet for vital signs taken during this treatment.   After treatment:   [x] Patient left in no apparent distress sitting up in chair  [] Patient left in no apparent distress in bed  [x] Call bell left within reach  [] Nursing notified  [] Caregiver present  [x] Chair alarm activated    COMMUNICATION/COLLABORATION:   The patients plan of care was discussed with: Occupational Therapist    ISRAEL Stoner  Time Calculation: 16 mins

## 2018-03-15 NOTE — PROGRESS NOTES
Bedside shift change report given to Benja Enciso (oncoming nurse) by Marta Hagan (offgoing nurse). Report included the following information SBAR, Kardex, Procedure Summary, MAR and Recent Results.

## 2018-03-15 NOTE — PROGRESS NOTES
Ortho Progress Note        S:  Sitting up in chair. Back pain moderate, improved with PO pain meds. O:  I examined pt's T and L spine. T12 Spinous process pain. No paraspinous process pain on exam. Strength 5/5 BLE's, DTR 2+ BLE's, +Dorsi/plantar flexion BLE's. NVI distally BLE's. Cap. Refill <2secs all toes. Progressed well with PT. 100ft ambulation in guerra way with walker donning LSO brace. A:  Stable T12 Burst Fracture    P:  Pt. Progressing moderately with PT. Not a candidate for kyphoplasty at this time due to recent PEG tube infection causing risk of discitis or epidural abscess with kypho. Recommend 2-3 weeks of further conservative tx with PT and re-evaluation with Dr. Chhaya Bermudez outpatient for further tx if pt. Still in need of kypho may be done at that time. Please reconsult as needed. Dr. Chhaya Bermudez agrees with plan. Thank you for allowing us to take part in this patients care.     Farhat Jay PA-C  Orthopaedic Surgery [facility]

## 2018-03-15 NOTE — PROGRESS NOTES
Progress Note    Patient: Niecy Trivedi MRN: [id number]  SSN: [id number]    YOB: 1945  Age: 67 y.o. Sex: female      Admit Date: 2018    31 Days Post-Op    Procedure:  Procedure(s):   Ex Lap    Subjective:     Mrs. Spike Woo is doing fine. She denies any pain. She tolerated clears and is passing flatus and BM. Objective:     Visit Vitals    /63 (BP 1 Location: Left arm, BP Patient Position: At rest)    Pulse 87    Temp 98.1 °F (36.7 °C)    Resp 16    Ht 5' 3.5\" (1.613 m)    Wt 178 lb 9.2 oz (81 kg)    SpO2 92%    Breastfeeding No    BMI 31.14 kg/m2       Temp (24hrs), Av.7 °F (36.5 °C), Min:97.3 °F (36.3 °C), Max:98.1 °F (36.7 °C)      Physical Exam:    GENERAL: alert, cooperative, no distress, ABDOMEN: Soft, NT, minimal distention. 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  Range 2018 17:20   Ammonia Latest Ref Range: <32 UMOL/L 69 (H)                Free intraperitoneal air ICD-10-CM: K66.8  ICD-9-CM: 568.89  2/12/2018 Yes        S/P exploratory laparotomy ICD-10-CM: Z98.890  ICD-9-CM: V45.89  2/12/2018 No    Overview Signed 2/12/2018 11:57 AM by Daquan Perrin MD     Suture repair of perforated posterior gastric ulcer with Daiva Guiles patch, core needle biopsies of left lobe of the liver. Alcoholic cirrhosis of liver without ascites (HCC) (Chronic) ICD-10-CM: K70.30  ICD-9-CM: 571.2  2/12/2018 Yes    Overview Signed 2/17/2018  8:45 AM by Owen Kimbrough MD     Liver bx 2/12/18:   Cirrhosis with mild chronic portal inflammation and macrovesicular steatosis. Fatty liver determined by biopsy ICD-10-CM: K76.0  ICD-9-CM: 571.8  2/12/2018 Yes    Overview Signed 2/17/2018  8:53 AM by Owen Kimbrough MD        Cirrhosis with mild chronic portal inflammation and macrovesicular steatosis. * (Principal)Perforated chronic gastric ulcer ([address] (Chronic) ICD-10-CM: K25.5  ICD-9-CM: 531.50  2/11/2018 Yes        ETOH abuse (Chronic) ICD-10-CM: F10.10  ICD-9-CM: 305.00  2/11/2018 Yes              Plan/Recommendations/Medical Decision Making:     GI lite diet. Cycle TPN one more night and d/c if tolerates diet. Continue PPI. Continue PT.  SNF placement tomorrow if tolerates diet.     Signed By: Daquan Perrin MD     March 15, 2018

## 2018-03-15 NOTE — PROGRESS NOTES
Family Practice Daily Progress Note: 3/15/2018  Jhonny Oneal,     Assessment/Plan:   Perforated chronic gastric ulcer /  Free intraperitoneal air / S/P exploratory laparotomy 2/11. S/p repair in OR with Dr. Rosibel Ta on 2/12/18 - per surg    Severe protein calorie malnutrition-POA  --TPN per GI - advance diet as per surg  --Replete K/Mg as needed     Acute metabolic encephalopathy/Delerium. Much improved - back to baseline. Multifactorial-- medications, alcohol use, sundowning  ---seroquel at night, haldol prn    Hypoxia / Respiratory distress. Improved. Pleural effusions   ---pulmonary has signed off  --wean O2 off as tolerated for sats >45%     Alcoholic cirrhosis of liver without ascites /  Fatty liver determined by biopsy /  Hyperammonemia. --GI has seen- rec alcohol cessation and outpt follow up     ETOH abuse. Needs to stop ETOH entirely.     Hypokalemia /  Hypoalbuminemia. Being addressed with TPN     Leukocytosis.  -- trending back up  -- repeat blood cultures, and urine 2/28 neg  --was on Zosyn, and levaquin - cultures drawn off antibiotics neg except yeast -anidulafungin  added  --ID following    Anemia: watch Hgb   --follow, transfuse <7  --recheck in AM    Back Pain due to compression fracture of T12  ---Increased Duragesic patch to 25mcg and DIiaudid to 1mg as needed  --ortho to re-eval Monday per daughter's request    Debility: due to prolonged hospital stay  --continue PT/OT- needs to get OOB  --will likely need rehab    DVT proph - lovenox        Problem List:  Problem List as of 3/15/2018  Date Reviewed: 4/26/2016          Codes Class Noted - Resolved    Hypokalemia ICD-10-CM: E87.6  ICD-9-CM: 276.8  2/18/2018 - Present        Leukocytosis ICD-10-CM: D72.829  ICD-9-CM: 288.60  2/18/2018 - Present        Severe protein-calorie malnutrition ([address] ICD-10-CM: E43  ICD-9-CM: 262  2/18/2018 - Present        Acute metabolic encephalopathy JOL-06-LR: G93.41  ICD-9-CM: 348.31  2/18/2018 - Present        Hyperammonemia (HCC) ICD-10-CM: E72.20  ICD-9-CM: 270.6  2/16/2018 - Present    Overview Addendum 2/17/2018  9:08 AM by Madeleine Colorado MD        Ref. Range 2/16/2018 17:20   Ammonia Latest Ref Range: <32 UMOL/L 69 (H)                Free intraperitoneal air ICD-10-CM: K66.8  ICD-9-CM: 568.89  2/12/2018 - Present        S/P exploratory laparotomy ICD-10-CM: Z98.890  ICD-9-CM: V45.89  2/12/2018 - Present    Overview Signed 2/12/2018 11:57 AM by Kristen Malik MD     Suture repair of perforated posterior gastric ulcer with Larnell Breaker patch, core needle biopsies of left lobe of the liver. Alcoholic cirrhosis of liver without ascites (HCC) (Chronic) ICD-10-CM: K70.30  ICD-9-CM: 571.2  2/12/2018 - Present    Overview Signed 2/17/2018  8:45 AM by Madeleine Colorado MD     Liver bx 2/12/18:   Cirrhosis with mild chronic portal inflammation and macrovesicular steatosis. Fatty liver determined by biopsy ICD-10-CM: K76.0  ICD-9-CM: 571.8  2/12/2018 - Present    Overview Signed 2/17/2018  8:53 AM by Madeleine Colorado MD        Cirrhosis with mild chronic portal inflammation and macrovesicular steatosis. * (Principal)Perforated chronic gastric ulcer ([address] (Chronic) ICD-10-CM: K25.5  ICD-9-CM: 531.50  2/11/2018 - Present        ETOH abuse (Chronic) ICD-10-CM: F10.10  ICD-9-CM: 305.00  2/11/2018 - Present        GI bleed ICD-10-CM: K92.2  ICD-9-CM: 578.9  4/26/2016 - Present        Hypotension ICD-10-CM: I95.9  ICD-9-CM: 458.9  4/26/2016 - Present        Tachycardia ICD-10-CM: R00.0  ICD-9-CM: 785.0  4/26/2016 - Present        Acute blood loss anemia ICD-10-CM: D62  ICD-9-CM: 285.1  4/26/2016 - Present              Subjective:    67 y.o.  female with EtOH abuse who is admitted to the General Surgery Service with perforated gastric ulcer. We are asked to evaluate for altered mental status.  The patient is poorly interactive at this time and this limits primary hx. Discussed case with family available at bedside providing secondary hx and chart review. Per family, patient has had declining neurological condition over past 48 hours. Notably, an RRT was called for respiratory distress. 2/19: This morning she is a bit more alert at this moment and taking in more complete sentences. She says she does not feel well. She has no specific site of pain other than the NG tube which is bothering her a great deal.  She should improve as time from surg increases. K+ a little low - replacing.     2/20:  Still confused and somnolent at times. Now able to localize pain to ab and converse a bit more. Tmax 100.3  WBC is up again but hopefully reactive - recheck in AM - more incentive spirom and check CXR.     2/21: A little more alert. Knows she is in the hospital. Not able to participate with PT yesterday as she was in too much pain. WBC still at 19.     2/22:  WBC 19K. She is more alert. Daughter at bedside. Both of her daughters live out of town. Looking at SNF options for rehab. Coughing more. Starting to use IS.     2/23: WBC 17. Repeat CT chest with moderate to large persistent left subphrenic collection. IR has been consulted. Vanc and Levaquin added by pulm. 2/24: Pt had a better night last night. Only brief episode of confusion. She is much clearer this AM.  Now in quite a bit of pain from gas and stomach cramping. Thinks she will feel better if she can have a BM. Has been on bedpan for a while without success. On TPN. Daughters very concerned that she won't be successful while lying down. Wanting to get up to bedside commode. Will need PT's help. 2/25: febrile overnight and WBC up after vanc was stopped. Pt c/o more dyspnea today. Known fluid collection that will be drained in IR this week. Up to chair with PT yesterday briefly. +BM yesterday. Da notes pt has been c/o R wrist pain off and on for 2 wks since she fell.   No swelling.    2/26:  No new complaints. Still c/o back and ab pain. Still passing gas and had BM. Diuresed with 1 mg Bumex yesterday by Pulmonary. Remains on Zosyn and Levaquin. Blood culture remains neg. Tmax 99.2. X-ray of wrists ok. CXR ok with tube inplace. WBC 15.7K.      2/27:  Looks much better today. Much less work of breathing. Diuresed about 1 liter over last 24h. WBC 16K today. Blood cult remains neg. Off antibiotics. Still on TPN. She will need rehab for PT/OT. 2/28: Very restless during the night. Has not been OOB. Daughter stayed the night with her. WBC is still up. Antibiotics stopped yesterday. She is c/o increased pain in her back and can't get comfortable. More anxious and agitated. Tolerating sips. 3/1:  She reports she feels better this AM after she had some sleep. Afeb. WBC still up - a bit more today with increased neutrophils. Will also get ID to see also. CT AB and Pelvis as below. Cultures done yesterday off antibiotics - so far no growth. No output from drain - may need to be repositioned. More edema today but not as short of breath today while sitting up - add albumin to todays lab. Add Aldactone if ok with GI and Bumex as per Pul. Discussed cirrhosis/ascites/edema with pts daughter. 3/2:  She reports she feels better, and has slightly less edema, although daughters note they think pts abdomin more swollen. The daughters are more worried about pts LE edema than anything else - explained that with pts low albumin that edema may cont to be a problem. She is more alert, a bit more oriented today and says she has much less pain today. Tmax 101. Culture had yeast - diflucan started. Drain was repositioned, had thoracentesis and new PICC placed yesterday. May need to restart broad spectrum antibiotics - ID consulted. 3/3:  She reports she feels better this AM.  More alert and oriented this AM.  BM yesterday. Tmax 99. WBC still 17-18K.   ID consulted and advises to watch off antibiotics for now. Less edema LE.      3/4:  Doing much better today and much more alert. Little pain. Off sleeping meds for now - not sleeping well but reports \"never was a good sleeper. \"  Anxious at night. WBC still up. Discussed rehab with daughters and pt and pt willing to go. 3/5:  Continues to improve. She wants to eat solid food - she now has an appetite. Passing gas. Has been up to chair. K+ 5.3 - will DC aldactone for now and restart if edema returns. CXR pending today. 3/6: She reports she feels better. Yesterday had marked increase in ALYSSA output and now NPO and back on TPN today. Will resume Aldactone again (at lower dose) as sl more edema today and hold again if K+ creeping up again. AM labs pending. 3/7:  Reports some ab pain this AM but not severe. Orthostatic hypotension with walking yesterday and K+ back up - will hold aldactone again. Breathing and edema better with the Aldactone. Push incentive spirom - last CXR with atelectasis. O>>I on I&Os but wonder about measurements. 3/8:  Nausea this AM with 2 episodes of vomiting but reports she feels back to normal now. No ab pain. No SOB at rest.  Wearing nasal O2 at 2 L. Nurse reports yesterdays labs were drawn just before 12MN last night for some reason - still have not resulted and lab called and will run samples now. This AMs labs not drawn yet. Port CXR this AM pending.  O>>I again but Intake not correct. 3/9:  No nausea this AM.  No further vomiting. Had BM yesterday. Desats with exercise. Was up in chair some yesterday. WBC down to 12K. Hb a little lower at 7.8 - recheck later today. Seems to be making slow progress. 3/10: [place] No N/V. Pain minimal at this time, some in back. No recent confusion. BM 2 days ago. 3/11: Walked this AM with PT. Up in chair now and back hurts, worse with deep breaths. No BM in a few days now.     3/12:  Pt reports BM yesterday. No pain. Breathing stable. AM CBC pending. Add: 327pm:  Labs back - phos up but Ca+ ok - will discuss with surg. 3/13:  Drains out yesterday and doing well so far with little drainage. Elevated Phos was prob an anomaly - todays is back in line with previous ones. 3/14: Feeling better today. No ab pain/fullness at this time. Still only on sips. 3/15:  She is feeling much better today - \"had soup\" - and reports did well with advancing diet. Pt reports BM and passing gas. Per ID can stop Eraxis today. Past Medical History:   Diagnosis Date    Alcoholic cirrhosis of liver without ascites ([facility] [address] 2/12/2018    ETOH abuse 2/11/2018    History of vascular access device 02/16/2018    [facility] VAT - 5 FR triple, R basilic, TPN    Hyperammonemia ([facility] 2/16/2018     Results for Jenna Mccord (MRN [id number]) as of 2/17/2018 08:43  Ref. Range 2/16/2018 17:20 Ammonia Latest Ref Range: <32 UMOL/L 69 (H)     Hyperlipidemia     Perforated chronic gastric ulcer ([facility] 2/11/2018     Past Surgical History:   Procedure Laterality Date    HX CATARACT REMOVAL       Social History     Social History    Marital status: SINGLE     Spouse name: N/A    Number of children: N/A    Years of education: N/A     Occupational History    Not on file. Social History Main Topics    Smoking status: Former Smoker    Smokeless tobacco: Never Used    Alcohol use 11.4 oz/week     0 Standard drinks or equivalent, 19 Glasses of wine per week      Comment: Hx of heavy etoh use, but quit several months ago    Drug use: No    Sexual activity: Not on file     Other Topics Concern    Not on file     Social History Narrative     Family History   Problem Relation Age of Onset    Other Other      patient did not know     Review of Systems:   A comprehensive review of systems was negative except for that written in the HPI.     Objective:   Physical Exam:     Visit Vitals    /64 (BP 1 Location: Left arm, BP Patient Position: Head of bed elevated (Comment degrees))    Pulse 78    Temp 97.6 °F (36.4 °C)    Resp 15    Ht 5' 3.5\" (1.613 m)    Wt 81 kg (178 lb 9.2 oz)    SpO2 99%    Breastfeeding No    BMI 31.14 kg/m2    O2 Flow Rate (L/min): 2 l/min O2 Device: Room air    Temp (24hrs), Av.6 °F (36.4 °C), Min:97.3 °F (36.3 °C), Max:97.8 °F (36.6 °C)        1901 - 03/15 0700  In: 1303.7 [P.O.:50; I.V.:1253.7]  Out: 850 [Urine:850]    General:  Awake, uncomfortable, appears stated age. Head:  Normocephalic, without obvious abnormality, atraumatic. Eyes:  Conjunctivae/corneas clear. EOMs intact. Throat: Lips, mucosa, and tongue dry. Neck: Supple, symmetrical, trachea midline, no adenopathy   Lungs:    CTAB, no w/r/r    Heart:  reg rate with regular rhythm,  no murmur, click, rub or gallop. Abdomen:   Soft,minimal distension. Former Drain sites ok. Extremities: no cyanosis. No LE edema at this time. No calf tenderness or cords, no erythema   Skin: turgor normal. No rashes or lesions   Neurologic: AOx2-3, . Sensation grossly normal to touch. Gross motor of extremities normal.       Data Review:    CT AB and Pelvis 18: IMPRESSION:  1. There is residual fluid in the posterior portion of the left subphrenic  collection which does not appear well drained by the pigtail catheter. This  could be repositioned versus new drainage catheter placement if clinically  indicated. 2. Persistent ascites. 3. Increasing right pleural effusion and right lower lobe atelectasis   4. Distended duodenum and proximal small bowel of doubtful clinical  Significance. CXR 3/1/18:  INDICATION:  chest pain and shortness of breath following thoracentesis   EXAM: Portable chest 1716 . Comparison 2018  FINDINGS: There is no significant change in appearance the lungs. Small left  pleural effusion with left basilar atelectasis unchanged. Cardiomediastinal  silhouette is unchanged.  No pneumothorax. Lines and tubes are unchanged in  position. IMPRESSION:  1. No interval change    KUB 3/6/18  IMPRESSION: Nonspecific bowel gas pattern with multiple dilated loops of small  bowel but does not appear obstructive. No pneumoperitoneum. Recent Days:  Recent Labs      03/13/18   0210   WBC  13.1*   HGB  8.4*   HCT  27.0*   PLT  290     Recent Labs      03/15/18   0416  03/13/18   0210  03/12/18   1734  03/12/18   0927   NA  136  138   --   136   K  4.5  4.4   --   4.6   CL  105  109*   --   104   CO2  22  25   --   22   GLU  77  78   --   98   BUN  15  14   --   17   CREA  0.40*  0.46*   --   0.51*   CA  8.3*  7.9*   --   8.4*   MG  1.5*  1.6   --    --    PHOS   --   3.5  3.5   --    ALB   --   1.6*   --   1.7*   TBILI   --   0.3   --   0.4   SGOT   --   20   --   26   ALT   --   20   --   20     No results for input(s): PH, PCO2, PO2, HCO3, FIO2 in the last 72 hours.   24 Hour Results:  Recent Results (from the past 24 hour(s))   GLUCOSE, POC    Collection Time: 03/14/18 12:02 PM   Result Value Ref Range    Glucose (POC) 101 (H) 65 - 100 mg/dL    Performed by Valentin Roman (PCT)    GLUCOSE, POC    Collection Time: 03/14/18  6:07 PM   Result Value Ref Range    Glucose (POC) 78 65 - 100 mg/dL    Performed by Valentin Roman (PCT)    GLUCOSE, POC    Collection Time: 03/14/18  6:45 PM   Result Value Ref Range    Glucose (POC) 87 65 - 100 mg/dL    Performed by Gala Lungmohsen (PCT)    GLUCOSE, POC    Collection Time: 03/14/18 11:43 PM   Result Value Ref Range    Glucose (POC) 118 (H) 65 - 100 mg/dL    Performed by Kaylah Raya    METABOLIC PANEL, BASIC    Collection Time: 03/15/18  4:16 AM   Result Value Ref Range    Sodium 136 136 - 145 mmol/L    Potassium 4.5 3.5 - 5.1 mmol/L    Chloride 105 97 - 108 mmol/L    CO2 22 21 - 32 mmol/L    Anion gap 9 5 - 15 mmol/L    Glucose 77 65 - 100 mg/dL    BUN 15 6 - 20 MG/DL    Creatinine 0.40 (L) 0.55 - 1.02 MG/DL    BUN/Creatinine ratio 38 (H) 12 - 20      GFR est AA >60 >60 ml/min/1.73m2    GFR est non-AA >60 >60 ml/min/1.73m2    Calcium 8.3 (L) 8.5 - 10.1 MG/DL   MAGNESIUM    Collection Time: 03/15/18  4:16 AM   Result Value Ref Range    Magnesium 1.5 (L) 1.6 - 2.4 mg/dL   GLUCOSE, POC    Collection Time: 03/15/18  5:58 AM   Result Value Ref Range    Glucose (POC) 101 (H) 65 - 100 mg/dL    Performed by Kaylah Raya      Medications reviewed  Current Facility-Administered Medications   Medication Dose Route Frequency    anidulafungin (ERAXIS) 100 mg in 0.9% sodium chloride 130 mL IVPB  100 mg IntraVENous Q24H    spironolactone (ALDACTONE) tablet 25 mg  25 mg Oral DAILY    albuterol-ipratropium (DUO-NEB) 2.5 MG-0.5 MG/3 ML  3 mL Nebulization Q4H PRN    melatonin tablet 1.5 mg  1.5 mg Oral QHS PRN    HYDROmorphone (PF) (DILAUDID) injection 1 mg  1 mg IntraVENous Q3H PRN    fentaNYL (DURAGESIC) 25 mcg/hr patch 1 Patch  1 Patch TransDERmal Q72H    haloperidol lactate (HALDOL) injection 1 mg  1 mg IntraVENous Q4H PRN    guaiFENesin ER (MUCINEX) tablet 1,200 mg  1,200 mg Oral Q12H    acetaminophen (TYLENOL) tablet 650 mg  650 mg Oral Q4H PRN    insulin regular (NOVOLIN R, HUMULIN R) injection   SubCUTAneous Q6H    fat emulsion 20% (LIPOSYN, INTRAlipid) infusion 500 mL  500 mL IntraVENous Q MON, WED & SAT    glucose chewable tablet 16 g  4 Tab Oral PRN    glucagon (GLUCAGEN) injection 1 mg  1 mg IntraMUSCular PRN    dextrose (D50W) injection syrg 12.5-25 g  12.5-25 g IntraVENous PRN    alteplase (CATHFLO) 1 mg in sterile water (preservative free) 1 mL injection  1 mg InterCATHeter PRN    sodium chloride (NS) flush 10-30 mL  10-30 mL InterCATHeter PRN    sodium chloride (NS) flush 10-40 mL  10-40 mL InterCATHeter Q8H    naloxone (NARCAN) injection 0.4 mg  0.4 mg IntraVENous PRN    ondansetron (ZOFRAN) injection 4 mg  4 mg IntraVENous Q4H PRN    enoxaparin (LOVENOX) injection 40 mg  40 mg SubCUTAneous Q24H    pantoprazole (PROTONIX) injection 40 mg  40 mg IntraVENous Q12H    phenol throat spray (CHLORASEPTIC) 1 Spray  1 Spray Oral PRN    nystatin (MYCOSTATIN) 100,000 unit/gram powder   Topical BID    sodium chloride (NS) flush 5-10 mL  5-10 mL IntraVENous PRN       Vickie Zepeda M.D.

## 2018-03-15 NOTE — PROGRESS NOTES
3/15/2018  3:26 PM  CM consulted with attending who reported pt may be able to DC tomorrow. CM spoke with pt's family who indicated [place] to be preference. CM requested Valeria begin [facility].

## 2018-03-15 NOTE — PROGRESS NOTES
Problem: Mobility Impaired (Adult and Pediatric)  Goal: *Acute Goals and Plan of Care (Insert Text)  Physical Therapy Goals  Revised 3/14/2018  1. Patient will move from flat supine to sit and sit to flat supine and roll side to side in bed with minimal assistance/contact guard assist within 7 day(s). 2.  Patient will transfer from bed <> chair and chair <> bed with supervision/set-up using the least restrictive device within 7 day(s). 3.  Patient will perform sit <>stand with modified independence within 7 day(s). 4.  Patient will ambulate with SBA for 100 feet with RW within 7 day(s). 5. Patient will verbalize and demonstrate understanding of spinal precautions (No bending, lifting greater than 5 lbs, or twisting; log-roll technique; frequent repositioning as instructed) within 7 days. 6. Patient will demonstrate independence completing 10 reps of each exercise of initial exercise program with visual handout within 7 days       Physical Therapy Goals  Revised 3/7/2018  1. Patient will move from long sitting to sit edge of bed and sit to supine and roll side to side in bed with modified independence 7 day(s). Not Met  2. Patient will transfer from bed to chair and chair to bed with contact guard assist x1 using RW within 7 day(s). Nearly Met - upgrade and continue  3. Patient will perform sit <>stand with contact guard assist x 1 within 7 day(s). Nearly Met - upgrade and continue  4. Patient will ambulate with minimal assistance x 1  for 50 feet with RW within 7 day(s). Met - upgrade and continue  5. Patient will verbalize and demonstrate understanding of spinal precautions (No bending, lifting greater than 5 lbs, or twisting; log-roll technique; frequent repositioning as instructed) within 7 days. Not Met - continue    Physical Therapy Goals  Revised 2/28/2018  1. Patient will move from supine to sit and sit to supine  and roll side to side in bed with moderate assistance x 1  within 7 day(s).  MET, upgrade  2. Patient will move from long sitting in bed to edge of bed with minimal assistance x 1 within 7 days. MET, advance  3. Patient will transfer from bed to chair and chair to bed with moderate assistance x 1 using the least restrictive device within 7 day(s). 4.  Patient will perform sit <> stand with minimal assistance x 1 within 7 day(s). NOT MET - continue  5. Patient will ambulate with minimal assistance x 1  for 15 feet with the least restrictive device within 7 day(s). MET, advance  6. Patient will verbalize and demonstrate understanding of spinal precautions (No bending, lifting greater than 5 lbs, or twisting; log-roll technique; frequent repositioning as instructed) within 7 days. NOT MET- continue    Physical Therapy Goals  Initiated 2/20/2018  1. Patient will move from supine to sit and sit to supine , scoot up and down and roll side to side in bed with moderate assistance x 2  within 7 days. Partially Met - Continue  2. Patient will perform sit <> stand with minimal assist x 2  within 7 days. Met - Advance goal below  3. Patient will ambulate with minimal assistance x 2 for 10 feet with RW and assist of 3rd pushing chair from behind within 7 days. Not Met- Continue  4. Patient will verbalize and demonstrate understanding of spinal precautions (No bending, lifting greater than 5 lbs, or twisting; log-roll technique; frequent repositioning as instructed) within 7 days. Not Met- Continue      physical Therapy TREATMENT  Patient: Jason Archibald (50 y.o. female)  Date: 3/15/2018  Diagnosis: Intra-abdominal free air of unknown etiology [K66.8] Perforated chronic gastric ulcer ([facility] [address]  Procedure(s) (LRB):   NASOGASTRIC TUBE PLACEMENT (N/A) 25 Days Post-Op  Precautions: Aspiration, Back, Bed Alarm, Fall, Skin  Chart, physical therapy assessment, plan of care and goals were reviewed. ASSESSMENT:  Pt received in chair, LSO in place, reporting increased back pain 6/10.  Agreeable to participate with physical therapy. Sit<>stand using RW with Min A. Gait training using ' x 2 with seated rest break between bouts. BR transfers with CGA/MIN A using grab bars and RW for steadying Min A to manage clothes. Maintained O2sat 92% on room air during activity. Pt requested to return to bed secondary to LB pain and fatigue. Mod A for sit>supine with verbal cues for log rolling technique. Daughter present throughout session. RN notified  Progression toward goals:  []    Improving appropriately and progressing toward goals  [x]    Improving slowly and progressing toward goals  []    Not making progress toward goals and plan of care will be adjusted     PLAN:  Patient continues to benefit from skilled intervention to address the above impairments. Continue treatment per established plan of care. Discharge Recommendations:  Vito Ritchie  Further Equipment Recommendations for Discharge:  TBD     SUBJECTIVE:   Patient stated My back is tired.     OBJECTIVE DATA SUMMARY:   Critical Behavior:  Neurologic State: Alert  Orientation Level: Oriented X4  Cognition: Follows commands  Safety/Judgement: Awareness of environment, Decreased insight into deficits, Fall prevention, Home safety  Functional Mobility Training:  Bed Mobility:     Supine to Sit: Minimum assistance  Sit to Supine:  Moderate assistance           Transfers:  Sit to Stand: Minimum assistance  Stand to Sit: Minimum assistance                             Balance:  Sitting: Intact  Standing: Impaired  Standing - Static: Constant support;Good  Standing - Dynamic : Fair  Ambulation/Gait Training:  Distance (ft):  (100' x2)  Assistive Device: Walker, rolling;Gait belt;Brace/Splint  Ambulation - Level of Assistance: Contact guard assistance;Minimal assistance                 Base of Support: Widened                             Stairs:              Neuro Re-Education:    Therapeutic Exercises:     Pain:  Pain Scale 1: Numeric (0 - 10)  Pain Intensity 1: 5           Pain Intervention(s) 1: Medication (see MAR)  Activity Tolerance:   Good  Please refer to the flowsheet for vital signs taken during this treatment.   After treatment:   []    Patient left in no apparent distress sitting up in chair  [x]    Patient left in no apparent distress in bed  [x]    Call bell left within reach  [x]    Nursing notified  [x]    Caregiver present  [x]    Bed alarm activated    COMMUNICATION/COLLABORATION:   The patients plan of care was discussed with: Registered Nurse    Zoya Michelle   Time Calculation: 31 mins

## 2018-03-16 VITALS
OXYGEN SATURATION: 95 % | TEMPERATURE: 98.3 F | DIASTOLIC BLOOD PRESSURE: 56 MMHG | SYSTOLIC BLOOD PRESSURE: 111 MMHG | HEIGHT: 64 IN | HEART RATE: 101 BPM | BODY MASS INDEX: 30.49 KG/M2 | WEIGHT: 178.57 LBS | RESPIRATION RATE: 18 BRPM

## 2018-03-16 PROBLEM — R18.8 INTRA-ABDOMINAL FLUID COLLECTION: Status: ACTIVE | Noted: 2018-03-16

## 2018-03-16 PROBLEM — J90 PLEURAL EFFUSION, BILATERAL: Status: ACTIVE | Noted: 2018-03-16

## 2018-03-16 PROBLEM — S22.081A T12 BURST FRACTURE (HCC): Status: ACTIVE | Noted: 2018-03-16

## 2018-03-16 PROBLEM — K76.0 FATTY LIVER DETERMINED BY BIOPSY: Status: RESOLVED | Noted: 2018-02-12 | Resolved: 2018-03-16

## 2018-03-16 PROBLEM — E87.6 HYPOKALEMIA: Status: RESOLVED | Noted: 2018-02-18 | Resolved: 2018-03-16

## 2018-03-16 LAB
GLUCOSE BLD STRIP.AUTO-MCNC: 115 MG/DL (ref 65–100)
GLUCOSE BLD STRIP.AUTO-MCNC: 99 MG/DL (ref 65–100)
SERVICE CMNT-IMP: ABNORMAL
SERVICE CMNT-IMP: NORMAL

## 2018-03-16 PROCEDURE — 97530 THERAPEUTIC ACTIVITIES: CPT

## 2018-03-16 PROCEDURE — 74011250637 HC RX REV CODE- 250/637: Performed by: FAMILY MEDICINE

## 2018-03-16 PROCEDURE — 97535 SELF CARE MNGMENT TRAINING: CPT

## 2018-03-16 PROCEDURE — 82962 GLUCOSE BLOOD TEST: CPT

## 2018-03-16 PROCEDURE — 74011250637 HC RX REV CODE- 250/637: Performed by: NURSE PRACTITIONER

## 2018-03-16 PROCEDURE — 74011250637 HC RX REV CODE- 250/637: Performed by: SURGERY

## 2018-03-16 PROCEDURE — 97116 GAIT TRAINING THERAPY: CPT

## 2018-03-16 RX ORDER — PANTOPRAZOLE SODIUM 40 MG/1
40 TABLET, DELAYED RELEASE ORAL
Qty: 90 TAB | Refills: 0 | Status: SHIPPED | OUTPATIENT
Start: 2018-03-16 | End: 2018-04-25

## 2018-03-16 RX ORDER — FENTANYL 25 UG/1
1 PATCH TRANSDERMAL
Qty: 5 PATCH | Refills: 0 | Status: SHIPPED | OUTPATIENT
Start: 2018-03-18 | End: 2018-03-23

## 2018-03-16 RX ORDER — POLYETHYLENE GLYCOL 3350 17 G/17G
17 POWDER, FOR SOLUTION ORAL DAILY
Qty: 30 PACKET | Refills: 0 | Status: SHIPPED | OUTPATIENT
Start: 2018-03-16 | End: 2018-05-08

## 2018-03-16 RX ORDER — POLYETHYLENE GLYCOL 3350 17 G/17G
17 POWDER, FOR SOLUTION ORAL DAILY
Status: DISCONTINUED | OUTPATIENT
Start: 2018-03-16 | End: 2018-03-16 | Stop reason: HOSPADM

## 2018-03-16 RX ORDER — LANOLIN ALCOHOL/MO/W.PET/CERES
400 CREAM (GRAM) TOPICAL DAILY
Qty: 30 TAB | Refills: 0 | Status: SHIPPED | OUTPATIENT
Start: 2018-03-16 | End: 2018-03-16

## 2018-03-16 RX ORDER — SPIRONOLACTONE 25 MG/1
25 TABLET ORAL DAILY
Qty: 30 TAB | Refills: 0 | Status: SHIPPED | OUTPATIENT
Start: 2018-03-16 | End: 2018-04-25

## 2018-03-16 RX ADMIN — NYSTATIN: 100000 POWDER TOPICAL at 09:42

## 2018-03-16 RX ADMIN — POLYETHYLENE GLYCOL 3350 17 G: 17 POWDER, FOR SOLUTION ORAL at 09:41

## 2018-03-16 RX ADMIN — Medication 10 ML: at 13:51

## 2018-03-16 RX ADMIN — OXYCODONE HYDROCHLORIDE 5 MG: 5 TABLET ORAL at 13:50

## 2018-03-16 RX ADMIN — Medication 10 ML: at 06:28

## 2018-03-16 RX ADMIN — SPIRONOLACTONE 25 MG: 25 TABLET, FILM COATED ORAL at 09:41

## 2018-03-16 RX ADMIN — PANTOPRAZOLE SODIUM 40 MG: 40 TABLET, DELAYED RELEASE ORAL at 17:53

## 2018-03-16 RX ADMIN — GUAIFENESIN 1200 MG: 600 TABLET, EXTENDED RELEASE ORAL at 09:41

## 2018-03-16 RX ADMIN — PANTOPRAZOLE SODIUM 40 MG: 40 TABLET, DELAYED RELEASE ORAL at 06:28

## 2018-03-16 NOTE — DISCHARGE INSTRUCTIONS
Peptic Ulcer Disease: Care Instructions  Your Care Instructions    Peptic ulcers are sores on the inside of the stomach or the small intestine. They are usually caused by an infection with bacteria or from use of nonsteroidal anti-inflammatory drugs (NSAIDs). NSAIDs include aspirin, ibuprofen (Advil), and naproxen (Aleve). Your doctor may have prescribed medicine to reduce stomach acid. You also may need to take antibiotics if your peptic ulcers are caused by an infection. You can help your stomach heal and keep ulcers from coming back by making some changes in your lifestyle. Quit smoking, limit caffeine and alcohol, and reduce stress. Follow-up care is a key part of your treatment and safety. Be sure to make and go to all appointments, and call your doctor if you are having problems. It's also a good idea to know your test results and keep a list of the medicines you take. How can you care for yourself at home? · Take your medicines exactly as prescribed. Call your doctor if you think you are having a problem with your medicine. · Do not take aspirin or other NSAIDs such as ibuprofen (Advil or Motrin) or naproxen (Aleve). Ask your doctor what you can take for pain. · Do not smoke. Smoking can make ulcers worse. If you need help quitting, talk to your doctor about stop-smoking programs and medicines. These can increase your chances of quitting for good. · Drink in moderation or avoid drinking alcohol. · Do not drink beverages that have caffeine if they bother your stomach. These include coffee, tea, and soda. · Eat a balanced diet of small, frequent meals. Make an appointment with a dietitian if you need help planning your meals. · Reduce stress. Avoid people and places that make you feel anxious, if you can. Learn ways to reduce stress, such as biofeedback, guided imagery, and meditation. When should you call for help? Call 911 anytime you think you may need emergency care.   For example, call if:  ? · You vomit blood or what looks like coffee grounds. ? · You pass maroon or very bloody stools. ?Call your doctor now or seek immediate medical care if:  ? · You have new or worse belly pain. ? · Your stools are black and look like tar, or they have streaks of blood. ? · You are vomiting. ? Watch closely for changes in your health, and be sure to contact your doctor if:  ? · You do not feel better as expected. Where can you learn more? Go to http://tala-jacob.info/. Enter Y015 in the search box to learn more about \"Peptic Ulcer Disease: Care Instructions. \"  Current as of: May 12, 2017  Content Version: 11.4  © 4447-6285 [employer]. Care instructions adapted under license by [employer] (which disclaims liability or warranty for this information). If you have questions about a medical condition or this instruction, always ask your healthcare professional. [place] [address] any warranty or liability for your use of this information. Claudette Farrow.  Donte Méndez MD, FACS  General Surgery at [address] [place] [address], [address] [place] [address] Charles Coronado [address] [place] [address] [address]  [phone]  Fax [phone]

## 2018-03-16 NOTE — PROGRESS NOTES
Progress Note    Patient: Elaina Winn MRN: [id number]  SSN: [id number]    YOB: 1945  Age: 67 y.o. Sex: female      Admit Date: 2018    32 Days Post-Op    Procedure:  Procedure(s):  Ex lap    Subjective:     Mrs. Henry Newsome is doing fine. She is tolerating her diet without pain. Objective:     Visit Vitals    /67 (BP 1 Location: Left arm, BP Patient Position: At rest)    Pulse (!) 103    Temp 97.9 °F (36.6 °C)    Resp 16    Ht 5' 3.5\" (1.613 m)    Wt 178 lb 9.2 oz (81 kg)    SpO2 95%    Breastfeeding No    BMI 31.14 kg/m2       Temp (24hrs), Av.2 °F (36.8 °C), Min:97.9 °F (36.6 °C), Max:98.6 °F (37 °C)      Physical Exam:    GENERAL: alert, cooperative, no distress, ABDOMEN: Soft, NT, ND. Lab Review: All lab results for the last 24 hours reviewed. 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 Range 2018 17:20   Ammonia Latest Ref Range: <32 UMOL/L 69 (H)                Free intraperitoneal air ICD-10-CM: K66.8  ICD-9-CM: 568.89  2018 Yes        S/P exploratory laparotomy ICD-10-CM: Z98.890  ICD-9-CM: V45.89  2018 No    Overview Signed 2018 11:57 AM by Teresita Stpehens MD     Suture repair of perforated posterior gastric ulcer with Scherry Resides patch, core needle biopsies of left lobe of the liver.              Alcoholic cirrhosis of liver without ascites ([facility] [address] (Chronic) ICD-10-CM: K70.30  ICD-9-CM: 571.2  2/12/2018 Yes    Overview Signed 2/17/2018  8:45 AM by Jesika Wall MD     Liver bx 2/12/18:   Cirrhosis with mild chronic portal inflammation and macrovesicular steatosis. Fatty liver determined by biopsy ICD-10-CM: K76.0  ICD-9-CM: 571.8  2/12/2018 Yes    Overview Signed 2/17/2018  8:53 AM by Jesika Wall MD        Cirrhosis with mild chronic portal inflammation and macrovesicular steatosis. * (Principal)Perforated chronic gastric ulcer ([address] (Chronic) ICD-10-CM: K25.5  ICD-9-CM: 531.50  2/11/2018 Yes        ETOH abuse (Chronic) ICD-10-CM: F10.10  ICD-9-CM: 305.00  2/11/2018 Yes              Plan/Recommendations/Medical Decision Making:     Mrs. Joseph Seals is doing fine. Will not renew TPN. OK for d/c to SNF today. Continue BID PPI. D/C PICC prior to D/C. F/U in office.     Signed By: Zena Hodgkins., MD     March 16, 2018

## 2018-03-16 NOTE — PROGRESS NOTES
Problem: Self Care Deficits Care Plan (Adult)  Goal: *Acute Goals and Plan of Care (Insert Text)  Occupational Therapy Goals   Reviewed 3/16/2018  Revised 3/2/2018. Reviewed 3/9/2018 and updated below to be met within the next 7 days. 1.  Patient will perform grooming with modified independence for >5 minutes with supervision/setup with < 2 verbal cues within 7 day(s)- not met and continue. 2.  Patient will perform upper body dressing and bathing with modified independence within 7 day(s) - not met and continue. 3.  Patient will perform lower body dressing and bathing with moderate assistance using adaptive equipment within 7 day(s)- met 3/9/18 and upgrade to min A.  4.  Patient will perform toilet transfers to [facility] with minimal assistance x1 within 7 day(s)- - not met and continue. 5.  Patient will perform all aspects of toileting with minimal assistance within 7 day(s)- - not met and continue. 6.  Patient will participate in upper extremity therapeutic exercise/activities with supervision/set-up for 10 minutes within 7 day(s)- not met and continue. 7.  Patient will utilize energy conservation techniques during functional activities with verbal cues within 7 day(s)- not met and continue. 8.  Patient will don/doff back brace at supervision/set-up within 7 days- not met and continue. 9.  Patient will verbalize/demonstrate all spinal precautions during ADL tasks without cues within 7 days - not met and continue. Occupational Therapy Goals  Revised 3/2/2018. Reviewed 3/9/2018 and updated below to be met within the next 7 days. 1.  Patient will perform grooming with modified independence for >5 minutes with supervision/setup with < 2 verbal cues within 7 day(s)- not met and continue. 2.  Patient will perform upper body dressing and bathing with modified independence within 7 day(s) - not met and continue.   3.  Patient will perform lower body dressing and bathing with moderate assistance using adaptive equipment within 7 day(s)- met 3/9/18 and upgrade to min A.  4.  Patient will perform toilet transfers to [facility] with minimal assistance x1 within 7 day(s)- - not met and continue. 5.  Patient will perform all aspects of toileting with minimal assistance within 7 day(s)- - not met and continue. 6.  Patient will participate in upper extremity therapeutic exercise/activities with supervision/set-up for 10 minutes within 7 day(s)- not met and continue. 7.  Patient will utilize energy conservation techniques during functional activities with verbal cues within 7 day(s)- not met and continue. 8.  Patient will don/doff back brace at supervision/set-up within 7 days- not met and continue. 9.  Patient will verbalize/demonstrate all spinal precautions during ADL tasks without cues within 7 days - not met and continue. Initiated 2/20/2018; revised at re-assess (see above); 1. Patient will perform upper body dressing with moderate assistance in unsupported sitting within 7 day(s). 2.  Patient will perform upper body bathing with moderate assistance  within 7 day(s). 3.  Patient will perform grooming with minimal assistance/contact guard assist within 7 day(s). 4.  Patient will perform toilet transfers with maximal assistance  within 7 day(s). 5.  Patient will perform all aspects of toileting with maximal assistance within 7 day(s). 6.  Patient will participate in upper extremity therapeutic exercise/activities with minimal assistance/contact guard assist for 5 minutes within 7 day(s).            Occupational Therapy TREATMENT: WEEKLY REASSESSMENT  Patient: Jason Moncada (29 y.o. female)  Date: 3/16/2018  Diagnosis: Intra-abdominal free air of unknown etiology [K66.8] Perforated chronic gastric ulcer ([facility] [address]  Procedure(s) (LRB):   NASOGASTRIC TUBE PLACEMENT (N/A) 26 Days Post-Op  Precautions: Aspiration, Back, Bed Alarm, Fall, Skin    ASSESSMENT :  Based on the objective data described below, making good progress with self-care and mobility. Patient still limited by endurance and balance with self-care task/IADLs. Feel patient would benefit from rehab to achieve mod I to independent level to safely return home alone    Patient will benefit from skilled intervention to address the above impairments. Patients rehabilitation potential is considered to be Good  Factors which may influence rehabilitation potential include:   [x]                None noted  []                Mental ability/status  []                Medical condition  []                Home/family situation and support systems  []                Safety awareness  []                Pain tolerance/management  []                Other:      PLAN :  Recommendations and Planned Interventions:  [x]                  Self Care Training                  [x]           Therapeutic Activities  []                  Functional Mobility Training    []           Cognitive Retraining  [x]                  Therapeutic Exercises           [x]           Endurance Activities  [x]                  Balance Training                   []           Neuromuscular Re-Education  []                  Visual/Perceptual Training     [x]      Home Safety Training  [x]                  Patient Education                 [x]           Family Training/Education  []                  Other (comment):    Frequency/Duration: Patient will be followed by occupational therapy 5 times a week to address goals. Discharge Recommendations: Rehab  Further Equipment Recommendations for Discharge: TBD     SUBJECTIVE:   Patient stated I can't wait to get home.     OBJECTIVE DATA SUMMARY:   Hospital course since last seen and reason for reevaluation: unremarkable  Cognitive/Behavioral Status:  Neurologic State: Alert  Orientation Level: Oriented X4  Cognition: Appropriate decision making           Skin: intact  Edema: intact                                     Range of Motion:  [facility] Balance:  Sitting: Intact  Sitting - Static: Good (unsupported)  Sitting - Dynamic: Good (unsupported)  Standing: With support  Standing - Static: Good    Functional Mobility and Transfers for ADLs:  Bed Mobility:  Supine to Sit: Modified independent    Transfers:  Sit to Stand: Stand-by assistance  Functional Transfers  Bathroom Mobility: Stand-by assistance  Toilet Transfer : Stand-by assistance   Bed to Chair: Contact guard assistance                                              ADL Intervention:       Grooming  Grooming Assistance: Independent  Washing Face: Independent  Washing Hands: Independent  Brushing Teeth: Independent                                    Functional Measure:  Barthel Index:    Bathin  Bladder: 10  Bowels: 10  Groomin  Dressing: 10  Feeding: 10  Mobility: 10  Stairs: 5  Toilet Use: 5  Transfer (Bed to Chair and Back): 10  Total: 75       Barthel and G-code impairment scale:  Percentage of impairment CH  0% CI  1-19% CJ  20-39% CK  40-59% CL  60-79% CM  80-99% CN  100%   Barthel Score 0-100 100 99-80 79-60 59-40 20-39 1-19   0   Barthel Score 0-20 20 17-19 13-16 9-12 5-8 1-4 0      The Barthel ADL Index: Guidelines  1. The index should be used as a record of what a patient does, not as a record of what a patient could do. 2. The main aim is to establish degree of independence from any help, physical or verbal, however minor and for whatever reason. 3. The need for supervision renders the patient not independent. 4. A patient's performance should be established using the best available evidence. Asking the patient, friends/relatives and nurses are the usual sources, but direct observation and common sense are also important. However direct testing is not needed. 5. Usually the patient's performance over the preceding 24-48 hours is important, but occasionally longer periods will be relevant.   6. Middle categories imply that the patient supplies over 50 per cent of the effort. 7. Use of aids to be independent is allowed. Liu Bolton., Barthel, D.W. (6014). Functional evaluation: the Barthel Index. [address] (14)2. PAULO Becerra Fredda Congo.Zainab., [place], [address] (1999). Measuring the change indisability after inpatient rehabilitation; comparison of the responsiveness of the Barthel Index and Functional [place] Measure. Journal of Neurology, Neurosurgery, and Psychiatry, 66(4), 134-525. BENTLEY FieldsA, BALBINA Felton, & Sis Garibay M.A. (2004.) Assessment of post-stroke quality of life in cost-effectiveness studies: The usefulness of the Barthel Index and the EuroQoL-5D. Quality of Life Research, 13, 259-61       G codes: In compliance with CMSs Claims Based Outcome Reporting, the following G-code set was chosen for this patient based on their primary functional limitation being treated: The outcome measure chosen to determine the severity of the functional limitation was the Barthel with a score of 75/100 which was correlated with the impairment scale. ?  Self Care:     - CURRENT STATUS: CJ - 20%-39% impaired, limited or restricted    - GOAL STATUS: CJ - 20%-39% impaired, limited or restricted    - D/C STATUS:  CI - 1%-19% impaired, limited or restricted     Occupational Therapy Evaluation Charge Determination   History Examination Decision-Making   LOW Complexity : Brief history review  LOW Complexity : 1-3 performance deficits relating to physical, cognitive , or psychosocial skils that result in activity limitations and / or participation restrictions  LOW Complexity : No comorbidities that affect functional and no verbal or physical assistance needed to complete eval tasks       Based on the above components, the patient evaluation is determined to be of the following complexity level: LOW   Pain:  Pain Scale 1: Numeric (0 - 10)  Pain Intensity 1: 6  Pain Location 1: Back  Pain Orientation 1: Lower  Pain Description 1: Aching  Pain Intervention(s) 1: Medication (see MAR)  Activity Tolerance: Good  Please refer to the flowsheet for vital signs taken during this treatment. After treatment:   [x] Patient left in no apparent distress sitting up in chair  [] Patient left in no apparent distress in bed  [x] Call bell left within reach  [x] Nursing notified  [] Caregiver present  [] Bed alarm activated    COMMUNICATION/EDUCATION:   The patients plan of care was discussed with: Physical Therapist and Registered Nurse. [x]    Home safety education was provided and the patient/caregiver indicated understanding. [x]    Patient/family have participated as able in goal setting and plan of care. [x]    Patient/family agree to work toward stated goals and plan of care. []    Patient understands intent and goals of therapy, but is neutral about his/her participation. []    Patient is unable to participate in goal setting and plan of care. This patients plan of care is appropriate for delegation to [facility].     Thank you for this referral.  Gloria Valerio, OTR/L  Time Calculation: 23 mins

## 2018-03-16 NOTE — PROGRESS NOTES
Reviewed discharge paperwork with patient's daughters. Removed PICC line and gave two prescriptions to daughters.

## 2018-03-16 NOTE — PROGRESS NOTES
Problem: Mobility Impaired (Adult and Pediatric)  Goal: *Acute Goals and Plan of Care (Insert Text)  Physical Therapy Goals  Revised 3/14/2018  1. Patient will move from flat supine to sit and sit to flat supine and roll side to side in bed with minimal assistance/contact guard assist within 7 day(s). 2.  Patient will transfer from bed <> chair and chair <> bed with supervision/set-up using the least restrictive device within 7 day(s). 3.  Patient will perform sit <>stand with modified independence within 7 day(s). 4.  Patient will ambulate with SBA for 100 feet with RW within 7 day(s). 5. Patient will verbalize and demonstrate understanding of spinal precautions (No bending, lifting greater than 5 lbs, or twisting; log-roll technique; frequent repositioning as instructed) within 7 days. 6. Patient will demonstrate independence completing 10 reps of each exercise of initial exercise program with visual handout within 7 days       Physical Therapy Goals  Revised 3/7/2018  1. Patient will move from long sitting to sit edge of bed and sit to supine and roll side to side in bed with modified independence 7 day(s). Not Met  2. Patient will transfer from bed to chair and chair to bed with contact guard assist x1 using RW within 7 day(s). Nearly Met - upgrade and continue  3. Patient will perform sit <>stand with contact guard assist x 1 within 7 day(s). Nearly Met - upgrade and continue  4. Patient will ambulate with minimal assistance x 1  for 50 feet with RW within 7 day(s). Met - upgrade and continue  5. Patient will verbalize and demonstrate understanding of spinal precautions (No bending, lifting greater than 5 lbs, or twisting; log-roll technique; frequent repositioning as instructed) within 7 days. Not Met - continue    Physical Therapy Goals  Revised 2/28/2018  1. Patient will move from supine to sit and sit to supine  and roll side to side in bed with moderate assistance x 1  within 7 day(s).  MET, upgrade  2. Patient will move from long sitting in bed to edge of bed with minimal assistance x 1 within 7 days. MET, advance  3. Patient will transfer from bed to chair and chair to bed with moderate assistance x 1 using the least restrictive device within 7 day(s). 4.  Patient will perform sit <> stand with minimal assistance x 1 within 7 day(s). NOT MET - continue  5. Patient will ambulate with minimal assistance x 1  for 15 feet with the least restrictive device within 7 day(s). MET, advance  6. Patient will verbalize and demonstrate understanding of spinal precautions (No bending, lifting greater than 5 lbs, or twisting; log-roll technique; frequent repositioning as instructed) within 7 days. NOT MET- continue    Physical Therapy Goals  Initiated 2/20/2018  1. Patient will move from supine to sit and sit to supine , scoot up and down and roll side to side in bed with moderate assistance x 2  within 7 days. Partially Met - Continue  2. Patient will perform sit <> stand with minimal assist x 2  within 7 days. Met - Advance goal below  3. Patient will ambulate with minimal assistance x 2 for 10 feet with RW and assist of 3rd pushing chair from behind within 7 days. Not Met- Continue  4. Patient will verbalize and demonstrate understanding of spinal precautions (No bending, lifting greater than 5 lbs, or twisting; log-roll technique; frequent repositioning as instructed) within 7 days. Not Met- Continue      physical Therapy TREATMENT/DISCHARGE  Patient: Niecy Trivedi (53 y.o. female)  Date: 3/16/2018  Diagnosis: Intra-abdominal free air of unknown etiology [K66.8] Perforated chronic gastric ulcer ([facility] [address]  Procedure(s) (LRB):   NASOGASTRIC TUBE PLACEMENT (N/A) 26 Days Post-Op  Precautions: Aspiration, Back, Bed Alarm, Fall, Skin  Chart, physical therapy assessment, plan of care and goals were reviewed. ASSESSMENT:  Ms. Spike Woo seen this afternoon for final treatment.  Received up in chair without TLSO brace so assisted donning brace before amb. Patient still requires max cues to recall 4/4 back precautions. Reassessment of balance via Tinetti Test indicates improved balance but still on cusp of moderate to high risk for falls. Bed mobility and sit<>stand from low surfaces are still most challenging tasks and patient tends to take very short strides with narrowed base of support with gait. Patient with improved endurance and tolerance for distance ambulation this pm and has ambulated hallways three times today- once OT once with daughter and once with PT. Reviewed sitting exercises after patient ambulated and then assisted back to bed and doffing brace. Patient tolerated activity on room air today and maintained SPO2 94-95% -120 during activity. Mild dyspnea with activity and more so post amb. in sitting but recovers ~3 minutesPatient transferring this pm to [facility]. Progression toward goals:  []      Improving appropriately and progressing toward goals  [x]      Improving slowly and progressing toward goals  []      Not making progress toward goals and plan of care will be adjusted     PLAN:  Patient will be discharged from physical therapy at this time. Rationale for discharge:  [] Goals Achieved  [] [address]  [] Patient not participating in therapy  [x] Other:  Discharge Recommendations:  Vito Ritchie  Further Equipment Recommendations for Discharge:  TBD post rehab     SUBJECTIVE:   Patient stated I will work hard.     OBJECTIVE DATA SUMMARY:   Critical Behavior:  Neurologic State: Alert  Orientation Level: Oriented X4  Cognition: Appropriate decision making  Safety/Judgement: Awareness of environment, Decreased insight into deficits, Fall prevention, Home safety  Functional Mobility Training:  Bed Mobility:  Rolling: Moderate assistance  Supine to Sit: Minimum assistance  Sit to Supine: Minimum assistance  Scooting:  Additional time        Transfers:  Sit to Stand: Supervision; Additional time  Stand to Sit: Supervision; Additional time  Stand Pivot Transfers: Supervision     Bed to Chair: Supervision; Additional time                    Balance:  Sitting: Intact  Sitting - Static: Good (unsupported)  Sitting - Dynamic: Good (unsupported)  Standing: With support  Standing - Static: Good; Unsupported  Standing - Dynamic : Fair (see  tinetti test)  Ambulation/Gait Training:  Distance (ft): 200 Feet (ft)  Assistive Device: Brace/Splint;Gait belt;Walker, rolling  Ambulation - Level of Assistance: Contact guard assistance        Gait Abnormalities: Antalgic;Decreased step clearance  Base of Support: Narrowed     Speed/Alejandra: Accelerated  Step Length: Left shortened;Right shortened       Stairs - Level of Assistance:  (NT)      Functional Measure:  Tinetti test:    Sitting Balance: 1  Arises: 1  Attempts to Rise: 2  Immediate Standing Balance: 1  Standing Balance: 1  Nudged: 2  Eyes Closed: 1  Turn 360 Degrees - Continuous/Discontinuous: 0  Turn 360 Degrees - Steady/Unsteady: 1  Sitting Down: 1  Balance Score: 11  Indication of Gait: 1  R Step Length/Height: 1  L Step Length/Height: 1  R Foot Clearance: 1  L Foot Clearance: 1  Step Symmetry: 1  Step Continuity: 1  Path: 1  Trunk: 0  Walking Time: 1  Gait Score: 9  Total Score: 20       Tinetti Test and G-code impairment scale:  Percentage of Impairment CH    0%   CI    1-19% CJ    20-39% CK    40-59% CL    60-79% CM    80-99% CN     100%   Tinetti  Score 0-28 28 23-27 17-22 12-16 6-11 1-5 0       Tinetti Tool Score Risk of Falls  <19 = High Fall Risk  19-24 = Moderate Fall Risk  25-28 = Low Fall Risk  Tinetti ME. Performance-Oriented Assessment of Mobility Problems in Elderly Patients. [address]; [id number].  (Scoring Description: PT Bulletin Feb. 10, 1993)    Older adults: Adair Lazo et al, 2009; n = 1000 [facility] elderly evaluated with ABC, DIANDRA, ADL, and IADL)  · Mean DIANDRA score for males aged 69-68 years = 26.21(3.40)  · Mean DIANDRA score for females age 69-68 years = 25.16(4.30)  · Mean DIANDRA score for males over 80 years = 23.29(6.02)  · Mean DIANDRA score for females over 80 years = 17.20(8.32)       G codes: In compliance with CMSs Claims Based Outcome Reporting, the following G-code set was chosen for this patient based on their primary functional limitation being treated: The outcome measure chosen to determine the severity of the functional limitation was the Tinetti Test with a score of 20/28 which was correlated with the impairment scale. ? Mobility - Walking and Moving Around:     - CURRENT STATUS: CJ - 20%-39% impaired, limited or restricted    - GOAL STATUS: CJ - 20%-39% impaired, limited or restricted    - D/C STATUS:  CJ - 20%-39% impaired, limited or restricted       Pain:  Pain Scale 1: Numeric (0 - 10)  Pain Intensity 1: 6  Pain Location 1: Back  Pain Orientation 1: Lower  Pain Description 1: Aching  Pain Intervention(s) 1: Medication (see MAR)  Activity Tolerance:   Improving slowly   Please refer to the flowsheet for vital signs taken during this treatment.   After treatment:   [] Patient left in no apparent distress sitting up in chair  [x] Patient left in no apparent distress in bed  [x] Call bell left within reach  [x] Nursing notified  [] Caregiver present  [] Bed alarm activated    COMMUNICATION/COLLABORATION:   The patients plan of care was discussed with: Registered Nurse    Caterina Posey, PT   Time Calculation: 26 mins

## 2018-03-16 NOTE — PROGRESS NOTES
Family Practice Daily Progress Note: 3/16/2018  Bird Oliva,     Assessment/Plan:   Perforated chronic gastric ulcer /  Free intraperitoneal air / S/P exploratory laparotomy 2/11. S/p repair in OR with Dr. Elieser Corona on 2/12/18 - per surg. Transitioned to soft po 3/15. Severe protein calorie malnutrition-POA  --TPN finished 3/15  --Recheck  K/Mg outpt     Acute metabolic encephalopathy/Delerium. Much improved - back to baseline. Multifactorial-- medications, alcohol use, sundowning: improved  ---seroquel at night, haldol prn, recheck outpt    Hypoxia / Respiratory distress. Resolved . Pleural effusions improved.      Alcoholic cirrhosis of liver without ascites /  Fatty liver determined by biopsy /  Hyperammonemia. --GI has seen- rec alcohol cessation and outpt follow up     ETOH abuse. Needs to stop ETOH entirely.     Hypokalemia /  Hypoalbuminemia. Being addressed with TPN     Leukocytosis.  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  ICD-9-CM: 537.4  3/15/2018 - Present        Hypokalemia ICD-10-CM: E87.6  ICD-9-CM: 276.8  2/18/2018 - Present        Leukocytosis ICD-10-CM: D72.829  ICD-9-CM: 288.60  2/18/2018 - Present        Severe protein-calorie malnutrition ([facility] [address] ICD-10-CM: E43  ICD-9-CM: 262  2/18/2018 - Present        Acute metabolic encephalopathy UOE-77-EV: G93.41  ICD-9-CM: 348.31  2/18/2018 - Present        Hyperammonemia ([address] ICD-10-CM: E72.20  ICD-9-CM: 270.6  2/16/2018 - Present    Overview Addendum 2/17/2018  9:08 AM by Kehinde Elkins MD        Ref. Range 2/16/2018 17:20   Ammonia Latest Ref Range: <32 UMOL/L 69 (H)                Free intraperitoneal air ICD-10-CM: K66.8  ICD-9-CM: 568.89  2/12/2018 - Present        S/P exploratory laparotomy ICD-10-CM: Z98.890  ICD-9-CM: V45.89  2/12/2018 - Present    Overview Signed 2/12/2018 11:57 AM by Jennie Alarcon MD     Suture repair of perforated posterior gastric ulcer with Ramirez Bitter patch, core needle biopsies of left lobe of the liver. Alcoholic cirrhosis of liver without ascites (HCC) (Chronic) ICD-10-CM: K70.30  ICD-9-CM: 571.2  2/12/2018 - Present    Overview Signed 2/17/2018  8:45 AM by Kehinde Elkins MD     Liver bx 2/12/18:   Cirrhosis with mild chronic portal inflammation and macrovesicular steatosis. Fatty liver determined by biopsy ICD-10-CM: K76.0  ICD-9-CM: 571.8  2/12/2018 - Present    Overview Signed 2/17/2018  8:53 AM by Kehinde Elkins MD        Cirrhosis with mild chronic portal inflammation and macrovesicular steatosis. * (Principal)Perforated chronic gastric ulcer ([address] (Chronic) ICD-10-CM: K25.5  ICD-9-CM: 531.50  2/11/2018 - Present        ETOH abuse (Chronic) ICD-10-CM: F10.10  ICD-9-CM: 305.00  2/11/2018 - Present        GI bleed ICD-10-CM: K92.2  ICD-9-CM: 578.9  4/26/2016 - Present        Hypotension ICD-10-CM: I95.9  ICD-9-CM: 458.9  4/26/2016 - Present        Tachycardia ICD-10-CM: R00.0  ICD-9-CM: 785.0  4/26/2016 - Present        Acute blood loss anemia ICD-10-CM: D62  ICD-9-CM: 285.1  4/26/2016 - Present              Subjective:    67 y.o.  female with EtOH abuse who is admitted to the General Surgery Service with perforated gastric ulcer. We are asked to evaluate for altered mental status. The patient is poorly interactive at this time and this limits primary hx.  Discussed case with family available at bedside providing secondary hx and chart review. Per family, patient has had declining neurological condition over past 48 hours. Notably, an RRT was called for respiratory distress. 2/19: This morning she is a bit more alert at this moment and taking in more complete sentences. She says she does not feel well. She has no specific site of pain other than the NG tube which is bothering her a great deal.  She should improve as time from surg increases. K+ a little low - replacing.     2/20:  Still confused and somnolent at times. Now able to localize pain to ab and converse a bit more. Tmax 100.3  WBC is up again but hopefully reactive - recheck in AM - more incentive spirom and check CXR.     2/21: A little more alert. Knows she is in the hospital. Not able to participate with PT yesterday as she was in too much pain. WBC still at 19.     2/22:  WBC 19K. She is more alert. Daughter at bedside. Both of her daughters live out of town. Looking at SNF options for rehab. Coughing more. Starting to use IS.     2/23: WBC 17. Repeat CT chest with moderate to large persistent left subphrenic collection. IR has been consulted. Vanc and Levaquin added by pulm. 2/24: Pt had a better night last night. Only brief episode of confusion. She is much clearer this AM.  Now in quite a bit of pain from gas and stomach cramping. Thinks she will feel better if she can have a BM. Has been on bedpan for a while without success. On TPN. Daughters very concerned that she won't be successful while lying down. Wanting to get up to bedside commode. Will need PT's help. 2/25: febrile overnight and WBC up after vanc was stopped. Pt c/o more dyspnea today. Known fluid collection that will be drained in IR this week. Up to chair with PT yesterday briefly. +BM yesterday. Da notes pt has been c/o R wrist pain off and on for 2 wks since she fell. No swelling.    2/26:  No new complaints. Still c/o back and ab pain.   Still passing gas and had BM. Diuresed with 1 mg Bumex yesterday by Pulmonary. Remains on Zosyn and Levaquin. Blood culture remains neg. Tmax 99.2. X-ray of wrists ok. CXR ok with tube inplace. WBC 15.7K.      2/27:  Looks much better today. Much less work of breathing. Diuresed about 1 liter over last 24h. WBC 16K today. Blood cult remains neg. Off antibiotics. Still on TPN. She will need rehab for PT/OT. 2/28: Very restless during the night. Has not been OOB. Daughter stayed the night with her. WBC is still up. Antibiotics stopped yesterday. She is c/o increased pain in her back and can't get comfortable. More anxious and agitated. Tolerating sips. 3/1:  She reports she feels better this AM after she had some sleep. Afeb. WBC still up - a bit more today with increased neutrophils. Will also get ID to see also. CT AB and Pelvis as below. Cultures done yesterday off antibiotics - so far no growth. No output from drain - may need to be repositioned. More edema today but not as short of breath today while sitting up - add albumin to todays lab. Add Aldactone if ok with GI and Bumex as per Pul. Discussed cirrhosis/ascites/edema with pts daughter. 3/2:  She reports she feels better, and has slightly less edema, although daughters note they think pts abdomin more swollen. The daughters are more worried about pts LE edema than anything else - explained that with pts low albumin that edema may cont to be a problem. She is more alert, a bit more oriented today and says she has much less pain today. Tmax 101. Culture had yeast - diflucan started. Drain was repositioned, had thoracentesis and new PICC placed yesterday. May need to restart broad spectrum antibiotics - ID consulted. 3/3:  She reports she feels better this AM.  More alert and oriented this AM.  BM yesterday. Tmax 99. WBC still 17-18K. ID consulted and advises to watch off antibiotics for now.   Less edema LE.      3/4:  Doing much better today and much more alert. Little pain. Off sleeping meds for now - not sleeping well but reports \"never was a good sleeper. \"  Anxious at night. WBC still up. Discussed rehab with daughters and pt and pt willing to go. 3/5:  Continues to improve. She wants to eat solid food - she now has an appetite. Passing gas. Has been up to chair. K+ 5.3 - will DC aldactone for now and restart if edema returns. CXR pending today. 3/6: She reports she feels better. Yesterday had marked increase in ALYSSA output and now NPO and back on TPN today. Will resume Aldactone again (at lower dose) as sl more edema today and hold again if K+ creeping up again. AM labs pending. 3/7:  Reports some ab pain this AM but not severe. Orthostatic hypotension with walking yesterday and K+ back up - will hold aldactone again. Breathing and edema better with the Aldactone. Push incentive spirom - last CXR with atelectasis. O>>I on I&Os but wonder about measurements. 3/8:  Nausea this AM with 2 episodes of vomiting but reports she feels back to normal now. No ab pain. No SOB at rest.  Wearing nasal O2 at 2 L. Nurse reports yesterdays labs were drawn just before 12MN last night for some reason - still have not resulted and lab called and will run samples now. This AMs labs not drawn yet. Port CXR this AM pending.  O>>I again but Intake not correct. 3/9:  No nausea this AM.  No further vomiting. Had BM yesterday. Desats with exercise. Was up in chair some yesterday. WBC down to 12K. Hb a little lower at 7.8 - recheck later today. Seems to be making slow progress. 3/10: [place] No N/V. Pain minimal at this time, some in back. No recent confusion. BM 2 days ago. 3/11: Walked this AM with PT. Up in chair now and back hurts, worse with deep breaths. No BM in a few days now. 3/12:  Pt reports BM yesterday. No pain. Breathing stable. AM CBC pending.     Add: 327pm:  Labs back - phos up but Ca+ ok - will discuss with surg. 3/13:  Drains out yesterday and doing well so far with little drainage. Elevated Phos was prob an anomaly - todays is back in line with previous ones. 3/14: Feeling better today. No ab pain/fullness at this time. Still only on sips. 3/15:  She is feeling much better today - \"had soup\" - and reports did well with advancing diet. Pt reports BM and passing gas. Per ID can stop Eraxis today. 3/16:  Continues to improve. Advanced to soft diet and pt and nurse reports pt did well. She has been up in halls walking. Medically stable. Cont Aldactone for now and DC as follow up dictates. Cont Protonix for foreseeable future. Stop NASAIDS. Mag was a little low so will add Mag Oxide and recheck Mag next wk. If she is discharged to home we will see her in office early next wk. If she goes to rehab they should recheck CMP/CBC/Mag early next wk and follow up our offices w/i one wk of DC from SNF. Past Medical History:   Diagnosis Date    Alcoholic cirrhosis of liver without ascites ([facility] [address] 2/12/2018    ETOH abuse 2/11/2018    History of vascular access device 02/16/2018    [facility] VAT - 5 FR triple, R basilic, TPN    Hyperammonemia ([facility] [address] 2/16/2018     Results for Moncho Ravi (MRN [id number]) as of 2/17/2018 08:43  Ref. Range 2/16/2018 17:20 Ammonia Latest Ref Range: <32 UMOL/L 69 (H)     Hyperlipidemia     Perforated chronic gastric ulcer ([facility] [address] 2/11/2018     Past Surgical History:   Procedure Laterality Date    HX CATARACT REMOVAL       Social History     Social History    Marital status: SINGLE     Spouse name: N/A    Number of children: N/A    Years of education: N/A     Occupational History    Not on file.      Social History Main Topics    Smoking status: Former Smoker    Smokeless tobacco: Never Used    Alcohol use 11.4 oz/week     0 Standard drinks or equivalent, 19 Glasses of wine per week      Comment: Hx of heavy etoh use, but quit several months ago    Drug use: No    Sexual activity: Not on file     Other Topics Concern    Not on file     Social History Narrative     Family History   Problem Relation Age of Onset    Other Other      patient did not know     Review of Systems:   A comprehensive review of systems was negative except for that written in the HPI. Objective:   Physical Exam:     Visit Vitals    /67 (BP 1 Location: Left arm, BP Patient Position: At rest)    Pulse (!) 103    Temp 97.9 °F (36.6 °C)    Resp 16    Ht 5' 3.5\" (1.613 m)    Wt 81 kg (178 lb 9.2 oz)    SpO2 95%    Breastfeeding No    BMI 31.14 kg/m2    O2 Flow Rate (L/min): 2 l/min O2 Device: Room air    Temp (24hrs), Av.2 °F (36.8 °C), Min:97.9 °F (36.6 °C), Max:98.6 °F (37 °C)         1901 -  0700  In: -   Out: 900 [Urine:900]    General:  Awake, uncomfortable, appears stated age. Head:  Normocephalic, without obvious abnormality, atraumatic. Eyes:  Conjunctivae/corneas clear. EOMs intact. Throat: Lips, mucosa, and tongue dry. Neck: Supple, symmetrical, trachea midline, no adenopathy   Lungs:    CTAB, no w/r/r    Heart:  reg rate with regular rhythm,  no murmur, click, rub or gallop. Abdomen:   Soft,minimal distension. Former Drain sites ok. Extremities: no cyanosis. No LE edema at this time. No calf tenderness or cords, no erythema   Skin: turgor normal. No rashes or lesions   Neurologic: AOx2-3, . Sensation grossly normal to touch. Gross motor of extremities normal.       Data Review:    CT AB and Pelvis 18: IMPRESSION:  1. There is residual fluid in the posterior portion of the left subphrenic  collection which does not appear well drained by the pigtail catheter. This  could be repositioned versus new drainage catheter placement if clinically  indicated. 2. Persistent ascites. 3. Increasing right pleural effusion and right lower lobe atelectasis   4.  Distended duodenum and proximal small bowel of doubtful clinical  Significance. CXR 3/1/18:  INDICATION:  chest pain and shortness of breath following thoracentesis   EXAM: Portable chest 1716 . Comparison March 1, 2018  FINDINGS: There is no significant change in appearance the lungs. Small left  pleural effusion with left basilar atelectasis unchanged. Cardiomediastinal  silhouette is unchanged. No pneumothorax. Lines and tubes are unchanged in  position. IMPRESSION:  1. No interval change    KUB 3/6/18  IMPRESSION: Nonspecific bowel gas pattern with multiple dilated loops of small  bowel but does not appear obstructive. No pneumoperitoneum. Recent Days:  No results for input(s): WBC, HGB, HCT, PLT, HGBEXT, HCTEXT, PLTEXT, HGBEXT, HCTEXT, PLTEXT in the last 72 hours. Recent Labs      03/15/18   0416   NA  136   K  4.5   CL  105   CO2  22   GLU  77   BUN  15   CREA  0.40*   CA  8.3*   MG  1.5*     No results for input(s): PH, PCO2, PO2, HCO3, FIO2 in the last 72 hours.   24 Hour Results:  Recent Results (from the past 24 hour(s))   GLUCOSE, POC    Collection Time: 03/15/18 12:00 PM   Result Value Ref Range    Glucose (POC) 108 (H) 65 - 100 mg/dL    Performed by [address] [place] [address], POC    Collection Time: 03/15/18  6:11 PM   Result Value Ref Range    Glucose (POC) 85 65 - 100 mg/dL    Performed by Kena Godfrey (PCT)    GLUCOSE, POC    Collection Time: 03/15/18 11:20 PM   Result Value Ref Range    Glucose (POC) 119 (H) 65 - 100 mg/dL    Performed by Floridalma Schmidt (PCT)    GLUCOSE, POC    Collection Time: 03/16/18  5:16 AM   Result Value Ref Range    Glucose (POC) 115 (H) 65 - 100 mg/dL    Performed by Floridalma Schmidt (PCT)      Medications reviewed  Current Facility-Administered Medications   Medication Dose Route Frequency    oxyCODONE IR (ROXICODONE) tablet 5 mg  5 mg Oral Q4H PRN    pantoprazole (PROTONIX) tablet 40 mg  40 mg Oral ACB&D    spironolactone (ALDACTONE) tablet 25 mg  25 mg Oral DAILY    albuterol-ipratropium (DUO-NEB) 2.5 MG-0.5 MG/3 ML  3 mL Nebulization Q4H PRN    melatonin tablet 1.5 mg  1.5 mg Oral QHS PRN    HYDROmorphone (PF) (DILAUDID) injection 1 mg  1 mg IntraVENous Q3H PRN    fentaNYL (DURAGESIC) 25 mcg/hr patch 1 Patch  1 Patch TransDERmal Q72H    haloperidol lactate (HALDOL) injection 1 mg  1 mg IntraVENous Q4H PRN    guaiFENesin ER (MUCINEX) tablet 1,200 mg  1,200 mg Oral Q12H    acetaminophen (TYLENOL) tablet 650 mg  650 mg Oral Q4H PRN    insulin regular (NOVOLIN R, HUMULIN R) injection   SubCUTAneous Q6H    fat emulsion 20% (LIPOSYN, INTRAlipid) infusion 500 mL  500 mL IntraVENous Q MON, WED & SAT    glucose chewable tablet 16 g  4 Tab Oral PRN    glucagon (GLUCAGEN) injection 1 mg  1 mg IntraMUSCular PRN    dextrose (D50W) injection syrg 12.5-25 g  12.5-25 g IntraVENous PRN    alteplase (CATHFLO) 1 mg in sterile water (preservative free) 1 mL injection  1 mg InterCATHeter PRN    sodium chloride (NS) flush 10-30 mL  10-30 mL InterCATHeter PRN    sodium chloride (NS) flush 10-40 mL  10-40 mL InterCATHeter Q8H    naloxone (NARCAN) injection 0.4 mg  0.4 mg IntraVENous PRN    ondansetron (ZOFRAN) injection 4 mg  4 mg IntraVENous Q4H PRN    enoxaparin (LOVENOX) injection 40 mg  40 mg SubCUTAneous Q24H    phenol throat spray (CHLORASEPTIC) 1 Spray  1 Spray Oral PRN    nystatin (MYCOSTATIN) 100,000 unit/gram powder   Topical BID    sodium chloride (NS) flush 5-10 mL  5-10 mL IntraVENous PRN       Debbie Be M.D.

## 2018-03-16 NOTE — PROGRESS NOTES
3/16/2018     3:23 PM  CM informed Betty Didier was received. Pt's DTR will provide pt transport. Nurses, please call report to Vito Bucio [phone], Wing 2.     10:21 AM  CM informed by Vito Bucio that Betty Didier is still pending.

## 2018-03-16 NOTE — ROUTINE PROCESS
Bedside and Verbal shift change report given to Neal Lee RN (oncoming nurse) by Radha Altman RN (offgoing nurse). Report included the following information SBAR, Intake/Output and MAR.

## 2018-03-16 NOTE — DISCHARGE SUMMARY
Physician Discharge Summary     Patient ID:  Rolando Strickland  [id number]  67 y.o.  1945    Allergies: Review of patient's allergies indicates no known allergies. Admit Date: 2/11/2018    Discharge Date: 3/16/2018    * Admission Diagnoses: Intra-abdominal free air of unknown etiology [K66.8]    * Discharge Diagnoses:    Hospital Problems as of 3/16/2018  Date Reviewed: 4/26/2016          Codes Class Noted - Resolved POA    Pleural effusion, bilateral ICD-10-CM: J90  ICD-9-CM: 511.9  3/16/2018 - Present No        Intra-abdominal fluid collection ICD-10-CM: R18.8  ICD-9-CM: 789.59  3/16/2018 - Present No    Overview Signed 3/16/2018 11:12 AM by Misbah Alanis MD     Subphrenic, LUQ. 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 Range 2/16/2018 17:20   Ammonia Latest Ref Range: <32 UMOL/L 69 (H)                Free intraperitoneal air ICD-10-CM: K66.8  ICD-9-CM: 568.89  2/12/2018 - Present Yes        S/P exploratory laparotomy ICD-10-CM: Z98.890  ICD-9-CM: V45.89  2/12/2018 - Present No    Overview Signed 2/12/2018 11:57 AM by Misbah Gipson., MD     Suture repair of perforated posterior gastric ulcer with Shikha Butts patch, core needle biopsies of left lobe of the liver.              Alcoholic cirrhosis of liver without ascites (HCC) (Chronic) ICD-10-CM: K70.30  ICD-9-CM: 571.2  2/12/2018 - Present Yes    Overview Signed 2/17/2018  8:45 AM by Brenda Lyon MD     Liver bx 2/12/18:   Cirrhosis with mild chronic portal inflammation and macrovesicular steatosis. * (Principal)Perforated chronic gastric ulcer ([address] (Chronic) ICD-10-CM: K25.5  ICD-9-CM: 531.50  2/11/2018 - Present Yes        ETOH abuse (Chronic) ICD-10-CM: F10.10  ICD-9-CM: 305.00  2/11/2018 - Present Yes        RESOLVED: Hypokalemia ICD-10-CM: E87.6  ICD-9-CM: 276.8  2/18/2018 - 3/16/2018 No        RESOLVED: Fatty liver determined by biopsy ICD-10-CM: K76.0  ICD-9-CM: 571.8  2/12/2018 - 3/16/2018 Yes    Overview Signed 2/17/2018  8:53 AM by Brenda Lyon MD        Cirrhosis with mild chronic portal inflammation and macrovesicular steatosis. Admission Condition: Critical    * Discharge Condition: good and improved    * Procedures: Procedure(s):   [address] Course:   Hospital course was complicated by respiratory complications, alcohol withdrawal and gastrocutaneous fistula, which added 3 weeks to the patient's length of stay. Consults: Gastroenterology, Infectious Disease, Internal Medicine, Neurology, Orthopedics, Pulmonary/Critical Care and Interventional Radiology. Significant Diagnostic Studies: labs: CBC, microbiology: drain cultures, and radiology: CXR: pleural effusion(s): bilateral , UGI/SBFT: normal, and CT scan: abdomen and pelvis, pathology: gastric and liver biopsies. * Disposition: [facility])    Discharge Medications:   Current Discharge Medication List      START taking these medications    Details   pantoprazole (PROTONIX) 40 mg tablet Take 1 Tab by mouth Before breakfast and dinner. Qty: 90 Tab, Refills: 0      spironolactone (ALDACTONE) 25 mg tablet Take 1 Tab by mouth daily. Qty: 30 Tab, Refills: 0      fentaNYL (DURAGESIC) 25 mcg/hr PATCH 1 Patch by TransDERmal route every seventy-two (72) hours. Max Daily Amount: 1 Patch.   Qty: 5 Patch, Refills: 0 Associated Diagnoses: T12 burst fracture (HCC)      polyethylene glycol (MIRALAX) 17 gram packet Take 1 Packet by mouth daily. Qty: 30 Packet, Refills: 0    Associated Diagnoses: T12 burst fracture ([address]         CONTINUE these medications which have NOT CHANGED    Details   cyanocobalamin (VITAMIN B12) 500 mcg tablet Take 500 mcg by mouth daily. multivitamin (ONE A DAY) tablet Take 1 Tab by mouth daily. omega-3 fatty acids-vitamin e 1,000 mg cap Take 2 Caps by mouth daily. STOP taking these medications       naproxen sodium (ALEVE) 220 mg tablet Comments:   Reason for Stopping:               * Follow-up Care/Patient Instructions: Activity: No heavy lifting for 7 weeks  Diet: Regular Diet  Wound Care: Keep wound clean and dry    Follow-up Information     Follow up With Details Comments Kemi Benitez MD In 2 weeks  [address]  [address] [place]   [address] [address]      [address]   [address] [place] [address] [address]        Follow-up: GI and Ortho.     Signed:  Keisha Rodriguez MD  3/16/2018  11:15 AM

## 2018-03-19 ENCOUNTER — TELEPHONE (OUTPATIENT)
Dept: SURGERY | Age: 73
End: 2018-03-19

## 2018-03-19 NOTE — TELEPHONE ENCOUNTER
Left message on daughters voicemail patient was discharged from hospital to rehab needs a 2 week follow up with Dr Otoniel bAdi

## 2018-03-21 ENCOUNTER — APPOINTMENT (OUTPATIENT)
Dept: CT IMAGING | Age: 73
End: 2018-03-21
Attending: EMERGENCY MEDICINE
Payer: MEDICARE

## 2018-03-21 ENCOUNTER — HOSPITAL ENCOUNTER (OUTPATIENT)
Age: 73
Setting detail: OBSERVATION
Discharge: NURSING, REHAB OR LONG-TERM CARE | End: 2018-03-23
Attending: EMERGENCY MEDICINE | Admitting: SURGERY
Payer: MEDICARE

## 2018-03-21 DIAGNOSIS — R10.84 ABDOMINAL PAIN, GENERALIZED: Primary | ICD-10-CM

## 2018-03-21 DIAGNOSIS — K59.00 CONSTIPATION, UNSPECIFIED CONSTIPATION TYPE: ICD-10-CM

## 2018-03-21 DIAGNOSIS — S22.081A T12 BURST FRACTURE (HCC): ICD-10-CM

## 2018-03-21 DIAGNOSIS — K59.03 CONSTIPATION DUE TO PAIN MEDICATION: ICD-10-CM

## 2018-03-21 DIAGNOSIS — K56.609 SMALL BOWEL OBSTRUCTION (HCC): ICD-10-CM

## 2018-03-21 LAB
ALBUMIN SERPL-MCNC: 2.5 G/DL (ref 3.5–5)
ALBUMIN/GLOB SERPL: 0.5 {RATIO} (ref 1.1–2.2)
ALP SERPL-CCNC: 179 U/L (ref 45–117)
ALT SERPL-CCNC: 25 U/L (ref 12–78)
ANION GAP SERPL CALC-SCNC: 10 MMOL/L (ref 5–15)
AST SERPL-CCNC: 25 U/L (ref 15–37)
BASOPHILS # BLD: 0 K/UL (ref 0–0.1)
BASOPHILS NFR BLD: 0 % (ref 0–1)
BILIRUB SERPL-MCNC: 0.6 MG/DL (ref 0.2–1)
BUN SERPL-MCNC: 12 MG/DL (ref 6–20)
BUN/CREAT SERPL: 18 (ref 12–20)
CALCIUM SERPL-MCNC: 8.9 MG/DL (ref 8.5–10.1)
CHLORIDE SERPL-SCNC: 104 MMOL/L (ref 97–108)
CO2 SERPL-SCNC: 26 MMOL/L (ref 21–32)
CREAT SERPL-MCNC: 0.67 MG/DL (ref 0.55–1.02)
DIFFERENTIAL METHOD BLD: ABNORMAL
EOSINOPHIL # BLD: 0.1 K/UL (ref 0–0.4)
EOSINOPHIL NFR BLD: 1 % (ref 0–7)
ERYTHROCYTE [DISTWIDTH] IN BLOOD BY AUTOMATED COUNT: 15.3 % (ref 11.5–14.5)
GLOBULIN SER CALC-MCNC: 5.1 G/DL (ref 2–4)
GLUCOSE SERPL-MCNC: 105 MG/DL (ref 65–100)
HCT VFR BLD AUTO: 33.5 % (ref 35–47)
HGB BLD-MCNC: 10.4 G/DL (ref 11.5–16)
IMM GRANULOCYTES # BLD: 0 K/UL (ref 0–0.04)
IMM GRANULOCYTES NFR BLD AUTO: 0 % (ref 0–0.5)
LYMPHOCYTES # BLD: 2.4 K/UL (ref 0.8–3.5)
LYMPHOCYTES NFR BLD: 25 % (ref 12–49)
MCH RBC QN AUTO: 31.7 PG (ref 26–34)
MCHC RBC AUTO-ENTMCNC: 31 G/DL (ref 30–36.5)
MCV RBC AUTO: 102.1 FL (ref 80–99)
MONOCYTES # BLD: 0.5 K/UL (ref 0–1)
MONOCYTES NFR BLD: 5 % (ref 5–13)
NEUTS SEG # BLD: 6.6 K/UL (ref 1.8–8)
NEUTS SEG NFR BLD: 68 % (ref 32–75)
NRBC # BLD: 0 K/UL (ref 0–0.01)
NRBC BLD-RTO: 0 PER 100 WBC
PLATELET # BLD AUTO: 331 K/UL (ref 150–400)
PMV BLD AUTO: 11.1 FL (ref 8.9–12.9)
POTASSIUM SERPL-SCNC: 3.5 MMOL/L (ref 3.5–5.1)
PROT SERPL-MCNC: 7.6 G/DL (ref 6.4–8.2)
RBC # BLD AUTO: 3.28 M/UL (ref 3.8–5.2)
SODIUM SERPL-SCNC: 140 MMOL/L (ref 136–145)
WBC # BLD AUTO: 9.7 K/UL (ref 3.6–11)

## 2018-03-21 PROCEDURE — 99285 EMERGENCY DEPT VISIT HI MDM: CPT

## 2018-03-21 PROCEDURE — 96360 HYDRATION IV INFUSION INIT: CPT

## 2018-03-21 PROCEDURE — 85025 COMPLETE CBC W/AUTO DIFF WBC: CPT | Performed by: EMERGENCY MEDICINE

## 2018-03-21 PROCEDURE — 74011250636 HC RX REV CODE- 250/636: Performed by: SURGERY

## 2018-03-21 PROCEDURE — 36415 COLL VENOUS BLD VENIPUNCTURE: CPT | Performed by: EMERGENCY MEDICINE

## 2018-03-21 PROCEDURE — 43753 TX GASTRO INTUB W/ASP: CPT

## 2018-03-21 PROCEDURE — 80053 COMPREHEN METABOLIC PANEL: CPT | Performed by: EMERGENCY MEDICINE

## 2018-03-21 PROCEDURE — 96372 THER/PROPH/DIAG INJ SC/IM: CPT

## 2018-03-21 PROCEDURE — 74177 CT ABD & PELVIS W/CONTRAST: CPT

## 2018-03-21 PROCEDURE — 96375 TX/PRO/DX INJ NEW DRUG ADDON: CPT

## 2018-03-21 PROCEDURE — C9113 INJ PANTOPRAZOLE SODIUM, VIA: HCPCS | Performed by: SURGERY

## 2018-03-21 PROCEDURE — 96374 THER/PROPH/DIAG INJ IV PUSH: CPT

## 2018-03-21 PROCEDURE — 74011250636 HC RX REV CODE- 250/636: Performed by: EMERGENCY MEDICINE

## 2018-03-21 PROCEDURE — 77030008771 HC TU NG SALEM SUMP -A

## 2018-03-21 PROCEDURE — 65390000012 HC CONDITION CODE 44 OBSERVATION

## 2018-03-21 PROCEDURE — 96361 HYDRATE IV INFUSION ADD-ON: CPT

## 2018-03-21 PROCEDURE — 65270000029 HC RM PRIVATE

## 2018-03-21 PROCEDURE — 96376 TX/PRO/DX INJ SAME DRUG ADON: CPT

## 2018-03-21 PROCEDURE — 74011636320 HC RX REV CODE- 636/320: Performed by: RADIOLOGY

## 2018-03-21 PROCEDURE — 74011250637 HC RX REV CODE- 250/637: Performed by: SURGERY

## 2018-03-21 RX ORDER — ONDANSETRON 2 MG/ML
4 INJECTION INTRAMUSCULAR; INTRAVENOUS
Status: DISCONTINUED | OUTPATIENT
Start: 2018-03-21 | End: 2018-03-23 | Stop reason: HOSPADM

## 2018-03-21 RX ORDER — ENOXAPARIN SODIUM 100 MG/ML
40 INJECTION SUBCUTANEOUS EVERY 24 HOURS
Status: DISCONTINUED | OUTPATIENT
Start: 2018-03-21 | End: 2018-03-23 | Stop reason: HOSPADM

## 2018-03-21 RX ORDER — ACETAMINOPHEN 325 MG/1
650 TABLET ORAL
Status: DISCONTINUED | OUTPATIENT
Start: 2018-03-21 | End: 2018-03-23 | Stop reason: HOSPADM

## 2018-03-21 RX ORDER — SPIRONOLACTONE 25 MG/1
25 TABLET ORAL DAILY
Status: DISCONTINUED | OUTPATIENT
Start: 2018-03-22 | End: 2018-03-23 | Stop reason: HOSPADM

## 2018-03-21 RX ORDER — THERA TABS 400 MCG
1 TAB ORAL DAILY
Status: DISCONTINUED | OUTPATIENT
Start: 2018-03-22 | End: 2018-03-23 | Stop reason: HOSPADM

## 2018-03-21 RX ORDER — LANOLIN ALCOHOL/MO/W.PET/CERES
500 CREAM (GRAM) TOPICAL DAILY
Status: DISCONTINUED | OUTPATIENT
Start: 2018-03-22 | End: 2018-03-23 | Stop reason: HOSPADM

## 2018-03-21 RX ORDER — ACETAMINOPHEN 325 MG/1
650 TABLET ORAL
COMMUNITY

## 2018-03-21 RX ORDER — HYDROMORPHONE HYDROCHLORIDE 2 MG/ML
0.5 INJECTION, SOLUTION INTRAMUSCULAR; INTRAVENOUS; SUBCUTANEOUS
Status: DISCONTINUED | OUTPATIENT
Start: 2018-03-21 | End: 2018-03-22

## 2018-03-21 RX ORDER — UREA 10 %
1 LOTION (ML) TOPICAL
COMMUNITY

## 2018-03-21 RX ORDER — SODIUM CHLORIDE 0.9 % (FLUSH) 0.9 %
5-10 SYRINGE (ML) INJECTION AS NEEDED
Status: DISCONTINUED | OUTPATIENT
Start: 2018-03-21 | End: 2018-03-23 | Stop reason: HOSPADM

## 2018-03-21 RX ORDER — SODIUM CHLORIDE, SODIUM LACTATE, POTASSIUM CHLORIDE, CALCIUM CHLORIDE 600; 310; 30; 20 MG/100ML; MG/100ML; MG/100ML; MG/100ML
125 INJECTION, SOLUTION INTRAVENOUS CONTINUOUS
Status: DISCONTINUED | OUTPATIENT
Start: 2018-03-21 | End: 2018-03-22

## 2018-03-21 RX ORDER — FENTANYL 25 UG/1
1 PATCH TRANSDERMAL
Status: DISCONTINUED | OUTPATIENT
Start: 2018-03-21 | End: 2018-03-22

## 2018-03-21 RX ORDER — SODIUM CHLORIDE 0.9 % (FLUSH) 0.9 %
5-10 SYRINGE (ML) INJECTION EVERY 8 HOURS
Status: DISCONTINUED | OUTPATIENT
Start: 2018-03-21 | End: 2018-03-23 | Stop reason: HOSPADM

## 2018-03-21 RX ORDER — NALOXONE HYDROCHLORIDE 0.4 MG/ML
0.4 INJECTION, SOLUTION INTRAMUSCULAR; INTRAVENOUS; SUBCUTANEOUS AS NEEDED
Status: DISCONTINUED | OUTPATIENT
Start: 2018-03-21 | End: 2018-03-23 | Stop reason: HOSPADM

## 2018-03-21 RX ADMIN — HYDROMORPHONE HYDROCHLORIDE 0.5 MG: 2 INJECTION, SOLUTION INTRAMUSCULAR; INTRAVENOUS; SUBCUTANEOUS at 20:15

## 2018-03-21 RX ADMIN — SODIUM CHLORIDE, SODIUM LACTATE, POTASSIUM CHLORIDE, AND CALCIUM CHLORIDE 125 ML/HR: 600; 310; 30; 20 INJECTION, SOLUTION INTRAVENOUS at 17:50

## 2018-03-21 RX ADMIN — SODIUM CHLORIDE 1000 ML: 900 INJECTION, SOLUTION INTRAVENOUS at 15:33

## 2018-03-21 RX ADMIN — IOPAMIDOL 100 ML: 755 INJECTION, SOLUTION INTRAVENOUS at 17:30

## 2018-03-21 RX ADMIN — ENOXAPARIN SODIUM 40 MG: 40 INJECTION SUBCUTANEOUS at 20:15

## 2018-03-21 RX ADMIN — PANTOPRAZOLE SODIUM 40 MG: 40 INJECTION, POWDER, FOR SOLUTION INTRAVENOUS at 20:15

## 2018-03-21 RX ADMIN — HYDROMORPHONE HYDROCHLORIDE 0.5 MG: 2 INJECTION, SOLUTION INTRAMUSCULAR; INTRAVENOUS; SUBCUTANEOUS at 16:42

## 2018-03-21 RX ADMIN — Medication 10 ML: at 18:10

## 2018-03-21 RX ADMIN — Medication 10 ML: at 20:16

## 2018-03-21 NOTE — ED PROVIDER NOTES
HPI Comments: 67 y.o. female with past medical history significant for HLD, alcoholic cirrhosis of liver, perforated chronic gastric ulcer, and EtOH abuse, who presents via EMS with chief complaint of SBO. Per chart review, pt was admitted 2/11/18-3/16/18 for acute gastric ulcer with perforation. Pt lives at [facility] and had an XR today at the facility that was significant for SBO. Pt reports some mild nausea and constipation; she states it has been 2-3 days since her LBM and has not had any recent flatus. She notes some abdominal pain to palpation earlier today but no pain now. She denies use of Miralax. She states she last ate this morning and has held down the food, and had H2O and \"a tiny bit\" of coffee this morning. Per note, pt has a  thoracic fx at this time; pt states, \"I don't think so, though they make me wear this brace, but they might have told me that while I was out of it. \" Pt specifically denies any vomiting, SOB, or hemoptysis. There are no other acute medical concerns at this time. Social hx: former tobacco use, heavy EtOH use (quit \"maybe 3 months ago\"), denies illicit drug use  PCP: Manoj Jimenez DO    Note written by Frances Barakat, as dictated by Wilian Donnelly DO 3:10 PM    Old Chart Review: Pt was admitted February 11 - March 16 2018 for intraabdominal with perforated viscous; course was complicated by respiratory complications, alcohol withdrawal, and gastrocutaneous fistula. The history is provided by the patient. No  was used. Past Medical History:   Diagnosis Date    Alcoholic cirrhosis of liver without ascites ([facility] 2/12/2018    ETOH abuse 2/11/2018    History of vascular access device 02/16/2018    [facility] VAT - 5 FR triple, R basilic, TPN    Hyperammonemia ([facility] 2/16/2018     Results for Verena Duval (MRN [id number]) as of 2/17/2018 08:43  Ref.  Range 2/16/2018 17:20 Ammonia Latest Ref Range: <32 UMOL/L 69 (H)     Hyperlipidemia     Perforated chronic gastric ulcer ([facility] [address] 2/11/2018       Past Surgical History:   Procedure Laterality Date    HX CATARACT REMOVAL           Family History:   Problem Relation Age of Onset    Other Other      patient did not know       Social History     Social History    Marital status: SINGLE     Spouse name: N/A    Number of children: N/A    Years of education: N/A     Occupational History    Not on file. Social History Main Topics    Smoking status: Former Smoker    Smokeless tobacco: Never Used    Alcohol use 11.4 oz/week     0 Standard drinks or equivalent, 19 Glasses of wine per week      Comment: Hx of heavy etoh use, but quit several months ago    Drug use: No    Sexual activity: Not on file     Other Topics Concern    Not on file     Social History Narrative         ALLERGIES: Review of patient's allergies indicates no known allergies. Review of Systems   Respiratory: Negative for cough and shortness of breath. Gastrointestinal: Positive for abdominal pain, constipation and nausea. Negative for vomiting. All other systems reviewed and are negative. Vitals:    03/21/18 1515   BP: 96/72   Pulse: 95   Resp: 16   Temp: 98.6 °F (37 °C)   SpO2: 93%            Physical Exam      Constitutional: Pt is awake and alert. Pt appears frail and elderly. NAD. HENT:   Head: Normocephalic and atraumatic. Nose: Nose normal.   Mouth/Throat: Oropharynx is clear and moist. No oropharyngeal exudate. Eyes: Conjunctivae and extraocular motions are normal. Pupils are equal, round, and reactive to light. Right eye exhibits no discharge. Left eye exhibits no discharge. No scleral icterus. Neck: No tracheal deviation present. Supple neck. Cardiovascular: Normal rate, regular rhythm, normal heart sounds and intact distal pulses. Exam reveals no gallop and no friction rub. No murmur heard. Pulmonary/Chest: Effort normal and breath sounds normal.  Pt  has no wheezes.   Pt  has no rales. Abdominal: Soft. Pt  exhibits no mass. Pt  has no rebound and no guarding. Distended abd, tenderness is extreme to R side of abdomen. Midline incsion is healing, no signs of infection. Musculoskeletal:  Pt  exhibits no edema and no tenderness. Ext: Normal ROM in all four extremities; not tender to palpation; distal pulses are normal, no edema. Neurological:  Pt is alert. nonfocal neuro exam.  Skin: Skin is warm and dry. Pt  is not diaphoretic. Psychiatric:  Pt  has a normal mood and affect. Behavior is normal. Calm and cooperative. Note written by Frances Ca, as dictated by Sharla Cao DO 3:10 PM      MDM  Number of Diagnoses or Management Options  Critical Care  Total time providing critical care: 30-74 minutes      30 minutes      ED Course       Procedures        CONSULT NOTE:  4:21 PM Sharla Cao DO spoke with Dr. Anatoly Drake, Consult for Surgery. Discussed available diagnostic tests and clinical findings. Dr. Fabricio Tan will admit pt.    5:17 PM  Reviewed CT images. Admitted prior to CT results. Recent Results (from the past 12 hour(s))   CBC WITH AUTOMATED DIFF    Collection Time: 03/21/18  3:26 PM   Result Value Ref Range    WBC 9.7 3.6 - 11.0 K/uL    RBC 3.28 (L) 3.80 - 5.20 M/uL    HGB 10.4 (L) 11.5 - 16.0 g/dL    HCT 33.5 (L) 35.0 - 47.0 %    .1 (H) 80.0 - 99.0 FL    MCH 31.7 26.0 - 34.0 PG    MCHC 31.0 30.0 - 36.5 g/dL    RDW 15.3 (H) 11.5 - 14.5 %    PLATELET 727 977 - 193 K/uL    MPV 11.1 8.9 - 12.9 FL    NRBC 0.0 0  WBC    ABSOLUTE NRBC 0.00 0.00 - 0.01 K/uL    NEUTROPHILS 68 32 - 75 %    LYMPHOCYTES 25 12 - 49 %    MONOCYTES 5 5 - 13 %    EOSINOPHILS 1 0 - 7 %    BASOPHILS 0 0 - 1 %    IMMATURE GRANULOCYTES 0 0.0 - 0.5 %    ABS. NEUTROPHILS 6.6 1.8 - 8.0 K/UL    ABS. LYMPHOCYTES 2.4 0.8 - 3.5 K/UL    ABS. MONOCYTES 0.5 0.0 - 1.0 K/UL    ABS. EOSINOPHILS 0.1 0.0 - 0.4 K/UL    ABS. BASOPHILS 0.0 0.0 - 0.1 K/UL    ABS. IMM.  GRANS. 0.0 0.00 - 0.04 K/UL    DF AUTOMATED     METABOLIC PANEL, COMPREHENSIVE    Collection Time: 03/21/18  3:26 PM   Result Value Ref Range    Sodium 140 136 - 145 mmol/L    Potassium 3.5 3.5 - 5.1 mmol/L    Chloride 104 97 - 108 mmol/L    CO2 26 21 - 32 mmol/L    Anion gap 10 5 - 15 mmol/L    Glucose 105 (H) 65 - 100 mg/dL    BUN 12 6 - 20 MG/DL    Creatinine 0.67 0.55 - 1.02 MG/DL    BUN/Creatinine ratio 18 12 - 20      GFR est AA >60 >60 ml/min/1.73m2    GFR est non-AA >60 >60 ml/min/1.73m2    Calcium 8.9 8.5 - 10.1 MG/DL    Bilirubin, total 0.6 0.2 - 1.0 MG/DL    ALT (SGPT) 25 12 - 78 U/L    AST (SGOT) 25 15 - 37 U/L    Alk.  phosphatase 179 (H) 45 - 117 U/L    Protein, total 7.6 6.4 - 8.2 g/dL    Albumin 2.5 (L) 3.5 - 5.0 g/dL    Globulin 5.1 (H) 2.0 - 4.0 g/dL    A-G Ratio 0.5 (L) 1.1 - 2.2

## 2018-03-21 NOTE — ROUTINE PROCESS
TRANSFER - OUT REPORT:    Verbal report given to Nataliya Castillo on [facility]  being transferred to  for routine progression of care       Report consisted of patients Situation, Background, Assessment and   Recommendations(SBAR). Information from the following report(s) SBAR, ED Summary, [facility] and Recent Results was reviewed with the receiving nurse. Opportunity for questions and clarification was provided.

## 2018-03-21 NOTE — PROGRESS NOTES
[facility]: MED RECONCILIATION    Comments/Recommendations:    Patient was discharged from [facility] on 3/16/16, went to [facility], and has been re-admitted today.  Per the nurse at [facility], patient received all her morning medications today.  Patient's Fentanyl patch has 3/18/18 written on it, but the nurse at [facility] states that \"it looks like 3/19/18\" was written on her MAR.  Patient's daughter requested to put Melatonin (lowest dose) on patient's home med list; however, patient was not prescribed Melatonin at [facility]. The nurse said the patient asked for it, but the physician never prescribed it. Medication(s) ADDED to PTA list:  1. Melatonin 1 mg QHS (has not been given during rehab at [facility])    Medication(s) REMOVED from PTA list:  1. none    Medication(s) ADJUSTED on PTA list:  1. none    Information obtained from: Discharge list from [facility], Transfer papers from [facility], Daughter      Allergies: Review of patient's allergies indicates no known allergies. Prior to Admission Medications:     Medication Documentation Review Audit       Reviewed by Loren Ramires PHARMD (Pharmacist) on 03/21/18 at 1645         Medication Sig Documenting Provider Last Dose Status Taking?      acetaminophen (TYLENOL) 325 mg tablet Take 650 mg by mouth every six (6) hours as needed for Pain. Historical Provider 3/21/2018 0830 Active Yes    cyanocobalamin (VITAMIN B12) 500 mcg tablet Take 500 mcg by mouth daily. Historical Provider 3/21/2018 0830 Active Yes    fentaNYL (DURAGESIC) 25 mcg/hr PATCH 1 Patch by TransDERmal route every seventy-two (72) hours. Max Daily Amount: 1 Patch. January Maldonado MD 3/18/2018 Active Yes    melatonin 1 mg tablet Take 1 mg by mouth nightly. Historical Provider  Active No    multivitamin (ONE A DAY) tablet Take 1 Tab by mouth daily. Historical Provider 3/21/2018 0830 Active Yes    omega-3 fatty acids-vitamin e 1,000 mg cap Take 2 Caps by mouth daily.  Historical Provider 3/21/2018 0830 Active Yes    pantoprazole (PROTONIX) 40 mg tablet Take 1 Tab by mouth Before breakfast and dinner. Joshua Bravo MD 3/21/2018 0630 Active Yes    polyethylene glycol (MIRALAX) 17 gram packet Take 1 Packet by mouth daily. Ramy Villeda MD 3/21/2018 0830 Active Yes    spironolactone (ALDACTONE) 25 mg tablet Take 1 Tab by mouth daily.  Joshua Bravo MD 3/21/2018 0830 Active Yes                        Nuria Suarez, PHARMD   Contact: [phone]

## 2018-03-21 NOTE — H&P
Surgery History and Physical    Subjective:      Loyd Haider is a 67 y.o. white female who presents for evaluation of crampy abdominal pain. Mrs. Neena Trevizo was doing fine until the past 2 days when she started experiencing crampy abdominal pain diffusely. The pain became severe this AM.  She denies any n/v and ate breakfast this morning. She does not recall the last time she passed flatus, but her daughter states that she had a BM yesterday. She was taking Miralax daily. She had an abdominal xray today which revealed a small bowel obstruction. Her only abdominal procedure was her recent laparotomy. Past Medical History:   Diagnosis Date    Alcoholic cirrhosis of liver without ascites ([facility] [address] 2/12/2018    ETOH abuse 2/11/2018    History of vascular access device 02/16/2018    [facility] VAT - 5 FR triple, R basilic, TPN    Hyperammonemia ([facility] [address] 2/16/2018     Results for Trisha Ramirez (MRN [id number]) as of 2/17/2018 08:43  Ref. Range 2/16/2018 17:20 Ammonia Latest Ref Range: <32 UMOL/L 69 (H)     Hyperlipidemia     Perforated chronic gastric ulcer ([facility] [address] 2/11/2018     Past Surgical History:   Procedure Laterality Date    HX CATARACT REMOVAL        Family History   Problem Relation Age of Onset    Other Other      patient did not know     Social History   Substance Use Topics    Smoking status: Former Smoker    Smokeless tobacco: Never Used    Alcohol use 11.4 oz/week     0 Standard drinks or equivalent, 19 Glasses of wine per week      Comment: Hx of heavy etoh use, but quit several months ago      Prior to Admission medications    Medication Sig Start Date End Date Taking? Authorizing Provider   pantoprazole (PROTONIX) 40 mg tablet Take 1 Tab by mouth Before breakfast and dinner. 3/16/18   Ale Cosby MD   spironolactone (ALDACTONE) 25 mg tablet Take 1 Tab by mouth daily.  3/16/18   Ale Cosby MD   fentaNYL (DURAGESIC) 25 mcg/hr PATCH 1 Patch by TransDERmal route every seventy-two (72) hours. Max Daily Amount: 1 Patch. 3/18/18   Zena Hodgkins., MD   polyethylene glycol [facility]) 17 gram packet Take 1 Packet by mouth daily. 3/16/18   Zena Hodgkins., MD   cyanocobalamin (VITAMIN B12) 500 mcg tablet Take 500 mcg by mouth daily. Historical Provider   multivitamin (ONE A DAY) tablet Take 1 Tab by mouth daily. Historical Provider   omega-3 fatty acids-vitamin e 1,000 mg cap Take 2 Caps by mouth daily. Historical Provider      No Known Allergies    Review of Systems:  A comprehensive review of systems was negative except for that written in the History of Present Illness. Objective:      Patient Vitals for the past 8 hrs:   BP Temp Pulse Resp SpO2   18 1600 109/60 - 91 23 (!) 89 %   18 1530 108/56 - 92 16 91 %   18 1515 96/72 98.6 °F (37 °C) 95 16 93 %       Temp (24hrs), Av.6 °F (37 °C), Min:98.6 °F (37 °C), Max:98.6 °F (37 °C)      Physical Exam:  GENERAL: alert, cooperative, no distress, appears stated age, EYE: negative findings: anicteric sclera, THROAT & NECK: normal, LUNG: clear to auscultation bilaterally, HEART: regular rate and rhythm, ABDOMEN: Soft, hypoactive BS, distended and tympanitic, NT. There are no masses. The upper midline incision is healed., EXTREMITIES:  no edema, SKIN: Normal., NEUROLOGIC: negative, PSYCHIATRIC: non focal    Assessment:     Small bowel obstruction. Plan:     Mrs. Joseph Seals has a bowel obstruction clinically and by report. A CT of her abdomen and pelvis will be done to r/o any other acute issues given her recent history. She will be admitted and placed on bowel rest and IVF's. A nasogastric tube will be placed for decompression. She will be placed on her BID PPI as well. Hopefully, she can be managed conservatively. Otherwise, she will need exploration. I have explained the plan of care to her and her 2 daughters, and all questions have been answered.      Signed By: Zena Hodgkins., MD     2018

## 2018-03-21 NOTE — ED TRIAGE NOTES
Patient arrives from [facility] for SBO diagnosed by X-ray. Last BM was several days ago, patient denies passing any gas. She ate breakfast this morning, denies vomiting. Hx of gastric ulcer, thoracic vertebral fractures, and ETOH abuse.

## 2018-03-21 NOTE — PROGRESS NOTES
CM in ED, met with patient and her two daughters for readmission assessment  2/11-3/16 Pt admitted for Intra-abdominal air    Pt admitted to ED from  [facility] rehab. Prior to that Pt. lived in two story home, but resides on the first level. Pt is single/ lives alone. Pt has two daughters Kiran Matias [phone] and Lebron Marcial [phone]. Prior to rehab, Daughters have been driving pt. to medical appointments. PCP Dr Elan Flanagan. No Nurse Navigator. DME none. No Home care in the past. Pt. has [employer] and has prescription coverage, used [facility] in [place]. Re-admission assessment:  3/21 Small bowel obstruction    Plan: PT/OT evaluation ordered. CM to follow for further planning. Pt and daughter are okay with pt returning to [facility] rehab or any others if needed. .Pt. and daughter given dispatch health information and explanation. [address] Management Interventions  PCP Verified by CM:  Yes  Mode of Transport at Discharge: Self  Transition of Care Consult (CM Consult): Discharge Planning  Physical Therapy Consult: Yes  Occupational Therapy Consult: Yes  Current Support Network: Lives Alone  Confirm Follow Up Transport: Family  Plan discussed with Pt/Family/Caregiver: Yes  Discharge Location  Discharge Placement: Skilled nursing facility

## 2018-03-21 NOTE — IP AVS SNAPSHOT
[address] 
[phone] Patient: Fermín Cotto MRN: [id number]:[id number] About your hospitalization You were admitted on:  March 21, 2018 You last received care in the:  [facility] 4M POST SURG ORT 1 You were discharged on:  March 23, 2018 Why you were hospitalized Your primary diagnosis was:  Not on File Your diagnoses also included:  Constipation Due To Pain Medication Follow-up Information Follow up With Details Comments Contact Info [address] 
[phone] Discharge Orders None A check halina indicates which time of day the medication should be taken. My Medications START taking these medications Instructions Each Dose to Equal  
 Morning Noon Evening Bedtime  
 calcium polycarbophil 625 mg tablet Commonly known as:  John Alejo Your last dose was: Your next dose is: Take 1 Tab by mouth daily. 625 mg  
    
   
   
   
  
 docusate sodium 100 mg capsule Commonly known as:  Claudia Sanchez Your last dose was: Your next dose is: Take 1 Cap by mouth two (2) times a day for 90 days. 100 mg  
    
   
   
   
  
 oxyCODONE IR 5 mg immediate release tablet Commonly known as:  Nixon Rosado Your last dose was: Your next dose is: Take 1 Tab by mouth every four (4) hours as needed. Max Daily Amount: 30 mg.  
 5 mg CONTINUE taking these medications Instructions Each Dose to Equal  
 Morning Noon Evening Bedtime  
 cyanocobalamin 500 mcg tablet Commonly known as:  VITAMIN B12 Your last dose was: Your next dose is: Take 500 mcg by mouth daily. 500 mcg  
    
   
   
   
  
 melatonin 1 mg tablet Your last dose was: Your next dose is: Take 1 mg by mouth nightly. 1 mg  
    
   
   
   
  
 multivitamin tablet Commonly known as:  ONE A DAY Your last dose was: Your next dose is: Take 1 Tab by mouth daily. 1 Tab  
    
   
   
   
  
 omega-3 fatty acids-vitamin e 1,000 mg Cap Your last dose was: Your next dose is: Take 2 Caps by mouth daily. 2 Cap  
    
   
   
   
  
 pantoprazole 40 mg tablet Commonly known as:  PROTONIX Your last dose was: Your next dose is: Take 1 Tab by mouth Before breakfast and dinner. 40 mg  
    
   
   
   
  
 polyethylene glycol 17 gram packet Commonly known as:  Leonora [place] Your last dose was: Your next dose is: Take 1 Packet by mouth daily. 17 g  
    
   
   
   
  
 spironolactone 25 mg tablet Commonly known as:  ALDACTONE Your last dose was: Your next dose is: Take 1 Tab by mouth daily. 25 mg  
    
   
   
   
  
 TYLENOL 325 mg tablet Generic drug:  acetaminophen Your last dose was: Your next dose is: Take 650 mg by mouth every six (6) hours as needed for Pain. 650 mg  
    
   
   
   
  
  
STOP taking these medications   
 fentaNYL 25 mcg/hr PATCH Commonly known as:  Warren Olivera Where to Get Your Medications Information on where to get these meds will be given to you by the nurse or doctor. ! Ask your nurse or doctor about these medications  
  calcium polycarbophil 625 mg tablet  
 docusate sodium 100 mg capsule  
 oxyCODONE IR 5 mg immediate release tablet Discharge Instructions None [facility] Announcement We are excited to announce that we are making your provider's discharge notes available to you in [place].   You will see these notes when they are completed and signed by the physician that discharged you from your recent hospital stay. If you have any questions or concerns about any information you see in [employer], please call the Health Information Department where you were seen or reach out to your Primary Care Provider for more information about your plan of care. Introducing [facility]! Dear Polo Jameson: Thank you for requesting a Anpro[employer] account. Our records indicate that you already have an active Anpro21 account. You can access your account anytime at [email or web address] Did you know that you can access your hospital and ER discharge instructions at any time in Anpro21? You can also review all of your test results from your hospital stay or ER visit. Additional Information If you have questions, please visit the Frequently Asked Questions section of the Anpro21 website at [email or web address] Remember, An[employer] is NOT to be used for urgent needs. For medical emergencies, dial 911. Now available from your iPhone and Android! Providers Seen During Your Hospitalization Provider Specialty Primary office phone Jennifer Gonsales DO Emergency Medicine [phone] Joy Huber MD Surgery [phone] Your Primary Care Physician (PCP) Primary Care Physician Office Phone Office Fax [address] [facility],[address], [address] [place] [address] [phone] You are allergic to the following No active allergies Recent Documentation Breastfeeding? OB Status Smoking Status No Postmenopausal Former Smoker Emergency Contacts Name Discharge Info Relation Home Work Mobile [address] CAREGIVER [3] Daughter [21] [phone] Rhina Kim DISCHARGE CAREGIVER [3] Daughter [21]   [phone] Patient Belongings  The following personal items are in your possession at time of discharge: 
  Dental Appliances: None  Visual Aid: Glasses, With patient   Hearing Aids/Status: Does not own  Home Medications: None   Jewelry: None  Clothing: Shirt, Pants, Jacket/Coat    Other Valuables: None Please provide this summary of care documentation to your next provider. Signatures-by signing, you are acknowledging that this After Visit Summary has been reviewed with you and you have received a copy. Patient Signature:  ____________________________________________________________ Date:  ____________________________________________________________  
  
[facility] Provider Signature:  ____________________________________________________________ Date:  ____________________________________________________________

## 2018-03-21 NOTE — IP AVS SNAPSHOT
[address] 
[phone] Patient: Ronnie Gr MRN: [id number] LJT:4/1/2097 A check halina indicates which time of day the medication should be taken. My Medications START taking these medications Instructions Each Dose to Equal  
 Morning Noon Evening Bedtime  
 calcium polycarbophil 625 mg tablet Commonly known as:  Misha Pham Your last dose was: Your next dose is: Take 1 Tab by mouth daily. 625 mg  
    
   
   
   
  
 docusate sodium 100 mg capsule Commonly known as:  Queen Mariela Your last dose was: Your next dose is: Take 1 Cap by mouth two (2) times a day for 90 days. 100 mg  
    
   
   
   
  
 oxyCODONE IR 5 mg immediate release tablet Commonly known as:  Maria T Gregorio Your last dose was: Your next dose is: Take 1 Tab by mouth every four (4) hours as needed. Max Daily Amount: 30 mg.  
 5 mg CONTINUE taking these medications Instructions Each Dose to Equal  
 Morning Noon Evening Bedtime  
 cyanocobalamin 500 mcg tablet Commonly known as:  VITAMIN B12 Your last dose was: Your next dose is: Take 500 mcg by mouth daily. 500 mcg  
    
   
   
   
  
 melatonin 1 mg tablet Your last dose was: Your next dose is: Take 1 mg by mouth nightly. 1 mg  
    
   
   
   
  
 multivitamin tablet Commonly known as:  ONE A DAY Your last dose was: Your next dose is: Take 1 Tab by mouth daily. 1 Tab  
    
   
   
   
  
 omega-3 fatty acids-vitamin e 1,000 mg Cap Your last dose was: Your next dose is: Take 2 Caps by mouth daily. 2 Cap  
    
   
   
   
  
 pantoprazole 40 mg tablet Commonly known as:  PROTONIX Your last dose was: Your next dose is: Take 1 Tab by mouth Before breakfast and dinner. 40 mg  
    
   
   
   
  
 polyethylene glycol 17 gram packet Commonly known as:  Mack Goodpasture Your last dose was: Your next dose is: Take 1 Packet by mouth daily. 17 g  
    
   
   
   
  
 spironolactone 25 mg tablet Commonly known as:  ALDACTONE Your last dose was: Your next dose is: Take 1 Tab by mouth daily. 25 mg  
    
   
   
   
  
 TYLENOL 325 mg tablet Generic drug:  acetaminophen Your last dose was: Your next dose is: Take 650 mg by mouth every six (6) hours as needed for Pain. 650 mg  
    
   
   
   
  
  
STOP taking these medications   
 fentaNYL 25 mcg/hr PATCH Commonly known as:  Debbi Jacobson Where to Get Your Medications Information on where to get these meds will be given to you by the nurse or doctor. ! Ask your nurse or doctor about these medications  
  calcium polycarbophil 625 mg tablet  
 docusate sodium 100 mg capsule  
 oxyCODONE IR 5 mg immediate release tablet

## 2018-03-21 NOTE — ED NOTES
Spoke with Dr. Farhat Calixto from surgery.  States we can hold of on the NG tube for now due to pt intolerance of procedure

## 2018-03-22 PROBLEM — K59.03 CONSTIPATION DUE TO PAIN MEDICATION: Status: ACTIVE | Noted: 2018-03-21

## 2018-03-22 LAB
ANION GAP SERPL CALC-SCNC: 11 MMOL/L (ref 5–15)
APPEARANCE UR: CLEAR
BACTERIA URNS QL MICRO: NEGATIVE /HPF
BILIRUB UR QL: NEGATIVE
BUN SERPL-MCNC: 9 MG/DL (ref 6–20)
BUN/CREAT SERPL: 19 (ref 12–20)
CALCIUM SERPL-MCNC: 8.2 MG/DL (ref 8.5–10.1)
CHLORIDE SERPL-SCNC: 107 MMOL/L (ref 97–108)
CO2 SERPL-SCNC: 23 MMOL/L (ref 21–32)
COLOR UR: NORMAL
CREAT SERPL-MCNC: 0.48 MG/DL (ref 0.55–1.02)
EPITH CASTS URNS QL MICRO: NORMAL /LPF
GLUCOSE SERPL-MCNC: 96 MG/DL (ref 65–100)
GLUCOSE UR STRIP.AUTO-MCNC: NEGATIVE MG/DL
HGB UR QL STRIP: NEGATIVE
HYALINE CASTS URNS QL MICRO: NORMAL /LPF (ref 0–5)
KETONES UR QL STRIP.AUTO: NEGATIVE MG/DL
LEUKOCYTE ESTERASE UR QL STRIP.AUTO: NEGATIVE
MAGNESIUM SERPL-MCNC: 1.5 MG/DL (ref 1.6–2.4)
NITRITE UR QL STRIP.AUTO: NEGATIVE
PH UR STRIP: 5.5 [PH] (ref 5–8)
POTASSIUM SERPL-SCNC: 3.7 MMOL/L (ref 3.5–5.1)
PROT UR STRIP-MCNC: NEGATIVE MG/DL
RBC #/AREA URNS HPF: NORMAL /HPF (ref 0–5)
SODIUM SERPL-SCNC: 141 MMOL/L (ref 136–145)
SP GR UR REFRACTOMETRY: 1.02 (ref 1–1.03)
UR CULT HOLD, URHOLD: NORMAL
UROBILINOGEN UR QL STRIP.AUTO: 1 EU/DL (ref 0.2–1)
WBC URNS QL MICRO: NORMAL /HPF (ref 0–4)

## 2018-03-22 PROCEDURE — 96376 TX/PRO/DX INJ SAME DRUG ADON: CPT

## 2018-03-22 PROCEDURE — 97162 PT EVAL MOD COMPLEX 30 MIN: CPT

## 2018-03-22 PROCEDURE — 74011250637 HC RX REV CODE- 250/637: Performed by: SURGERY

## 2018-03-22 PROCEDURE — 97116 GAIT TRAINING THERAPY: CPT

## 2018-03-22 PROCEDURE — 77030032490 HC SLV COMPR SCD KNE COVD -B

## 2018-03-22 PROCEDURE — 83735 ASSAY OF MAGNESIUM: CPT | Performed by: SURGERY

## 2018-03-22 PROCEDURE — G8989 SELF CARE D/C STATUS: HCPCS

## 2018-03-22 PROCEDURE — 96361 HYDRATE IV INFUSION ADD-ON: CPT

## 2018-03-22 PROCEDURE — 97165 OT EVAL LOW COMPLEX 30 MIN: CPT

## 2018-03-22 PROCEDURE — 96375 TX/PRO/DX INJ NEW DRUG ADDON: CPT

## 2018-03-22 PROCEDURE — 99218 HC RM OBSERVATION: CPT

## 2018-03-22 PROCEDURE — C9113 INJ PANTOPRAZOLE SODIUM, VIA: HCPCS | Performed by: SURGERY

## 2018-03-22 PROCEDURE — G8978 MOBILITY CURRENT STATUS: HCPCS

## 2018-03-22 PROCEDURE — 65390000012 HC CONDITION CODE 44 OBSERVATION

## 2018-03-22 PROCEDURE — 81001 URINALYSIS AUTO W/SCOPE: CPT | Performed by: EMERGENCY MEDICINE

## 2018-03-22 PROCEDURE — G8987 SELF CARE CURRENT STATUS: HCPCS

## 2018-03-22 PROCEDURE — 97161 PT EVAL LOW COMPLEX 20 MIN: CPT

## 2018-03-22 PROCEDURE — 97535 SELF CARE MNGMENT TRAINING: CPT

## 2018-03-22 PROCEDURE — 36415 COLL VENOUS BLD VENIPUNCTURE: CPT | Performed by: SURGERY

## 2018-03-22 PROCEDURE — G8979 MOBILITY GOAL STATUS: HCPCS

## 2018-03-22 PROCEDURE — 74011250636 HC RX REV CODE- 250/636: Performed by: SURGERY

## 2018-03-22 PROCEDURE — 96372 THER/PROPH/DIAG INJ SC/IM: CPT

## 2018-03-22 PROCEDURE — 80048 BASIC METABOLIC PNL TOTAL CA: CPT | Performed by: SURGERY

## 2018-03-22 RX ORDER — PANTOPRAZOLE SODIUM 40 MG/1
40 TABLET, DELAYED RELEASE ORAL 2 TIMES DAILY
Status: DISCONTINUED | OUTPATIENT
Start: 2018-03-22 | End: 2018-03-23 | Stop reason: HOSPADM

## 2018-03-22 RX ORDER — OXYCODONE HYDROCHLORIDE 5 MG/1
5 TABLET ORAL
Qty: 20 TAB | Refills: 0 | Status: SHIPPED | OUTPATIENT
Start: 2018-03-22 | End: 2018-04-25

## 2018-03-22 RX ORDER — FACIAL-BODY WIPES
10 EACH TOPICAL
Status: COMPLETED | OUTPATIENT
Start: 2018-03-22 | End: 2018-03-22

## 2018-03-22 RX ORDER — ADHESIVE BANDAGE
30 BANDAGE TOPICAL DAILY PRN
Status: DISCONTINUED | OUTPATIENT
Start: 2018-03-22 | End: 2018-03-22

## 2018-03-22 RX ORDER — OXYCODONE HYDROCHLORIDE 5 MG/1
5 TABLET ORAL
Status: DISCONTINUED | OUTPATIENT
Start: 2018-03-22 | End: 2018-03-23 | Stop reason: HOSPADM

## 2018-03-22 RX ORDER — ADHESIVE BANDAGE
30 BANDAGE TOPICAL ONCE
Status: COMPLETED | OUTPATIENT
Start: 2018-03-22 | End: 2018-03-22

## 2018-03-22 RX ORDER — POLYETHYLENE GLYCOL 3350 17 G/17G
17 POWDER, FOR SOLUTION ORAL DAILY
Status: DISCONTINUED | OUTPATIENT
Start: 2018-03-22 | End: 2018-03-23 | Stop reason: HOSPADM

## 2018-03-22 RX ADMIN — SODIUM CHLORIDE, SODIUM LACTATE, POTASSIUM CHLORIDE, AND CALCIUM CHLORIDE 125 ML/HR: 600; 310; 30; 20 INJECTION, SOLUTION INTRAVENOUS at 09:18

## 2018-03-22 RX ADMIN — SPIRONOLACTONE 25 MG: 25 TABLET, FILM COATED ORAL at 09:07

## 2018-03-22 RX ADMIN — SODIUM PHOSPHATE, DIBASIC AND SODIUM PHOSPHATE, MONOBASIC 118 ML: 7; 19 ENEMA RECTAL at 10:13

## 2018-03-22 RX ADMIN — PANTOPRAZOLE SODIUM 40 MG: 40 INJECTION, POWDER, FOR SOLUTION INTRAVENOUS at 09:09

## 2018-03-22 RX ADMIN — SODIUM CHLORIDE, SODIUM LACTATE, POTASSIUM CHLORIDE, AND CALCIUM CHLORIDE 125 ML/HR: 600; 310; 30; 20 INJECTION, SOLUTION INTRAVENOUS at 01:03

## 2018-03-22 RX ADMIN — MAGNESIUM HYDROXIDE 30 ML: 400 SUSPENSION ORAL at 10:17

## 2018-03-22 RX ADMIN — BISACODYL 10 MG: 10 SUPPOSITORY RECTAL at 14:20

## 2018-03-22 RX ADMIN — CYANOCOBALAMIN TAB 500 MCG 500 MCG: 500 TAB at 09:07

## 2018-03-22 RX ADMIN — HYDROMORPHONE HYDROCHLORIDE 0.5 MG: 2 INJECTION, SOLUTION INTRAMUSCULAR; INTRAVENOUS; SUBCUTANEOUS at 01:03

## 2018-03-22 RX ADMIN — POLYETHYLENE GLYCOL 3350 17 G: 17 POWDER, FOR SOLUTION ORAL at 10:17

## 2018-03-22 RX ADMIN — Medication 10 ML: at 13:07

## 2018-03-22 RX ADMIN — Medication 10 ML: at 21:54

## 2018-03-22 RX ADMIN — THERA TABS 1 TABLET: TAB at 09:07

## 2018-03-22 RX ADMIN — PANTOPRAZOLE SODIUM 40 MG: 40 TABLET, DELAYED RELEASE ORAL at 17:10

## 2018-03-22 RX ADMIN — ENOXAPARIN SODIUM 40 MG: 40 INJECTION SUBCUTANEOUS at 21:54

## 2018-03-22 RX ADMIN — ACETAMINOPHEN 650 MG: 325 TABLET ORAL at 20:05

## 2018-03-22 NOTE — PROGRESS NOTES
Occupational therapy note:  Orders received, chart reviewed. Spoke with evaluating PT who reports attempted evaluation and deferred as patient recently received enema and RN currently requesting defer at this time. Plan is for patient to discharge back to rehab today following bowel movement. Will follow up with patient for OT evaluation at later time. Kristie Lester MS OTR/L

## 2018-03-22 NOTE — PROGRESS NOTES
Physical Therapy Note:    Orders acknowledged, chart reviewed, discussed with RN. PT evaluation attempted and deferred. RN reports recently administering enema and requesting PT defer at this time. She affirms plan for pt to discharge back to rehab today after BM occurs. Will continue to follow and complete PT evaluation when pt available and appropriate.

## 2018-03-22 NOTE — PROGRESS NOTES
Spiritual Care Partner Volunteer visited patient in post surgical unit  on 3/22/2018. Documented by:  Visit by: Rev. Donna Moreland.  Guero Chinchilla MA, [facility]    Lead  Profession Development & Advancement

## 2018-03-22 NOTE — PROGRESS NOTES
3/22/2018 5:58 PM Medicare and State Obs letters were explained and signed by pt's daughter. Copies placed on pt's hard chart. 3/22/2018  4:57 PM Auth has not been received from [place]. CM will follow up on 3/23.      3/22/2018  1:42 PM [facility] has accepted pt back but will have to receive auth from [facility] [place] to [place] returning. CM notified pt's daughter, Ankita Siegel. Unsure if this will happen today. CM will follow up.     3/22/2018  12:29 PM Spoke with Amber Cazares in admissions who reported they are reviewing pt's updates but they will need to have a new auth for pt's return. CM will follow up.     3/22/2018 11:54 AM Case management consult for discharge back to rehab received. Met with pt and pt's daughters who confirmed they would like to to return to [place]. CM sent referral to [facility] and [facility] with admissions requesting pt return today. CM will follow up.  ZACH Escamilla

## 2018-03-22 NOTE — PROGRESS NOTES
Bedside and Verbal shift change report given to Corey Horowitz 86 (oncoming nurse) by Arnulfo Abdi RN (offgoing nurse). Report included the following information SBAR, Kardex, ED Summary, Procedure Summary, Intake/Output, MAR and Recent Results.

## 2018-03-22 NOTE — PROGRESS NOTES
Problem: Self Care Deficits Care Plan (Adult)  Goal: *Acute Goals and Plan of Care (Insert Text)  Occupational Therapy Goals  Initiated 3/22/2018    1. Patient will perform lower body dressing with supervision/set-up, using AE PRN, within 7 day(s). 2.  Patient will perform upper body dressing with supervision/set-up within 7 day(s). 3.  Patient will perform toilet transfer with supervision/set-up within 7 day(s). 4.  Patient will don/doff back brace at supervision/set-up within 7 days. 5.  Patient will perform all aspects of toileting with supervision/set-up within 7 day(s). 6.  Patient will participate in upper extremity therapeutic exercise/activities with supervision/set-up for 10 minutes within 7 day(s). Occupational Therapy EVALUATION  Patient: Linnea Eduardo (15 y.o. female)  Date: 3/22/2018  Primary Diagnosis: Small bowel obstruction  Constipation due to pain medication        Precautions: fall, brace when OOB        ASSESSMENT :  Based on the objective data described below, the patient presents with hospital admission secondary to small bowel obstruction. Patient with recent hospital admission from 2/11/18-3/16/18 for acute gastric ulcer with perforation. Patient discharged from hospital to [facility] for rehab. Patient with increased abdominal pain at facility and xray revealed SBO. Patient RN deferred attempt at evaluation earlier due to recent enema. Patient to receive suppository shortly and now agreeable to activity. Patient received sitting EOB with PT. Patient declines offers for grooming tasks due to increased abdominal pain. Patient able to ambulate short distance but requires frequent standing breaks when she reports severe abdominal cramping. Patient able to maintain standing with good balance. Noted O2 sats at 87-89 percent on room air. Patient returned to supine with moderate assistance for bilateral LEs. Patient placed on supplemental O2 at end of session.   Patient continues to benefit from OT services to increase safety and independence with ADL tasks. Recommend return to SNF for continued progression upon discharge. Patient will benefit from skilled intervention to address the above impairments. Patients rehabilitation potential is considered to be Good  Factors which may influence rehabilitation potential include:   []             None noted  []             Mental ability/status  [x]             Medical condition  []             Home/family situation and support systems  []             Safety awareness  [x]             Pain tolerance/management  []             Other:      PLAN :  Recommendations and Planned Interventions:  [x]               Self Care Training                  [x]        Therapeutic Activities  [x]               Functional Mobility Training    []        Cognitive Retraining  [x]               Therapeutic Exercises           [x]        Endurance Activities  [x]               Balance Training                   []        Neuromuscular Re-Education  []               Visual/Perceptual Training     [x]   Home Safety Training  [x]               Patient Education                 [x]        Family Training/Education  []               Other (comment):    Frequency/Duration: Patient will be followed by occupational therapy 3 times a week to address goals. Discharge Recommendations: Vito Ritchie  Further Equipment Recommendations for Discharge: TBD     SUBJECTIVE:   Patient stated It just hurts so bad.     OBJECTIVE DATA SUMMARY:   HISTORY:   Past Medical History:   Diagnosis Date    Alcoholic cirrhosis of liver without ascites ([facility] [address] 2/12/2018    ETOH abuse 2/11/2018    History of vascular access device 02/16/2018    [facility] VAT - 5 FR triple, R basilic, TPN    Hyperammonemia ([facility] [address] 2/16/2018     Results for Aminta Guallpa (MRN [id number]) as of 2/17/2018 08:43  Ref.  Range 2/16/2018 17:20 Ammonia Latest Ref Range: <32 UMOL/L 69 (H)     Hyperlipidemia     Perforated chronic gastric ulcer ([facility] [address] 2/11/2018     Past Surgical History:   Procedure Laterality Date    HX CATARACT REMOVAL         Prior Level of Function/Environment/Context: see above  Occupations in which the patient is/was successful, what are the barriers preventing that success:   Performance Patterns (routines, roles, habits, and rituals):   Personal Interests and/or values:   Expanded or extensive additional review of patient history:     Home Situation  Home Environment: Private residence ([place] from [facility])  # Steps to Enter: 4  One/Two Story Residence: Two story, live on 1st floor  Lift Chair Available: No  Living Alone: Yes  Support Systems: Child(palma)  Patient Expects to be Discharged to[de-identified] Private residence  Current DME Used/Available at Home: Shower chair  [x]  Right hand dominant   []  Left hand dominant    EXAMINATION OF PERFORMANCE DEFICITS:  Cognitive/Behavioral Status:  Neurologic State: Alert; Appropriate for age  Orientation Level: Appropriate for age;Oriented X4  Cognition: Appropriate decision making; Appropriate for age attention/concentration; Appropriate safety awareness; Follows commands             Skin: intact as seen    Edema: LEs     Hearing: Auditory  Auditory Impairment: None, Hard of hearing, bilateral, Hearing aid(s)  Hearing Aids/Status: Does not own    Vision/Perceptual:            Range of Motion:  AROM: Generally decreased, functional                         Strength:  Strength: Generally decreased, functional                Coordination:  Coordination: Within functional limits  Fine Motor Skills-Upper: Left Intact; Right Intact    Gross Motor Skills-Upper: Left Intact; Right Intact    Tone & Sensation:  Tone: Normal  Sensation: Intact                      Balance:  Sitting: Intact  Standing: Intact; With support    Functional Mobility and Transfers for ADLs:  Bed Mobility:  Rolling: Minimum assistance  Supine to Sit:  (received OOB with PT )  Sit to Supine: Moderate assistance (bilateral LEs onto bed)    Transfers:  Sit to Stand: Contact guard assistance;Assist x2  Stand to Sit: Contact guard assistance  Toilet Transfer : Minimum assistance (inferred, seat height)    ADL Assessment:  Feeding: Supervision    Oral Facial Hygiene/Grooming: Supervision (seated EOB, abdominal discomfort limiting)    Bathing: Moderate assistance    Upper Body Dressing: Minimum assistance (brace)    Lower Body Dressing: Moderate assistance    Toileting: Minimum assistance (simulated, unable to perform at this time)                ADL Intervention and task modifications:                                          Therapeutic Exercise:     Functional Measure:  Barthel Index:    Bathin  Bladder: 10  Bowels: 5  Groomin  Dressin  Feedin  Mobility: 10  Stairs: 0  Toilet Use: 5  Transfer (Bed to Chair and Back): 10  Total: 55       Barthel and G-code impairment scale:  Percentage of impairment CH  0% CI  1-19% CJ  20-39% CK  40-59% CL  60-79% CM  80-99% CN  100%   Barthel Score 0-100 100 99-80 79-60 59-40 20-39 1-19   0   Barthel Score 0-20 20 17-19 13-16 9-12 5-8 1-4 0      The Barthel ADL Index: Guidelines  1. The index should be used as a record of what a patient does, not as a record of what a patient could do. 2. The main aim is to establish degree of independence from any help, physical or verbal, however minor and for whatever reason. 3. The need for supervision renders the patient not independent. 4. A patient's performance should be established using the best available evidence. Asking the patient, friends/relatives and nurses are the usual sources, but direct observation and common sense are also important. However direct testing is not needed. 5. Usually the patient's performance over the preceding 24-48 hours is important, but occasionally longer periods will be relevant. 6. Middle categories imply that the patient supplies over 50 per cent of the effort.   7. Use of aids to be independent is allowed. Lou Andrews., Barthel, D.W. ([id number]). Functional evaluation: the Barthel Index. [address] (142. PAULO Alonso, Lucero Castellano., Jose Be.Rylee, [address] (1999). Measuring the change indisability after inpatient rehabilitation; comparison of the responsiveness of the Barthel Index and Functional [place] Measure. Journal of Neurology, Neurosurgery, and Psychiatry, 66(4), 140-213. KATY Medel, BALBINA Felton, & Hector Huff MMAYURI (2004.) Assessment of post-stroke quality of life in cost-effectiveness studies: The usefulness of the Barthel Index and the EuroQoL-5D. Quality of Life Research, 13, 861-09         G codes: In compliance with CMSs Claims Based Outcome Reporting, the following G-code set was chosen for this patient based on their primary functional limitation being treated: The outcome measure chosen to determine the severity of the functional limitation was the Barthel Index with a score of 55/100 which was correlated with the impairment scale. ?  Self Care:     - CURRENT STATUS: CK - 40%-59% impaired, limited or restricted    - GOAL STATUS: CJ - 20%-39% impaired, limited or restricted    - D/C STATUS:  ---------------To be determined---------------     Occupational Therapy Evaluation Charge Determination   History Examination Decision-Making   LOW Complexity : Brief history review  LOW Complexity : 1-3 performance deficits relating to physical, cognitive , or psychosocial skils that result in activity limitations and / or participation restrictions  LOW Complexity : No comorbidities that affect functional and no verbal or physical assistance needed to complete eval tasks       Based on the above components, the patient evaluation is determined to be of the following complexity level: LOW   Pain:  Pain Scale 1: Numeric (0 - 10)  Pain Intensity 1: 0              Activity Tolerance:   VSS  Please refer to the flowsheet for vital signs taken during this treatment. After treatment:   [] Patient left in no apparent distress sitting up in chair  [x] Patient left in no apparent distress in bed  [x] Call bell left within reach  [x] Nursing notified  [] Caregiver present  [x] Bed alarm activated    COMMUNICATION/EDUCATION:   The patients plan of care was discussed with: Physical Therapist and Registered Nurse. [x] Home safety education was provided and the patient/caregiver indicated understanding. [x] Patient/family have participated as able in goal setting and plan of care. [x] Patient/family agree to work toward stated goals and plan of care. [] Patient understands intent and goals of therapy, but is neutral about his/her participation. [] Patient is unable to participate in goal setting and plan of care. This patients plan of care is appropriate for delegation to [facility].     Thank you for this referral.  Bruce Paniagua OTR/L  Time Calculation: 23 mins

## 2018-03-22 NOTE — PROGRESS NOTES
Problem: Mobility Impaired (Adult and Pediatric)  Goal: *Acute Goals and Plan of Care (Insert Text)  Physical Therapy Goals  Initiated 3/22/2018  1. Patient will move from supine to sit and sit to supine  in bed with supervision/set-up within 7 day(s). 2.  Patient will transfer from bed to chair and chair to bed with supervision/set-up using the least restrictive device within 7 day(s). 3.  Patient will perform sit to stand with supervision/set-up within 7 day(s). 4.  Patient will ambulate with supervision/set-up for 450 feet with the least restrictive device within 7 day(s). 5.  Patient will ascend/descend 4 stairs with one handrail(s) with minimal assistance/contact guard assist within 7 day(s). physical Therapy EVALUATION  Patient: Martha Beltran (57 y.o. female)  Date: 3/22/2018  Primary Diagnosis: Small bowel obstruction  Constipation due to pain medication        Precautions:   Fall    ASSESSMENT :  Based on the objective data described below, the patient presents with generally decreased strength, decreased endurance, severe intermittent abdominal pain, and limited functional mobility on hospital day 1 of admission with SBO vs. Constipation. Pt admitted from [facility] where she was receiving skilled PT services. On PT evaluation pt offering good efforts but limited by pain. She requires up to minimal assistance for mobility as below and occasional cueing for safe gait techniques/pacing. Patient will benefit from skilled intervention to address the above impairments.   Patients rehabilitation potential is considered to be Good  Factors which may influence rehabilitation potential include:   [x]         None noted  []         Mental ability/status  []         Medical condition  []         Home/family situation and support systems  []         Safety awareness  []         Pain tolerance/management  []         Other:      PLAN :  Recommendations and Planned Interventions:  [x]           Bed Mobility Training             []    Neuromuscular Re-Education  [x]           Transfer Training                   []    Orthotic/Prosthetic Training  [x]           Gait Training                         []    Modalities  [x]           Therapeutic Exercises           []    Edema Management/Control  [x]           Therapeutic Activities            [x]    Patient and Family Training/Education  []           Other (comment):    Frequency/Duration: Patient will be followed by physical therapy  5 times a week to address goals. Discharge Recommendations: [facility] Ritchie  Further Equipment Recommendations for Discharge: defer to SNF     SUBJECTIVE:   Patient stated It hurts so bad.     OBJECTIVE DATA SUMMARY:   HISTORY:    Past Medical History:   Diagnosis Date    Alcoholic cirrhosis of liver without ascites ([facility] [address] 2/12/2018    ETOH abuse 2/11/2018    History of vascular access device 02/16/2018    [facility] VAT - 5 FR triple, R basilic, TPN    Hyperammonemia ([facility] [address] 2/16/2018     Results for Martin Hutchinson (MRN [id number]) as of 2/17/2018 08:43  Ref.  Range 2/16/2018 17:20 Ammonia Latest Ref Range: <32 UMOL/L 69 (H)     Hyperlipidemia     Perforated chronic gastric ulcer ([facility] [address] 2/11/2018     Past Surgical History:   Procedure Laterality Date    HX CATARACT REMOVAL       Prior Level of Function/Home Situation: pt reportedly ambulatory with PT using rolling walker vs.  Personal factors and/or comorbidities impacting plan of care: as above     Home Situation  Home Environment: Private residence (admitted from [place])  # Steps to Enter: 4  One/Two Story Residence: Two story, live on 1st floor  Lift Chair Available: No  Living Alone: Yes  Support Systems: Child(palma)  Patient Expects to be Discharged to[de-identified] Rehabilitation facility  Current DME Used/Available at Home: Shower chair    EXAMINATION/PRESENTATION/DECISION MAKING:   Critical Behavior:  Neurologic State: Alert, Appropriate for age  Orientation Level: Appropriate for age, Oriented X4  Cognition: Appropriate decision making, Appropriate for age attention/concentration, Appropriate safety awareness, Follows commands     Hearing: Auditory  Auditory Impairment: None, Hard of hearing, bilateral, Hearing aid(s)  Hearing Aids/Status: Does not own  Skin:  Exposed skin intact    Range Of Motion:  AROM: Generally decreased, functional                       Strength:    Strength: Generally decreased, functional                    Tone & Sensation:   Tone: Normal              Sensation: Intact               Coordination:  Coordination: Within functional limits       Functional Mobility:  Bed Mobility:  Rolling: Minimum assistance  Supine to Sit:  (received OOB with PT )  Sit to Supine: Moderate assistance (bilateral LEs onto bed)     Transfers:  Sit to Stand: Contact guard assistance;Assist x2  Stand to Sit: Contact guard assistance                       Balance:   Sitting: Intact  Standing: Intact; With support  Ambulation/Gait Training:  Distance (ft): 160 Feet (ft)  Assistive Device: Gait belt;Walker, rolling  Ambulation - Level of Assistance: Contact guard assistance;Minimal assistance        Gait Abnormalities: Decreased step clearance        Base of Support: Widened     Speed/Alejandra: Pace decreased (<100 feet/min)                     Cues for decreased pace, walker approximation. Functional Measure:  Barthel Index:    Bathin  Bladder: 10  Bowels: 5  Groomin  Dressin  Feedin  Mobility: 10  Stairs: 0  Toilet Use: 5  Transfer (Bed to Chair and Back): 10  Total: 55       Barthel and G-code impairment scale:  Percentage of impairment CH  0% CI  1-19% CJ  20-39% CK  40-59% CL  60-79% CM  80-99% CN  100%   Barthel Score 0-100 100 99-80 79-60 59-40 20-39 1-19   0   Barthel Score 0-20 20 17-19 13-16 9-12 5-8 1-4 0      The Barthel ADL Index: Guidelines  1. The index should be used as a record of what a patient does, not as a record of what a patient could do.   2. The main aim is to establish degree of independence from any help, physical or verbal, however minor and for whatever reason. 3. The need for supervision renders the patient not independent. 4. A patient's performance should be established using the best available evidence. Asking the patient, friends/relatives and nurses are the usual sources, but direct observation and common sense are also important. However direct testing is not needed. 5. Usually the patient's performance over the preceding 24-48 hours is important, but occasionally longer periods will be relevant. 6. Middle categories imply that the patient supplies over 50 per cent of the effort. 7. Use of aids to be independent is allowed. Alex Mcfarland., Barthel, D.W. (1449). Functional evaluation: the Barthel Index. [address] [facility] (14)2. Valeria Schulz kenan PAULO Steve, Jcaob Staples., [facility] [place], [address] [place] [address] (1999). Measuring the change indisability after inpatient rehabilitation; comparison of the responsiveness of the Barthel Index and Functional [place] Measure. Journal of Neurology, Neurosurgery, and Psychiatry, 66(4), 365-819. Dilan Thacker, N.J.A, BALBINA Felton, & Bc Amaya, M.A. (2004.) Assessment of post-stroke quality of life in cost-effectiveness studies: The usefulness of the Barthel Index and the EuroQoL-5D. Quality of Life Research, 13, 530-13         G codes: In compliance with CMSs Claims Based Outcome Reporting, the following G-code set was chosen for this patient based on their primary functional limitation being treated: The outcome measure chosen to determine the severity of the functional limitation was the barthel with a score of 55/100 which was correlated with the impairment scale.     ? Mobility - Walking and Moving Around:     - CURRENT STATUS: CK - 40%-59% impaired, limited or restricted    - GOAL STATUS: CJ - 20%-39% impaired, limited or restricted    - D/C STATUS:  ---------------To be determined---------------      Physical Therapy Evaluation Charge Determination   History Examination Presentation Decision-Making   MEDIUM  Complexity : 1-2 comorbidities / personal factors will impact the outcome/ POC  HIGH Complexity : 4+ Standardized tests and measures addressing body structure, function, activity limitation and / or participation in recreation  MEDIUM Complexity : Evolving with changing characteristics  Other outcome measures barthel  MEDIUM      Based on the above components, the patient evaluation is determined to be of the following complexity level: MEDIUM    Pain:  Pain Scale 1: Numeric (0 - 10)  Pain Intensity 1: 0              Activity Tolerance:   Limited by pain. Please refer to the flowsheet for vital signs taken during this treatment. After treatment:   []         Patient left in no apparent distress sitting up in chair  [x]         Patient left in no apparent distress in bed  [x]         Call bell left within reach  [x]         Nursing notified  []         Caregiver present  [x]         Bed alarm activated    COMMUNICATION/EDUCATION:   The patients plan of care was discussed with: Occupational Therapist and Registered Nurse. [x]         Fall prevention education was provided and the patient/caregiver indicated understanding. [x]         Patient/family have participated as able in goal setting and plan of care. [x]         Patient/family agree to work toward stated goals and plan of care. []         Patient understands intent and goals of therapy, but is neutral about his/her participation. []         Patient is unable to participate in goal setting and plan of care.     Thank you for this referral.  Nitesh Wright, PT, DPT   Time Calculation: 30 mins

## 2018-03-22 NOTE — PROGRESS NOTES
Bedside and Verbal shift change report given to Marva Schaeffer RN (oncoming nurse) by Anastasiia Beaver RN (offgoing nurse). Report included the following information SBAR, ED Summary, Intake/Output, MAR and Recent Results.

## 2018-03-22 NOTE — PROGRESS NOTES
Primary Nurse Quinton Bosch RN and Ryland Griffith RN performed a dual skin assessment on this patient No impairment noted  Gianfranco score is 19

## 2018-03-22 NOTE — DISCHARGE SUMMARY
Physician Discharge Summary     Patient ID:  Colten Amador  [id number]  67 y.o.  1945    Allergies: Review of patient's allergies indicates no known allergies. Admit Date: 3/21/2018    Discharge Date: 3/22/2018    * Admission Diagnoses: Small bowel obstruction. * Discharge Diagnoses:    Hospital Problems as of 3/22/2018  Date Reviewed: 4/26/2016          Codes Class Noted - Resolved POA    Constipation due to pain medication ICD-10-CM: K59.03  ICD-9-CM: 564.09, E947.9  3/21/2018 - Present Yes               Admission Condition: Stable    * Discharge Condition: good    * Procedures: * No surgery found [facility] Course:   Normal hospital course. Consults: None    Significant Diagnostic Studies: radiology: CT scan: abdomen and pelvis. * Disposition: [place] ([facility])    Discharge Medications:   Current Discharge Medication List      START taking these medications    Details   oxyCODONE IR (ROXICODONE) 5 mg immediate release tablet Take 1 Tab by mouth every four (4) hours as needed. Max Daily Amount: 30 mg.  Qty: 20 Tab, Refills: 0    Associated Diagnoses: T12 burst fracture ([facility] [address]         CONTINUE these medications which have NOT CHANGED    Details   acetaminophen (TYLENOL) 325 mg tablet Take 650 mg by mouth every six (6) hours as needed for Pain.      pantoprazole (PROTONIX) 40 mg tablet Take 1 Tab by mouth Before breakfast and dinner. Qty: 90 Tab, Refills: 0      spironolactone (ALDACTONE) 25 mg tablet Take 1 Tab by mouth daily. Qty: 30 Tab, Refills: 0      polyethylene glycol (MIRALAX) 17 gram packet Take 1 Packet by mouth daily. Qty: 30 Packet, Refills: 0    Associated Diagnoses: T12 burst fracture ([facility])      cyanocobalamin (VITAMIN B12) 500 mcg tablet Take 500 mcg by mouth daily. multivitamin (ONE A DAY) tablet Take 1 Tab by mouth daily. omega-3 fatty acids-vitamin e 1,000 mg cap Take 2 Caps by mouth daily. melatonin 1 mg tablet Take 1 mg by mouth nightly. STOP taking these medications       fentaNYL (DURAGESIC) 25 mcg/hr PATCH Comments:   Reason for Stopping:               * Follow-up Care/Patient Instructions: Activity: Activity as tolerated. Diet: Regular Diet. Wound Care: None needed. Follow-up Information     Follow up With Details Comments 1111 MATHEUS Lyle, DO   [address]  [phone]          Follow-up tests/labs: None.     Signed:  Edward Baker MD  3/22/2018  1:58 PM

## 2018-03-22 NOTE — PROGRESS NOTES
Progress Note    Patient: Mak Jaffe MRN: [id number]  SSN: [id number]    YOB: 1945  Age: 67 y.o. Sex: female      Admit Date: 3/21/2018    * No surgery found *    Procedure:      Subjective:     Mrs. Henrique Mari still has intermittent pain. She denies any BM. Objective:     Visit Vitals    /63 (BP 1 Location: Left arm, BP Patient Position: At rest;Sitting)    Pulse 96    Temp 98.6 °F (37 °C)    Resp 18    SpO2 91%    Breastfeeding No       Temp (24hrs), Av.2 °F (36.8 °C), Min:97.5 °F (36.4 °C), Max:98.8 °F (37.1 °C)      Physical Exam:    GENERAL: alert, cooperative, no distress, ABDOMEN: Soft, NT, distended and tympanitic. Data Review: reviewed  CT abdomen and pelvis. Lab Review:   BMP:   Lab Results   Component Value Date/Time     2018 01:12 AM    K 3.7 2018 01:12 AM     2018 01:12 AM    CO2 23 2018 01:12 AM    AGAP 11 2018 01:12 AM    GLU 96 2018 01:12 AM    BUN 9 2018 01:12 AM    CREA 0.48 (L) 2018 01:12 AM    GFRAA >60 2018 01:12 AM    GFRNA >60 2018 01:12 AM       Assessment:     Hospital Problems  Date Reviewed: 2016          Codes Class Noted POA    Small bowel obstruction ICD-10-CM: U76.991  ICD-9-CM: 560.9  3/21/2018 Yes              Plan/Recommendations/Medical Decision Making:     CT with constipation, but no obstruction. Will resume diet. D/C Fentanyl patch and start prn Roxicodone. Resume Miralax and give a Fleets enema and MOM this AM.  D/C back to rehab today if possible.     Signed By: Misbah Gipson., MD     2018

## 2018-03-23 VITALS
TEMPERATURE: 97.6 F | OXYGEN SATURATION: 92 % | SYSTOLIC BLOOD PRESSURE: 101 MMHG | HEART RATE: 92 BPM | DIASTOLIC BLOOD PRESSURE: 64 MMHG | RESPIRATION RATE: 18 BRPM

## 2018-03-23 PROCEDURE — 74011250636 HC RX REV CODE- 250/636: Performed by: SURGERY

## 2018-03-23 PROCEDURE — 74011250637 HC RX REV CODE- 250/637: Performed by: SURGERY

## 2018-03-23 PROCEDURE — 96375 TX/PRO/DX INJ NEW DRUG ADDON: CPT

## 2018-03-23 PROCEDURE — 96361 HYDRATE IV INFUSION ADD-ON: CPT

## 2018-03-23 PROCEDURE — 77010033678 HC OXYGEN DAILY

## 2018-03-23 PROCEDURE — 99218 HC RM OBSERVATION: CPT

## 2018-03-23 RX ORDER — CALCIUM POLYCARBOPHIL 625 MG
625 TABLET ORAL DAILY
Qty: 30 TAB | Refills: 0 | Status: SHIPPED | OUTPATIENT
Start: 2018-03-23 | End: 2018-04-25

## 2018-03-23 RX ORDER — CALCIUM POLYCARBOPHIL 625 MG
1 TABLET ORAL DAILY
Status: DISCONTINUED | OUTPATIENT
Start: 2018-03-23 | End: 2018-03-23 | Stop reason: HOSPADM

## 2018-03-23 RX ORDER — DOCUSATE SODIUM 100 MG/1
100 CAPSULE, LIQUID FILLED ORAL 2 TIMES DAILY
Status: DISCONTINUED | OUTPATIENT
Start: 2018-03-23 | End: 2018-03-23 | Stop reason: HOSPADM

## 2018-03-23 RX ORDER — LANOLIN ALCOHOL/MO/W.PET/CERES
1.5 CREAM (GRAM) TOPICAL
Status: DISCONTINUED | OUTPATIENT
Start: 2018-03-23 | End: 2018-03-23 | Stop reason: HOSPADM

## 2018-03-23 RX ORDER — DOCUSATE SODIUM 100 MG/1
100 CAPSULE, LIQUID FILLED ORAL 2 TIMES DAILY
Qty: 60 CAP | Refills: 0 | Status: SHIPPED | OUTPATIENT
Start: 2018-03-23 | End: 2018-04-25

## 2018-03-23 RX ADMIN — ACETAMINOPHEN 650 MG: 325 TABLET ORAL at 09:25

## 2018-03-23 RX ADMIN — CALCIUM POLYCARBOPHIL 625 MG: 625 TABLET, FILM COATED ORAL at 10:09

## 2018-03-23 RX ADMIN — SODIUM PHOSPHATE, DIBASIC AND SODIUM PHOSPHATE, MONOBASIC 118 ML: 7; 19 ENEMA RECTAL at 10:09

## 2018-03-23 RX ADMIN — DOCUSATE SODIUM 100 MG: 100 CAPSULE, LIQUID FILLED ORAL at 10:09

## 2018-03-23 RX ADMIN — Medication 10 ML: at 05:41

## 2018-03-23 RX ADMIN — CYANOCOBALAMIN TAB 500 MCG 500 MCG: 500 TAB at 09:19

## 2018-03-23 RX ADMIN — ONDANSETRON 4 MG: 2 INJECTION INTRAMUSCULAR; INTRAVENOUS at 13:13

## 2018-03-23 RX ADMIN — POLYETHYLENE GLYCOL 3350 17 G: 17 POWDER, FOR SOLUTION ORAL at 09:20

## 2018-03-23 RX ADMIN — THERA TABS 1 TABLET: TAB at 09:19

## 2018-03-23 RX ADMIN — SPIRONOLACTONE 25 MG: 25 TABLET, FILM COATED ORAL at 09:19

## 2018-03-23 RX ADMIN — ACETAMINOPHEN 650 MG: 325 TABLET ORAL at 02:42

## 2018-03-23 RX ADMIN — PANTOPRAZOLE SODIUM 40 MG: 40 TABLET, DELAYED RELEASE ORAL at 09:19

## 2018-03-23 NOTE — PROGRESS NOTES
3/23/2018 1:41 PM Packet on pt's hard chart including avs and pt's hard scripts. 3/23/2018 1:21 PM Pt's daughter present in pt's room, aware [facility] is received and is prepared to transport pt to [facility] today. 3/23/2018 1:03 PM Received call from Abdullahi at [facility] has been received and they can accept pt today. Nursing please call report to [phone]. Pt going to room 214b.    3/23/2018 11:19 AM Received message from Abdullahi in admissions at [facility] is still pending. 3/23/2018 8:56 AM Planning for pt to return to [facility] today if [facility] [employer] [facility] received. Called and lvm with admissions regarding auth. Pt's daughters will transport pt to [facility]. CM will follow.  [employer], BSW

## 2018-03-23 NOTE — PROGRESS NOTES
Bedside and Verbal shift change report given to Chiqui Riggs RN (oncoming nurse) by Luan Penn RN (offgoing nurse). Report included the following information SBAR and Kardex.

## 2018-03-23 NOTE — PROGRESS NOTES
Problem: Falls - Risk of  Goal: *Absence of Falls  Document Demarcus Fall Risk and appropriate interventions in the flowsheet. Outcome: Progressing Towards Goal  Fall Risk Interventions:  Mobility Interventions: Bed/chair exit alarm, Patient to call before getting OOB, Utilize walker, cane, or other assitive device         Medication Interventions: Utilize gait belt for transfers/ambulation, Teach patient to arise slowly, Patient to call before getting OOB, Evaluate medications/consider consulting pharmacy, Bed/chair exit alarm, Assess postural VS orthostatic hypotension    Elimination Interventions:  Toileting schedule/hourly rounds, Toilet paper/wipes in reach, Patient to call for help with toileting needs, Elevated toilet seat, Call light in reach, Bed/chair exit alarm    History of Falls Interventions: Utilize gait belt for transfer/ambulation, Room close to nurse's station, Investigate reason for fall, Consult care management for discharge planning, Bed/chair exit alarm, Door open when patient unattended, Evaluate medications/consider consulting pharmacy

## 2018-03-23 NOTE — PROGRESS NOTES
Progress Note    Patient: Linnea Eduardo MRN: [id number]  SSN: [id number]    YOB: 1945  Age: 67 y.o. Sex: female      Admit Date: 3/21/2018    * No surgery found *    Procedure:      Subjective:     Mrs. Pippa Davidson still has intermittent pain. She is passing flatus and had 2 small BM's. Objective:     Visit Vitals    /70 (BP 1 Location: Left arm, BP Patient Position: At rest;Supine)    Pulse 89    Temp 97.3 °F (36.3 °C)    Resp 20    SpO2 91%    Breastfeeding No       Temp (24hrs), Av.8 °F (36.6 °C), Min:96.9 °F (36.1 °C), Max:98.4 °F (36.9 °C)      Physical Exam:    GENERAL: alert, cooperative, no distress, ABDOMEN: Soft, mild tenderness, distended. Lab Review: All lab results for the last 24 hours reviewed. Assessment:     Hospital Problems  Date Reviewed: 2016          Codes Class Noted POA    Constipation due to pain medication ICD-10-CM: K59.03  ICD-9-CM: 564.09, E947.9  3/21/2018 Yes              Plan/Recommendations/Medical Decision Making:     Continue Miralax. Add fiber supplement and repeat enema. Ambulate/OOB. SNF placement.     Signed By: Kelvin Ramos MD     2018

## 2018-03-23 NOTE — PROGRESS NOTES
3/23/2018  11:57 AM  CM place TC to [address] admissions they have sent updates to [facility], awaiting auth. CM will follow.   Annika Rose

## 2018-04-25 ENCOUNTER — HOSPITAL ENCOUNTER (OUTPATIENT)
Dept: PREADMISSION TESTING | Age: 73
Discharge: HOME | End: 2018-04-25
Payer: MEDICARE

## 2018-04-25 ENCOUNTER — HOSPITAL ENCOUNTER (OUTPATIENT)
Dept: GENERAL RADIOLOGY | Age: 73
Discharge: HOME | End: 2018-04-25
Payer: MEDICARE

## 2018-04-25 VITALS
TEMPERATURE: 98 F | HEIGHT: 63 IN | BODY MASS INDEX: 26.22 KG/M2 | WEIGHT: 148 LBS | HEART RATE: 84 BPM | DIASTOLIC BLOOD PRESSURE: 72 MMHG | SYSTOLIC BLOOD PRESSURE: 130 MMHG

## 2018-04-25 DIAGNOSIS — Z01.818 PRE-OP TESTING: ICD-10-CM

## 2018-04-25 LAB
ALBUMIN SERPL-MCNC: 3 G/DL (ref 3.5–5)
ALBUMIN/GLOB SERPL: 0.7 {RATIO} (ref 1.1–2.2)
ALP SERPL-CCNC: 113 U/L (ref 45–117)
ALT SERPL-CCNC: 15 U/L (ref 12–78)
ANION GAP SERPL CALC-SCNC: 7 MMOL/L (ref 5–15)
APTT PPP: 25.1 SEC (ref 22.1–32)
AST SERPL-CCNC: 23 U/L (ref 15–37)
BILIRUB SERPL-MCNC: 0.6 MG/DL (ref 0.2–1)
BUN SERPL-MCNC: 10 MG/DL (ref 6–20)
BUN/CREAT SERPL: 20 (ref 12–20)
CALCIUM SERPL-MCNC: 9.2 MG/DL (ref 8.5–10.1)
CHLORIDE SERPL-SCNC: 107 MMOL/L (ref 97–108)
CO2 SERPL-SCNC: 24 MMOL/L (ref 21–32)
CREAT SERPL-MCNC: 0.51 MG/DL (ref 0.55–1.02)
ERYTHROCYTE [DISTWIDTH] IN BLOOD BY AUTOMATED COUNT: 15.4 % (ref 11.5–14.5)
GLOBULIN SER CALC-MCNC: 4.3 G/DL (ref 2–4)
GLUCOSE SERPL-MCNC: 81 MG/DL (ref 65–100)
HCT VFR BLD AUTO: 40.1 % (ref 35–47)
HGB BLD-MCNC: 12.1 G/DL (ref 11.5–16)
INR PPP: 1.1 (ref 0.9–1.1)
MAGNESIUM SERPL-MCNC: 1.8 MG/DL (ref 1.6–2.4)
MCH RBC QN AUTO: 29.6 PG (ref 26–34)
MCHC RBC AUTO-ENTMCNC: 30.2 G/DL (ref 30–36.5)
MCV RBC AUTO: 98 FL (ref 80–99)
NRBC # BLD: 0 K/UL (ref 0–0.01)
NRBC BLD-RTO: 0 PER 100 WBC
PHOSPHATE SERPL-MCNC: 3.9 MG/DL (ref 2.6–4.7)
PLATELET # BLD AUTO: 245 K/UL (ref 150–400)
PMV BLD AUTO: 12.3 FL (ref 8.9–12.9)
POTASSIUM SERPL-SCNC: 4.4 MMOL/L (ref 3.5–5.1)
PROT SERPL-MCNC: 7.3 G/DL (ref 6.4–8.2)
PROTHROMBIN TIME: 11.2 SEC (ref 9–11.1)
RBC # BLD AUTO: 4.09 M/UL (ref 3.8–5.2)
SODIUM SERPL-SCNC: 138 MMOL/L (ref 136–145)
THERAPEUTIC RANGE,PTTT: NORMAL SECS (ref 58–77)
WBC # BLD AUTO: 7.3 K/UL (ref 3.6–11)

## 2018-04-25 PROCEDURE — 86900 BLOOD TYPING SEROLOGIC ABO: CPT | Performed by: COLON & RECTAL SURGERY

## 2018-04-25 PROCEDURE — 85610 PROTHROMBIN TIME: CPT | Performed by: COLON & RECTAL SURGERY

## 2018-04-25 PROCEDURE — 80053 COMPREHEN METABOLIC PANEL: CPT | Performed by: COLON & RECTAL SURGERY

## 2018-04-25 PROCEDURE — 84100 ASSAY OF PHOSPHORUS: CPT | Performed by: COLON & RECTAL SURGERY

## 2018-04-25 PROCEDURE — 71046 X-RAY EXAM CHEST 2 VIEWS: CPT

## 2018-04-25 PROCEDURE — 85027 COMPLETE CBC AUTOMATED: CPT | Performed by: COLON & RECTAL SURGERY

## 2018-04-25 PROCEDURE — 83036 HEMOGLOBIN GLYCOSYLATED A1C: CPT | Performed by: COLON & RECTAL SURGERY

## 2018-04-25 PROCEDURE — 85730 THROMBOPLASTIN TIME PARTIAL: CPT | Performed by: COLON & RECTAL SURGERY

## 2018-04-25 PROCEDURE — 83735 ASSAY OF MAGNESIUM: CPT | Performed by: COLON & RECTAL SURGERY

## 2018-04-25 RX ORDER — FAMOTIDINE 20 MG/1
20 TABLET, FILM COATED ORAL 2 TIMES DAILY
COMMUNITY

## 2018-04-25 NOTE — PERIOP NOTES
PATIENT GIVEN 2 PACKS OF 6 HCG WIPES  WITH INSTRUCTIONS BOTH WRITTEN AND VERBAL FOR THEIR USE. PATIENT VOICED UNDERSTANDING OF SAME AND HAD ALL QUESTIONS ADDRESSED AND ANSWERED TO THEIR SATISFACTION. PATIENT ALSO GIVEN PRE-OP INSTRUCTIONS AND VOICED UNDERSTANDING OF SAME. PATIENT GIVEN INCENTIVE SPIROMETER. INSTRUCTIONS FOR ITS USE GIVEN VERBALLY AND IN WRITING. PATIENT DEMONSTRATED UNDERSTANDING OF SAME. ERAS BOOKLET REVIEWED AND PATIENT APPEARED TO HAVE A GOOD UNDERSTANDING OF INSTRUCTIONS. DENIED ANY QUESTIONS AT THIS TIME.   PATIENT GIVEN INSTRUCTIONS/DIRECTIONS FOR CXR TO BE DONE  [facility], [address] [place] [address].; ORDER ENTERED.

## 2018-04-26 LAB
ABO + RH BLD: NORMAL
BLOOD GROUP ANTIBODIES SERPL: NORMAL
EST. AVERAGE GLUCOSE BLD GHB EST-MCNC: NORMAL MG/DL
HBA1C MFR BLD: 4.8 % (ref 4.2–6.3)
SPECIMEN EXP DATE BLD: NORMAL

## 2018-04-27 ENCOUNTER — ANESTHESIA EVENT (OUTPATIENT)
Dept: SURGERY | Age: 73
DRG: 329 | End: 2018-04-27
Payer: MEDICARE

## 2018-04-27 ENCOUNTER — ANESTHESIA (OUTPATIENT)
Dept: SURGERY | Age: 73
End: 2018-04-27
Payer: MEDICARE

## 2018-04-27 ENCOUNTER — HOSPITAL ENCOUNTER (OUTPATIENT)
Age: 73
Setting detail: OUTPATIENT SURGERY
Discharge: HOME | End: 2018-04-27
Attending: COLON & RECTAL SURGERY | Admitting: COLON & RECTAL SURGERY
Payer: MEDICARE

## 2018-04-27 ENCOUNTER — ANESTHESIA EVENT (OUTPATIENT)
Dept: SURGERY | Age: 73
End: 2018-04-27
Payer: MEDICARE

## 2018-04-27 VITALS
RESPIRATION RATE: 20 BRPM | HEIGHT: 63 IN | SYSTOLIC BLOOD PRESSURE: 124 MMHG | DIASTOLIC BLOOD PRESSURE: 70 MMHG | BODY MASS INDEX: 26.72 KG/M2 | WEIGHT: 150.79 LBS | OXYGEN SATURATION: 95 % | TEMPERATURE: 97.8 F | HEART RATE: 74 BPM

## 2018-04-27 PROCEDURE — 76210000016 HC OR PH I REC 1 TO 1.5 HR: Performed by: COLON & RECTAL SURGERY

## 2018-04-27 PROCEDURE — 76010000138 HC OR TIME 0.5 TO 1 HR: Performed by: COLON & RECTAL SURGERY

## 2018-04-27 PROCEDURE — 76060000032 HC ANESTHESIA 0.5 TO 1 HR: Performed by: COLON & RECTAL SURGERY

## 2018-04-27 PROCEDURE — 74011250636 HC RX REV CODE- 250/636

## 2018-04-27 PROCEDURE — 74011000250 HC RX REV CODE- 250

## 2018-04-27 PROCEDURE — 76210000020 HC REC RM PH II FIRST 0.5 HR: Performed by: COLON & RECTAL SURGERY

## 2018-04-27 PROCEDURE — 77030018836 HC SOL IRR NACL ICUM -A: Performed by: COLON & RECTAL SURGERY

## 2018-04-27 PROCEDURE — 77030037186 HC VLV ENDOSC STRL DEFENDO DISP MVAT -A: Performed by: COLON & RECTAL SURGERY

## 2018-04-27 PROCEDURE — 77030003657 HC NDL SCLER BSC -B: Performed by: COLON & RECTAL SURGERY

## 2018-04-27 PROCEDURE — 77030009426 HC FCPS BIOP ENDOSC BSC -B: Performed by: COLON & RECTAL SURGERY

## 2018-04-27 PROCEDURE — 77030018846 HC SOL IRR STRL H20 ICUM -A: Performed by: COLON & RECTAL SURGERY

## 2018-04-27 PROCEDURE — 77030032490 HC SLV COMPR SCD KNE COVD -B: Performed by: COLON & RECTAL SURGERY

## 2018-04-27 PROCEDURE — 77030010104 HC SEAL PRT ENDOSC BYRN -B: Performed by: COLON & RECTAL SURGERY

## 2018-04-27 RX ORDER — ATROPINE SULFATE 0.1 MG/ML
0.5 INJECTION INTRAVENOUS
Status: ACTIVE | OUTPATIENT
Start: 2018-04-27 | End: 2018-04-27

## 2018-04-27 RX ORDER — PROPOFOL 10 MG/ML
INJECTION, EMULSION INTRAVENOUS AS NEEDED
Status: DISCONTINUED | OUTPATIENT
Start: 2018-04-27 | End: 2018-04-27

## 2018-04-27 RX ORDER — FLUMAZENIL 0.1 MG/ML
0.2 INJECTION INTRAVENOUS
Status: ACTIVE | OUTPATIENT
Start: 2018-04-27 | End: 2018-04-27

## 2018-04-27 RX ORDER — NALOXONE HYDROCHLORIDE 0.4 MG/ML
0.4 INJECTION, SOLUTION INTRAMUSCULAR; INTRAVENOUS; SUBCUTANEOUS
Status: ACTIVE | OUTPATIENT
Start: 2018-04-27 | End: 2018-04-27

## 2018-04-27 RX ORDER — DEXTROMETHORPHAN/PSEUDOEPHED 2.5-7.5/.8
1.2 DROPS ORAL
Status: DISCONTINUED | OUTPATIENT
Start: 2018-04-27 | End: 2018-04-27 | Stop reason: HOSPADM

## 2018-04-27 RX ORDER — LIDOCAINE HYDROCHLORIDE 20 MG/ML
INJECTION, SOLUTION EPIDURAL; INFILTRATION; INTRACAUDAL; PERINEURAL AS NEEDED
Status: DISCONTINUED | OUTPATIENT
Start: 2018-04-27 | End: 2018-04-27 | Stop reason: HOSPADM

## 2018-04-27 RX ORDER — EPINEPHRINE 0.1 MG/ML
1 INJECTION INTRACARDIAC; INTRAVENOUS
Status: ACTIVE | OUTPATIENT
Start: 2018-04-27 | End: 2018-04-27

## 2018-04-27 RX ORDER — PROPOFOL 10 MG/ML
INJECTION, EMULSION INTRAVENOUS AS NEEDED
Status: DISCONTINUED | OUTPATIENT
Start: 2018-04-27 | End: 2018-04-27 | Stop reason: HOSPADM

## 2018-04-27 RX ADMIN — PROPOFOL 20 MG: 10 INJECTION, EMULSION INTRAVENOUS at 10:32

## 2018-04-27 RX ADMIN — PROPOFOL 100 MG: 10 INJECTION, EMULSION INTRAVENOUS at 10:27

## 2018-04-27 RX ADMIN — PROPOFOL 20 MG: 10 INJECTION, EMULSION INTRAVENOUS at 10:46

## 2018-04-27 RX ADMIN — LIDOCAINE HYDROCHLORIDE 100 MG: 20 INJECTION, SOLUTION EPIDURAL; INFILTRATION; INTRACAUDAL; PERINEURAL at 10:59

## 2018-04-27 RX ADMIN — PROPOFOL 10 MG: 10 INJECTION, EMULSION INTRAVENOUS at 10:30

## 2018-04-27 RX ADMIN — PROPOFOL 20 MG: 10 INJECTION, EMULSION INTRAVENOUS at 10:55

## 2018-04-27 RX ADMIN — PROPOFOL 20 MG: 10 INJECTION, EMULSION INTRAVENOUS at 10:39

## 2018-04-27 RX ADMIN — PROPOFOL 30 MG: 10 INJECTION, EMULSION INTRAVENOUS at 10:28

## 2018-04-27 RX ADMIN — PROPOFOL 20 MG: 10 INJECTION, EMULSION INTRAVENOUS at 10:52

## 2018-04-27 RX ADMIN — PROPOFOL 20 MG: 10 INJECTION, EMULSION INTRAVENOUS at 10:49

## 2018-04-27 RX ADMIN — PROPOFOL 30 MG: 10 INJECTION, EMULSION INTRAVENOUS at 11:00

## 2018-04-27 RX ADMIN — PROPOFOL 20 MG: 10 INJECTION, EMULSION INTRAVENOUS at 10:51

## 2018-04-27 RX ADMIN — PROPOFOL 20 MG: 10 INJECTION, EMULSION INTRAVENOUS at 10:56

## 2018-04-27 RX ADMIN — PROPOFOL 20 MG: 10 INJECTION, EMULSION INTRAVENOUS at 10:41

## 2018-04-27 RX ADMIN — PROPOFOL 20 MG: 10 INJECTION, EMULSION INTRAVENOUS at 10:57

## 2018-04-27 RX ADMIN — PROPOFOL 20 MG: 10 INJECTION, EMULSION INTRAVENOUS at 10:53

## 2018-04-27 RX ADMIN — PROPOFOL 30 MG: 10 INJECTION, EMULSION INTRAVENOUS at 10:29

## 2018-04-27 RX ADMIN — PROPOFOL 20 MG: 10 INJECTION, EMULSION INTRAVENOUS at 10:47

## 2018-04-27 RX ADMIN — PROPOFOL 20 MG: 10 INJECTION, EMULSION INTRAVENOUS at 10:43

## 2018-04-27 RX ADMIN — PROPOFOL 20 MG: 10 INJECTION, EMULSION INTRAVENOUS at 10:35

## 2018-04-27 RX ADMIN — PROPOFOL 20 MG: 10 INJECTION, EMULSION INTRAVENOUS at 10:45

## 2018-04-27 NOTE — IP AVS SNAPSHOT
[address] [facility] 
[phone] Patient: Mariella Dalton MRN: [id number] OGT:5/6/7591 About your hospitalization You were admitted on:  April 27, 2018 You last received care in the:  [facility] PACU You were discharged on:  April 27, 2018 Why you were hospitalized Your primary diagnosis was:  Not on File Follow-up Information Follow up With Details Comments Contact Info [address] [place] [address] 
[phone] Your Scheduled Appointments Monday April 30, 2018 COLON RESECTION SIGMOID LEFT ROBOTIC ASSISTED with Donn Cevallos MD  
[facility] SURGERY (RI OR PRE ASSESSMENT) [address] [place] [address] 13  
[phone] Discharge Orders None A check halina indicates which time of day the medication should be taken. My Medications CONTINUE taking these medications Instructions Each Dose to Equal  
 Morning Noon Evening Bedtime  
 melatonin 1 mg tablet Your last dose was: Your next dose is: Take 1 mg by mouth nightly. 1 mg  
    
   
   
   
  
 multivitamin tablet Commonly known as:  ONE A DAY Your last dose was: Your next dose is: Take 1 Tab by mouth daily. 1 Tab  
    
   
   
   
  
 omega-3 fatty acids-vitamin e 1,000 mg Cap Your last dose was: Your next dose is: Take 2 Caps by mouth daily. 2 Cap PEPCID 20 mg tablet Generic drug:  famotidine Your last dose was: Your next dose is: Take 20 mg by mouth two (2) times a day. 20 mg  
    
   
   
   
  
 polyethylene glycol 17 gram packet Commonly known as:  Chucho Villalobos Your last dose was: Your next dose is: Take 1 Packet by mouth daily. 17 g TYLENOL 325 mg tablet Generic drug:  acetaminophen Your last dose was: Your next dose is: Take 650 mg by mouth every six (6) hours as needed for Pain. 650 mg Discharge Instructions El Vazquez [id number] 
1945 COLON DISCHARGE INSTRUCTIONS Discomfort: 
Redness at IV site- apply warm compress to area; if redness or soreness persist- contact your physician There may be a slight amount of blood passed from the rectum Gaseous discomfort- walking, belching will help relieve any discomfort You may not operate a vehicle for 12 hours You may not engage in an occupation involving machinery or appliances for rest of today You may not drink alcoholic beverages for at least 12 hours Avoid making any critical decisions for at least 24 hour DIET: 
 Clear liquid diet and advance as tolerated.  however -  remember your colon is empty and a heavy meal will produce gas. Avoid these foods:  vegetables, fried / greasy foods, carbonated drinks for today MEDICATIONS: 
resume ACTIVITY: 
You may resume your normal daily activities it is recommended that you spend the remainder of the day resting -  avoid any strenuous activity. CALL M.D. ANY SIGN OF: Increasing pain, nausea, vomiting Abdominal distension (swelling) New increased bleeding (oral or rectal) Fever (chills) Pain in chest area Bloody discharge from nose or mouth Shortness of breath Follow-up Instructions: 
 Call Warren Montalvo MD if any questions or problems. Telephone # [phone] Biopsy results will be available in  7 to10 days Should have a repeat colonoscopy in 6 months. COLONOSCOPY FINDINGS: 
Your colonoscopy showed: polyp in the ascending colon, cancer in the descending colon, diverticulosis in the sigmoid colon. DISCHARGE SUMMARY from Nurse PATIENT INSTRUCTIONS: 
 
 
F-face looks uneven A-arms unable to move or move unevenly S-speech slurred or non-existent T-time-call 911 as soon as signs and symptoms begin-DO NOT go Back to bed or wait to see if you get better-TIME IS BRAIN.  
 
Warning Signs of HEART ATTACK  
 
 Call 911 if you have these symptoms: 
? Chest discomfort. Most heart attacks involve discomfort in the center of the chest that lasts more than a few minutes, or that goes away and comes back. It can feel like uncomfortable pressure, squeezing, fullness, or pain. ? Discomfort in other areas of the upper body. Symptoms can include pain or discomfort in one or both arms, the back, neck, jaw, or stomach. ? Shortness of breath with or without chest discomfort. ? Other signs may include breaking out in a cold sweat, nausea, or lightheadedness. Don't wait more than five minutes to call [address]! Fast action can save your life. Calling 911 is almost always the fastest way to get lifesaving treatment. Emergency Medical Services staff can begin treatment when they arrive  up to an hour sooner than if someone gets to the hospital by car. The discharge information has been reviewed with the patient and caregiver. The patient and caregiver verbalized understanding. Discharge medications reviewed with the patient and caregiver and appropriate educational materials and side effects teaching were provided. ___________________________________________________________________________________________________________________________________ Introducing hospitals & HEALTH SERVICES! Dear Teja Round: Thank you for requesting a Heavy account. Our records indicate that you already have an active Heavy account. You can access your account anytime at [email or web address] Did you know that you can access your hospital and ER discharge instructions at any time in Heavy? You can also review all of your test results from your hospital stay or ER visit. Additional Information If you have questions, please visit the Frequently Asked Questions section of the Heavy website at [email or web address] Remember, MyChart is NOT to be used for urgent needs.  For medical emergencies, dial 911. Now available from your iPhone and Android! Introducing Julio Kim As a [employer] patient, I wanted to make you aware of our electronic visit tool called Julio Kim. [employer] 24/7 allows you to connect within minutes with a medical provider 24 hours a day, seven days a week via a mobile device or tablet or logging into a secure website from your computer. You can access Julio Kim from anywhere in the [place]. A virtual visit might be right for you when you have a simple condition and feel like you just dont want to get out of bed, or cant get away from work for an appointment, when your regular [employer] provider is not available (evenings, weekends or holidays), or when youre out of town and need minor care. Electronic visits cost only $49 and if the [employer] 24/7 provider determines a prescription is needed to treat your condition, one can be electronically transmitted to a nearby pharmacy*. Please take a moment to enroll today if you have not already done so. The enrollment process is free and takes just a few minutes. To enroll, please download the [employer] 24/7 ananya to your tablet or phone, or visit [email or web address] to enroll on your computer. And, as an [address] [place] [address] patient with a [employer] account, the results of your visits will be scanned into your electronic medical record and your primary care provider will be able to view the scanned results. We urge you to continue to see your regular [employer] provider for your ongoing medical care. And while your primary care provider may not be the one available when you seek a Julio Kim virtual visit, the peace of mind you get from getting a real diagnosis real time can be priceless. For more information on Julio Kim, view our Frequently Asked Questions (FAQs) at [email or web address].  
 
Sincerely, 
 
Fabiola Carmen MD 
 Chief Medical Officer Mitch Annika Benoit *:  certain medications cannot be prescribed via Julio Kim Providers Seen During Your Hospitalization Provider Specialty Primary office phone Pavan Cannon MD Colon and Rectal Surgery [phone] Your Primary Care Physician (PCP) Primary Care Physician Office Phone Office Fax [address], [address] [phone] You are allergic to the following No active allergies Recent Documentation Height Weight BMI OB Status Smoking Status 1.6 m 68.4 kg 26.71 kg/m2 Postmenopausal Former Smoker Emergency Contacts Name Discharge Info Relation Home Work Mobile [address] CAREGIVER [3] Daughter [21] [phone] Rhina Kim DISCHARGE CAREGIVER [3] Daughter [21]   [phone] Patient Belongings The following personal items are in your possession at time of discharge: 
  Dental Appliances: None         Home Medications: None   Jewelry: None  Clothing: Socks, Pants, Shirt, Footwear, Undergarments    Other Valuables: Brace (back brace sent to PACU with belongings)  Personal Items Sent to Safe: na 
 
  
  
 Please provide this summary of care documentation to your next provider. Signatures-by signing, you are acknowledging that this After Visit Summary has been reviewed with you and you have received a copy. Patient Signature:  ____________________________________________________________ Date:  ____________________________________________________________  
  
[facility] [place] [place] Provider Signature:  ____________________________________________________________ Date:  ____________________________________________________________

## 2018-04-27 NOTE — BRIEF OP NOTE
BRIEF OPERATIVE NOTE    Date of Procedure: 4/27/2018   Preoperative Diagnosis: Personal History of Colon Cancer  Postoperative Diagnosis: Personal History of Colon Cancer    Procedure(s):  COLONOSCOPY WITH SUBMUCOUSAL INJECTION OF MIKAELA INK   Surgeon(s) and Role:     * Maura Adair MD - Primary         Surgical Assistant:     Surgical Staff:  Circ-1: Billy Stevens RN  Circ-2: Jerome Barrow RN  Scrub Tech-1: Nannette Hams  Endoscopy RN-1: Maricruz Stearns RN  Float Staff: Mode Kaur  Event Time In   Incision Start 1028   Incision Close 1104     Anesthesia: General   Estimated Blood Loss: none  Specimens: * No specimens in log *   Findings: 2cm flat polyp just distal to the ileocecal valve. Splenic flexure mass tattooed today above the mass. 2 polyps distal to the mass. Tattoo placed during stenting is distal to all of the lesions.   She has a fair amount of \"normal\" sigmoid colon other than moderately severe diverticulosis  Complications: none apparent  Implants: * No implants in log *

## 2018-04-27 NOTE — PERIOP NOTES
200 Spoke with Daughter Sarah Prince, provided an update. Allowed time for questions and answers. She discussed that she has not spoken with Dr. Broderick Lechuga and would like an update. Dr. Broderick Lechuga paged. Awaiting call back. 200 Dr. Broderick Lechuga returned call and spoke with daughter for an update. 6628 TRANSFER - OUT REPORT:    Verbal report given to [facility] RN(name) on Rosarioitor  being transferred to Phase II(unit) for routine post - op       Report consisted of patients Situation, Background, Assessment and   Recommendations(SBAR). Information from the following report(s) SBAR, Kardex, OR Summary, Procedure Summary, Intake/Output, MAR, Accordion, Recent Results, Med Rec Status, Cardiac Rhythm NSR and Alarm Parameters  was reviewed with the receiving nurse. Opportunity for questions and clarification was provided.       Patient transported with:   Registered Nurse

## 2018-04-27 NOTE — PERIOP NOTES
Handoff Report from Operating Room to PACU    Report received from MATHEUS Guerrero RN and FELI Clifton CRNA regarding Adore Vang. Surgeon(s):  Fady Waller MD  And Procedure(s) (LRB):  COLONOSCOPY WITH SUBMUCOUSAL INJECTION OF MIKAELA INK  (N/A)  confirmed   with allergies and dressings discussed. Anesthesia type, drugs, patient history, complications, estimated blood loss, vital signs, intake and output, and last pain medication, lines, reversal medications and temperature were reviewed.

## 2018-04-27 NOTE — ANESTHESIA POSTPROCEDURE EVALUATION
Post-Anesthesia Evaluation and Assessment    Patient: Jorje Flood MRN: [id number]  SSN: [id number]    YOB: 1945  Age: 75 y.o. Sex: female       Cardiovascular Function/Vital Signs  Visit Vitals    /64 (BP 1 Location: Left arm, BP Patient Position: At rest)    Pulse 74    Temp 36.6 °C (97.9 °F)    Resp 15    Ht 5' 3\" (1.6 m)    Wt 68.4 kg (150 lb 12.7 oz)    SpO2 100%    BMI 26.71 kg/m2       Patient is status post MAC anesthesia for Procedure(s):  COLONOSCOPY WITH SUBMUCOUSAL INJECTION OF MIKAELA INK . Nausea/Vomiting: None    Postoperative hydration reviewed and adequate. Pain:  Pain Scale 1: Numeric (0 - 10) (04/27/18 1130)  Pain Intensity 1: 0 (04/27/18 1130)   Managed    Neurological Status:   Neuro (WDL): Exceptions to WDL (04/27/18 1115)  Neuro  Neurologic State: Drowsy; Eyes open spontaneously (04/27/18 1115)  Orientation Level: Oriented to person;Oriented to place;Oriented to situation (04/27/18 1115)  Cognition: Follows commands (04/27/18 1115)  Speech: Delayed responses (04/27/18 1115)  LUE Motor Response: Purposeful;Spontaneous  (04/27/18 1115)  LLE Motor Response: Purposeful;Spontaneous  (04/27/18 1115)  RUE Motor Response: Purposeful;Spontaneous  (04/27/18 1115)  RLE Motor Response: Purposeful;Spontaneous  (04/27/18 1115)   At baseline    Mental Status and Level of Consciousness: Arousable    Pulmonary Status:   O2 Device: Nasal cannula (04/27/18 1130)   Adequate oxygenation and airway patent    Complications related to anesthesia: None    Post-anesthesia assessment completed.  No concerns    Signed By: Chayito Mcnulty MD     April 27, 2018

## 2018-04-27 NOTE — IP AVS SNAPSHOT
[address] [facility] 
[phone] Patient: Cata Olivier MRN: [id number] XOI:1/8/1109 A check halina indicates which time of day the medication should be taken. My Medications CONTINUE taking these medications Instructions Each Dose to Equal  
 Morning Noon Evening Bedtime  
 melatonin 1 mg tablet Your last dose was: Your next dose is: Take 1 mg by mouth nightly. 1 mg  
    
   
   
   
  
 multivitamin tablet Commonly known as:  ONE A DAY Your last dose was: Your next dose is: Take 1 Tab by mouth daily. 1 Tab  
    
   
   
   
  
 omega-3 fatty acids-vitamin e 1,000 mg Cap Your last dose was: Your next dose is: Take 2 Caps by mouth daily. 2 Cap PEPCID 20 mg tablet Generic drug:  famotidine Your last dose was: Your next dose is: Take 20 mg by mouth two (2) times a day. 20 mg  
    
   
   
   
  
 polyethylene glycol 17 gram packet Commonly known as:  Chen Hussar Your last dose was: Your next dose is: Take 1 Packet by mouth daily. 17 g TYLENOL 325 mg tablet Generic drug:  acetaminophen Your last dose was: Your next dose is: Take 650 mg by mouth every six (6) hours as needed for Pain.   
 650 mg

## 2018-04-27 NOTE — ANESTHESIA PREPROCEDURE EVALUATION
Anesthetic History   No history of anesthetic complications            Review of Systems / Medical History  Patient summary reviewed, nursing notes reviewed and pertinent labs reviewed    Pulmonary  Within defined limits                 Neuro/Psych   Within defined limits           Cardiovascular  Within defined limits                Exercise tolerance: >4 METS     GI/Hepatic/Renal     GERD      PUD and liver disease     Endo/Other        Arthritis     Other Findings            Physical Exam    Airway  Mallampati: II  TM Distance: 4 - 6 cm  Neck ROM: normal range of motion   Mouth opening: Normal     Cardiovascular  Regular rate and rhythm,  S1 and S2 normal,  no murmur, click, rub, or gallop             Dental    Dentition: Caps/crowns     Pulmonary  Breath sounds clear to auscultation               Abdominal  GI exam deferred       Other Findings            Anesthetic Plan    ASA: 2  Anesthesia type: MAC          Induction: Intravenous  Anesthetic plan and risks discussed with: Patient

## 2018-04-27 NOTE — H&P
Colon and Rectal Surgery History and Physical    Subjective:      Chepe Mcnamara is a 75 y.o. female who has an obstructing colon cancer in the sigmoid colon. This has been stented and she has been having normal bms. 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    Leukocytosis 02/18/2018    Severe protein-calorie malnutrition ([address] 02/18/2018    Acute metabolic encephalopathy 00/99/3832    Hyperammonemia ([address] 02/16/2018    Free intraperitoneal air 02/12/2018    S/P exploratory laparotomy 46/13/6147    Alcoholic cirrhosis of liver without ascites ([address] 02/12/2018    Perforated chronic gastric ulcer ([address] 02/11/2018    ETOH abuse 02/11/2018    GI bleed 04/26/2016    Hypotension 04/26/2016    Tachycardia 04/26/2016    Acute blood loss anemia 04/26/2016     Past Medical History:   Diagnosis Date    Alcoholic cirrhosis of liver without ascites ([address] 2/12/2018    Arthritis     Cancer (HCC)     COLON    Chronic pain     BACK PAIN T12 FRACTURE    ETOH abuse 2/11/2018    GERD (gastroesophageal reflux disease)     History of vascular access device 02/16/2018    [facility] VAT - 5 FR triple, R basilic, TPN    Hyperammonemia ([address] 2/16/2018     Results for [facility] [place] (MRN [id number]) as of 2/17/2018 08:43  Ref.  Range 2/16/2018 17:20 Ammonia Latest Ref Range: <32 UMOL/L 69 (H)     Hyperlipidemia     Perforated chronic gastric ulcer ([address] 2/11/2018      Past Surgical History:   Procedure Laterality Date    ABDOMEN SURGERY PROC UNLISTED      HX CATARACT REMOVAL      HX GI      PERFORATED ULCER SURGERY      Social History   Substance Use Topics    Smoking status: Former Smoker     Packs/day: 1.00     Years: 20.00     Quit date: 5    Smokeless tobacco: Never Used    Alcohol use 11.4 oz/week     19 Glasses of wine, 0 Standard drinks or equivalent per week      Comment: QUIT 2/12/2018      Family History   Problem Relation Age of Onset    Other Other      patient did not know    Heart Disease Mother     Cancer Father      COLON    Anesth Problems Neg Hx       Prior to Admission medications    Medication Sig Start Date End Date Taking? Authorizing Provider   famotidine (PEPCID) 20 mg tablet Take 20 mg by mouth two (2) times a day. Yes Historical Provider   acetaminophen (TYLENOL) 325 mg tablet Take 650 mg by mouth every six (6) hours as needed for Pain. Yes Historical Provider   melatonin 1 mg tablet Take 1 mg by mouth nightly. Yes Historical Provider   polyethylene glycol (MIRALAX) 17 gram packet Take 1 Packet by mouth daily. 3/16/18  Yes Melba Molina MD   multivitamin (ONE A DAY) tablet Take 1 Tab by mouth daily. Yes Historical Provider   omega-3 fatty acids-vitamin e 1,000 mg cap Take 2 Caps by mouth daily. Yes Historical Provider     No Known Allergies     Review of Systems:    A comprehensive review of systems was negative except for that written in the History of Present Illness. Objective:     Visit Vitals    /65 (BP 1 Location: Right arm, BP Patient Position: At rest)    Pulse 85    Temp 97.4 °F (36.3 °C)    Resp 18    Ht 5' 3\" (1.6 m)    Wt 68.4 kg (150 lb 12.7 oz)    SpO2 100%    BMI 26.71 kg/m2        Physical Exam: nad  Chest clear  Heart reg  abd soft    Imaging:  images and reports reviewed    Lab Review:  No results found for this or any previous visit (from the past 24 hour(s)). Labs and radiology: images and reports reviewed      Assessment:   Near obstructing colon mass    Plan:     1. I recommend proceeding with colonoscopy. Treatment alternatives were discussed.   2. Discussed aspects of surgical intervention, methods, risks (including by not limited to infection, bleeding, hematoma, and perforation of the intestines or solid organs), and the risks of general anesthetic. The patient understands the risks; any and all questions were answered to the patient's satisfaction.     Signed By: Sherman Wright MD     April 27, 2018

## 2018-04-27 NOTE — DISCHARGE INSTRUCTIONS
Carrington Fox  [id number]  1945    COLON DISCHARGE INSTRUCTIONS  Discomfort:  Redness at IV site- apply warm compress to area; if redness or soreness persist- contact your physician  There may be a slight amount of blood passed from the rectum  Gaseous discomfort- walking, belching will help relieve any discomfort  You may not operate a vehicle for 12 hours  You may not engage in an occupation involving machinery or appliances for rest of today  You may not drink alcoholic beverages for at least 12 hours  Avoid making any critical decisions for at least 24 hour  DIET:   Clear liquid diet and advance as tolerated. - however -  remember your colon is empty and a heavy meal will produce gas. Avoid these foods:  vegetables, fried / greasy foods, carbonated drinks for today    MEDICATIONS:  resume       ACTIVITY:  You may resume your normal daily activities it is recommended that you spend the remainder of the day resting -  avoid any strenuous activity. CALL M.D. ANY SIGN OF:   Increasing pain, nausea, vomiting  Abdominal distension (swelling)  New increased bleeding (oral or rectal)  Fever (chills)  Pain in chest area  Bloody discharge from nose or mouth  Shortness of breath     Follow-up Instructions:   Call Misael Aleman MD if any questions or problems. Telephone # [phone]  Biopsy results will be available in  7 to10 days  Should have a repeat colonoscopy in 6 months. COLONOSCOPY FINDINGS:  Your colonoscopy showed: polyp in the ascending colon, cancer in the descending colon, diverticulosis in the sigmoid colon.    DISCHARGE SUMMARY from Nurse    PATIENT INSTRUCTIONS:    After general anesthesia or intravenous sedation, for 24 hours or while taking prescription Narcotics:  · Limit your activities  · Do not drive and operate hazardous machinery  · Do not make important personal or business decisions  · Do  not drink alcoholic beverages  · If you have not urinated within 8 hours after discharge, please contact your surgeon on call. Report the following to your surgeon:  · Excessive pain, swelling, redness or odor of or around the surgical area  · Temperature over 100.5  · Nausea and vomiting lasting longer than 4 hours or if unable to take medications  · Any signs of decreased circulation or nerve impairment to extremity: change in color, persistent  numbness, tingling, coldness or increase pain  · Any questions    What to do at Home:    Please carry medication information at all times in case of emergency situations. These are general instructions for a healthy lifestyle:    No smoking/ No tobacco products/ Avoid exposure to second hand smoke  Surgeon General's Warning:  Quitting smoking now greatly reduces serious risk to your health. Obesity, smoking, and sedentary lifestyle greatly increases your risk for illness    A healthy diet, regular physical exercise & weight monitoring are important for maintaining a healthy lifestyle    You may be retaining fluid if you have a history of heart failure or if you experience any of the following symptoms:  Weight gain of 3 pounds or more overnight or 5 pounds in a week, increased swelling in our hands or feet or shortness of breath while lying flat in bed. Please call your doctor as soon as you notice any of these symptoms; do not wait until your next office visit. Recognize signs and symptoms of STROKE:    F-face looks uneven    A-arms unable to move or move unevenly    S-speech slurred or non-existent    T-time-call 911 as soon as signs and symptoms begin-DO NOT go       Back to bed or wait to see if you get better-TIME IS BRAIN. Warning Signs of HEART ATTACK     Call 911 if you have these symptoms:   Chest discomfort. Most heart attacks involve discomfort in the center of the chest that lasts more than a few minutes, or that goes away and comes back. It can feel like uncomfortable pressure, squeezing, fullness, or pain.    Discomfort in other areas of the upper body. Symptoms can include pain or discomfort in one or both arms, the back, neck, jaw, or stomach.  Shortness of breath with or without chest discomfort.  Other signs may include breaking out in a cold sweat, nausea, or lightheadedness. Don't wait more than five minutes to call 911 - MINUTES MATTER! Fast action can save your life. Calling 911 is almost always the fastest way to get lifesaving treatment. Emergency Medical Services staff can begin treatment when they arrive -- up to an hour sooner than if someone gets to the hospital by car. The discharge information has been reviewed with the patient and caregiver. The patient and caregiver verbalized understanding. Discharge medications reviewed with the patient and caregiver and appropriate educational materials and side effects teaching were provided.   ___________________________________________________________________________________________________________________________________

## 2018-04-27 NOTE — OP NOTES
Colonoscopy Procedure Note    Indications: Previous colon cancer    Anesthesia/Sedation: MAC anesthesia Propofol    Pre-Procedure Exam:  Airway: clear   Heart: normal S1and S2    Lungs: clear bilateral  Abdomen: soft, nontender, bowel sounds present and normal in all quadrants   Mental Status: awake, alert, and oriented to person, place, and time      Procedure in Detail:  Informed consent was obtained for the procedure, including sedation. Risks of perforation, hemorrhage, adverse drug reaction, and aspiration were discussed. The patient was placed in the left lateral decubitus position. Based on the pre-procedure assessment, including review of the patient's medical history, medications, allergies, and review of systems, she had been deemed to be an appropriate candidate for moderate sedation; she was therefore sedated with the medications listed above. The patient was monitored continuously with ECG tracing, pulse oximetry, blood pressure monitoring, and direct observations. A rectal examination was performed. The [id number] was inserted into the rectum and advanced under direct vision to the cecum, which was identified by the ileocecal valve and appendiceal orifice. The quality of the colonic preparation was excellent. A careful inspection was made as the colonoscope was withdrawn, including a retroflexed view of the rectum; findings and interventions are described below. Appropriate photodocumentation was obtained. Findings:   Rectum:   Normal  Sigmoid:     - Excavated lesions:     - Diverticulosis  Descending Colon:     - stented descending colon mass is patent. Able to pass a pediatric scope through the stent. Tattooed the mass above the stent today. There are 2 polyps distal to the stented mass. They are not retrievable. There is tattoo from the prior procedure at the level of the distalmost polyp.    Transverse Colon:   Normal  Ascending Colon:   Flat carpeting lesion of polyps across from the ileocecal valve. This was irretrievable today. Cecum:   Normal          Specimens: No specimens were collected. EBL: None    Complications: None; patient tolerated the procedure well. Attending Attestation: I performed the procedure.     Recommendations:    - repeat colonoscopy in 6 months    Signed By: Donn Cevallos MD                        April 27, 2018

## 2018-04-30 ENCOUNTER — HOSPITAL ENCOUNTER (INPATIENT)
Age: 73
LOS: 8 days | Discharge: HOME HEALTH | DRG: 329 | End: 2018-05-08
Attending: COLON & RECTAL SURGERY | Admitting: COLON & RECTAL SURGERY
Payer: MEDICARE

## 2018-04-30 ENCOUNTER — ANESTHESIA (OUTPATIENT)
Dept: SURGERY | Age: 73
DRG: 329 | End: 2018-04-30
Payer: MEDICARE

## 2018-04-30 DIAGNOSIS — C18.9 MALIGNANT NEOPLASM OF COLON, UNSPECIFIED PART OF COLON (HCC): Primary | ICD-10-CM

## 2018-04-30 LAB
ALBUMIN FLD-MCNC: 2 G/DL
APPEARANCE FLD: ABNORMAL
COLOR FLD: ABNORMAL
LIPASE FLD-CCNC: 33 U/L
LYMPHOCYTES NFR FLD: 43 %
MONOS+MACROS NFR FLD: 54 %
NEUTROPHILS NFR FLD: 3 %
NUC CELL # FLD: 659 /CU MM
RBC # FLD: >100 /CU MM
SPECIMEN SOURCE FLD: ABNORMAL
SPECIMEN SOURCE FLD: NORMAL
SPECIMEN SOURCE FLD: NORMAL

## 2018-04-30 PROCEDURE — 74011000254 HC RX REV CODE- 254

## 2018-04-30 PROCEDURE — 8E0W4CZ ROBOTIC ASSISTED PROCEDURE OF TRUNK REGION, PERCUTANEOUS ENDOSCOPIC APPROACH: ICD-10-PCS | Performed by: COLON & RECTAL SURGERY

## 2018-04-30 PROCEDURE — 77030025303 HC STPLR ENDOSC J&J -G: Performed by: COLON & RECTAL SURGERY

## 2018-04-30 PROCEDURE — 88331 PATH CONSLTJ SURG 1 BLK 1SPC: CPT | Performed by: COLON & RECTAL SURGERY

## 2018-04-30 PROCEDURE — 74011000250 HC RX REV CODE- 250: Performed by: COLON & RECTAL SURGERY

## 2018-04-30 PROCEDURE — 77030020263 HC SOL INJ SOD CL0.9% LFCR 1000ML: Performed by: COLON & RECTAL SURGERY

## 2018-04-30 PROCEDURE — 77030018315 HC SEAL FBRN TISSL10ML BAXT -F: Performed by: COLON & RECTAL SURGERY

## 2018-04-30 PROCEDURE — 76060000044 HC ANESTHESIA 6.5 TO 7 HR: Performed by: COLON & RECTAL SURGERY

## 2018-04-30 PROCEDURE — 77030034133 HC APPL ENDOSCP FLX SPRY BAXT -C: Performed by: COLON & RECTAL SURGERY

## 2018-04-30 PROCEDURE — 74011250636 HC RX REV CODE- 250/636

## 2018-04-30 PROCEDURE — 36415 COLL VENOUS BLD VENIPUNCTURE: CPT | Performed by: COLON & RECTAL SURGERY

## 2018-04-30 PROCEDURE — 77030002933 HC SUT MCRYL J&J -A: Performed by: COLON & RECTAL SURGERY

## 2018-04-30 PROCEDURE — 77030031139 HC SUT VCRL2 J&J -A: Performed by: COLON & RECTAL SURGERY

## 2018-04-30 PROCEDURE — 77030013079 HC BLNKT BAIR HGGR 3M -A

## 2018-04-30 PROCEDURE — 65270000032 HC RM SEMIPRIVATE

## 2018-04-30 PROCEDURE — 77030012893

## 2018-04-30 PROCEDURE — 74011250637 HC RX REV CODE- 250/637: Performed by: COLON & RECTAL SURGERY

## 2018-04-30 PROCEDURE — 0DBN4ZZ EXCISION OF SIGMOID COLON, PERCUTANEOUS ENDOSCOPIC APPROACH: ICD-10-PCS | Performed by: COLON & RECTAL SURGERY

## 2018-04-30 PROCEDURE — 77030005515 HC CATH URETH FOL14 BARD -B: Performed by: COLON & RECTAL SURGERY

## 2018-04-30 PROCEDURE — 0DTF4ZZ RESECTION OF RIGHT LARGE INTESTINE, PERCUTANEOUS ENDOSCOPIC APPROACH: ICD-10-PCS | Performed by: COLON & RECTAL SURGERY

## 2018-04-30 PROCEDURE — 77030020061 HC IV BLD WRMR ADMIN SET 3M -B: Performed by: NURSE ANESTHETIST, CERTIFIED REGISTERED

## 2018-04-30 PROCEDURE — 83690 ASSAY OF LIPASE: CPT | Performed by: COLON & RECTAL SURGERY

## 2018-04-30 PROCEDURE — 77030019927 HC TBNG IRR CYSTO BAXT -A: Performed by: COLON & RECTAL SURGERY

## 2018-04-30 PROCEDURE — 77030032523 HC RELD STPL PK ENDORS INTU -C: Performed by: COLON & RECTAL SURGERY

## 2018-04-30 PROCEDURE — 76210000003 HC OR PH I REC 3.5 TO 4 HR: Performed by: COLON & RECTAL SURGERY

## 2018-04-30 PROCEDURE — 85018 HEMOGLOBIN: CPT

## 2018-04-30 PROCEDURE — 74011000250 HC RX REV CODE- 250

## 2018-04-30 PROCEDURE — C1729 CATH, DRAINAGE: HCPCS | Performed by: COLON & RECTAL SURGERY

## 2018-04-30 PROCEDURE — 76010000885 HC OR TIME 6.5 TO 7HR INTENSV - TIER 2: Performed by: COLON & RECTAL SURGERY

## 2018-04-30 PROCEDURE — 77030020703 HC SEAL CANN DISP INTU -B: Performed by: COLON & RECTAL SURGERY

## 2018-04-30 PROCEDURE — 74011250636 HC RX REV CODE- 250/636: Performed by: COLON & RECTAL SURGERY

## 2018-04-30 PROCEDURE — 77030037241 HC PRT ACC BLDLSS AIRSEAL CNMD -B: Performed by: COLON & RECTAL SURGERY

## 2018-04-30 PROCEDURE — 77030013079 HC BLNKT BAIR HGGR 3M -A: Performed by: NURSE ANESTHETIST, CERTIFIED REGISTERED

## 2018-04-30 PROCEDURE — 88309 TISSUE EXAM BY PATHOLOGIST: CPT | Performed by: COLON & RECTAL SURGERY

## 2018-04-30 PROCEDURE — 77030008684 HC TU ET CUF COVD -B: Performed by: NURSE ANESTHETIST, CERTIFIED REGISTERED

## 2018-04-30 PROCEDURE — 82042 OTHER SOURCE ALBUMIN QUAN EA: CPT | Performed by: COLON & RECTAL SURGERY

## 2018-04-30 PROCEDURE — 77030022704 HC SUT VLOC COVD -B: Performed by: COLON & RECTAL SURGERY

## 2018-04-30 PROCEDURE — 83735 ASSAY OF MAGNESIUM: CPT | Performed by: COLON & RECTAL SURGERY

## 2018-04-30 PROCEDURE — 77030020782 HC GWN BAIR PAWS FLX 3M -B

## 2018-04-30 PROCEDURE — 77030018836 HC SOL IRR NACL ICUM -A: Performed by: COLON & RECTAL SURGERY

## 2018-04-30 PROCEDURE — 0DBW4ZX EXCISION OF PERITONEUM, PERCUTANEOUS ENDOSCOPIC APPROACH, DIAGNOSTIC: ICD-10-PCS | Performed by: COLON & RECTAL SURGERY

## 2018-04-30 PROCEDURE — 77030002916 HC SUT ETHLN J&J -A: Performed by: COLON & RECTAL SURGERY

## 2018-04-30 PROCEDURE — 80048 BASIC METABOLIC PNL TOTAL CA: CPT | Performed by: COLON & RECTAL SURGERY

## 2018-04-30 PROCEDURE — 77030034667 HC ACC PLATFRM ENDO GELPNT AMR -E: Performed by: COLON & RECTAL SURGERY

## 2018-04-30 PROCEDURE — 0DNU4ZZ RELEASE OMENTUM, PERCUTANEOUS ENDOSCOPIC APPROACH: ICD-10-PCS | Performed by: COLON & RECTAL SURGERY

## 2018-04-30 PROCEDURE — 77030032522 HC SHT STPL PK ENDOWR INTU -B: Performed by: COLON & RECTAL SURGERY

## 2018-04-30 PROCEDURE — 77030032490 HC SLV COMPR SCD KNE COVD -B: Performed by: COLON & RECTAL SURGERY

## 2018-04-30 PROCEDURE — 77030002966 HC SUT PDS J&J -A: Performed by: COLON & RECTAL SURGERY

## 2018-04-30 PROCEDURE — 0DTL4ZZ RESECTION OF TRANSVERSE COLON, PERCUTANEOUS ENDOSCOPIC APPROACH: ICD-10-PCS | Performed by: COLON & RECTAL SURGERY

## 2018-04-30 PROCEDURE — 77030035279 HC SEAL VSL ENDOWR XI INTU -I2: Performed by: COLON & RECTAL SURGERY

## 2018-04-30 PROCEDURE — 77030002996 HC SUT SLK J&J -A: Performed by: COLON & RECTAL SURGERY

## 2018-04-30 PROCEDURE — 77030019908 HC STETH ESOPH SIMS -A: Performed by: NURSE ANESTHETIST, CERTIFIED REGISTERED

## 2018-04-30 PROCEDURE — 77030039266 HC ADH SKN EXOFIN S2SG -A: Performed by: COLON & RECTAL SURGERY

## 2018-04-30 PROCEDURE — 77030018832 HC SOL IRR H20 ICUM -A: Performed by: COLON & RECTAL SURGERY

## 2018-04-30 PROCEDURE — 77030035278 HC STPLR SEAL ENDOWR INTU -B: Performed by: COLON & RECTAL SURGERY

## 2018-04-30 PROCEDURE — 77030031492 HC PRT ACC BLNT AIRSEAL CNMD -B: Performed by: COLON & RECTAL SURGERY

## 2018-04-30 PROCEDURE — 77030013567 HC DRN WND RESERV BARD -A: Performed by: COLON & RECTAL SURGERY

## 2018-04-30 PROCEDURE — 77030035048 HC TRCR ENDOSC OPTCL COVD -B: Performed by: COLON & RECTAL SURGERY

## 2018-04-30 PROCEDURE — 77030013533 HC SEAL FBRN TISSL RDYTUSE 10ML BAXT -E: Performed by: COLON & RECTAL SURGERY

## 2018-04-30 PROCEDURE — 0DNL4ZZ RELEASE TRANSVERSE COLON, PERCUTANEOUS ENDOSCOPIC APPROACH: ICD-10-PCS | Performed by: COLON & RECTAL SURGERY

## 2018-04-30 PROCEDURE — 77030008756 HC TU IRR SUC STRY -B: Performed by: COLON & RECTAL SURGERY

## 2018-04-30 PROCEDURE — 77030035489 HC REDUCR CANN ENDOWR INTU -C: Performed by: COLON & RECTAL SURGERY

## 2018-04-30 PROCEDURE — 84100 ASSAY OF PHOSPHORUS: CPT | Performed by: COLON & RECTAL SURGERY

## 2018-04-30 PROCEDURE — 0W9G4ZX DRAINAGE OF PERITONEAL CAVITY, PERCUTANEOUS ENDOSCOPIC APPROACH, DIAGNOSTIC: ICD-10-PCS | Performed by: COLON & RECTAL SURGERY

## 2018-04-30 PROCEDURE — 77030035277 HC OBTRTR BLDELSS DISP INTU -B: Performed by: COLON & RECTAL SURGERY

## 2018-04-30 PROCEDURE — 77030011640 HC PAD GRND REM COVD -A: Performed by: COLON & RECTAL SURGERY

## 2018-04-30 PROCEDURE — 88112 CYTOPATH CELL ENHANCE TECH: CPT | Performed by: COLON & RECTAL SURGERY

## 2018-04-30 PROCEDURE — 0DN84ZZ RELEASE SMALL INTESTINE, PERCUTANEOUS ENDOSCOPIC APPROACH: ICD-10-PCS | Performed by: COLON & RECTAL SURGERY

## 2018-04-30 PROCEDURE — 74011000258 HC RX REV CODE- 258: Performed by: COLON & RECTAL SURGERY

## 2018-04-30 PROCEDURE — 77030016151 HC PROTCTR LNS DFOG COVD -B: Performed by: COLON & RECTAL SURGERY

## 2018-04-30 PROCEDURE — 85027 COMPLETE CBC AUTOMATED: CPT | Performed by: COLON & RECTAL SURGERY

## 2018-04-30 PROCEDURE — P9045 ALBUMIN (HUMAN), 5%, 250 ML: HCPCS

## 2018-04-30 PROCEDURE — 77030012407 HC DRN WND BARD -B: Performed by: COLON & RECTAL SURGERY

## 2018-04-30 PROCEDURE — 89050 BODY FLUID CELL COUNT: CPT | Performed by: COLON & RECTAL SURGERY

## 2018-04-30 PROCEDURE — 77030025171 HC FCPS ENDOSC DISP 2 J&J -B: Performed by: COLON & RECTAL SURGERY

## 2018-04-30 PROCEDURE — 88305 TISSUE EXAM BY PATHOLOGIST: CPT | Performed by: COLON & RECTAL SURGERY

## 2018-04-30 RX ORDER — NALOXONE HYDROCHLORIDE 0.4 MG/ML
0.4 INJECTION, SOLUTION INTRAMUSCULAR; INTRAVENOUS; SUBCUTANEOUS AS NEEDED
Status: DISCONTINUED | OUTPATIENT
Start: 2018-04-30 | End: 2018-05-08 | Stop reason: HOSPADM

## 2018-04-30 RX ORDER — UREA 10 %
1 LOTION (ML) TOPICAL
Status: DISCONTINUED | OUTPATIENT
Start: 2018-04-30 | End: 2018-04-30 | Stop reason: CLARIF

## 2018-04-30 RX ORDER — SODIUM CHLORIDE, SODIUM LACTATE, POTASSIUM CHLORIDE, CALCIUM CHLORIDE 600; 310; 30; 20 MG/100ML; MG/100ML; MG/100ML; MG/100ML
75 INJECTION, SOLUTION INTRAVENOUS CONTINUOUS
Status: DISPENSED | OUTPATIENT
Start: 2018-04-30 | End: 2018-05-01

## 2018-04-30 RX ORDER — KETOROLAC TROMETHAMINE 30 MG/ML
15 INJECTION, SOLUTION INTRAMUSCULAR; INTRAVENOUS EVERY 6 HOURS
Status: COMPLETED | OUTPATIENT
Start: 2018-04-30 | End: 2018-05-01

## 2018-04-30 RX ORDER — ONDANSETRON 2 MG/ML
INJECTION INTRAMUSCULAR; INTRAVENOUS AS NEEDED
Status: DISCONTINUED | OUTPATIENT
Start: 2018-04-30 | End: 2018-04-30 | Stop reason: HOSPADM

## 2018-04-30 RX ORDER — FENTANYL CITRATE 50 UG/ML
25 INJECTION, SOLUTION INTRAMUSCULAR; INTRAVENOUS
Status: DISCONTINUED | OUTPATIENT
Start: 2018-04-30 | End: 2018-04-30 | Stop reason: HOSPADM

## 2018-04-30 RX ORDER — KETOROLAC TROMETHAMINE 30 MG/ML
INJECTION, SOLUTION INTRAMUSCULAR; INTRAVENOUS
Status: COMPLETED
Start: 2018-04-30 | End: 2018-04-30

## 2018-04-30 RX ORDER — ONDANSETRON 4 MG/1
4 TABLET, ORALLY DISINTEGRATING ORAL
Status: DISCONTINUED | OUTPATIENT
Start: 2018-04-30 | End: 2018-05-08 | Stop reason: HOSPADM

## 2018-04-30 RX ORDER — GABAPENTIN 300 MG/1
600 CAPSULE ORAL ONCE
Status: COMPLETED | OUTPATIENT
Start: 2018-04-30 | End: 2018-04-30

## 2018-04-30 RX ORDER — CELECOXIB 100 MG/1
100 CAPSULE ORAL EVERY 12 HOURS
Status: DISCONTINUED | OUTPATIENT
Start: 2018-05-02 | End: 2018-05-06

## 2018-04-30 RX ORDER — ALBUMIN HUMAN 50 G/1000ML
SOLUTION INTRAVENOUS AS NEEDED
Status: DISCONTINUED | OUTPATIENT
Start: 2018-04-30 | End: 2018-04-30 | Stop reason: HOSPADM

## 2018-04-30 RX ORDER — ENOXAPARIN SODIUM 100 MG/ML
40 INJECTION SUBCUTANEOUS EVERY 24 HOURS
Status: DISCONTINUED | OUTPATIENT
Start: 2018-05-01 | End: 2018-05-08 | Stop reason: HOSPADM

## 2018-04-30 RX ORDER — SODIUM CHLORIDE 0.9 % (FLUSH) 0.9 %
5-10 SYRINGE (ML) INJECTION AS NEEDED
Status: DISCONTINUED | OUTPATIENT
Start: 2018-04-30 | End: 2018-05-08 | Stop reason: HOSPADM

## 2018-04-30 RX ORDER — GLYCOPYRROLATE 0.2 MG/ML
INJECTION INTRAMUSCULAR; INTRAVENOUS AS NEEDED
Status: DISCONTINUED | OUTPATIENT
Start: 2018-04-30 | End: 2018-04-30 | Stop reason: HOSPADM

## 2018-04-30 RX ORDER — PROPOFOL 10 MG/ML
INJECTION, EMULSION INTRAVENOUS AS NEEDED
Status: DISCONTINUED | OUTPATIENT
Start: 2018-04-30 | End: 2018-04-30 | Stop reason: HOSPADM

## 2018-04-30 RX ORDER — LIDOCAINE HYDROCHLORIDE 20 MG/ML
INJECTION, SOLUTION EPIDURAL; INFILTRATION; INTRACAUDAL; PERINEURAL AS NEEDED
Status: DISCONTINUED | OUTPATIENT
Start: 2018-04-30 | End: 2018-04-30 | Stop reason: HOSPADM

## 2018-04-30 RX ORDER — SUCCINYLCHOLINE CHLORIDE 20 MG/ML
INJECTION INTRAMUSCULAR; INTRAVENOUS AS NEEDED
Status: DISCONTINUED | OUTPATIENT
Start: 2018-04-30 | End: 2018-04-30 | Stop reason: HOSPADM

## 2018-04-30 RX ORDER — OXYCODONE HYDROCHLORIDE 5 MG/1
5 TABLET ORAL
Status: DISCONTINUED | OUTPATIENT
Start: 2018-04-30 | End: 2018-05-08 | Stop reason: HOSPADM

## 2018-04-30 RX ORDER — SODIUM CHLORIDE, SODIUM LACTATE, POTASSIUM CHLORIDE, CALCIUM CHLORIDE 600; 310; 30; 20 MG/100ML; MG/100ML; MG/100ML; MG/100ML
75 INJECTION, SOLUTION INTRAVENOUS CONTINUOUS
Status: DISCONTINUED | OUTPATIENT
Start: 2018-04-30 | End: 2018-04-30 | Stop reason: HOSPADM

## 2018-04-30 RX ORDER — LANOLIN ALCOHOL/MO/W.PET/CERES
3 CREAM (GRAM) TOPICAL
Status: DISCONTINUED | OUTPATIENT
Start: 2018-04-30 | End: 2018-05-08 | Stop reason: HOSPADM

## 2018-04-30 RX ORDER — FAMOTIDINE 20 MG/1
20 TABLET, FILM COATED ORAL 2 TIMES DAILY
Status: DISCONTINUED | OUTPATIENT
Start: 2018-04-30 | End: 2018-05-02

## 2018-04-30 RX ORDER — FENTANYL CITRATE 50 UG/ML
INJECTION, SOLUTION INTRAMUSCULAR; INTRAVENOUS AS NEEDED
Status: DISCONTINUED | OUTPATIENT
Start: 2018-04-30 | End: 2018-04-30 | Stop reason: HOSPADM

## 2018-04-30 RX ORDER — ALVIMOPAN 12 MG/1
12 CAPSULE ORAL ONCE
Status: COMPLETED | OUTPATIENT
Start: 2018-04-30 | End: 2018-04-30

## 2018-04-30 RX ORDER — BUPIVACAINE HYDROCHLORIDE AND EPINEPHRINE 2.5; 5 MG/ML; UG/ML
INJECTION, SOLUTION EPIDURAL; INFILTRATION; INTRACAUDAL; PERINEURAL AS NEEDED
Status: DISCONTINUED | OUTPATIENT
Start: 2018-04-30 | End: 2018-04-30 | Stop reason: HOSPADM

## 2018-04-30 RX ORDER — ROCURONIUM BROMIDE 10 MG/ML
INJECTION, SOLUTION INTRAVENOUS AS NEEDED
Status: DISCONTINUED | OUTPATIENT
Start: 2018-04-30 | End: 2018-04-30 | Stop reason: HOSPADM

## 2018-04-30 RX ORDER — INDOCYANINE GREEN AND WATER 25 MG
KIT INJECTION AS NEEDED
Status: DISCONTINUED | OUTPATIENT
Start: 2018-04-30 | End: 2018-04-30 | Stop reason: HOSPADM

## 2018-04-30 RX ORDER — DEXMEDETOMIDINE HYDROCHLORIDE 4 UG/ML
INJECTION, SOLUTION INTRAVENOUS AS NEEDED
Status: DISCONTINUED | OUTPATIENT
Start: 2018-04-30 | End: 2018-04-30 | Stop reason: HOSPADM

## 2018-04-30 RX ORDER — DEXAMETHASONE SODIUM PHOSPHATE 4 MG/ML
INJECTION, SOLUTION INTRA-ARTICULAR; INTRALESIONAL; INTRAMUSCULAR; INTRAVENOUS; SOFT TISSUE AS NEEDED
Status: DISCONTINUED | OUTPATIENT
Start: 2018-04-30 | End: 2018-04-30 | Stop reason: HOSPADM

## 2018-04-30 RX ORDER — ONDANSETRON 2 MG/ML
4 INJECTION INTRAMUSCULAR; INTRAVENOUS AS NEEDED
Status: DISCONTINUED | OUTPATIENT
Start: 2018-04-30 | End: 2018-04-30 | Stop reason: HOSPADM

## 2018-04-30 RX ORDER — ACETAMINOPHEN 10 MG/ML
INJECTION, SOLUTION INTRAVENOUS
Status: COMPLETED
Start: 2018-04-30 | End: 2018-04-30

## 2018-04-30 RX ORDER — SODIUM CHLORIDE 0.9 % (FLUSH) 0.9 %
5-10 SYRINGE (ML) INJECTION EVERY 8 HOURS
Status: DISCONTINUED | OUTPATIENT
Start: 2018-04-30 | End: 2018-05-08 | Stop reason: HOSPADM

## 2018-04-30 RX ORDER — CELECOXIB 100 MG/1
100 CAPSULE ORAL 2 TIMES DAILY
Status: DISCONTINUED | OUTPATIENT
Start: 2018-05-01 | End: 2018-04-30 | Stop reason: SDUPTHER

## 2018-04-30 RX ORDER — KETAMINE HYDROCHLORIDE 10 MG/ML
INJECTION, SOLUTION INTRAMUSCULAR; INTRAVENOUS AS NEEDED
Status: DISCONTINUED | OUTPATIENT
Start: 2018-04-30 | End: 2018-04-30 | Stop reason: HOSPADM

## 2018-04-30 RX ORDER — PHENYLEPHRINE HCL IN 0.9% NACL 0.4MG/10ML
SYRINGE (ML) INTRAVENOUS AS NEEDED
Status: DISCONTINUED | OUTPATIENT
Start: 2018-04-30 | End: 2018-04-30 | Stop reason: HOSPADM

## 2018-04-30 RX ORDER — LIDOCAINE HYDROCHLORIDE ANHYDROUS AND DEXTROSE MONOHYDRATE .8; 5 G/100ML; G/100ML
1 INJECTION, SOLUTION INTRAVENOUS CONTINUOUS
Status: DISPENSED | OUTPATIENT
Start: 2018-04-30 | End: 2018-05-02

## 2018-04-30 RX ORDER — ACETAMINOPHEN 10 MG/ML
1000 INJECTION, SOLUTION INTRAVENOUS ONCE
Status: COMPLETED | OUTPATIENT
Start: 2018-04-30 | End: 2018-05-03

## 2018-04-30 RX ORDER — SODIUM CHLORIDE, SODIUM LACTATE, POTASSIUM CHLORIDE, CALCIUM CHLORIDE 600; 310; 30; 20 MG/100ML; MG/100ML; MG/100ML; MG/100ML
INJECTION, SOLUTION INTRAVENOUS
Status: DISCONTINUED | OUTPATIENT
Start: 2018-04-30 | End: 2018-04-30 | Stop reason: HOSPADM

## 2018-04-30 RX ORDER — LIDOCAINE HYDROCHLORIDE ANHYDROUS AND DEXTROSE MONOHYDRATE .8; 5 G/100ML; G/100ML
INJECTION, SOLUTION INTRAVENOUS
Status: DISCONTINUED | OUTPATIENT
Start: 2018-04-30 | End: 2018-04-30 | Stop reason: HOSPADM

## 2018-04-30 RX ORDER — ACETAMINOPHEN 500 MG
1000 TABLET ORAL EVERY 6 HOURS
Status: DISCONTINUED | OUTPATIENT
Start: 2018-04-30 | End: 2018-05-06

## 2018-04-30 RX ORDER — NEOSTIGMINE METHYLSULFATE 1 MG/ML
INJECTION INTRAVENOUS AS NEEDED
Status: DISCONTINUED | OUTPATIENT
Start: 2018-04-30 | End: 2018-04-30 | Stop reason: HOSPADM

## 2018-04-30 RX ORDER — MIDAZOLAM HYDROCHLORIDE 1 MG/ML
INJECTION, SOLUTION INTRAMUSCULAR; INTRAVENOUS AS NEEDED
Status: DISCONTINUED | OUTPATIENT
Start: 2018-04-30 | End: 2018-04-30 | Stop reason: HOSPADM

## 2018-04-30 RX ORDER — ALVIMOPAN 12 MG/1
12 CAPSULE ORAL 2 TIMES DAILY
Status: DISCONTINUED | OUTPATIENT
Start: 2018-05-01 | End: 2018-05-02

## 2018-04-30 RX ORDER — MAGNESIUM SULFATE HEPTAHYDRATE 40 MG/ML
INJECTION, SOLUTION INTRAVENOUS AS NEEDED
Status: DISCONTINUED | OUTPATIENT
Start: 2018-04-30 | End: 2018-04-30 | Stop reason: HOSPADM

## 2018-04-30 RX ORDER — MIDAZOLAM HYDROCHLORIDE 1 MG/ML
1 INJECTION, SOLUTION INTRAMUSCULAR; INTRAVENOUS AS NEEDED
Status: DISCONTINUED | OUTPATIENT
Start: 2018-04-30 | End: 2018-04-30 | Stop reason: HOSPADM

## 2018-04-30 RX ORDER — CELECOXIB 200 MG/1
200 CAPSULE ORAL ONCE
Status: COMPLETED | OUTPATIENT
Start: 2018-04-30 | End: 2018-04-30

## 2018-04-30 RX ORDER — ACETAMINOPHEN 500 MG
1000 TABLET ORAL ONCE
Status: COMPLETED | OUTPATIENT
Start: 2018-04-30 | End: 2018-04-30

## 2018-04-30 RX ADMIN — DEXMEDETOMIDINE HYDROCHLORIDE 5 MCG: 4 INJECTION, SOLUTION INTRAVENOUS at 13:20

## 2018-04-30 RX ADMIN — SODIUM CHLORIDE, SODIUM LACTATE, POTASSIUM CHLORIDE, AND CALCIUM CHLORIDE 75 ML/HR: 600; 310; 30; 20 INJECTION, SOLUTION INTRAVENOUS at 06:55

## 2018-04-30 RX ADMIN — PROPOFOL 30 MG: 10 INJECTION, EMULSION INTRAVENOUS at 13:11

## 2018-04-30 RX ADMIN — ACETAMINOPHEN 1000 MG: 10 INJECTION, SOLUTION INTRAVENOUS at 15:16

## 2018-04-30 RX ADMIN — NEOSTIGMINE METHYLSULFATE 4 MG: 1 INJECTION INTRAVENOUS at 13:43

## 2018-04-30 RX ADMIN — SODIUM CHLORIDE, SODIUM LACTATE, POTASSIUM CHLORIDE, CALCIUM CHLORIDE: 600; 310; 30; 20 INJECTION, SOLUTION INTRAVENOUS at 07:23

## 2018-04-30 RX ADMIN — CEFOTETAN DISODIUM 2 G: 2 INJECTION, POWDER, FOR SOLUTION INTRAMUSCULAR; INTRAVENOUS at 07:55

## 2018-04-30 RX ADMIN — Medication 10 ML: at 21:13

## 2018-04-30 RX ADMIN — ONDANSETRON 4 MG: 4 TABLET, ORALLY DISINTEGRATING ORAL at 19:27

## 2018-04-30 RX ADMIN — MIDAZOLAM HYDROCHLORIDE 2 MG: 1 INJECTION, SOLUTION INTRAMUSCULAR; INTRAVENOUS at 07:29

## 2018-04-30 RX ADMIN — SUCCINYLCHOLINE CHLORIDE 160 MG: 20 INJECTION INTRAMUSCULAR; INTRAVENOUS at 07:35

## 2018-04-30 RX ADMIN — LIDOCAINE HYDROCHLORIDE 80 MG: 20 INJECTION, SOLUTION EPIDURAL; INFILTRATION; INTRACAUDAL; PERINEURAL at 07:34

## 2018-04-30 RX ADMIN — INDOCYANINE GREEN AND WATER 7.5 MG: KIT at 12:10

## 2018-04-30 RX ADMIN — SODIUM CHLORIDE, SODIUM LACTATE, POTASSIUM CHLORIDE, AND CALCIUM CHLORIDE 75 ML/HR: 600; 310; 30; 20 INJECTION, SOLUTION INTRAVENOUS at 19:46

## 2018-04-30 RX ADMIN — ACETAMINOPHEN 1000 MG: 500 TABLET, FILM COATED ORAL at 06:25

## 2018-04-30 RX ADMIN — ACETAMINOPHEN 1000 MG: 500 TABLET, FILM COATED ORAL at 21:13

## 2018-04-30 RX ADMIN — CELECOXIB 200 MG: 200 CAPSULE ORAL at 06:22

## 2018-04-30 RX ADMIN — ROCURONIUM BROMIDE 10 MG: 10 INJECTION, SOLUTION INTRAVENOUS at 09:02

## 2018-04-30 RX ADMIN — ROCURONIUM BROMIDE 20 MG: 10 INJECTION, SOLUTION INTRAVENOUS at 11:30

## 2018-04-30 RX ADMIN — ALBUMIN HUMAN 250 ML: 50 SOLUTION INTRAVENOUS at 08:15

## 2018-04-30 RX ADMIN — ROCURONIUM BROMIDE 10 MG: 10 INJECTION, SOLUTION INTRAVENOUS at 08:15

## 2018-04-30 RX ADMIN — LIDOCAINE HYDROCHLORIDE ANHYDROUS AND DEXTROSE MONOHYDRATE 2 MG/KG/HR: .8; 5 INJECTION, SOLUTION INTRAVENOUS at 08:00

## 2018-04-30 RX ADMIN — GLYCOPYRROLATE 0.4 MG: 0.2 INJECTION INTRAMUSCULAR; INTRAVENOUS at 13:43

## 2018-04-30 RX ADMIN — MAGNESIUM SULFATE HEPTAHYDRATE 2 G: 40 INJECTION, SOLUTION INTRAVENOUS at 08:02

## 2018-04-30 RX ADMIN — ROCURONIUM BROMIDE 5 MG: 10 INJECTION, SOLUTION INTRAVENOUS at 07:34

## 2018-04-30 RX ADMIN — ENOXAPARIN SODIUM 40 MG: 40 INJECTION SUBCUTANEOUS at 23:32

## 2018-04-30 RX ADMIN — ROCURONIUM BROMIDE 10 MG: 10 INJECTION, SOLUTION INTRAVENOUS at 10:30

## 2018-04-30 RX ADMIN — ROCURONIUM BROMIDE 35 MG: 10 INJECTION, SOLUTION INTRAVENOUS at 07:45

## 2018-04-30 RX ADMIN — Medication 40 MCG: at 08:15

## 2018-04-30 RX ADMIN — CEFOTETAN DISODIUM 2 G: 2 INJECTION, POWDER, FOR SOLUTION INTRAMUSCULAR; INTRAVENOUS at 13:50

## 2018-04-30 RX ADMIN — DEXMEDETOMIDINE HYDROCHLORIDE 5 MCG: 4 INJECTION, SOLUTION INTRAVENOUS at 13:32

## 2018-04-30 RX ADMIN — ONDANSETRON 4 MG: 2 INJECTION INTRAMUSCULAR; INTRAVENOUS at 13:16

## 2018-04-30 RX ADMIN — PROPOFOL 150 MG: 10 INJECTION, EMULSION INTRAVENOUS at 07:34

## 2018-04-30 RX ADMIN — Medication 40 MCG: at 09:28

## 2018-04-30 RX ADMIN — ROCURONIUM BROMIDE 5 MG: 10 INJECTION, SOLUTION INTRAVENOUS at 13:11

## 2018-04-30 RX ADMIN — Medication 80 MCG: at 10:20

## 2018-04-30 RX ADMIN — OXYCODONE HYDROCHLORIDE 5 MG: 5 TABLET ORAL at 18:57

## 2018-04-30 RX ADMIN — DEXAMETHASONE SODIUM PHOSPHATE 8 MG: 4 INJECTION, SOLUTION INTRA-ARTICULAR; INTRALESIONAL; INTRAMUSCULAR; INTRAVENOUS; SOFT TISSUE at 07:35

## 2018-04-30 RX ADMIN — KETOROLAC TROMETHAMINE 15 MG: 30 INJECTION, SOLUTION INTRAMUSCULAR at 15:35

## 2018-04-30 RX ADMIN — SODIUM CHLORIDE, SODIUM LACTATE, POTASSIUM CHLORIDE, CALCIUM CHLORIDE: 600; 310; 30; 20 INJECTION, SOLUTION INTRAVENOUS at 13:34

## 2018-04-30 RX ADMIN — ALBUMIN HUMAN 250 ML: 50 SOLUTION INTRAVENOUS at 11:58

## 2018-04-30 RX ADMIN — ROCURONIUM BROMIDE 10 MG: 10 INJECTION, SOLUTION INTRAVENOUS at 09:06

## 2018-04-30 RX ADMIN — FAMOTIDINE 20 MG: 20 TABLET ORAL at 18:57

## 2018-04-30 RX ADMIN — Medication 3 MG: at 21:13

## 2018-04-30 RX ADMIN — Medication 40 MCG: at 09:33

## 2018-04-30 RX ADMIN — FENTANYL CITRATE 100 MCG: 50 INJECTION, SOLUTION INTRAMUSCULAR; INTRAVENOUS at 07:34

## 2018-04-30 RX ADMIN — KETAMINE HYDROCHLORIDE 30 MG: 10 INJECTION, SOLUTION INTRAMUSCULAR; INTRAVENOUS at 07:34

## 2018-04-30 RX ADMIN — DEXMEDETOMIDINE HYDROCHLORIDE 5 MCG: 4 INJECTION, SOLUTION INTRAVENOUS at 13:23

## 2018-04-30 RX ADMIN — GABAPENTIN 600 MG: 300 CAPSULE ORAL at 06:22

## 2018-04-30 RX ADMIN — KETOROLAC TROMETHAMINE 15 MG: 30 INJECTION, SOLUTION INTRAMUSCULAR at 21:13

## 2018-04-30 RX ADMIN — ALVIMOPAN 12 MG: 12 CAPSULE ORAL at 06:22

## 2018-04-30 RX ADMIN — Medication 80 MCG: at 08:05

## 2018-04-30 NOTE — PERIOP NOTES
TRANSFER - OUT REPORT:    Verbal report given to Zoie(name) on Obey Naik  being transferred to (unit) for routine post - op       Report consisted of patients Situation, Background, Assessment and   Recommendations(SBAR). Time Pre op antibiotic given:0755/1350  Anesthesia Stop time: 0394  Min Present on Transfer to floor:yes  Order for Min on Chart:yes  Discharge Prescriptions with Chart:no    Information from the following report(s) SBAR, OR Summary, Intake/Output and MAR was reviewed with the receiving nurse. Opportunity for questions and clarification was provided. Is the patient on 02? YES       L/Min 2        Is the patient on a monitor? NO    Is the nurse transporting with the patient? NO    Surgical Waiting Area notified of patient's transfer from PACU?  YES      The following personal items collected during your admission accompanied patient upon transfer:   Dental Appliance:    Vision:    Hearing Aid:    Jewelry:    Clothing: Clothing: Other (comment) (1 bag of clothes and back brace returned in pacu)  Other Valuables:    Valuables sent to safe:

## 2018-04-30 NOTE — ANESTHESIA POSTPROCEDURE EVALUATION
Post-Anesthesia Evaluation and Assessment    Patient: Carlos Shukla MRN: [id number]  SSN: [id number]    YOB: 1945  Age: 75 y.o. Sex: female       Cardiovascular Function/Vital Signs  Visit Vitals    /55    Pulse 70    Temp 36 °C (96.8 °F)    Resp 13    Ht 5' 3\" (1.6 m)    Wt 68.6 kg (151 lb 5 oz)    SpO2 92%    BMI 26.8 kg/m2       Patient is status post general anesthesia for Procedure(s):  ROBOTIC SUB TOTAL COLECTOMY  (E R A S); INTRAOPERATIVE COLONOSCOPY. Nausea/Vomiting: None    Postoperative hydration reviewed and adequate. Pain:  Pain Scale 1: Numeric (0 - 10) (04/30/18 0512)  Pain Intensity 1: 0 (04/30/18 9906)   Managed    Neurological Status:   Neuro (WDL): Within Defined Limits (04/30/18 7616)   At baseline    Mental Status and Level of Consciousness: Arousable    Pulmonary Status:   O2 Device: Nasal cannula (04/30/18 1409)   Adequate oxygenation and airway patent    Complications related to anesthesia: None    Post-anesthesia assessment completed.  No concerns    Signed By: Ac Arroyo DO     April 30, 2018

## 2018-04-30 NOTE — PROGRESS NOTES
04/30/18 1042   Family Communication   Family Update Message Surgeon working   Delivery Origin Nurse  (Stanford Heard)    Relationship to Patient Other relative  (\"Rody\")    Phone Number [phone]   Family/Significant Other Update Called

## 2018-04-30 NOTE — PERIOP NOTES
Patient: Sammi Blanchard MRN: [id number]  SSN: [id number]   YOB: 1945  Age: 75 y.o. Sex: female     Patient is status post Procedure(s):  ROBOTIC SUB TOTAL COLECTOMY  (E R A S); INTRAOPERATIVE COLONOSCOPY.     Surgeon(s) and Role:     * Ingrid Styles MD - Primary    Local/Dose/Irrigation:  30 mL 0.25% marcaine with epi                  Peripheral IV 04/30/18 Left Hand (Active)       Peripheral IV 04/30/18 Right Forearm (Active)          Osvaldo Drain #1 04/30/18 Left Abdomen (Active)   Site Assessment Clean, dry, & intact 4/30/2018  1:39 PM   Dressing Status Clean, dry, & intact 4/30/2018  1:39 PM      Airway - Endotracheal Tube 04/30/18 Oral (Active)                   Dressing/Packing:  Wound Abdomen-DRESSING TYPE: Topical skin adhesive/glue (04/30/18 4068)  Splint/Cast:  ]    Other:  Mechelle

## 2018-04-30 NOTE — ANESTHESIA PREPROCEDURE EVALUATION
Anesthetic History   No history of anesthetic complications            Review of Systems / Medical History  Patient summary reviewed, nursing notes reviewed and pertinent labs reviewed    Pulmonary  Within defined limits                 Neuro/Psych   Within defined limits           Cardiovascular  Within defined limits                Exercise tolerance: >4 METS     GI/Hepatic/Renal     GERD      PUD and liver disease     Endo/Other        Arthritis     Other Findings              Physical Exam    Airway  Mallampati: II  TM Distance: 4 - 6 cm  Neck ROM: normal range of motion   Mouth opening: Normal     Cardiovascular  Regular rate and rhythm,  S1 and S2 normal,  no murmur, click, rub, or gallop             Dental    Dentition: Caps/crowns     Pulmonary  Breath sounds clear to auscultation               Abdominal  GI exam deferred       Other Findings            Anesthetic Plan    ASA: 2  Anesthesia type: general          Induction: Intravenous  Anesthetic plan and risks discussed with: Patient

## 2018-04-30 NOTE — OP NOTES
[address] [facility]  OPERATIVE REPORT    Dennis Hunter  MR#: [id number]  : 1945  ACCOUNT #: [de-identified]   DATE OF SERVICE: 2018    PREOPERATIVE DIAGNOSIS:  Colon cancer and cecal polyp and sigmoid polyp. POSTOPERATIVE DIAGNOSIS:  Colon cancer and cecal polyp and sigmoid polyp. PROCEDURE PERFORMED:  Robotic subtotal colectomy, ileosigmoid anastomosis. SURGEON:  MD Matias Suarez     ANESTHESIA:  General plus 30 mL of 0.25% Marcaine with epinephrine. ESTIMATED BLOOD LOSS:  350 mL. SPECIMENS REMOVED:  Subtotal colectomy sent to Pathology, plus a couple of frozen sections from peritoneal implants. We also sent 1.5 liters of ascites for cytology as well as serology. DRAINS:  Round Osvaldo drain left in the pelvis. CONDITION:  The patient tolerated the procedure well, was transferred to the recovery room in stable condition. COMPLICATIONS:  None apparent     IMPLANTS:  none     HISTORY:  This is a 76-year-old female who had a gastric perforation. She was found to be constipated and obstructed with a colon mass about a month subsequent to that. She ended up getting a colonic stent and she was able to be staged and operated on electively. She was consented for a subtotal colectomy given the fact that she had a colonoscopy showing an irretrievable polyp in the cecum as well as 2 polyps distal to the descending colon mass. I tattooed below the distal colonic polyp and above the descending colon mass in anticipation of the operation. Risks, benefits, alternatives were discussed with her. I also had a very long conversation with both daughters regarding the risks and benefits. PROCEDURE IN DETAIL:  She was taken to the operating room and placed on the table in normal supine position. After smooth induction of anesthesia, the abdomen was prepped and draped in usual fashion. A timeout was performed.   I made incision in the right lower quadrant, a McBurney's incision. I was able to place a GelPort and a stable cannula. The abdomen was insufflated with CO2 to create pneumoperitoneum. I was able to place an 8 mm trocar in the umbilicus and two 8 mm trocars in left lateral position. An assistant port was placed in left lateral as well. A 5 mm assistant port was placed in the right lateral position. I placed the patient in reverse Trendelenburg position and then began lysing adhesions for a couple of hours. There were lots of adhesions from her previous surgeries. There were adhesions from the small bowel to the anterior abdominal wall, from the omentum to the stomach, the transverse colon was stuck up in the splenic flexure. They were all sorts of peritoneal implants that looked like carcinomatosis and I sent a couple of them for pathology. They did not show any malignancy, only granulomata. Upon entering the abdomen, first she had about a liter and a half of straw-colored ascites, which was sent for cytology as well as serology. I began mobilizing the colon by taking blue load across the sigmoid colon just distal to the distal tattoo. I then took the mesentery with the vessel sealer. I took the large vessels in the left colic with white load of the robotic stapler. The ileocolic artery was also taken with a white load of the robotic stapler. The duodenum was carefully avoided, as well as bilateral ureters. I had the whole colon mobilized and I transected the terminal ileum. I then brought the terminal ileum across the abdomen in proper orientation without any twist in the mesentery, and lined it up for an antiperistaltic side-to-side, functional end-to-end anastomosis using 2 fires of the blue load on the robotic stapler. The common enterotomy was closed using 2 layers with a single layer of 2-0 V-Loc in a full-thickness fashion, followed by a 2-0 inverting Lembert suture that was interrupted.   I sprayed aerosolized Tisseel. I leak tested it with the colonoscope. There was about 25 cm of colon and rectum left anastomosed to the terminal ileum. Irrigated the abdomen copiously with saline. She was fairly oozy, but no significant bleeding vessels were seen as I was cleaning up the abdominal cavity. I then removed the specimen through the right lower quadrant incision. The specimen was opened on the back table. The cecal polyp was marked with a suture as it was flat and carpeting and about 2-3 cm in size. Gowns and gloves were changed, it was now time to close. We closed the right lower quadrant incision using 3-0 Vicryl to close the peritoneum, #1 PDS to close the fascia. All incisions were closed after being irrigated with 4-0 Monocryl in subcuticular fashion. Dermabond was used as a sterile dressing. A drain was left in the pelvis, secured with a 2-0 nylon. Patient tolerated the procedure well, was transferred to the recovery room in stable condition.       MD Danae Antunez / LEIGH  D: 04/30/2018 14:31     T: 04/30/2018 18:30  JOB #: [id number]

## 2018-04-30 NOTE — IP AVS SNAPSHOT
[address] [facility] [address] 
[phone] Patient: Alverto Camarillo MRN: [id number] RQY:6/0/9460 A check halina indicates which time of day the medication should be taken. My Medications START taking these medications Instructions Each Dose to Equal  
 Morning Noon Evening Bedtime  
 oxyCODONE IR 5 mg immediate release tablet Commonly known as:  Vicente Laser Your last dose was: Your next dose is: Take 0.5 Tabs by mouth every eight (8) hours as needed. Max Daily Amount: 7.5 mg.  
 2.5 mg  
    
   
   
   
  
  
CONTINUE taking these medications Instructions Each Dose to Equal  
 Morning Noon Evening Bedtime  
 melatonin 1 mg tablet Your last dose was: Your next dose is: Take 1 mg by mouth nightly. 1 mg  
    
   
   
   
  
 multivitamin tablet Commonly known as:  ONE A DAY Your last dose was: Your next dose is: Take 1 Tab by mouth daily. 1 Tab PEPCID 20 mg tablet Generic drug:  famotidine Your last dose was: Your next dose is: Take 20 mg by mouth two (2) times a day. 20 mg  
    
   
   
   
  
 TYLENOL 325 mg tablet Generic drug:  acetaminophen Your last dose was: Your next dose is: Take 650 mg by mouth every six (6) hours as needed for Pain. 650 mg  
    
   
   
   
  
  
STOP taking these medications   
 omega-3 fatty acids-vitamin e 1,000 mg Cap  
   
  
 polyethylene glycol 17 gram packet Commonly known as:  Felix Joel Where to Get Your Medications Information on where to get these meds will be given to you by the nurse or doctor. ! Ask your nurse or doctor about these medications  
  oxyCODONE IR 5 mg immediate release tablet

## 2018-04-30 NOTE — PERIOP NOTES
Pt refuses to get up due to pain in incision. She states she wears a back brace due to resent HX of fracture.

## 2018-04-30 NOTE — BRIEF OP NOTE
BRIEF OPERATIVE NOTE    Date of Procedure: 4/30/2018   Preoperative Diagnosis: HISTORY OF COLON CANCER  Postoperative Diagnosis: HISTORY OF COLON CANCER    Procedure(s):  ROBOTIC SUB TOTAL COLECTOMY  (E R A S); INTRAOPERATIVE COLONOSCOPY  Surgeon(s) and Role:     * Hilary Park MD - Primary         Surgical Assistant: Estefania Dennis    Surgical Staff:  Circ-1: Akbar Grimm  Circ-2: Marti Pederson  Circ-Relief: Ebenezer Hurd RN  Scrub Tech-1: Evelia Kern  Surg Asst-1: Christiano Sacks  Surg Asst-Relief: Ezio Goodman  Event Time In   Incision Start 0815   Incision Close 1346     Anesthesia: General   Estimated Blood Loss: 350cc  Specimens:   ID Type Source Tests Collected by Time Destination   1 : ascites Fresh Ascitic Fluid  Hilary Park MD 4/30/2018 2785 Pathology   2 : peritoneal implant Frozen Section Intestine  Hilary Park MD 4/30/2018 1046 Pathology   3 : peritoneal implant Frozen Section Peritoneum  Hilary Park MD 4/30/2018 1126 Pathology   4 : subtotal colectomy Fresh Sigmoid  Hilary Park MD 4/30/2018 1156 Pathology   1 : ascites Fresh Abdominal Fluid  Hilary Park MD 4/30/2018 2806 Cytology      Findings: 1.5liters of ascites sent for pathology and serology, several nodules sent to pathology for frozen section, cecal polyp marked with a stitch, proximal sigmoid colon, large stented mass. Approximately 25cm of colon and rectum remains.   Extensive adhesions from her prior surgeries extended the operation by approximate 2 hours for lysing adhesions   Complications: none apparent  Implants: * No implants in log *

## 2018-04-30 NOTE — PROGRESS NOTES
04/30/18 1096   Family Communication   Family Update Message Procedure started   Delivery Origin Nurse  Mihir Michel)    Relationship to Patient Other (Comment)  (\"Rody\")    Phone Number ex [phone]   Family/Significant Other Update Other (Comment)  (left message with [phone] volunteer)

## 2018-04-30 NOTE — PROGRESS NOTES
04/30/18 1240   Family Communication   Family Update Message Surgeon working   Delivery Origin Nurse  (Grace Haider)    Relationship to Patient Daughter  (\"Rody\")    Phone Number ex [phone]   Family/Significant Other Update Called

## 2018-04-30 NOTE — H&P
Colon and Rectal Surgery History and Physical    Subjective:      Terry Echeverria is a 75 y.o. female who is scheduled for a sigmoid resection. A tumor was discovered on routine colonoscopy. 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    Leukocytosis 02/18/2018    Severe protein-calorie malnutrition ([address] 02/18/2018    Acute metabolic encephalopathy 83/06/2503    Hyperammonemia ([address] 02/16/2018    Free intraperitoneal air 02/12/2018    S/P exploratory laparotomy 67/97/3209    Alcoholic cirrhosis of liver without ascites ([address] 02/12/2018    Perforated chronic gastric ulcer ([address] 02/11/2018    ETOH abuse 02/11/2018    GI bleed 04/26/2016    Hypotension 04/26/2016    Tachycardia 04/26/2016    Acute blood loss anemia 04/26/2016     Past Medical History:   Diagnosis Date    Alcoholic cirrhosis of liver without ascites ([address] 2/12/2018    Arthritis     Cancer (HCC)     COLON    Chronic pain     BACK PAIN T12 FRACTURE    ETOH abuse 2/11/2018    GERD (gastroesophageal reflux disease)     History of vascular access device 02/16/2018    [facility] VAT - 5 FR triple, R basilic, TPN    Hyperammonemia ([address] 2/16/2018     Results for Desiree Monson (MRN [id number]) as of 2/17/2018 08:43  Ref.  Range 2/16/2018 17:20 Ammonia Latest Ref Range: <32 UMOL/L 69 (H)     Hyperlipidemia     Perforated chronic gastric ulcer ([address] 2/11/2018      Past Surgical History:   Procedure Laterality Date    ABDOMEN SURGERY PROC UNLISTED      COLONOSCOPY N/A 4/27/2018    COLONOSCOPY WITH SUBMUCOUSAL INJECTION OF MIKAELA INK  performed by Meri White MD at [facility] MAIN OR    HX CATARACT REMOVAL      HX GI      PERFORATED ULCER SURGERY      Social History Substance Use Topics    Smoking status: Former Smoker     Packs/day: 1.00     Years: 20.00     Quit date: 1985    Smokeless tobacco: Never Used    Alcohol use 11.4 oz/week     19 Glasses of wine, 0 Standard drinks or equivalent per week      Comment: QUIT 2/12/2018      Family History   Problem Relation Age of Onset    Other Other      patient did not know    Heart Disease Mother     Cancer Father      COLON    Anesth Problems Neg Hx       Prior to Admission medications    Medication Sig Start Date End Date Taking? Authorizing Provider   famotidine (PEPCID) 20 mg tablet Take 20 mg by mouth two (2) times a day. Yes Historical Provider   acetaminophen (TYLENOL) 325 mg tablet Take 650 mg by mouth every six (6) hours as needed for Pain. Yes Historical Provider   melatonin 1 mg tablet Take 1 mg by mouth nightly. Yes Historical Provider   polyethylene glycol (MIRALAX) 17 gram packet Take 1 Packet by mouth daily. 3/16/18  Yes Zeinab Huang MD   multivitamin (ONE A DAY) tablet Take 1 Tab by mouth daily. Yes Historical Provider   omega-3 fatty acids-vitamin e 1,000 mg cap Take 2 Caps by mouth daily. Yes Historical Provider     No Known Allergies     Review of Systems:    Pertinent items are noted in the History of Present Illness.     Objective:     Visit Vitals    /56 (BP 1 Location: Left arm, BP Patient Position: At rest)    Pulse 81    Temp 97.9 °F (36.6 °C)    Resp 17    Ht 5' 3\" (1.6 m)    Wt 68.6 kg (151 lb 5 oz)    SpO2 97%    BMI 26.8 kg/m2        Physical Exam:   Visit Vitals    /56 (BP 1 Location: Left arm, BP Patient Position: At rest)    Pulse 81    Temp 97.9 °F (36.6 °C)    Resp 17    Ht 5' 3\" (1.6 m)    Wt 68.6 kg (151 lb 5 oz)    SpO2 97%    BMI 26.8 kg/m2     General appearance: alert, cooperative, no distress, appears stated age  Lungs: clear to auscultation bilaterally  Heart: regular rate and rhythm, S1, S2 normal, no murmur, click, rub or gallop  Abdomen: soft, non-tender. Bowel sounds normal. No masses,  no organomegaly    Lab Review:  No results found for this or any previous visit (from the past 24 hour(s)). Assessment:     sigmoid colon cancer    Plan:     1. I recommend proceeding with sigmoid resection. Treatment alternatives were discussed. 2. Discussed aspects of surgical intervention, methods, risks (including by not limited to infection, bleeding, hematoma, and perforation of the intestines or solid organs), and the risks of general anesthetic. The patient understands the risks; any and all questions were answered to the patient's satisfaction.     Signed By: Marlene Ritchie PA-C     April 30, 2018

## 2018-04-30 NOTE — IP AVS SNAPSHOT
[address] 
[phone] Patient: Cata Olivier MRN: [id number] KXX:1[id number] About your hospitalization You were admitted on:  April 30, 2018 [facility]  [facility] [id number] You were discharged on: May 8, 2018 Why you were hospitalized Your primary diagnosis was:  Not on File Your diagnoses also included:  Colon Cancer (Hcc) Follow-up Information Follow up With Details Comments Contact Info Efrem Rob MD On 5/9/2018 post-op appointment on Wednesday May 9,2018 @ 10:45 a.m. [address] [facility] [address] [facility] [address] [phone] [address] A Call Home health physical therapy. Call agency office if you have not been contacted by a liaison within 24 hours of hospital discharge. [phone] [address] 
[phone] Discharge Orders None A check halina indicates which time of day the medication should be taken. My Medications START taking these medications Instructions Each Dose to Equal  
 Morning Noon Evening Bedtime  
 oxyCODONE IR 5 mg immediate release tablet Commonly known as:  Desiree Broomall Your last dose was: Your next dose is: Take 0.5 Tabs by mouth every eight (8) hours as needed. Max Daily Amount: 7.5 mg.  
 2.5 mg  
    
   
   
   
  
  
CONTINUE taking these medications Instructions Each Dose to Equal  
 Morning Noon Evening Bedtime  
 melatonin 1 mg tablet Your last dose was: Your next dose is: Take 1 mg by mouth nightly. 1 mg  
    
   
   
   
  
 multivitamin tablet Commonly known as:  ONE A DAY Your last dose was: Your next dose is: Take 1 Tab by mouth daily. 1 Tab PEPCID 20 mg tablet Generic drug:  famotidine Your last dose was: Your next dose is: Take 20 mg by mouth two (2) times a day. 20 mg  
    
   
   
   
  
 TYLENOL 325 mg tablet Generic drug:  acetaminophen Your last dose was: Your next dose is: Take 650 mg by mouth every six (6) hours as needed for Pain. 650 mg  
    
   
   
   
  
  
STOP taking these medications   
 omega-3 fatty acids-vitamin e 1,000 mg Cap  
   
  
 polyethylene glycol 17 gram packet Commonly known as:  Melanie Marcial Where to Get Your Medications Information on where to get these meds will be given to you by the nurse or doctor. ! Ask your nurse or doctor about these medications  
  oxyCODONE IR 5 mg immediate release tablet Opioid Education Prescription Opioids: What You Need to Know: 
 
 
 
Discharge Instructions for ERAS Colorectal Surgery Patients 1. Do not lift any objects weighing more than 10 pounds for 4 weeks. 2. Do not do any housework including vacuuming, scrubbing or yardwork for 4 weeks. 3. Do not drive for two weeks or while taking sedating medications. 4. You may walk as desired and go up and down stairs as needed. 5. You may shower. Do not take tub baths, swim or use hot tubs for 4 weeks. 6. Leave steri-strips on incision. They will fall off on their own. You may have dermabond on your incisions which is surgical glue. This will dissolve over time. Do not scrub around incisions. 7. Follow lowfiber diet for 2 weeks. (See handbook for additional details). 8. Drink nutritional supplements 2 times per day until your diet and appetite are back to normal. Diabetic patients should drink one-half bottle 4 times daily. 9. Multiple bowel movements are normal each day. Contact your surgeons office for any concerns. 10. Take Acetaminophen 1000mg every 6 hours for 24 hours, then as needed for pain and Oxycodone (per prescription instructions) 11. Follow up with providers as scheduled. 12. If your surgery involves an ostomy bag, please bring your supplies to the first office visit with your surgeon. 13. If you experience fever (greater than 100.5), chills, vomiting or redness or drainage at surgical site, please contact your surgeons office. 14. For questions regarding quality or quantity of ostomy output, refer to ERAS handbook or call surgeons office. Please see handbook for additional instructions. If you have further questions, please call your surgeons office. Introducing [facility]! Dear Cecilia Armenta: Thank you for requesting a [employer] account. Our records indicate that you already have an active [employer] account. You can access your account anytime at [email or web address] Did you know that you can access your hospital and ER discharge instructions at any time in [employer]? You can also review all of your test results from your hospital stay or ER visit. Additional Information If you have questions, please visit the Frequently Asked Questions section of the [employer] website at [email or web address] Remember, [employer] is NOT to be used for urgent needs. For medical emergencies, dial 911. Now available from your iPhone and Android! Introducing Julio Kim As a [employer] patient, I wanted to make you aware of our electronic visit tool called Julio Kim. iwoca allows you to connect within minutes with a medical provider 24 hours a day, seven days a week via a mobile device or tablet or logging into a secure website from your computer. You can access OneWire from anywhere in the [place]. A virtual visit might be right for you when you have a simple condition and feel like you just dont want to get out of bed, or cant get away from work for an appointment, when your regular [employer] provider is not available (evenings, weekends or holidays), or when youre out of town and need minor care. Electronic visits cost only $49 and if the [employer] provider determines a prescription is needed to treat your condition, one can be electronically transmitted to a nearby pharmacy*. Please take a moment to enroll today if you have not already done so. The enrollment process is free and takes just a few minutes. To enroll, please download the iwoca ananya to your tablet or phone, or visit [email or web address] to enroll on your computer. And, as an [address] [place] [address] patient with a [facility] account, the results of your visits will be scanned into your electronic medical record and your primary care provider will be able to view the scanned results. We urge you to continue to see your regular [employer] provider for your ongoing medical care. And while your primary care provider may not be the one available when you seek a OneWire virtual visit, the peace of mind you get from getting a real diagnosis real time can be priceless. For more information on OneWire, view our Frequently Asked Questions (FAQs) at [email or web address]. Sincerely, 
 
Lissa Brown MD 
Chief Medical Officer Mitch Benoit *:  certain medications cannot be prescribed via OneWire Providers Seen During Your Hospitalization Provider Specialty Primary office phone Cyndi Gregorio MD Colon and Rectal Surgery [phone] Your Primary Care Physician (PCP) Primary Care Physician Office Phone Office Fax [address] [facility],[address] [phone] You are allergic to the following No active allergies Recent Documentation Height Weight BMI OB Status Smoking Status 1.6 m 62.1 kg 24.23 kg/m2 Postmenopausal Former Smoker Emergency Contacts Name Discharge Info Relation Home Work Mobile [address] CAREGIVER [3] Daughter [21] [phone] JulioRhina DISCHARGE CAREGIVER [3] Daughter [21]   [phone] Patient Belongings The following personal items are in your possession at time of discharge: 
     Visual Aid: Glasses             Clothing: Other (comment) (1 bag of clothes and back brace returned in pacu) Please provide this summary of care documentation to your next provider. Signatures-by signing, you are acknowledging that this After Visit Summary has been reviewed with you and you have received a copy. Patient Signature:  ____________________________________________________________ Date:  ____________________________________________________________  
  
[facility] Signature:  ____________________________________________________________ Date:  ____________________________________________________________

## 2018-05-01 LAB
ANION GAP SERPL CALC-SCNC: 8 MMOL/L (ref 5–15)
BUN SERPL-MCNC: 8 MG/DL (ref 6–20)
BUN/CREAT SERPL: 11 (ref 12–20)
CALCIUM SERPL-MCNC: 8.2 MG/DL (ref 8.5–10.1)
CHLORIDE SERPL-SCNC: 108 MMOL/L (ref 97–108)
CO2 SERPL-SCNC: 24 MMOL/L (ref 21–32)
CREAT SERPL-MCNC: 0.75 MG/DL (ref 0.55–1.02)
ERYTHROCYTE [DISTWIDTH] IN BLOOD BY AUTOMATED COUNT: 14.9 % (ref 11.5–14.5)
GLUCOSE SERPL-MCNC: 160 MG/DL (ref 65–100)
HCT VFR BLD AUTO: 30.6 % (ref 35–47)
HGB BLD-MCNC: 10 G/DL (ref 11.5–16)
HGB BLD-MCNC: 9.3 G/DL (ref 11.5–16)
HGB BLD-MCNC: 9.8 G/DL (ref 11.5–16)
MAGNESIUM SERPL-MCNC: 1.7 MG/DL (ref 1.6–2.4)
MCH RBC QN AUTO: 29.6 PG (ref 26–34)
MCHC RBC AUTO-ENTMCNC: 30.4 G/DL (ref 30–36.5)
MCV RBC AUTO: 97.5 FL (ref 80–99)
NRBC # BLD: 0 K/UL (ref 0–0.01)
NRBC BLD-RTO: 0 PER 100 WBC
PHOSPHATE SERPL-MCNC: 3.3 MG/DL (ref 2.6–4.7)
PLATELET # BLD AUTO: 185 K/UL (ref 150–400)
PMV BLD AUTO: 11.6 FL (ref 8.9–12.9)
POTASSIUM SERPL-SCNC: 4 MMOL/L (ref 3.5–5.1)
RBC # BLD AUTO: 3.14 M/UL (ref 3.8–5.2)
SODIUM SERPL-SCNC: 140 MMOL/L (ref 136–145)
WBC # BLD AUTO: 7.9 K/UL (ref 3.6–11)

## 2018-05-01 PROCEDURE — 65270000032 HC RM SEMIPRIVATE

## 2018-05-01 PROCEDURE — 74011250637 HC RX REV CODE- 250/637: Performed by: NURSE PRACTITIONER

## 2018-05-01 PROCEDURE — 74011250636 HC RX REV CODE- 250/636: Performed by: COLON & RECTAL SURGERY

## 2018-05-01 PROCEDURE — 74011250637 HC RX REV CODE- 250/637: Performed by: COLON & RECTAL SURGERY

## 2018-05-01 PROCEDURE — G8979 MOBILITY GOAL STATUS: HCPCS

## 2018-05-01 PROCEDURE — 97116 GAIT TRAINING THERAPY: CPT

## 2018-05-01 PROCEDURE — 97161 PT EVAL LOW COMPLEX 20 MIN: CPT

## 2018-05-01 PROCEDURE — G8978 MOBILITY CURRENT STATUS: HCPCS

## 2018-05-01 RX ADMIN — OXYCODONE HYDROCHLORIDE 5 MG: 5 TABLET ORAL at 10:00

## 2018-05-01 RX ADMIN — KETOROLAC TROMETHAMINE 15 MG: 30 INJECTION, SOLUTION INTRAMUSCULAR at 09:25

## 2018-05-01 RX ADMIN — OXYCODONE HYDROCHLORIDE 5 MG: 5 TABLET ORAL at 06:55

## 2018-05-01 RX ADMIN — ALVIMOPAN 12 MG: 12 CAPSULE ORAL at 17:06

## 2018-05-01 RX ADMIN — Medication 10 ML: at 21:48

## 2018-05-01 RX ADMIN — Medication 3 MG: at 21:48

## 2018-05-01 RX ADMIN — ACETAMINOPHEN 1000 MG: 500 TABLET, FILM COATED ORAL at 03:19

## 2018-05-01 RX ADMIN — LACTULOSE 20 G: 20 SOLUTION ORAL at 17:06

## 2018-05-01 RX ADMIN — OXYCODONE HYDROCHLORIDE 5 MG: 5 TABLET ORAL at 14:17

## 2018-05-01 RX ADMIN — ACETAMINOPHEN 1000 MG: 500 TABLET, FILM COATED ORAL at 08:12

## 2018-05-01 RX ADMIN — LIDOCAINE HYDROCHLORIDE ANHYDROUS AND DEXTROSE MONOHYDRATE 1 MG/KG/HR: .8; 5 INJECTION, SOLUTION INTRAVENOUS at 08:05

## 2018-05-01 RX ADMIN — OXYCODONE HYDROCHLORIDE 5 MG: 5 TABLET ORAL at 17:55

## 2018-05-01 RX ADMIN — FAMOTIDINE 20 MG: 20 TABLET ORAL at 17:06

## 2018-05-01 RX ADMIN — KETOROLAC TROMETHAMINE 15 MG: 30 INJECTION, SOLUTION INTRAMUSCULAR at 03:57

## 2018-05-01 RX ADMIN — ACETAMINOPHEN 1000 MG: 500 TABLET, FILM COATED ORAL at 21:48

## 2018-05-01 RX ADMIN — FAMOTIDINE 20 MG: 20 TABLET ORAL at 08:12

## 2018-05-01 RX ADMIN — Medication 10 ML: at 13:06

## 2018-05-01 RX ADMIN — ALVIMOPAN 12 MG: 12 CAPSULE ORAL at 08:12

## 2018-05-01 NOTE — PROGRESS NOTES
Problem: Falls - Risk of  Goal: *Absence of Falls  Document Demarcus Fall Risk and appropriate interventions in the flowsheet. Outcome: Progressing Towards Goal  Fall Risk Interventions:  Mobility Interventions: Communicate number of staff needed for ambulation/transfer         Medication Interventions: Teach patient to arise slowly    Elimination Interventions: Patient to call for help with toileting needs             Problem: Patient Education: Go to Patient Education Activity  Goal: Patient/Family Education  Outcome: Progressing Towards Goal  Received patient from previous RN via bedside shift report. Patient is resting comfortably; A&Ox4. AVSS, NAD noted at this time. Sites to abd- CDI. Pt reports getting up to chair- well tolerated. IVF infusing per emar. Full assessment into epic. Patient updated on current POC- verbalized understanding. All safety precautions in place- safety maintained. Will continue to monitor.

## 2018-05-01 NOTE — PROGRESS NOTES
General Daily Progress Note    Admit Date: 4/30/2018  1 Day Post-Op   Subjective:     Patient is stable this morning. Pain controlled  No N/V  Not passing gas yet    Objective:   Patient Vitals for the past 24 hrs:   BP Temp Pulse Resp SpO2 Weight   05/01/18 0837 107/60 98 °F (36.7 °C) 75 20 100 % -   05/01/18 0652 - - - - - 66.5 kg (146 lb 9.6 oz)   05/01/18 0427 108/67 98.1 °F (36.7 °C) 76 18 98 % -   05/01/18 0014 118/65 98.1 °F (36.7 °C) 72 18 98 % -   04/30/18 2017 141/71 97.9 °F (36.6 °C) 95 18 100 % -   04/30/18 1846 133/75 98.7 °F (37.1 °C) 76 18 95 % -   04/30/18 1715 135/63 - 78 11 97 % -   04/30/18 1700 121/55 96.9 °F (36.1 °C) 75 23 97 % -   04/30/18 1645 121/57 - 80 22 98 % -   04/30/18 1630 113/54 - 67 14 98 % -   04/30/18 1615 113/50 - 69 15 96 % -   04/30/18 1600 107/53 - 64 18 97 % -   04/30/18 1545 106/51 - 62 15 98 % -   04/30/18 1530 105/48 - 64 15 98 % -   04/30/18 1515 103/52 - 62 12 97 % -   04/30/18 1500 95/53 - 60 15 96 % -   04/30/18 1445 91/43 - 66 12 92 % -   04/30/18 1430 95/50 - 66 14 92 % -   04/30/18 1422 - - 68 13 95 % -   04/30/18 1415 102/44 - 71 16 94 % -   04/30/18 1410 125/55 - 70 13 92 % -   04/30/18 1409 (!) 89/61 96.8 °F (36 °C) 69 13 91 % -   04/30/18 1405 (!) 89/61 - 71 14 95 % -   04/30/18 1404 (!) 84/40 - - - - -     Patient Vitals for the past 8 hrs:   BP Temp Pulse Resp SpO2 Weight   05/01/18 0837 107/60 98 °F (36.7 °C) 75 20 100 % -   05/01/18 0652 - - - - - 66.5 kg (146 lb 9.6 oz)   05/01/18 0427 108/67 98.1 °F (36.7 °C) 76 18 98 % -     05/01 0701 - 05/01 1900  In: 727.2 [I.V.:727.2]  Out: -   04/29 1901 - 05/01 0700  In: 7456 [P.O.:530;  I.V.:3010]  Out: 4055 [Urine:1920; Drains:535]    Physical Exam:   Alert, oriented  No distress  Abdomen soft and nontender  Incisions clean and dry          Data Review   Recent Results (from the past 24 hour(s))   ALBUMIN, FLUID    Collection Time: 04/30/18  9:45 AM   Result Value Ref Range    Fluid Type: ASCITIC     Albumin, body fld. 2.0 g/dL   CELL COUNT, BODY FLUID    Collection Time: 04/30/18  9:45 AM   Result Value Ref Range    BODY FLUID TYPE ASCITIC     FLUID COLOR DARK YELLOW      FLUID APPEARANCE CLOUDY      FLUID RBC CT. >100 (H) 0 /cu mm    FLUID NUCLEATED CELLS 659 /cu mm    FLD NEUTROPHILS 3 (A) NRRE %    FLD LYMPHS 43 (A) NRRE %    FLD MONO/MACROPHAGES 54 (A) NRRE %   LIPASE, FLUID    Collection Time: 04/30/18  9:45 AM   Result Value Ref Range    Fluid Type: ASCITIC     Lipase, fluid 33 U/L   CBC W/O DIFF    Collection Time: 04/30/18 11:34 PM   Result Value Ref Range    WBC 7.9 3.6 - 11.0 K/uL    RBC 3.14 (L) 3.80 - 5.20 M/uL    HGB 9.3 (L) 11.5 - 16.0 g/dL    HCT 30.6 (L) 35.0 - 47.0 %    MCV 97.5 80.0 - 99.0 FL    MCH 29.6 26.0 - 34.0 PG    MCHC 30.4 30.0 - 36.5 g/dL    RDW 14.9 (H) 11.5 - 14.5 %    PLATELET 816 603 - 323 K/uL    MPV 11.6 8.9 - 12.9 FL    NRBC 0.0 0  WBC    ABSOLUTE NRBC 0.00 0.00 - 5.94 K/uL   METABOLIC PANEL, BASIC    Collection Time: 04/30/18 11:34 PM   Result Value Ref Range    Sodium 140 136 - 145 mmol/L    Potassium 4.0 3.5 - 5.1 mmol/L    Chloride 108 97 - 108 mmol/L    CO2 24 21 - 32 mmol/L    Anion gap 8 5 - 15 mmol/L    Glucose 160 (H) 65 - 100 mg/dL    BUN 8 6 - 20 MG/DL    Creatinine 0.75 0.55 - 1.02 MG/DL    BUN/Creatinine ratio 11 (L) 12 - 20      GFR est AA >60 >60 ml/min/1.73m2    GFR est non-AA >60 >60 ml/min/1.73m2    Calcium 8.2 (L) 8.5 - 10.1 MG/DL   MAGNESIUM    Collection Time: 04/30/18 11:34 PM   Result Value Ref Range    Magnesium 1.7 1.6 - 2.4 mg/dL   PHOSPHORUS    Collection Time: 04/30/18 11:34 PM   Result Value Ref Range    Phosphorus 3.3 2.6 - 4.7 MG/DL         Assessment:     Active Problems:    Colon cancer (HCC) (4/30/2018)      1 Day Post-Op   Plan:     - adv diet  - OOB AT   - DVT prophylaxis -> lovenox  - D/C Min catheter  - await return of bowel function. - will get physical therapy to help with the patient's ambulation.    Patient has a history of a recent T12 burst fracture, unknown severity. Has a TLSO brace which she should wear when up in chair or OOB.       José Singletary PA-C

## 2018-05-01 NOTE — PROGRESS NOTES
Noted PT evaluation with recommendation for inpatient rehab. Patient insured by [facility] [employer], would require OT consult and insurance authorization for post-acute placement. CM s/w patient at bedside, reviewed PT recommendation. Patient states that she does not believe rehab necessary or useful to her at this time, patient states that her daughter and son-in-law will be staying with her to help and home and \"there's nothing they [rehab or HH] do for me that I can't do for myself. \" CM reiterated that PT recommendations based on perceived safety risks at home. Patient denied sharing these concerns. CM explained that if patient is at all willing to consider placement, process must be initiated immediately 2/2 insurance auth need. Patient remained adamant that has absolutely no interest in rehab and wants to go home. CM offered HH, patient states that [facility] was coming out up until 1 week PTA but patient did not find work useful. At this time, she does not wish to re-establish services. Patient declines placement, declines HH. Discharge home with family and outpatient follow-up. CM sent referral to [address] Specialist Ashley Brown for ERAS and PCP appts. No further CM needs anticipated at this time.     ASHWIN Wolfe

## 2018-05-01 NOTE — PROGRESS NOTES
Reason for Admission:  Scheduled surgery - robotic subtotal colectomy, ileosigmoid anastomosis (ERAS)               RRAT Score: 22                 Resources/supports as identified by patient/family: daughter staying with patient post-op for as long as patient requires. Top Challenges facing patient (as identified by patient/family and CM): Patient denies issues with any of the following. Finances/Medication cost?                    Transportation? Support system or lack thereof? Living arrangements? Self-care/ADLs/Cognition? Current Advanced Directive/Advance Care Plan:  AMD on file                          Plan for utilizing home health: No skilled needs identified at this time, patient denies perceived need for [facility]                        Likelihood of readmission: low                 Transition of Care Plan: home when cleared by surgery                 74 y/o female insured by [facility], admitted to [address] [place] [address] 4/30/18 for scheduled surgery (ERAS). CM s/w patient at bedside, patient A&Ox4, confirmed demographics on facesheet. Patient lives alone, independent with ADL/IADL's, has TLSO and no other DME. Patient's daughter Ovidio Wright will be staying in patient's home with patient to assist following [address] [place] [address] discharge, Ovidio Wright will provide transport to follow-up appts while she is here, patient otherwise drives self. Patient denies perceived need for additional home resources/supports following [address] [place] [address] discharge. Discharge date remains undetermined, awaiting return of bowel function and ambulation. Will need follow-up with both PCP and surgery post-discharge, CM specialist to arrange when discharge date set.     ASHWIN Cifuentes

## 2018-05-01 NOTE — PROGRESS NOTES
Post-op appointment  Scheduled with Dr. Taiwo Bloom on Wednesday May 9,2018 @ 10:45 a.m.     Added to AVS.    Kana Chi CM Specialist

## 2018-05-01 NOTE — ROUTINE PROCESS
Bedside and Verbal shift change report given to Sharyle Mood, RN (oncoming nurse) by Jenny Weaver RN (offgoing nurse). Report included the following information SBAR, Kardex, Intake/Output, MAR, Accordion and Recent Results.

## 2018-05-01 NOTE — CONSULTS
[facility]  [facility] [address] [place]  1400 [facility], [address]  [phone]                     GI CONSULTATION NOTE  Jose Ramon Guzman AGACNP-BC  Work Cell: [phone]      NAME:  Louis Mirza   :   2003   MRN:   [id number]       Referring Provider: Dr. Mandy Ovalle Date: 2018     Chief Complaint: Patient does not recall     History of Present Illness:  Patient is a 75 y. o. who is seen in consultation at the request of Dr. Delores Wagoner for ascites. Ms. Jovani Chaudhari has a PMH as detailed below. She was admitted for surgery and is s/p subtotal colectomy for colon cancer. She was initially admitted to [facility] after GLF at which time she had abdominal/back pain. Work-up revealed free air in the abdomen. She was taken to the OR and found to have perforated gastric ulcer which was repairs. She was also noted to have constipation and obstructing colonic mass which was stented. Post-operative course complicated by controlled fistula/abscess requiring drainage. She was seen previously by our partners for alcoholic cirrhosis and ascites. She has history of alcohol abuse (4-5 glasses of wine nightly) but states she has stopped this a couple of months ago. Liver biopsy performed and results with cirrhosis with mild chronic portal inflammation and macrovesicular steatosis (comments: likely alcohol related or steatohepatitis). Patient has history of perforated gastric ulcer in 2016. Recommended to have repeat EGD/Colonoscopy 6-8 weeks after procedure but per office records/notes patient declined. She had recent colonoscopy demonstrating multiple polyps and is s/p subtotal colectomy for colon cancer. Intra-operatively 1.5L of ascites was sent for pathology/cytology as there is concern for possible carcinomatosis.        PMH:  Past Medical History:   Diagnosis Date    Alcoholic cirrhosis of liver without ascites ([facility] [address] 2018    Arthritis     Cancer (HCC)     COLON    Chronic pain     BACK PAIN T12 FRACTURE    ETOH abuse 2/11/2018    GERD (gastroesophageal reflux disease)     History of vascular access device 02/16/2018    [facility] VAT - 5 FR triple, R basilic, TPN    Hyperammonemia ([facility] [address] 2/16/2018     Results for Inez Wolf (MRN [id number]) as of 2/17/2018 08:43  Ref. Range 2/16/2018 17:20 Ammonia Latest Ref Range: <32 UMOL/L 69 (H)     Hyperlipidemia     Perforated chronic gastric ulcer ([facility] [address] 2/11/2018       PSH:  Past Surgical History:   Procedure Laterality Date    ABDOMEN SURGERY PROC UNLISTED      COLONOSCOPY N/A 4/27/2018    COLONOSCOPY WITH SUBMUCOUSAL INJECTION OF MIKAELA INK  performed by Sirisha Le MD at [facility] MAIN OR    HX CATARACT REMOVAL      HX GI      PERFORATED ULCER SURGERY       Allergies:  No Known Allergies    Home Medications:  Prior to Admission Medications   Prescriptions Last Dose Informant Patient Reported? Taking?   acetaminophen (TYLENOL) 325 mg tablet 4/29/2018 at Unknown time  Yes Yes   Sig: Take 650 mg by mouth every six (6) hours as needed for Pain.   famotidine (PEPCID) 20 mg tablet 4/30/2018 at 0300  Yes Yes   Sig: Take 20 mg by mouth two (2) times a day. melatonin 1 mg tablet 4/23/2018 at Unknown time  Yes Yes   Sig: Take 1 mg by mouth nightly. multivitamin (ONE A DAY) tablet 4/23/2018 at Unknown time Self Yes Yes   Sig: Take 1 Tab by mouth daily. omega-3 fatty acids-vitamin e 1,000 mg cap 4/23/2018 at Unknown time Self Yes Yes   Sig: Take 2 Caps by mouth daily. polyethylene glycol (MIRALAX) 17 gram packet 4/29/2018 at Unknown time  No Yes   Sig: Take 1 Packet by mouth daily.       Facility-Administered Medications: None       Hospital Medications:  Current Facility-Administered Medications   Medication Dose Route Frequency    sodium chloride (NS) flush 5-10 mL  5-10 mL IntraVENous Q8H    sodium chloride (NS) flush 5-10 mL  5-10 mL IntraVENous PRN    famotidine (PEPCID) tablet 20 mg  20 mg Oral BID    lactated Ringers infusion  75 mL/hr IntraVENous CONTINUOUS    acetaminophen (TYLENOL) tablet 1,000 mg  1,000 mg Oral Q6H    alvimopan (ENTEREG) capsule 12 mg  12 mg Oral BID    naloxone (NARCAN) injection 0.4 mg  0.4 mg IntraVENous PRN    oxyCODONE IR (ROXICODONE) tablet 5 mg  5 mg Oral Q4H PRN    lidocaine 8 mg/mL (Xylocaine) infusion  1 mg/kg/hr (Ideal) IntraVENous CONTINUOUS    ondansetron (ZOFRAN ODT) tablet 4 mg  4 mg Oral Q4H PRN    enoxaparin (LOVENOX) injection 40 mg  40 mg SubCUTAneous Q24H    melatonin tablet 3 mg  3 mg Oral QHS    [START ON 5/2/2018] celecoxib (CELEBREX) capsule 100 mg  100 mg Oral Q12H       Social History:  Social History   Substance Use Topics    Smoking status: Former Smoker     Packs/day: 1.00     Years: 20.00     Quit date: 1985    Smokeless tobacco: Never Used    Alcohol use 11.4 oz/week     19 Glasses of wine, 0 Standard drinks or equivalent per week      Comment: QUIT 2/12/2018       Family History:  Family History   Problem Relation Age of Onset    Other Other      patient did not know    Heart Disease Mother     Cancer Father      COLON    Anesth Problems Neg Hx        Review of Systems:    Constitutional: negative fever, negative chills, negative weight loss  Eyes:   negative visual changes  ENT:   negative sore throat, tongue or lip swelling  Respiratory:  negative cough, negative dyspnea  Cards:  negative for chest pain, palpitations, lower extremity edema  GI:   See HPI  :  negative for frequency, dysuria  Integument:  negative for rash and pruritus  Heme:  negative for easy bruising and gum/nose bleeding  Musculoskel: negative for myalgias, back pain and muscle weakness  Neuro: negative for headaches, dizziness, vertigo  Psych:  negative for feelings of anxiety, depression      Objective:   Patient Vitals for the past 8 hrs:   BP Temp Pulse Resp SpO2 Weight   05/01/18 0837 107/60 98 °F (36.7 °C) 75 20 100 % -   05/01/18 0652 - - - - - 66.5 kg (146 lb 9.6 oz)   05/01/18 0427 108/67 98.1 °F (36.7 °C) 76 18 98 % -     05/01 0701 - 05/01 1900  In: 1207.2 [P.O.:480; I.V.:727.2]  Out: -   04/29 1901 - 05/01 0700  In: 9154 [P.O.:530; I.V.:3010]  Out: 4055 [IRXCJ:4002; Drains:535]      PHYSICAL EXAM:  General: WD, WN. Alert, cooperative, no acute distress.    HEENT: NC, Atraumatic. PERRLA, EOMI. Anicteric sclerae. Lungs:  CTA Bilaterally. No Wheezing/Rhonchi/Rales. Heart:  Regular rate and rhythm, No murmur, No Rubs, No Gallops  Abdomen: Soft, non-distended, mild diffuse tenderness, surgical sites present.  +Bowel sounds, no HSM  Extremities: No c/c/e  Neurologic:  Alert and oriented X 3. No acute neurological distress. Psych:   Not anxious nor agitated. Data Review     Recent Labs      04/30/18   2334   WBC  7.9   HGB  9.3*   HCT  30.6*   PLT  185     Recent Labs      04/30/18   2334   NA  140   K  4.0   CL  108   CO2  24   BUN  8   CREA  0.75   GLU  160*   PHOS  3.3   CA  8.2*     No results for input(s): SGOT, GPT, AP, TBIL, TP, ALB, GLOB, GGT, AML, LPSE in the last 72 hours. No lab exists for component: AMYP, HLPSE  No results for input(s): INR, PTP, APTT in the last 72 hours. No lab exists for component: INREXT     Imaging studies reviewed      Assessment:   1. Cirrhosis with ascites - likely secondary to alcohol vs BUSBY as evidenced on recent liver biopsy, 1.5L of ascites removed during subtotal colectomy, r/o malignancy. 2. Confusion - possibly related to the above   3. Colon mass - s/p subtotal colectomy  4. History of alcohol abuse  5. History of perforated ulcer - s/p repair, H. Pylori (+).      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 Pylori treatment when appropriate  -Discussed with Dr. Cherl Fleischer  -Will follow along with you  -Thank you kindly for allowing us to participate in the care of this patient    I have examined the patient. I have reviewed the chart and agree with the documentation recorded by the NP, including the assessment, treatment plan, and disposition. ASSESSMENT AND PLAN:  75 yr old lady has presented with confusion, Cirrhosis with ascites and status post subtotal colectomy for colon mass. Start lactulose. Serum ammonia level  If there is evidence of malignancy in lymph nodes or peritoneum, she will need oncology evaluation. We will follow with you. Thank you for consultation.     Silvano Hernandez MD

## 2018-05-02 LAB
AMMONIA PLAS-SCNC: 52 UMOL/L
ANION GAP SERPL CALC-SCNC: 8 MMOL/L (ref 5–15)
BASOPHILS # BLD: 0 K/UL (ref 0–0.1)
BASOPHILS NFR BLD: 0 % (ref 0–1)
BUN SERPL-MCNC: 19 MG/DL (ref 6–20)
BUN/CREAT SERPL: 20 (ref 12–20)
CALCIUM SERPL-MCNC: 8.5 MG/DL (ref 8.5–10.1)
CHLORIDE SERPL-SCNC: 109 MMOL/L (ref 97–108)
CO2 SERPL-SCNC: 21 MMOL/L (ref 21–32)
CREAT SERPL-MCNC: 0.93 MG/DL (ref 0.55–1.02)
DIFFERENTIAL METHOD BLD: ABNORMAL
EOSINOPHIL # BLD: 0.1 K/UL (ref 0–0.4)
EOSINOPHIL NFR BLD: 1 % (ref 0–7)
ERYTHROCYTE [DISTWIDTH] IN BLOOD BY AUTOMATED COUNT: 15.2 % (ref 11.5–14.5)
GLUCOSE SERPL-MCNC: 99 MG/DL (ref 65–100)
HCT VFR BLD AUTO: 30.2 % (ref 35–47)
HGB BLD-MCNC: 9.4 G/DL (ref 11.5–16)
IMM GRANULOCYTES # BLD: 0 K/UL (ref 0–0.04)
IMM GRANULOCYTES NFR BLD AUTO: 0 % (ref 0–0.5)
LYMPHOCYTES # BLD: 2.2 K/UL (ref 0.8–3.5)
LYMPHOCYTES NFR BLD: 22 % (ref 12–49)
MCH RBC QN AUTO: 29.7 PG (ref 26–34)
MCHC RBC AUTO-ENTMCNC: 31.1 G/DL (ref 30–36.5)
MCV RBC AUTO: 95.6 FL (ref 80–99)
MONOCYTES # BLD: 0.4 K/UL (ref 0–1)
MONOCYTES NFR BLD: 4 % (ref 5–13)
NEUTS SEG # BLD: 7.1 K/UL (ref 1.8–8)
NEUTS SEG NFR BLD: 72 % (ref 32–75)
NRBC # BLD: 0 K/UL (ref 0–0.01)
NRBC BLD-RTO: 0 PER 100 WBC
PLATELET # BLD AUTO: 190 K/UL (ref 150–400)
PMV BLD AUTO: 12.1 FL (ref 8.9–12.9)
POTASSIUM SERPL-SCNC: 3.7 MMOL/L (ref 3.5–5.1)
RBC # BLD AUTO: 3.16 M/UL (ref 3.8–5.2)
SODIUM SERPL-SCNC: 138 MMOL/L (ref 136–145)
WBC # BLD AUTO: 9.8 K/UL (ref 3.6–11)

## 2018-05-02 PROCEDURE — 97116 GAIT TRAINING THERAPY: CPT

## 2018-05-02 PROCEDURE — 74011250637 HC RX REV CODE- 250/637: Performed by: COLON & RECTAL SURGERY

## 2018-05-02 PROCEDURE — 85025 COMPLETE CBC W/AUTO DIFF WBC: CPT | Performed by: COLON & RECTAL SURGERY

## 2018-05-02 PROCEDURE — 74011250636 HC RX REV CODE- 250/636: Performed by: COLON & RECTAL SURGERY

## 2018-05-02 PROCEDURE — P9045 ALBUMIN (HUMAN), 5%, 250 ML: HCPCS | Performed by: COLON & RECTAL SURGERY

## 2018-05-02 PROCEDURE — 65270000032 HC RM SEMIPRIVATE

## 2018-05-02 PROCEDURE — 51798 US URINE CAPACITY MEASURE: CPT

## 2018-05-02 PROCEDURE — 82140 ASSAY OF AMMONIA: CPT | Performed by: NURSE PRACTITIONER

## 2018-05-02 PROCEDURE — 74011250637 HC RX REV CODE- 250/637: Performed by: NURSE PRACTITIONER

## 2018-05-02 PROCEDURE — 36415 COLL VENOUS BLD VENIPUNCTURE: CPT | Performed by: COLON & RECTAL SURGERY

## 2018-05-02 PROCEDURE — 80048 BASIC METABOLIC PNL TOTAL CA: CPT | Performed by: COLON & RECTAL SURGERY

## 2018-05-02 PROCEDURE — 97530 THERAPEUTIC ACTIVITIES: CPT

## 2018-05-02 RX ORDER — ALBUMIN HUMAN 50 G/1000ML
25 SOLUTION INTRAVENOUS ONCE
Status: COMPLETED | OUTPATIENT
Start: 2018-05-02 | End: 2018-05-03

## 2018-05-02 RX ORDER — FAMOTIDINE 20 MG/1
20 TABLET, FILM COATED ORAL DAILY
Status: DISCONTINUED | OUTPATIENT
Start: 2018-05-03 | End: 2018-05-03

## 2018-05-02 RX ADMIN — ACETAMINOPHEN 1000 MG: 500 TABLET, FILM COATED ORAL at 14:08

## 2018-05-02 RX ADMIN — ACETAMINOPHEN 1000 MG: 500 TABLET, FILM COATED ORAL at 08:25

## 2018-05-02 RX ADMIN — CELECOXIB 100 MG: 100 CAPSULE ORAL at 16:02

## 2018-05-02 RX ADMIN — ACETAMINOPHEN 1000 MG: 500 TABLET, FILM COATED ORAL at 03:14

## 2018-05-02 RX ADMIN — Medication 10 ML: at 13:38

## 2018-05-02 RX ADMIN — Medication 3 MG: at 21:01

## 2018-05-02 RX ADMIN — OXYCODONE HYDROCHLORIDE 5 MG: 5 TABLET ORAL at 08:55

## 2018-05-02 RX ADMIN — ACETAMINOPHEN 1000 MG: 500 TABLET, FILM COATED ORAL at 20:58

## 2018-05-02 RX ADMIN — OXYCODONE HYDROCHLORIDE 5 MG: 5 TABLET ORAL at 16:03

## 2018-05-02 RX ADMIN — OXYCODONE HYDROCHLORIDE 5 MG: 5 TABLET ORAL at 19:12

## 2018-05-02 RX ADMIN — Medication 10 ML: at 06:00

## 2018-05-02 RX ADMIN — ALBUMIN (HUMAN) 25 G: 12.5 INJECTION, SOLUTION INTRAVENOUS at 08:31

## 2018-05-02 RX ADMIN — Medication 10 ML: at 21:01

## 2018-05-02 RX ADMIN — CELECOXIB 100 MG: 100 CAPSULE ORAL at 05:01

## 2018-05-02 RX ADMIN — FAMOTIDINE 20 MG: 20 TABLET ORAL at 17:00

## 2018-05-02 RX ADMIN — RIFAXIMIN 550 MG: 550 TABLET ORAL at 17:03

## 2018-05-02 RX ADMIN — FAMOTIDINE 20 MG: 20 TABLET ORAL at 08:25

## 2018-05-02 RX ADMIN — ALBUMIN (HUMAN) 25 G: 12.5 INJECTION, SOLUTION INTRAVENOUS at 04:54

## 2018-05-02 RX ADMIN — ENOXAPARIN SODIUM 40 MG: 40 INJECTION SUBCUTANEOUS at 03:14

## 2018-05-02 NOTE — PROGRESS NOTES
General Daily Progress Note    Admit Date: 4/30/2018  2 Days Post-Op   Subjective:     Patient complains of some pain this am  appreciate GI input. Ammonia level ordered this morning. Patient continues to be somewhat confused. Blood pressure remains low. Patient received bolus of albumin overnight. Unable to get accurate urine output. Objective:   Patient Vitals for the past 24 hrs:   BP Temp Pulse Resp SpO2 Height Weight   05/02/18 0826 92/54 - - - - - -   05/02/18 0800 (!) 80/51 97.4 °F (36.3 °C) 78 18 91 % - -   05/02/18 0407 (!) 84/50 98.2 °F (36.8 °C) 77 18 92 % - -   05/02/18 0154 (!) 84/46 - 74 - - 5' 3\" (1.6 m) 65.8 kg (145 lb)   05/01/18 2235 (!) 81/55 98 °F (36.7 °C) 78 18 92 % - -   05/01/18 2040 (!) 87/48 - 88 20 94 % - -   05/01/18 2003 (!) 84/49 97.6 °F (36.4 °C) 83 18 90 % - -   05/01/18 1526 110/64 97.8 °F (36.6 °C) 80 16 94 % - -     Patient Vitals for the past 8 hrs:   BP Temp Pulse Resp SpO2 Height Weight   05/02/18 0826 92/54 - - - - - -   05/02/18 0800 (!) 80/51 97.4 °F (36.3 °C) 78 18 91 % - -   05/02/18 0407 (!) 84/50 98.2 °F (36.8 °C) 77 18 92 % - -   05/02/18 0154 (!) 84/46 - 74 - - 5' 3\" (1.6 m) 65.8 kg (145 lb)     05/02 0701 - 05/02 1900  In: 740 [P.O.:240; I.V.:500]  Out: 250 [Urine:100; Drains:150]  04/30 1901 - 05/02 0700  In: 3408.6 [P.O.:1940; I.V.:1468.6]  Out: 2065 [Urine:1125; Drains:940]    Physical Exam:   Patient lying in bed. Appears to be in mild to moderate discomfort. Tenderness near incision sites. No peritoneal signs.   Incisions clean and dry          Data Review   Recent Results (from the past 24 hour(s))   CBC WITH AUTOMATED DIFF    Collection Time: 05/02/18  5:01 AM   Result Value Ref Range    WBC 9.8 3.6 - 11.0 K/uL    RBC 3.16 (L) 3.80 - 5.20 M/uL    HGB 9.4 (L) 11.5 - 16.0 g/dL    HCT 30.2 (L) 35.0 - 47.0 %    MCV 95.6 80.0 - 99.0 FL    MCH 29.7 26.0 - 34.0 PG    MCHC 31.1 30.0 - 36.5 g/dL    RDW 15.2 (H) 11.5 - 14.5 %    PLATELET 139 400 - 267 K/uL MPV 12.1 8.9 - 12.9 FL    NRBC 0.0 0  WBC    ABSOLUTE NRBC 0.00 0.00 - 0.01 K/uL    NEUTROPHILS 72 32 - 75 %    LYMPHOCYTES 22 12 - 49 %    MONOCYTES 4 (L) 5 - 13 %    EOSINOPHILS 1 0 - 7 %    BASOPHILS 0 0 - 1 %    IMMATURE GRANULOCYTES 0 0.0 - 0.5 %    ABS. NEUTROPHILS 7.1 1.8 - 8.0 K/UL    ABS. LYMPHOCYTES 2.2 0.8 - 3.5 K/UL    ABS. MONOCYTES 0.4 0.0 - 1.0 K/UL    ABS. EOSINOPHILS 0.1 0.0 - 0.4 K/UL    ABS. BASOPHILS 0.0 0.0 - 0.1 K/UL    ABS. IMM. GRANS. 0.0 0.00 - 0.04 K/UL    DF AUTOMATED     METABOLIC PANEL, BASIC    Collection Time: 05/02/18  5:01 AM   Result Value Ref Range    Sodium 138 136 - 145 mmol/L    Potassium 3.7 3.5 - 5.1 mmol/L    Chloride 109 (H) 97 - 108 mmol/L    CO2 21 21 - 32 mmol/L    Anion gap 8 5 - 15 mmol/L    Glucose 99 65 - 100 mg/dL    BUN 19 6 - 20 MG/DL    Creatinine 0.93 0.55 - 1.02 MG/DL    BUN/Creatinine ratio 20 12 - 20      GFR est AA >60 >60 ml/min/1.73m2    GFR est non-AA 59 (L) >60 ml/min/1.73m2    Calcium 8.5 8.5 - 10.1 MG/DL         Assessment:     Active Problems:    Colon cancer (HCC) (4/30/2018)      2 Days Post-Op   Plan:     Advance diet. Will replace farmer to keep better eye on urine output. Monitor blood pressure. Will give another bolus this morning. D/c entereg. Awaiting ammonia level  Patient will have diarrhea due to colon resection. Is there something other than lactulose? Which can also cause diarrhea?     Marlene Ritchie PA-C

## 2018-05-02 NOTE — PROGRESS NOTES
Patient Vitals for the past 4 hrs:   Temp Pulse Resp BP SpO2   05/01/18 2040 - 88 20 (!) 87/48 94 %   05/01/18 2003 97.6 °F (36.4 °C) 83 18 (!) 84/49 90 %       Paged Dr. Trish Michael. I will stop lidocaine til morning and recheck vitals through out the night. Spoke with Dr. Adan Johnston this morning about patient's pressures over night. He wants to place a farmer for strict I&O's and will give another 500 ml bolus of albumin. Will pass on to day shift nurse. Bedside and Verbal shift change report given to Sonia Mckeon (oncoming nurse) by Yessica Gomez (offgoing nurse). Report included the following information SBAR, Kardex, Procedure Summary, Intake/Output, MAR, Accordion and Recent Results.

## 2018-05-02 NOTE — PROGRESS NOTES
Renal Dosing/Monitoring  Medication: Famotidine   Current regimen:  20 mg every 12 hr  Recent Labs      05/02/18   0501  04/30/18   2334   CREA  0.93  0.75   BUN  19  8     Estimated CrCl:  48.4 ml/min  Plan: Change to 20 mg every 24 hr for CrCl < 50 ml/min     Magui Mathews, PharmD

## 2018-05-02 NOTE — CDMP QUERY
Myra Pelayo,     Please clarify if this patient is (was) being treated/managed for:     => Unspecified severe protein-calorie malnutrition in the setting of unintentional weight loss requiring nutritional supplements and RD monitoring  => Other explanation of clinical findings  => Clinically Undetermined (no explanation for clinical findings)    The medical record reflects the following clinical findings, treatment, and risk factors. Risk Factors:  74 y/o pt  Clinical Indicators:  Per RD notes:severe wt loss of 17% x 2 months; loss of muscle/fat mass,  Unintended weight loss related to physical inactivity, inadequate oral intake as evidenced by extended hospitalization with TPN for gastric ulcer; severe wt loss. Patient meets criteria for Severe Acute Protein Calorie Malnutrition as evidenced by:   ASPEN Malnutrition Criteria  Acute Illness, Chronic Illness, or Social/Environmental: Acute illness  Weight Loss: Greater than 7.5% x 3 mos  Body Fat: Moderate  Muscle Mass: Moderate  ASPEN Malnutrition Score - Acute Illness: 18  Acute Illness - Malnutrition Diagnosis: Severe malnutrition. Treatment: nutritional supplements, RD monitoring, serial lab monitoring, daily weights    Please clarify and document your clinical opinion in the progress notes and discharge summary including the definitive and/or presumptive diagnosis, (suspected or probable), related to the above clinical findings. Please include clinical findings supporting your diagnosis.     Thank you,  Zahra Harman  [facility]  [phone]

## 2018-05-02 NOTE — PROGRESS NOTES
Problem: Patient Education: Go to Patient Education Activity  Goal: Patient/Family Education  Outcome: Progressing Towards Goal  Received patient from previous RN via bedside shift report. Patient is resting comfortably; A&Ox4. AVSS, NAD noted at this time. Min inserted this AM per orders. Pt up with assistance and back brace- fairly tolerated. Albumin given per emar. Full assessment into epic. Patient updated on current POC- verbalized understanding. All safety precautions in place- safety maintained. Will continue to monitor.

## 2018-05-02 NOTE — CDMP QUERY
Dr. Edna Diggs or Ben Craig, ELVIA~    Please clarify if this patient is (was) being treated/managed for:     => Acute hepatic encephalopathy in the setting of confusion requiring lactulose and monitoring  => Other explanation of clinical findings  => Clinically Undetermined (no explanation for clinical findings)    The medical record reflects the following clinical findings, treatment, and risk factors. Risk Factors:  76 y/o pt  Clinical Indicators:  confusion, elevated ammonia level  Treatment: monitoring, lactulose    Please clarify and document your clinical opinion in the progress notes and discharge summary including the definitive and/or presumptive diagnosis, (suspected or probable), related to the above clinical findings. Please include clinical findings supporting your diagnosis. .Thank you,  Alexi Hu  [facility]  [phone]

## 2018-05-02 NOTE — PROGRESS NOTES
[address]  [address]   [phone]                GI PROGRESS NOTE  EMILY Olmedo-BC  Work Cell: [phone]      NAME:   Michelle Terry   :    1945   MRN:    [id number]     Assessment/Plan   1. Cirrhosis with ascites - likely secondary to alcohol vs BUSBY as evidenced on recent liver biopsy, 1.5L of ascites removed during subtotal colectomy, r/o malignancy. - Supportive management per primary team  - Await pathology/cytology from ascitic fluid  - If evidence of malignancy, would need oncology evaluation  2. Confusion - possibly related hepatic encephalopathy as ammonia level is elevated   - Switch to Xifaxan as lactulose was causing diarrhea  3. Colon mass - s/p subtotal colectomy  - Colorectal surgery following   4. History of alcohol abuse  5. History of perforated ulcer - s/p repair, H. Pylori (+). - Will need H. 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 Still with some abdominal pain. Tolerating diet. Ammonia level pending.  Feels \"a little clearer this AM.\"       Review of Systems    Constitutional: negative fever, negative chills, negative weight loss  Eyes: negative visual changes  ENT:   negative sore throat, tongue or lip swelling  Respiratory:  negative cough, negative dyspnea  Cards:  negative for chest pain, palpitations, lower extremity edema  GI:   See HPI  :  negative for frequency, dysuria  Integument:  negative for rash and pruritus  Heme:  negative for easy bruising and gum/nose bleeding  Musculoskel: negative for myalgias, back pain and muscle weakness  Neuro: negative for headaches, dizziness, vertigo  Psych:  negative for feelings of anxiety, depression     Objective:     VITALS:   Last 24hrs VS reviewed since prior hospitalist progress note. Most recent are:  Visit Vitals    BP 92/54    Pulse 78    Temp 97.4 °F (36.3 °C)    Resp 18    Ht 5' 3\" (1.6 m)    Wt 65.8 kg (145 lb)    SpO2 91%    BMI 25.69 kg/m2       Intake/Output Summary (Last 24 hours) at 05/02/18 1106  Last data filed at 05/02/18 7763   Gross per 24 hour   Intake          2186.41 ml   Output              920 ml   Net          1266.41 ml        PHYSICAL EXAM:  General   well developed, alert, in no acute distress  EENT  Normocephalic, Atraumatic, PERRLA, EOMI, sclera clear  Respiratory   Clear To Auscultation bilaterally - no wheezes, rales, rhonchi, or crackles  Cardiology  Regular Rate and Rythmn  - no murmurs, rubs or gallops  Abdominal  Soft, non-distended, mild diffuse tenderness, surgical sites present, positive bowel sounds, no HSM, no mass  Extremities  No clubbing, cyanosis, or edema. Pulses intact. Neurological  No focal neurological deficits noted  Psychological  Oriented x 3. Normal affect.        Lab Data   Recent Results (from the past 12 hour(s))   CBC WITH AUTOMATED DIFF    Collection Time: 05/02/18  5:01 AM   Result Value Ref Range    WBC 9.8 3.6 - 11.0 K/uL    RBC 3.16 (L) 3.80 - 5.20 M/uL    HGB 9.4 (L) 11.5 - 16.0 g/dL    HCT 30.2 (L) 35.0 - 47.0 %    MCV 95.6 80.0 - 99.0 FL    MCH 29.7 26.0 - 34.0 PG    MCHC 31.1 30.0 - 36.5 g/dL    RDW 15.2 (H) 11.5 - 14.5 % PLATELET 884 655 - 952 K/uL    MPV 12.1 8.9 - 12.9 FL    NRBC 0.0 0  WBC    ABSOLUTE NRBC 0.00 0.00 - 0.01 K/uL    NEUTROPHILS 72 32 - 75 %    LYMPHOCYTES 22 12 - 49 %    MONOCYTES 4 (L) 5 - 13 %    EOSINOPHILS 1 0 - 7 %    BASOPHILS 0 0 - 1 %    IMMATURE GRANULOCYTES 0 0.0 - 0.5 %    ABS. NEUTROPHILS 7.1 1.8 - 8.0 K/UL    ABS. LYMPHOCYTES 2.2 0.8 - 3.5 K/UL    ABS. MONOCYTES 0.4 0.0 - 1.0 K/UL    ABS. EOSINOPHILS 0.1 0.0 - 0.4 K/UL    ABS. BASOPHILS 0.0 0.0 - 0.1 K/UL    ABS. IMM. GRANS. 0.0 0.00 - 0.04 K/UL    DF AUTOMATED     METABOLIC PANEL, BASIC    Collection Time: 05/02/18  5:01 AM   Result Value Ref Range    Sodium 138 136 - 145 mmol/L    Potassium 3.7 3.5 - 5.1 mmol/L    Chloride 109 (H) 97 - 108 mmol/L    CO2 21 21 - 32 mmol/L    Anion gap 8 5 - 15 mmol/L    Glucose 99 65 - 100 mg/dL    BUN 19 6 - 20 MG/DL    Creatinine 0.93 0.55 - 1.02 MG/DL    BUN/Creatinine ratio 20 12 - 20      GFR est AA >60 >60 ml/min/1.73m2    GFR est non-AA 59 (L) >60 ml/min/1.73m2    Calcium 8.5 8.5 - 10.1 MG/DL         Medications: Reviewed    PMH/ reviewed - no change compared to H&P  Mid-Level Provider: Kit Brizuela NP   Date/Time:  5/2/2018  I have examined the patient. I have reviewed the chart and agree with the documentation recorded by the NP, including the assessment, treatment plan, and disposition.       Monika Morrison MD

## 2018-05-02 NOTE — PROGRESS NOTES
Problem: Mobility Impaired (Adult and Pediatric)  Goal: *Acute Goals and Plan of Care (Insert Text)  Physical Therapy Goals  Initiated 5/1/2018  1. Patient will move from supine to sit and sit to supine , scoot up and down and roll side to side in bed with modified independence within 7 day(s). 2.  Patient will transfer from bed to chair and chair to bed with supervision/set-up using the least restrictive device within 7 day(s). 3.  Patient will perform sit to stand with supervision/set-up within 7 day(s). 4.  Patient will ambulate with supervision/set-up for 150 feet with the least restrictive device within 7 day(s). 5.  Patient will ascend/descend 13 stairs with 2 handrail(s) with minimal assistance/contact guard assist within 7 day(s). physical Therapy TREATMENT  Patient: Alverto Camarillo (22 y.o. female)  Date: 5/2/2018  Diagnosis: HISTORY OF COLON CANCER  Colon cancer ([facility] [address] <principal problem not specified>  Procedure(s) (LRB):  ROBOTIC SUB TOTAL COLECTOMY  (E R A S); INTRAOPERATIVE COLONOSCOPY (N/A) 2 Days Post-Op  Precautions: Fall  Chart, physical therapy assessment, plan of care and goals were reviewed. ASSESSMENT:  Pt was agreeable to gait. Pt utilized IV pole for gait. V.c for no A.D. Pt slightly unsteady with gait. Pt's daughter reports the discharge plan is to return home and have assistance from daughter. Pt could benefit from HHPT. Will attempt A.D. If needed to improve independence. Progression toward goals:  [x]    Improving appropriately and progressing toward goals  []    Improving slowly and progressing toward goals  []    Not making progress toward goals and plan of care will be adjusted     PLAN:  Patient continues to benefit from skilled intervention to address the above impairments. Continue treatment per established plan of care.   Discharge Recommendations:  HHPT with assistance from daughter  Further Equipment Recommendations for Discharge:  TBD cane vs RW     SUBJECTIVE: Patient stated I need to use the bathroom first. .    OBJECTIVE DATA SUMMARY:   Critical Behavior:  Neurologic State: Alert  Orientation Level: Oriented X4  Cognition: Appropriate decision making     Functional Mobility Training:  Bed Mobility:  Rolling: Minimum assistance  Supine to Sit: Minimum assistance              Transfers:  Sit to Stand: Contact guard assistance  Stand to Sit: Contact guard assistance                  CGA to ambulate the the bathroom pt able to provide self care. Balance:  Sitting: Intact  Standing: Impaired  Standing - Static: Good  Standing - Dynamic : Fair  Ambulation/Gait Training:  Distance (ft): 200 Feet (ft)  Assistive Device: Brace/Splint (TLSO, IV pole)  Ambulation - Level of Assistance: Contact guard assistance;Minimal assistance        Gait Abnormalities: Altered arm swing;Decreased step clearance; Path deviations        Base of Support: Widened     Speed/Alejandra: Pace decreased (<100 feet/min); Slow  Step Length: Left shortened;Right shortened                   Stairs:              Neuro Re-Education:    Therapeutic Exercises:     Pain:  Pain Scale 1: Numeric (0 - 10)  Pain Intensity 1: 6  Pain Location 1: Abdomen  Pain Orientation 1: Anterior  Pain Description 1: Aching  Pain Intervention(s) 1: Medication (see MAR)  Activity Tolerance:   Limited   Please refer to the flowsheet for vital signs taken during this treatment.   After treatment:   [x]    Patient left in no apparent distress sitting up in chair  []    Patient left in no apparent distress in bed  [x]    Call bell left within reach  [x]    Nursing notified  [x]    Caregiver present  []    Bed alarm activated    COMMUNICATION/COLLABORATION:   The patients plan of care was discussed with: Registered Nurse    Rudy Jaime PTA   Time Calculation: 28 mins

## 2018-05-02 NOTE — PROGRESS NOTES
NUTRITION COMPLETE ASSESSMENT    RECOMMENDATIONS:   1. Continue encourage PO intake with protein foods and Ensure  2. Add daily MVI, if not drinking Ensure shakes  3. Daily weights on standing scale with ascites noted     Interventions/Plan:   Food/Nutrient Delivery:    Commercial supplement (continue Ensure BID)          Assessment:   Reason for Assessment: [x]BPA/MST Referral (>33# wt loss)    Diet: Regular low fiber  Supplements: Ensure Enlive  Nutritionally Significant Medications: [x] Reviewed & Includes: entereg, cefotetan, pepcid, lactulose, lidocaine drip  Meal Intake:   Patient Vitals for the past 100 hrs:   % Diet Eaten   05/01/18 1829 100 %   05/01/18 1426 100 %   05/01/18 0925 100 %   04/30/18 1946 15 %     Pre-Hospitalization:  Usual Appetite: Good  Diet at Home: regular  Vitamins/Supplements: No    Current Hospitalization:   Appetite: Good  PO Ability: Independent Average po intake:%  Average supplements intake:        Subjective:  \"I have been doing pretty good. My daughter has been living with me since I was in the hospital to help with cleaning and cooking. .. I usually don't do snacks. \"    Objective:  Pt admitted for sigmoid resection. PMHx: colon CA, constipation, severe protein-calorie malnutrition, ETOH cirrhosis. S/p subtotal colectomy (25cm of colon and rectum left anastomosed to the terminal ileum). Sent 1.5 liters of ascites for cytology with concerns for malignancy noted. Seen by GI with ascites attributed to cirrhosis. Mild confusion noted with lactulose started. Severe wt loss of 17% x 2 months noted. Recent admit for gastric ulcer lasting ~1 month with TPN at that time. Also with removal of ascitic fluid (see above) and likely loss of muscle mass.    Wt Readings from Last 10 Encounters:   05/02/18 65.8 kg (145 lb)   04/27/18 68.4 kg (150 lb 12.7 oz)   04/25/18 67.1 kg (148 lb)   04/26/18 67.1 kg (148 lb)   03/05/18 81 kg (178 lb 9.2 oz)   04/29/16 76.3 kg (168 lb 3.2 oz) Patient meets criteria for Severe Acute Protein Calorie Malnutrition as evidenced by:   ASPEN Malnutrition Criteria  Acute Illness, Chronic Illness, or Social/Enviornmental: Acute illness  Weight Loss: Greater than 7.5% x 3 mos  Body Fat: Moderate  Muscle Mass: Moderate  ASPEN Malnutrition Score - Acute Illness: 18  Acute Illness - Malnutrition Diagnosis: Severe malnutrition. Pt visited today. Reports appetite good at home since recent hospitalization. Daughter helping with meals. Has not been drinking any Ensure at home but agreeable to trial, thinks she would prefer strawberry. Will continue Ensure BID as ordered with ERAS protocol for 700kcal, 40g protein. Plan to follow for PO intake, supplement acceptance, wt trends/fluid status, cytology results. Estimated Nutrition Needs:   Kcals/day: 8729 Kcals/day (1343-1455kcal)  Protein: 79 g (79-92g (1.2-1.4g/kg))  Fluid: 1645 ml (25ml/kg)  Based On: [place] Jeor (x 1.2-1.3)  Weight Used: Actual wt (65.8kg)    Pt expected to meet estimated nutrient needs:  []   Yes     []  No [x] Unable to predict at this time  Nutrition Diagnosis:   1. Malnutrition related to previous inadequate oral intake as evidenced by severe wt loss of 17% x 2 months; loss of muscle/fat mass    2.  Unintended weight loss related to physical inactivity, inadequate oral intake as evidenced by extended hospitalization with TPN for gastric ulcer; severe wt loss    Goals:     Continued consumption of at least 75% of meals and 1 supplement/day in 5-7 days; wt maintenance     Monitoring & Evaluation:    - Total energy intake, Protein intake   - Weight/weight change, GI     Previous Nutrition Goals Met:   N/A  Previous Recommendations:    N/A    Education & Discharge Needs:   [x] None Identified   [] Identified and addressed    [x] Participated in care plan, discharge planning, and/or interdisciplinary rounds        Cultural, Moravian and ethnic food preferences identified: None    Skin Integrity: [x]Intact  []Other  Edema: [x]None []Other  Last BM: 5/1 - loose with lactulose  Food Allergies: [x]None []Other  Diet Restrictions: Cultural/Congregational Preference(s): None      Anthropometrics:    Weight Loss Metrics 5/2/2018 4/30/2018 4/27/2018 4/27/2018 4/26/2018 4/25/2018 3/5/2018   Today's Wt 145 lb - 150 lb 12.7 oz - 148 lb 148 lb 178 lb 9.2 oz   BMI - 25.69 kg/m2 26.71 kg/m2 26.22 kg/m2 - 26.22 kg/m2 31.14 kg/m2      Weight Source: Standing scale (comment)  Height: 5' 3\" (160 cm),    Body mass index is 25.69 kg/(m^2).   IBW : 52.2 kg (115 lb), % IBW (Calculated): 126.09 %  Usual Body Weight: 80.7 kg (178 lb),      Labs:    Lab Results   Component Value Date/Time    Sodium 138 05/02/2018 05:01 AM    Potassium 3.7 05/02/2018 05:01 AM    Chloride 109 (H) 05/02/2018 05:01 AM    CO2 21 05/02/2018 05:01 AM    Glucose 99 05/02/2018 05:01 AM    BUN 19 05/02/2018 05:01 AM    Creatinine 0.93 05/02/2018 05:01 AM    Calcium 8.5 05/02/2018 05:01 AM    Magnesium 1.7 04/30/2018 11:34 PM    Phosphorus 3.3 04/30/2018 11:34 PM    Albumin 3.0 (L) 04/25/2018 03:21 PM     Lab Results   Component Value Date/Time    Hemoglobin A1c 4.8 04/25/2018 03:21 PM     Johana Pagan, [address] [place] [address] Pager #[phone] ql [phone]

## 2018-05-03 ENCOUNTER — APPOINTMENT (OUTPATIENT)
Dept: CT IMAGING | Age: 73
DRG: 329 | End: 2018-05-03
Attending: COLON & RECTAL SURGERY
Payer: MEDICARE

## 2018-05-03 LAB
ALBUMIN SERPL-MCNC: 2.7 G/DL (ref 3.5–5)
ALBUMIN/GLOB SERPL: 0.8 {RATIO} (ref 1.1–2.2)
ALP SERPL-CCNC: 68 U/L (ref 45–117)
ALT SERPL-CCNC: 14 U/L (ref 12–78)
ANION GAP SERPL CALC-SCNC: 9 MMOL/L (ref 5–15)
AST SERPL-CCNC: 15 U/L (ref 15–37)
BILIRUB SERPL-MCNC: 0.6 MG/DL (ref 0.2–1)
BUN SERPL-MCNC: 15 MG/DL (ref 6–20)
BUN/CREAT SERPL: 38 (ref 12–20)
CALCIUM SERPL-MCNC: 9 MG/DL (ref 8.5–10.1)
CHLORIDE SERPL-SCNC: 111 MMOL/L (ref 97–108)
CO2 SERPL-SCNC: 21 MMOL/L (ref 21–32)
CREAT SERPL-MCNC: 0.39 MG/DL (ref 0.55–1.02)
ERYTHROCYTE [DISTWIDTH] IN BLOOD BY AUTOMATED COUNT: 15.2 % (ref 11.5–14.5)
GLOBULIN SER CALC-MCNC: 3.3 G/DL (ref 2–4)
GLUCOSE SERPL-MCNC: 92 MG/DL (ref 65–100)
HCT VFR BLD AUTO: 31.2 % (ref 35–47)
HGB BLD-MCNC: 9.8 G/DL (ref 11.5–16)
MCH RBC QN AUTO: 30 PG (ref 26–34)
MCHC RBC AUTO-ENTMCNC: 31.4 G/DL (ref 30–36.5)
MCV RBC AUTO: 95.4 FL (ref 80–99)
NRBC # BLD: 0 K/UL (ref 0–0.01)
NRBC BLD-RTO: 0 PER 100 WBC
PLATELET # BLD AUTO: 196 K/UL (ref 150–400)
PMV BLD AUTO: 11.7 FL (ref 8.9–12.9)
POTASSIUM SERPL-SCNC: 3.8 MMOL/L (ref 3.5–5.1)
PROT SERPL-MCNC: 6 G/DL (ref 6.4–8.2)
RBC # BLD AUTO: 3.27 M/UL (ref 3.8–5.2)
SODIUM SERPL-SCNC: 141 MMOL/L (ref 136–145)
WBC # BLD AUTO: 8.4 K/UL (ref 3.6–11)

## 2018-05-03 PROCEDURE — 85027 COMPLETE CBC AUTOMATED: CPT | Performed by: COLON & RECTAL SURGERY

## 2018-05-03 PROCEDURE — 74011250637 HC RX REV CODE- 250/637: Performed by: COLON & RECTAL SURGERY

## 2018-05-03 PROCEDURE — 74011250637 HC RX REV CODE- 250/637: Performed by: NURSE PRACTITIONER

## 2018-05-03 PROCEDURE — 80053 COMPREHEN METABOLIC PANEL: CPT | Performed by: COLON & RECTAL SURGERY

## 2018-05-03 PROCEDURE — 74011250636 HC RX REV CODE- 250/636: Performed by: COLON & RECTAL SURGERY

## 2018-05-03 PROCEDURE — 74011000258 HC RX REV CODE- 258: Performed by: COLON & RECTAL SURGERY

## 2018-05-03 PROCEDURE — 77030036554

## 2018-05-03 PROCEDURE — 36415 COLL VENOUS BLD VENIPUNCTURE: CPT | Performed by: COLON & RECTAL SURGERY

## 2018-05-03 PROCEDURE — 97116 GAIT TRAINING THERAPY: CPT

## 2018-05-03 PROCEDURE — 74011636320 HC RX REV CODE- 636/320: Performed by: COLON & RECTAL SURGERY

## 2018-05-03 PROCEDURE — 74011000250 HC RX REV CODE- 250: Performed by: COLON & RECTAL SURGERY

## 2018-05-03 PROCEDURE — 74177 CT ABD & PELVIS W/CONTRAST: CPT

## 2018-05-03 PROCEDURE — 65270000032 HC RM SEMIPRIVATE

## 2018-05-03 PROCEDURE — 74011250637 HC RX REV CODE- 250/637: Performed by: PHYSICIAN ASSISTANT

## 2018-05-03 RX ORDER — FAMOTIDINE 20 MG/1
20 TABLET, FILM COATED ORAL 2 TIMES DAILY
Status: DISCONTINUED | OUTPATIENT
Start: 2018-05-03 | End: 2018-05-06

## 2018-05-03 RX ORDER — SODIUM CHLORIDE 0.9 % (FLUSH) 0.9 %
10 SYRINGE (ML) INJECTION
Status: COMPLETED | OUTPATIENT
Start: 2018-05-03 | End: 2018-05-03

## 2018-05-03 RX ORDER — CALCIUM CARBONATE 200(500)MG
200 TABLET,CHEWABLE ORAL
Status: DISCONTINUED | OUTPATIENT
Start: 2018-05-03 | End: 2018-05-08 | Stop reason: HOSPADM

## 2018-05-03 RX ORDER — ONDANSETRON 2 MG/ML
4 INJECTION INTRAMUSCULAR; INTRAVENOUS
Status: DISCONTINUED | OUTPATIENT
Start: 2018-05-03 | End: 2018-05-08 | Stop reason: HOSPADM

## 2018-05-03 RX ORDER — MORPHINE SULFATE 4 MG/ML
1-2 INJECTION, SOLUTION INTRAMUSCULAR; INTRAVENOUS
Status: DISCONTINUED | OUTPATIENT
Start: 2018-05-03 | End: 2018-05-04 | Stop reason: CLARIF

## 2018-05-03 RX ADMIN — ONDANSETRON 4 MG: 4 TABLET, ORALLY DISINTEGRATING ORAL at 18:48

## 2018-05-03 RX ADMIN — CELECOXIB 100 MG: 100 CAPSULE ORAL at 16:36

## 2018-05-03 RX ADMIN — OXYCODONE HYDROCHLORIDE 5 MG: 5 TABLET ORAL at 06:36

## 2018-05-03 RX ADMIN — OXYCODONE HYDROCHLORIDE 5 MG: 5 TABLET ORAL at 14:30

## 2018-05-03 RX ADMIN — ACETAMINOPHEN 1000 MG: 500 TABLET, FILM COATED ORAL at 03:57

## 2018-05-03 RX ADMIN — OXYCODONE HYDROCHLORIDE 5 MG: 5 TABLET ORAL at 11:02

## 2018-05-03 RX ADMIN — Medication 10 ML: at 21:28

## 2018-05-03 RX ADMIN — RIFAXIMIN 550 MG: 550 TABLET ORAL at 17:51

## 2018-05-03 RX ADMIN — ACETAMINOPHEN 1000 MG: 500 TABLET, FILM COATED ORAL at 09:19

## 2018-05-03 RX ADMIN — IOHEXOL 50 ML: 240 INJECTION, SOLUTION INTRATHECAL; INTRAVASCULAR; INTRAVENOUS; ORAL at 12:00

## 2018-05-03 RX ADMIN — ENOXAPARIN SODIUM 40 MG: 40 INJECTION SUBCUTANEOUS at 03:57

## 2018-05-03 RX ADMIN — FAMOTIDINE 20 MG: 20 TABLET ORAL at 17:51

## 2018-05-03 RX ADMIN — ONDANSETRON 4 MG: 4 TABLET, ORALLY DISINTEGRATING ORAL at 14:30

## 2018-05-03 RX ADMIN — FAMOTIDINE 20 MG: 20 TABLET ORAL at 09:19

## 2018-05-03 RX ADMIN — IOPAMIDOL 100 ML: 755 INJECTION, SOLUTION INTRAVENOUS at 12:00

## 2018-05-03 RX ADMIN — Medication 10 ML: at 16:56

## 2018-05-03 RX ADMIN — ACETAMINOPHEN 1000 MG: 500 TABLET, FILM COATED ORAL at 14:30

## 2018-05-03 RX ADMIN — CELECOXIB 100 MG: 100 CAPSULE ORAL at 05:45

## 2018-05-03 RX ADMIN — Medication 10 ML: at 05:47

## 2018-05-03 RX ADMIN — SODIUM CHLORIDE 5 MG: 9 INJECTION INTRAMUSCULAR; INTRAVENOUS; SUBCUTANEOUS at 21:26

## 2018-05-03 RX ADMIN — Medication 3 MG: at 21:36

## 2018-05-03 RX ADMIN — ACETAMINOPHEN 1000 MG: 500 TABLET, FILM COATED ORAL at 21:36

## 2018-05-03 RX ADMIN — CALCIUM CARBONATE (ANTACID) CHEW TAB 500 MG 200 MG: 500 CHEW TAB at 16:52

## 2018-05-03 RX ADMIN — ONDANSETRON 4 MG: 2 INJECTION INTRAMUSCULAR; INTRAVENOUS at 23:22

## 2018-05-03 RX ADMIN — OXYCODONE HYDROCHLORIDE 5 MG: 5 TABLET ORAL at 01:10

## 2018-05-03 RX ADMIN — SODIUM CHLORIDE 100 ML: 900 INJECTION, SOLUTION INTRAVENOUS at 12:00

## 2018-05-03 RX ADMIN — Medication 10 ML: at 12:00

## 2018-05-03 RX ADMIN — RIFAXIMIN 550 MG: 550 TABLET ORAL at 09:19

## 2018-05-03 RX ADMIN — OXYCODONE HYDROCHLORIDE 5 MG: 5 TABLET ORAL at 18:40

## 2018-05-03 NOTE — PROGRESS NOTES
Bedside and Verbal shift change report given to Jeet Street (oncoming nurse) by Luis Bruno (offgoing nurse). Report included the following information SBAR, Kardex, Procedure Summary, Intake/Output, MAR, Accordion and Recent Results.

## 2018-05-03 NOTE — PROGRESS NOTES
0915: Pt complaining of pain 7/10 to her lower and Right abdomen. States she feels a corset would help. Scheduled Tylenol given, Dr. Kristian Duvall paged at 0930 to question abdominal binder and possible CT scan today. Bedside shift change report given to [facility] (oncoming nurse) by Tom Gonzáles (offgoing nurse). Report included the following information SBAR, Kardex, Intake/Output, MAR and Recent Results.

## 2018-05-03 NOTE — PROGRESS NOTES
Problem: Mobility Impaired (Adult and Pediatric)  Goal: *Acute Goals and Plan of Care (Insert Text)  Physical Therapy Goals  Initiated 5/1/2018  1. Patient will move from supine to sit and sit to supine , scoot up and down and roll side to side in bed with modified independence within 7 day(s). 2.  Patient will transfer from bed to chair and chair to bed with supervision/set-up using the least restrictive device within 7 day(s). 3.  Patient will perform sit to stand with supervision/set-up within 7 day(s). 4.  Patient will ambulate with supervision/set-up for 150 feet with the least restrictive device within 7 day(s). 5.  Patient will ascend/descend 13 stairs with 2 handrail(s) with minimal assistance/contact guard assist within 7 day(s). physical Therapy TREATMENT  Patient: Raina Fowler (62 y.o. female)  Date: 5/3/2018  Diagnosis: HISTORY OF COLON CANCER  Colon cancer ([facility] [address] <principal problem not specified>  Procedure(s) (LRB):  ROBOTIC SUB TOTAL COLECTOMY  (E R A S); INTRAOPERATIVE COLONOSCOPY (N/A) 3 Days Post-Op  Precautions: Fall  Chart, physical therapy assessment, plan of care and goals were reviewed. ASSESSMENT:  Pt was agreeable to therapy but is reporting abdominal pain with bed mobility. Pt was able to ambulate with cane. Pt was able to mobilize at a CGA level. Pt had limited tolerance due to pain. Pt was able to tolerate sitting in the chair. Pt could benefit from HHPT with assistance at home. Daughter is planning on staying with pt at discharge. PTA pt was utilizing a TLSO due to a vertebral FX that occurred approximately 11 weeks ago. Per daughter pt was supposed to wear for 6-8 week and then follow up with ortho for possible  Kyphoplasty. Pt has unable to follow up.  Discussed with nurse to see if there can be a ortho consult placed  Progression toward goals:  [x]    Improving appropriately and progressing toward goals  []    Improving slowly and progressing toward goals  []    Not making progress toward goals and plan of care will be adjusted     PLAN:  Patient continues to benefit from skilled intervention to address the above impairments. Continue treatment per established plan of care. Discharge Recommendations:  Home Health  Further Equipment Recommendations for Discharge:  [address] SUBJECTIVE:   Patient stated I am hurting more.     OBJECTIVE DATA SUMMARY:   Critical Behavior:  Neurologic State: Alert  Orientation Level: Oriented X4  Cognition: Appropriate decision making     Functional Mobility Training:  Bed Mobility:  Rolling: Minimum assistance  Supine to Sit: Minimum assistance (Antalg[place])              Transfers:  Sit to Stand: Contact guard assistance  Stand to Sit: Contact guard assistance                             Balance:  Sitting: Intact  Standing: Impaired  Standing - Static: Good  Standing - Dynamic : Fair  Ambulation/Gait Training:  Distance (ft): 120 Feet (ft)  Assistive Device: Brace/Splint;Cane, straight  Ambulation - Level of Assistance: Contact guard assistance        Gait Abnormalities: Altered arm swing;Decreased step clearance; Path deviations        Base of Support: Widened     Speed/Alejandra: Pace decreased (<100 feet/min)  Step Length: Left shortened;Right shortened                   Stairs:              Neuro Re-Education:    Therapeutic Exercises:     Pain:  Pain Scale 1: Numeric (0 - 10)  Pain Intensity 1: 8  Pain Location 1: Abdomen  Pain Orientation 1: Right;Mid;Lower  Pain Description 1: Aching;Cramping; Sharp  Pain Intervention(s) 1: Medication (see MAR)  Activity Tolerance:   Limited   Please refer to the flowsheet for vital signs taken during this treatment.   After treatment:   [x]    Patient left in no apparent distress sitting up in chair  []    Patient left in no apparent distress in bed  [x]    Call bell left within reach  [x]    Nursing notified  [x]    Caregiver present  []    Bed alarm activated    COMMUNICATION/COLLABORATION:   The patients plan of care was discussed with: Registered Nurse    Lizeth German PTA   Time Calculation: 17 mins

## 2018-05-03 NOTE — PROGRESS NOTES
Spiritual Care Partner Volunteer visited patient in [address] [place] [address] on 5/3/18. Documented by:   Chaplain Whitaker MDiv, MACE  [phone] ([id number])

## 2018-05-03 NOTE — PROGRESS NOTES
Complaining of pain  Moving her bowels  Visit Vitals    BP 96/62 (BP 1 Location: Right arm, BP Patient Position: At rest)    Pulse 79    Temp 97.4 °F (36.3 °C)    Resp 20    Ht 5' 3\" (1.6 m)    Wt 64.3 kg (141 lb 12.8 oz)    SpO2 94%    BMI 25.12 kg/m2       Date 05/02/18 0700 - 05/03/18 0659 05/03/18 0700 - 05/04/18 0659   Shift 0700-1859 1900-0659 24 Hour Total 0700-1859 1900-0659 24 Hour Total   I  N  T  A  K  E   P.O. 240  240         P. O. 240  240       I.V.  (mL/kg/hr) 500  (0.6)  500  (0.3)         Volume (albumin human 5% (BUMINATE) solution 25 g) 500  500       Shift Total  (mL/kg) 740  (11.3)  740  (11.5)      O  U  T  P  U  T   Urine  (mL/kg/hr) 100  (0.1) 375  (0.5) 475  (0.3)         Urine Voided 100  100         Urine Occurrence(s) 1 x  1 x         Urine Output (mL) (Urinary Catheter 05/02/18 2- way)  375 375       Drains 425 430 855         Output (ml) ([place] Drain #1 04/30/18 Left Abdomen) 425 430 855       Stool            Stool Occurrence(s) 4 x  4 x       Shift Total  (mL/kg) 525  (8) 805  (12.5) 1330  (20.7)       -805 -590      Weight (kg) 65.8 64.3 64.3 64.3 64.3 64.3     abd softly distended  Tender    A/P check labs  If abnormal then ct scan to rule out bowel injury or leak

## 2018-05-03 NOTE — PROGRESS NOTES
Spoke with Dr. Leo Deleon office earlier today (patient's orthopaedic doctor. [facility]). Patient does not have to wear TLSO brace when out of bed if it is bothering her. CT scan reviewed with Dr. Liz Lucero. Small bowel distention. Ileus. No evidence of leak. Patient complaining of nausea and possible heart burn. TUMS ordered. Switch to clear liquid diet overnight.      Drea Rodriguez

## 2018-05-03 NOTE — PROGRESS NOTES
Pharmacy Renal Dosing Protocol  Medication: famotidine   Current regimen:  20 mg po every 24 hr  Recent Labs      05/03/18   0712  05/02/18   0501  04/30/18   2334   CREA  0.39*  0.93  0.75   BUN  15  19  8     Estimated CrCl:  >50 ml/min  Plan: Change to original order of 20 mg po every 12 hr for improvement in renal function

## 2018-05-03 NOTE — PROGRESS NOTES
[address]  [address]   [phone]                GI PROGRESS NOTE  [facility]iram Jaimes [facility]  Work Cell: [phone]      NAME:   Jorje Flood   :    1945   MRN:    [id number]     Assessment/Plan   1. Cirrhosis with ascites - likely secondary to alcohol vs BUSBY as evidenced on recent liver biopsy, 1.5L of ascites removed during subtotal colectomy, Peritoneal fluid and lymph nodes negative for malignancy but pericolonic tissue involved by tumor.   - Supportive management per primary team  2. Confusion - possibly related hepatic encephalopathy as ammonia level is elevated   - Continue Xifaxan   3. Colon mass - s/p subtotal colectomy, with worsening abdominal pain  - Colorectal surgery following   - CT abd/pelvis did not show anastomotic leak   4. History of alcohol abuse  5. History of perforated ulcer - s/p repair, H. Pylori (+). - Will need H. 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 Denies any fever, chills, nausea or vomiting. Still with loose stools.         Review of Systems    Constitutional: negative fever, negative chills, negative weight loss  Eyes:   negative visual changes  ENT:   negative sore throat, tongue or lip swelling  Respiratory:  negative cough, negative dyspnea  Cards:  negative for chest pain, palpitations, lower extremity edema  GI:   See HPI  :  negative for frequency, dysuria  Integument:  negative for rash and pruritus  Heme:  negative for easy bruising and gum/nose bleeding  Musculoskel: negative for myalgias, back pain and muscle weakness  Neuro: negative for headaches, dizziness, vertigo  Psych:  negative for feelings of anxiety, depression    Objective:     VITALS:   Last 24hrs VS reviewed since prior hospitalist progress note. Most recent are:  Visit Vitals    BP 94/64 (BP 1 Location: Right arm, BP Patient Position: At rest;Head of bed elevated (Comment degrees))    Pulse 84    Temp 97.5 °F (36.4 °C)    Resp 18    Ht 5' 3\" (1.6 m)    Wt 64.3 kg (141 lb 12.8 oz)    SpO2 95%    BMI 25.12 kg/m2       Intake/Output Summary (Last 24 hours) at 05/03/18 7984  Last data filed at 05/03/18 0684   Gross per 24 hour   Intake                0 ml   Output             1130 ml   Net            -1130 ml        PHYSICAL EXAM:  General   well developed, alert, in no acute distress  EENT  Normocephalic, Atraumatic, PERRLA, EOMI, sclera clear  Respiratory   Clear To Auscultation bilaterally - no wheezes, rales, rhonchi, or crackles  Cardiology  Regular Rate and Rythmn  - no murmurs, rubs or gallops  Abdominal  Soft, mildly distended, moderate b/l LQ tenderness with most pain elicited in RLQ, surgical sites present, hypoactive bowel sounds, no HSM, no mass  Extremities  No clubbing, cyanosis, or edema. Pulses intact. Neurological  No focal neurological deficits noted  Psychological  Oriented x 3. Normal affect.        Lab Data   Recent Results (from the past 12 hour(s))   CBC W/O DIFF    Collection Time: 05/03/18  7:12 AM   Result Value Ref Range    WBC 8.4 3.6 - 11.0 K/uL    RBC 3.27 (L) 3.80 - 5.20 M/uL    HGB 9.8 (L) 11.5 - 16.0 g/dL    HCT 31.2 (L) 35.0 - 47.0 % MCV 95.4 80.0 - 99.0 FL    MCH 30.0 26.0 - 34.0 PG    MCHC 31.4 30.0 - 36.5 g/dL    RDW 15.2 (H) 11.5 - 14.5 %    PLATELET 254 380 - 537 K/uL    MPV 11.7 8.9 - 12.9 FL    NRBC 0.0 0  WBC    ABSOLUTE NRBC 0.00 0.00 - 9.05 K/uL   METABOLIC PANEL, COMPREHENSIVE    Collection Time: 05/03/18  7:12 AM   Result Value Ref Range    Sodium 141 136 - 145 mmol/L    Potassium 3.8 3.5 - 5.1 mmol/L    Chloride 111 (H) 97 - 108 mmol/L    CO2 21 21 - 32 mmol/L    Anion gap 9 5 - 15 mmol/L    Glucose 92 65 - 100 mg/dL    BUN 15 6 - 20 MG/DL    Creatinine 0.39 (L) 0.55 - 1.02 MG/DL    BUN/Creatinine ratio 38 (H) 12 - 20      GFR est AA >60 >60 ml/min/1.73m2    GFR est non-AA >60 >60 ml/min/1.73m2    Calcium 9.0 8.5 - 10.1 MG/DL    Bilirubin, total 0.6 0.2 - 1.0 MG/DL    ALT (SGPT) 14 12 - 78 U/L    AST (SGOT) 15 15 - 37 U/L    Alk. phosphatase 68 45 - 117 U/L    Protein, total 6.0 (L) 6.4 - 8.2 g/dL    Albumin 2.7 (L) 3.5 - 5.0 g/dL    Globulin 3.3 2.0 - 4.0 g/dL    A-G Ratio 0.8 (L) 1.1 - 2.2           Medications: Reviewed    PMH/ reviewed - no change compared to H&P  Mid-Level Provider: Mariana Santamaria NP   Date/Time:  5/3/2018    I have examined the patient. I have reviewed the chart and agree with the documentation recorded by the NP, including the assessment, treatment plan, and disposition.       Luis Fernando Johnston MD

## 2018-05-04 LAB
ALBUMIN SERPL-MCNC: 3.2 G/DL (ref 3.5–5)
ALBUMIN/GLOB SERPL: 0.8 {RATIO} (ref 1.1–2.2)
ALP SERPL-CCNC: 87 U/L (ref 45–117)
ALT SERPL-CCNC: 16 U/L (ref 12–78)
ANION GAP SERPL CALC-SCNC: 9 MMOL/L (ref 5–15)
AST SERPL-CCNC: 15 U/L (ref 15–37)
BILIRUB SERPL-MCNC: 0.9 MG/DL (ref 0.2–1)
BUN SERPL-MCNC: 21 MG/DL (ref 6–20)
BUN/CREAT SERPL: 37 (ref 12–20)
CALCIUM SERPL-MCNC: 9.6 MG/DL (ref 8.5–10.1)
CHLORIDE SERPL-SCNC: 106 MMOL/L (ref 97–108)
CO2 SERPL-SCNC: 23 MMOL/L (ref 21–32)
CREAT SERPL-MCNC: 0.57 MG/DL (ref 0.55–1.02)
ERYTHROCYTE [DISTWIDTH] IN BLOOD BY AUTOMATED COUNT: 15.4 % (ref 11.5–14.5)
GLOBULIN SER CALC-MCNC: 3.9 G/DL (ref 2–4)
GLUCOSE SERPL-MCNC: 120 MG/DL (ref 65–100)
HCT VFR BLD AUTO: 34.7 % (ref 35–47)
HGB BLD-MCNC: 11.2 G/DL (ref 11.5–16)
MCH RBC QN AUTO: 30.4 PG (ref 26–34)
MCHC RBC AUTO-ENTMCNC: 32.3 G/DL (ref 30–36.5)
MCV RBC AUTO: 94.3 FL (ref 80–99)
NRBC # BLD: 0 K/UL (ref 0–0.01)
NRBC BLD-RTO: 0 PER 100 WBC
PLATELET # BLD AUTO: 303 K/UL (ref 150–400)
PMV BLD AUTO: 11.3 FL (ref 8.9–12.9)
POTASSIUM SERPL-SCNC: 4.3 MMOL/L (ref 3.5–5.1)
PROT SERPL-MCNC: 7.1 G/DL (ref 6.4–8.2)
RBC # BLD AUTO: 3.68 M/UL (ref 3.8–5.2)
SODIUM SERPL-SCNC: 138 MMOL/L (ref 136–145)
WBC # BLD AUTO: 9.3 K/UL (ref 3.6–11)

## 2018-05-04 PROCEDURE — 80053 COMPREHEN METABOLIC PANEL: CPT | Performed by: COLON & RECTAL SURGERY

## 2018-05-04 PROCEDURE — 36415 COLL VENOUS BLD VENIPUNCTURE: CPT | Performed by: COLON & RECTAL SURGERY

## 2018-05-04 PROCEDURE — 74011000250 HC RX REV CODE- 250: Performed by: COLON & RECTAL SURGERY

## 2018-05-04 PROCEDURE — 85027 COMPLETE CBC AUTOMATED: CPT | Performed by: COLON & RECTAL SURGERY

## 2018-05-04 PROCEDURE — 74011250637 HC RX REV CODE- 250/637: Performed by: NURSE PRACTITIONER

## 2018-05-04 PROCEDURE — 65270000032 HC RM SEMIPRIVATE

## 2018-05-04 PROCEDURE — 74011250636 HC RX REV CODE- 250/636: Performed by: PHYSICIAN ASSISTANT

## 2018-05-04 PROCEDURE — 74011250637 HC RX REV CODE- 250/637: Performed by: COLON & RECTAL SURGERY

## 2018-05-04 PROCEDURE — 74011250636 HC RX REV CODE- 250/636: Performed by: COLON & RECTAL SURGERY

## 2018-05-04 RX ORDER — MORPHINE SULFATE 4 MG/ML
1-2 INJECTION INTRAVENOUS
Status: DISCONTINUED | OUTPATIENT
Start: 2018-05-04 | End: 2018-05-08 | Stop reason: HOSPADM

## 2018-05-04 RX ORDER — SODIUM CHLORIDE, SODIUM LACTATE, POTASSIUM CHLORIDE, CALCIUM CHLORIDE 600; 310; 30; 20 MG/100ML; MG/100ML; MG/100ML; MG/100ML
100 INJECTION, SOLUTION INTRAVENOUS CONTINUOUS
Status: DISCONTINUED | OUTPATIENT
Start: 2018-05-04 | End: 2018-05-08 | Stop reason: HOSPADM

## 2018-05-04 RX ADMIN — ACETAMINOPHEN 1000 MG: 500 TABLET, FILM COATED ORAL at 09:37

## 2018-05-04 RX ADMIN — SODIUM CHLORIDE, SODIUM LACTATE, POTASSIUM CHLORIDE, AND CALCIUM CHLORIDE 100 ML/HR: 600; 310; 30; 20 INJECTION, SOLUTION INTRAVENOUS at 09:33

## 2018-05-04 RX ADMIN — MORPHINE SULFATE 1 MG: 4 INJECTION INTRAVENOUS at 11:17

## 2018-05-04 RX ADMIN — SODIUM CHLORIDE 5 MG: 9 INJECTION INTRAMUSCULAR; INTRAVENOUS; SUBCUTANEOUS at 20:23

## 2018-05-04 RX ADMIN — CELECOXIB 100 MG: 100 CAPSULE ORAL at 17:50

## 2018-05-04 RX ADMIN — MORPHINE SULFATE 1 MG: 4 INJECTION INTRAVENOUS at 15:04

## 2018-05-04 RX ADMIN — ACETAMINOPHEN 1000 MG: 500 TABLET, FILM COATED ORAL at 15:40

## 2018-05-04 RX ADMIN — SODIUM CHLORIDE 5 MG: 9 INJECTION INTRAMUSCULAR; INTRAVENOUS; SUBCUTANEOUS at 03:17

## 2018-05-04 RX ADMIN — ONDANSETRON 4 MG: 2 INJECTION INTRAMUSCULAR; INTRAVENOUS at 17:50

## 2018-05-04 RX ADMIN — SODIUM CHLORIDE, SODIUM LACTATE, POTASSIUM CHLORIDE, AND CALCIUM CHLORIDE 100 ML/HR: 600; 310; 30; 20 INJECTION, SOLUTION INTRAVENOUS at 20:31

## 2018-05-04 RX ADMIN — Medication 10 ML: at 15:04

## 2018-05-04 RX ADMIN — FAMOTIDINE 20 MG: 20 TABLET ORAL at 09:37

## 2018-05-04 RX ADMIN — ONDANSETRON 4 MG: 2 INJECTION INTRAMUSCULAR; INTRAVENOUS at 13:05

## 2018-05-04 RX ADMIN — ENOXAPARIN SODIUM 40 MG: 40 INJECTION SUBCUTANEOUS at 03:20

## 2018-05-04 RX ADMIN — ONDANSETRON 4 MG: 2 INJECTION INTRAMUSCULAR; INTRAVENOUS at 06:29

## 2018-05-04 RX ADMIN — MORPHINE SULFATE 1 MG: 4 INJECTION INTRAVENOUS at 03:18

## 2018-05-04 RX ADMIN — MORPHINE SULFATE 1 MG: 4 INJECTION INTRAVENOUS at 17:50

## 2018-05-04 RX ADMIN — RIFAXIMIN 550 MG: 550 TABLET ORAL at 10:07

## 2018-05-04 RX ADMIN — RIFAXIMIN 550 MG: 550 TABLET ORAL at 17:50

## 2018-05-04 RX ADMIN — Medication 10 ML: at 06:29

## 2018-05-04 RX ADMIN — FAMOTIDINE 20 MG: 20 TABLET ORAL at 17:50

## 2018-05-04 RX ADMIN — Medication 10 ML: at 21:44

## 2018-05-04 NOTE — PROGRESS NOTES
05/04/18 1324   Activity and Safety   Distance Ambulated (ft) 50   Time Ambulated (min) 5 minutes   Activity Response Fairly tolerated     Pt ambulated for 10 mins at 1830 as well. Bedside shift change report given to Jenny Weaver (oncoming nurse) by Sussy Silva (offgoing nurse). Report included the following information SBAR, Kardex, Intake/Output, MAR and Recent Results.

## 2018-05-04 NOTE — PROGRESS NOTES
PT Note:    Chart reviewed and attempted to see patient for PT treatment. Patient resting in bed, reports being up most of the night with nausea + vomiting. Encouraged repositioning in bed and ambulation, patient declining requesting to rest as she did not sleep much last night. Patient reports she has been up ambulating the hallways with nursing when feeling better and OOB in chair for meals-recommend continuing these tasks over the weekend. Will continue to follow to progress. Continue to recommend HHPT with family assistance at discharge.

## 2018-05-04 NOTE — PROGRESS NOTES
General Daily Progress Note    Admit Date: 4/30/2018  4 Days Post-Op   Subjective:     Nausea/vomiting overnight  Patient states that she feels better this morning. Admits to passing some gas. Objective:   Patient Vitals for the past 24 hrs:   BP Temp Pulse Resp SpO2 Weight   05/04/18 0750 98/60 97.9 °F (36.6 °C) (!) 105 20 92 % -   05/04/18 0719 - - - - - 65.1 kg (143 lb 8.3 oz)   05/04/18 0402 99/65 98.1 °F (36.7 °C) 100 18 94 % -   05/03/18 2343 107/74 97.4 °F (36.3 °C) 91 18 94 % -   05/03/18 2008 104/68 98.3 °F (36.8 °C) 93 18 95 % -   05/03/18 1705 96/68 97.4 °F (36.3 °C) 89 18 94 % -   05/03/18 1228 102/70 97.4 °F (36.3 °C) 92 18 97 % -     Patient Vitals for the past 8 hrs:   BP Temp Pulse Resp SpO2 Weight   05/04/18 0750 98/60 97.9 °F (36.6 °C) (!) 105 20 92 % -   05/04/18 0719 - - - - - 65.1 kg (143 lb 8.3 oz)   05/04/18 0402 99/65 98.1 °F (36.7 °C) 100 18 94 % -        05/02 1901 - 05/04 0700  In: -   Out: 1925 [Urine:825; Drains:900]    Physical Exam:   Patient sleeping in bed. Oriented. Easily arousable  Abdomen with expected tenderness on right side. Still with some distension but soft. Incisions clean and dry          Data Review No results found for this or any previous visit (from the past 24 hour(s)). Assessment:     Active Problems:    Colon cancer ([address] (4/30/2018)      4 Days Post-Op   Plan:     Remain on clear liquid diet  OOB AT  DVT prophylaxis -> lovenox  Will start IV fluids. Blood pressure in the 90s  Monitor vomiting and abdominal distention.       Lasha Ramirez PA-C

## 2018-05-04 NOTE — PROGRESS NOTES
[address]  [address]  [address] [place] [address]   [phone]                GI PROGRESS NOTE  Fady Israel, [facility]  Work Cell: [phone]      NAME:   Obey Naik   :    1945   MRN:    [id number]     Assessment/Plan   1. Cirrhosis with ascites - likely secondary to alcohol vs BUSBY as evidenced on recent liver biopsy, 1.5L of ascites removed during subtotal colectomy, Peritoneal fluid and lymph nodes negative for malignancy but pericolonic tissue involved by tumor. Recommend Oncology consult either inpatient or outpatient.  - Supportive management per primary team  2. Confusion - possibly related hepatic encephalopathy as ammonia level is elevated   - Continue Xifaxan   3. Colon mass - s/p subtotal colectomy, with persistent abd pain, nausea and vomiting. CT with SB distention consistent with ileus but no anastomotic leak. - Management per Colorectal surgery   4. History of alcohol abuse  5. History of perforated ulcer - s/p repair, H. Pylori (+). - Will need H. Pylori treatment when recovered from acute issues    Will see PRN. Needs outpatient follow-up with Dr. Enriquez Later upon discharge.       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 Continues to have moderate abdominal pain. Passing flatus. Had BM yesterday AM but none since. Review of Systems    Constitutional: negative fever, negative chills, negative weight loss  Eyes:   negative visual changes  ENT:   negative sore throat, tongue or lip swelling  Respiratory:  negative cough, negative dyspnea  Cards:  negative for chest pain, palpitations, lower extremity edema  GI:   See HPI  :  negative for frequency, dysuria  Integument:  negative for rash and pruritus  Heme:  negative for easy bruising and gum/nose bleeding  Musculoskel: negative for myalgias, back pain and muscle weakness  Neuro: negative for headaches, dizziness, vertigo  Psych:  negative for feelings of anxiety, depression     Objective:     VITALS:   Last 24hrs VS reviewed since prior hospitalist progress note. Most recent are:  Visit Vitals    BP 98/60 (BP 1 Location: Right arm, BP Patient Position: At rest;Head of bed elevated (Comment degrees))    Pulse (!) 105    Temp 97.9 °F (36.6 °C)    Resp 20    Ht 5' 3\" (1.6 m)    Wt 65.1 kg (143 lb 8.3 oz)    SpO2 92%    BMI 25.42 kg/m2       Intake/Output Summary (Last 24 hours) at 05/04/18 0946  Last data filed at 05/04/18 7801   Gross per 24 hour   Intake                0 ml   Output             1015 ml   Net            -1015 ml      PHYSICAL EXAM:  General   well developed, alert, in no acute distress  EENT  Normocephalic, Atraumatic, PERRLA, EOMI, sclera clear  Respiratory   Clear To Auscultation bilaterally - no wheezes, rales, rhonchi, or crackles  Cardiology  Regular Rate and Rythmn  - no murmurs, rubs or gallops  Abdominal  Soft, mildly distended, moderate b/l LQ tenderness with most pain elicited in RLQ, surgical sites present, hypoactive bowel sounds, no HSM, no mass  Extremities  No clubbing, cyanosis, or edema. Pulses intact. Neurological  No focal neurological deficits noted  Psychological  Oriented x 3.  Normal affect.        Lab Data No results found for this or any previous visit (from the past 12 hour(s)). Medications: Reviewed    PMH/SH reviewed - no change compared to H&P  Mid-Level Provider: Jag Gutierrez NP   Date/Time:  5/4/2018  I have examined the patient. I have reviewed the chart and agree with the documentation recorded by the NP, including the assessment, treatment plan, and disposition.       Millicent Jaramillo MD

## 2018-05-04 NOTE — PROGRESS NOTES
2100 spoke with Dr. Reinaldo Bautista regarding patients nausea and vomiting post zofran PO, ordered IV compazine and IV morphine for pain since pt cannot keep PO med down. Will continue to monitor.

## 2018-05-05 ENCOUNTER — APPOINTMENT (OUTPATIENT)
Dept: GENERAL RADIOLOGY | Age: 73
DRG: 329 | End: 2018-05-05
Attending: COLON & RECTAL SURGERY
Payer: MEDICARE

## 2018-05-05 LAB
ANION GAP SERPL CALC-SCNC: 8 MMOL/L (ref 5–15)
ATRIAL RATE: 89 BPM
BASOPHILS # BLD: 0 K/UL (ref 0–0.1)
BASOPHILS NFR BLD: 1 % (ref 0–1)
BUN SERPL-MCNC: 22 MG/DL (ref 6–20)
BUN/CREAT SERPL: 37 (ref 12–20)
CALCIUM SERPL-MCNC: 9.1 MG/DL (ref 8.5–10.1)
CALCULATED P AXIS, ECG09: 35 DEGREES
CALCULATED R AXIS, ECG10: 10 DEGREES
CALCULATED T AXIS, ECG11: 57 DEGREES
CHLORIDE SERPL-SCNC: 103 MMOL/L (ref 97–108)
CO2 SERPL-SCNC: 26 MMOL/L (ref 21–32)
CREAT SERPL-MCNC: 0.59 MG/DL (ref 0.55–1.02)
DIAGNOSIS, 93000: NORMAL
DIFFERENTIAL METHOD BLD: ABNORMAL
EOSINOPHIL # BLD: 0.2 K/UL (ref 0–0.4)
EOSINOPHIL NFR BLD: 2 % (ref 0–7)
ERYTHROCYTE [DISTWIDTH] IN BLOOD BY AUTOMATED COUNT: 15.6 % (ref 11.5–14.5)
GLUCOSE SERPL-MCNC: 111 MG/DL (ref 65–100)
HCT VFR BLD AUTO: 33.4 % (ref 35–47)
HGB BLD-MCNC: 10.5 G/DL (ref 11.5–16)
IMM GRANULOCYTES # BLD: 0 K/UL (ref 0–0.04)
IMM GRANULOCYTES NFR BLD AUTO: 0 % (ref 0–0.5)
LYMPHOCYTES # BLD: 1.5 K/UL (ref 0.8–3.5)
LYMPHOCYTES NFR BLD: 19 % (ref 12–49)
MAGNESIUM SERPL-MCNC: 1.7 MG/DL (ref 1.6–2.4)
MCH RBC QN AUTO: 29.7 PG (ref 26–34)
MCHC RBC AUTO-ENTMCNC: 31.4 G/DL (ref 30–36.5)
MCV RBC AUTO: 94.6 FL (ref 80–99)
MONOCYTES # BLD: 0.7 K/UL (ref 0–1)
MONOCYTES NFR BLD: 9 % (ref 5–13)
NEUTS SEG # BLD: 5.4 K/UL (ref 1.8–8)
NEUTS SEG NFR BLD: 69 % (ref 32–75)
NRBC # BLD: 0 K/UL (ref 0–0.01)
NRBC BLD-RTO: 0 PER 100 WBC
P-R INTERVAL, ECG05: 152 MS
PHOSPHATE SERPL-MCNC: 4 MG/DL (ref 2.6–4.7)
PLATELET # BLD AUTO: 301 K/UL (ref 150–400)
PMV BLD AUTO: 11.2 FL (ref 8.9–12.9)
POTASSIUM SERPL-SCNC: 4.1 MMOL/L (ref 3.5–5.1)
Q-T INTERVAL, ECG07: 402 MS
QRS DURATION, ECG06: 86 MS
QTC CALCULATION (BEZET), ECG08: 489 MS
RBC # BLD AUTO: 3.53 M/UL (ref 3.8–5.2)
SODIUM SERPL-SCNC: 137 MMOL/L (ref 136–145)
TROPONIN I SERPL-MCNC: <0.04 NG/ML
VENTRICULAR RATE, ECG03: 89 BPM
WBC # BLD AUTO: 7.8 K/UL (ref 3.6–11)

## 2018-05-05 PROCEDURE — 74011250636 HC RX REV CODE- 250/636: Performed by: COLON & RECTAL SURGERY

## 2018-05-05 PROCEDURE — 36415 COLL VENOUS BLD VENIPUNCTURE: CPT | Performed by: COLON & RECTAL SURGERY

## 2018-05-05 PROCEDURE — 65270000032 HC RM SEMIPRIVATE

## 2018-05-05 PROCEDURE — 84100 ASSAY OF PHOSPHORUS: CPT | Performed by: COLON & RECTAL SURGERY

## 2018-05-05 PROCEDURE — 84484 ASSAY OF TROPONIN QUANT: CPT | Performed by: COLON & RECTAL SURGERY

## 2018-05-05 PROCEDURE — 74011250636 HC RX REV CODE- 250/636: Performed by: PHYSICIAN ASSISTANT

## 2018-05-05 PROCEDURE — 80048 BASIC METABOLIC PNL TOTAL CA: CPT | Performed by: COLON & RECTAL SURGERY

## 2018-05-05 PROCEDURE — 85025 COMPLETE CBC W/AUTO DIFF WBC: CPT | Performed by: COLON & RECTAL SURGERY

## 2018-05-05 PROCEDURE — 93005 ELECTROCARDIOGRAM TRACING: CPT

## 2018-05-05 PROCEDURE — 71045 X-RAY EXAM CHEST 1 VIEW: CPT

## 2018-05-05 PROCEDURE — 83735 ASSAY OF MAGNESIUM: CPT | Performed by: COLON & RECTAL SURGERY

## 2018-05-05 RX ADMIN — ONDANSETRON 4 MG: 2 INJECTION INTRAMUSCULAR; INTRAVENOUS at 01:23

## 2018-05-05 RX ADMIN — Medication 10 ML: at 23:24

## 2018-05-05 RX ADMIN — MORPHINE SULFATE 1 MG: 4 INJECTION INTRAVENOUS at 20:36

## 2018-05-05 RX ADMIN — MORPHINE SULFATE 1 MG: 4 INJECTION INTRAVENOUS at 01:22

## 2018-05-05 RX ADMIN — Medication 10 ML: at 06:15

## 2018-05-05 RX ADMIN — Medication 10 ML: at 22:30

## 2018-05-05 RX ADMIN — ONDANSETRON 4 MG: 2 INJECTION INTRAMUSCULAR; INTRAVENOUS at 12:49

## 2018-05-05 RX ADMIN — MORPHINE SULFATE 1 MG: 4 INJECTION INTRAVENOUS at 06:14

## 2018-05-05 RX ADMIN — MORPHINE SULFATE 1 MG: 4 INJECTION INTRAVENOUS at 17:57

## 2018-05-05 RX ADMIN — MORPHINE SULFATE 1 MG: 4 INJECTION INTRAVENOUS at 12:54

## 2018-05-05 RX ADMIN — SODIUM CHLORIDE, SODIUM LACTATE, POTASSIUM CHLORIDE, AND CALCIUM CHLORIDE 100 ML/HR: 600; 310; 30; 20 INJECTION, SOLUTION INTRAVENOUS at 23:24

## 2018-05-05 RX ADMIN — MORPHINE SULFATE 1 MG: 4 INJECTION INTRAVENOUS at 09:56

## 2018-05-05 RX ADMIN — ONDANSETRON 4 MG: 2 INJECTION INTRAMUSCULAR; INTRAVENOUS at 17:57

## 2018-05-05 RX ADMIN — SODIUM CHLORIDE, SODIUM LACTATE, POTASSIUM CHLORIDE, AND CALCIUM CHLORIDE 100 ML/HR: 600; 310; 30; 20 INJECTION, SOLUTION INTRAVENOUS at 09:07

## 2018-05-05 RX ADMIN — ENOXAPARIN SODIUM 40 MG: 40 INJECTION SUBCUTANEOUS at 04:03

## 2018-05-05 RX ADMIN — ONDANSETRON 4 MG: 2 INJECTION INTRAMUSCULAR; INTRAVENOUS at 09:05

## 2018-05-05 RX ADMIN — MORPHINE SULFATE 2 MG: 4 INJECTION INTRAVENOUS at 23:23

## 2018-05-05 RX ADMIN — ONDANSETRON 4 MG: 2 INJECTION INTRAMUSCULAR; INTRAVENOUS at 23:28

## 2018-05-05 NOTE — PROGRESS NOTES
Dr. Jessie Vigil made aware that the patient has only voided 175 for the past 10 hours. He said to continue to monitor it and no orders received.

## 2018-05-05 NOTE — CONSULTS
MEDICAL CONSULTATION      Reason for consult: chest pain  Consulting provider: Sera Man M.D.      [facility]: chest pain      HPI: this is a 75 y.o lady with a history of cirrhosis (BUSBY vs EtOH), who is s/p subtotal colectomy for colon adenocarcinoma. She developed chest discomfort today and we were asked to further evaluate. She was at rest laying in bed when the pain occurred. It was a mild pressure in the substernal region, non-radiating, occurred while she was having hiccoughs. It lasted a few minutes and spontaneously resolved. It had no exacerbating or alleviating factors. It was associated with nausea and a reflux sensation. She denies associated dyspnea, cough, fever, PND, orthopnea, or leg swelling. She denies a history of cardiac problems. She currently feels well overall. PMH/PSH:  Past Medical History:   Diagnosis Date    Alcoholic cirrhosis of liver without ascites ([address] 2/12/2018    Arthritis     Cancer (HCC)     COLON    Chronic pain     BACK PAIN T12 FRACTURE    ETOH abuse 2/11/2018    GERD (gastroesophageal reflux disease)     History of vascular access device 02/16/2018    [facility] VAT - 5 FR triple, R basilic, TPN    Hyperammonemia ([facility] [address] 2/16/2018     Results for Ghada Fiddler (MRN [id number]) as of 2/17/2018 08:43  Ref.  Range 2/16/2018 17:20 Ammonia Latest Ref Range: <32 UMOL/L 69 (H)     Hyperlipidemia     Perforated chronic gastric ulcer ([address] 2/11/2018     Past Surgical History:   Procedure Laterality Date    ABDOMEN SURGERY PROC UNLISTED      COLONOSCOPY N/A 4/27/2018    COLONOSCOPY WITH SUBMUCOUSAL INJECTION OF MIKAELA INK  performed by Agapito Macdonald MD at hospitals MAIN OR    HX CATARACT REMOVAL      HX GI      PERFORATED ULCER SURGERY       Inpatient Meds:  Current Facility-Administered Medications   Medication Dose Route Frequency    morphine injection 1-2 mg  1-2 mg IntraVENous Q3H PRN    lactated Ringers infusion  100 mL/hr IntraVENous CONTINUOUS    famotidine (PEPCID) tablet 20 mg  20 mg Oral BID    calcium carbonate (TUMS) chewable tablet 200 mg [elemental]  200 mg Oral QID PRN    ondansetron (ZOFRAN) injection 4 mg  4 mg IntraVENous Q4H PRN    prochlorperazine (COMPAZINE) with saline injection 5 mg  5 mg IntraVENous Q6H PRN    rifAXIMin (XIFAXAN) tablet 550 mg  550 mg Oral BID    sodium chloride (NS) flush 5-10 mL  5-10 mL IntraVENous Q8H    sodium chloride (NS) flush 5-10 mL  5-10 mL IntraVENous PRN    acetaminophen (TYLENOL) tablet 1,000 mg  1,000 mg Oral Q6H    naloxone (NARCAN) injection 0.4 mg  0.4 mg IntraVENous PRN    oxyCODONE IR (ROXICODONE) tablet 5 mg  5 mg Oral Q4H PRN    ondansetron (ZOFRAN ODT) tablet 4 mg  4 mg Oral Q4H PRN    enoxaparin (LOVENOX) injection 40 mg  40 mg SubCUTAneous Q24H    melatonin tablet 3 mg  3 mg Oral QHS    celecoxib (CELEBREX) capsule 100 mg  100 mg Oral Q12H       Home meds:   Prior to Admission medications    Medication Sig Start Date End Date Taking? Authorizing Provider   famotidine (PEPCID) 20 mg tablet Take 20 mg by mouth two (2) times a day. Yes Historical Provider   acetaminophen (TYLENOL) 325 mg tablet Take 650 mg by mouth every six (6) hours as needed for Pain. Yes Historical Provider   melatonin 1 mg tablet Take 1 mg by mouth nightly. Yes Historical Provider   polyethylene glycol (MIRALAX) 17 gram packet Take 1 Packet by mouth daily. 3/16/18  Yes Sarina Salvador MD   multivitamin (ONE A DAY) tablet Take 1 Tab by mouth daily. Yes Historical Provider   omega-3 fatty acids-vitamin e 1,000 mg cap Take 2 Caps by mouth daily.    Yes Historical Provider       Allergies:  No Known Allergies    FH:  Family History   Problem Relation Age of Onset    Other Other      patient did not know    Heart Disease Mother     Cancer Father      COLON    Anesth Problems Neg Hx        SH:  Social History   Substance Use Topics    Smoking status: Former Smoker     Packs/day: 1.00     Years: 20.00     Quit date: 1985    Smokeless tobacco: Never Used    Alcohol use 11.4 oz/week     19 Glasses of wine, 0 Standard drinks or equivalent per week      Comment: QUIT 2/12/2018       ROS: A comprehensive review of systems was negative except for that written in the HPI.       PHYSICAL EXAM:  Visit Vitals    BP 98/61 (BP 1 Location: Left arm, BP Patient Position: At rest)    Pulse 92    Temp 98 °F (36.7 °C)    Resp 16    Ht 5' 3\" (1.6 m)    Wt 63.5 kg (140 lb)    SpO2 92%    BMI 24.8 kg/m2       Gen: NAD, non-toxic  HEENT: anicteric sclerae, normal conjunctiva, oropharynx clear, MM moist  Neck: supple, trachea midline, no adenopathy  Heart: RRR, no MRG, no JVD, no peripheral edema  Lungs: CTA b/l, non-labored respirations  Abd: soft, abd appropriately tender, wounds dressed, ND, BS+, ALYSSA drain w/ serosanguinous fluid  Extr: warm  Skin: dry, no rash  Neuro: CN II-XII grossly intact, normal speech, moves all extremities  Psych: normal mood, appropriate affect      Labs/Imaging:  Recent Results (from the past 24 hour(s))   EKG, 12 LEAD, INITIAL    Collection Time: 05/05/18 11:44 AM   Result Value Ref Range    Ventricular Rate 89 BPM    Atrial Rate 89 BPM    P-R Interval 152 ms    QRS Duration 86 ms    Q-T Interval 402 ms    QTC Calculation (Bezet) 489 ms    Calculated P Axis 35 degrees    Calculated R Axis 10 degrees    Calculated T Axis 57 degrees    Diagnosis       Normal sinus rhythm  Normal ECG  When compared with ECG of 27-FEB-2018 14:47,  No significant change was found  Confirmed by Ron Gibbons MD, Jorge Luis ([id number]) on 5/5/2018 2:09:46 PM     CBC WITH AUTOMATED DIFF    Collection Time: 05/05/18 12:07 PM   Result Value Ref Range    WBC 7.8 3.6 - 11.0 K/uL    RBC 3.53 (L) 3.80 - 5.20 M/uL    HGB 10.5 (L) 11.5 - 16.0 g/dL    HCT 33.4 (L) 35.0 - 47.0 %    MCV 94.6 80.0 - 99.0 FL    MCH 29.7 26.0 - 34.0 PG    MCHC 31.4 30.0 - 36.5 g/dL    RDW 15.6 (H) 11.5 - 14.5 %    PLATELET 025 143 - 865 K/uL    MPV 11.2 8.9 - 12.9 FL    NRBC 0.0 0  WBC    ABSOLUTE NRBC 0.00 0.00 - 0.01 K/uL    NEUTROPHILS 69 32 - 75 %    LYMPHOCYTES 19 12 - 49 %    MONOCYTES 9 5 - 13 %    EOSINOPHILS 2 0 - 7 %    BASOPHILS 1 0 - 1 %    IMMATURE GRANULOCYTES 0 0.0 - 0.5 %    ABS. NEUTROPHILS 5.4 1.8 - 8.0 K/UL    ABS. LYMPHOCYTES 1.5 0.8 - 3.5 K/UL    ABS. MONOCYTES 0.7 0.0 - 1.0 K/UL    ABS. EOSINOPHILS 0.2 0.0 - 0.4 K/UL    ABS. BASOPHILS 0.0 0.0 - 0.1 K/UL    ABS. IMM.  GRANS. 0.0 0.00 - 0.04 K/UL    DF AUTOMATED     METABOLIC PANEL, BASIC    Collection Time: 05/05/18 12:07 PM   Result Value Ref Range    Sodium 137 136 - 145 mmol/L    Potassium 4.1 3.5 - 5.1 mmol/L    Chloride 103 97 - 108 mmol/L    CO2 26 21 - 32 mmol/L    Anion gap 8 5 - 15 mmol/L    Glucose 111 (H) 65 - 100 mg/dL    BUN 22 (H) 6 - 20 MG/DL    Creatinine 0.59 0.55 - 1.02 MG/DL    BUN/Creatinine ratio 37 (H) 12 - 20      GFR est AA >60 >60 ml/min/1.73m2    GFR est non-AA >60 >60 ml/min/1.73m2    Calcium 9.1 8.5 - 10.1 MG/DL   MAGNESIUM    Collection Time: 05/05/18 12:07 PM   Result Value Ref Range    Magnesium 1.7 1.6 - 2.4 mg/dL   PHOSPHORUS    Collection Time: 05/05/18 12:07 PM   Result Value Ref Range    Phosphorus 4.0 2.6 - 4.7 MG/DL   TROPONIN I    Collection Time: 05/05/18 12:07 PM   Result Value Ref Range    Troponin-I, Qt. <0.04 <0.05 ng/mL       Recent Labs      05/05/18   1207  05/04/18   0957   WBC  7.8  9.3   HGB  10.5*  11.2*   HCT  33.4*  34.7*   PLT  301  303     Recent Labs      05/05/18   1207  05/04/18   0957  05/03/18   0712   NA  137  138  141   K  4.1  4.3  3.8   CL  103  106  111*   CO2  26  23  21   BUN  22*  21*  15   CREA  0.59  0.57  0.39*   GLU  111*  120*  92   CA  9.1  9.6  9.0   MG  1.7   --    --    PHOS  4.0   --    --      Recent Labs      05/04/18   0957  05/03/18   0712   SGOT  15  15   ALT  16  14   AP  87  68   TBILI  0.9  0.6   TP  7.1  6.0*   ALB  3.2*  2.7*   GLOB  3.9  3.3       Recent Labs      05/05/18   1207   TROIQ  <0.04     CXR: Extensive soft tissue emphysema, presumably related to recent  surgery. ECG: normal sinus rhythm        Assessment & Plan: chest pain    Seems more associated with hiccoughs and GERD, ECG unremarkable, troponin-I x1 negative. Low suspicion for cardiac etiology. Echocardiogram in 2/2018 unremarkable. Would not do further work-up at this time. Recommend elevating head of the bed. Trial of GI cocktail if symptoms recur. H. Pylori (+): can triple therapy treatment be started? Cirrhosis wit hyperammonemia: no evidence of hepatic encephalopathy currently    Colorectal adenocarcinoma: s/p subtotal colectomy    Thank you for involving us in your patient's care. Will see again as needed.     Signed By: Keyla Carrillo MD     May 5, 2018

## 2018-05-05 NOTE — ROUTINE PROCESS
Bedside and Verbal shift change report given to Cipriano Coreas RN (oncoming nurse) by Raza Richard RN (offgoing nurse). Report included the following information SBAR, Kardex, Intake/Output, MAR, Accordion and Recent Results.

## 2018-05-05 NOTE — PROGRESS NOTES
1022 - Held the patients medications this morning, because she is nauseated. She also states she has a little bit of pain in her chest that comes and goes. She says the pain in her chest feels like she has to burp right now, but can't. Paging the on call DrJayesh To make them aware of the chest pain.      10:32 - Dr. Tomas Blancas said to make the patient NPO and he will be over soon to evaluate the patient

## 2018-05-05 NOTE — PROGRESS NOTES
CRS  Pt c/o chest pain. Describes as \"pressure\" rather than burning discomfort  Flowsheet, labs reviewed  Abd: soft, tender    Plan:  Chest pressure likely related to reflux. Nonetheless, warrants cardiac work up. Will check labs, cxr, ekg. Will ask hospitalist service to evaluate.  Keep npo

## 2018-05-06 LAB
GLUCOSE BLD STRIP.AUTO-MCNC: 83 MG/DL (ref 65–100)
GLUCOSE BLD STRIP.AUTO-MCNC: 88 MG/DL (ref 65–100)
GLUCOSE BLD STRIP.AUTO-MCNC: 91 MG/DL (ref 65–100)
SERVICE CMNT-IMP: NORMAL

## 2018-05-06 PROCEDURE — C1751 CATH, INF, PER/CENT/MIDLINE: HCPCS

## 2018-05-06 PROCEDURE — 74011250637 HC RX REV CODE- 250/637: Performed by: NURSE PRACTITIONER

## 2018-05-06 PROCEDURE — 82962 GLUCOSE BLOOD TEST: CPT

## 2018-05-06 PROCEDURE — 74011250636 HC RX REV CODE- 250/636: Performed by: COLON & RECTAL SURGERY

## 2018-05-06 PROCEDURE — 76937 US GUIDE VASCULAR ACCESS: CPT

## 2018-05-06 PROCEDURE — 65270000032 HC RM SEMIPRIVATE

## 2018-05-06 PROCEDURE — P9045 ALBUMIN (HUMAN), 5%, 250 ML: HCPCS | Performed by: COLON & RECTAL SURGERY

## 2018-05-06 PROCEDURE — 74011250636 HC RX REV CODE- 250/636: Performed by: PHYSICIAN ASSISTANT

## 2018-05-06 PROCEDURE — 74011000250 HC RX REV CODE- 250: Performed by: HOSPITALIST

## 2018-05-06 PROCEDURE — 74011250637 HC RX REV CODE- 250/637: Performed by: COLON & RECTAL SURGERY

## 2018-05-06 RX ORDER — ACETAMINOPHEN 10 MG/ML
1000 INJECTION, SOLUTION INTRAVENOUS
Status: DISCONTINUED | OUTPATIENT
Start: 2018-05-06 | End: 2018-05-08 | Stop reason: HOSPADM

## 2018-05-06 RX ORDER — ALBUMIN HUMAN 50 G/1000ML
25 SOLUTION INTRAVENOUS ONCE
Status: COMPLETED | OUTPATIENT
Start: 2018-05-06 | End: 2018-05-06

## 2018-05-06 RX ADMIN — ENOXAPARIN SODIUM 40 MG: 40 INJECTION SUBCUTANEOUS at 04:28

## 2018-05-06 RX ADMIN — MORPHINE SULFATE 1 MG: 4 INJECTION INTRAVENOUS at 04:28

## 2018-05-06 RX ADMIN — RIFAXIMIN 550 MG: 550 TABLET ORAL at 17:52

## 2018-05-06 RX ADMIN — Medication 10 ML: at 07:06

## 2018-05-06 RX ADMIN — Medication 10 ML: at 20:41

## 2018-05-06 RX ADMIN — ACETAMINOPHEN 1000 MG: 500 TABLET, FILM COATED ORAL at 11:31

## 2018-05-06 RX ADMIN — SODIUM CHLORIDE, SODIUM LACTATE, POTASSIUM CHLORIDE, AND CALCIUM CHLORIDE 100 ML/HR: 600; 310; 30; 20 INJECTION, SOLUTION INTRAVENOUS at 20:41

## 2018-05-06 RX ADMIN — Medication 3 MG: at 21:52

## 2018-05-06 RX ADMIN — SODIUM CHLORIDE 20 MG: 9 INJECTION INTRAMUSCULAR; INTRAVENOUS; SUBCUTANEOUS at 19:06

## 2018-05-06 RX ADMIN — ALBUMIN (HUMAN) 25 G: 12.5 INJECTION, SOLUTION INTRAVENOUS at 16:34

## 2018-05-06 RX ADMIN — RIFAXIMIN 550 MG: 550 TABLET ORAL at 09:13

## 2018-05-06 RX ADMIN — SODIUM CHLORIDE, SODIUM LACTATE, POTASSIUM CHLORIDE, AND CALCIUM CHLORIDE 100 ML/HR: 600; 310; 30; 20 INJECTION, SOLUTION INTRAVENOUS at 09:20

## 2018-05-06 RX ADMIN — FAMOTIDINE 20 MG: 20 TABLET ORAL at 08:44

## 2018-05-06 RX ADMIN — MORPHINE SULFATE 2 MG: 4 INJECTION INTRAVENOUS at 08:18

## 2018-05-06 RX ADMIN — Medication 10 ML: at 14:00

## 2018-05-06 RX ADMIN — MORPHINE SULFATE 2 MG: 4 INJECTION INTRAVENOUS at 20:41

## 2018-05-06 NOTE — PROGRESS NOTES
Bedside shift change report given to [id number] [place] [address] [address] (oncoming nurse) by Hi Lubin (offgoing nurse). Report included the following information SBAR, Kardex, Procedure Summary, Intake/Output, MAR and Recent Results.

## 2018-05-06 NOTE — PROCEDURES
Midline Insertion and Progress Note    PRE-PROCEDURE VERIFICATION  Correct Procedure: yes  Correct Site:  yes  Temperature: Temp: 98.3 °F (36.8 °C), Temperature Source: Temp Source: Oral  Recent Labs      05/05/18   1207   BUN  22*   CREA  0.59   PLT  301   WBC  7.8     Allergies: Review of patient's allergies indicates no known allergies. PROCEDURE DETAIL  A Powerglide single lumen midline was started for vascular access.  The following documentation is in addition to the Midline properties in the lines/airways flowsheet :  Lot #: [id number]  Catheter Total Length: 8 (cm)  Vein Selection for Midline :left basilic  Complication Related to Insertion: none      Line is okay to use: yes    Karina Bryan RN, BSN, [address]  Vascular [facility]

## 2018-05-06 NOTE — PROGRESS NOTES
CRS  Pt with no episodes of emesis this am. Some liquid stools  flowsheet reviewed  Abd: sft, dt, tender with palpation    Plan:  Despite stools, given distension will hold off on advancing beyond sips of clears.  Mobilize, pulm toilet

## 2018-05-06 NOTE — PROGRESS NOTES
Bedside and Verbal shift change report given to Sabrina Ivey RN (oncoming nurse) by Doreen Knight RN (offgoing nurse). Report included the following information SBAR, Kardex, Intake/Output, MAR, Accordion, Recent Results and Med Rec Status.

## 2018-05-07 LAB
ANION GAP SERPL CALC-SCNC: 10 MMOL/L (ref 5–15)
BASOPHILS # BLD: 0 K/UL (ref 0–0.1)
BASOPHILS NFR BLD: 0 % (ref 0–1)
BUN SERPL-MCNC: 18 MG/DL (ref 6–20)
BUN/CREAT SERPL: 56 (ref 12–20)
CALCIUM SERPL-MCNC: 8.5 MG/DL (ref 8.5–10.1)
CHLORIDE SERPL-SCNC: 107 MMOL/L (ref 97–108)
CO2 SERPL-SCNC: 20 MMOL/L (ref 21–32)
CREAT SERPL-MCNC: 0.32 MG/DL (ref 0.55–1.02)
DIFFERENTIAL METHOD BLD: ABNORMAL
EOSINOPHIL # BLD: 0.4 K/UL (ref 0–0.4)
EOSINOPHIL NFR BLD: 5 % (ref 0–7)
ERYTHROCYTE [DISTWIDTH] IN BLOOD BY AUTOMATED COUNT: 15.5 % (ref 11.5–14.5)
GLUCOSE BLD STRIP.AUTO-MCNC: 73 MG/DL (ref 65–100)
GLUCOSE BLD STRIP.AUTO-MCNC: 77 MG/DL (ref 65–100)
GLUCOSE BLD STRIP.AUTO-MCNC: 78 MG/DL (ref 65–100)
GLUCOSE SERPL-MCNC: 73 MG/DL (ref 65–100)
HCT VFR BLD AUTO: 29.8 % (ref 35–47)
HGB BLD-MCNC: 9.1 G/DL (ref 11.5–16)
IMM GRANULOCYTES # BLD: 0 K/UL (ref 0–0.04)
IMM GRANULOCYTES NFR BLD AUTO: 0 % (ref 0–0.5)
LYMPHOCYTES # BLD: 1.4 K/UL (ref 0.8–3.5)
LYMPHOCYTES NFR BLD: 18 % (ref 12–49)
MAGNESIUM SERPL-MCNC: 1.8 MG/DL (ref 1.6–2.4)
MCH RBC QN AUTO: 29.6 PG (ref 26–34)
MCHC RBC AUTO-ENTMCNC: 30.5 G/DL (ref 30–36.5)
MCV RBC AUTO: 97.1 FL (ref 80–99)
MONOCYTES # BLD: 0.7 K/UL (ref 0–1)
MONOCYTES NFR BLD: 10 % (ref 5–13)
NEUTS SEG # BLD: 5 K/UL (ref 1.8–8)
NEUTS SEG NFR BLD: 67 % (ref 32–75)
NRBC # BLD: 0 K/UL (ref 0–0.01)
NRBC BLD-RTO: 0 PER 100 WBC
PHOSPHATE SERPL-MCNC: 2.7 MG/DL (ref 2.6–4.7)
PLATELET # BLD AUTO: 256 K/UL (ref 150–400)
PMV BLD AUTO: 11.1 FL (ref 8.9–12.9)
POTASSIUM SERPL-SCNC: 3.9 MMOL/L (ref 3.5–5.1)
RBC # BLD AUTO: 3.07 M/UL (ref 3.8–5.2)
SERVICE CMNT-IMP: NORMAL
SODIUM SERPL-SCNC: 137 MMOL/L (ref 136–145)
WBC # BLD AUTO: 7.5 K/UL (ref 3.6–11)

## 2018-05-07 PROCEDURE — 74011250636 HC RX REV CODE- 250/636: Performed by: COLON & RECTAL SURGERY

## 2018-05-07 PROCEDURE — 80048 BASIC METABOLIC PNL TOTAL CA: CPT | Performed by: COLON & RECTAL SURGERY

## 2018-05-07 PROCEDURE — 97116 GAIT TRAINING THERAPY: CPT

## 2018-05-07 PROCEDURE — 74011000250 HC RX REV CODE- 250: Performed by: HOSPITALIST

## 2018-05-07 PROCEDURE — 82962 GLUCOSE BLOOD TEST: CPT

## 2018-05-07 PROCEDURE — 85025 COMPLETE CBC W/AUTO DIFF WBC: CPT | Performed by: COLON & RECTAL SURGERY

## 2018-05-07 PROCEDURE — 74011250637 HC RX REV CODE- 250/637: Performed by: NURSE PRACTITIONER

## 2018-05-07 PROCEDURE — 36415 COLL VENOUS BLD VENIPUNCTURE: CPT | Performed by: COLON & RECTAL SURGERY

## 2018-05-07 PROCEDURE — 84100 ASSAY OF PHOSPHORUS: CPT | Performed by: COLON & RECTAL SURGERY

## 2018-05-07 PROCEDURE — 65270000032 HC RM SEMIPRIVATE

## 2018-05-07 PROCEDURE — 74011250636 HC RX REV CODE- 250/636: Performed by: PHYSICIAN ASSISTANT

## 2018-05-07 PROCEDURE — 74011250637 HC RX REV CODE- 250/637: Performed by: COLON & RECTAL SURGERY

## 2018-05-07 PROCEDURE — 83735 ASSAY OF MAGNESIUM: CPT | Performed by: COLON & RECTAL SURGERY

## 2018-05-07 RX ADMIN — ACETAMINOPHEN 1000 MG: 10 INJECTION, SOLUTION INTRAVENOUS at 09:12

## 2018-05-07 RX ADMIN — RIFAXIMIN 550 MG: 550 TABLET ORAL at 08:46

## 2018-05-07 RX ADMIN — Medication 10 ML: at 04:06

## 2018-05-07 RX ADMIN — Medication 10 ML: at 22:46

## 2018-05-07 RX ADMIN — SODIUM CHLORIDE 20 MG: 9 INJECTION INTRAMUSCULAR; INTRAVENOUS; SUBCUTANEOUS at 22:46

## 2018-05-07 RX ADMIN — ENOXAPARIN SODIUM 40 MG: 40 INJECTION SUBCUTANEOUS at 04:04

## 2018-05-07 RX ADMIN — SODIUM CHLORIDE, SODIUM LACTATE, POTASSIUM CHLORIDE, AND CALCIUM CHLORIDE 100 ML/HR: 600; 310; 30; 20 INJECTION, SOLUTION INTRAVENOUS at 05:53

## 2018-05-07 RX ADMIN — ACETAMINOPHEN 1000 MG: 10 INJECTION, SOLUTION INTRAVENOUS at 22:46

## 2018-05-07 RX ADMIN — RIFAXIMIN 550 MG: 550 TABLET ORAL at 18:18

## 2018-05-07 RX ADMIN — MORPHINE SULFATE 2 MG: 4 INJECTION INTRAVENOUS at 00:42

## 2018-05-07 RX ADMIN — MORPHINE SULFATE 2 MG: 4 INJECTION INTRAVENOUS at 05:52

## 2018-05-07 RX ADMIN — SODIUM CHLORIDE 20 MG: 9 INJECTION INTRAMUSCULAR; INTRAVENOUS; SUBCUTANEOUS at 08:46

## 2018-05-07 RX ADMIN — Medication 3 MG: at 22:46

## 2018-05-07 NOTE — PROGRESS NOTES
On first rounds, pt has low bp of 86/57, rechecked at 0901 - 92/52. C/o lot of cramping abd pain in right abd. Requesting pain meds. Due to low blood pressure, medicated with IV tylenol. Pt unable to move self in bed due to severe pain. Repositioned and now on phone with her daughter. ALYSSA draining large amounts of serous drainage, emptied of 130 ml and dressing changed to site as it was saturated with leakage onto gown.

## 2018-05-07 NOTE — PROGRESS NOTES
Bedside and Verbal shift change report given to Lennox Park, RN (oncoming nurse) by Gorge Davenport RN (offgoing nurse). Report included the following information SBAR, Kardex, Intake/Output, MAR, Accordion, Recent Results and Med Rec Status.

## 2018-05-07 NOTE — PROGRESS NOTES
5/7/18 1445: [facility] confirmed acceptance of case. HHA contact info added to AVS.    -------------------------  5/7/18 2040  CM noted HH order. Patient unable to recall name of HHA that provided services PTA, does no otherwise have HHA preference. Patient insured by [employer] and lives in [place], [place]. CM sent [employer] referrals to multiple agencies to find one that can provide service in patient's geographic area.

## 2018-05-07 NOTE — PROGRESS NOTES
Problem: Mobility Impaired (Adult and Pediatric)  Goal: *Acute Goals and Plan of Care (Insert Text)  Physical Therapy Goals  Initiated 5/1/2018  1. Patient will move from supine to sit and sit to supine , scoot up and down and roll side to side in bed with modified independence within 7 day(s). 2.  Patient will transfer from bed to chair and chair to bed with supervision/set-up using the least restrictive device within 7 day(s). 3.  Patient will perform sit to stand with supervision/set-up within 7 day(s). 4.  Patient will ambulate with supervision/set-up for 150 feet with the least restrictive device within 7 day(s). 5.  Patient will ascend/descend 13 stairs with 2 handrail(s) with minimal assistance/contact guard assist within 7 day(s). physical Therapy TREATMENT  Patient: Eliseo Jacobs (78 y.o. female)  Date: 5/7/2018  Diagnosis: HISTORY OF COLON CANCER  Colon cancer ([facility] [address] <principal problem not specified>  Procedure(s) (LRB):  ROBOTIC SUB TOTAL COLECTOMY  (E R A S); INTRAOPERATIVE COLONOSCOPY (N/A) 7 Days Post-Op  Precautions: Fall (hypotension throughout session)  Chart, physical therapy assessment, plan of care and goals were reviewed. ASSESSMENT:  Patient received supine in bed, agreeable to therapy despite pain (which she stated had improved since this AM). Noted episode of hypotension this AM, and resting BP upon PT entry 94/59 HR 88 bpm. Dropped upon standing at EOB, 87/47  bpm, however patient asymptomatic and only c/o pain and hunger. Ambulated short distance in room, BP improved mildly (92/56 HR 97 bpm). Tolerated second gait session with increased distance, however patient impulsive throughout session and with overall fair dynamic standing balance. She required frequent redirection for safety and cues for sequencing with SPC (fair carryover). Returned to chair and BP remained low but stable.  RN notified and patient instructed to call for assist back to bed immediately if she experienced light headedness or dizziness. Family present. Recommend HHPT at d/c, however patient will need to improve with use of SPC over the course of her acute stay, as she strongly dislikes to use RW. Progression toward goals:  [x]    Improving appropriately and progressing toward goals  []    Improving slowly and progressing toward goals  []    Not making progress toward goals and plan of care will be adjusted     PLAN:  Patient continues to benefit from skilled intervention to address the above impairments. Continue treatment per established plan of care. Discharge Recommendations:  Home Health  Further Equipment Recommendations for Discharge:  SPC? SUBJECTIVE:   Patient stated It's too cumbersome.  re: RW    OBJECTIVE DATA SUMMARY:   Critical Behavior:  Neurologic State: Alert, Confused  Orientation Level: Oriented to person, Oriented to place, Oriented to situation  Cognition: Decreased attention/concentration, Follows commands  Functional Mobility Training:  Bed Mobility:  Supine to Sit: Minimum assistance; Additional time (limited by pain)  Transfers:  Sit to Stand: Contact guard assistance;Minimum assistance (min A to steady upon impulsive standing)  Stand to Sit: Contact guard assistance  Balance:  Sitting: Intact  Standing: Impaired; With support  Standing - Static: Good;Fair  Standing - Dynamic : Fair  Ambulation/Gait Training:  Distance (ft): 120 Feet (ft)  Assistive Device: Gait belt;Cane, straight  Ambulation - Level of Assistance: Minimal assistance  Gait Abnormalities: Decreased step clearance; Path deviations; Shuffling gait  Base of Support: Widened  Speed/Alejandra: Shuffled;Pace decreased (<100 feet/min)  Step Length: Right shortened;Left shortened  Pain:  Pain Scale 1: Numeric (0 - 10)  Pain Intensity 1: 7  Pain Location 1: Abdomen  Pain Orientation 1: Right  Pain Description 1: Stabbing; Sharp  Pain Intervention(s) 1: Medication (see MAR) (medicated with IV tylenol as blood pressure low)  Activity Tolerance:   BP low throughout 92/56 after gait, patient asymptomatic   Please refer to the flowsheet for vital signs taken during this treatment.   After treatment:   [x]    Patient left in no apparent distress sitting up in chair  []    Patient left in no apparent distress in bed  [x]    Call bell left within reach  [x]    Nursing notified  [x]    Caregiver present  []    Bed alarm activated    COMMUNICATION/COLLABORATION:   The patients plan of care was discussed with: Registered Nurse    Sintia Sheikh, PT, DPT   Time Calculation: 19 mins

## 2018-05-07 NOTE — PROGRESS NOTES
Feels better today. Less abdominal pain  Visit Vitals    /58 (BP 1 Location: Left arm, BP Patient Position: At rest)    Pulse 83    Temp 98.6 °F (37 °C)    Resp 16    Ht 5' 3\" (1.6 m)    Wt 63.7 kg (140 lb 8 oz)    SpO2 94%    BMI 24.89 kg/m2       Date 05/06/18 0700 - 05/07/18 0659 05/07/18 0700 - 05/08/18 0659   Shift 0700-1859 1900-0659 24 Hour Total 0700-1859 1900-0659 24 Hour Total   I  N  T  A  K  E   P.O.    120  120      P. O.    120  120    I.V.  (mL/kg/hr)  1000  (1.3) 1000  (0.7) 100  100      Volume (lactated Ringers infusion)  1000 1000         Volume (acetaminophen (OFIRMEV) infusion 1,000 mg)    100  100    Shift Total  (mL/kg)  1000  (15.7) 1000  (15.7) 220  (3.5)  220  (3.5)   O  U  T  P  U  T   Urine  (mL/kg/hr) 400  (0.5) 300  (0.4) 700  (0.5) 150  150      Urine Output (mL) (Urinary Catheter 05/02/18 2- way) 400 300 700 150  150    Drains 340 360 700 130  130      Output (ml) ([place] Drain #1 04/30/18 Left Abdomen) 340 360 700 130  130    Stool  800 800 300  300      Stool Occurrence(s) 1 x  1 x 2 x  2 x      Stool  800 800 300  300    Shift Total  (mL/kg) 740  (11.6) 1460  (22.9) 2200  (34.5) 580  (9.1)  580  (9.1)   NET -740 -460 -1200 -360  -360   Weight (kg) 63.7 63.7 63.7 63.7 63.7 63.7     abd soft  Drain serosang    A/p advance diet  Perhaps home tomorrow afternoon if tolerating a diet

## 2018-05-07 NOTE — PROGRESS NOTES
Patient denied nausea and tolerating sips well, still having large volume dark green watery BMs. BG in the 70's this am, per hospital policy for non-diabetics no treatment needed. Orange juice given per patient request.    Bedside shift change report given to Lorenza South (oncoming nurse) by Niki Riggs RN (offgoing nurse). Report included the following information SBAR, Kardex, Procedure Summary, Intake/Output, MAR and Recent Results.

## 2018-05-08 VITALS
SYSTOLIC BLOOD PRESSURE: 114 MMHG | DIASTOLIC BLOOD PRESSURE: 74 MMHG | OXYGEN SATURATION: 98 % | HEART RATE: 60 BPM | HEIGHT: 63 IN | TEMPERATURE: 98.1 F | WEIGHT: 136.8 LBS | RESPIRATION RATE: 18 BRPM | BODY MASS INDEX: 24.24 KG/M2

## 2018-05-08 PROCEDURE — 74011250636 HC RX REV CODE- 250/636: Performed by: COLON & RECTAL SURGERY

## 2018-05-08 PROCEDURE — 74011000250 HC RX REV CODE- 250: Performed by: HOSPITALIST

## 2018-05-08 PROCEDURE — 74011250637 HC RX REV CODE- 250/637: Performed by: NURSE PRACTITIONER

## 2018-05-08 PROCEDURE — 74011250636 HC RX REV CODE- 250/636: Performed by: PHYSICIAN ASSISTANT

## 2018-05-08 RX ORDER — OXYCODONE HYDROCHLORIDE 5 MG/1
2.5 TABLET ORAL
Qty: 20 TAB | Refills: 0 | Status: SHIPPED | OUTPATIENT
Start: 2018-05-08

## 2018-05-08 RX ADMIN — ENOXAPARIN SODIUM 40 MG: 40 INJECTION SUBCUTANEOUS at 04:51

## 2018-05-08 RX ADMIN — SODIUM CHLORIDE, SODIUM LACTATE, POTASSIUM CHLORIDE, AND CALCIUM CHLORIDE 100 ML/HR: 600; 310; 30; 20 INJECTION, SOLUTION INTRAVENOUS at 07:50

## 2018-05-08 RX ADMIN — ONDANSETRON 4 MG: 2 INJECTION INTRAMUSCULAR; INTRAVENOUS at 14:30

## 2018-05-08 RX ADMIN — SODIUM CHLORIDE 20 MG: 9 INJECTION INTRAMUSCULAR; INTRAVENOUS; SUBCUTANEOUS at 09:16

## 2018-05-08 RX ADMIN — RIFAXIMIN 550 MG: 550 TABLET ORAL at 09:19

## 2018-05-08 RX ADMIN — ACETAMINOPHEN 1000 MG: 10 INJECTION, SOLUTION INTRAVENOUS at 17:36

## 2018-05-08 RX ADMIN — Medication 10 ML: at 07:50

## 2018-05-08 RX ADMIN — ACETAMINOPHEN 1000 MG: 10 INJECTION, SOLUTION INTRAVENOUS at 09:21

## 2018-05-08 RX ADMIN — RIFAXIMIN 550 MG: 550 TABLET ORAL at 17:32

## 2018-05-08 NOTE — PROGRESS NOTES
Bedside and Verbal shift change report given to Olga Lidia Cohen RN  (oncoming nurse) by Nicole Harris (offgoing nurse). Report included the following information SBAR and Kardex.

## 2018-05-08 NOTE — DISCHARGE INSTRUCTIONS
Contreras Sheikh MD, [facility] Cici Whitlock MD, [address] [facility] Zee Mcpherson MD, FACS  Mike Velazco. Warren New MD, MD Cortez Lozano MD Belenda Miller, MD      Discharge Instructions for [facility] Colorectal Surgery Patients       1. Do not lift any objects weighing more than 10 pounds for 4 weeks. 2. Do not do any housework including vacuuming, scrubbing or yardwork for 4 weeks. 3. Do not drive for two weeks or while taking sedating medications. 4. You may walk as desired and go up and down stairs as needed. 5. You may shower. Do not take tub baths, swim or use hot tubs for 4 weeks. 6. Leave steri-strips on incision. They will fall off on their own. You may have dermabond on your incisions which is surgical glue. This will dissolve over time. Do not scrub around incisions. 7. Follow low-fiber diet for 2 weeks. (See handbook for additional details). 8. Drink nutritional supplements 2 times per day until your diet and appetite are back to normal. Diabetic patients should drink one-half bottle 4 times daily. 9. Multiple bowel movements are normal each day. Contact your surgeons office for any concerns. 10. Take Acetaminophen 1000mg every 6 hours for 24 hours, then as needed for pain and Oxycodone (per prescription instructions)    11. Follow up with providers as scheduled. 12. If your surgery involves an ostomy bag, please bring your supplies to the first office visit with your surgeon. 13. If you experience fever (greater than 100.5), chills, vomiting or redness or drainage at surgical site, please contact your surgeons office. 14. For questions regarding quality or quantity of ostomy output, refer to [facility] handbook or call surgeons office. Please see handbook for additional instructions. If you have further questions, please call your surgeons office.

## 2018-05-08 NOTE — PROGRESS NOTES
Bedside and Verbal shift change report given to Yassine Cortes RN (oncoming nurse) by Bebeto Juárez RN (offgoing nurse). Report included the following information SBAR, Kardex, ED Summary, Intake/Output, MAR and Recent Results.

## 2018-05-08 NOTE — PROGRESS NOTES
Feels better today  Sore when she moves  Visit Vitals    BP 94/53 (BP Patient Position: Supine)    Pulse 89    Temp 98.3 °F (36.8 °C)    Resp 20    Ht 5' 3\" (1.6 m)    Wt 63.7 kg (140 lb 8 oz)    SpO2 96%    BMI 24.89 kg/m2       Date 05/07/18 0700 - 05/08/18 0659 05/08/18 0700 - 05/09/18 0659   Shift 0700-1859 1900-0659 24 Hour Total 0700-1859 1900-0659 24 Hour Total   I  N  T  A  K  E   P.O. 120  120         P. O. 120  120       I.V.  (mL/kg/hr) 100  (0.1)  100  (0.1)         Volume (acetaminophen (OFIRMEV) infusion 1,000 mg) 100  100       Shift Total  (mL/kg) 220  (3.5)  220  (3.5)      O  U  T  P  U  T   Urine  (mL/kg/hr) 150  (0.2) 1000  (1.3) 1150  (0.8)         Urine Output (mL) (Urinary Catheter 05/02/18 2- way) 150 1000 1150       Drains 220 300 520 70  70      Output (ml) (Osvaldo Drain #1 04/30/18 Left Abdomen) 220 300 520 70  70    Stool 300  300         Stool Occurrence(s) 2 x 2 x 4 x         Stool 300  300       Shift Total  (mL/kg) 670  (10.5) 1300  (20.4) 1970  (30.9) 70  (1.1)  70  (1.1)   NET -450 -1300 -1750 -70  -70   Weight (kg) 63.7 63.7 63.7 63.7 63.7 63.7     abd soft  masoud serosang    A/P remove masoud drain   Home later today with home health pt

## 2018-05-09 NOTE — DISCHARGE SUMMARY
Physician Discharge Summary     Patient ID:  Chong Hill  [id number]  75 y.o.  1945    Allergies: Review of patient's allergies indicates no known allergies. Admit Date: 4/30/2018    Discharge Date: 5/9/2018    * Admission Diagnoses: HISTORY OF COLON CANCER  Colon cancer [facility])    * Discharge Diagnoses:    Hospital Problems as of 5/8/2018  Date Reviewed: 4/26/2016          Codes Class Noted - Resolved POA    Colon cancer ([facility] [address] ICD-10-CM: C18.9  ICD-9-CM: 153.9  4/30/2018 - Present Unknown               Admission Condition: Good    * Discharge Condition: good    * Procedures: Procedure(s):  ROBOTIC SUB TOTAL COLECTOMY  (E R A S); INTRAOPERATIVE COLONOSCOPY    * Hospital Course: The patient tolerated the procedure well and was admitted postoperatively for pain control and medical management. On POD 1, the patient was complaining some pain. Confusion was noted. The patient has a history of alcohol abuse with cirrhosis. A GI consult was requested to help manage the patient's ascites and ammonia levels. The patient was not passing gas. Physical therapy was consulted to help with ambulation. The patient was started on lactulose. On POD 2, the patient was hypotensive. IV fluid boluses were provided. A farmer catheter was inserted to monitor urine output. On POD 3, the patient complaining of abdominal pain and distention in the right lower quadrant. CT scan of the abdomen showed a post op ileus but no evidence of anastomotic leak. By POD 4, the patient was again passing gas. Abdomen was less tender. The patient did require a cardiac workup for some atypical chest pain. Workup was negative. On POD 8, the patient was ready for discharge. She was tolerating a regular diet. Pain was well controlled. The patient was passing gas and having bowel movements.      Consults: Cardiology and Gastroenterology    Significant Diagnostic Studies: CT scan of the abdomen shows ileus but no anastomatic leak    * Disposition: home with home health    Discharge Medications:   Discharge Medication List as of 5/8/2018  6:42 PM      START taking these medications    Details   oxyCODONE IR (ROXICODONE) 5 mg immediate release tablet Take 0.5 Tabs by mouth every eight (8) hours as needed. Max Daily Amount: 7.5 mg., Print, Disp-20 Tab, R-0         CONTINUE these medications which have NOT CHANGED    Details   famotidine (PEPCID) 20 mg tablet Take 20 mg by mouth two (2) times a day., Historical Med      acetaminophen (TYLENOL) 325 mg tablet Take 650 mg by mouth every six (6) hours as needed for Pain., Historical Med      melatonin 1 mg tablet Take 1 mg by mouth nightly., Historical Med      multivitamin (ONE A DAY) tablet Take 1 Tab by mouth daily. , Historical Med         STOP taking these medications       polyethylene glycol (MIRALAX) 17 gram packet Comments:   Reason for Stopping:         omega-3 fatty acids-vitamin e 1,000 mg cap Comments:   Reason for Stopping:               * Follow-up Care/Patient Instructions: Activity: Ambulate in house, No lifting, Driving, or Strenuous exercise for 6 weeks, PT/OT per Home Health and See surgical instructions  Diet: GI lite  Wound Care: Keep wound clean and dry and Ice to area for comfort    Follow-up Information     Follow up With Details Comments Contact Info    Hussain Ribeiro MD On 5/9/2018 post-op appointment on Wednesday May 9,2018 @ 10:45 a.m. [address] [place], [place] [address]ashley Benoit Call Home health physical therapy. Call agency office if you have not been contacted by a liaison within 24 hours of hospital discharge.  [address], [address] [facility]  [phone]          Follow-up with Dr. Nadia Nixon in 10 days    Signed:  Jeniffer New PA-C  5/9/2018  9:25 AM

## 2018-05-22 NOTE — PROGRESS NOTES
Re cdmp query    Unspecified severe protein-calorie malnutrition in the setting of unintentional weight loss requiring nutritional supplements and RD monitoring

## 2019-01-30 NOTE — PROGRESS NOTES
BS noted to be 78, asymptomatic. IV dextrose, 25mL given. Recheck noted at 143.  Will continue to monitor Pt with epigastric pain radiating to mid sternal

## 2020-10-06 NOTE — PERIOP NOTES
CALLED DR COLES`S OFFICE AND LEFT A VOICEMAIL FOR NURSE( VOICE MAIL  HAS NO NAME ON THE MESSAGE )ABOUT ABNORMAL LABS, EKG AND CHEST X-RAY. ALL LABS, EKG AND CHEST X-RAY HAVE BEEN FAXED OVER TO OFFICE. [facility]
[address]   [address]                     ELECTROCARDIOGRAM REPORT      
_______________________________________________________________________________
 
Name:       MIESHA RODRIGES          Room #:         449-I       DIS IN  
M.R.#:      [id number]                       Account #:      [id number]  
Admission:  20    Attend Phys:    Kem Paris MD      
Discharge:  10/05/20    Date of Birth:  [birth year]  
                                                          Report #: [id number]
                                                                    [id number]
_______________________________________________________________________________
THIS REPORT FOR:  
 
cc:  Bettie Cuellar MD, Melanie MD Santiago,Dawson TATUM [facility]                                         ~
THIS REPORT FOR:   //name//                          
 
                          [facility]
                                       
Test Date:    2020-10-05               Test Time:    17:09:25
Pat Name:     MIESHA RODRIGES           Department:   
Patient ID:   [id number]            Room:         [facility] I
Gender:       M                        Technician:   ARACELI
:          1993               Requested By: Mani Khan
Order Number: [id number] MD:   Dawson Chong
                                 Measurements
Intervals                              Axis          
Rate:         71                       P:            61
CT:           Dawson Chong
[email or web address]
 
 
 
 
 
 
 
 
 
 
 
 
  <ELECTRONICALLY SIGNED>
   By: Dawson Chong MD, [facility]    
  10/06/20     0741
D: 10/05/20 1709                           _____________________________________
T: 10/05/20 170                           Dawson Chong MD, [facility]      /EPI

## 2022-06-24 NOTE — PROGRESS NOTES
Problem: Mobility Impaired (Adult and Pediatric)  Goal: *Acute Goals and Plan of Care (Insert Text)  Physical Therapy Goals  Initiated 5/1/2018  1. Patient will move from supine to sit and sit to supine , scoot up and down and roll side to side in bed with modified independence within 7 day(s). 2.  Patient will transfer from bed to chair and chair to bed with supervision/set-up using the least restrictive device within 7 day(s). 3.  Patient will perform sit to stand with supervision/set-up within 7 day(s). 4.  Patient will ambulate with supervision/set-up for 150 feet with the least restrictive device within 7 day(s). 5.  Patient will ascend/descend 13 stairs with 2 handrail(s) with minimal assistance/contact guard assist within 7 day(s). physical Therapy EVALUATION  Patient: Adore Vang (15 y.o. female)  Date: 5/1/2018  Primary Diagnosis: HISTORY OF COLON CANCER  Colon cancer ([facility] [address]  Procedure(s) (LRB):  ROBOTIC SUB TOTAL COLECTOMY  (E R A S); INTRAOPERATIVE COLONOSCOPY (N/A) 1 Day Post-Op   Precautions:   Fall, TLSO    ASSESSMENT :  Based on the objective data described below, the patient presents with generalized weakness and impaired balance following multiple hospitalizations over the past few months, this time admitted for total colectomy POD 1. Prior to recent decline in health patient was independent and lived alone. Patient is overall min A for supine to sit and mod A for sit to stand from lower surfaces. Ambulated 50 feet with min A, requires IV pole for balance. Patient ambulates with shuffled steps and slow aba. Patient also reports recently history of a fall. Patient scored 11/28 on Tinetti this date indicating high fall risk. Recommend IP rehab as patient was recently at [place] and has multiple hospital readmissions. Patient can tolerate 3 hours of therapy and anticipate good gains to return to mod I level of mobility.       Patient will benefit from skilled intervention to address Stable. the above impairments. Patients rehabilitation potential is considered to be Good  Factors which may influence rehabilitation potential include:   []         None noted  []         Mental ability/status  [x]         Medical condition  []         Home/family situation and support systems  []         Safety awareness  []         Pain tolerance/management  []         Other:      PLAN :  Recommendations and Planned Interventions:  [x]           Bed Mobility Training             [x]    Neuromuscular Re-Education  [x]           Transfer Training                   []    Orthotic/Prosthetic Training  [x]           Gait Training                         []    Modalities  [x]           Therapeutic Exercises           []    Edema Management/Control  [x]           Therapeutic Activities            [x]    Patient and Family Training/Education  []           Other (comment):    Frequency/Duration: Patient will be followed by physical therapy  5 times a week to address goals. Discharge Recommendations: Inpatient Rehab  Further Equipment Recommendations for Discharge: TBD     SUBJECTIVE:   Patient stated I was at SNF and I didn't think it helped it wasn't enough.     OBJECTIVE DATA SUMMARY:   HISTORY:    Past Medical History:   Diagnosis Date    Alcoholic cirrhosis of liver without ascites ([address] 2/12/2018    Arthritis     Cancer (HCC)     COLON    Chronic pain     BACK PAIN T12 FRACTURE    ETOH abuse 2/11/2018    GERD (gastroesophageal reflux disease)     History of vascular access device 02/16/2018    [facility] VAT - 5 FR triple, R basilic, TPN    Hyperammonemia ([address] 2/16/2018     Results for Oracio Dickens (MRN [id number]) as of 2/17/2018 08:43  Ref.  Range 2/16/2018 17:20 Ammonia Latest Ref Range: <32 UMOL/L 69 (H)     Hyperlipidemia     Perforated chronic gastric ulcer ([address] 2/11/2018     Past Surgical History:   Procedure Laterality Date    ABDOMEN SURGERY PROC UNLISTED      COLONOSCOPY N/A 4/27/2018 COLONOSCOPY WITH SUBMUCOUSAL INJECTION OF MIKAELA INK  performed by Patrice Franco MD at hospitals MAIN OR    HX CATARACT REMOVAL      HX GI      PERFORATED ULCER SURGERY     Prior Level of Function/Home Situation: recently at SNF and with readmissions to hospital, had previously lived alone and was independent prior to decline in health  Personal factors and/or comorbidities impacting plan of care:     Home Situation  Home Environment: Private residence  # Steps to Enter: 5  Rails to Enter: Yes  Hand Rails : Bilateral  One/Two Story Residence: Two story  # of Interior Steps: 15  Interior Rails: Both  Living Alone: No (but typically lives alone)  Support Systems: Child(palma)    EXAMINATION/PRESENTATION/DECISION MAKING:   Critical Behavior:  Neurologic State: Appropriate for age           Hearing:     Skin:    Edema:   Range Of Motion:  AROM: Generally decreased, functional           PROM: Generally decreased, functional           Strength:    Strength: Generally decreased, functional                    Tone & Sensation:                                  Coordination:     Vision:      Functional Mobility:  Bed Mobility:     Supine to Sit: Minimum assistance; Additional time;Assist x1        Transfers:  Sit to Stand: Moderate assistance (lower surface)  Stand to Sit: Moderate assistance (poor eccentric control)        Bed to Chair: Minimum assistance              Balance:   Sitting: Intact  Standing: Impaired  Standing - Static: Fair;Constant support  Standing - Dynamic : Fair  Ambulation/Gait Training:  Distance (ft): 50 Feet (ft)  Assistive Device: Gait belt (IV pole for balance)  Ambulation - Level of Assistance: Minimal assistance        Gait Abnormalities: Altered arm swing;Decreased step clearance;Shuffling gait        Base of Support: Widened     Speed/Alejandra: Pace decreased (<100 feet/min); Shuffled  Step Length: Right shortened;Left shortened                     Stairs:               Therapeutic Exercises: Functional Measure:  Tinetti test:    Sitting Balance: 1  Arises: 0  Attempts to Rise: 0  Immediate Standing Balance: 0  Standing Balance: 1  Nudged: 0  Eyes Closed: 0  Turn 360 Degrees - Continuous/Discontinuous: 0  Turn 360 Degrees - Steady/Unsteady: 0  Sitting Down: 1  Balance Score: 3  Indication of Gait: 1  R Step Length/Height: 1  L Step Length/Height: 1  R Foot Clearance: 1  L Foot Clearance: 1  Step Symmetry: 1  Step Continuity: 0  Path: 1  Trunk: 1  Walking Time: 0  Gait Score: 8  Total Score: 11       Tinetti Test and G-code impairment scale:  Percentage of Impairment CH    0%   CI    1-19% CJ    20-39% CK    40-59% CL    60-79% CM    80-99% CN     100%   Tinetti  Score 0-28 28 23-27 17-22 12-16 6-11 1-5 0       Tinetti Tool Score Risk of Falls  <19 = High Fall Risk  19-24 = Moderate Fall Risk  25-28 = Low Fall Risk  Tinetti ME. Performance-Oriented Assessment of Mobility Problems in Elderly Patients. [facility]; [id number]. (Scoring Description: PT Bulletin Feb. 10, 1993)    Older adults: Jeniffer Shafer et al, 2009; n = 1000 [facility] elderly evaluated with ABC, DIANDRA, ADL, and IADL)  · Mean DIANDRA score for males aged 69-68 years = 26.21(3.40)  · Mean DIANDRA score for females age 69-68 years = 25.16(4.30)  · Mean DIANDRA score for males over 80 years = 23.29(6.02)  · Mean DIANDRA score for females over 80 years = 17.20(8.32)         G codes: In compliance with CMSs Claims Based Outcome Reporting, the following G-code set was chosen for this patient based on their primary functional limitation being treated: The outcome measure chosen to determine the severity of the functional limitation was the Tinetti with a score of 11/28 which was correlated with the impairment scale.     ? Mobility - Walking and Moving Around:     - CURRENT STATUS: CL - 60%-79% impaired, limited or restricted    - GOAL STATUS: CK - 40%-59% impaired, limited or restricted    - D/C STATUS:  ---------------To be determined---------------        Pain:  Pain Scale 1: Numeric (0 - 10)  Pain Intensity 1: 2  Pain Location 1: Abdomen  Pain Orientation 1: Anterior  Pain Description 1: Aching  Pain Intervention(s) 1: Medication (see MAR)  Activity Tolerance:   Improving  Please refer to the flowsheet for vital signs taken during this treatment. After treatment:   [x]         Patient left in no apparent distress sitting up in chair  []         Patient left in no apparent distress in bed  [x]         Call bell left within reach  [x]         Nursing notified  []         Caregiver present  []         Bed alarm activated    COMMUNICATION/EDUCATION:   The patients plan of care was discussed with: Registered Nurse. [x]         Fall prevention education was provided and the patient/caregiver indicated understanding. [x]         Patient/family have participated as able in goal setting and plan of care. [x]         Patient/family agree to work toward stated goals and plan of care. []         Patient understands intent and goals of therapy, but is neutral about his/her participation. []         Patient is unable to participate in goal setting and plan of care.     Thank you for this referral.  Clint Ramirez, PT   Time Calculation: 24 mins

## 2022-07-13 NOTE — PROGRESS NOTES
Problem: Self Care Deficits Care Plan (Adult)  Goal: *Acute Goals and Plan of Care (Insert Text)  Occupational Therapy Goals  Revised 3/2/2018  1. Patient will perform grooming with modified independence for >5 minutes with supervision/setup with < 2 verbal cues within 7 day(s). 2.  Patient will perform upper body dressing and bathing with modified independence within 7 day(s). 3.  Patient will perform lower body dressing and bathing with moderate assistance using adaptive equipment within 7 day(s). 4.  Patient will perform toilet transfers to [facility] with minimal assistance x1 within 7 day(s). 5.  Patient will perform all aspects of toileting with minimal assistance within 7 day(s). 6.  Patient will participate in upper extremity therapeutic exercise/activities with supervision/set-up for 10 minutes within 7 day(s). 7.  Patient will utilize energy conservation techniques during functional activities with verbal cues within 7 day(s). 8.  Patient will don/doff back brace at supervision/set-up within 7 days. 9.  Patient will verbalize/demonstrate all spinal precautions during ADL tasks without cues within 7 days. Initiated 2/20/2018; revised at re-assess (see above); 1. Patient will perform upper body dressing with moderate assistance in unsupported sitting within 7 day(s). 2.  Patient will perform upper body bathing with moderate assistance  within 7 day(s). 3.  Patient will perform grooming with minimal assistance/contact guard assist within 7 day(s). 4.  Patient will perform toilet transfers with maximal assistance  within 7 day(s). 5.  Patient will perform all aspects of toileting with maximal assistance within 7 day(s). 6.  Patient will participate in upper extremity therapeutic exercise/activities with minimal assistance/contact guard assist for 5 minutes within 7 day(s).         Occupational Therapy TREATMENT  Patient: Thor Bernheim (67 y.o. female)  Date: 3/7/2018  Diagnosis: Intra-abdominal free air of unknown etiology [K66.8] Perforated chronic gastric ulcer ([address]  Procedure(s) (LRB):   NASOGASTRIC TUBE PLACEMENT (N/A) 17 Days Post-Op  Precautions: Aspiration, Back, Bed Alarm, Fall  Chart, occupational therapy assessment, plan of care, and goals were reviewed. ASSESSMENT:  Pt seated in chair agreeable to OT. She was able to brush her teeth with min assist to open containers. She combed her hair with contact guard as well as applied deodorant, simple grooming. Pt repositions herself frequently while in the chair and asked what is uncomfortable to which she replies, \" my back and I don't know. \" Pt seated on waffle cushion. She was able to participate with one set of UE therapeutic exercise except too fatigued to engage with any overhead exercises. Cont towards goals and recommend rehab. Progression toward goals:  []          Improving appropriately and progressing toward goals  [x]          Improving slowly and progressing toward goals  []          Not making progress toward goals and plan of care will be adjusted     PLAN:  Patient continues to benefit from skilled intervention to address the above impairments. Continue treatment per established plan of care. Discharge Recommendations:  Rehab  Further Equipment Recommendations for Discharge:  None     SUBJECTIVE:   Patient stated I don't remember.     OBJECTIVE DATA SUMMARY:   Cognitive/Behavioral Status:  Neurologic State: Alert  Orientation Level: Oriented X4  Cognition: Appropriate decision making           Functional Mobility and Transfers for ADLs:   Bed Mobility:      Not tested as pt already up in the chair           Transfers:      Not tested    Balance: Intact sitting balance in the chair     ADL Intervention:       Grooming  Grooming Assistance: Minimum assistance (seated in chair)  Brushing Teeth: Minimum assistance  Brushing/Combing Hair: Contact guard assistance     Min assist for simple grooming including oral hygiene, applying deodorant. Therapeutic Exercises:     EXERCISE   Sets   Reps   Active Active Assist   Passive   Comments   Finger flex/ext 1 10 [x]           []           []              Wrist flex/ext 1 10 [x]           []           []              Forearm supination/pronation 1 10 [x]           []           []              Elbow flex/ext 1 10 [x]           []           []              Chest presses 1 10 [x]           []           []                 []           []           []                 []           []           []                 []           []           []                 []           []           []                 []           []           []                 []           []           []                    Pain:  Pain Scale 1: Numeric (0 - 10)  Pain Intensity 1: 0  Pain Location 1: Abdomen  Pain Orientation 1: Lateral  Pain Description 1: Aching;Constant  Pain Intervention(s) 1: Medication (see MAR)    Activity Tolerance:    Fair  Please refer to the flowsheet for vital signs taken during this treatment.   After treatment:   [x]  Patient left in no apparent distress sitting up in chair  []  Patient left in no apparent distress in bed  [x]  Call bell left within reach  []  Nursing notified  []  Caregiver present  []  Bed alarm activated    COMMUNICATION/COLLABORATION:   The patients plan of care was discussed with: Occupational Therapist    ISRAEL Mcdonough  Time Calculation: 28 mins Left arm;

## (undated) DEVICE — SOLUTION IRRIG 1000ML H2O STRL BLT

## (undated) DEVICE — STAPLER 45: Brand: ENDOWRIST

## (undated) DEVICE — DRAPE,REIN 53X77,STERILE: Brand: MEDLINE

## (undated) DEVICE — NEEDLE SCLERO 25GA L240CM OD0.51MM ID0.24MM EXTN L4MM SHTH

## (undated) DEVICE — GOWN,SIRUS,FABRNF,XL,20/CS: Brand: MEDLINE

## (undated) DEVICE — ROCKER SWITCH PENCIL BLADE ELECTRODE, HOLSTER: Brand: EDGE

## (undated) DEVICE — STAPLER 45 RELOAD WHITE: Brand: ENDOWRIST

## (undated) DEVICE — ACCESS PLATFORM FOR MINIMALLY INVASIVE SURGERY: Brand: GELPOINT®  MINI ADVANCED ACCESS PLATFORM

## (undated) DEVICE — TRAY PREP DRY W/ PREM GLV 2 APPL 6 SPNG 2 UNDPD 1 OVERWRAP

## (undated) DEVICE — SOLUTION IV 1000ML 0.9% SOD CHL

## (undated) DEVICE — INFECTION CONTROL KIT SYS

## (undated) DEVICE — TISSEEL VHSD 10 ML KT 1504516] BAXTER BIOSURGERY]

## (undated) DEVICE — CYSTO/BLADDER IRRIGATION SET, REGULATING CLAMP

## (undated) DEVICE — CATHETER DRNGE 32FR 4 WNG DISP FOR NEPHSTMY MALECOTS

## (undated) DEVICE — SUT SLK 2-0SH 30IN BLK --

## (undated) DEVICE — POOLE SUCTION INSTRUMENT WITH REMOVABLE SHEATH: Brand: POOLE

## (undated) DEVICE — SUT ETHLN 2-0 18IN FS BLK --

## (undated) DEVICE — DRAPE FLD WRM W44XL66IN C6L FOR INTRATEMP SYS THERMABASIN

## (undated) DEVICE — DUAL LUMEN STOMACH TUBE MULTI-FUNCTIONAL PORT: Brand: SALEM SUMP

## (undated) DEVICE — Device

## (undated) DEVICE — DEVON™ KNEE AND BODY STRAP 60" X 3" (1.5 M X 7.6 CM): Brand: DEVON

## (undated) DEVICE — Device: Brand: ENDO SMARTCAP

## (undated) DEVICE — TRAY CATH W/ 16FR CATH URIN M CTRL FIT OUTLT TB F STATLOK

## (undated) DEVICE — FORCEPS BX L240CM JAW DIA2.8MM L CAP W/ NDL MIC MESH TOOTH

## (undated) DEVICE — CURVED, LARGE JAW, OPEN SEALER/DIVIDER NANO-COATED: Brand: LIGASURE IMPACT

## (undated) DEVICE — DRAIN CHN 19FR L0.25MM DIA6.3MM SIL RND HUBLESS FULL FLUT

## (undated) DEVICE — TRI-LUMEN FILTERED TUBE SET WITH ACTIVATED CHARCOAL FILTER: Brand: AIRSEAL

## (undated) DEVICE — SMALL GRASPING RETRACTOR: Brand: ENDOWRIST

## (undated) DEVICE — Device: Brand: DEFENDO VALVE AND CONNECTOR KIT

## (undated) DEVICE — 1200 GUARD II KIT W/5MM TUBE W/O VAC TUBE: Brand: GUARDIAN

## (undated) DEVICE — STERILE POLYISOPRENE POWDER-FREE SURGICAL GLOVES: Brand: PROTEXIS

## (undated) DEVICE — STERILE POLYISOPRENE POWDER-FREE SURGICAL GLOVES WITH EMOLLIENT COATING: Brand: PROTEXIS

## (undated) DEVICE — VISUALIZATION SYSTEM: Brand: CLEARIFY

## (undated) DEVICE — STAPLER SHEATH: Brand: ENDOWRIST

## (undated) DEVICE — BULB SYRINGE, IRRIGATION WITH PROTECTIVE CAP, 60 CC, INDIVIDUALLY WRAPPED: Brand: DOVER

## (undated) DEVICE — SEAL

## (undated) DEVICE — APPLICATOR FLEXIBLE 360D 40CM --

## (undated) DEVICE — STERILE-Z MAYO STAND COVERS CLEAR POLYETHYLENE STERILE UNIVERSAL FIT 20 PER CASE: Brand: STERILE-Z

## (undated) DEVICE — REM POLYHESIVE ADULT PATIENT RETURN ELECTRODE: Brand: VALLEYLAB

## (undated) DEVICE — (D)PREP SKN CHLRAPRP APPL 26ML -- CONVERT TO ITEM 371833

## (undated) DEVICE — TIP COVER ACCESSORY

## (undated) DEVICE — TOWEL SURG W17XL27IN STD BLU COT NONFENESTRATED PREWASHED

## (undated) DEVICE — CADIERE FORCEPS: Brand: ENDOWRIST

## (undated) DEVICE — Z DISCONTINUEDSOLUTION PREP 2OZ 10% POVIDONE IOD SCR CAP BTL

## (undated) DEVICE — LARGE SUTURE CUT NEEDLE DRIVER: Brand: ENDOWRIST

## (undated) DEVICE — Z INACTIVE USE 2527070 DRAPE SURG W40XL44IN UNDERBUTTOCK SMS POLYPR W/ PCH BK DISP

## (undated) DEVICE — SEAL UNIV 5-8MM DISP BX/10 -- DA VINCI XI - SNGL USE

## (undated) DEVICE — 3000CC GUARDIAN II: Brand: GUARDIAN

## (undated) DEVICE — ARM DRAPE

## (undated) DEVICE — BASIC PACK: Brand: CONVERTORS

## (undated) DEVICE — LEGGINGS, PAIR, 31X48, STERILE: Brand: MEDLINE

## (undated) DEVICE — NEEDLE HYPO 25GA L1.5IN BVL ORIENTED ECLIPSE

## (undated) DEVICE — DRAPE,ROBOTICS,STERILE: Brand: MEDLINE

## (undated) DEVICE — AIRSEAL 8 MM ACCESS PORT AND LOW PROFILE OBTURATOR WITH BLADELESS OPTICAL TIP, 120 MM LENGTH: Brand: AIRSEAL

## (undated) DEVICE — VESSEL SEALER: Brand: ENDOWRIST

## (undated) DEVICE — TOWEL 4 PLY TISS 19X30 SUE WHT

## (undated) DEVICE — AIRSEAL 8 MM ACCESS PORT AND LOW PROFILE OBTURATOR WITH BLADELESS OPTICAL TIP, 100 MM LENGTH: Brand: AIRSEAL

## (undated) DEVICE — LARGE NEEDLE DRIVER: Brand: ENDOWRIST

## (undated) DEVICE — KENDALL SCD EXPRESS SLEEVES, KNEE LENGTH, MEDIUM: Brand: KENDALL SCD

## (undated) DEVICE — SUTURE DEV SZ 2-0 WND CLSR ABSRB GS-22 VLOC COVIDIEN VLOCM2145

## (undated) DEVICE — ELECTRO LUBE IS A SINGLE PATIENT USE DEVICE THAT IS INTENDED TO BE USED ON ELECTROSURGICAL ELECTRODES TO REDUCE STICKING.: Brand: KEY SURGICAL ELECTRO LUBE

## (undated) DEVICE — SUTURE MCRYL SZ 4-0 L27IN ABSRB UD L19MM PS-2 1/2 CIR PRIM Y426H

## (undated) DEVICE — SURGICAL PROCEDURE PACK TISS 3X5 IN ABSORBABLE SEPRAFILM

## (undated) DEVICE — BLADELESS OPTICAL TROCAR WITH FIXATION CANNULA: Brand: VERSAPORT

## (undated) DEVICE — MAX-CORE® DISPOSABLE CORE BIOPSY INSTRUMENT, 18G X 16CM: Brand: MAX-CORE

## (undated) DEVICE — STAIN INDIA INK IN NACL 10ML --

## (undated) DEVICE — JELLY,LUBE,STERILE,FLIP TOP,TUBE,4-OZ: Brand: MEDLINE

## (undated) DEVICE — REDUCER CANN ENDOWRIST 12-8MM -- DA VINCI XI - SNGL USE

## (undated) DEVICE — STAPLER 45 RELOAD BLUE: Brand: ENDOWRIST

## (undated) DEVICE — SEALANT FIBRIN 10 CC FRZN PRE FILLED SYR TISSEEL

## (undated) DEVICE — DISPOSABLE GRASPER CARTRIDGE: Brand: DIRECT DRIVE REPOSABLE GRASPERS

## (undated) DEVICE — SURGICAL PROCEDURE PACK BASIN MAJ SET CUST NO CAUT

## (undated) DEVICE — FENESTRATED BIPOLAR FORCEPS: Brand: ENDOWRIST

## (undated) DEVICE — TELFA NON-ADHERENT ABSORBENT DRESSING: Brand: TELFA

## (undated) DEVICE — SUTURE PDS II SZ 1 L96IN ABSRB VLT TP-1 L65MM 1/2 CIR Z880G

## (undated) DEVICE — BLADE ELECTRODE: Brand: EDGE

## (undated) DEVICE — COLON CLOSING PACK: Brand: MEDLINE INDUSTRIES, INC.

## (undated) DEVICE — STAPLER INT L28CM DIA29MM CLS STPL H10-2.5MM OPN LEG L5.5MM

## (undated) DEVICE — GRASPER ENDOSCP TIP L10MM ANVIL ROT RATCH HNDL DISP

## (undated) DEVICE — TELFA ADHESIVE ISLAND DRESSING: Brand: TELFA

## (undated) DEVICE — APPLICATOR BNDG 1MM ADH PREMIERPRO EXOFIN

## (undated) DEVICE — DBD-PACK,LAPAROTOMY,2 REINFORCED GOWNS: Brand: MEDLINE

## (undated) DEVICE — SOLIDIFIER MEDC 1200ML -- CONVERT TO 356117

## (undated) DEVICE — SUTURE PDS II SZ 1 L27IN ABSRB VLT CT-1 L36MM 1/2 CIR Z341H

## (undated) DEVICE — SCISSORS SURG DIA8MM MPLR CRV ENDOWRIST

## (undated) DEVICE — BLADELESS OBTURATOR: Brand: WECK VISTA

## (undated) DEVICE — SOLUTION IRRIGATION H2O 0797305] ICU MEDICAL INC]

## (undated) DEVICE — DEVICE TRNSF SPIK STL 2008S] MICROTEK MEDICAL INC]

## (undated) DEVICE — SUTURE VCRL SZ 3-0 L27IN ABSRB UD L26MM SH 1/2 CIR J416H